# Patient Record
Sex: MALE | Race: OTHER | HISPANIC OR LATINO | URBAN - METROPOLITAN AREA
[De-identification: names, ages, dates, MRNs, and addresses within clinical notes are randomized per-mention and may not be internally consistent; named-entity substitution may affect disease eponyms.]

---

## 2018-07-23 ENCOUNTER — OUTPATIENT (OUTPATIENT)
Dept: OUTPATIENT SERVICES | Facility: HOSPITAL | Age: 53
LOS: 1 days | Discharge: HOME | End: 2018-07-23

## 2018-07-23 DIAGNOSIS — E11.65 TYPE 2 DIABETES MELLITUS WITH HYPERGLYCEMIA: ICD-10-CM

## 2018-07-23 DIAGNOSIS — I10 ESSENTIAL (PRIMARY) HYPERTENSION: ICD-10-CM

## 2018-07-23 DIAGNOSIS — E55.9 VITAMIN D DEFICIENCY, UNSPECIFIED: ICD-10-CM

## 2018-07-23 DIAGNOSIS — E78.00 PURE HYPERCHOLESTEROLEMIA, UNSPECIFIED: ICD-10-CM

## 2018-10-29 ENCOUNTER — OUTPATIENT (OUTPATIENT)
Dept: OUTPATIENT SERVICES | Facility: HOSPITAL | Age: 53
LOS: 1 days | Discharge: HOME | End: 2018-10-29

## 2018-10-29 DIAGNOSIS — E11.9 TYPE 2 DIABETES MELLITUS WITHOUT COMPLICATIONS: ICD-10-CM

## 2018-10-29 DIAGNOSIS — E55.9 VITAMIN D DEFICIENCY, UNSPECIFIED: ICD-10-CM

## 2018-10-29 DIAGNOSIS — I10 ESSENTIAL (PRIMARY) HYPERTENSION: ICD-10-CM

## 2018-10-29 DIAGNOSIS — E78.00 PURE HYPERCHOLESTEROLEMIA, UNSPECIFIED: ICD-10-CM

## 2021-04-17 ENCOUNTER — OUTPATIENT (OUTPATIENT)
Dept: OUTPATIENT SERVICES | Facility: HOSPITAL | Age: 56
LOS: 1 days | Discharge: HOME | End: 2021-04-17

## 2021-04-17 DIAGNOSIS — Z11.59 ENCOUNTER FOR SCREENING FOR OTHER VIRAL DISEASES: ICD-10-CM

## 2021-07-01 ENCOUNTER — INPATIENT (INPATIENT)
Facility: HOSPITAL | Age: 56
LOS: 23 days | Discharge: HOME | End: 2021-07-25
Attending: INTERNAL MEDICINE | Admitting: INTERNAL MEDICINE
Payer: COMMERCIAL

## 2021-07-01 VITALS
TEMPERATURE: 99 F | HEIGHT: 63 IN | WEIGHT: 163.14 LBS | SYSTOLIC BLOOD PRESSURE: 192 MMHG | OXYGEN SATURATION: 99 % | HEART RATE: 77 BPM | RESPIRATION RATE: 18 BRPM | DIASTOLIC BLOOD PRESSURE: 130 MMHG

## 2021-07-01 PROCEDURE — 93010 ELECTROCARDIOGRAM REPORT: CPT

## 2021-07-01 PROCEDURE — 99285 EMERGENCY DEPT VISIT HI MDM: CPT | Mod: 25

## 2021-07-01 PROCEDURE — 93308 TTE F-UP OR LMTD: CPT | Mod: 26

## 2021-07-01 NOTE — ED ADULT TRIAGE NOTE - CHIEF COMPLAINT QUOTE
I'm diabetic, my feet are swollen and they hurt, my arm is bothering me most of the day. My thighs also hurt - patient   Patient reports wanting medication for pain and be able to get appointment with specialist faster

## 2021-07-02 ENCOUNTER — TRANSCRIPTION ENCOUNTER (OUTPATIENT)
Age: 56
End: 2021-07-02

## 2021-07-02 DIAGNOSIS — R09.89 OTHER SPECIFIED SYMPTOMS AND SIGNS INVOLVING THE CIRCULATORY AND RESPIRATORY SYSTEMS: ICD-10-CM

## 2021-07-02 DIAGNOSIS — I50.9 HEART FAILURE, UNSPECIFIED: ICD-10-CM

## 2021-07-02 LAB
A1C WITH ESTIMATED AVERAGE GLUCOSE RESULT: 5.8 % — HIGH (ref 4–5.6)
ALBUMIN SERPL ELPH-MCNC: 2.5 G/DL — LOW (ref 3.5–5.2)
ALBUMIN SERPL ELPH-MCNC: 2.6 G/DL — LOW (ref 3.5–5.2)
ALP SERPL-CCNC: 166 U/L — HIGH (ref 30–115)
ALP SERPL-CCNC: 172 U/L — HIGH (ref 30–115)
ALT FLD-CCNC: 42 U/L — HIGH (ref 0–41)
ALT FLD-CCNC: 45 U/L — HIGH (ref 0–41)
ANION GAP SERPL CALC-SCNC: 5 MMOL/L — LOW (ref 7–14)
ANION GAP SERPL CALC-SCNC: 6 MMOL/L — LOW (ref 7–14)
AST SERPL-CCNC: 40 U/L — SIGNIFICANT CHANGE UP (ref 0–41)
AST SERPL-CCNC: 60 U/L — HIGH (ref 0–41)
B-OH-BUTYR SERPL-SCNC: <0.2 MMOL/L — SIGNIFICANT CHANGE UP
BASE EXCESS BLDV CALC-SCNC: 2.1 MMOL/L — HIGH (ref -2–2)
BASOPHILS # BLD AUTO: 0.03 K/UL — SIGNIFICANT CHANGE UP (ref 0–0.2)
BASOPHILS # BLD AUTO: 0.04 K/UL — SIGNIFICANT CHANGE UP (ref 0–0.2)
BASOPHILS NFR BLD AUTO: 0.3 % — SIGNIFICANT CHANGE UP (ref 0–1)
BASOPHILS NFR BLD AUTO: 0.4 % — SIGNIFICANT CHANGE UP (ref 0–1)
BILIRUB DIRECT SERPL-MCNC: <0.2 MG/DL — SIGNIFICANT CHANGE UP (ref 0–0.2)
BILIRUB INDIRECT FLD-MCNC: >0.3 MG/DL — SIGNIFICANT CHANGE UP (ref 0.2–1.2)
BILIRUB SERPL-MCNC: 0.4 MG/DL — SIGNIFICANT CHANGE UP (ref 0.2–1.2)
BILIRUB SERPL-MCNC: 0.5 MG/DL — SIGNIFICANT CHANGE UP (ref 0.2–1.2)
BLD GP AB SCN SERPL QL: SIGNIFICANT CHANGE UP
BUN SERPL-MCNC: 24 MG/DL — HIGH (ref 10–20)
BUN SERPL-MCNC: 27 MG/DL — HIGH (ref 10–20)
CA-I SERPL-SCNC: 1.15 MMOL/L — SIGNIFICANT CHANGE UP (ref 1.12–1.3)
CALCIUM SERPL-MCNC: 7.9 MG/DL — LOW (ref 8.5–10.1)
CALCIUM SERPL-MCNC: 8 MG/DL — LOW (ref 8.5–10.1)
CHLORIDE SERPL-SCNC: 103 MMOL/L — SIGNIFICANT CHANGE UP (ref 98–110)
CHLORIDE SERPL-SCNC: 105 MMOL/L — SIGNIFICANT CHANGE UP (ref 98–110)
CK MB CFR SERPL CALC: 10.2 NG/ML — HIGH (ref 0.6–6.3)
CK SERPL-CCNC: 863 U/L — HIGH (ref 0–225)
CO2 SERPL-SCNC: 26 MMOL/L — SIGNIFICANT CHANGE UP (ref 17–32)
CO2 SERPL-SCNC: 28 MMOL/L — SIGNIFICANT CHANGE UP (ref 17–32)
CREAT SERPL-MCNC: 2 MG/DL — HIGH (ref 0.7–1.5)
CREAT SERPL-MCNC: 2.1 MG/DL — HIGH (ref 0.7–1.5)
EOSINOPHIL # BLD AUTO: 0.31 K/UL — SIGNIFICANT CHANGE UP (ref 0–0.7)
EOSINOPHIL # BLD AUTO: 0.57 K/UL — SIGNIFICANT CHANGE UP (ref 0–0.7)
EOSINOPHIL NFR BLD AUTO: 3.3 % — SIGNIFICANT CHANGE UP (ref 0–8)
EOSINOPHIL NFR BLD AUTO: 5.5 % — SIGNIFICANT CHANGE UP (ref 0–8)
ESTIMATED AVERAGE GLUCOSE: 120 MG/DL — HIGH (ref 68–114)
GAS PNL BLDV: 138 MMOL/L — SIGNIFICANT CHANGE UP (ref 136–145)
GAS PNL BLDV: SIGNIFICANT CHANGE UP
GLUCOSE BLDC GLUCOMTR-MCNC: 130 MG/DL — HIGH (ref 70–99)
GLUCOSE BLDC GLUCOMTR-MCNC: 133 MG/DL — HIGH (ref 70–99)
GLUCOSE SERPL-MCNC: 133 MG/DL — HIGH (ref 70–99)
GLUCOSE SERPL-MCNC: 90 MG/DL — SIGNIFICANT CHANGE UP (ref 70–99)
HCO3 BLDV-SCNC: 29 MMOL/L — SIGNIFICANT CHANGE UP (ref 22–29)
HCT VFR BLD CALC: 36.9 % — LOW (ref 42–52)
HCT VFR BLD CALC: 39.8 % — LOW (ref 42–52)
HCT VFR BLDA CALC: 34 % — SIGNIFICANT CHANGE UP (ref 34–44)
HGB BLD CALC-MCNC: 11.1 G/DL — LOW (ref 14–18)
HGB BLD-MCNC: 11.1 G/DL — LOW (ref 14–18)
HGB BLD-MCNC: 11.7 G/DL — LOW (ref 14–18)
IMM GRANULOCYTES NFR BLD AUTO: 0.3 % — SIGNIFICANT CHANGE UP (ref 0.1–0.3)
IMM GRANULOCYTES NFR BLD AUTO: 0.4 % — HIGH (ref 0.1–0.3)
IRON SATN MFR SERPL: 22 % — SIGNIFICANT CHANGE UP (ref 15–50)
IRON SATN MFR SERPL: 43 UG/DL — SIGNIFICANT CHANGE UP (ref 35–150)
LACTATE BLDV-MCNC: 0.9 MMOL/L — SIGNIFICANT CHANGE UP (ref 0.5–1.6)
LACTATE SERPL-SCNC: 0.6 MMOL/L — LOW (ref 0.7–2)
LYMPHOCYTES # BLD AUTO: 1.04 K/UL — LOW (ref 1.2–3.4)
LYMPHOCYTES # BLD AUTO: 1.14 K/UL — LOW (ref 1.2–3.4)
LYMPHOCYTES # BLD AUTO: 11.1 % — LOW (ref 20.5–51.1)
LYMPHOCYTES # BLD AUTO: 11.1 % — LOW (ref 20.5–51.1)
MAGNESIUM SERPL-MCNC: 2 MG/DL — SIGNIFICANT CHANGE UP (ref 1.8–2.4)
MAGNESIUM SERPL-MCNC: 2.1 MG/DL — SIGNIFICANT CHANGE UP (ref 1.8–2.4)
MCHC RBC-ENTMCNC: 23.3 PG — LOW (ref 27–31)
MCHC RBC-ENTMCNC: 24.1 PG — LOW (ref 27–31)
MCHC RBC-ENTMCNC: 29.4 G/DL — LOW (ref 32–37)
MCHC RBC-ENTMCNC: 30.1 G/DL — LOW (ref 32–37)
MCV RBC AUTO: 79.3 FL — LOW (ref 80–94)
MCV RBC AUTO: 80 FL — SIGNIFICANT CHANGE UP (ref 80–94)
MONOCYTES # BLD AUTO: 0.7 K/UL — HIGH (ref 0.1–0.6)
MONOCYTES # BLD AUTO: 0.89 K/UL — HIGH (ref 0.1–0.6)
MONOCYTES NFR BLD AUTO: 7.5 % — SIGNIFICANT CHANGE UP (ref 1.7–9.3)
MONOCYTES NFR BLD AUTO: 8.7 % — SIGNIFICANT CHANGE UP (ref 1.7–9.3)
NEUTROPHILS # BLD AUTO: 7.23 K/UL — HIGH (ref 1.4–6.5)
NEUTROPHILS # BLD AUTO: 7.61 K/UL — HIGH (ref 1.4–6.5)
NEUTROPHILS NFR BLD AUTO: 74 % — SIGNIFICANT CHANGE UP (ref 42.2–75.2)
NEUTROPHILS NFR BLD AUTO: 77.4 % — HIGH (ref 42.2–75.2)
NRBC # BLD: 0 /100 WBCS — SIGNIFICANT CHANGE UP (ref 0–0)
NRBC # BLD: 0 /100 WBCS — SIGNIFICANT CHANGE UP (ref 0–0)
NT-PROBNP SERPL-SCNC: HIGH PG/ML (ref 0–300)
OSMOLALITY SERPL: 288 MOS/KG — SIGNIFICANT CHANGE UP (ref 275–300)
PCO2 BLDV: 55 MMHG — HIGH (ref 41–51)
PH BLDV: 7.33 — SIGNIFICANT CHANGE UP (ref 7.26–7.43)
PLATELET # BLD AUTO: 230 K/UL — SIGNIFICANT CHANGE UP (ref 130–400)
PLATELET # BLD AUTO: 239 K/UL — SIGNIFICANT CHANGE UP (ref 130–400)
PO2 BLDV: 17 MMHG — LOW (ref 20–40)
POTASSIUM SERPL-MCNC: 4.6 MMOL/L — SIGNIFICANT CHANGE UP (ref 3.5–5)
POTASSIUM SERPL-MCNC: 4.8 MMOL/L — SIGNIFICANT CHANGE UP (ref 3.5–5)
POTASSIUM SERPL-SCNC: 4.6 MMOL/L — SIGNIFICANT CHANGE UP (ref 3.5–5)
POTASSIUM SERPL-SCNC: 4.8 MMOL/L — SIGNIFICANT CHANGE UP (ref 3.5–5)
PROT SERPL-MCNC: 5.1 G/DL — LOW (ref 6–8)
PROT SERPL-MCNC: 5.1 G/DL — LOW (ref 6–8)
RAPID RVP RESULT: SIGNIFICANT CHANGE UP
RBC # BLD: 4.61 M/UL — LOW (ref 4.7–6.1)
RBC # BLD: 5.02 M/UL — SIGNIFICANT CHANGE UP (ref 4.7–6.1)
RBC # FLD: 14.1 % — SIGNIFICANT CHANGE UP (ref 11.5–14.5)
RBC # FLD: 14.3 % — SIGNIFICANT CHANGE UP (ref 11.5–14.5)
SAO2 % BLDV: 19 % — SIGNIFICANT CHANGE UP
SARS-COV-2 RNA SPEC QL NAA+PROBE: SIGNIFICANT CHANGE UP
SODIUM SERPL-SCNC: 136 MMOL/L — SIGNIFICANT CHANGE UP (ref 135–146)
SODIUM SERPL-SCNC: 137 MMOL/L — SIGNIFICANT CHANGE UP (ref 135–146)
TIBC SERPL-MCNC: 198 UG/DL — LOW (ref 220–430)
TROPONIN T SERPL-MCNC: 0.1 NG/ML — CRITICAL HIGH
TROPONIN T SERPL-MCNC: 0.11 NG/ML — CRITICAL HIGH
TROPONIN T SERPL-MCNC: 0.11 NG/ML — CRITICAL HIGH
UIBC SERPL-MCNC: 155 UG/DL — SIGNIFICANT CHANGE UP (ref 110–370)
WBC # BLD: 10.28 K/UL — SIGNIFICANT CHANGE UP (ref 4.8–10.8)
WBC # BLD: 9.35 K/UL — SIGNIFICANT CHANGE UP (ref 4.8–10.8)
WBC # FLD AUTO: 10.28 K/UL — SIGNIFICANT CHANGE UP (ref 4.8–10.8)
WBC # FLD AUTO: 9.35 K/UL — SIGNIFICANT CHANGE UP (ref 4.8–10.8)

## 2021-07-02 PROCEDURE — 93306 TTE W/DOPPLER COMPLETE: CPT | Mod: 26

## 2021-07-02 PROCEDURE — 76770 US EXAM ABDO BACK WALL COMP: CPT | Mod: 26

## 2021-07-02 PROCEDURE — 99223 1ST HOSP IP/OBS HIGH 75: CPT

## 2021-07-02 PROCEDURE — 71045 X-RAY EXAM CHEST 1 VIEW: CPT | Mod: 26

## 2021-07-02 PROCEDURE — 93970 EXTREMITY STUDY: CPT | Mod: 26

## 2021-07-02 PROCEDURE — 99222 1ST HOSP IP/OBS MODERATE 55: CPT

## 2021-07-02 RX ORDER — SODIUM CHLORIDE 9 MG/ML
1000 INJECTION, SOLUTION INTRAVENOUS
Refills: 0 | Status: DISCONTINUED | OUTPATIENT
Start: 2021-07-02 | End: 2021-07-19

## 2021-07-02 RX ORDER — DEXTROSE 50 % IN WATER 50 %
25 SYRINGE (ML) INTRAVENOUS ONCE
Refills: 0 | Status: DISCONTINUED | OUTPATIENT
Start: 2021-07-02 | End: 2021-07-19

## 2021-07-02 RX ORDER — FUROSEMIDE 40 MG
80 TABLET ORAL EVERY 12 HOURS
Refills: 0 | Status: DISCONTINUED | OUTPATIENT
Start: 2021-07-02 | End: 2021-07-05

## 2021-07-02 RX ORDER — BRIMONIDINE TARTRATE 2 MG/MG
0 SOLUTION/ DROPS OPHTHALMIC
Qty: 0 | Refills: 0 | DISCHARGE

## 2021-07-02 RX ORDER — NIFEDIPINE 30 MG
30 TABLET, EXTENDED RELEASE 24 HR ORAL ONCE
Refills: 0 | Status: COMPLETED | OUTPATIENT
Start: 2021-07-02 | End: 2021-07-02

## 2021-07-02 RX ORDER — GLUCAGON INJECTION, SOLUTION 0.5 MG/.1ML
1 INJECTION, SOLUTION SUBCUTANEOUS ONCE
Refills: 0 | Status: DISCONTINUED | OUTPATIENT
Start: 2021-07-02 | End: 2021-07-19

## 2021-07-02 RX ORDER — TIMOLOL 0.5 %
1 DROPS OPHTHALMIC (EYE)
Refills: 0 | Status: DISCONTINUED | OUTPATIENT
Start: 2021-07-02 | End: 2021-07-25

## 2021-07-02 RX ORDER — BRIMONIDINE TARTRATE 2 MG/MG
1 SOLUTION/ DROPS OPHTHALMIC
Refills: 0 | Status: DISCONTINUED | OUTPATIENT
Start: 2021-07-02 | End: 2021-07-25

## 2021-07-02 RX ORDER — LATANOPROST 0.05 MG/ML
0 SOLUTION/ DROPS OPHTHALMIC; TOPICAL
Qty: 0 | Refills: 0 | DISCHARGE

## 2021-07-02 RX ORDER — DORZOLAMIDE HYDROCHLORIDE TIMOLOL MALEATE 20; 5 MG/ML; MG/ML
1 SOLUTION/ DROPS OPHTHALMIC
Refills: 0 | Status: DISCONTINUED | OUTPATIENT
Start: 2021-07-02 | End: 2021-07-02

## 2021-07-02 RX ORDER — FUROSEMIDE 40 MG
0 TABLET ORAL
Qty: 0 | Refills: 0 | DISCHARGE

## 2021-07-02 RX ORDER — ENOXAPARIN SODIUM 100 MG/ML
40 INJECTION SUBCUTANEOUS DAILY
Refills: 0 | Status: DISCONTINUED | OUTPATIENT
Start: 2021-07-02 | End: 2021-07-12

## 2021-07-02 RX ORDER — DEXTROSE 50 % IN WATER 50 %
12.5 SYRINGE (ML) INTRAVENOUS ONCE
Refills: 0 | Status: DISCONTINUED | OUTPATIENT
Start: 2021-07-02 | End: 2021-07-19

## 2021-07-02 RX ORDER — ATORVASTATIN CALCIUM 80 MG/1
40 TABLET, FILM COATED ORAL AT BEDTIME
Refills: 0 | Status: DISCONTINUED | OUTPATIENT
Start: 2021-07-02 | End: 2021-07-25

## 2021-07-02 RX ORDER — ATORVASTATIN CALCIUM 80 MG/1
0 TABLET, FILM COATED ORAL
Qty: 0 | Refills: 0 | DISCHARGE

## 2021-07-02 RX ORDER — KETOROLAC TROMETHAMINE 0.5 %
0 DROPS OPHTHALMIC (EYE)
Qty: 0 | Refills: 0 | DISCHARGE

## 2021-07-02 RX ORDER — FUROSEMIDE 40 MG
40 TABLET ORAL ONCE
Refills: 0 | Status: COMPLETED | OUTPATIENT
Start: 2021-07-02 | End: 2021-07-02

## 2021-07-02 RX ORDER — DORZOLAMIDE HYDROCHLORIDE 20 MG/ML
1 SOLUTION/ DROPS OPHTHALMIC THREE TIMES A DAY
Refills: 0 | Status: DISCONTINUED | OUTPATIENT
Start: 2021-07-02 | End: 2021-07-25

## 2021-07-02 RX ORDER — DEXTROSE 50 % IN WATER 50 %
15 SYRINGE (ML) INTRAVENOUS ONCE
Refills: 0 | Status: DISCONTINUED | OUTPATIENT
Start: 2021-07-02 | End: 2021-07-19

## 2021-07-02 RX ORDER — NIFEDIPINE 30 MG
30 TABLET, EXTENDED RELEASE 24 HR ORAL DAILY
Refills: 0 | Status: DISCONTINUED | OUTPATIENT
Start: 2021-07-02 | End: 2021-07-03

## 2021-07-02 RX ORDER — CHLORHEXIDINE GLUCONATE 213 G/1000ML
1 SOLUTION TOPICAL
Refills: 0 | Status: DISCONTINUED | OUTPATIENT
Start: 2021-07-02 | End: 2021-07-25

## 2021-07-02 RX ORDER — DORZOLAMIDE HYDROCHLORIDE TIMOLOL MALEATE 20; 5 MG/ML; MG/ML
0 SOLUTION/ DROPS OPHTHALMIC
Qty: 0 | Refills: 0 | DISCHARGE

## 2021-07-02 RX ORDER — LATANOPROST 0.05 MG/ML
1 SOLUTION/ DROPS OPHTHALMIC; TOPICAL AT BEDTIME
Refills: 0 | Status: DISCONTINUED | OUTPATIENT
Start: 2021-07-02 | End: 2021-07-25

## 2021-07-02 RX ORDER — PIOGLITAZONE HCL AND METFORMIN HCL 850; 15 MG/1; MG/1
0 TABLET ORAL
Qty: 0 | Refills: 0 | DISCHARGE

## 2021-07-02 RX ORDER — LISINOPRIL 2.5 MG/1
0 TABLET ORAL
Qty: 0 | Refills: 0 | DISCHARGE

## 2021-07-02 RX ADMIN — DORZOLAMIDE HYDROCHLORIDE 1 DROP(S): 20 SOLUTION/ DROPS OPHTHALMIC at 06:38

## 2021-07-02 RX ADMIN — Medication 40 MILLIGRAM(S): at 03:25

## 2021-07-02 RX ADMIN — DORZOLAMIDE HYDROCHLORIDE 1 DROP(S): 20 SOLUTION/ DROPS OPHTHALMIC at 21:34

## 2021-07-02 RX ADMIN — Medication 1 DROP(S): at 17:21

## 2021-07-02 RX ADMIN — Medication 80 MILLIGRAM(S): at 17:21

## 2021-07-02 RX ADMIN — ATORVASTATIN CALCIUM 40 MILLIGRAM(S): 80 TABLET, FILM COATED ORAL at 21:34

## 2021-07-02 RX ADMIN — Medication 1 DROP(S): at 06:38

## 2021-07-02 RX ADMIN — BRIMONIDINE TARTRATE 1 DROP(S): 2 SOLUTION/ DROPS OPHTHALMIC at 06:38

## 2021-07-02 RX ADMIN — Medication 30 MILLIGRAM(S): at 16:23

## 2021-07-02 RX ADMIN — ENOXAPARIN SODIUM 40 MILLIGRAM(S): 100 INJECTION SUBCUTANEOUS at 11:42

## 2021-07-02 RX ADMIN — LATANOPROST 1 DROP(S): 0.05 SOLUTION/ DROPS OPHTHALMIC; TOPICAL at 21:34

## 2021-07-02 RX ADMIN — DORZOLAMIDE HYDROCHLORIDE 1 DROP(S): 20 SOLUTION/ DROPS OPHTHALMIC at 14:08

## 2021-07-02 RX ADMIN — BRIMONIDINE TARTRATE 1 DROP(S): 2 SOLUTION/ DROPS OPHTHALMIC at 17:21

## 2021-07-02 NOTE — H&P ADULT - NSICDXPASTMEDICALHX_GEN_ALL_CORE_FT
PAST MEDICAL HISTORY:  DM (diabetes mellitus)     Glaucoma     HLD (hyperlipidemia)     HTN (hypertension)

## 2021-07-02 NOTE — H&P ADULT - NSHPPHYSICALEXAM_GEN_ALL_CORE
GENERAL: mildly distressed  HEAD:  Atraumatic, Normocephalic  EYES: EOMI, PERRLA, conjunctiva and sclera clear  NECK: Supple, No JVD  CHEST/LUNG: Decreased breath sounds b/l  HEART: Regular rate and rhythm; No murmurs, rubs, or gallops  ABDOMEN: Soft, Nontender, Nondistended; Bowel sounds present  EXTREMITIES:  Pitting Edema up till mid calf  PSYCH: AAOx3  NEUROLOGY: non-focal  SKIN: No rashes or lesions

## 2021-07-02 NOTE — CHART NOTE - NSCHARTNOTEFT_GEN_A_CORE
pt is feeling better compared to when he got to ER    CARDIAC MARKERS ( 02 Jul 2021 06:03 )  x     / 0.11 ng/mL / x     / x     / x      CARDIAC MARKERS ( 02 Jul 2021 01:09 )  x     / 0.10 ng/mL / x     / x     / x        #Progress Note Handoff  Pending (specify):  ECHo, cardio   Family discussion: dw pt, fully aaox3  Disposition: Home_

## 2021-07-02 NOTE — H&P ADULT - HISTORY OF PRESENT ILLNESS
PC: LE swelling + Pain 1month    PMHx: HTN, HLD, T2DM on insulin, Glaucoma    HPI: 56M w/ PMHx as above presents to the Ed c/o worsening LE swelling and pain for the past 1 month. Endorses SOB on exertion, along w/ increased abdominal girth. Denies orthopnea, PND, wheeze, CP, diaphoresis. No cardiac Hx.    ED course: /130 in triage, otherwise VS. Labs showed Hb 11.1, Cr 2.1, mildly increased ALP and AST/ALT, Trop 0.10, BNP 29595. CXR revealing cardiomegaly and CP angle blunting b/l.

## 2021-07-02 NOTE — ED PROVIDER NOTE - PHYSICAL EXAMINATION
Vital Signs: Reviewed  GEN: alert, NAD, speaks full sentences  HEAD:  normocephalic, atraumatic  EYES:  PERRLA; conjunctivae without injection, drainage or discharge  ENMT:  nasal mucosa moist; mouth moist without ulcerations or lesions; throat moist without erythema, exudate, ulcerations or lesions  NECK:  supple  CARDIAC:  regular rate, normal S1 and S2, no murmurs  RESP:  tachypneic, crackles bases  ABDOMEN:  soft, nontender, distended  MUSCULOSKELETAL/NEURO:  normal movement, normal tone  SKIN:  normal skin color for age and race, well-perfused; warm and dry

## 2021-07-02 NOTE — ED ADULT NURSE NOTE - OBJECTIVE STATEMENT
pt c/o generalized weakness, poor po intake x3 days. pt also states bilateral lower leg swelling and increased sob on dyspnea x1 week

## 2021-07-02 NOTE — ED PROVIDER NOTE - PROGRESS NOTE DETAILS
AG: pt confirms CP free, still denies SOB but confirms that it is difficult to take a deep breath. ECG non-ischemic.

## 2021-07-02 NOTE — CONSULT NOTE ADULT - PROBLEM SELECTOR RECOMMENDATION 9
- Patient likely developed progressive HF over years 2/2 poor bp control. Unclear if patient is truly compliant on prescribed meds including lasix at home. Pioglitazone likely exacerbated sx of HF.  - C/w aggressive diuresis 80mg lasix BID.   - Please strictly document I&Os. Daily weight monitoring. Po fluid restriction.  - Monitor on tele and pending TTE results.  - Closely monitor electrolytes and renal functions. Will replete K and Mg to 4 and 2 respectively as needed.  - Toponemia on admission c/w CHF exacerbation in the context of LENNY/CKD. No signs of ACS at present.  - BP control as needed along with diuresis. Closely monitor BP.    Please refer to attending documentation for final plan of care. - Patient likely developed progressive HF over years 2/2 poor bp control. Unclear if patient is truly compliant on prescribed meds including lasix at home. Pioglitazone likely exacerbated sx of HF.  - C/w diuresis 80mg lasix BID.   - Please strictly document I&Os. Daily weight monitoring. Po fluid restriction.  - Monitor on tele and pending TTE results.  - Closely monitor electrolytes and renal functions. Will replete K and Mg to 4 and 2 respectively as needed.  - Toponemia on admission c/w CHF exacerbation in the context of LENNY/CKD. No signs of ACS at present. However, will consider need for ischemic w/u based on TTE findings and LV functions.  - BP control as needed along with diuresis. Closely monitor BP. Okay to hold off on initiating nifedipine as patient's BP likely chronic elevated and aggressive diuresis will likely resolve hypertension.  - Please stop pioglitazone at discharge as this could precipitate HF exacerbation.    Please refer to attending documentation for final plan of care.

## 2021-07-02 NOTE — H&P ADULT - ASSESSMENT
56M w/ PMHx of HTN, HLD, T2DM on insulin, Glaucoma presents to the Ed c/o worsening LE swelling and pain for the past 1 month.    #Acute CHF exacerbation  #LE Pitting Edema, b/l  -Denies cardiac Hx   -Usual weight per patient: 67.3kg, Weight on admission: 74kg  -CXR: cardiomegaly and CP angle blunting b/l.  -Serum Pro-BNP: 73108  -Currently not requiring supplemental O2  Plan  - VA Duplex  - I<O, Daily Weight  - IV Lasix  - No ACEI/BB until patient euvolemic  - 2D echo  - supplemental O2 as needed    #LENNY on CKD  #Cardiorenal Syndrome  -Cr increased from 1.7 (3/29/21) to 2.1  Plan  - Urine lytes for FeNa and FeUrea, and protein-creatine ratio.  - UA to evaluate for proteinuria/sterile pyuria/casts  - f/u US KUB to r/o obstruction  - Monitor I&Os to assess for oliguria/anuria.    #HTN  #HLD  -Holding ACEI  -cw Statin    #T2DM  -On Lantus and Metformin-Pioglitazone at home  Plan  - f/u A1c  - Insulin basal/bolus if FS>180    #Glaucoma  -Eye drops    DVT ppx Lovenox  GI ppx N/A  Diet DASH/CC. fluid restricted  Code Full  Dispo Acute

## 2021-07-02 NOTE — CONSULT NOTE ADULT - SUBJECTIVE AND OBJECTIVE BOX
Date of Admission: 07-02-21    CHIEF COMPLAINT: weakness    HISTORY OF PRESENT ILLNESS:   57yo M hx of HTN, HLD, DMII on insulin and pioglitazone presented to ED with cc of generalized weakness. Cardiology service consulted for generalized swelling concerning for CHF exacerbation. Patient reported generalized weakness onset one month ago. His wife recently travelled abroad and he has been eating take-out food lately (likely high in salt content). Patient reported noticing increasing LE swelling. Also sob with walking 2 flight of stairs. He realized increasing abd girth as well as weight gain several pounds. Patient otherwise denied orthopnea, chest pain or palpitations. Patient reported compliant on all prescribed medication at home.    Interval Hx On admission, patient noted hypoglycemic to 60s and now recovered feeling better in the ED. BP remains elevated and denied active complaints at the time of this eval.    PAST MEDICAL & SURGICAL HISTORY  HTN (hypertension)  HLD (hyperlipidemia)  DM (diabetes mellitus)  Glaucoma  No significant past surgical history    FAMILY HISTORY:  FAMILY HISTORY:  No pertinent family history in first degree relatives    SOCIAL HISTORY:  [x]smoker  []Alcohol occasionally/socially  []Drug    ROS:  Negative except as mentioned in HPI    ALLERGIES:  No Known Allergies    MEDICATIONS:  MEDICATIONS  (STANDING):  atorvastatin 40 milliGRAM(s) Oral at bedtime  brimonidine 0.2% Ophthalmic Solution 1 Drop(s) Both EYES two times a day  chlorhexidine 4% Liquid 1 Application(s) Topical <User Schedule>  dextrose 40% Gel 15 Gram(s) Oral once  dextrose 5%. 1000 milliLiter(s) (50 mL/Hr) IV Continuous <Continuous>  dextrose 5%. 1000 milliLiter(s) (100 mL/Hr) IV Continuous <Continuous>  dextrose 50% Injectable 25 Gram(s) IV Push once  dextrose 50% Injectable 12.5 Gram(s) IV Push once  dextrose 50% Injectable 25 Gram(s) IV Push once  dorzolamide 2% Ophthalmic Solution 1 Drop(s) Both EYES three times a day  enoxaparin Injectable 40 milliGRAM(s) SubCutaneous daily  furosemide   Injectable 80 milliGRAM(s) IV Push every 12 hours  glucagon  Injectable 1 milliGRAM(s) IntraMuscular once  latanoprost 0.005% Ophthalmic Solution 1 Drop(s) Both EYES at bedtime  NIFEdipine XL 30 milliGRAM(s) Oral once  NIFEdipine XL 30 milliGRAM(s) Oral daily  timolol 0.5% Solution 1 Drop(s) Both EYES two times a day  MEDICATIONS  (PRN):    HOME MEDICATIONS:  Home Medications:  atorvastatin 40 mg oral tablet: 1 tab(s) orally once a day (02 Jul 2021 04:46)  BASAGLAR 100 UNIT/ML KWIKPEN:  (02 Jul 2021 04:46)  brimonidine 0.2% ophthalmic solution: 1 drop(s) to each affected eye 2 times a day (02 Jul 2021 04:46)  dorzolamide-timolol 2%-0.5% preservative-free ophthalmic solution: 1 drop(s) to each affected eye 2 times a day (02 Jul 2021 04:46)  furosemide 20 mg oral tablet: 1 tab(s) orally once a day (02 Jul 2021 04:46)  latanoprost 0.005% ophthalmic solution: 1 drop(s) to each affected eye once a day (in the evening) (02 Jul 2021 04:46)  lisinopril 20 mg oral tablet: 1 tab(s) orally once a day (02 Jul 2021 04:46)  pioglitazone-metformin 15 mg-850 mg oral tablet: 1 tab(s) orally 2 times a day (02 Jul 2021 04:46)  Rhopressa 0.02% ophthalmic solution: 1 drop(s) to each affected eye once a day (in the evening) (02 Jul 2021 04:46)    VITALS:   T(F): 98.7 (07-02 @ 13:06), Max: 99.1 (07-01 @ 23:44)  HR: 84 (07-02 @ 13:08) (69 - 86)  BP: 168/104 (07-02 @ 13:25) (151/89 - 195/120)  BP(mean): --  RR: 18 (07-02 @ 13:08) (18 - 18)  SpO2: 98% (07-02 @ 06:00) (98% - 99%)    PHYSICAL EXAM:  GEN: Not in acute distress  HEENT: NCAT, PERRL, EOMI  LUNGS: Clear to auscultation bilaterally   CARDIOVASCULAR: RRR, S1/S2 present,   ABD: Soft, non-tender, distended.  EXT: 2+ LE swelling up to bilateral knee level  SKIN: Intact  NEURO: AAOx3    LABS:                        11.7   9.35  )-----------( 239      ( 02 Jul 2021 11:37 )             39.8     07-02    136  |  103  |  27<H>  ----------------------------<  133<H>  4.6   |  28  |  2.0<H>    Ca    7.9<L>      02 Jul 2021 11:37  Mg     2.0     07-02    TPro  5.1<L>  /  Alb  2.6<L>  /  TBili  0.5  /  DBili  <0.2  /  AST  40  /  ALT  42<H>  /  AlkPhos  166<H>  07-02      Lactate, Blood: 0.6 mmol/L *L* (07-02-21 @ 11:37)  Creatine Kinase, Serum: 863 U/L *H* (07-02-21 @ 11:37)  Troponin T, Serum: 0.11 ng/mL *HH* (07-02-21 @ 11:37)  Troponin T, Serum: 0.11 ng/mL *HH* (07-02-21 @ 06:03)  Troponin T, Serum: 0.10 ng/mL *HH* (07-02-21 @ 01:09)    Troponin 0.11, CKMB 10.2, / 07-02-21 @ 11:37  Troponin 0.11, CKMB --, CK --/ 07-02-21 @ 06:03  Troponin 0.10, CKMB --, CK --/ 07-02-21 @ 01:09    Serum Pro-Brain Natriuretic Peptide: 02878 pg/mL (07-02-21 @ 01:09)      Hemoglobin A1C   Thyroid    RADIOLOGY:  -CXR: bilateral pleural effusion, cardiomegaly    -TTE: N/A    ECG:  Sinus with frequent PVCs, Extreme RAD, RBBB    TELEMETRY EVENTS:  PVC   Date of Admission: 07-02-21    CHIEF COMPLAINT: weakness    HISTORY OF PRESENT ILLNESS:   55yo M hx of HTN, HLD, DMII on insulin and pioglitazone presented to ED with cc of generalized weakness. Cardiology service consulted for generalized swelling concerning for CHF exacerbation. Patient reported generalized weakness onset one month ago. His wife recently travelled abroad and he has been eating take-out food lately (likely high in salt content). Patient reported noticing increasing LE swelling. Also sob with walking 2 flight of stairs. He realized increasing abd girth as well as weight gain several pounds. Patient otherwise denied orthopnea, chest pain or palpitations. Patient reported compliant on all prescribed medication at home.    Interval Hx On admission, patient noted hypoglycemic to 60s and now recovered feeling better in the ED. BP remains elevated and denied active complaints at the time of this eval.    PAST MEDICAL & SURGICAL HISTORY  HTN (hypertension)  HLD (hyperlipidemia)  DM (diabetes mellitus)  Glaucoma  No significant past surgical history    FAMILY HISTORY:  FAMILY HISTORY:  No pertinent family history in first degree relatives    SOCIAL HISTORY:  [x]smoker  []Alcohol occasionally/socially  []Drug    ROS:  Negative except as mentioned in HPI    ALLERGIES:  No Known Allergies    MEDICATIONS:  MEDICATIONS  (STANDING):  atorvastatin 40 milliGRAM(s) Oral at bedtime  brimonidine 0.2% Ophthalmic Solution 1 Drop(s) Both EYES two times a day  chlorhexidine 4% Liquid 1 Application(s) Topical <User Schedule>  dextrose 40% Gel 15 Gram(s) Oral once  dextrose 5%. 1000 milliLiter(s) (50 mL/Hr) IV Continuous <Continuous>  dextrose 5%. 1000 milliLiter(s) (100 mL/Hr) IV Continuous <Continuous>  dextrose 50% Injectable 25 Gram(s) IV Push once  dextrose 50% Injectable 12.5 Gram(s) IV Push once  dextrose 50% Injectable 25 Gram(s) IV Push once  dorzolamide 2% Ophthalmic Solution 1 Drop(s) Both EYES three times a day  enoxaparin Injectable 40 milliGRAM(s) SubCutaneous daily  furosemide   Injectable 80 milliGRAM(s) IV Push every 12 hours  glucagon  Injectable 1 milliGRAM(s) IntraMuscular once  latanoprost 0.005% Ophthalmic Solution 1 Drop(s) Both EYES at bedtime  NIFEdipine XL 30 milliGRAM(s) Oral once  NIFEdipine XL 30 milliGRAM(s) Oral daily  timolol 0.5% Solution 1 Drop(s) Both EYES two times a day  MEDICATIONS  (PRN):    HOME MEDICATIONS:  Home Medications:  atorvastatin 40 mg oral tablet: 1 tab(s) orally once a day (02 Jul 2021 04:46)  BASAGLAR 100 UNIT/ML KWIKPEN:  (02 Jul 2021 04:46)  brimonidine 0.2% ophthalmic solution: 1 drop(s) to each affected eye 2 times a day (02 Jul 2021 04:46)  dorzolamide-timolol 2%-0.5% preservative-free ophthalmic solution: 1 drop(s) to each affected eye 2 times a day (02 Jul 2021 04:46)  furosemide 20 mg oral tablet: 1 tab(s) orally once a day (02 Jul 2021 04:46)  latanoprost 0.005% ophthalmic solution: 1 drop(s) to each affected eye once a day (in the evening) (02 Jul 2021 04:46)  lisinopril 20 mg oral tablet: 1 tab(s) orally once a day (02 Jul 2021 04:46)  pioglitazone-metformin 15 mg-850 mg oral tablet: 1 tab(s) orally 2 times a day (02 Jul 2021 04:46)  Rhopressa 0.02% ophthalmic solution: 1 drop(s) to each affected eye once a day (in the evening) (02 Jul 2021 04:46)    VITALS:   T(F): 98.7 (07-02 @ 13:06), Max: 99.1 (07-01 @ 23:44)  HR: 84 (07-02 @ 13:08) (69 - 86)  BP: 168/104 (07-02 @ 13:25) (151/89 - 195/120)  BP(mean): --  RR: 18 (07-02 @ 13:08) (18 - 18)  SpO2: 98% (07-02 @ 06:00) (98% - 99%)    PHYSICAL EXAM:  GEN: Not in acute distress  HEENT: NCAT, PERRL, EOMI  LUNGS: Clear to auscultation bilaterally. Diminished lung sounds bilaterally lower lung fields.  CARDIOVASCULAR: RRR, S1/S2 present,   ABD: Soft, non-tender, distended.  EXT: 2+ LE swelling up to bilateral knee level  SKIN: Intact  NEURO: AAOx3    LABS:                        11.7   9.35  )-----------( 239      ( 02 Jul 2021 11:37 )             39.8     07-02    136  |  103  |  27<H>  ----------------------------<  133<H>  4.6   |  28  |  2.0<H>    Ca    7.9<L>      02 Jul 2021 11:37  Mg     2.0     07-02    TPro  5.1<L>  /  Alb  2.6<L>  /  TBili  0.5  /  DBili  <0.2  /  AST  40  /  ALT  42<H>  /  AlkPhos  166<H>  07-02      Lactate, Blood: 0.6 mmol/L *L* (07-02-21 @ 11:37)  Creatine Kinase, Serum: 863 U/L *H* (07-02-21 @ 11:37)  Troponin T, Serum: 0.11 ng/mL *HH* (07-02-21 @ 11:37)  Troponin T, Serum: 0.11 ng/mL *HH* (07-02-21 @ 06:03)  Troponin T, Serum: 0.10 ng/mL *HH* (07-02-21 @ 01:09)    Troponin 0.11, CKMB 10.2, / 07-02-21 @ 11:37  Troponin 0.11, CKMB --, CK --/ 07-02-21 @ 06:03  Troponin 0.10, CKMB --, CK --/ 07-02-21 @ 01:09    Serum Pro-Brain Natriuretic Peptide: 20071 pg/mL (07-02-21 @ 01:09)      Hemoglobin A1C   Thyroid    RADIOLOGY:  -CXR: bilateral pleural effusion, cardiomegaly    -TTE: N/A    ECG:  Sinus with frequent PVCs, Extreme RAD, RBBB    TELEMETRY EVENTS:  PVC

## 2021-07-02 NOTE — DISCHARGE NOTE NURSING/CASE MANAGEMENT/SOCIAL WORK - PATIENT PORTAL LINK FT
You can access the FollowMyHealth Patient Portal offered by [facility] by registering at the following website: [email or web address]. By joining [employer]’s FollowMyHealth portal, you will also be able to view your health information using other applications (apps) compatible with our system.

## 2021-07-02 NOTE — ED ADULT NURSE REASSESSMENT NOTE - NS ED NURSE REASSESS COMMENT FT1
rn contacted md [phone] regarding pt admitting . md confirmed that pt is ok to go to medicine floor and not tele and will trend troponins in the am to see if they are up or down trending

## 2021-07-02 NOTE — H&P ADULT - ATTENDING COMMENTS
HPI:  PC: LE swelling + Pain 1month    PMHx: HTN, HLD, T2DM on insulin, Glaucoma    HPI: 56M w/ PMHx as above presents to the Ed c/o worsening LE swelling and pain for the past 1 month. Endorses SOB on exertion, along w/ increased abdominal girth. Denies orthopnea, PND, wheeze, CP, diaphoresis. No cardiac Hx.    ED course: /130 in triage, otherwise VS. Labs showed Hb 11.1, Cr 2.1, mildly increased ALP and AST/ALT, Trop 0.10, BNP 01519. CXR revealing cardiomegaly and CP angle blunting b/l. (02 Jul 2021 05:08)    REVIEW OF SYSTEMS: see cc/HPI   CONSTITUTIONAL: No weakness, fevers or chills  EYES/ENT: No visual changes;  No vertigo or throat pain   NECK: No pain or stiffness  RESPIRATORY: No cough, wheezing, hemoptysis; (+) shortness of breath/[place]  CARDIOVASCULAR: No chest pain or palpitations, (+) pedal edema   GASTROINTESTINAL: No abdominal or epigastric pain. No nausea, vomiting, or hematemesis; No diarrhea or constipation. No melena or hematochezia.  GENITOURINARY: No dysuria, frequency or hematuria  NEUROLOGICAL: No numbness or weakness  SKIN: No itching, rashes, (+) dry    Physical Exam:   General: WN/WD NAD  Neurology: A&Ox3, nonfocal, follows commands  Eyes: PERRLA/ EOMI  ENT/Neck: Neck supple, trachea midline, No JVD  Respiratory: CTA B/L, No wheezing, rales, rhonchi  CV: Normal rate regular rhythm, S3 present, no murmurs/ rubs  Abdominal: Soft, NT, ND +BS,   Extremities: (+) pitting edema, + peripheral pulses  Skin: No Rashes, Hematoma, Ecchymosis, (+) dry skin   Incisions: n/a  Tubes: n/a    A/p  New onset HF?EF   -admit to tele   -serial CE and EKGs  -2D echo ( heart appears dilated on CXR)  -IV lasix /Is and Os/ Fluid restriction   -dietary eval    LENNY on CKD III ( in setting of DM and Uncontrolled HTN)  -Renal U/S   -renal lytes/ cr  -serial Scr /BUN while diuresing   -Is and Os  -hold ACEI for now     HTN - uncontrolled in ED  -IV lasix   -may need to add Hydralazine     Type II DM/ Dyslipidemia   -Hold oral hypoglycemic agents   -Lispro Sliding scale w/ F/S monitoring   -statin     DVT prophylaxis

## 2021-07-02 NOTE — H&P ADULT - NSHPLABSRESULTS_GEN_ALL_CORE
Labs:                         11.1   10.28 )-----------( 230      ( 02 Jul 2021 01:09 )             36.9     Neutophil% 74.0, Lymphocyte% 11.1, Monocyte% 8.7, Bands% 0.3 07-02-21 @ 01:09    02 Jul 2021 01:09    137    |  105    |  24     ----------------------------<  90     4.8     |  26     |  2.1      Ca    8.0        02 Jul 2021 01:09  Mg     2.1       02 Jul 2021 01:09    TPro  5.1    /  Alb  2.5    /  TBili  0.4    /  DBili  x      /  AST  60     /  ALT  45     /  AlkPhos  172    02 Jul 2021 01:09            Serum Pro-Brain Natriuretic Peptide: 47752 pg/mL (07-02-21 @ 01:09)    Troponin 0.10, CKMB --, CK -- 07-02-21 @ 01:09                            Troponin T, Serum: 0.10 ng/mL (07-02)      Radiology:

## 2021-07-02 NOTE — ED PROVIDER NOTE - OBJECTIVE STATEMENT
56yM pmhx HTN, HLD, DM c/o leg pain and swelling for a while, worsening over the past day; constant; states he recently saw PMD Dr Kitchen who rx'ed a water pill which pt has been taking; poor PO intake as wife has been in the [place] recently. Denies chest pain, SOB, fever/chills, abd pain, n/v/d.

## 2021-07-02 NOTE — CONSULT NOTE ADULT - ASSESSMENT
57yo M hx of HTN, HLD, DMII on insulin and pioglitazone presented to ED with cc of generalized weakness. Cardiology service consulted for generalized swelling concerning for CHF exacerbation.

## 2021-07-02 NOTE — ED PROVIDER NOTE - ATTENDING CONTRIBUTION TO CARE
I personally evaluated the patient. I reviewed the Resident’s or Physician Assistant’s note (as assigned above), and agree with the findings and plan except as documented in my note.    57 y/o M pmhx HTN, HLD, DM c/o b/l leg swelling and SOB. No CP. No n/v. No abd pain.     CONSTITUTIONAL: Well-developed; well-nourished; in no acute distress. Sitting up and providing appropriate history and physical examination  SKIN: skin exam is warm and dry, no acute rash.  HEAD: Normocephalic; atraumatic.  EYES: PERRL, 3 mm bilateral, no nystagmus, EOM intact; conjunctiva and sclera clear.  ENT: No nasal discharge; airway clear.  NECK: Supple; non tender.+ full passive ROM in all directions. No JVD  CARD: S1, S2 normal; no murmurs, gallops, or rubs. Regular rate and rhythm. + Symmetric Strong Pulses  RESP: b/l rales   ABD: soft; non-distended; non-tender. No Rebound, No Gaurding, No signs of peritnitis, No CVA tenderness  EXT: 2+ peripheral edema     Plan - ecg, labs, cxr, reassess

## 2021-07-03 LAB
A1C WITH ESTIMATED AVERAGE GLUCOSE RESULT: 5.9 % — HIGH (ref 4–5.6)
ALBUMIN SERPL ELPH-MCNC: 2.6 G/DL — LOW (ref 3.5–5.2)
ALP SERPL-CCNC: 149 U/L — HIGH (ref 30–115)
ALT FLD-CCNC: 33 U/L — SIGNIFICANT CHANGE UP (ref 0–41)
ANION GAP SERPL CALC-SCNC: 8 MMOL/L — SIGNIFICANT CHANGE UP (ref 7–14)
APPEARANCE UR: CLEAR — SIGNIFICANT CHANGE UP
AST SERPL-CCNC: 30 U/L — SIGNIFICANT CHANGE UP (ref 0–41)
BACTERIA # UR AUTO: NEGATIVE — SIGNIFICANT CHANGE UP
BILIRUB SERPL-MCNC: 0.5 MG/DL — SIGNIFICANT CHANGE UP (ref 0.2–1.2)
BILIRUB UR-MCNC: NEGATIVE — SIGNIFICANT CHANGE UP
BUN SERPL-MCNC: 31 MG/DL — HIGH (ref 10–20)
CALCIUM SERPL-MCNC: 8.3 MG/DL — LOW (ref 8.5–10.1)
CHLORIDE SERPL-SCNC: 104 MMOL/L — SIGNIFICANT CHANGE UP (ref 98–110)
CO2 SERPL-SCNC: 25 MMOL/L — SIGNIFICANT CHANGE UP (ref 17–32)
COLOR SPEC: SIGNIFICANT CHANGE UP
COVID-19 SPIKE DOMAIN AB INTERP: POSITIVE
COVID-19 SPIKE DOMAIN ANTIBODY RESULT: >250 U/ML — HIGH
CREAT ?TM UR-MCNC: 58 MG/DL — SIGNIFICANT CHANGE UP
CREAT SERPL-MCNC: 2.2 MG/DL — HIGH (ref 0.7–1.5)
DIFF PNL FLD: ABNORMAL
EPI CELLS # UR: 1 /HPF — SIGNIFICANT CHANGE UP (ref 0–5)
ESTIMATED AVERAGE GLUCOSE: 123 MG/DL — HIGH (ref 68–114)
FERRITIN SERPL-MCNC: 255 NG/ML — SIGNIFICANT CHANGE UP (ref 30–400)
GLUCOSE BLDC GLUCOMTR-MCNC: 109 MG/DL — HIGH (ref 70–99)
GLUCOSE BLDC GLUCOMTR-MCNC: 141 MG/DL — HIGH (ref 70–99)
GLUCOSE BLDC GLUCOMTR-MCNC: 201 MG/DL — HIGH (ref 70–99)
GLUCOSE BLDC GLUCOMTR-MCNC: 249 MG/DL — HIGH (ref 70–99)
GLUCOSE SERPL-MCNC: 123 MG/DL — HIGH (ref 70–99)
GLUCOSE UR QL: ABNORMAL
HAV IGM SER-ACNC: SIGNIFICANT CHANGE UP
HBV CORE IGM SER-ACNC: SIGNIFICANT CHANGE UP
HBV SURFACE AG SER-ACNC: SIGNIFICANT CHANGE UP
HCT VFR BLD CALC: 37.9 % — LOW (ref 42–52)
HCV AB S/CO SERPL IA: 0.05 COI — SIGNIFICANT CHANGE UP
HCV AB S/CO SERPL IA: 0.1 S/CO — SIGNIFICANT CHANGE UP (ref 0–0.99)
HCV AB SERPL-IMP: SIGNIFICANT CHANGE UP
HCV AB SERPL-IMP: SIGNIFICANT CHANGE UP
HGB BLD-MCNC: 11.5 G/DL — LOW (ref 14–18)
HYALINE CASTS # UR AUTO: 1 /LPF — SIGNIFICANT CHANGE UP (ref 0–7)
KETONES UR-MCNC: NEGATIVE — SIGNIFICANT CHANGE UP
LEUKOCYTE ESTERASE UR-ACNC: NEGATIVE — SIGNIFICANT CHANGE UP
MAGNESIUM SERPL-MCNC: 1.9 MG/DL — SIGNIFICANT CHANGE UP (ref 1.8–2.4)
MCHC RBC-ENTMCNC: 24.1 PG — LOW (ref 27–31)
MCHC RBC-ENTMCNC: 30.3 G/DL — LOW (ref 32–37)
MCV RBC AUTO: 79.3 FL — LOW (ref 80–94)
NITRITE UR-MCNC: NEGATIVE — SIGNIFICANT CHANGE UP
NRBC # BLD: 0 /100 WBCS — SIGNIFICANT CHANGE UP (ref 0–0)
PH UR: 7 — SIGNIFICANT CHANGE UP (ref 5–8)
PLATELET # BLD AUTO: 201 K/UL — SIGNIFICANT CHANGE UP (ref 130–400)
POTASSIUM SERPL-MCNC: 4.5 MMOL/L — SIGNIFICANT CHANGE UP (ref 3.5–5)
POTASSIUM SERPL-SCNC: 4.5 MMOL/L — SIGNIFICANT CHANGE UP (ref 3.5–5)
POTASSIUM UR-SCNC: 28 MMOL/L — SIGNIFICANT CHANGE UP
PROT ?TM UR-MCNC: 470 MG/DLG/24H — SIGNIFICANT CHANGE UP
PROT SERPL-MCNC: 5.2 G/DL — LOW (ref 6–8)
PROT UR-MCNC: ABNORMAL
PROT/CREAT UR-RTO: 8.1 RATIO — HIGH (ref 0–0.2)
RBC # BLD: 4.78 M/UL — SIGNIFICANT CHANGE UP (ref 4.7–6.1)
RBC # FLD: 13.9 % — SIGNIFICANT CHANGE UP (ref 11.5–14.5)
RBC CASTS # UR COMP ASSIST: 8 /HPF — HIGH (ref 0–4)
SARS-COV-2 IGG+IGM SERPL QL IA: >250 U/ML — HIGH
SARS-COV-2 IGG+IGM SERPL QL IA: POSITIVE
SODIUM SERPL-SCNC: 137 MMOL/L — SIGNIFICANT CHANGE UP (ref 135–146)
SODIUM UR-SCNC: 103 MMOL/L — SIGNIFICANT CHANGE UP
SP GR SPEC: 1.01 — SIGNIFICANT CHANGE UP (ref 1.01–1.03)
TROPONIN T SERPL-MCNC: 0.14 NG/ML — CRITICAL HIGH
UROBILINOGEN FLD QL: SIGNIFICANT CHANGE UP
UUN UR-MCNC: 389 MG/DL — SIGNIFICANT CHANGE UP
WBC # BLD: 9.31 K/UL — SIGNIFICANT CHANGE UP (ref 4.8–10.8)
WBC # FLD AUTO: 9.31 K/UL — SIGNIFICANT CHANGE UP (ref 4.8–10.8)
WBC UR QL: 2 /HPF — SIGNIFICANT CHANGE UP (ref 0–5)

## 2021-07-03 PROCEDURE — 93010 ELECTROCARDIOGRAM REPORT: CPT

## 2021-07-03 PROCEDURE — 99233 SBSQ HOSP IP/OBS HIGH 50: CPT

## 2021-07-03 PROCEDURE — 93925 LOWER EXTREMITY STUDY: CPT | Mod: 26

## 2021-07-03 RX ORDER — GABAPENTIN 400 MG/1
200 CAPSULE ORAL
Refills: 0 | Status: DISCONTINUED | OUTPATIENT
Start: 2021-07-03 | End: 2021-07-16

## 2021-07-03 RX ORDER — NIFEDIPINE 30 MG
60 TABLET, EXTENDED RELEASE 24 HR ORAL DAILY
Refills: 0 | Status: DISCONTINUED | OUTPATIENT
Start: 2021-07-04 | End: 2021-07-06

## 2021-07-03 RX ORDER — NIFEDIPINE 30 MG
30 TABLET, EXTENDED RELEASE 24 HR ORAL ONCE
Refills: 0 | Status: COMPLETED | OUTPATIENT
Start: 2021-07-03 | End: 2021-07-03

## 2021-07-03 RX ADMIN — Medication 80 MILLIGRAM(S): at 17:10

## 2021-07-03 RX ADMIN — Medication 80 MILLIGRAM(S): at 05:13

## 2021-07-03 RX ADMIN — Medication 30 MILLIGRAM(S): at 08:54

## 2021-07-03 RX ADMIN — DORZOLAMIDE HYDROCHLORIDE 1 DROP(S): 20 SOLUTION/ DROPS OPHTHALMIC at 12:54

## 2021-07-03 RX ADMIN — Medication 30 MILLIGRAM(S): at 05:12

## 2021-07-03 RX ADMIN — BRIMONIDINE TARTRATE 1 DROP(S): 2 SOLUTION/ DROPS OPHTHALMIC at 05:13

## 2021-07-03 RX ADMIN — GABAPENTIN 200 MILLIGRAM(S): 400 CAPSULE ORAL at 17:10

## 2021-07-03 RX ADMIN — ENOXAPARIN SODIUM 40 MILLIGRAM(S): 100 INJECTION SUBCUTANEOUS at 11:32

## 2021-07-03 RX ADMIN — Medication 1 DROP(S): at 17:10

## 2021-07-03 RX ADMIN — Medication 1 DROP(S): at 05:13

## 2021-07-03 RX ADMIN — ATORVASTATIN CALCIUM 40 MILLIGRAM(S): 80 TABLET, FILM COATED ORAL at 21:46

## 2021-07-03 RX ADMIN — CHLORHEXIDINE GLUCONATE 1 APPLICATION(S): 213 SOLUTION TOPICAL at 05:14

## 2021-07-03 RX ADMIN — DORZOLAMIDE HYDROCHLORIDE 1 DROP(S): 20 SOLUTION/ DROPS OPHTHALMIC at 05:13

## 2021-07-03 RX ADMIN — BRIMONIDINE TARTRATE 1 DROP(S): 2 SOLUTION/ DROPS OPHTHALMIC at 17:09

## 2021-07-03 NOTE — PROGRESS NOTE ADULT - ASSESSMENT
56M w/ PMHx of HTN, HLD, T2DM on insulin, Glaucoma presents to the Ed c/o worsening LE swelling and pain for the past 1 month.    #New onset Acute CHF exacerbation suspected secondary to uncontrolled HTN  #Uncontrolled HTN  -Denies cardiac Hx, Usual weight per patient: 67.3kg, Weight on admission: 74kg  -CXR: cardiomegaly and CP angle blunting bilateral, BNP: 95520 on admission  -Currently not requiring supplemental O2  - VA Duplex negative   - Trop 0.10 > 0.11 > 0.11, CKMB 10.2  - continue with Lasix 80mg q12  - Echo pending   - I<O, Daily Weight  - No ACEI/BB until patient euvolemic  - Increase Procardia to 60mg daily  - monitor BP, if needed, increase procardia to 90mg and/or add Hydralazine 10mg TID  - Cardiology following: Ischemic workup once compensated  - f/u HF consult       #LENNY on CKD  #Cardiorenal Syndrome  - Cr increased from 1.7 (3/29/21) to 2.1  - Currently Cr 2.0  - Urine lytes for FeNa and FeUrea, and protein-creatine ratio.  - UA to evaluate for proteinuria/sterile pyuria/casts  - US KUB: no hydronephrosis bilateral, right renal cysts  - Monitor I&Os to assess for oliguria/anuria.    #HLD  -cw Statin    #T2DM  -On Lantus and Metformin-Pioglitazone at home  - f/u A1c 5.8  - plan to hold Pioglitazone on d/c as can lead to CHF exacerbation  - Insulin basal/bolus if FS>180    #Glaucoma  -Eye drops    DVT ppx Lovenox  GI ppx N/A  Diet DASH/CC. fluid restricted  Code Full  Dispo Acute     56M w/ PMHx of HTN, HLD, T2DM on insulin, Glaucoma presents to the Ed c/o worsening LE swelling and pain for the past 1 month.    #New onset Acute CHF exacerbation  HFrEF  secondary to uncontrolled HTN  #moderate pericardial effusion   #Uncontrolled HTN  -Denies cardiac Hx, Usual weight per patient: 67.3kg, Weight on admission: 74kg  -CXR: cardiomegaly and CP angle blunting bilateral, BNP: 00920 on admission  -Currently not requiring supplemental O2  - VA Duplex negative   - Trop 0.10 > 0.11 > 0.11, CKMB 10.2  - continue with Lasix 80mg q12  - Echo showed EF34% moderate pericardial effusion   - I<O, Daily Weight  - No ACEI/BB until patient euvolemic  - Increase Procardia to 60mg daily  - monitor BP, if needed, increase procardia to 90mg and/or add Hydralazine 10mg TID  - Cardiology following: Ischemic workup once compensated  - f/u HF consult       #LENNY on CKD  #Cardiorenal Syndrome  - Cr increased from 1.7 (3/29/21) to 2.1  - Currently Cr 2.0  - Urine lytes for FeNa and FeUrea, and protein-creatine ratio.  - UA to evaluate for proteinuria/sterile pyuria/casts  - US KUB: no hydronephrosis bilateral, right renal cysts  - Monitor I&Os to assess for oliguria/anuria.    #HLD  -cw Statin    #T2DM with peripheral neuropathy   -On Lantus and Metformin-Pioglitazone at home  - f/u A1c 5.8  - plan to hold Pioglitazone on d/c as can lead to CHF exacerbation  - Insulin basal/bolus if FS>180  - started on gabapentin 200 mg BID renally adjusted  - duplex neg   - check arterial duplex      #Glaucoma  -Eye drops    DVT ppx Lovenox  GI ppx N/A  Diet DASH/CC. fluid restricted  Code Full  Dispo Acute

## 2021-07-03 NOTE — PROGRESS NOTE ADULT - SUBJECTIVE AND OBJECTIVE BOX
HPI  Patient is a 56y old Male who presents with a chief complaint of generalized weakness (02 Jul 2021 15:03)    Currently admitted to medicine with the primary diagnosis of New onset of congestive heart failure       Today is hospital day 1d.     INTERVAL HPI / OVERNIGHT EVENTS:  Patient was examined and seen at bedside. This morning he is resting comfortably in bed and reports no new issues or overnight events. Currently he is not complaining of SOB, has some numbness in his feet.    ROS: Otherwise unremarkable     PAST MEDICAL & SURGICAL HISTORY  HTN (hypertension)    HLD (hyperlipidemia)    DM (diabetes mellitus)    Glaucoma    No significant past surgical history      ALLERGIES  No Known Allergies    MEDICATIONS  STANDING MEDICATIONS  atorvastatin 40 milliGRAM(s) Oral at bedtime  brimonidine 0.2% Ophthalmic Solution 1 Drop(s) Both EYES two times a day  chlorhexidine 4% Liquid 1 Application(s) Topical <User Schedule>  dextrose 40% Gel 15 Gram(s) Oral once  dextrose 5%. 1000 milliLiter(s) IV Continuous <Continuous>  dextrose 5%. 1000 milliLiter(s) IV Continuous <Continuous>  dextrose 50% Injectable 25 Gram(s) IV Push once  dextrose 50% Injectable 12.5 Gram(s) IV Push once  dextrose 50% Injectable 25 Gram(s) IV Push once  dorzolamide 2% Ophthalmic Solution 1 Drop(s) Both EYES three times a day  enoxaparin Injectable 40 milliGRAM(s) SubCutaneous daily  furosemide   Injectable 80 milliGRAM(s) IV Push every 12 hours  gabapentin 200 milliGRAM(s) Oral two times a day  glucagon  Injectable 1 milliGRAM(s) IntraMuscular once  latanoprost 0.005% Ophthalmic Solution 1 Drop(s) Both EYES at bedtime  timolol 0.5% Solution 1 Drop(s) Both EYES two times a day    PRN MEDICATIONS    VITALS:  T(F): 96.1  HR: 79  BP: 183/97  RR: 19  SpO2: --    PHYSICAL EXAM  GEN: NAD, Resting comfortably in bed  PULM: Clear to auscultation bilaterally, No wheezes  CVS: Regular rate and rhythm, S1-S2, no murmurs  ABD: Soft, non-tender, mildly distended  EXT: No edema in LE, cold to touch  NEURO: A&Ox3, no focal deficits    LABS                        11.5   9.31  )-----------( 201      ( 03 Jul 2021 06:53 )             37.9     07-03    137  |  104  |  31<H>  ----------------------------<  123<H>  4.5   |  25  |  2.2<H>    Ca    8.3<L>      03 Jul 2021 06:53  Mg     1.9     07-03    TPro  5.2<L>  /  Alb  2.6<L>  /  TBili  0.5  /  DBili  x   /  AST  30  /  ALT  33  /  AlkPhos  149<H>  07-03          Lactate, Blood: 0.6 mmol/L *L* (07-02-21 @ 11:37)  Creatine Kinase, Serum: 863 U/L *H* (07-02-21 @ 11:37)  Troponin T, Serum: 0.11 ng/mL *HH* (07-02-21 @ 11:37)      CARDIAC MARKERS ( 02 Jul 2021 11:37 )  x     / 0.11 ng/mL / 863 U/L / x     / 10.2 ng/mL  CARDIAC MARKERS ( 02 Jul 2021 06:03 )  x     / 0.11 ng/mL / x     / x     / x      CARDIAC MARKERS ( 02 Jul 2021 01:09 )  x     / 0.10 ng/mL / x     / x     / x          RADIOLOGY    EXAM:  XR CHEST PORTABLE URGENT 1V            PROCEDURE DATE:  07/02/2021            INTERPRETATION:  Clinical History / Reason for exam: Chest pain    Comparison : Chest radiograph March 10, 2010.    Technique/Positioning: Single AP view of the chest.    Findings:    Support devices: None.    Cardiac/mediastinum/hilum: Marked cardiomegaly.    Lung parenchyma/Pleura: Mild pulmonary vascular congestion. No pneumothorax.    Skeleton/soft tissues: Unremarkable.    Impression:    Marked cardiomegaly with pulmonary vascular congestion.     HPI  Patient is a 56y old Male who presents with a chief complaint of generalized weakness (02 Jul 2021 15:03)    Currently admitted to medicine with the primary diagnosis of New onset of congestive heart failure    Attending note: pt seen and examined at bedside. No cp or sob. Complained of numbness in his toes.        Today is hospital day 1d.     INTERVAL HPI / OVERNIGHT EVENTS:  Patient was examined and seen at bedside. This morning he is resting comfortably in bed and reports no new issues or overnight events. Currently he is not complaining of SOB, has some numbness in his feet.    Attending Note: Pt seen and examined at bedside. No cp or sob.     ROS: Otherwise unremarkable     PAST MEDICAL & SURGICAL HISTORY  HTN (hypertension)    HLD (hyperlipidemia)    DM (diabetes mellitus)    Glaucoma    No significant past surgical history      ALLERGIES  No Known Allergies    MEDICATIONS  STANDING MEDICATIONS  atorvastatin 40 milliGRAM(s) Oral at bedtime  brimonidine 0.2% Ophthalmic Solution 1 Drop(s) Both EYES two times a day  chlorhexidine 4% Liquid 1 Application(s) Topical <User Schedule>  dextrose 40% Gel 15 Gram(s) Oral once  dextrose 5%. 1000 milliLiter(s) IV Continuous <Continuous>  dextrose 5%. 1000 milliLiter(s) IV Continuous <Continuous>  dextrose 50% Injectable 25 Gram(s) IV Push once  dextrose 50% Injectable 12.5 Gram(s) IV Push once  dextrose 50% Injectable 25 Gram(s) IV Push once  dorzolamide 2% Ophthalmic Solution 1 Drop(s) Both EYES three times a day  enoxaparin Injectable 40 milliGRAM(s) SubCutaneous daily  furosemide   Injectable 80 milliGRAM(s) IV Push every 12 hours  gabapentin 200 milliGRAM(s) Oral two times a day  glucagon  Injectable 1 milliGRAM(s) IntraMuscular once  latanoprost 0.005% Ophthalmic Solution 1 Drop(s) Both EYES at bedtime  timolol 0.5% Solution 1 Drop(s) Both EYES two times a day    PRN MEDICATIONS    VITALS:  T(F): 96.1  HR: 79  BP: 183/97  RR: 19  SpO2: --    PHYSICAL EXAM  GEN: NAD, Resting comfortably in bed  PULM: Clear to auscultation bilaterally, No wheezes  CVS: Regular rate and rhythm, S1-S2, no murmurs  ABD: Soft, non-tender, mildly distended  EXT: No edema in LE, cold to touch  NEURO: A&Ox3, no focal deficits    LABS                        11.5   9.31  )-----------( 201      ( 03 Jul 2021 06:53 )             37.9     07-03    137  |  104  |  31<H>  ----------------------------<  123<H>  4.5   |  25  |  2.2<H>    Ca    8.3<L>      03 Jul 2021 06:53  Mg     1.9     07-03    TPro  5.2<L>  /  Alb  2.6<L>  /  TBili  0.5  /  DBili  x   /  AST  30  /  ALT  33  /  AlkPhos  149<H>  07-03          Lactate, Blood: 0.6 mmol/L *L* (07-02-21 @ 11:37)  Creatine Kinase, Serum: 863 U/L *H* (07-02-21 @ 11:37)  Troponin T, Serum: 0.11 ng/mL *HH* (07-02-21 @ 11:37)      CARDIAC MARKERS ( 02 Jul 2021 11:37 )  x     / 0.11 ng/mL / 863 U/L / x     / 10.2 ng/mL  CARDIAC MARKERS ( 02 Jul 2021 06:03 )  x     / 0.11 ng/mL / x     / x     / x      CARDIAC MARKERS ( 02 Jul 2021 01:09 )  x     / 0.10 ng/mL / x     / x     / x          RADIOLOGY    EXAM:  XR CHEST PORTABLE URGENT 1V            PROCEDURE DATE:  07/02/2021            INTERPRETATION:  Clinical History / Reason for exam: Chest pain    Comparison : Chest radiograph March 10, 2010.    Technique/Positioning: Single AP view of the chest.    Findings:    Support devices: None.    Cardiac/mediastinum/hilum: Marked cardiomegaly.    Lung parenchyma/Pleura: Mild pulmonary vascular congestion. No pneumothorax.    Skeleton/soft tissues: Unremarkable.    Impression:    Marked cardiomegaly with pulmonary vascular congestion.    < from: TTE Echo Complete w/o Contrast w/ Doppler (07.02.21 @ 14:54) >    Summary:   1. Moderately decreased global left ventricular systolic function.   2. Multiple left ventricular regional wall motion abnormalities exist. See wall motion findings.   3. LV Ejection Fraction by Garcia's Method with a biplane EF of 34 %.   4. Moderately increased LV wall thickness.   5. Normal left ventricular internal cavity size.   6. Mild to moderately enlarged left atrium.   7. Normal right atrial size.   8. Moderate pericardial effusion.   9. Mild to moderate mitral valve regurgitation.  10. Structurally normal mitral valve, with normal leaflet excursion.  11. Moderate tricuspid regurgitation.  12. Mild aortic regurgitation.  13. Normal trileaflet aortic valve with normal opening.  14. Mild pulmonic valve regurgitation.  15. Estimated pulmonary artery systolic pressure is 52.0 mmHg assuming a right atrial pressure of 10 mmHg, which is consistent with moderate pulmonary hypertension.    < end of copied text >

## 2021-07-03 NOTE — PROGRESS NOTE ADULT - ATTENDING COMMENTS
#Progress Note Handoff  Pending (specify):  HF management, cardiology follow up, HF consult, will need ischemic work up, cardiac telemonitoring, arterial duplex.   Family discussion: updated family at bedside with pt  Disposition: cardiac telemonitoring

## 2021-07-04 LAB
ALBUMIN SERPL ELPH-MCNC: 2.2 G/DL — LOW (ref 3.5–5.2)
ALP SERPL-CCNC: 136 U/L — HIGH (ref 30–115)
ALT FLD-CCNC: 25 U/L — SIGNIFICANT CHANGE UP (ref 0–41)
ANION GAP SERPL CALC-SCNC: 8 MMOL/L — SIGNIFICANT CHANGE UP (ref 7–14)
AST SERPL-CCNC: 23 U/L — SIGNIFICANT CHANGE UP (ref 0–41)
BASOPHILS # BLD AUTO: 0.03 K/UL — SIGNIFICANT CHANGE UP (ref 0–0.2)
BASOPHILS NFR BLD AUTO: 0.4 % — SIGNIFICANT CHANGE UP (ref 0–1)
BILIRUB SERPL-MCNC: 0.4 MG/DL — SIGNIFICANT CHANGE UP (ref 0.2–1.2)
BUN SERPL-MCNC: 35 MG/DL — HIGH (ref 10–20)
CALCIUM SERPL-MCNC: 7.7 MG/DL — LOW (ref 8.5–10.1)
CHLORIDE SERPL-SCNC: 104 MMOL/L — SIGNIFICANT CHANGE UP (ref 98–110)
CO2 SERPL-SCNC: 23 MMOL/L — SIGNIFICANT CHANGE UP (ref 17–32)
CREAT SERPL-MCNC: 2.1 MG/DL — HIGH (ref 0.7–1.5)
EOSINOPHIL # BLD AUTO: 0.42 K/UL — SIGNIFICANT CHANGE UP (ref 0–0.7)
EOSINOPHIL NFR BLD AUTO: 5.5 % — SIGNIFICANT CHANGE UP (ref 0–8)
GLUCOSE BLDC GLUCOMTR-MCNC: 106 MG/DL — HIGH (ref 70–99)
GLUCOSE BLDC GLUCOMTR-MCNC: 149 MG/DL — HIGH (ref 70–99)
GLUCOSE BLDC GLUCOMTR-MCNC: 169 MG/DL — HIGH (ref 70–99)
GLUCOSE BLDC GLUCOMTR-MCNC: 222 MG/DL — HIGH (ref 70–99)
GLUCOSE SERPL-MCNC: 191 MG/DL — HIGH (ref 70–99)
HCT VFR BLD CALC: 34 % — LOW (ref 42–52)
HGB BLD-MCNC: 10.4 G/DL — LOW (ref 14–18)
IMM GRANULOCYTES NFR BLD AUTO: 0.3 % — SIGNIFICANT CHANGE UP (ref 0.1–0.3)
LYMPHOCYTES # BLD AUTO: 1.04 K/UL — LOW (ref 1.2–3.4)
LYMPHOCYTES # BLD AUTO: 13.5 % — LOW (ref 20.5–51.1)
MAGNESIUM SERPL-MCNC: 2 MG/DL — SIGNIFICANT CHANGE UP (ref 1.8–2.4)
MCHC RBC-ENTMCNC: 23.8 PG — LOW (ref 27–31)
MCHC RBC-ENTMCNC: 30.6 G/DL — LOW (ref 32–37)
MCV RBC AUTO: 77.8 FL — LOW (ref 80–94)
MONOCYTES # BLD AUTO: 0.55 K/UL — SIGNIFICANT CHANGE UP (ref 0.1–0.6)
MONOCYTES NFR BLD AUTO: 7.1 % — SIGNIFICANT CHANGE UP (ref 1.7–9.3)
NEUTROPHILS # BLD AUTO: 5.64 K/UL — SIGNIFICANT CHANGE UP (ref 1.4–6.5)
NEUTROPHILS NFR BLD AUTO: 73.2 % — SIGNIFICANT CHANGE UP (ref 42.2–75.2)
NRBC # BLD: 0 /100 WBCS — SIGNIFICANT CHANGE UP (ref 0–0)
PLATELET # BLD AUTO: 179 K/UL — SIGNIFICANT CHANGE UP (ref 130–400)
POTASSIUM SERPL-MCNC: 4.8 MMOL/L — SIGNIFICANT CHANGE UP (ref 3.5–5)
POTASSIUM SERPL-SCNC: 4.8 MMOL/L — SIGNIFICANT CHANGE UP (ref 3.5–5)
PROT SERPL-MCNC: 4.9 G/DL — LOW (ref 6–8)
RBC # BLD: 4.37 M/UL — LOW (ref 4.7–6.1)
RBC # FLD: 13.8 % — SIGNIFICANT CHANGE UP (ref 11.5–14.5)
SODIUM SERPL-SCNC: 135 MMOL/L — SIGNIFICANT CHANGE UP (ref 135–146)
TROPONIN T SERPL-MCNC: 0.09 NG/ML — CRITICAL HIGH
WBC # BLD: 7.7 K/UL — SIGNIFICANT CHANGE UP (ref 4.8–10.8)
WBC # FLD AUTO: 7.7 K/UL — SIGNIFICANT CHANGE UP (ref 4.8–10.8)

## 2021-07-04 PROCEDURE — 99233 SBSQ HOSP IP/OBS HIGH 50: CPT

## 2021-07-04 PROCEDURE — 71046 X-RAY EXAM CHEST 2 VIEWS: CPT | Mod: 26

## 2021-07-04 RX ORDER — INSULIN GLARGINE 100 [IU]/ML
10 INJECTION, SOLUTION SUBCUTANEOUS EVERY MORNING
Refills: 0 | Status: DISCONTINUED | OUTPATIENT
Start: 2021-07-04 | End: 2021-07-08

## 2021-07-04 RX ORDER — INSULIN LISPRO 100/ML
VIAL (ML) SUBCUTANEOUS
Refills: 0 | Status: DISCONTINUED | OUTPATIENT
Start: 2021-07-04 | End: 2021-07-14

## 2021-07-04 RX ORDER — INSULIN LISPRO 100/ML
3 VIAL (ML) SUBCUTANEOUS
Refills: 0 | Status: DISCONTINUED | OUTPATIENT
Start: 2021-07-04 | End: 2021-07-17

## 2021-07-04 RX ADMIN — Medication 1 DROP(S): at 17:34

## 2021-07-04 RX ADMIN — Medication 1 DROP(S): at 05:06

## 2021-07-04 RX ADMIN — GABAPENTIN 200 MILLIGRAM(S): 400 CAPSULE ORAL at 05:06

## 2021-07-04 RX ADMIN — GABAPENTIN 200 MILLIGRAM(S): 400 CAPSULE ORAL at 17:34

## 2021-07-04 RX ADMIN — ENOXAPARIN SODIUM 40 MILLIGRAM(S): 100 INJECTION SUBCUTANEOUS at 11:02

## 2021-07-04 RX ADMIN — INSULIN GLARGINE 10 UNIT(S): 100 INJECTION, SOLUTION SUBCUTANEOUS at 08:40

## 2021-07-04 RX ADMIN — Medication 80 MILLIGRAM(S): at 17:34

## 2021-07-04 RX ADMIN — LATANOPROST 1 DROP(S): 0.05 SOLUTION/ DROPS OPHTHALMIC; TOPICAL at 21:14

## 2021-07-04 RX ADMIN — DORZOLAMIDE HYDROCHLORIDE 1 DROP(S): 20 SOLUTION/ DROPS OPHTHALMIC at 05:06

## 2021-07-04 RX ADMIN — BRIMONIDINE TARTRATE 1 DROP(S): 2 SOLUTION/ DROPS OPHTHALMIC at 05:07

## 2021-07-04 RX ADMIN — ATORVASTATIN CALCIUM 40 MILLIGRAM(S): 80 TABLET, FILM COATED ORAL at 21:14

## 2021-07-04 RX ADMIN — Medication 80 MILLIGRAM(S): at 05:07

## 2021-07-04 RX ADMIN — DORZOLAMIDE HYDROCHLORIDE 1 DROP(S): 20 SOLUTION/ DROPS OPHTHALMIC at 13:16

## 2021-07-04 RX ADMIN — BRIMONIDINE TARTRATE 1 DROP(S): 2 SOLUTION/ DROPS OPHTHALMIC at 17:35

## 2021-07-04 RX ADMIN — DORZOLAMIDE HYDROCHLORIDE 1 DROP(S): 20 SOLUTION/ DROPS OPHTHALMIC at 21:14

## 2021-07-04 RX ADMIN — Medication 60 MILLIGRAM(S): at 05:06

## 2021-07-04 NOTE — PROGRESS NOTE ADULT - SUBJECTIVE AND OBJECTIVE BOX
Pt seen and examined at bedside. No CP or SOB.  Pt improving.       PAST MEDICAL & SURGICAL HISTORY:  HTN (hypertension)    HLD (hyperlipidemia)    DM (diabetes mellitus)    Glaucoma    No significant past surgical history        VITAL SIGNS (Last 24 hrs):  T(C): 36.2 (21 @ 05:02), Max: 36.2 (21 @ 05:02)  HR: 73 (21 @ 05:02) (69 - 79)  BP: 135/87 (21 @ 05:02) (117/73 - 135/87)  RR: 18 (21 @ 05:02) (18 - 18)  SpO2: 99% (21 @ 21:04) (99% - 99%)  Wt(kg): --  Daily     Daily Weight in k.4 (2021 05:02)    I&O's Summary    2021 07:01  -  2021 07:00  --------------------------------------------------------  IN: 1790 mL / OUT: 2975 mL / NET: -1185 mL    2021 07:01  -  2021 11:23  --------------------------------------------------------  IN: 0 mL / OUT: 1450 mL / NET: -1450 mL        PHYSICAL EXAM:  GENERAL: NAD, well-developed  HEAD:  Atraumatic, Normocephalic  EYES: EOMI, PERRLA, conjunctiva and sclera clear  NECK: Supple, No JVD  CHEST/LUNG: Clear to auscultation bilaterally; No wheeze  HEART: Regular rate and rhythm; No murmurs, rubs, or gallops  ABDOMEN: Soft, Nontender, Nondistended; Bowel sounds present  EXTREMITIES:  2+ Peripheral Pulses, No clubbing, cyanosis, Trace edema  PSYCH: AAOx3  NEUROLOGY: non-focal  SKIN: No rashes or lesions    Labs Reviewed  Spoke to patient in regards to abnormal labs.    CBC Full  -  ( 2021 04:52 )  WBC Count : 7.70 K/uL  Hemoglobin : 10.4 g/dL  Hematocrit : 34.0 %  Platelet Count - Automated : 179 K/uL  Mean Cell Volume : 77.8 fL  Mean Cell Hemoglobin : 23.8 pg  Mean Cell Hemoglobin Concentration : 30.6 g/dL  Auto Neutrophil # : 5.64 K/uL  Auto Lymphocyte # : 1.04 K/uL  Auto Monocyte # : 0.55 K/uL  Auto Eosinophil # : 0.42 K/uL  Auto Basophil # : 0.03 K/uL  Auto Neutrophil % : 73.2 %  Auto Lymphocyte % : 13.5 %  Auto Monocyte % : 7.1 %  Auto Eosinophil % : 5.5 %  Auto Basophil % : 0.4 %    BMP:     @ 04:52    Blood Urea Nitrogen - 35  Calcium - 7.7  Carbond Dioxide - 23  Chloride - 104  Creatinine - 2.1  Glucose - 191  Potassium - 4.8  Sodium - 135      Hemoglobin A1c -     Urine Culture:        COVID Labs      D-Dimer:    Ferritin, Serum: 255 ng/mL (21 @ 11:37)      Imaging reviewed:   < from: TTE Echo Complete w/o Contrast w/ Doppler (21 @ 14:54) >  Summary:   1. Moderately decreased global left ventricular systolic function.   2. Multiple left ventricular regional wall motion abnormalities exist. See wall motion findings.   3. LV Ejection Fraction by Garcia's Method with a biplane EF of 34 %.   4. Moderately increased LV wall thickness.   5. Normal left ventricular internal cavity size.   6. Mild to moderately enlarged left atrium.   7. Normal right atrial size.   8. Moderate pericardial effusion.   9. Mild to moderate mitral valve regurgitation.  10. Structurally normal mitral valve, with normal leaflet excursion.  11. Moderate tricuspid regurgitation.  12. Mild aortic regurgitation.  13. Normal trileaflet aortic valve with normal opening.  14. Mild pulmonic valve regurgitation.  15. Estimated pulmonary artery systolic pressure is 52.0 mmHg assuming a right atrial pressure of 10 mmHg, which is consistent with moderate pulmonary hypertension.    < end of copied text >      MEDICATIONS  (STANDING):  atorvastatin 40 milliGRAM(s) Oral at bedtime  brimonidine 0.2% Ophthalmic Solution 1 Drop(s) Both EYES two times a day  chlorhexidine 4% Liquid 1 Application(s) Topical <User Schedule>  dextrose 40% Gel 15 Gram(s) Oral once  dextrose 5%. 1000 milliLiter(s) (50 mL/Hr) IV Continuous <Continuous>  dextrose 5%. 1000 milliLiter(s) (100 mL/Hr) IV Continuous <Continuous>  dextrose 50% Injectable 25 Gram(s) IV Push once  dextrose 50% Injectable 12.5 Gram(s) IV Push once  dextrose 50% Injectable 25 Gram(s) IV Push once  dorzolamide 2% Ophthalmic Solution 1 Drop(s) Both EYES three times a day  enoxaparin Injectable 40 milliGRAM(s) SubCutaneous daily  furosemide   Injectable 80 milliGRAM(s) IV Push every 12 hours  gabapentin 200 milliGRAM(s) Oral two times a day  glucagon  Injectable 1 milliGRAM(s) IntraMuscular once  insulin glargine Injectable (LANTUS) 10 Unit(s) SubCutaneous every morning  insulin lispro (ADMELOG) corrective regimen sliding scale   SubCutaneous three times a day before meals  insulin lispro Injectable (ADMELOG) 3 Unit(s) SubCutaneous three times a day before meals  latanoprost 0.005% Ophthalmic Solution 1 Drop(s) Both EYES at bedtime  NIFEdipine XL 60 milliGRAM(s) Oral daily  timolol 0.5% Solution 1 Drop(s) Both EYES two times a day    MEDICATIONS  (PRN):

## 2021-07-04 NOTE — PROGRESS NOTE ADULT - ASSESSMENT
56M w/ PMHx of HTN, HLD, T2DM on insulin, Glaucoma presents to the Ed c/o worsening LE swelling and pain for the past 1 month.    #New onset Acute CHF exacerbation  HFrEF  secondary to uncontrolled HTN  #moderate pericardial effusion   #moderate Pulm HTN  #Uncontrolled HTN  -Denies cardiac Hx, Usual weight per patient: 67.3kg, Weight on admission: 74kg  -CXR: cardiomegaly and CP angle blunting bilateral, BNP: 76531 on admission  -Currently not requiring supplemental O2  - VA Duplex negative   - Trop 0.10 > 0.11 > 0.11, CKMB 10.2, trending down  - continue with Lasix 80mg q12  - Echo showed EF34% moderate pericardial effusion   - I<O, Daily Weight  - No ACEI/BB until patient euvolemic  - Increase Procardia to 60mg daily  - monitor BP, if needed, increase procardia to 90mg and/or add Hydralazine 10mg TID  - Cardiology following: Ischemic workup once compensated  - f/u HF consult   -CXR appears to have effusion, follow up read      #LENNY on CKD  #Cardiorenal Syndrome  - Cr increased from 1.7 (3/29/21) to 2.1 (2.1 mabye new baseline?)   - Currently Cr 2.0  - Urine lytes for FeNa and FeUrea, and protein-creatine ratio.  - UA to evaluate for proteinuria/sterile pyuria/casts  - US KUB: no hydronephrosis bilateral, right renal cysts  - Monitor I&Os to assess for oliguria/anuria.    #HLD  -cw Statin    #T2DM with peripheral neuropathy   -On Lantus and Metformin-Pioglitazone at home  - f/u A1c 5.8  - plan to hold Pioglitazone on d/c as can lead to CHF exacerbation  - Insulin basal/bolus if FS>180  - started on gabapentin 200 mg BID renally adjusted  - duplex neg   - check arterial duplex      #Glaucoma  -Eye drops    #Progress Note Handoff  Pending (specify):  Cardio follow up, continue HF management   Family discussion: updated pt at bedside.   Disposition: f9

## 2021-07-04 NOTE — PHYSICAL THERAPY INITIAL EVALUATION ADULT - PERTINENT HX OF CURRENT PROBLEM, REHAB EVAL
56M w/ PMHx of HTN, HLD, T2DM on insulin, Glaucoma presents to the Ed c/o worsening LE swelling and pain for the past 1 month.
bilat LE's edema and pain x 1 month

## 2021-07-04 NOTE — PHYSICAL THERAPY INITIAL EVALUATION ADULT - GENERAL OBSERVATIONS, REHAB EVAL
905-
[id number] pm. pt seen in F9. 55 y/o M rec'd/left in bed, nad, + bed alarm. A+Ox4, speaks fluent English in addition to Citizen of [place]. reports resolution of bilat LE's edema present on admission. negative DVT on bilat LE's [place] doppler. vitals: 150/90 91 97% O2 sats on RA.

## 2021-07-05 LAB
ALBUMIN SERPL ELPH-MCNC: 2.6 G/DL — LOW (ref 3.5–5.2)
ALP SERPL-CCNC: 136 U/L — HIGH (ref 30–115)
ALT FLD-CCNC: 23 U/L — SIGNIFICANT CHANGE UP (ref 0–41)
ANION GAP SERPL CALC-SCNC: 8 MMOL/L — SIGNIFICANT CHANGE UP (ref 7–14)
AST SERPL-CCNC: 21 U/L — SIGNIFICANT CHANGE UP (ref 0–41)
BASOPHILS # BLD AUTO: 0.03 K/UL — SIGNIFICANT CHANGE UP (ref 0–0.2)
BASOPHILS NFR BLD AUTO: 0.4 % — SIGNIFICANT CHANGE UP (ref 0–1)
BILIRUB SERPL-MCNC: 0.3 MG/DL — SIGNIFICANT CHANGE UP (ref 0.2–1.2)
BUN SERPL-MCNC: 38 MG/DL — HIGH (ref 10–20)
CALCIUM SERPL-MCNC: 8.1 MG/DL — LOW (ref 8.5–10.1)
CHLORIDE SERPL-SCNC: 103 MMOL/L — SIGNIFICANT CHANGE UP (ref 98–110)
CO2 SERPL-SCNC: 30 MMOL/L — SIGNIFICANT CHANGE UP (ref 17–32)
CREAT SERPL-MCNC: 2.4 MG/DL — HIGH (ref 0.7–1.5)
EOSINOPHIL # BLD AUTO: 0.51 K/UL — SIGNIFICANT CHANGE UP (ref 0–0.7)
EOSINOPHIL NFR BLD AUTO: 6 % — SIGNIFICANT CHANGE UP (ref 0–8)
GLUCOSE BLDC GLUCOMTR-MCNC: 107 MG/DL — HIGH (ref 70–99)
GLUCOSE BLDC GLUCOMTR-MCNC: 212 MG/DL — HIGH (ref 70–99)
GLUCOSE BLDC GLUCOMTR-MCNC: 70 MG/DL — SIGNIFICANT CHANGE UP (ref 70–99)
GLUCOSE BLDC GLUCOMTR-MCNC: 89 MG/DL — SIGNIFICANT CHANGE UP (ref 70–99)
GLUCOSE SERPL-MCNC: 98 MG/DL — SIGNIFICANT CHANGE UP (ref 70–99)
HCT VFR BLD CALC: 36.3 % — LOW (ref 42–52)
HGB BLD-MCNC: 10.9 G/DL — LOW (ref 14–18)
IMM GRANULOCYTES NFR BLD AUTO: 0.2 % — SIGNIFICANT CHANGE UP (ref 0.1–0.3)
LYMPHOCYTES # BLD AUTO: 1.25 K/UL — SIGNIFICANT CHANGE UP (ref 1.2–3.4)
LYMPHOCYTES # BLD AUTO: 14.6 % — LOW (ref 20.5–51.1)
MAGNESIUM SERPL-MCNC: 2 MG/DL — SIGNIFICANT CHANGE UP (ref 1.8–2.4)
MCHC RBC-ENTMCNC: 23.3 PG — LOW (ref 27–31)
MCHC RBC-ENTMCNC: 30 G/DL — LOW (ref 32–37)
MCV RBC AUTO: 77.7 FL — LOW (ref 80–94)
MONOCYTES # BLD AUTO: 0.67 K/UL — HIGH (ref 0.1–0.6)
MONOCYTES NFR BLD AUTO: 7.8 % — SIGNIFICANT CHANGE UP (ref 1.7–9.3)
NEUTROPHILS # BLD AUTO: 6.08 K/UL — SIGNIFICANT CHANGE UP (ref 1.4–6.5)
NEUTROPHILS NFR BLD AUTO: 71 % — SIGNIFICANT CHANGE UP (ref 42.2–75.2)
NRBC # BLD: 0 /100 WBCS — SIGNIFICANT CHANGE UP (ref 0–0)
PHOSPHATE SERPL-MCNC: 4.3 MG/DL — SIGNIFICANT CHANGE UP (ref 2.1–4.9)
PLATELET # BLD AUTO: 223 K/UL — SIGNIFICANT CHANGE UP (ref 130–400)
POTASSIUM SERPL-MCNC: 4.2 MMOL/L — SIGNIFICANT CHANGE UP (ref 3.5–5)
POTASSIUM SERPL-SCNC: 4.2 MMOL/L — SIGNIFICANT CHANGE UP (ref 3.5–5)
PROT SERPL-MCNC: 5.2 G/DL — LOW (ref 6–8)
RBC # BLD: 4.67 M/UL — LOW (ref 4.7–6.1)
RBC # FLD: 13.8 % — SIGNIFICANT CHANGE UP (ref 11.5–14.5)
SODIUM SERPL-SCNC: 141 MMOL/L — SIGNIFICANT CHANGE UP (ref 135–146)
WBC # BLD: 8.56 K/UL — SIGNIFICANT CHANGE UP (ref 4.8–10.8)
WBC # FLD AUTO: 8.56 K/UL — SIGNIFICANT CHANGE UP (ref 4.8–10.8)

## 2021-07-05 PROCEDURE — 99233 SBSQ HOSP IP/OBS HIGH 50: CPT

## 2021-07-05 RX ADMIN — INSULIN GLARGINE 10 UNIT(S): 100 INJECTION, SOLUTION SUBCUTANEOUS at 11:41

## 2021-07-05 RX ADMIN — BRIMONIDINE TARTRATE 1 DROP(S): 2 SOLUTION/ DROPS OPHTHALMIC at 18:59

## 2021-07-05 RX ADMIN — GABAPENTIN 200 MILLIGRAM(S): 400 CAPSULE ORAL at 18:13

## 2021-07-05 RX ADMIN — DORZOLAMIDE HYDROCHLORIDE 1 DROP(S): 20 SOLUTION/ DROPS OPHTHALMIC at 21:36

## 2021-07-05 RX ADMIN — Medication 60 MILLIGRAM(S): at 05:22

## 2021-07-05 RX ADMIN — DORZOLAMIDE HYDROCHLORIDE 1 DROP(S): 20 SOLUTION/ DROPS OPHTHALMIC at 05:23

## 2021-07-05 RX ADMIN — ATORVASTATIN CALCIUM 40 MILLIGRAM(S): 80 TABLET, FILM COATED ORAL at 21:36

## 2021-07-05 RX ADMIN — ENOXAPARIN SODIUM 40 MILLIGRAM(S): 100 INJECTION SUBCUTANEOUS at 11:41

## 2021-07-05 RX ADMIN — CHLORHEXIDINE GLUCONATE 1 APPLICATION(S): 213 SOLUTION TOPICAL at 05:19

## 2021-07-05 RX ADMIN — DORZOLAMIDE HYDROCHLORIDE 1 DROP(S): 20 SOLUTION/ DROPS OPHTHALMIC at 13:25

## 2021-07-05 RX ADMIN — Medication 1 DROP(S): at 18:13

## 2021-07-05 RX ADMIN — Medication 3 UNIT(S): at 12:38

## 2021-07-05 RX ADMIN — Medication 3 UNIT(S): at 08:11

## 2021-07-05 RX ADMIN — Medication 1 DROP(S): at 05:24

## 2021-07-05 RX ADMIN — GABAPENTIN 200 MILLIGRAM(S): 400 CAPSULE ORAL at 05:22

## 2021-07-05 RX ADMIN — BRIMONIDINE TARTRATE 1 DROP(S): 2 SOLUTION/ DROPS OPHTHALMIC at 05:24

## 2021-07-05 RX ADMIN — Medication 80 MILLIGRAM(S): at 05:22

## 2021-07-05 RX ADMIN — LATANOPROST 1 DROP(S): 0.05 SOLUTION/ DROPS OPHTHALMIC; TOPICAL at 21:36

## 2021-07-05 NOTE — PROGRESS NOTE ADULT - ATTENDING COMMENTS
#Progress Note Handoff  Pending (specify): HF evaluation, Cardio   Family discussion: house staff updated pt family  DispositionF9

## 2021-07-05 NOTE — PROGRESS NOTE ADULT - SUBJECTIVE AND OBJECTIVE BOX
GILLIAN MENDOZA 56y Male  MRN#: [id number]   CODE STATUS:________    SUBJECTIVE  Patient is a 56y old Male who presents with a chief complaint of generalized weakness (2021 15:03)  Currently admitted to medicine with the primary diagnosis of New onset of congestive heart failure     Today is hospital day 3d, and this morning he is resting comfortably and reports no overnight events.       OBJECTIVE  PAST MEDICAL & SURGICAL HISTORY  HTN (hypertension)    HLD (hyperlipidemia)    DM (diabetes mellitus)    Glaucoma    No significant past surgical history      ALLERGIES:  No Known Allergies    MEDICATIONS:  STANDING MEDICATIONS  atorvastatin 40 milliGRAM(s) Oral at bedtime  brimonidine 0.2% Ophthalmic Solution 1 Drop(s) Both EYES two times a day  chlorhexidine 4% Liquid 1 Application(s) Topical <User Schedule>  dextrose 40% Gel 15 Gram(s) Oral once  dextrose 5%. 1000 milliLiter(s) IV Continuous <Continuous>  dextrose 5%. 1000 milliLiter(s) IV Continuous <Continuous>  dextrose 50% Injectable 25 Gram(s) IV Push once  dextrose 50% Injectable 12.5 Gram(s) IV Push once  dextrose 50% Injectable 25 Gram(s) IV Push once  dorzolamide 2% Ophthalmic Solution 1 Drop(s) Both EYES three times a day  enoxaparin Injectable 40 milliGRAM(s) SubCutaneous daily  gabapentin 200 milliGRAM(s) Oral two times a day  glucagon  Injectable 1 milliGRAM(s) IntraMuscular once  insulin glargine Injectable (LANTUS) 10 Unit(s) SubCutaneous every morning  insulin lispro (ADMELOG) corrective regimen sliding scale   SubCutaneous three times a day before meals  insulin lispro Injectable (ADMELOG) 3 Unit(s) SubCutaneous three times a day before meals  latanoprost 0.005% Ophthalmic Solution 1 Drop(s) Both EYES at bedtime  NIFEdipine XL 60 milliGRAM(s) Oral daily  timolol 0.5% Solution 1 Drop(s) Both EYES two times a day    PRN MEDICATIONS      VITAL SIGNS: Last 24 Hours  T(C): 35.6 (2021 05:15), Max: 36.3 (2021 20:36)  T(F): 96 (2021 05:15), Max: 97.3 (2021 20:36)  HR: 72 (2021 05:15) (71 - 82)  BP: 138/85 (2021 05:15) (120/71 - 140/90)  BP(mean): --  RR: 18 (2021 05:15) (18 - 18)  SpO2: --    LABS:                        10.4   7.70  )-----------( 179      ( 2021 04:52 )             34.0     07-04    135  |  104  |  35<H>  ----------------------------<  191<H>  4.8   |  23  |  2.1<H>    Ca    7.7<L>      2021 04:52  Mg     2.0     07-04    TPro  4.9<L>  /  Alb  2.2<L>  /  TBili  0.4  /  DBili  x   /  AST  23  /  ALT  25  /  AlkPhos  136<H>  07-04      Urinalysis Basic - ( 2021 16:34 )    Color: Light Yellow / Appearance: Clear / S.013 / pH: x  Gluc: x / Ketone: Negative  / Bili: Negative / Urobili: <2 mg/dL   Blood: x / Protein: 300 mg/dL / Nitrite: Negative   Leuk Esterase: Negative / RBC: 8 /HPF / WBC 2 /HPF   Sq Epi: x / Non Sq Epi: 1 /HPF / Bacteria: Negative            CARDIAC MARKERS ( 2021 04:52 )  x     / 0.09 ng/mL / x     / x     / x      CARDIAC MARKERS ( 2021 16:38 )  x     / 0.14 ng/mL / x     / x     / x          RADIOLOGY:    PHYSICAL EXAM:  GEN: NAD, Resting comfortably in bed  PULM: Clear to auscultation bilaterally, No wheezes  CVS: Regular rate and rhythm, S1-S2, no murmurs  ABD: Soft, non-tender, nondistended  EXT: No edema in LE  NEURO: A&Ox3, no focal deficits      ASSESSMENT & PLAN  56M w/ PMHx of HTN, HLD, T2DM on insulin, Glaucoma presents to the Ed c/o worsening LE swelling and pain for the past 1 month.    #New onset Acute CHF exacerbation  HFrEF  secondary to uncontrolled HTN  #moderate pericardial effusion   #Uncontrolled HTN  -Denies cardiac Hx, Usual weight per patient: 67.3kg, Weight on admission: 74kg  -CXR: cardiomegaly and CP angle blunting bilateral, BNP: 95468 on admission  -Currently not requiring supplemental O2  - VA Duplex negative   - Trop 0.10 > 0.11 > 0.11, CKMB 10.2  - hold Lasix 80mg tonight, will reassess tomorrow  - Echo showed EF34% moderate pericardial effusion   - I<O, Daily Weight  - No ACEI/BB until patient euvolemic  - c/w Procardia to 60mg daily  - monitor BP, if needed, increase procardia to 90mg and/or add Hydralazine 10mg TID  - Cardiology following: Ischemic workup once compensated, will need to be seen by HF team prior to ischemic w/u  - f/u HF consult       #LENNY on CKD  #Cardiorenal Syndrome  - Cr increased from 1.7 (3/29/21) to 2.1  - Currently Cr 2.1  - Urine lytes for FeNa and FeUrea, and protein-creatine ratio.  - UA to evaluate for proteinuria/sterile pyuria/casts  - US KUB: no hydronephrosis bilateral, right renal cysts  - Monitor I&Os to assess for oliguria/anuria.    #HLD  -cw Statin    #T2DM with peripheral neuropathy   -On Lantus and Metformin-Pioglitazone at home  - f/u A1c 5.8  - plan to hold Pioglitazone on d/c as can lead to CHF exacerbation  - Insulin basal/bolus if FS>180  - started on gabapentin 200 mg BID renally adjusted  - duplex neg   - arterial duplex negative for stenosis      #Glaucoma  -Eye drops    DVT ppx Lovenox  GI ppx N/A  Diet DASH/CC. fluid restricted  Code Full  Dispo pending HF eval       GILLIAN MENDOZA 56y Male  MRN#: [id number]   CODE STATUS:________    SUBJECTIVE  Patient is a 56y old Male who presents with a chief complaint of generalized weakness (2021 15:03)  Currently admitted to medicine with the primary diagnosis of New onset of congestive heart failure     Today is hospital day 3d, and this morning he is resting comfortably and reports no overnight events.     Attending Note: Pt seen and examined at bedside. No cp or sob. No overnight events.       OBJECTIVE  PAST MEDICAL & SURGICAL HISTORY  HTN (hypertension)    HLD (hyperlipidemia)    DM (diabetes mellitus)    Glaucoma    No significant past surgical history      ALLERGIES:  No Known Allergies    MEDICATIONS:  STANDING MEDICATIONS  atorvastatin 40 milliGRAM(s) Oral at bedtime  brimonidine 0.2% Ophthalmic Solution 1 Drop(s) Both EYES two times a day  chlorhexidine 4% Liquid 1 Application(s) Topical <User Schedule>  dextrose 40% Gel 15 Gram(s) Oral once  dextrose 5%. 1000 milliLiter(s) IV Continuous <Continuous>  dextrose 5%. 1000 milliLiter(s) IV Continuous <Continuous>  dextrose 50% Injectable 25 Gram(s) IV Push once  dextrose 50% Injectable 12.5 Gram(s) IV Push once  dextrose 50% Injectable 25 Gram(s) IV Push once  dorzolamide 2% Ophthalmic Solution 1 Drop(s) Both EYES three times a day  enoxaparin Injectable 40 milliGRAM(s) SubCutaneous daily  gabapentin 200 milliGRAM(s) Oral two times a day  glucagon  Injectable 1 milliGRAM(s) IntraMuscular once  insulin glargine Injectable (LANTUS) 10 Unit(s) SubCutaneous every morning  insulin lispro (ADMELOG) corrective regimen sliding scale   SubCutaneous three times a day before meals  insulin lispro Injectable (ADMELOG) 3 Unit(s) SubCutaneous three times a day before meals  latanoprost 0.005% Ophthalmic Solution 1 Drop(s) Both EYES at bedtime  NIFEdipine XL 60 milliGRAM(s) Oral daily  timolol 0.5% Solution 1 Drop(s) Both EYES two times a day    PRN MEDICATIONS      VITAL SIGNS: Last 24 Hours  T(C): 35.6 (2021 05:15), Max: 36.3 (2021 20:36)  T(F): 96 (2021 05:15), Max: 97.3 (2021 20:36)  HR: 72 (2021 05:15) (71 - 82)  BP: 138/85 (2021 05:15) (120/71 - 140/90)  BP(mean): --  RR: 18 (2021 05:15) (18 - 18)  SpO2: --    LABS:                        10.4   7.70  )-----------( 179      ( 2021 04:52 )             34.0     07-04    135  |  104  |  35<H>  ----------------------------<  191<H>  4.8   |  23  |  2.1<H>    Ca    7.7<L>      2021 04:52  Mg     2.0     07-    TPro  4.9<L>  /  Alb  2.2<L>  /  TBili  0.4  /  DBili  x   /  AST  23  /  ALT  25  /  AlkPhos  136<H>  07-04      Urinalysis Basic - ( 2021 16:34 )    Color: Light Yellow / Appearance: Clear / S.013 / pH: x  Gluc: x / Ketone: Negative  / Bili: Negative / Urobili: <2 mg/dL   Blood: x / Protein: 300 mg/dL / Nitrite: Negative   Leuk Esterase: Negative / RBC: 8 /HPF / WBC 2 /HPF   Sq Epi: x / Non Sq Epi: 1 /HPF / Bacteria: Negative            CARDIAC MARKERS ( 2021 04:52 )  x     / 0.09 ng/mL / x     / x     / x      CARDIAC MARKERS ( 2021 16:38 )  x     / 0.14 ng/mL / x     / x     / x          RADIOLOGY:    PHYSICAL EXAM:  GEN: NAD, Resting comfortably in bed  PULM: Clear to auscultation bilaterally, No wheezes  CVS: Regular rate and rhythm, S1-S2, no murmurs  ABD: Soft, non-tender, nondistended  EXT: No edema in LE  NEURO: A&Ox3, no focal deficits      ASSESSMENT & PLAN  56M w/ PMHx of HTN, HLD, T2DM on insulin, Glaucoma presents to the Ed c/o worsening LE swelling and pain for the past 1 month.    #New onset Acute CHF exacerbation  HFrEF  secondary to uncontrolled HTN  #moderate pericardial effusion   #Uncontrolled HTN  -Denies cardiac Hx, Usual weight per patient: 67.3kg, Weight on admission: 74kg  -CXR: cardiomegaly and CP angle blunting bilateral, BNP: 70773 on admission  -Currently not requiring supplemental O2  - VA Duplex negative   - Trop 0.10 > 0.11 > 0.11, CKMB 10.2  - hold Lasix 80mg tonight, will reassess tomorrow  - Echo showed EF34% moderate pericardial effusion   - I<O, Daily Weight  - No ACEI/BB until patient euvolemic  - c/w Procardia to 60mg daily  - monitor BP, if needed, increase procardia to 90mg and/or add Hydralazine 10mg TID  - Cardiology following: Ischemic workup once compensated, will need to be seen by HF team prior to ischemic w/u  - f/u HF consult     Attending Note: Lasix held today as Cr bumped. HF evaluation in am      #LENNY on CKD  #Cardiorenal Syndrome  - Cr increased from 1.7 (3/29/21) to 2.1  - Currently Cr 2.6  - Urine lytes for FeNa and FeUrea, and protein-creatine ratio.  - UA to evaluate for proteinuria/sterile pyuria/casts  - US KUB: no hydronephrosis bilateral, right renal cysts  - Monitor I&Os to assess for oliguria/anuria.    #HLD  -cw Statin    #T2DM with peripheral neuropathy   -On Lantus and Metformin-Pioglitazone at home  - f/u A1c 5.8  - plan to hold Pioglitazone on d/c as can lead to CHF exacerbation  - Insulin basal/bolus if FS>180  - started on gabapentin 200 mg BID renally adjusted  - duplex neg   - arterial duplex negative for stenosis      #Glaucoma  -Eye drops    DVT ppx Lovenox  GI ppx N/A  Diet DASH/CC. fluid restricted  Code Full  Dispo pending HF eval

## 2021-07-06 LAB
ALBUMIN SERPL ELPH-MCNC: 2.4 G/DL — LOW (ref 3.5–5.2)
ALP SERPL-CCNC: 125 U/L — HIGH (ref 30–115)
ALT FLD-CCNC: 24 U/L — SIGNIFICANT CHANGE UP (ref 0–41)
ANION GAP SERPL CALC-SCNC: 7 MMOL/L — SIGNIFICANT CHANGE UP (ref 7–14)
AST SERPL-CCNC: 24 U/L — SIGNIFICANT CHANGE UP (ref 0–41)
BILIRUB SERPL-MCNC: 0.2 MG/DL — SIGNIFICANT CHANGE UP (ref 0.2–1.2)
BUN SERPL-MCNC: 46 MG/DL — HIGH (ref 10–20)
CALCIUM SERPL-MCNC: 7.8 MG/DL — LOW (ref 8.5–10.1)
CHLORIDE SERPL-SCNC: 104 MMOL/L — SIGNIFICANT CHANGE UP (ref 98–110)
CO2 SERPL-SCNC: 28 MMOL/L — SIGNIFICANT CHANGE UP (ref 17–32)
CREAT SERPL-MCNC: 2.7 MG/DL — HIGH (ref 0.7–1.5)
GLUCOSE BLDC GLUCOMTR-MCNC: 114 MG/DL — HIGH (ref 70–99)
GLUCOSE BLDC GLUCOMTR-MCNC: 114 MG/DL — HIGH (ref 70–99)
GLUCOSE BLDC GLUCOMTR-MCNC: 120 MG/DL — HIGH (ref 70–99)
GLUCOSE BLDC GLUCOMTR-MCNC: 151 MG/DL — HIGH (ref 70–99)
GLUCOSE SERPL-MCNC: 112 MG/DL — HIGH (ref 70–99)
HCT VFR BLD CALC: 33.8 % — LOW (ref 42–52)
HGB BLD-MCNC: 10.2 G/DL — LOW (ref 14–18)
MAGNESIUM SERPL-MCNC: 2 MG/DL — SIGNIFICANT CHANGE UP (ref 1.8–2.4)
MCHC RBC-ENTMCNC: 24 PG — LOW (ref 27–31)
MCHC RBC-ENTMCNC: 30.2 G/DL — LOW (ref 32–37)
MCV RBC AUTO: 79.5 FL — LOW (ref 80–94)
NRBC # BLD: 0 /100 WBCS — SIGNIFICANT CHANGE UP (ref 0–0)
PHOSPHATE SERPL-MCNC: 4.3 MG/DL — SIGNIFICANT CHANGE UP (ref 2.1–4.9)
PLATELET # BLD AUTO: 194 K/UL — SIGNIFICANT CHANGE UP (ref 130–400)
POTASSIUM SERPL-MCNC: 4.7 MMOL/L — SIGNIFICANT CHANGE UP (ref 3.5–5)
POTASSIUM SERPL-SCNC: 4.7 MMOL/L — SIGNIFICANT CHANGE UP (ref 3.5–5)
PROT SERPL-MCNC: 5 G/DL — LOW (ref 6–8)
RBC # BLD: 4.25 M/UL — LOW (ref 4.7–6.1)
RBC # FLD: 13.8 % — SIGNIFICANT CHANGE UP (ref 11.5–14.5)
SODIUM SERPL-SCNC: 139 MMOL/L — SIGNIFICANT CHANGE UP (ref 135–146)
WBC # BLD: 7.82 K/UL — SIGNIFICANT CHANGE UP (ref 4.8–10.8)
WBC # FLD AUTO: 7.82 K/UL — SIGNIFICANT CHANGE UP (ref 4.8–10.8)

## 2021-07-06 PROCEDURE — 99223 1ST HOSP IP/OBS HIGH 75: CPT

## 2021-07-06 PROCEDURE — 71045 X-RAY EXAM CHEST 1 VIEW: CPT | Mod: 26

## 2021-07-06 PROCEDURE — 99233 SBSQ HOSP IP/OBS HIGH 50: CPT

## 2021-07-06 RX ORDER — ISOSORBIDE DINITRATE 5 MG/1
20 TABLET ORAL THREE TIMES A DAY
Refills: 0 | Status: DISCONTINUED | OUTPATIENT
Start: 2021-07-06 | End: 2021-07-16

## 2021-07-06 RX ORDER — HYDRALAZINE HCL 50 MG
25 TABLET ORAL THREE TIMES A DAY
Refills: 0 | Status: DISCONTINUED | OUTPATIENT
Start: 2021-07-06 | End: 2021-07-11

## 2021-07-06 RX ADMIN — GABAPENTIN 200 MILLIGRAM(S): 400 CAPSULE ORAL at 17:08

## 2021-07-06 RX ADMIN — Medication 60 MILLIGRAM(S): at 05:59

## 2021-07-06 RX ADMIN — Medication 3 UNIT(S): at 08:00

## 2021-07-06 RX ADMIN — DORZOLAMIDE HYDROCHLORIDE 1 DROP(S): 20 SOLUTION/ DROPS OPHTHALMIC at 21:32

## 2021-07-06 RX ADMIN — ENOXAPARIN SODIUM 40 MILLIGRAM(S): 100 INJECTION SUBCUTANEOUS at 12:02

## 2021-07-06 RX ADMIN — BRIMONIDINE TARTRATE 1 DROP(S): 2 SOLUTION/ DROPS OPHTHALMIC at 05:58

## 2021-07-06 RX ADMIN — DORZOLAMIDE HYDROCHLORIDE 1 DROP(S): 20 SOLUTION/ DROPS OPHTHALMIC at 12:03

## 2021-07-06 RX ADMIN — BRIMONIDINE TARTRATE 1 DROP(S): 2 SOLUTION/ DROPS OPHTHALMIC at 17:08

## 2021-07-06 RX ADMIN — Medication 1 DROP(S): at 05:58

## 2021-07-06 RX ADMIN — DORZOLAMIDE HYDROCHLORIDE 1 DROP(S): 20 SOLUTION/ DROPS OPHTHALMIC at 05:58

## 2021-07-06 RX ADMIN — INSULIN GLARGINE 10 UNIT(S): 100 INJECTION, SOLUTION SUBCUTANEOUS at 08:00

## 2021-07-06 RX ADMIN — Medication 1 DROP(S): at 17:08

## 2021-07-06 RX ADMIN — GABAPENTIN 200 MILLIGRAM(S): 400 CAPSULE ORAL at 05:59

## 2021-07-06 RX ADMIN — Medication 1: at 12:01

## 2021-07-06 RX ADMIN — Medication 25 MILLIGRAM(S): at 21:31

## 2021-07-06 RX ADMIN — Medication 3 UNIT(S): at 12:02

## 2021-07-06 RX ADMIN — ATORVASTATIN CALCIUM 40 MILLIGRAM(S): 80 TABLET, FILM COATED ORAL at 21:31

## 2021-07-06 RX ADMIN — LATANOPROST 1 DROP(S): 0.05 SOLUTION/ DROPS OPHTHALMIC; TOPICAL at 21:31

## 2021-07-06 RX ADMIN — Medication 3 UNIT(S): at 17:07

## 2021-07-06 NOTE — PROGRESS NOTE ADULT - ATTENDING COMMENTS
#Progress Note Handoff  Pending (specify):  [facility] HF team. pt cleared for cath on there end, will reach out to cardiology today for possible cath, follow up HF note for diaeresis   Family discussion: house staff updated pt family  Disposition: cardiac telemonitoring

## 2021-07-06 NOTE — CONSULT NOTE ADULT - SUBJECTIVE AND OBJECTIVE BOX
Date of Admission: 7/2/21    HISTORY OF PRESENT ILLNESS:    HPI: 56M w/ PMHx HTN, HLD, T2DM on insulin, Glaucoma. He presents to the Ed c/o worsening LE swelling and pain for the past 1 month. Endorses SOB on exertion, along w/ increased abdominal girth. Denies orthopnea, PND, wheeze, CP, diaphoresis. No cardiac Hx.        PAST MEDICAL & SURGICAL HISTORY:    HTN (hypertension)  HLD (hyperlipidemia)  DM (diabetes mellitus)  Glaucoma  No significant past surgical history        FAMILY HISTORY:  [ ] no pertinent family history of premature cardiovascular disease in first degree relatives.  Mother:   Father:   Siblings:     SOCIAL HISTORY:    [ ] Non-smoker  [ ] Smoker  [ ] Alcohol    Allergies    No Known Allergies    Intolerances    	    REVIEW OF SYSTEMS:  CONSTITUTIONAL: No fever, weight loss, or fatigue  CARDIOLOGY: PAtient denies chest pain, shortness of breath or syncopal episodes.   RESPIRATORY: denies shortness of breath, wheezeing.   NEUROLOGICAL: NO weakness, no focal deficits to report.  ENDOCRINOLOGICAL: no recent change in diabetic medications.   GI: no BRBPR, no N,V,diarrhea.    PSYCHIATRY: normal mood and affect  HEENT: no nasal discharge, no ecchymosis  SKIN: no ecchymosis, no breakdown  MUSCULOSKELETAL: Full range of motion x4.     PHYSICAL EXAM:  T(C): 36.2 (07-06-21 @ 05:52), Max: 37 (07-05-21 @ 21:00)  HR: 71 (07-06-21 @ 05:52) (70 - 74)  BP: 152/97 (07-06-21 @ 05:52) (113/65 - 152/97)  RR: 18 (07-06-21 @ 05:52) (18 - 20)  SpO2: --  Wt(kg): --  I&O's Summary    05 Jul 2021 07:01  -  06 Jul 2021 07:00  --------------------------------------------------------  IN: 810 mL / OUT: 1850 mL / NET: -1040 mL        General Appearance: well appearing, normal for age and gender. 	  Neck: normal JVP, no bruit.   Eyes: No xanthomalasia, Extra Ocular muscles intact.   Cardiovascular: regular rate and rhythm S1 S2, No JVD, No murmurs, No edema  Respiratory: Lungs clear to auscultation	  Psychiatry: Alert and oriented x 3, Mood & affect appropriate  Gastrointestinal:  Soft, Non-tender  Skin/Integumen: No rashes, No ecchymoses, No cyanosis	  Neurologic: Non-focal  Musculoskeletal/ extremities: Normal range of motion, No clubbing, cyanosis or edema  Vascular: Peripheral pulses palpable 2+ bilaterally    LABS:	 	                          10.9   8.56  )-----------( 223      ( 05 Jul 2021 12:04 )             36.3     07-05    141  |  103  |  38<H>  ----------------------------<  98  4.2   |  30  |  2.4<H>    Ca    8.1<L>      05 Jul 2021 12:04  Phos  4.3     07-05  Mg     2.0     07-05    TPro  5.2<L>  /  Alb  2.6<L>  /  TBili  0.3  /  DBili  x   /  AST  21  /  ALT  23  /  AlkPhos  136<H>  07-05            CARDIAC MARKERS:  	    PREVIOUS DIAGNOSTIC TESTING:          TTE 7/2/21    Summary:   1. Moderately decreased global left ventricular systolic function.   2. Multiple left ventricular regional wall motion abnormalities exist. See wall motion findings.   3. LV Ejection Fraction by Garcia's Method with a biplane EF of 34 %.   4. Moderately increased LV wall thickness.   5. Normal left ventricular internal cavity size.   6. Mild to moderately enlarged left atrium.   7. Normal right atrial size.   8. Moderate pericardial effusion.   9. Mild to moderate mitral valve regurgitation.  10. Structurally normal mitral valve, with normal leaflet excursion.  11. Moderate tricuspid regurgitation.  12. Mild aortic regurgitation.  13. Normal trileaflet aortic valve with normal opening.  14. Mild pulmonic valve regurgitation.  15. Estimated pulmonary artery systolic pressure is 52.0 mmHg assuming a right atrial pressure of 10 mmHg, which is consistent with moderate pulmonary hypertension.  	    Home Medications:  atorvastatin 40 mg oral tablet: 1 tab(s) orally once a day (02 Jul 2021 04:46)  BASAGLAR 100 UNIT/ML KWIKPEN:  (02 Jul 2021 04:46)  brimonidine 0.2% ophthalmic solution: 1 drop(s) to each affected eye 2 times a day (02 Jul 2021 04:46)  dorzolamide-timolol 2%-0.5% preservative-free ophthalmic solution: 1 drop(s) to each affected eye 2 times a day (02 Jul 2021 04:46)  furosemide 20 mg oral tablet: 1 tab(s) orally once a day (02 Jul 2021 04:46)  latanoprost 0.005% ophthalmic solution: 1 drop(s) to each affected eye once a day (in the evening) (02 Jul 2021 04:46)  lisinopril 20 mg oral tablet: 1 tab(s) orally once a day (02 Jul 2021 04:46)  pioglitazone-metformin 15 mg-850 mg oral tablet: 1 tab(s) orally 2 times a day (02 Jul 2021 04:46)  Rhopressa 0.02% ophthalmic solution: 1 drop(s) to each affected eye once a day (in the evening) (02 Jul 2021 04:46)    MEDICATIONS  (STANDING):  atorvastatin 40 milliGRAM(s) Oral at bedtime  brimonidine 0.2% Ophthalmic Solution 1 Drop(s) Both EYES two times a day  chlorhexidine 4% Liquid 1 Application(s) Topical <User Schedule>  dextrose 40% Gel 15 Gram(s) Oral once  dextrose 5%. 1000 milliLiter(s) (50 mL/Hr) IV Continuous <Continuous>  dextrose 5%. 1000 milliLiter(s) (100 mL/Hr) IV Continuous <Continuous>  dextrose 50% Injectable 25 Gram(s) IV Push once  dextrose 50% Injectable 12.5 Gram(s) IV Push once  dextrose 50% Injectable 25 Gram(s) IV Push once  dorzolamide 2% Ophthalmic Solution 1 Drop(s) Both EYES three times a day  enoxaparin Injectable 40 milliGRAM(s) SubCutaneous daily  gabapentin 200 milliGRAM(s) Oral two times a day  glucagon  Injectable 1 milliGRAM(s) IntraMuscular once  insulin glargine Injectable (LANTUS) 10 Unit(s) SubCutaneous every morning  insulin lispro (ADMELOG) corrective regimen sliding scale   SubCutaneous three times a day before meals  insulin lispro Injectable (ADMELOG) 3 Unit(s) SubCutaneous three times a day before meals  latanoprost 0.005% Ophthalmic Solution 1 Drop(s) Both EYES at bedtime  NIFEdipine XL 60 milliGRAM(s) Oral daily  timolol 0.5% Solution 1 Drop(s) Both EYES two times a day    MEDICATIONS  (PRN):         Date of Admission: 21    HISTORY OF PRESENT ILLNESS:    HPI: 56M w/ PMHx HTN, HLD, T2DM on insulin, Glaucoma. He presents to the Ed c/o worsening LE swelling and pain for the past 1 month. Endorses SOB on exertion, along w/ increased abdominal girth. Denies orthopnea, PND, wheeze, CP, diaphoresis. No cardiac Hx.    Patient reports about two months ago he was able to walk and climb stairs without any limitations. For the past couple weeks he developed a BLE edema. Patient denies c/p, palpitations, headaches, bleeding, LH, dizziness, orthopnea, PND, LOC, cough, feeling bloated, N/V/D.     PAST MEDICAL & SURGICAL HISTORY:     HTN (hypertension)  HLD (hyperlipidemia)  DM (diabetes mellitus)  Glaucoma  No significant past surgical history        FAMILY HISTORY:    Mother:  77 HTN  Father:  at 55 of CKD       SOCIAL HISTORY:      Smokes about 3 cigarettes a week, social alcohol drinking, denies drug use    Allergies    No Known Allergies    Intolerances    	    REVIEW OF SYSTEMS:  CONSTITUTIONAL: No fever, weight loss, or fatigue  CARDIOLOGY: denies chest pain, shortness of breath or syncopal episodes.   RESPIRATORY: denies shortness of breath   NEUROLOGICAL: NO weakness, no focal deficits to report.  ENDOCRINOLOGICAL: no recent change in diabetic medications.   GI: no BRBPR, no N,V, diarrhea.    PSYCHIATRY: normal mood and affect  HEENT: no nasal discharge, no ecchymosis  SKIN: no ecchymosis, no breakdown  MUSCULOSKELETAL: Full range of motion x4.     PHYSICAL EXAM:    General Appearance: well appearing, normal for age and gender. 	  Neck: normal JVP, no bruit.   Eyes: Extra Ocular muscles intact.   Cardiovascular: regular rate and rhythm S1 S2, No JVD, No murmurs, No edema  Respiratory: Lungs clear to auscultation	  Psychiatry: Alert and oriented x 3, Mood & affect appropriate  Gastrointestinal:  Soft, Non-tender  Skin/Integumen: No rashes, No ecchymoses, No cyanosis	  Neurologic: Non-focal  Musculoskeletal/ extremities: Normal range of motion, No clubbing, cyanosis or edema  Vascular: Peripheral pulses palpable 2+ bilaterally      PREVIOUS DIAGNOSTIC TESTING:      TTE 21    Summary:   1. Moderately decreased global left ventricular systolic function.   2. Multiple left ventricular regional wall motion abnormalities exist. See wall motion findings.   3. LV Ejection Fraction by Garcia's Method with a biplane EF of 34 %.   4. Moderately increased LV wall thickness.   5. Normal left ventricular internal cavity size.   6. Mild to moderately enlarged left atrium.   7. Normal right atrial size.   8. Moderate pericardial effusion.   9. Mild to moderate mitral valve regurgitation.  10. Structurally normal mitral valve, with normal leaflet excursion.  11. Moderate tricuspid regurgitation.  12. Mild aortic regurgitation.  13. Normal trileaflet aortic valve with normal opening.  14. Mild pulmonic valve regurgitation.  15. Estimated pulmonary artery systolic pressure is 52.0 mmHg assuming a right atrial pressure of 10 mmHg, which is consistent with moderate pulmonary hypertension.  	    Home Medications:  atorvastatin 40 mg oral tablet: 1 tab(s) orally once a day (2021 04:46)  BASAGLAR 100 UNIT/ML KWIKPEN:  (:46)  brimonidine 0.2% ophthalmic solution: 1 drop(s) to each affected eye 2 times a day (2021 04:46)  dorzolamide-timolol 2%-0.5% preservative-free ophthalmic solution: 1 drop(s) to each affected eye 2 times a day (2021 04:46)  furosemide 20 mg oral tablet: 1 tab(s) orally once a day (2021 04:46)  latanoprost 0.005% ophthalmic solution: 1 drop(s) to each affected eye once a day (in the evening) (2021 04:46)  lisinopril 20 mg oral tablet: 1 tab(s) orally once a day (2021 04:46)  pioglitazone-metformin 15 mg-850 mg oral tablet: 1 tab(s) orally 2 times a day (2021 04:46)  Rhopressa 0.02% ophthalmic solution: 1 drop(s) to each affected eye once a day (in the evening) (2021 04:46)    MEDICATIONS  (STANDING):  atorvastatin 40 milliGRAM(s) Oral at bedtime  brimonidine 0.2% Ophthalmic Solution 1 Drop(s) Both EYES two times a day  chlorhexidine 4% Liquid 1 Application(s) Topical <User Schedule>  dextrose 40% Gel 15 Gram(s) Oral once  dextrose 5%. 1000 milliLiter(s) (50 mL/Hr) IV Continuous <Continuous>  dextrose 5%. 1000 milliLiter(s) (100 mL/Hr) IV Continuous <Continuous>  dextrose 50% Injectable 25 Gram(s) IV Push once  dextrose 50% Injectable 12.5 Gram(s) IV Push once  dextrose 50% Injectable 25 Gram(s) IV Push once  dorzolamide 2% Ophthalmic Solution 1 Drop(s) Both EYES three times a day  enoxaparin Injectable 40 milliGRAM(s) SubCutaneous daily  gabapentin 200 milliGRAM(s) Oral two times a day  glucagon  Injectable 1 milliGRAM(s) IntraMuscular once  insulin glargine Injectable (LANTUS) 10 Unit(s) SubCutaneous every morning  insulin lispro (ADMELOG) corrective regimen sliding scale   SubCutaneous three times a day before meals  insulin lispro Injectable (ADMELOG) 3 Unit(s) SubCutaneous three times a day before meals  latanoprost 0.005% Ophthalmic Solution 1 Drop(s) Both EYES at bedtime  NIFEdipine XL 60 milliGRAM(s) Oral daily  timolol 0.5% Solution 1 Drop(s) Both EYES two times a day    MEDICATIONS  (PRN):

## 2021-07-06 NOTE — PROGRESS NOTE ADULT - ASSESSMENT
ASSESSMENT & PLAN  56M w/ PMHx HTN, HLD, DM2 (on insulin), Glaucoma presents to the Ed c/o worsening LE swelling and pain for the past 1 month. Endorses SOB on exertion, along w/ increased abdominal girth. Denies orthopnea, PND, wheeze, CP, diaphoresis. No cardiac Hx. ED course: /130 in triage, otherwise VS. Labs showed Hb 11.1, Cr 2.1, mildly increased ALP and AST/ALT, Trop 0.10, BNP 51104. CXR revealing cardiomegaly and CP angle blunting b/l.    #New Onset Acute CHF exacerbation  #HFrEF 2/2 uncontrolled HTN  #Moderate pericardial effusion  #Uncontrolled HTN  -no prior cardiac hx  -CXR - cardiomegaly and blunting of CP angles  -BNP 40916 on admission  -trops 0.10 -> 0.11 -> 0.11 -> 0.14 -> 0.09  -Echo - EF 34%, moderate pericardial effusion  -I<O, daily weights  -hold ACEI/BB until pt is euvolemic  -c/w nifedipine XL 60mg PO daily  -cont to monitor BP, can increase nifedipine to 90mg and/or add hydralazine 10mg TID  -CHF team to see patient today - if cleared will f/u with cardiology for possible cath    #LENNY on CKD  -Cr 2.0->2.2->2.1->2.4->2.7  -hold lasix today  -FeNa = 2.8%, FeUrea 40.2% => likely intrinsic  -UA 7/3 significant for moderate blood, 300 protein, protein/Cr ratio 8.1  -US KUB - no hydro b/l, right renal cysts  -cont to monitor I&O for oliguria/anuria    #HLD  -cont atrovastatin    #DM2 w/ peripheral neuropathy  -FS: 114, 212, 70, 89  -cont Lantus, ISS  -on lanus, metformin-pioglitazone at home  -hold pioglitazone now and on d/c - can contribute to CHF exacerbation  -on gabapentin 200mg BID renally dosed  -duplex negative  -arterial duplex negative    #HTN  -BP: 152/97 (113/65 - 152/97)  -cont nifedipine    #Glaucoma  -cont eye drops    PT/REHAB: seen by PT ambulated 100ft with supervision  ACTIVITY: Increase as tolerated   DVT PPX: enoxaparin Injectable  GI PPX:  Not indicated  DIET: Diet, DASH/TLC  CODE: Full code   DIsposition: from home; will return home  Pending: f/u CHF team - if cleared can reach out to cardiology to continue ischemic w/u. Cont to hold lasix due to rising Cr.  ASSESSMENT & PLAN  56M w/ PMHx HTN, HLD, DM2 (on insulin), Glaucoma presents to the Ed c/o worsening LE swelling and pain for the past 1 month. Endorses SOB on exertion, along w/ increased abdominal girth. Denies orthopnea, PND, wheeze, CP, diaphoresis. No cardiac Hx. ED course: /130 in triage, otherwise VS. Labs showed Hb 11.1, Cr 2.1, mildly increased ALP and AST/ALT, Trop 0.10, BNP 51871. CXR revealing cardiomegaly and CP angle blunting b/l.    #New Onset Acute HFrEF exacerbation  #HFrEF 2/2 uncontrolled HTN  #Moderate pericardial effusion  #Uncontrolled HTN  -no prior cardiac hx  -CXR - cardiomegaly and blunting of CP angles  -BNP 79929 on admission  -trops 0.10 -> 0.11 -> 0.11 -> 0.14 -> 0.09  -Echo - EF 34%, moderate pericardial effusion  -I<O, daily weights  -hold ACEI/BB until pt is euvolemic  -c/w nifedipine XL 60mg PO daily  -cont to monitor BP, can increase nifedipine to 90mg and/or add hydralazine 10mg TID  -CHF team to see patient today - if cleared will f/u with cardiology for possible cath    #LENNY on CKD  -Cr 2.0->2.2->2.1->2.4->2.7  -hold lasix today  -FeNa = 2.8%, FeUrea 40.2% => likely intrinsic  -UA 7/3 significant for moderate blood, 300 protein, protein/Cr ratio 8.1  -US KUB - no hydro b/l, right renal cysts  -cont to monitor I&O for oliguria/anuria    #HLD  -cont atrovastatin    #DM2 w/ peripheral neuropathy  -FS: 114, 212, 70, 89  -cont Lantus, ISS  -on lanus, metformin-pioglitazone at home  -hold pioglitazone now and on d/c - can contribute to CHF exacerbation  -on gabapentin 200mg BID renally dosed  -duplex negative  -arterial duplex negative    #HTN  -BP: 152/97 (113/65 - 152/97)  -cont nifedipine    #Glaucoma  -cont eye drops    PT/REHAB: seen by PT ambulated 100ft with supervision  ACTIVITY: Increase as tolerated   DVT PPX: enoxaparin Injectable  GI PPX:  Not indicated  DIET: Diet, DASH/TLC  CODE: Full code   DIsposition: from home; will return home  Pending: f/u CHF team - if cleared can reach out to cardiology to continue ischemic w/u. Cont to hold lasix due to rising Cr.

## 2021-07-06 NOTE — PROGRESS NOTE ADULT - SUBJECTIVE AND OBJECTIVE BOX
GILLIAN MENDOZA MRN#: [id number]  CODE STATUS: FULL CODE     SUBJECTIVE   Chief complaint: generalized weakness    Currently admitted to medicine with the primary diagnosis of NEW ONSET OF CONGESTIVE HEART FAILURE ...      Today is hospital day 4d,   INTERVAL HPI/OVERNIGHT EVENTS: No acute events overnight    This morning, he is laying in bed comfortably. Denies chest pain, shortness of breath, abdominal pain, nausea, vomiting or changes in bowel habits. He has no complaints today.     Urinating and stooling appropriately.    Present Today:           Cain Catheter (x)No/ ()Yes?   Indication:             Central Line (x)No/ ()Yes?  Indication:          IV Fluids (x)No/ ()Yes?   Indication:     OBJECTIVE  PAST MEDICAL & SURGICAL HISTORY  HTN (hypertension)    HLD (hyperlipidemia)    DM (diabetes mellitus)    Glaucoma    No significant past surgical history      ALLERGIES:  No Known Allergies    HOME MEDICATIONS:  Home Medications:  atorvastatin 40 mg oral tablet: 1 tab(s) orally once a day (02 Jul 2021 04:46)  BASAGLAR 100 UNIT/ML KWIKPEN:  (02 Jul 2021 04:46)  brimonidine 0.2% ophthalmic solution: 1 drop(s) to each affected eye 2 times a day (02 Jul 2021 04:46)  dorzolamide-timolol 2%-0.5% preservative-free ophthalmic solution: 1 drop(s) to each affected eye 2 times a day (02 Jul 2021 04:46)  furosemide 20 mg oral tablet: 1 tab(s) orally once a day (02 Jul 2021 04:46)  latanoprost 0.005% ophthalmic solution: 1 drop(s) to each affected eye once a day (in the evening) (02 Jul 2021 04:46)  lisinopril 20 mg oral tablet: 1 tab(s) orally once a day (02 Jul 2021 04:46)  pioglitazone-metformin 15 mg-850 mg oral tablet: 1 tab(s) orally 2 times a day (02 Jul 2021 04:46)  Rhopressa 0.02% ophthalmic solution: 1 drop(s) to each affected eye once a day (in the evening) (02 Jul 2021 04:46)    MEDICATIONS:  STANDING MEDICATIONS  MEDICATIONS  (STANDING):  atorvastatin 40 milliGRAM(s) Oral at bedtime  brimonidine 0.2% Ophthalmic Solution 1 Drop(s) Both EYES two times a day  chlorhexidine 4% Liquid 1 Application(s) Topical <User Schedule>  dextrose 40% Gel 15 Gram(s) Oral once  dextrose 5%. 1000 milliLiter(s) (50 mL/Hr) IV Continuous <Continuous>  dextrose 5%. 1000 milliLiter(s) (100 mL/Hr) IV Continuous <Continuous>  dextrose 50% Injectable 25 Gram(s) IV Push once  dextrose 50% Injectable 12.5 Gram(s) IV Push once  dextrose 50% Injectable 25 Gram(s) IV Push once  dorzolamide 2% Ophthalmic Solution 1 Drop(s) Both EYES three times a day  enoxaparin Injectable 40 milliGRAM(s) SubCutaneous daily  gabapentin 200 milliGRAM(s) Oral two times a day  glucagon  Injectable 1 milliGRAM(s) IntraMuscular once  insulin glargine Injectable (LANTUS) 10 Unit(s) SubCutaneous every morning  insulin lispro (ADMELOG) corrective regimen sliding scale   SubCutaneous three times a day before meals  insulin lispro Injectable (ADMELOG) 3 Unit(s) SubCutaneous three times a day before meals  latanoprost 0.005% Ophthalmic Solution 1 Drop(s) Both EYES at bedtime  NIFEdipine XL 60 milliGRAM(s) Oral daily  timolol 0.5% Solution 1 Drop(s) Both EYES two times a day    PRN MEDICATIONS  MEDICATIONS  (PRN):      VITAL SIGNS  T(F): 97.2 (07-06 @ 05:52), Max: 98.6 (07-05 @ 21:00)  HR: 71 (07-06 @ 05:52) (70 - 74)  BP: 152/97 (07-06 @ 05:52) (113/65 - 152/97)  RR: 18 (07-06 @ 05:52) (18 - 20)  SpO2: --    LABS:                        10.2   7.82  )-----------( 194      ( 06 Jul 2021 07:25 )             33.8     07-06    139  |  104  |  46<H>  ----------------------------<  112<H>  4.7   |  28  |  2.7<H>    Ca    7.8<L>      06 Jul 2021 07:25  Phos  4.3     07-06  Mg     2.0     07-06    TPro  5.0<L>  /  Alb  2.4<L>  /  TBili  0.2  /  DBili  x   /  AST  24  /  ALT  24  /  AlkPhos  125<H>  07-06      RADIOLOGY:  CXR 7/6 - Cardiomegaly. Low lung volumes leads to magnification of the pulmonary interstitium.  LE Art duplex 7/3 - Moderate atherosclerotic disease in bilateral lower extremities without evidence of hemodynamically significant stenoses or occlusions.  LE venous duplex 7/2 - No evidence of deep venous thrombosis in either lower extremity.    ECHO:  7/2 - 1. Moderately decreased global left ventricular systolic function.   2. Multiple left ventricular regional wall motion abnormalities exist. See wall motion findings.   3. LV Ejection Fraction by Garcia's Method with a biplane EF of 34 %.   4. Moderately increased LV wall thickness.   5. Normal left ventricular internal cavity size.   6. Mild to moderately enlarged left atrium.   7. Normal right atrial size.   8. Moderate pericardial effusion.   9. Mild to moderate mitral valve regurgitation.  10. Structurally normal mitral valve, with normal leaflet excursion.  11. Moderate tricuspid regurgitation.  12. Mild aortic regurgitation.  13. Normal trileaflet aortic valve with normal opening.  14. Mild pulmonic valve regurgitation.  15. Estimated pulmonary artery systolic pressure is 52.0 mmHg assuming a right atrial pressure of 10 mmHg, which is consistent with moderate pulmonary hypertension.      PHYSICAL EXAM:  General: Comfortably laying on the hospital bed. NAD. AAOx3.  HEENT: Atraumatic. Normocephalic. EOMI. PERRL. Conjunctiva and sclera clear.  Cardiac: Normal S1, S2. RRR. No murmurs, rubs, or gallops.  Pulm: CTA b/l no wheezes, rhonchi, or rales.  GI: Soft, nontender, nondistended. BSx4q  Ext: 2+ peripheral pulses. Moving all 4 extremities. No edema, cyanosis.  Neuro: Grossly intact  Skin: No lesions or rashes    ASSESSMENT & PLAN  56M w/ PMHx HTN, HLD, DM2 (on insulin), Glaucoma presents to the Ed c/o worsening LE swelling and pain for the past 1 month. Endorses SOB on exertion, along w/ increased abdominal girth. Denies orthopnea, PND, wheeze, CP, diaphoresis. No cardiac Hx. ED course: /130 in triage, otherwise VS. Labs showed Hb 11.1, Cr 2.1, mildly increased ALP and AST/ALT, Trop 0.10, BNP 62044. CXR revealing cardiomegaly and CP angle blunting b/l.    #New Onset Acute CHF exacerbation  #HFrEF 2/2 uncontrolled HTN  #Moderate pericardial effusion  #Uncontrolled HTN    #LENNY on CKD  -Cr 2.0->2.2->2.1->2.4->2.7  -    #HLD  -cont atrovastatin      #DM2  -FS: 114, 212, 70, 89  -cont     #HTN  -BP: 152/97 (113/65 - 152/97)    PT/REHAB   ACTIVITY: Increase as tolerated   DVT PPX: enoxaparin Injectable    GI PPX:  Not indicated  DIET: Diet, DASH/TLC        CODE: Full code   DIsposition: from home;   Pending:   GILLIAN MENDOZA MRN#: [id number]  CODE STATUS: FULL CODE     SUBJECTIVE   Chief complaint: generalized weakness    Currently admitted to medicine with the primary diagnosis of NEW ONSET OF CONGESTIVE HEART FAILURE ...      Today is hospital day 4d,   INTERVAL HPI/OVERNIGHT EVENTS: No acute events overnight    This morning, he is laying in bed comfortably. Denies chest pain, shortness of breath, abdominal pain, nausea, vomiting or changes in bowel habits. He has no complaints today.     Urinating and stooling appropriately. Last BM saturday.     Present Today:           Cain Catheter (x)No/ ()Yes?   Indication:             Central Line (x)No/ ()Yes?  Indication:          IV Fluids (x)No/ ()Yes?   Indication:     OBJECTIVE  PAST MEDICAL & SURGICAL HISTORY  HTN (hypertension)    HLD (hyperlipidemia)    DM (diabetes mellitus)    Glaucoma    No significant past surgical history      ALLERGIES:  No Known Allergies    HOME MEDICATIONS:  Home Medications:  atorvastatin 40 mg oral tablet: 1 tab(s) orally once a day (02 Jul 2021 04:46)  BASAGLAR 100 UNIT/ML KWIKPEN:  (02 Jul 2021 04:46)  brimonidine 0.2% ophthalmic solution: 1 drop(s) to each affected eye 2 times a day (02 Jul 2021 04:46)  dorzolamide-timolol 2%-0.5% preservative-free ophthalmic solution: 1 drop(s) to each affected eye 2 times a day (02 Jul 2021 04:46)  furosemide 20 mg oral tablet: 1 tab(s) orally once a day (02 Jul 2021 04:46)  latanoprost 0.005% ophthalmic solution: 1 drop(s) to each affected eye once a day (in the evening) (02 Jul 2021 04:46)  lisinopril 20 mg oral tablet: 1 tab(s) orally once a day (02 Jul 2021 04:46)  pioglitazone-metformin 15 mg-850 mg oral tablet: 1 tab(s) orally 2 times a day (02 Jul 2021 04:46)  Rhopressa 0.02% ophthalmic solution: 1 drop(s) to each affected eye once a day (in the evening) (02 Jul 2021 04:46)    MEDICATIONS:  STANDING MEDICATIONS  MEDICATIONS  (STANDING):  atorvastatin 40 milliGRAM(s) Oral at bedtime  brimonidine 0.2% Ophthalmic Solution 1 Drop(s) Both EYES two times a day  chlorhexidine 4% Liquid 1 Application(s) Topical <User Schedule>  dextrose 40% Gel 15 Gram(s) Oral once  dextrose 5%. 1000 milliLiter(s) (50 mL/Hr) IV Continuous <Continuous>  dextrose 5%. 1000 milliLiter(s) (100 mL/Hr) IV Continuous <Continuous>  dextrose 50% Injectable 25 Gram(s) IV Push once  dextrose 50% Injectable 12.5 Gram(s) IV Push once  dextrose 50% Injectable 25 Gram(s) IV Push once  dorzolamide 2% Ophthalmic Solution 1 Drop(s) Both EYES three times a day  enoxaparin Injectable 40 milliGRAM(s) SubCutaneous daily  gabapentin 200 milliGRAM(s) Oral two times a day  glucagon  Injectable 1 milliGRAM(s) IntraMuscular once  insulin glargine Injectable (LANTUS) 10 Unit(s) SubCutaneous every morning  insulin lispro (ADMELOG) corrective regimen sliding scale   SubCutaneous three times a day before meals  insulin lispro Injectable (ADMELOG) 3 Unit(s) SubCutaneous three times a day before meals  latanoprost 0.005% Ophthalmic Solution 1 Drop(s) Both EYES at bedtime  NIFEdipine XL 60 milliGRAM(s) Oral daily  timolol 0.5% Solution 1 Drop(s) Both EYES two times a day    PRN MEDICATIONS  MEDICATIONS  (PRN):      VITAL SIGNS  T(F): 97.2 (07-06 @ 05:52), Max: 98.6 (07-05 @ 21:00)  HR: 71 (07-06 @ 05:52) (70 - 74)  BP: 152/97 (07-06 @ 05:52) (113/65 - 152/97)  RR: 18 (07-06 @ 05:52) (18 - 20)      LABS:                        10.2   7.82  )-----------( 194      ( 06 Jul 2021 07:25 )             33.8     07-06    139  |  104  |  46<H>  ----------------------------<  112<H>  4.7   |  28  |  2.7<H>    Ca    7.8<L>      06 Jul 2021 07:25  Phos  4.3     07-06  Mg     2.0     07-06    TPro  5.0<L>  /  Alb  2.4<L>  /  TBili  0.2  /  DBili  x   /  AST  24  /  ALT  24  /  AlkPhos  125<H>  07-06      RADIOLOGY:  CXR 7/6 - Cardiomegaly. Low lung volumes leads to magnification of the pulmonary interstitium.  LE Art duplex 7/3 - Moderate atherosclerotic disease in bilateral lower extremities without evidence of hemodynamically significant stenoses or occlusions.  LE venous duplex 7/2 - No evidence of deep venous thrombosis in either lower extremity.    ECHO:  7/2 - 1. Moderately decreased global left ventricular systolic function.   2. Multiple left ventricular regional wall motion abnormalities exist. See wall motion findings.   3. LV Ejection Fraction by Garcia's Method with a biplane EF of 34 %.   4. Moderately increased LV wall thickness.   5. Normal left ventricular internal cavity size.   6. Mild to moderately enlarged left atrium.   7. Normal right atrial size.   8. Moderate pericardial effusion.   9. Mild to moderate mitral valve regurgitation.  10. Structurally normal mitral valve, with normal leaflet excursion.  11. Moderate tricuspid regurgitation.  12. Mild aortic regurgitation.  13. Normal trileaflet aortic valve with normal opening.  14. Mild pulmonic valve regurgitation.  15. Estimated pulmonary artery systolic pressure is 52.0 mmHg assuming a right atrial pressure of 10 mmHg, which is consistent with moderate pulmonary hypertension.      PHYSICAL EXAM:  General: Comfortably laying on the hospital bed. NAD. AAOx3.  HEENT: Atraumatic. Normocephalic. EOMI. PERRL. Conjunctiva and sclera clear.  Cardiac: Normal S1, S2. RRR. No murmurs, rubs, or gallops.  Pulm: CTA b/l no wheezes, rhonchi, or rales.  GI: Soft, nontender, nondistended. BSx4q  Ext: 2+ peripheral pulses. Moving all 4 extremities. No edema, cyanosis.  Neuro: Grossly intact  Skin: No lesions or rashes       GILLIAN MENDOZA MRN#: [id number]  CODE STATUS: FULL CODE     SUBJECTIVE   Chief complaint: generalized weakness    Currently admitted to medicine with the primary diagnosis of NEW ONSET OF CONGESTIVE HEART FAILURE ...      Today is hospital day 4d,   INTERVAL HPI/OVERNIGHT EVENTS: No acute events overnight    Attending Note: Pt seen and examined at bedside. No cp or sob. No overnight events.     This morning, he is laying in bed comfortably. Denies chest pain, shortness of breath, abdominal pain, nausea, vomiting or changes in bowel habits. He has no complaints today.     Urinating and stooling appropriately. Last BM saturday.     Present Today:           Cain Catheter (x)No/ ()Yes?   Indication:             Central Line (x)No/ ()Yes?  Indication:          IV Fluids (x)No/ ()Yes?   Indication:     OBJECTIVE  PAST MEDICAL & SURGICAL HISTORY  HTN (hypertension)    HLD (hyperlipidemia)    DM (diabetes mellitus)    Glaucoma    No significant past surgical history      ALLERGIES:  No Known Allergies    HOME MEDICATIONS:  Home Medications:  atorvastatin 40 mg oral tablet: 1 tab(s) orally once a day (02 Jul 2021 04:46)  BASAGLAR 100 UNIT/ML KWIKPEN:  (02 Jul 2021 04:46)  brimonidine 0.2% ophthalmic solution: 1 drop(s) to each affected eye 2 times a day (02 Jul 2021 04:46)  dorzolamide-timolol 2%-0.5% preservative-free ophthalmic solution: 1 drop(s) to each affected eye 2 times a day (02 Jul 2021 04:46)  furosemide 20 mg oral tablet: 1 tab(s) orally once a day (02 Jul 2021 04:46)  latanoprost 0.005% ophthalmic solution: 1 drop(s) to each affected eye once a day (in the evening) (02 Jul 2021 04:46)  lisinopril 20 mg oral tablet: 1 tab(s) orally once a day (02 Jul 2021 04:46)  pioglitazone-metformin 15 mg-850 mg oral tablet: 1 tab(s) orally 2 times a day (02 Jul 2021 04:46)  Rhopressa 0.02% ophthalmic solution: 1 drop(s) to each affected eye once a day (in the evening) (02 Jul 2021 04:46)    MEDICATIONS:  STANDING MEDICATIONS  MEDICATIONS  (STANDING):  atorvastatin 40 milliGRAM(s) Oral at bedtime  brimonidine 0.2% Ophthalmic Solution 1 Drop(s) Both EYES two times a day  chlorhexidine 4% Liquid 1 Application(s) Topical <User Schedule>  dextrose 40% Gel 15 Gram(s) Oral once  dextrose 5%. 1000 milliLiter(s) (50 mL/Hr) IV Continuous <Continuous>  dextrose 5%. 1000 milliLiter(s) (100 mL/Hr) IV Continuous <Continuous>  dextrose 50% Injectable 25 Gram(s) IV Push once  dextrose 50% Injectable 12.5 Gram(s) IV Push once  dextrose 50% Injectable 25 Gram(s) IV Push once  dorzolamide 2% Ophthalmic Solution 1 Drop(s) Both EYES three times a day  enoxaparin Injectable 40 milliGRAM(s) SubCutaneous daily  gabapentin 200 milliGRAM(s) Oral two times a day  glucagon  Injectable 1 milliGRAM(s) IntraMuscular once  insulin glargine Injectable (LANTUS) 10 Unit(s) SubCutaneous every morning  insulin lispro (ADMELOG) corrective regimen sliding scale   SubCutaneous three times a day before meals  insulin lispro Injectable (ADMELOG) 3 Unit(s) SubCutaneous three times a day before meals  latanoprost 0.005% Ophthalmic Solution 1 Drop(s) Both EYES at bedtime  NIFEdipine XL 60 milliGRAM(s) Oral daily  timolol 0.5% Solution 1 Drop(s) Both EYES two times a day    PRN MEDICATIONS  MEDICATIONS  (PRN):      VITAL SIGNS  T(F): 97.2 (07-06 @ 05:52), Max: 98.6 (07-05 @ 21:00)  HR: 71 (07-06 @ 05:52) (70 - 74)  BP: 152/97 (07-06 @ 05:52) (113/65 - 152/97)  RR: 18 (07-06 @ 05:52) (18 - 20)      LABS:                        10.2   7.82  )-----------( 194      ( 06 Jul 2021 07:25 )             33.8     07-06    139  |  104  |  46<H>  ----------------------------<  112<H>  4.7   |  28  |  2.7<H>    Ca    7.8<L>      06 Jul 2021 07:25  Phos  4.3     07-06  Mg     2.0     07-06    TPro  5.0<L>  /  Alb  2.4<L>  /  TBili  0.2  /  DBili  x   /  AST  24  /  ALT  24  /  AlkPhos  125<H>  07-06      RADIOLOGY:  CXR 7/6 - Cardiomegaly. Low lung volumes leads to magnification of the pulmonary interstitium.  LE Art duplex 7/3 - Moderate atherosclerotic disease in bilateral lower extremities without evidence of hemodynamically significant stenoses or occlusions.  LE venous duplex 7/2 - No evidence of deep venous thrombosis in either lower extremity.    ECHO:  7/2 - 1. Moderately decreased global left ventricular systolic function.   2. Multiple left ventricular regional wall motion abnormalities exist. See wall motion findings.   3. LV Ejection Fraction by Garcia's Method with a biplane EF of 34 %.   4. Moderately increased LV wall thickness.   5. Normal left ventricular internal cavity size.   6. Mild to moderately enlarged left atrium.   7. Normal right atrial size.   8. Moderate pericardial effusion.   9. Mild to moderate mitral valve regurgitation.  10. Structurally normal mitral valve, with normal leaflet excursion.  11. Moderate tricuspid regurgitation.  12. Mild aortic regurgitation.  13. Normal trileaflet aortic valve with normal opening.  14. Mild pulmonic valve regurgitation.  15. Estimated pulmonary artery systolic pressure is 52.0 mmHg assuming a right atrial pressure of 10 mmHg, which is consistent with moderate pulmonary hypertension.      PHYSICAL EXAM:  General: Comfortably laying on the hospital bed. NAD. AAOx3.  HEENT: Atraumatic. Normocephalic. EOMI. PERRL. Conjunctiva and sclera clear.  Cardiac: Normal S1, S2. RRR. No murmurs, rubs, or gallops.  Pulm: CTA b/l no wheezes, rhonchi, or rales.  GI: Soft, nontender, nondistended. BSx4q  Ext: 2+ peripheral pulses. Moving all 4 extremities. No edema, cyanosis.  Neuro: Grossly intact  Skin: No lesions or rashes

## 2021-07-07 LAB
ANION GAP SERPL CALC-SCNC: 5 MMOL/L — LOW (ref 7–14)
BASOPHILS # BLD AUTO: 0.03 K/UL — SIGNIFICANT CHANGE UP (ref 0–0.2)
BASOPHILS NFR BLD AUTO: 0.4 % — SIGNIFICANT CHANGE UP (ref 0–1)
BUN SERPL-MCNC: 52 MG/DL — HIGH (ref 10–20)
CALCIUM SERPL-MCNC: 7.9 MG/DL — LOW (ref 8.5–10.1)
CHLORIDE SERPL-SCNC: 107 MMOL/L — SIGNIFICANT CHANGE UP (ref 98–110)
CO2 SERPL-SCNC: 28 MMOL/L — SIGNIFICANT CHANGE UP (ref 17–32)
CREAT SERPL-MCNC: 2.7 MG/DL — HIGH (ref 0.7–1.5)
EOSINOPHIL # BLD AUTO: 0.52 K/UL — SIGNIFICANT CHANGE UP (ref 0–0.7)
EOSINOPHIL NFR BLD AUTO: 6.5 % — SIGNIFICANT CHANGE UP (ref 0–8)
GLUCOSE BLDC GLUCOMTR-MCNC: 101 MG/DL — HIGH (ref 70–99)
GLUCOSE BLDC GLUCOMTR-MCNC: 124 MG/DL — HIGH (ref 70–99)
GLUCOSE BLDC GLUCOMTR-MCNC: 129 MG/DL — HIGH (ref 70–99)
GLUCOSE BLDC GLUCOMTR-MCNC: 140 MG/DL — HIGH (ref 70–99)
GLUCOSE SERPL-MCNC: 99 MG/DL — SIGNIFICANT CHANGE UP (ref 70–99)
HCT VFR BLD CALC: 33.6 % — LOW (ref 42–52)
HGB BLD-MCNC: 10.3 G/DL — LOW (ref 14–18)
IMM GRANULOCYTES NFR BLD AUTO: 0.1 % — SIGNIFICANT CHANGE UP (ref 0.1–0.3)
LYMPHOCYTES # BLD AUTO: 1.53 K/UL — SIGNIFICANT CHANGE UP (ref 1.2–3.4)
LYMPHOCYTES # BLD AUTO: 19 % — LOW (ref 20.5–51.1)
MAGNESIUM SERPL-MCNC: 2 MG/DL — SIGNIFICANT CHANGE UP (ref 1.8–2.4)
MCHC RBC-ENTMCNC: 24.1 PG — LOW (ref 27–31)
MCHC RBC-ENTMCNC: 30.7 G/DL — LOW (ref 32–37)
MCV RBC AUTO: 78.5 FL — LOW (ref 80–94)
MONOCYTES # BLD AUTO: 0.64 K/UL — HIGH (ref 0.1–0.6)
MONOCYTES NFR BLD AUTO: 7.9 % — SIGNIFICANT CHANGE UP (ref 1.7–9.3)
NEUTROPHILS # BLD AUTO: 5.33 K/UL — SIGNIFICANT CHANGE UP (ref 1.4–6.5)
NEUTROPHILS NFR BLD AUTO: 66.1 % — SIGNIFICANT CHANGE UP (ref 42.2–75.2)
NRBC # BLD: 0 /100 WBCS — SIGNIFICANT CHANGE UP (ref 0–0)
PLATELET # BLD AUTO: 214 K/UL — SIGNIFICANT CHANGE UP (ref 130–400)
POTASSIUM SERPL-MCNC: 4.6 MMOL/L — SIGNIFICANT CHANGE UP (ref 3.5–5)
POTASSIUM SERPL-SCNC: 4.6 MMOL/L — SIGNIFICANT CHANGE UP (ref 3.5–5)
RBC # BLD: 4.28 M/UL — LOW (ref 4.7–6.1)
RBC # FLD: 13.8 % — SIGNIFICANT CHANGE UP (ref 11.5–14.5)
SODIUM SERPL-SCNC: 140 MMOL/L — SIGNIFICANT CHANGE UP (ref 135–146)
WBC # BLD: 8.06 K/UL — SIGNIFICANT CHANGE UP (ref 4.8–10.8)
WBC # FLD AUTO: 8.06 K/UL — SIGNIFICANT CHANGE UP (ref 4.8–10.8)

## 2021-07-07 PROCEDURE — 99233 SBSQ HOSP IP/OBS HIGH 50: CPT

## 2021-07-07 RX ORDER — METOPROLOL TARTRATE 50 MG
25 TABLET ORAL DAILY
Refills: 0 | Status: DISCONTINUED | OUTPATIENT
Start: 2021-07-07 | End: 2021-07-13

## 2021-07-07 RX ORDER — METOPROLOL TARTRATE 50 MG
12.5 TABLET ORAL
Refills: 0 | Status: DISCONTINUED | OUTPATIENT
Start: 2021-07-07 | End: 2021-07-07

## 2021-07-07 RX ADMIN — CHLORHEXIDINE GLUCONATE 1 APPLICATION(S): 213 SOLUTION TOPICAL at 05:35

## 2021-07-07 RX ADMIN — BRIMONIDINE TARTRATE 1 DROP(S): 2 SOLUTION/ DROPS OPHTHALMIC at 17:30

## 2021-07-07 RX ADMIN — DORZOLAMIDE HYDROCHLORIDE 1 DROP(S): 20 SOLUTION/ DROPS OPHTHALMIC at 05:35

## 2021-07-07 RX ADMIN — ISOSORBIDE DINITRATE 20 MILLIGRAM(S): 5 TABLET ORAL at 11:54

## 2021-07-07 RX ADMIN — ATORVASTATIN CALCIUM 40 MILLIGRAM(S): 80 TABLET, FILM COATED ORAL at 21:36

## 2021-07-07 RX ADMIN — Medication 25 MILLIGRAM(S): at 21:36

## 2021-07-07 RX ADMIN — Medication 1 DROP(S): at 17:32

## 2021-07-07 RX ADMIN — Medication 12.5 MILLIGRAM(S): at 08:04

## 2021-07-07 RX ADMIN — ENOXAPARIN SODIUM 40 MILLIGRAM(S): 100 INJECTION SUBCUTANEOUS at 11:53

## 2021-07-07 RX ADMIN — INSULIN GLARGINE 10 UNIT(S): 100 INJECTION, SOLUTION SUBCUTANEOUS at 08:05

## 2021-07-07 RX ADMIN — Medication 3 UNIT(S): at 08:04

## 2021-07-07 RX ADMIN — Medication 3 UNIT(S): at 11:53

## 2021-07-07 RX ADMIN — ISOSORBIDE DINITRATE 20 MILLIGRAM(S): 5 TABLET ORAL at 05:36

## 2021-07-07 RX ADMIN — LATANOPROST 1 DROP(S): 0.05 SOLUTION/ DROPS OPHTHALMIC; TOPICAL at 21:36

## 2021-07-07 RX ADMIN — Medication 1 DROP(S): at 05:35

## 2021-07-07 RX ADMIN — DORZOLAMIDE HYDROCHLORIDE 1 DROP(S): 20 SOLUTION/ DROPS OPHTHALMIC at 21:36

## 2021-07-07 RX ADMIN — ISOSORBIDE DINITRATE 20 MILLIGRAM(S): 5 TABLET ORAL at 17:30

## 2021-07-07 RX ADMIN — GABAPENTIN 200 MILLIGRAM(S): 400 CAPSULE ORAL at 17:30

## 2021-07-07 RX ADMIN — DORZOLAMIDE HYDROCHLORIDE 1 DROP(S): 20 SOLUTION/ DROPS OPHTHALMIC at 13:27

## 2021-07-07 RX ADMIN — GABAPENTIN 200 MILLIGRAM(S): 400 CAPSULE ORAL at 05:37

## 2021-07-07 RX ADMIN — Medication 25 MILLIGRAM(S): at 05:36

## 2021-07-07 RX ADMIN — Medication 25 MILLIGRAM(S): at 13:27

## 2021-07-07 RX ADMIN — BRIMONIDINE TARTRATE 1 DROP(S): 2 SOLUTION/ DROPS OPHTHALMIC at 05:36

## 2021-07-07 RX ADMIN — Medication 3 UNIT(S): at 17:11

## 2021-07-07 NOTE — PROGRESS NOTE ADULT - ASSESSMENT
ASSESSMENT & PLAN  56M w/ PMHx HTN, HLD, DM2 (on insulin), Glaucoma presents to the Ed c/o worsening LE swelling and pain for the past 1 month. Endorses SOB on exertion, along w/ increased abdominal girth. Denies orthopnea, PND, wheeze, CP, diaphoresis. No cardiac Hx. ED course: /130 in triage, otherwise VS. Labs showed Hb 11.1, Cr 2.1, mildly increased ALP and AST/ALT, Trop 0.10, BNP 27387. CXR revealing cardiomegaly and CP angle blunting b/l.    #New Onset Acute HFrEF exacerbation  #HFrEF 2/2 uncontrolled HTN  #Moderate pericardial effusion  #Uncontrolled HTN  -no prior cardiac hx  -CXR - cardiomegaly and blunting of CP angles  -BNP 84582 on admission  -trops 0.10 -> 0.11 -> 0.11 -> 0.14 -> 0.09  -Echo - EF 34%, moderate pericardial effusion  -I<O, daily weights  -BP: 152/97 (113/65 - 152/97)  -HF team saw pt 7/6 - cleared to f/u with cardiology for ischemic w/u / cath  -per HF team, d/c nifedipine, start metoprolol succinate 25mg qd, start hydralazine 25mg TID, and start isosorbide 20mg TID.  -awaiting f/u by Dr. Finn    #LENNY on CKD  -Cr 2.0->2.2->2.1->2.4->2.7->2.7  -cont to hold Lasix  -FeNa = 2.8%, FeUrea 40.2% => likely intrinsic  -UA 7/3 significant for moderate blood, 300 protein, protein/Cr ratio 8.1  -US KUB - no hydro b/l, right renal cysts  -cont to monitor I&O for oliguria/anuria    #HLD  -cont atrovastatin    #DM2 w/ peripheral neuropathy  -FS: 120, 114, 101, 124  -cont Lantus, ISS  -on lanus, metformin-pioglitazone at home  -hold pioglitazone now and on d/c - can contribute to CHF exacerbation  -on gabapentin 200mg BID renally dosed  -duplex negative  -arterial duplex negative    #Glaucoma  -cont eye drops    PT/REHAB: seen by PT ambulated 100ft with supervision  ACTIVITY: Increase as tolerated   DVT PPX: enoxaparin Injectable  GI PPX:  Not indicated  DIET: Diet, DASH/TLC  CODE: Full code   DIsposition: from home; will return home  Pending: f/u cardiology for ischemic w/u. Cont to hold lasix due to rising Cr.

## 2021-07-07 NOTE — PROGRESS NOTE ADULT - SUBJECTIVE AND OBJECTIVE BOX
GILLIAN MENDOZA MRN#: [id number]  CODE STATUS: FULL CODE     SUBJECTIVE   Chief complaint: new onset CHF    Currently admitted to medicine with the primary diagnosis of NEW ONSET OF CONGESTIVE HEART FAILURE ...      Today is hospital day 5d,   INTERVAL HPI/OVERNIGHT EVENTS: No acute events overnight    This morning, he is laying in bed comfortably. Denies chest pain, shortness of breath, abdominal pain, nausea, vomiting or changes in bowel habits. He has no complaints today.     Urinating and stooling appropriately.    Present Today:           Cain Catheter (x)No/ ()Yes?   Indication:             Central Line (x)No/ ()Yes?  Indication:          IV Fluids (x)No/ ()Yes?   Indication:     OBJECTIVE  PAST MEDICAL & SURGICAL HISTORY  HTN (hypertension)    HLD (hyperlipidemia)    DM (diabetes mellitus)    Glaucoma    No significant past surgical history      ALLERGIES:  No Known Allergies    HOME MEDICATIONS:  Home Medications:  atorvastatin 40 mg oral tablet: 1 tab(s) orally once a day (02 Jul 2021 04:46)  BASAGLAR 100 UNIT/ML KWIKPEN:  (02 Jul 2021 04:46)  brimonidine 0.2% ophthalmic solution: 1 drop(s) to each affected eye 2 times a day (02 Jul 2021 04:46)  dorzolamide-timolol 2%-0.5% preservative-free ophthalmic solution: 1 drop(s) to each affected eye 2 times a day (02 Jul 2021 04:46)  furosemide 20 mg oral tablet: 1 tab(s) orally once a day (02 Jul 2021 04:46)  latanoprost 0.005% ophthalmic solution: 1 drop(s) to each affected eye once a day (in the evening) (02 Jul 2021 04:46)  lisinopril 20 mg oral tablet: 1 tab(s) orally once a day (02 Jul 2021 04:46)  pioglitazone-metformin 15 mg-850 mg oral tablet: 1 tab(s) orally 2 times a day (02 Jul 2021 04:46)  Rhopressa 0.02% ophthalmic solution: 1 drop(s) to each affected eye once a day (in the evening) (02 Jul 2021 04:46)    MEDICATIONS:  STANDING MEDICATIONS  MEDICATIONS  (STANDING):  atorvastatin 40 milliGRAM(s) Oral at bedtime  brimonidine 0.2% Ophthalmic Solution 1 Drop(s) Both EYES two times a day  chlorhexidine 4% Liquid 1 Application(s) Topical <User Schedule>  dextrose 40% Gel 15 Gram(s) Oral once  dextrose 5%. 1000 milliLiter(s) (50 mL/Hr) IV Continuous <Continuous>  dextrose 5%. 1000 milliLiter(s) (100 mL/Hr) IV Continuous <Continuous>  dextrose 50% Injectable 25 Gram(s) IV Push once  dextrose 50% Injectable 12.5 Gram(s) IV Push once  dextrose 50% Injectable 25 Gram(s) IV Push once  dorzolamide 2% Ophthalmic Solution 1 Drop(s) Both EYES three times a day  enoxaparin Injectable 40 milliGRAM(s) SubCutaneous daily  gabapentin 200 milliGRAM(s) Oral two times a day  glucagon  Injectable 1 milliGRAM(s) IntraMuscular once  hydrALAZINE 25 milliGRAM(s) Oral three times a day  insulin glargine Injectable (LANTUS) 10 Unit(s) SubCutaneous every morning  insulin lispro (ADMELOG) corrective regimen sliding scale   SubCutaneous three times a day before meals  insulin lispro Injectable (ADMELOG) 3 Unit(s) SubCutaneous three times a day before meals  isosorbide   dinitrate Tablet (ISORDIL) 20 milliGRAM(s) Oral three times a day  latanoprost 0.005% Ophthalmic Solution 1 Drop(s) Both EYES at bedtime  metoprolol succinate ER 25 milliGRAM(s) Oral daily  timolol 0.5% Solution 1 Drop(s) Both EYES two times a day    PRN MEDICATIONS  MEDICATIONS  (PRN):      VITAL SIGNS  T(F): 98 (07-07 @ 13:27), Max: 98 (07-07 @ 05:09)  HR: 75 (07-07 @ 13:27) (74 - 78)  BP: 131/74 (07-07 @ 13:27) (115/67 - 156/90)  RR: 16 (07-07 @ 13:27) (16 - 18)  SpO2: --    LABS:                        10.3   8.06  )-----------( 214      ( 07 Jul 2021 04:30 )             33.6     07-07    140  |  107  |  52<H>  ----------------------------<  99  4.6   |  28  |  2.7<H>    Ca    7.9<L>      07 Jul 2021 04:30  Phos  4.3     07-06  Mg     2.0     07-07    TPro  5.0<L>  /  Alb  2.4<L>  /  TBili  0.2  /  DBili  x   /  AST  24  /  ALT  24  /  AlkPhos  125<H>  07-06      RADIOLOGY:  CXR 7/6 - Cardiomegaly. Low lung volumes leads to magnification of the pulmonary interstitium.  LE Art duplex 7/3 - Moderate atherosclerotic disease in bilateral lower extremities without evidence of hemodynamically significant stenoses or occlusions.  LE venous duplex 7/2 - No evidence of deep venous thrombosis in either lower extremity.    ECHO:  7/2 - 1. Moderately decreased global left ventricular systolic function.   2. Multiple left ventricular regional wall motion abnormalities exist. See wall motion findings.   3. LV Ejection Fraction by Garcia's Method with a biplane EF of 34 %.   4. Moderately increased LV wall thickness.   5. Normal left ventricular internal cavity size.   6. Mild to moderately enlarged left atrium.   7. Normal right atrial size.   8. Moderate pericardial effusion.   9. Mild to moderate mitral valve regurgitation.  10. Structurally normal mitral valve, with normal leaflet excursion.  11. Moderate tricuspid regurgitation.  12. Mild aortic regurgitation.  13. Normal trileaflet aortic valve with normal opening.  14. Mild pulmonic valve regurgitation.  15. Estimated pulmonary artery systolic pressure is 52.0 mmHg assuming a right atrial pressure of 10 mmHg, which is consistent with moderate pulmonary hypertension.      PHYSICAL EXAM:  General: Comfortably laying on the hospital bed. NAD. AAOx3.  HEENT: Atraumatic. Normocephalic. EOMI. PERRL. Conjunctiva and sclera clear.  Cardiac: Normal S1, S2. RRR. No murmurs, rubs, or gallops.  Pulm: CTA b/l no wheezes, rhonchi, or rales.  GI: Soft, nontender, nondistended. [id number]  Ext: 2+ peripheral pulses. Moving all 4 extremities. No edema, cyanosis.  Neuro: Grossly intact  Skin: No lesions or rashes

## 2021-07-07 NOTE — PROGRESS NOTE ADULT - ASSESSMENT
55 y/o man with PMH of HTN, CKD from 2020, DM type 2 on insulin with peripheral neuropathy and Glaucoma presented to the ED c/o worsening LE swelling and pain for the past 1 month.    1. Newly diagnosed acute HFrEF  - moderate pericardial effusion and pulm HTN  - was on IV lasix for diuresis and now held for rising Cr  - volume status improved but still not euvolemic  - heart failure consult appreciated  - continue metoprolol, hydralazine, isosorbide  - cardio f/u for ischemic workup  - daily wts, I's and O's, low sodium diet    2. HTN - now better controlled on current meds    3. LENNY over CKD 3  - CKD progressed over the past year  - nephrotic range proteinuria now  - Cr rising with diuresis so lasix was held  - no hydronephrosis on US  - A1C 5.9 so DM seems well controlled as outpt  - workup for proteinuria  - renal consult    4. DM type 2 with peripheral neuropathy   - On Lantus and Metformin-Pioglitazone at home  -  A1c 5.8  - continue insulin inpt  - discontinue pioglitazone on discharge - metformin will also need to be stopped if renal function does not improve by discharge  -on gabapentin 200 mg BID renally adjusted  - duplex neg   - arterial duplex with moderate atherosclerotic disease    5. Glaucoma  - on Eye drops    6. DVT prophylaxis - heparin SQ        PROGRESS NOTE HANDOFF    Pending: cardio f/u re: ischemic workup, BMP in AM, renal consult    pt aware of plan of care    Disposition: home 57 y/o man with PMH of HTN, CKD from 2020, DM type 2 on insulin with peripheral neuropathy and Glaucoma presented to the ED c/o worsening LE swelling and pain for the past 1 month.    1. Newly diagnosed acute HFrEF  - moderate pericardial effusion and pulm HTN  - was on IV lasix for diuresis and now held for rising Cr  - volume status improved but still not euvolemic  - heart failure consult appreciated  - continue metoprolol, hydralazine, isosorbide  - cardio f/u for ischemic workup  - daily wts, I's and O's, low sodium diet    2. HTN - now better controlled on current meds    3. LENNY over CKD 3  - CKD progressed over the past year  - nephrotic range proteinuria now   - Cr rising with diuresis so lasix was held  - no hydronephrosis on US  - A1C 5.9 so DM seems well controlled as outpt now but pt with neuropathy  - workup for proteinuria - due to DM vs other causes  - renal consult    4. DM type 2 with peripheral neuropathy   - On Lantus and Metformin-Pioglitazone at home  -  A1c 5.8  - continue insulin inpt  - discontinue pioglitazone on discharge - metformin will also need to be stopped if renal function does not improve by discharge  -on gabapentin 200 mg BID renally adjusted  - duplex neg   - arterial duplex with moderate atherosclerotic disease    5. Glaucoma  - on Eye drops    6. DVT prophylaxis - heparin SQ        PROGRESS NOTE HANDOFF    Pending: cardio f/u re: ischemic workup, BMP in AM, renal consult    pt aware of plan of care    Disposition: home

## 2021-07-07 NOTE — PROGRESS NOTE ADULT - SUBJECTIVE AND OBJECTIVE BOX
GILLIAN MENDOZA  56y Male    INTERVAL HPI/OVERNIGHT EVENTS:    Pt lying flat in bed in no distress  Less SOB and LE edema  Denies chest pain, N/V, abdominal pain or other complaints.    T(F): 98 (21 @ 13:27), Max: 98 (21 @ 05:09)  HR: 75 (21 @ 13:27) (74 - 78)  BP: 131/74 (21 @ 13:27) (115/67 - 156/90)  RR: 16 (21 @ 13:27) (16 - 18)  SpO2: --    I&O's Summary    2021 07:01  -  2021 07:00  --------------------------------------------------------  IN: 1020 mL / OUT: 1500 mL / NET: -480 mL    2021 07:01  -  2021 17:29  --------------------------------------------------------  IN: 460 mL / OUT: 500 mL / NET: -40 mL        Daily     Daily Weight in k.5 (2021 05:09)    CAPILLARY BLOOD GLUCOSE      POCT Blood Glucose.: 129 mg/dL (2021 16:22)  POCT Blood Glucose.: 124 mg/dL (2021 11:34)  POCT Blood Glucose.: 101 mg/dL (2021 07:39)  POCT Blood Glucose.: 114 mg/dL (2021 21:29)        PHYSICAL EXAM:  GENERAL: NAD  HEAD:  Normocephalic  EYES:  conjunctiva and sclera clear  ENMT: Moist mucous membranes  NECK: Supple  NERVOUS SYSTEM:  Alert & Oriented X3, Good concentration  CHEST/LUNG: decreased BS at left base otherwise CTA  HEART: Regular rate and rhythm  ABDOMEN: Soft, Nontender, Nondistended; Bowel sounds present  EXTREMITIES:  decreased LE edema      Consultant(s) Notes Reviewed:  [x ] YES  [ ] NO  Care Discussed with Consultants/Other Providers [ x] YES  [ ] NO    MEDICATIONS  (STANDING):  atorvastatin 40 milliGRAM(s) Oral at bedtime  brimonidine 0.2% Ophthalmic Solution 1 Drop(s) Both EYES two times a day  chlorhexidine 4% Liquid 1 Application(s) Topical <User Schedule>  dextrose 40% Gel 15 Gram(s) Oral once  dextrose 5%. 1000 milliLiter(s) (50 mL/Hr) IV Continuous <Continuous>  dextrose 5%. 1000 milliLiter(s) (100 mL/Hr) IV Continuous <Continuous>  dextrose 50% Injectable 25 Gram(s) IV Push once  dextrose 50% Injectable 12.5 Gram(s) IV Push once  dextrose 50% Injectable 25 Gram(s) IV Push once  dorzolamide 2% Ophthalmic Solution 1 Drop(s) Both EYES three times a day  enoxaparin Injectable 40 milliGRAM(s) SubCutaneous daily  gabapentin 200 milliGRAM(s) Oral two times a day  glucagon  Injectable 1 milliGRAM(s) IntraMuscular once  hydrALAZINE 25 milliGRAM(s) Oral three times a day  insulin glargine Injectable (LANTUS) 10 Unit(s) SubCutaneous every morning  insulin lispro (ADMELOG) corrective regimen sliding scale   SubCutaneous three times a day before meals  insulin lispro Injectable (ADMELOG) 3 Unit(s) SubCutaneous three times a day before meals  isosorbide   dinitrate Tablet (ISORDIL) 20 milliGRAM(s) Oral three times a day  latanoprost 0.005% Ophthalmic Solution 1 Drop(s) Both EYES at bedtime  metoprolol succinate ER 25 milliGRAM(s) Oral daily  timolol 0.5% Solution 1 Drop(s) Both EYES two times a day    MEDICATIONS  (PRN):    EKG reviewed  Telemetry reviewed    LABS:                        10.3   8.06  )-----------( 214      ( 2021 04:30 )             33.6     07-07    140  |  107  |  52<H>  ----------------------------<  99  4.6   |  28  |  2.7<H>    Ca    7.9<L>      2021 04:30  Phos  4.3     07-06  Mg     2.0     07-07    TPro  5.0<L>  /  Alb  2.4<L>  /  TBili  0.2  /  DBili  x   /  AST  24  /  ALT  24  /  AlkPhos  125<H>      Protein/Creatinine Ratio Calculation: 8.1 Ratio           RADIOLOGY & ADDITIONAL TESTS:    Imaging or report Personally Reviewed:  [ ] YES  [ ] NO    < from: TTE Echo Complete w/o Contrast w/ Doppler (21 @ 14:54) >  Summary:   1. Moderately decreased global left ventricular systolic function.   2. Multiple left ventricular regional wall motion abnormalities exist. See wall motion findings.   3. LV Ejection Fraction by Garcia's Method with a biplane EF of 34 %.   4. Moderately increased LV wall thickness.   5. Normal left ventricular internal cavity size.   6. Mild to moderately enlarged left atrium.   7. Normal right atrial size.   8. Moderate pericardial effusion.   9. Mild to moderate mitral valve regurgitation.  10. Structurally normal mitral valve, with normal leaflet excursion.  11. Moderate tricuspid regurgitation.  12. Mild aortic regurgitation.  13. Normal trileaflet aortic valve with normal opening.  14. Mild pulmonic valve regurgitation.  15. Estimated pulmonary artery systolic pressure is 52.0 mmHg assuming a right atrial pressure of 10 mmHg, which is consistent with moderate pulmonary hypertension.      < end of copied text >      < from: US Kidney and Bladder (21 @ 06:07) >  IMPRESSION:  1.  No hydronephrosis bilaterally.  2.  Right renal cyst.  3.  Postvoid residual bladder volume is 164 mL.  4.  Incidentally noted bilateral pleural effusions.      < end of copied text >      Case discussed with residents and RN on rounds today    Care discussed with pt

## 2021-07-08 LAB
ANION GAP SERPL CALC-SCNC: 6 MMOL/L — LOW (ref 7–14)
BASOPHILS # BLD AUTO: 0.03 K/UL — SIGNIFICANT CHANGE UP (ref 0–0.2)
BASOPHILS NFR BLD AUTO: 0.3 % — SIGNIFICANT CHANGE UP (ref 0–1)
BUN SERPL-MCNC: 48 MG/DL — HIGH (ref 10–20)
CALCIUM SERPL-MCNC: 7.7 MG/DL — LOW (ref 8.5–10.1)
CHLORIDE SERPL-SCNC: 105 MMOL/L — SIGNIFICANT CHANGE UP (ref 98–110)
CO2 SERPL-SCNC: 26 MMOL/L — SIGNIFICANT CHANGE UP (ref 17–32)
CREAT SERPL-MCNC: 2.5 MG/DL — HIGH (ref 0.7–1.5)
EOSINOPHIL # BLD AUTO: 0.78 K/UL — HIGH (ref 0–0.7)
EOSINOPHIL NFR BLD AUTO: 7.5 % — SIGNIFICANT CHANGE UP (ref 0–8)
GLUCOSE BLDC GLUCOMTR-MCNC: 151 MG/DL — HIGH (ref 70–99)
GLUCOSE BLDC GLUCOMTR-MCNC: 158 MG/DL — HIGH (ref 70–99)
GLUCOSE BLDC GLUCOMTR-MCNC: 162 MG/DL — HIGH (ref 70–99)
GLUCOSE BLDC GLUCOMTR-MCNC: 88 MG/DL — SIGNIFICANT CHANGE UP (ref 70–99)
GLUCOSE SERPL-MCNC: 94 MG/DL — SIGNIFICANT CHANGE UP (ref 70–99)
HCT VFR BLD CALC: 30.2 % — LOW (ref 42–52)
HGB BLD-MCNC: 8.9 G/DL — LOW (ref 14–18)
IMM GRANULOCYTES NFR BLD AUTO: 0.2 % — SIGNIFICANT CHANGE UP (ref 0.1–0.3)
LYMPHOCYTES # BLD AUTO: 1.79 K/UL — SIGNIFICANT CHANGE UP (ref 1.2–3.4)
LYMPHOCYTES # BLD AUTO: 17.2 % — LOW (ref 20.5–51.1)
MAGNESIUM SERPL-MCNC: 1.9 MG/DL — SIGNIFICANT CHANGE UP (ref 1.8–2.4)
MCHC RBC-ENTMCNC: 23.4 PG — LOW (ref 27–31)
MCHC RBC-ENTMCNC: 29.5 G/DL — LOW (ref 32–37)
MCV RBC AUTO: 79.5 FL — LOW (ref 80–94)
MONOCYTES # BLD AUTO: 0.75 K/UL — HIGH (ref 0.1–0.6)
MONOCYTES NFR BLD AUTO: 7.2 % — SIGNIFICANT CHANGE UP (ref 1.7–9.3)
NEUTROPHILS # BLD AUTO: 7.04 K/UL — HIGH (ref 1.4–6.5)
NEUTROPHILS NFR BLD AUTO: 67.6 % — SIGNIFICANT CHANGE UP (ref 42.2–75.2)
NRBC # BLD: 0 /100 WBCS — SIGNIFICANT CHANGE UP (ref 0–0)
PLATELET # BLD AUTO: 215 K/UL — SIGNIFICANT CHANGE UP (ref 130–400)
POTASSIUM SERPL-MCNC: 4.3 MMOL/L — SIGNIFICANT CHANGE UP (ref 3.5–5)
POTASSIUM SERPL-SCNC: 4.3 MMOL/L — SIGNIFICANT CHANGE UP (ref 3.5–5)
RBC # BLD: 3.8 M/UL — LOW (ref 4.7–6.1)
RBC # FLD: 13.8 % — SIGNIFICANT CHANGE UP (ref 11.5–14.5)
SODIUM SERPL-SCNC: 137 MMOL/L — SIGNIFICANT CHANGE UP (ref 135–146)
WBC # BLD: 10.41 K/UL — SIGNIFICANT CHANGE UP (ref 4.8–10.8)
WBC # FLD AUTO: 10.41 K/UL — SIGNIFICANT CHANGE UP (ref 4.8–10.8)

## 2021-07-08 PROCEDURE — 99233 SBSQ HOSP IP/OBS HIGH 50: CPT

## 2021-07-08 RX ORDER — INSULIN GLARGINE 100 [IU]/ML
8 INJECTION, SOLUTION SUBCUTANEOUS EVERY MORNING
Refills: 0 | Status: DISCONTINUED | OUTPATIENT
Start: 2021-07-08 | End: 2021-07-14

## 2021-07-08 RX ADMIN — ISOSORBIDE DINITRATE 20 MILLIGRAM(S): 5 TABLET ORAL at 05:35

## 2021-07-08 RX ADMIN — Medication 3 UNIT(S): at 16:41

## 2021-07-08 RX ADMIN — LATANOPROST 1 DROP(S): 0.05 SOLUTION/ DROPS OPHTHALMIC; TOPICAL at 21:24

## 2021-07-08 RX ADMIN — DORZOLAMIDE HYDROCHLORIDE 1 DROP(S): 20 SOLUTION/ DROPS OPHTHALMIC at 21:24

## 2021-07-08 RX ADMIN — Medication 25 MILLIGRAM(S): at 05:35

## 2021-07-08 RX ADMIN — Medication 25 MILLIGRAM(S): at 05:37

## 2021-07-08 RX ADMIN — GABAPENTIN 200 MILLIGRAM(S): 400 CAPSULE ORAL at 05:35

## 2021-07-08 RX ADMIN — DORZOLAMIDE HYDROCHLORIDE 1 DROP(S): 20 SOLUTION/ DROPS OPHTHALMIC at 05:35

## 2021-07-08 RX ADMIN — Medication 25 MILLIGRAM(S): at 21:24

## 2021-07-08 RX ADMIN — Medication 25 MILLIGRAM(S): at 13:07

## 2021-07-08 RX ADMIN — ISOSORBIDE DINITRATE 20 MILLIGRAM(S): 5 TABLET ORAL at 18:06

## 2021-07-08 RX ADMIN — Medication 3 UNIT(S): at 12:23

## 2021-07-08 RX ADMIN — Medication 1: at 12:24

## 2021-07-08 RX ADMIN — BRIMONIDINE TARTRATE 1 DROP(S): 2 SOLUTION/ DROPS OPHTHALMIC at 05:35

## 2021-07-08 RX ADMIN — GABAPENTIN 200 MILLIGRAM(S): 400 CAPSULE ORAL at 18:06

## 2021-07-08 RX ADMIN — INSULIN GLARGINE 8 UNIT(S): 100 INJECTION, SOLUTION SUBCUTANEOUS at 08:41

## 2021-07-08 RX ADMIN — CHLORHEXIDINE GLUCONATE 1 APPLICATION(S): 213 SOLUTION TOPICAL at 05:36

## 2021-07-08 RX ADMIN — ATORVASTATIN CALCIUM 40 MILLIGRAM(S): 80 TABLET, FILM COATED ORAL at 21:24

## 2021-07-08 RX ADMIN — Medication 1 DROP(S): at 18:06

## 2021-07-08 RX ADMIN — Medication 1: at 16:41

## 2021-07-08 RX ADMIN — BRIMONIDINE TARTRATE 1 DROP(S): 2 SOLUTION/ DROPS OPHTHALMIC at 18:06

## 2021-07-08 RX ADMIN — ENOXAPARIN SODIUM 40 MILLIGRAM(S): 100 INJECTION SUBCUTANEOUS at 12:27

## 2021-07-08 RX ADMIN — ISOSORBIDE DINITRATE 20 MILLIGRAM(S): 5 TABLET ORAL at 12:28

## 2021-07-08 RX ADMIN — Medication 1 DROP(S): at 05:35

## 2021-07-08 RX ADMIN — DORZOLAMIDE HYDROCHLORIDE 1 DROP(S): 20 SOLUTION/ DROPS OPHTHALMIC at 13:07

## 2021-07-08 NOTE — PROGRESS NOTE ADULT - ASSESSMENT
57 y/o man with PMH of HTN, CKD from 2020, DM type 2 on insulin with peripheral neuropathy and Glaucoma presented to the ED c/o worsening LE swelling and pain for the past 1 month.    1. Newly diagnosed acute HFrEF  - moderate pericardial effusion and pulm HTN  - was on IV lasix for diuresis and then held for rising Cr  - volume status improved but still not euvolemic  - heart failure f/u  - continue metoprolol, hydralazine, isosorbide  - awaiting cardio f/u for ischemic workup  - daily wts, I's and O's, low sodium diet    2. HTN - now better controlled on current meds    3. LENNY over CKD 3  - CKD progressed over the past year  - nephrotic range proteinuria now   - Cr rising with diuresis so lasix was held - now trending down  - no hydronephrosis on US  - A1C 5.9 so DM seems well controlled as outpt now but pt with neuropathy  - workup for proteinuria - due to DM vs other causes  - renal consult pending    4. DM type 2 with peripheral neuropathy   - On Lantus and Metformin-Pioglitazone at home  -  A1c 5.8  - continue insulin inpt - lantus dose decreased  - discontinue pioglitazone on discharge - metformin will also need to be stopped if renal function does not improve by discharge  -on gabapentin 200 mg BID renally adjusted  - duplex negative  - arterial duplex with moderate atherosclerotic disease    5. Glaucoma  - on Eye drops    6. DVT prophylaxis - heparin SQ        PROGRESS NOTE HANDOFF    Pending: cardio f/u re: ischemic workup, BMP in AM, renal consult    pt aware of plan of care    Disposition: home

## 2021-07-08 NOTE — CONSULT NOTE ADULT - ASSESSMENT
56M w/ PMHx HTN, HLD, T2DM on insulin x>10 years, retinopathy, Glaucoma presents to the Ed c/o worsening LE swelling and pain for the past 1 month. Endorses SOB on exertion, along w/ increased abdominal girth.  Found to have:  #New Onset Acute HFrEF exacerbation  #HFrEF   #Moderate pericardial effusion  #Uncontrolled HTN  #LENNY vs CKD    #LENNY on CKD with PRoteinuria >300  -Cr 2.1 <- 2.5 < -2.7  (Baseline 1.7 in 3/2021)  -cont to hold Lasix  - Proteinuria likely due to Diabetic nephropathy - needs serology for lupus and dysproteinemia  -FeNa = 2.8%, FeUrea 40.2% c/w intrinsic  -UA 7/3 significant for moderate blood, 300 protein, protein/Cr ratio 8.1  -US KUB - no hydro b/l, right renal cysts  -cont to monitor I&O for oliguria/anuria  -obtain SPEP, UPEP, SIF and UIF,  light chains in serum  , iron studies, MARLIN, C3, C4  - needs cardiac cath to r/o ischemic heart disease - creat is close to baseline  pt is at moderate-high risk for ROSENDO - HOLD diuretics, no IVF, benefits outweigh the risk of IV contrast, cont to monitor BMP daily    HFrEF-no prior cardiac hx  -BNP 93375 on admission; trops 0.10 -> 0.11 -> 0.11 -> 0.14 -> 0.09  -Echo - EF 34%, moderate pericardial effusion    HTN - start Nifedipine ER 30 mg qd -BP: 182/97 (140/78 - 182/97)  cont metoprolol succinate 25mg qd, cont hydralazine 25mg TID,  #DM2 w/ peripheral neuropathy and likely retionopathy  -on lanus, metformin-pioglitazone at home  -hold pioglitazone now and on d/c - can contribute to CHF exacerbation  -on gabapentin 200mg BID  -arterial duplex negative

## 2021-07-08 NOTE — CONSULT NOTE ADULT - SUBJECTIVE AND OBJECTIVE BOX
NEPHROLOGY CONSULTATION NOTE    Patient is a 56y Male whom presented to the hospital with SOB and LE edema.  PMHx: HTN, HLD, T2DM on insulin for 13 years, ahd laser tx for retinopathy, Glaucoma    HPI: 56M w/ PMHx as above presents to the Ed c/o worsening LE swelling and pain for the past 1 month. Endorses SOB on exertion, along w/ increased abdominal girth.   ED course: /130 in triage, otherwise VS. Labs showed Hb 11.1, Cr 2.1, mildly increased ALP and AST/ALT, Trop 0.10, BNP 90820. CXR revealing cardiomegaly and CP angle blunting b/l.  Pt is not aware of renal failure, but has not blood work done for a few years.  Follows with dr Mireya Mobley on [address]    PAST MEDICAL & SURGICAL HISTORY:  HTN (hypertension)    HLD (hyperlipidemia)    DM (diabetes mellitus)    Glaucoma    No significant past surgical history      Allergies:  No Known Allergies    Home Medications Reviewed  Hospital Medications:   MEDICATIONS  (STANDING):  atorvastatin 40 milliGRAM(s) Oral at bedtime  brimonidine 0.2% Ophthalmic Solution 1 Drop(s) Both EYES two times a day  chlorhexidine 4% Liquid 1 Application(s) Topical <User Schedule>  dextrose 40% Gel 15 Gram(s) Oral once  dextrose 5%. 1000 milliLiter(s) (100 mL/Hr) IV Continuous <Continuous>  dextrose 5%. 1000 milliLiter(s) (50 mL/Hr) IV Continuous <Continuous>  dextrose 50% Injectable 25 Gram(s) IV Push once  dextrose 50% Injectable 12.5 Gram(s) IV Push once  dextrose 50% Injectable 25 Gram(s) IV Push once  dorzolamide 2% Ophthalmic Solution 1 Drop(s) Both EYES three times a day  enoxaparin Injectable 40 milliGRAM(s) SubCutaneous daily  gabapentin 200 milliGRAM(s) Oral two times a day  glucagon  Injectable 1 milliGRAM(s) IntraMuscular once  hydrALAZINE 25 milliGRAM(s) Oral three times a day  insulin glargine Injectable (LANTUS) 8 Unit(s) SubCutaneous every morning  insulin lispro (ADMELOG) corrective regimen sliding scale   SubCutaneous three times a day before meals  insulin lispro Injectable (ADMELOG) 3 Unit(s) SubCutaneous three times a day before meals  isosorbide   dinitrate Tablet (ISORDIL) 20 milliGRAM(s) Oral three times a day  latanoprost 0.005% Ophthalmic Solution 1 Drop(s) Both EYES at bedtime  metoprolol succinate ER 25 milliGRAM(s) Oral daily  timolol 0.5% Solution 1 Drop(s) Both EYES two times a day      SOCIAL HISTORY:  Denies ETOH,Smoking,   FAMILY HISTORY:  No pertinent family history in first degree relatives          REVIEW OF SYSTEMS:  CONSTITUTIONAL: No weakness, fevers or chills  EYES/ENT: No visual changes;  No vertigo or throat pain   NECK: No pain or stiffness  RESPIRATORY: No cough, wheezing, hemoptysis; Mild shortness of breath  CARDIOVASCULAR: No chest pain or palpitations.  GASTROINTESTINAL: No abdominal or epigastric pain. No nausea, vomiting, or hematemesis; No diarrhea or constipation. No melena or hematochezia.  GENITOURINARY: No dysuria, frequency, foamy urine,  NEUROLOGICAL: No numbness or weakness  SKIN: No itching, burning, rashes, or lesions   VASCULAR: No bilateral lower extremity edema.   All other review of systems is negative unless indicated above.    VITALS:  T(F): 98.1 (21 @ 13:07), Max: 98.2 (21 @ 05:06)  HR: 80 (21 @ 13:07)  BP: 182/97 (21 @ 13:07)  RR: 18 (21 @ 13:07)  SpO2: --     @ 07:  -   @ 07:00  --------------------------------------------------------  IN: 820 mL / OUT: 1700 mL / NET: -880 mL     @ 07:  -   @ 16:50  --------------------------------------------------------  IN: 0 mL / OUT: 580 mL / NET: -580 mL            I&O's Detail    2021 07:01  -  2021 07:00  --------------------------------------------------------  IN:    Oral Fluid: 820 mL  Total IN: 820 mL    OUT:    Voided (mL): 1700 mL  Total OUT: 1700 mL    Total NET: -880 mL      2021 07:01  -  2021 16:50  --------------------------------------------------------  IN:  Total IN: 0 mL    OUT:    Voided (mL): 580 mL  Total OUT: 580 mL    Total NET: -580 mL            PHYSICAL EXAM:  Constitutional: NAD  HEENT: anicteric sclera, oropharynx clear, MMM  Neck: No JVD  Respiratory: CTAB, no wheezes, rales or rhonchi  Cardiovascular: S1, S2, RRR  Gastrointestinal: BS+, soft, NT/ND  Extremities:  1+ peripheral edema  Neurological: A/O x 3, no focal deficits  Psychiatric: Normal mood, normal affect  : No CVA tenderness. No harris.   Skin: No rashes  Vascular Access:    LABS:      137  |  105  |  48<H>  ----------------------------<  94  4.3   |  26  |  2.5<H>    Ca    7.7<L>      2021 06:42  Mg     1.9           Creatinine Trend: 2.5 <--, 2.7 <--, 2.7 <--, 2.4 <--, 2.1 <--, 2.2 <--, 2.0 <--, 2.1 <--                        8.9    10.41 )-----------( 215      ( 2021 06:42 )             30.2     Urine Studies:  Urinalysis Basic - ( 2021 16:34 )    Color: Light Yellow / Appearance: Clear / S.013 / pH:   Gluc:  / Ketone: Negative  / Bili: Negative / Urobili: <2 mg/dL   Blood:  / Protein: 300 mg/dL / Nitrite: Negative   Leuk Esterase: Negative / RBC: 8 /HPF / WBC 2 /HPF   Sq Epi:  / Non Sq Epi: 1 /HPF / Bacteria: Negative      Protein/Creatinine Ratio Calculation: 8.1 Ratio ( @ 16:34)  Creatinine, Random Urine: 58 mg/dL ( @ 16:34)  Sodium, Random Urine: 103.0 mmoL/L ( @ 16:34)  Potassium, Random Urine: 28 mmol/L ( @ 16:34)            RADIOLOGY & ADDITIONAL STUDIES:    < from: Xray Chest 1 View- PORTABLE-Routine (Xray Chest 1 View- PORTABLE-Routine in AM.) (21 @ 10:26) >  Cardiomegaly. Low lung volumes leads to magnification of the pulmonary interstitium.    < end of copied text >  < from: US Kidney and Bladder (21 @ 06:07) >    Right kidney: 10.8 cm. A simple interpolar cyst measures up to 1.4 cm. No solid renal mass, hydronephrosis or calculi.    Left kidney:  11.2 cm. No renal mass, hydronephrosis or calculi.    Urinary bladder: No debris or calculus. Bilateral ureteral jets are visualized. Prevoid volume of approximately 360 mL.  Postvoid volume is approximately 164 mL.    Prostate: Measures 4.2 x 3.0 x 3.7 cm, proximally 25 mL in volume.    Other: Bilateral pleural effusions noted.    IMPRESSION:  1.  No hydronephrosis bilaterally.  2.  Right renal cyst.  3.  Postvoid residual bladder volume is 164 mL.  4.  Incidentally noted bilateral pleural effusions.      < end of copied text >

## 2021-07-08 NOTE — PROGRESS NOTE ADULT - ASSESSMENT
ASSESSMENT & PLAN  HPI: 56M w/ PMHx HTN, HLD, T2DM on insulin, Glaucoma presents to the Ed c/o worsening LE swelling and pain for the past 1 month. Endorses SOB on exertion, along w/ increased abdominal girth. Denies orthopnea, PND, wheeze, CP, diaphoresis. No cardiac Hx. Seen by HF team and cardiology.    #New Onset Acute HFrEF exacerbation  #HFrEF 2/2 uncontrolled HTN  #Moderate pericardial effusion  #Uncontrolled HTN  -no prior cardiac hx  -CXR - cardiomegaly and blunting of CP angles  -BNP 28059 on admission  -trops 0.10 -> 0.11 -> 0.11 -> 0.14 -> 0.09  -Echo - EF 34%, moderate pericardial effusion  -I<O, daily weights  -BP: 182/97 (140/78 - 182/97)  -HF team saw pt 7/6 - cleared to f/u with cardiology for ischemic w/u / cath  -per HF team, cont metoprolol succinate 25mg qd, cont hydralazine 25mg TID, and cont isosorbide 20mg TID.  -awaiting f/u by Dr. Finn for ischemic w/u poss cath    #LENNY on CKD  -Cr 2.0->2.2->2.1->2.4->2.7->2.7 (Baseline 1.7 3/2021)  -cont to hold Lasix  -FeNa = 2.8%, FeUrea 40.2% => likely intrinsic  -UA 7/3 significant for moderate blood, 300 protein, protein/Cr ratio 8.1  -US KUB - no hydro b/l, right renal cysts  -cont to monitor I&O for oliguria/anuria  -obtain SPEP, light chains, iron studies, MARLIN, C3, C4    #HLD  -cont atorvastatin    #DM2 w/ peripheral neuropathy  -FS: 151, 88, 140, 129  -cont Lantus, ISS  -on lanus, metformin-pioglitazone at home  -hold pioglitazone now and on d/c - can contribute to CHF exacerbation  -on gabapentin 200mg BID  -duplex negative  -arterial duplex negative    #Glaucoma  -cont eye drops    PT/REHAB: ambulated 50ft x2 w/ supervision  ACTIVITY: Increase as tolerated   DVT PPX: enoxaparin Injectable  GI PPX:  Not indicated  DIET: Diet, DASH/TLC  CODE: Full code   DIsposition: from home; acute  Pending: f/u by Dr. Orozco, f/u SPEP, light chains, iron, MARLIN, C3, C4.

## 2021-07-08 NOTE — PROGRESS NOTE ADULT - SUBJECTIVE AND OBJECTIVE BOX
GILLIAN MENDOZA  56y Male    INTERVAL HPI/OVERNIGHT EVENTS:    Pt in bed in no distress.  Denies SOB, chest pain, N/V, abdominal pain  denies seeing a nephrologist in the past    T(F): 98.2 (21 @ 05:06), Max: 98.2 (21 @ 05:06)  HR: 72 (21 @ 05:06) (72 - 75)  BP: 149/85 (21 @ 05:06) (131/74 - 149/85)  RR: 18 (21 @ 05:06) (16 - 18)  SpO2: --    I&O's Summary    2021 07:01  -  2021 07:00  --------------------------------------------------------  IN: 820 mL / OUT: 1700 mL / NET: -880 mL      Daily Weight in k.4 (2021 05:06)    CAPILLARY BLOOD GLUCOSE      POCT Blood Glucose.: 151 mg/dL (2021 11:24)  POCT Blood Glucose.: 88 mg/dL (2021 07:47)  POCT Blood Glucose.: 140 mg/dL (2021 22:02)  POCT Blood Glucose.: 129 mg/dL (2021 16:22)        PHYSICAL EXAM:  GENERAL: NAD  HEAD:  Normocephalic  EYES:  conjunctiva and sclera clear  ENMT: Moist mucous membranes  NERVOUS SYSTEM:  Alert, awake, Good concentration  CHEST/LUNG: decreased BS of left base otherwise CTA  HEART: Regular rate and rhythm  ABDOMEN: Soft, Nontender, Nondistended; Bowel sounds present  EXTREMITIES: + LE edema but improved from admission    Consultant(s) Notes Reviewed:  [x ] YES  [ ] NO  Care Discussed with Consultants/Other Providers [ x] YES  [ ] NO    MEDICATIONS  (STANDING):  atorvastatin 40 milliGRAM(s) Oral at bedtime  brimonidine 0.2% Ophthalmic Solution 1 Drop(s) Both EYES two times a day  chlorhexidine 4% Liquid 1 Application(s) Topical <User Schedule>  dextrose 40% Gel 15 Gram(s) Oral once  dextrose 5%. 1000 milliLiter(s) (100 mL/Hr) IV Continuous <Continuous>  dextrose 5%. 1000 milliLiter(s) (50 mL/Hr) IV Continuous <Continuous>  dextrose 50% Injectable 25 Gram(s) IV Push once  dextrose 50% Injectable 12.5 Gram(s) IV Push once  dextrose 50% Injectable 25 Gram(s) IV Push once  dorzolamide 2% Ophthalmic Solution 1 Drop(s) Both EYES three times a day  enoxaparin Injectable 40 milliGRAM(s) SubCutaneous daily  gabapentin 200 milliGRAM(s) Oral two times a day  glucagon  Injectable 1 milliGRAM(s) IntraMuscular once  hydrALAZINE 25 milliGRAM(s) Oral three times a day  insulin glargine Injectable (LANTUS) 8 Unit(s) SubCutaneous every morning  insulin lispro (ADMELOG) corrective regimen sliding scale   SubCutaneous three times a day before meals  insulin lispro Injectable (ADMELOG) 3 Unit(s) SubCutaneous three times a day before meals  isosorbide   dinitrate Tablet (ISORDIL) 20 milliGRAM(s) Oral three times a day  latanoprost 0.005% Ophthalmic Solution 1 Drop(s) Both EYES at bedtime  metoprolol succinate ER 25 milliGRAM(s) Oral daily  timolol 0.5% Solution 1 Drop(s) Both EYES two times a day    MEDICATIONS  (PRN):      Telemetry reviewed by me    LABS:                        8.9    10.41 )-----------( 215      ( 2021 06:42 )             30.2     07-08    137  |  105  |  48<H>  ----------------------------<  94  4.3   |  26  |  2.5<H>    Ca    7.7<L>      2021 06:42  Mg     1.9     07-08                  Case discussed with residents and RN on rounds today    Care discussed with pt

## 2021-07-08 NOTE — PROGRESS NOTE ADULT - SUBJECTIVE AND OBJECTIVE BOX
GILLIAN MENDOZA MRN#: [id number]  CODE STATUS: FULL CODE     SUBJECTIVE   Chief complaint: LE swelling, pain    Currently admitted to medicine with the primary diagnosis of NEW ONSET OF CONGESTIVE HEART FAILURE ...      Today is hospital day 6d,   INTERVAL HPI/OVERNIGHT EVENTS: No acute events overnight    This morning, he is laying in bed comfortably. Denies chest pain, shortness of breath, abdominal pain, nausea, vomiting or changes in bowel habits. He has no complaints today.     Urinating and stooling appropriately.    Present Today:           Cain Catheter (x)No/ ()Yes?   Indication:             Central Line (x)No/ ()Yes?  Indication:          IV Fluids (x)No/ ()Yes?   Indication:     OBJECTIVE  PAST MEDICAL & SURGICAL HISTORY  HTN (hypertension)    HLD (hyperlipidemia)    DM (diabetes mellitus)    Glaucoma    No significant past surgical history      ALLERGIES:  No Known Allergies    HOME MEDICATIONS:  Home Medications:  atorvastatin 40 mg oral tablet: 1 tab(s) orally once a day (02 Jul 2021 04:46)  BASAGLAR 100 UNIT/ML KWIKPEN:  (02 Jul 2021 04:46)  brimonidine 0.2% ophthalmic solution: 1 drop(s) to each affected eye 2 times a day (02 Jul 2021 04:46)  dorzolamide-timolol 2%-0.5% preservative-free ophthalmic solution: 1 drop(s) to each affected eye 2 times a day (02 Jul 2021 04:46)  furosemide 20 mg oral tablet: 1 tab(s) orally once a day (02 Jul 2021 04:46)  latanoprost 0.005% ophthalmic solution: 1 drop(s) to each affected eye once a day (in the evening) (02 Jul 2021 04:46)  lisinopril 20 mg oral tablet: 1 tab(s) orally once a day (02 Jul 2021 04:46)  pioglitazone-metformin 15 mg-850 mg oral tablet: 1 tab(s) orally 2 times a day (02 Jul 2021 04:46)  Rhopressa 0.02% ophthalmic solution: 1 drop(s) to each affected eye once a day (in the evening) (02 Jul 2021 04:46)    MEDICATIONS:  STANDING MEDICATIONS  MEDICATIONS  (STANDING):  atorvastatin 40 milliGRAM(s) Oral at bedtime  brimonidine 0.2% Ophthalmic Solution 1 Drop(s) Both EYES two times a day  chlorhexidine 4% Liquid 1 Application(s) Topical <User Schedule>  dextrose 40% Gel 15 Gram(s) Oral once  dextrose 5%. 1000 milliLiter(s) (100 mL/Hr) IV Continuous <Continuous>  dextrose 5%. 1000 milliLiter(s) (50 mL/Hr) IV Continuous <Continuous>  dextrose 50% Injectable 25 Gram(s) IV Push once  dextrose 50% Injectable 12.5 Gram(s) IV Push once  dextrose 50% Injectable 25 Gram(s) IV Push once  dorzolamide 2% Ophthalmic Solution 1 Drop(s) Both EYES three times a day  enoxaparin Injectable 40 milliGRAM(s) SubCutaneous daily  gabapentin 200 milliGRAM(s) Oral two times a day  glucagon  Injectable 1 milliGRAM(s) IntraMuscular once  hydrALAZINE 25 milliGRAM(s) Oral three times a day  insulin glargine Injectable (LANTUS) 8 Unit(s) SubCutaneous every morning  insulin lispro (ADMELOG) corrective regimen sliding scale   SubCutaneous three times a day before meals  insulin lispro Injectable (ADMELOG) 3 Unit(s) SubCutaneous three times a day before meals  isosorbide   dinitrate Tablet (ISORDIL) 20 milliGRAM(s) Oral three times a day  latanoprost 0.005% Ophthalmic Solution 1 Drop(s) Both EYES at bedtime  metoprolol succinate ER 25 milliGRAM(s) Oral daily  timolol 0.5% Solution 1 Drop(s) Both EYES two times a day    PRN MEDICATIONS  MEDICATIONS  (PRN):      VITAL SIGNS  T(F): 98.1 (07-08 @ 13:07), Max: 98.2 (07-08 @ 05:06)  HR: 80 (07-08 @ 13:07) (72 - 80)  BP: 182/97 (07-08 @ 13:07) (140/78 - 182/97)  RR: 18 (07-08 @ 13:07) (18 - 18)  SpO2: --    LABS:                        8.9    10.41 )-----------( 215      ( 08 Jul 2021 06:42 )             30.2     07-08    137  |  105  |  48<H>  ----------------------------<  94  4.3   |  26  |  2.5<H>    Ca    7.7<L>      08 Jul 2021 06:42  Mg     1.9     07-08        RADIOLOGY:  CXR 7/6 - Cardiomegaly. Low lung volumes leads to magnification of the pulmonary interstitium.  LE Art duplex 7/3 - Moderate atherosclerotic disease in bilateral lower extremities without evidence of hemodynamically significant stenoses or occlusions.  LE venous duplex 7/2 - No evidence of deep venous thrombosis in either lower extremity.    ECHO:  7/2 - 1. Moderately decreased global left ventricular systolic function.   2. Multiple left ventricular regional wall motion abnormalities exist. See wall motion findings.   3. LV Ejection Fraction by Garcia's Method with a biplane EF of 34 %.   4. Moderately increased LV wall thickness.   5. Normal left ventricular internal cavity size.   6. Mild to moderately enlarged left atrium.   7. Normal right atrial size.   8. Moderate pericardial effusion.   9. Mild to moderate mitral valve regurgitation.  10. Structurally normal mitral valve, with normal leaflet excursion.  11. Moderate tricuspid regurgitation.  12. Mild aortic regurgitation.  13. Normal trileaflet aortic valve with normal opening.  14. Mild pulmonic valve regurgitation.  15. Estimated pulmonary artery systolic pressure is 52.0 mmHg assuming a right atrial pressure of 10 mmHg, which is consistent with moderate pulmonary hypertension.      PHYSICAL EXAM:  General: Comfortably laying on the hospital bed. NAD. AAOx3.  HEENT: Atraumatic. Normocephalic. EOMI. PERRL. Conjunctiva and sclera clear.  Cardiac: Normal S1, S2. RRR. No murmurs, rubs, or gallops.  Pulm: CTA b/l no wheezes, rhonchi, or rales.  GI: Soft, nontender, nondistended. [id number]  Ext: 2+ peripheral pulses. Moving all 4 extremities. No edema, cyanosis.  Neuro: Grossly intact  Skin: No lesions or rashes

## 2021-07-09 LAB
ALBUMIN SERPL ELPH-MCNC: 2.6 G/DL — LOW (ref 3.5–5.2)
ALP SERPL-CCNC: 117 U/L — HIGH (ref 30–115)
ALT FLD-CCNC: 32 U/L — SIGNIFICANT CHANGE UP (ref 0–41)
ANION GAP SERPL CALC-SCNC: 8 MMOL/L — SIGNIFICANT CHANGE UP (ref 7–14)
AST SERPL-CCNC: 28 U/L — SIGNIFICANT CHANGE UP (ref 0–41)
BASOPHILS # BLD AUTO: 0.01 K/UL — SIGNIFICANT CHANGE UP (ref 0–0.2)
BASOPHILS NFR BLD AUTO: 0.1 % — SIGNIFICANT CHANGE UP (ref 0–1)
BILIRUB SERPL-MCNC: 0.4 MG/DL — SIGNIFICANT CHANGE UP (ref 0.2–1.2)
BUN SERPL-MCNC: 45 MG/DL — HIGH (ref 10–20)
C3 SERPL-MCNC: 108 MG/DL — SIGNIFICANT CHANGE UP (ref 81–157)
C4 SERPL-MCNC: 23 MG/DL — SIGNIFICANT CHANGE UP (ref 13–39)
CALCIUM SERPL-MCNC: 7.7 MG/DL — LOW (ref 8.5–10.1)
CHLORIDE SERPL-SCNC: 108 MMOL/L — SIGNIFICANT CHANGE UP (ref 98–110)
CO2 SERPL-SCNC: 24 MMOL/L — SIGNIFICANT CHANGE UP (ref 17–32)
CREAT SERPL-MCNC: 2.1 MG/DL — HIGH (ref 0.7–1.5)
EOSINOPHIL # BLD AUTO: 0.79 K/UL — HIGH (ref 0–0.7)
EOSINOPHIL NFR BLD AUTO: 8 % — SIGNIFICANT CHANGE UP (ref 0–8)
GLUCOSE BLDC GLUCOMTR-MCNC: 122 MG/DL — HIGH (ref 70–99)
GLUCOSE BLDC GLUCOMTR-MCNC: 178 MG/DL — HIGH (ref 70–99)
GLUCOSE BLDC GLUCOMTR-MCNC: 179 MG/DL — HIGH (ref 70–99)
GLUCOSE BLDC GLUCOMTR-MCNC: 93 MG/DL — SIGNIFICANT CHANGE UP (ref 70–99)
GLUCOSE SERPL-MCNC: 140 MG/DL — HIGH (ref 70–99)
HCT VFR BLD CALC: 31.9 % — LOW (ref 42–52)
HGB BLD-MCNC: 9.4 G/DL — LOW (ref 14–18)
IMM GRANULOCYTES NFR BLD AUTO: 0.4 % — HIGH (ref 0.1–0.3)
KAPPA LC SER QL IFE: 9.68 MG/DL — HIGH (ref 0.33–1.94)
KAPPA/LAMBDA FREE LIGHT CHAIN RATIO, SERUM: 1.97 RATIO — HIGH (ref 0.26–1.65)
LAMBDA LC SER QL IFE: 4.91 MG/DL — HIGH (ref 0.57–2.63)
LYMPHOCYTES # BLD AUTO: 1.36 K/UL — SIGNIFICANT CHANGE UP (ref 1.2–3.4)
LYMPHOCYTES # BLD AUTO: 13.7 % — LOW (ref 20.5–51.1)
MAGNESIUM SERPL-MCNC: 2 MG/DL — SIGNIFICANT CHANGE UP (ref 1.8–2.4)
MCHC RBC-ENTMCNC: 23.7 PG — LOW (ref 27–31)
MCHC RBC-ENTMCNC: 29.5 G/DL — LOW (ref 32–37)
MCV RBC AUTO: 80.6 FL — SIGNIFICANT CHANGE UP (ref 80–94)
MONOCYTES # BLD AUTO: 0.91 K/UL — HIGH (ref 0.1–0.6)
MONOCYTES NFR BLD AUTO: 9.2 % — SIGNIFICANT CHANGE UP (ref 1.7–9.3)
NEUTROPHILS # BLD AUTO: 6.82 K/UL — HIGH (ref 1.4–6.5)
NEUTROPHILS NFR BLD AUTO: 68.6 % — SIGNIFICANT CHANGE UP (ref 42.2–75.2)
NRBC # BLD: 0 /100 WBCS — SIGNIFICANT CHANGE UP (ref 0–0)
PLATELET # BLD AUTO: 206 K/UL — SIGNIFICANT CHANGE UP (ref 130–400)
POTASSIUM SERPL-MCNC: 4.8 MMOL/L — SIGNIFICANT CHANGE UP (ref 3.5–5)
POTASSIUM SERPL-SCNC: 4.8 MMOL/L — SIGNIFICANT CHANGE UP (ref 3.5–5)
PROT SERPL-MCNC: 4.9 G/DL — LOW (ref 6–8.3)
PROT SERPL-MCNC: 4.9 G/DL — LOW (ref 6–8.3)
PROT SERPL-MCNC: 5 G/DL — LOW (ref 6–8)
RBC # BLD: 3.96 M/UL — LOW (ref 4.7–6.1)
RBC # FLD: 13.8 % — SIGNIFICANT CHANGE UP (ref 11.5–14.5)
SODIUM SERPL-SCNC: 140 MMOL/L — SIGNIFICANT CHANGE UP (ref 135–146)
WBC # BLD: 9.93 K/UL — SIGNIFICANT CHANGE UP (ref 4.8–10.8)
WBC # FLD AUTO: 9.93 K/UL — SIGNIFICANT CHANGE UP (ref 4.8–10.8)

## 2021-07-09 PROCEDURE — 99233 SBSQ HOSP IP/OBS HIGH 50: CPT

## 2021-07-09 RX ADMIN — ISOSORBIDE DINITRATE 20 MILLIGRAM(S): 5 TABLET ORAL at 17:01

## 2021-07-09 RX ADMIN — Medication 25 MILLIGRAM(S): at 05:01

## 2021-07-09 RX ADMIN — Medication 1 DROP(S): at 17:01

## 2021-07-09 RX ADMIN — BRIMONIDINE TARTRATE 1 DROP(S): 2 SOLUTION/ DROPS OPHTHALMIC at 05:01

## 2021-07-09 RX ADMIN — Medication 3 UNIT(S): at 11:46

## 2021-07-09 RX ADMIN — Medication 25 MILLIGRAM(S): at 22:01

## 2021-07-09 RX ADMIN — Medication 25 MILLIGRAM(S): at 14:10

## 2021-07-09 RX ADMIN — Medication 1: at 11:46

## 2021-07-09 RX ADMIN — GABAPENTIN 200 MILLIGRAM(S): 400 CAPSULE ORAL at 05:01

## 2021-07-09 RX ADMIN — ENOXAPARIN SODIUM 40 MILLIGRAM(S): 100 INJECTION SUBCUTANEOUS at 11:46

## 2021-07-09 RX ADMIN — Medication 1 DROP(S): at 05:01

## 2021-07-09 RX ADMIN — DORZOLAMIDE HYDROCHLORIDE 1 DROP(S): 20 SOLUTION/ DROPS OPHTHALMIC at 22:02

## 2021-07-09 RX ADMIN — LATANOPROST 1 DROP(S): 0.05 SOLUTION/ DROPS OPHTHALMIC; TOPICAL at 22:02

## 2021-07-09 RX ADMIN — ATORVASTATIN CALCIUM 40 MILLIGRAM(S): 80 TABLET, FILM COATED ORAL at 22:01

## 2021-07-09 RX ADMIN — ISOSORBIDE DINITRATE 20 MILLIGRAM(S): 5 TABLET ORAL at 05:00

## 2021-07-09 RX ADMIN — BRIMONIDINE TARTRATE 1 DROP(S): 2 SOLUTION/ DROPS OPHTHALMIC at 17:01

## 2021-07-09 RX ADMIN — INSULIN GLARGINE 8 UNIT(S): 100 INJECTION, SOLUTION SUBCUTANEOUS at 07:57

## 2021-07-09 RX ADMIN — GABAPENTIN 200 MILLIGRAM(S): 400 CAPSULE ORAL at 17:01

## 2021-07-09 RX ADMIN — Medication 3 UNIT(S): at 07:57

## 2021-07-09 RX ADMIN — ISOSORBIDE DINITRATE 20 MILLIGRAM(S): 5 TABLET ORAL at 11:45

## 2021-07-09 RX ADMIN — DORZOLAMIDE HYDROCHLORIDE 1 DROP(S): 20 SOLUTION/ DROPS OPHTHALMIC at 14:10

## 2021-07-09 RX ADMIN — DORZOLAMIDE HYDROCHLORIDE 1 DROP(S): 20 SOLUTION/ DROPS OPHTHALMIC at 05:01

## 2021-07-09 NOTE — PROGRESS NOTE ADULT - SUBJECTIVE AND OBJECTIVE BOX
GILLIAN MENDOZA  56y Male    INTERVAL HPI/OVERNIGHT EVENTS:    Pt feels well.   No SOB, chest pain, abdominal pain  stable LE edema    T(F): 98.4 (21 @ 05:17), Max: 99.3 (21 @ 22:12)  HR: 73 (21 @ 05:17) (73 - 80)  BP: 146/83 (21 @ 08:31) (146/83 - 182/97)  RR: 18 (21 @ 05:17) (18 - 18)  SpO2: 98% (21 @ 23:57) (98% - 98%) on RA    I&O's Summary    2021 07:01  -  2021 07:00  --------------------------------------------------------  IN: 237 mL / OUT: 1980 mL / NET: -1743 mL    2021 07:01  -  2021 12:16  --------------------------------------------------------  IN: 0 mL / OUT: 400 mL / NET: -400 mL        Daily     Daily Weight in k.7 (2021 05:17)    CAPILLARY BLOOD GLUCOSE      POCT Blood Glucose.: 179 mg/dL (2021 11:09)  POCT Blood Glucose.: 122 mg/dL (2021 07:43)  POCT Blood Glucose.: 162 mg/dL (2021 21:47)  POCT Blood Glucose.: 158 mg/dL (2021 16:27)        PHYSICAL EXAM:  GENERAL: NAD  HEAD:  Normocephalic  EYES:  conjunctiva and sclera clear  ENMT: Moist mucous membranes  NERVOUS SYSTEM:  Alert, awake, Good concentration  CHEST/LUNG: decreased BS of left base but improved air entry  otherwise CTA  HEART: Regular rate and rhythm  ABDOMEN: Soft, Nontender, Nondistended; Bowel sounds present  EXTREMITIES: 1+ LE edema    Consultant(s) Notes Reviewed:  [x ] YES  [ ] NO  Care Discussed with Consultants/Other Providers [ x] YES  [ ] NO    MEDICATIONS  (STANDING):  atorvastatin 40 milliGRAM(s) Oral at bedtime  brimonidine 0.2% Ophthalmic Solution 1 Drop(s) Both EYES two times a day  chlorhexidine 4% Liquid 1 Application(s) Topical <User Schedule>  dextrose 40% Gel 15 Gram(s) Oral once  dextrose 5%. 1000 milliLiter(s) (50 mL/Hr) IV Continuous <Continuous>  dextrose 5%. 1000 milliLiter(s) (100 mL/Hr) IV Continuous <Continuous>  dextrose 50% Injectable 25 Gram(s) IV Push once  dextrose 50% Injectable 12.5 Gram(s) IV Push once  dextrose 50% Injectable 25 Gram(s) IV Push once  dorzolamide 2% Ophthalmic Solution 1 Drop(s) Both EYES three times a day  enoxaparin Injectable 40 milliGRAM(s) SubCutaneous daily  gabapentin 200 milliGRAM(s) Oral two times a day  glucagon  Injectable 1 milliGRAM(s) IntraMuscular once  hydrALAZINE 25 milliGRAM(s) Oral three times a day  insulin glargine Injectable (LANTUS) 8 Unit(s) SubCutaneous every morning  insulin lispro (ADMELOG) corrective regimen sliding scale   SubCutaneous three times a day before meals  insulin lispro Injectable (ADMELOG) 3 Unit(s) SubCutaneous three times a day before meals  isosorbide   dinitrate Tablet (ISORDIL) 20 milliGRAM(s) Oral three times a day  latanoprost 0.005% Ophthalmic Solution 1 Drop(s) Both EYES at bedtime  metoprolol succinate ER 25 milliGRAM(s) Oral daily  timolol 0.5% Solution 1 Drop(s) Both EYES two times a day    MEDICATIONS  (PRN):      Telemetry reviewed by me    LABS:                        9.4    9.93  )-----------( 206      ( 2021 05:31 )             31.9     07-09    140  |  108  |  45<H>  ----------------------------<  140<H>  4.8   |  24  |  2.1<H>    Ca    7.7<L>      2021 05:31  Mg     2.0     07-09    TPro  5.0<L>  /  Alb  2.6<L>  /  TBili  0.4  /  DBili  x   /  AST  28  /  ALT  32  /  AlkPhos  117<H>                Case discussed with residents and RN on rounds today    Care discussed with pt

## 2021-07-09 NOTE — PROGRESS NOTE ADULT - SUBJECTIVE AND OBJECTIVE BOX
Nephrology progress note    Patient is seen and examined, events over the last 24 h noted .  Denies SOB  Allergies:  No Known Allergies    Hospital Medications:   MEDICATIONS  (STANDING):  atorvastatin 40 milliGRAM(s) Oral at bedtime  brimonidine 0.2% Ophthalmic Solution 1 Drop(s) Both EYES two times a day  chlorhexidine 4% Liquid 1 Application(s) Topical <User Schedule>  dextrose 40% Gel 15 Gram(s) Oral once  dextrose 5%. 1000 milliLiter(s) (50 mL/Hr) IV Continuous <Continuous>  dextrose 5%. 1000 milliLiter(s) (100 mL/Hr) IV Continuous <Continuous>  dextrose 50% Injectable 25 Gram(s) IV Push once  dextrose 50% Injectable 12.5 Gram(s) IV Push once  dextrose 50% Injectable 25 Gram(s) IV Push once  dorzolamide 2% Ophthalmic Solution 1 Drop(s) Both EYES three times a day  enoxaparin Injectable 40 milliGRAM(s) SubCutaneous daily  gabapentin 200 milliGRAM(s) Oral two times a day  glucagon  Injectable 1 milliGRAM(s) IntraMuscular once  hydrALAZINE 25 milliGRAM(s) Oral three times a day  insulin glargine Injectable (LANTUS) 8 Unit(s) SubCutaneous every morning  insulin lispro (ADMELOG) corrective regimen sliding scale   SubCutaneous three times a day before meals  insulin lispro Injectable (ADMELOG) 3 Unit(s) SubCutaneous three times a day before meals  isosorbide   dinitrate Tablet (ISORDIL) 20 milliGRAM(s) Oral three times a day  latanoprost 0.005% Ophthalmic Solution 1 Drop(s) Both EYES at bedtime  metoprolol succinate ER 25 milliGRAM(s) Oral daily  timolol 0.5% Solution 1 Drop(s) Both EYES two times a day        VITALS:  T(F): 98.4 (21 @ 05:17), Max: 99.3 (21 @ 22:12)  HR: 73 (21 @ 05:17)  BP: 146/83 (21 @ 08:31)  RR: 18 (21 @ 05:17)  SpO2: 98% (21 @ 23:57)  Wt(kg): --     @ 07:01  -   @ 07:00  --------------------------------------------------------  IN: 820 mL / OUT: 1700 mL / NET: -880 mL     @ 07:01   @ 07:00  --------------------------------------------------------  IN: 237 mL / OUT: 1980 mL / NET: -1743 mL     @ 07:  -   @ 12:43  --------------------------------------------------------  IN: 0 mL / OUT: 400 mL / NET: -400 mL          PHYSICAL EXAM:  Constitutional: NAD  HEENT: anicteric sclera, MMM  Neck: No JVD  Respiratory: CTAB, no wheezes, rales or rhonchi  Cardiovascular: S1, S2, RRR  Gastrointestinal: BS+, soft, NT/ND  Extremities: No peripheral edema  Neurological: A/O x 3  : No CVA tenderness. No harris.   Skin: No rashes  Vascular Access:    LABS:      140  |  108  |  45<H>  ----------------------------<  140<H>  4.8   |  24  |  2.1<H>    Ca    7.7<L>      2021 05:31  Mg     2.0         TPro  5.0<L>  /  Alb  2.6<L>  /  TBili  0.4  /  DBili      /  AST  28  /  ALT  32  /  AlkPhos  117<H>                            9.4    9.93  )-----------( 206      ( 2021 05:31 )             31.9       Urine Studies:  Urinalysis Basic - ( 2021 16:34 )    Color: Light Yellow / Appearance: Clear / S.013 / pH:   Gluc:  / Ketone: Negative  / Bili: Negative / Urobili: <2 mg/dL   Blood:  / Protein: 300 mg/dL / Nitrite: Negative   Leuk Esterase: Negative / RBC: 8 /HPF / WBC 2 /HPF   Sq Epi:  / Non Sq Epi: 1 /HPF / Bacteria: Negative      Protein/Creatinine Ratio Calculation: 8.1 Ratio ( @ 16:34)  Creatinine, Random Urine: 58 mg/dL ( @ 16:34)  Sodium, Random Urine: 103.0 mmoL/L ( @ 16:34)  Potassium, Random Urine: 28 mmol/L ( @ 16:34)    RADIOLOGY & ADDITIONAL STUDIES:  < from:  Kidney and Bladder (21 @ 06:07) >  Right kidney: 10.8 cm. A simple interpolar cyst measures up to 1.4 cm. No solid renal mass, hydronephrosis or calculi.    Left kidney:  11.2 cm. No renal mass, hydronephrosis or calculi.    Urinary bladder: No debris or calculus. Bilateral ureteral jets are visualized. Prevoid volume of approximately 360 mL.  Postvoid volume is approximately 164 mL.    Prostate: Measures 4.2 x 3.0 x 3.7 cm, proximally 25 mL in volume.    Other: Bilateral pleural effusions noted.    IMPRESSION:  1.  No hydronephrosis bilaterally.  2.  Right renal cyst.  3.  Postvoid residual bladder volume is 164 mL.    < end of copied text >

## 2021-07-09 NOTE — PROGRESS NOTE ADULT - SUBJECTIVE AND OBJECTIVE BOX
ELLIOT MENDOZA MRN#: [id number]  CODE STATUS: FULL CODE     SUBJECTIVE   Chief complaint: acute CHF    Currently admitted to medicine with the primary diagnosis of NEW ONSET OF CONGESTIVE HEART FAILURE ...      Today is hospital day 7d,   INTERVAL HPI/OVERNIGHT EVENTS: No acute events overnight    This morning, he is laying in bed comfortably. Denies chest pain, shortness of breath, abdominal pain, nausea, vomiting or changes in bowel habits. He has no complaints today.     Urinating and stooling appropriately.    Present Today:           Cain Catheter (x)No/ ()Yes?   Indication:             Central Line (x)No/ ()Yes?  Indication:          IV Fluids (x)No/ ()Yes?   Indication:     OBJECTIVE  PAST MEDICAL & SURGICAL HISTORY  HTN (hypertension)    HLD (hyperlipidemia)    DM (diabetes mellitus)    Glaucoma    No significant past surgical history      ALLERGIES:  No Known Allergies    HOME MEDICATIONS:  Home Medications:  atorvastatin 40 mg oral tablet: 1 tab(s) orally once a day (02 Jul 2021 04:46)  BASAGLAR 100 UNIT/ML KWIKPEN:  (02 Jul 2021 04:46)  brimonidine 0.2% ophthalmic solution: 1 drop(s) to each affected eye 2 times a day (02 Jul 2021 04:46)  dorzolamide-timolol 2%-0.5% preservative-free ophthalmic solution: 1 drop(s) to each affected eye 2 times a day (02 Jul 2021 04:46)  furosemide 20 mg oral tablet: 1 tab(s) orally once a day (02 Jul 2021 04:46)  latanoprost 0.005% ophthalmic solution: 1 drop(s) to each affected eye once a day (in the evening) (02 Jul 2021 04:46)  lisinopril 20 mg oral tablet: 1 tab(s) orally once a day (02 Jul 2021 04:46)  pioglitazone-metformin 15 mg-850 mg oral tablet: 1 tab(s) orally 2 times a day (02 Jul 2021 04:46)  Rhopressa 0.02% ophthalmic solution: 1 drop(s) to each affected eye once a day (in the evening) (02 Jul 2021 04:46)    MEDICATIONS:  STANDING MEDICATIONS  MEDICATIONS  (STANDING):  atorvastatin 40 milliGRAM(s) Oral at bedtime  brimonidine 0.2% Ophthalmic Solution 1 Drop(s) Both EYES two times a day  chlorhexidine 4% Liquid 1 Application(s) Topical <User Schedule>  dextrose 40% Gel 15 Gram(s) Oral once  dextrose 5%. 1000 milliLiter(s) (50 mL/Hr) IV Continuous <Continuous>  dextrose 5%. 1000 milliLiter(s) (100 mL/Hr) IV Continuous <Continuous>  dextrose 50% Injectable 25 Gram(s) IV Push once  dextrose 50% Injectable 12.5 Gram(s) IV Push once  dextrose 50% Injectable 25 Gram(s) IV Push once  dorzolamide 2% Ophthalmic Solution 1 Drop(s) Both EYES three times a day  enoxaparin Injectable 40 milliGRAM(s) SubCutaneous daily  gabapentin 200 milliGRAM(s) Oral two times a day  glucagon  Injectable 1 milliGRAM(s) IntraMuscular once  hydrALAZINE 25 milliGRAM(s) Oral three times a day  insulin glargine Injectable (LANTUS) 8 Unit(s) SubCutaneous every morning  insulin lispro (ADMELOG) corrective regimen sliding scale   SubCutaneous three times a day before meals  insulin lispro Injectable (ADMELOG) 3 Unit(s) SubCutaneous three times a day before meals  isosorbide   dinitrate Tablet (ISORDIL) 20 milliGRAM(s) Oral three times a day  latanoprost 0.005% Ophthalmic Solution 1 Drop(s) Both EYES at bedtime  metoprolol succinate ER 25 milliGRAM(s) Oral daily  timolol 0.5% Solution 1 Drop(s) Both EYES two times a day    PRN MEDICATIONS  MEDICATIONS  (PRN):      VITAL SIGNS  T(F): 98.1 (07-09 @ 13:02), Max: 99.3 (07-08 @ 22:12)  HR: 76 (07-09 @ 13:02) (73 - 79)  BP: 153/83 (07-09 @ 13:02) (146/83 - 174/92)  RR: 18 (07-09 @ 13:02) (18 - 18)  SpO2: 98% (07-08 @ 23:57) (98% - 98%)    LABS:                        9.4    9.93  )-----------( 206      ( 09 Jul 2021 05:31 )             31.9     07-09    140  |  108  |  45<H>  ----------------------------<  140<H>  4.8   |  24  |  2.1<H>    Ca    7.7<L>      09 Jul 2021 05:31  Mg     2.0     07-09    TPro  5.0<L>  /  Alb  2.6<L>  /  TBili  0.4  /  DBili  x   /  AST  28  /  ALT  32  /  AlkPhos  117<H>  07-09      RADIOLOGY:  CXR 7/6 - Cardiomegaly. Low lung volumes leads to magnification of the pulmonary interstitium.  LE Art duplex 7/3 - Moderate atherosclerotic disease in bilateral lower extremities without evidence of hemodynamically significant stenoses or occlusions.  LE venous duplex 7/2 - No evidence of deep venous thrombosis in either lower extremity.    ECHO:  7/2 - 1. Moderately decreased global left ventricular systolic function.   2. Multiple left ventricular regional wall motion abnormalities exist. See wall motion findings.   3. LV Ejection Fraction by Garcia's Method with a biplane EF of 34 %.   4. Moderately increased LV wall thickness.   5. Normal left ventricular internal cavity size.   6. Mild to moderately enlarged left atrium.   7. Normal right atrial size.   8. Moderate pericardial effusion.   9. Mild to moderate mitral valve regurgitation.  10. Structurally normal mitral valve, with normal leaflet excursion.  11. Moderate tricuspid regurgitation.  12. Mild aortic regurgitation.  13. Normal trileaflet aortic valve with normal opening.  14. Mild pulmonic valve regurgitation.  15. Estimated pulmonary artery systolic pressure is 52.0 mmHg assuming a right atrial pressure of 10 mmHg, which is consistent with moderate pulmonary hypertension.      PHYSICAL EXAM:  General: Comfortably laying on the hospital bed. NAD. AAOx3.  HEENT: Atraumatic. Normocephalic. EOMI. PERRL. Conjunctiva and sclera clear.  Cardiac: Normal S1, S2. RRR. No murmurs, rubs, or gallops.  Pulm: CTA b/l no wheezes, rhonchi, or rales.  GI: Soft, nontender, nondistended. BSx4q  Ext: 2+ peripheral pulses. Moving all 4 extremities. No edema, cyanosis.  Neuro: Grossly intact  Skin: No lesions or rashes    ASSESSMENT & PLAN  HPI: 56M w/ PMHx HTN, HLD, T2DM on insulin, Glaucoma presents to the Ed c/o worsening LE swelling and pain for the past 1 month. Endorses SOB on exertion, along w/ increased abdominal girth. Denies orthopnea, PND, wheeze, CP, diaphoresis. No cardiac Hx. Seen by HF team and cardiology.    #New Onset Acute HFrEF exacerbation  #HFrEF 2/2 uncontrolled HTN  #Moderate pericardial effusion  #Uncontrolled HTN  -no prior cardiac hx  -CXR - cardiomegaly and blunting of CP angles  -BNP 06793 on admission  -trops 0.10 -> 0.11 -> 0.11 -> 0.14 -> 0.09  -Echo - EF 34%, moderate pericardial effusion  -I<O, daily weights  -BP: 182/97 (140/78 - 182/97)  -HF team saw pt 7/6 - cleared to f/u with cardiology for ischemic w/u / cath  -per HF team, cont metoprolol succinate 25mg qd, cont hydralazine 25mg TID, and cont isosorbide 20mg TID.  -awaiting f/u by Dr. Finn for ischemic w/u poss cath    #LENNY on CKD  -Cr 2.0->2.2->2.1->2.4->2.7->2.7 (Baseline 1.7 3/2021)  -cont to hold Lasix  -FeNa = 2.8%, FeUrea 40.2% => likely intrinsic  -UA 7/3 significant for moderate blood, 300 protein, protein/Cr ratio 8.1  -US KUB - no hydro b/l, right renal cysts  -cont to monitor I&O for oliguria/anuria  -obtain SPEP, light chains, iron studies, MARLIN, C3, C4    #HLD  -cont atorvastatin    #DM2 w/ peripheral neuropathy  -FS: 151, 88, 140, 129  -cont Lantus, ISS  -on lanus, metformin-pioglitazone at home  -hold pioglitazone now and on d/c - can contribute to CHF exacerbation  -on gabapentin 200mg BID  -duplex negative  -arterial duplex negative    #Glaucoma  -cont eye drops  #DM2  -FS: 179, 122, 162, 158  -cont     #HTN  -BP: 153/83 (146/83 - 174/92)    PT/REHAB   ACTIVITY: Activity - Increase As Tolerated  Activity - Bedrest    DVT PPX: enoxaparin Injectable    GI PPX:  Not indicated  DIET: Diet, DASH/TLC        CODE: Full code   Disposition: from home;   Pending:   ELLIOT MENDOZA MRN#: [id number]  CODE STATUS: FULL CODE     SUBJECTIVE   Chief complaint: acute CHF    Currently admitted to medicine with the primary diagnosis of NEW ONSET OF CONGESTIVE HEART FAILURE ...      Today is hospital day 7d,   INTERVAL HPI/OVERNIGHT EVENTS: No acute events overnight    This morning, he is laying in bed comfortably. Denies chest pain, shortness of breath, abdominal pain, nausea, vomiting or changes in bowel habits. He has no complaints today.     Urinating and stooling appropriately.    Present Today:           Cain Catheter (x)No/ ()Yes?   Indication:             Central Line (x)No/ ()Yes?  Indication:          IV Fluids (x)No/ ()Yes?   Indication:     OBJECTIVE  PAST MEDICAL & SURGICAL HISTORY  HTN (hypertension)    HLD (hyperlipidemia)    DM (diabetes mellitus)    Glaucoma    No significant past surgical history      ALLERGIES:  No Known Allergies    HOME MEDICATIONS:  Home Medications:  atorvastatin 40 mg oral tablet: 1 tab(s) orally once a day (02 Jul 2021 04:46)  BASAGLAR 100 UNIT/ML KWIKPEN:  (02 Jul 2021 04:46)  brimonidine 0.2% ophthalmic solution: 1 drop(s) to each affected eye 2 times a day (02 Jul 2021 04:46)  dorzolamide-timolol 2%-0.5% preservative-free ophthalmic solution: 1 drop(s) to each affected eye 2 times a day (02 Jul 2021 04:46)  furosemide 20 mg oral tablet: 1 tab(s) orally once a day (02 Jul 2021 04:46)  latanoprost 0.005% ophthalmic solution: 1 drop(s) to each affected eye once a day (in the evening) (02 Jul 2021 04:46)  lisinopril 20 mg oral tablet: 1 tab(s) orally once a day (02 Jul 2021 04:46)  pioglitazone-metformin 15 mg-850 mg oral tablet: 1 tab(s) orally 2 times a day (02 Jul 2021 04:46)  Rhopressa 0.02% ophthalmic solution: 1 drop(s) to each affected eye once a day (in the evening) (02 Jul 2021 04:46)    MEDICATIONS:  STANDING MEDICATIONS  MEDICATIONS  (STANDING):  atorvastatin 40 milliGRAM(s) Oral at bedtime  brimonidine 0.2% Ophthalmic Solution 1 Drop(s) Both EYES two times a day  chlorhexidine 4% Liquid 1 Application(s) Topical <User Schedule>  dextrose 40% Gel 15 Gram(s) Oral once  dextrose 5%. 1000 milliLiter(s) (50 mL/Hr) IV Continuous <Continuous>  dextrose 5%. 1000 milliLiter(s) (100 mL/Hr) IV Continuous <Continuous>  dextrose 50% Injectable 25 Gram(s) IV Push once  dextrose 50% Injectable 12.5 Gram(s) IV Push once  dextrose 50% Injectable 25 Gram(s) IV Push once  dorzolamide 2% Ophthalmic Solution 1 Drop(s) Both EYES three times a day  enoxaparin Injectable 40 milliGRAM(s) SubCutaneous daily  gabapentin 200 milliGRAM(s) Oral two times a day  glucagon  Injectable 1 milliGRAM(s) IntraMuscular once  hydrALAZINE 25 milliGRAM(s) Oral three times a day  insulin glargine Injectable (LANTUS) 8 Unit(s) SubCutaneous every morning  insulin lispro (ADMELOG) corrective regimen sliding scale   SubCutaneous three times a day before meals  insulin lispro Injectable (ADMELOG) 3 Unit(s) SubCutaneous three times a day before meals  isosorbide   dinitrate Tablet (ISORDIL) 20 milliGRAM(s) Oral three times a day  latanoprost 0.005% Ophthalmic Solution 1 Drop(s) Both EYES at bedtime  metoprolol succinate ER 25 milliGRAM(s) Oral daily  timolol 0.5% Solution 1 Drop(s) Both EYES two times a day    PRN MEDICATIONS  MEDICATIONS  (PRN):      VITAL SIGNS  T(F): 98.1 (07-09 @ 13:02), Max: 99.3 (07-08 @ 22:12)  HR: 76 (07-09 @ 13:02) (73 - 79)  BP: 153/83 (07-09 @ 13:02) (146/83 - 174/92)  RR: 18 (07-09 @ 13:02) (18 - 18)  SpO2: 98% (07-08 @ 23:57) (98% - 98%)    LABS:                        9.4    9.93  )-----------( 206      ( 09 Jul 2021 05:31 )             31.9     07-09    140  |  108  |  45<H>  ----------------------------<  140<H>  4.8   |  24  |  2.1<H>    Ca    7.7<L>      09 Jul 2021 05:31  Mg     2.0     07-09    TPro  5.0<L>  /  Alb  2.6<L>  /  TBili  0.4  /  DBili  x   /  AST  28  /  ALT  32  /  AlkPhos  117<H>  07-09      RADIOLOGY:  CXR 7/6 - Cardiomegaly. Low lung volumes leads to magnification of the pulmonary interstitium.  LE Art duplex 7/3 - Moderate atherosclerotic disease in bilateral lower extremities without evidence of hemodynamically significant stenoses or occlusions.  LE venous duplex 7/2 - No evidence of deep venous thrombosis in either lower extremity.    ECHO:  7/2 - 1. Moderately decreased global left ventricular systolic function.   2. Multiple left ventricular regional wall motion abnormalities exist. See wall motion findings.   3. LV Ejection Fraction by Garcia's Method with a biplane EF of 34 %.   4. Moderately increased LV wall thickness.   5. Normal left ventricular internal cavity size.   6. Mild to moderately enlarged left atrium.   7. Normal right atrial size.   8. Moderate pericardial effusion.   9. Mild to moderate mitral valve regurgitation.  10. Structurally normal mitral valve, with normal leaflet excursion.  11. Moderate tricuspid regurgitation.  12. Mild aortic regurgitation.  13. Normal trileaflet aortic valve with normal opening.  14. Mild pulmonic valve regurgitation.  15. Estimated pulmonary artery systolic pressure is 52.0 mmHg assuming a right atrial pressure of 10 mmHg, which is consistent with moderate pulmonary hypertension.      PHYSICAL EXAM:  General: Comfortably laying on the hospital bed. NAD. AAOx3.  HEENT: Atraumatic. Normocephalic. EOMI. PERRL. Conjunctiva and sclera clear.  Cardiac: Normal S1, S2. RRR. No murmurs, rubs, or gallops.  Pulm: CTA b/l no wheezes, rhonchi, or rales.  GI: Soft, nontender, nondistended. BSx4q  Ext: 2+ peripheral pulses. Moving all 4 extremities. No edema, cyanosis.  Neuro: Grossly intact  Skin: No lesions or rashes    ASSESSMENT & PLAN  HPI: 56M w/ PMHx HTN, HLD, T2DM on insulin, Glaucoma presents to the Ed c/o worsening LE swelling and pain for the past 1 month. Endorses SOB on exertion, along w/ increased abdominal girth. Denies orthopnea, PND, wheeze, CP, diaphoresis. No cardiac Hx. Seen by HF team and cardiology.    #New Onset Acute HFrEF exacerbation  #HFrEF 2/2 uncontrolled HTN  #Moderate pericardial effusion  #Uncontrolled HTN  -no prior cardiac hx  -CXR - cardiomegaly and blunting of CP angles  -BNP 72862 on admission  -trops 0.10 -> 0.11 -> 0.11 -> 0.14 -> 0.09  -Echo - EF 34%, moderate pericardial effusion  -I<O, daily weights  -BP: 153/83 (146/83 - 174/92)  -HF team saw pt 7/6 - cleared to f/u with cardiology for ischemic w/u / cath  -per HF team, cont metoprolol succinate 25mg qd, cont hydralazine 25mg TID, and cont isosorbide 20mg TID.  -awaiting f/u by Dr. Finn for ischemic w/u poss cath    #LENNY on CKD  -Cr 2.4->2.7->2.7->2.5->2.1 (Baseline 1.7 3/2021)  -cont to hold Lasix per nephro  -FeNa = 2.8%, FeUrea 40.2% => likely intrinsic  -UA 7/3 significant for moderate blood, 300 protein, protein/Cr ratio 8.1  -US KUB - no hydro b/l, right renal cysts  -cont to monitor I&O for oliguria/anuria  -f/u SPEP, UPEP, SIF, UIF, light chains, iron studies, MARLIN, C3, C4    #HLD  -cont atorvastatin    #DM2 w/ peripheral neuropathy  -FS: 179, 122, 162, 158  -cont Lantus, ISS  -on lanus, metformin-pioglitazone at home  -hold pioglitazone now and on d/c - can contribute to CHF exacerbation  -on gabapentin 200mg BID  -duplex negative  -arterial duplex negative    #Glaucoma  -cont eye drops    #HTN      PT/REHAB   ACTIVITY: Activity - Increase As Tolerated  Activity - Bedrest    DVT PPX: enoxaparin Injectable    GI PPX:  Not indicated  DIET: Diet, DASH/TLC        CODE: Full code   Disposition: from home;   Pending:   ELLIOT MENDOZA MRN#: [id number]  CODE STATUS: FULL CODE     SUBJECTIVE   Chief complaint: acute CHF    Currently admitted to medicine with the primary diagnosis of NEW ONSET OF CONGESTIVE HEART FAILURE ...      Today is hospital day 7d,   INTERVAL HPI/OVERNIGHT EVENTS: No acute events overnight    This morning, he is laying in bed comfortably. Denies chest pain, shortness of breath, abdominal pain, nausea, vomiting or changes in bowel habits. He has no complaints today.     Urinating and stooling appropriately.    Present Today:           Cain Catheter (x)No/ ()Yes?   Indication:             Central Line (x)No/ ()Yes?  Indication:          IV Fluids (x)No/ ()Yes?   Indication:     OBJECTIVE  PAST MEDICAL & SURGICAL HISTORY  HTN (hypertension)    HLD (hyperlipidemia)    DM (diabetes mellitus)    Glaucoma    No significant past surgical history      ALLERGIES:  No Known Allergies    HOME MEDICATIONS:  Home Medications:  atorvastatin 40 mg oral tablet: 1 tab(s) orally once a day (02 Jul 2021 04:46)  BASAGLAR 100 UNIT/ML KWIKPEN:  (02 Jul 2021 04:46)  brimonidine 0.2% ophthalmic solution: 1 drop(s) to each affected eye 2 times a day (02 Jul 2021 04:46)  dorzolamide-timolol 2%-0.5% preservative-free ophthalmic solution: 1 drop(s) to each affected eye 2 times a day (02 Jul 2021 04:46)  furosemide 20 mg oral tablet: 1 tab(s) orally once a day (02 Jul 2021 04:46)  latanoprost 0.005% ophthalmic solution: 1 drop(s) to each affected eye once a day (in the evening) (02 Jul 2021 04:46)  lisinopril 20 mg oral tablet: 1 tab(s) orally once a day (02 Jul 2021 04:46)  pioglitazone-metformin 15 mg-850 mg oral tablet: 1 tab(s) orally 2 times a day (02 Jul 2021 04:46)  Rhopressa 0.02% ophthalmic solution: 1 drop(s) to each affected eye once a day (in the evening) (02 Jul 2021 04:46)    MEDICATIONS:  STANDING MEDICATIONS  MEDICATIONS  (STANDING):  atorvastatin 40 milliGRAM(s) Oral at bedtime  brimonidine 0.2% Ophthalmic Solution 1 Drop(s) Both EYES two times a day  chlorhexidine 4% Liquid 1 Application(s) Topical <User Schedule>  dextrose 40% Gel 15 Gram(s) Oral once  dextrose 5%. 1000 milliLiter(s) (50 mL/Hr) IV Continuous <Continuous>  dextrose 5%. 1000 milliLiter(s) (100 mL/Hr) IV Continuous <Continuous>  dextrose 50% Injectable 25 Gram(s) IV Push once  dextrose 50% Injectable 12.5 Gram(s) IV Push once  dextrose 50% Injectable 25 Gram(s) IV Push once  dorzolamide 2% Ophthalmic Solution 1 Drop(s) Both EYES three times a day  enoxaparin Injectable 40 milliGRAM(s) SubCutaneous daily  gabapentin 200 milliGRAM(s) Oral two times a day  glucagon  Injectable 1 milliGRAM(s) IntraMuscular once  hydrALAZINE 25 milliGRAM(s) Oral three times a day  insulin glargine Injectable (LANTUS) 8 Unit(s) SubCutaneous every morning  insulin lispro (ADMELOG) corrective regimen sliding scale   SubCutaneous three times a day before meals  insulin lispro Injectable (ADMELOG) 3 Unit(s) SubCutaneous three times a day before meals  isosorbide   dinitrate Tablet (ISORDIL) 20 milliGRAM(s) Oral three times a day  latanoprost 0.005% Ophthalmic Solution 1 Drop(s) Both EYES at bedtime  metoprolol succinate ER 25 milliGRAM(s) Oral daily  timolol 0.5% Solution 1 Drop(s) Both EYES two times a day    PRN MEDICATIONS  MEDICATIONS  (PRN):      VITAL SIGNS  T(F): 98.1 (07-09 @ 13:02), Max: 99.3 (07-08 @ 22:12)  HR: 76 (07-09 @ 13:02) (73 - 79)  BP: 153/83 (07-09 @ 13:02) (146/83 - 174/92)  RR: 18 (07-09 @ 13:02) (18 - 18)  SpO2: 98% (07-08 @ 23:57) (98% - 98%)    LABS:                        9.4    9.93  )-----------( 206      ( 09 Jul 2021 05:31 )             31.9     07-09    140  |  108  |  45<H>  ----------------------------<  140<H>  4.8   |  24  |  2.1<H>    Ca    7.7<L>      09 Jul 2021 05:31  Mg     2.0     07-09    TPro  5.0<L>  /  Alb  2.6<L>  /  TBili  0.4  /  DBili  x   /  AST  28  /  ALT  32  /  AlkPhos  117<H>  07-09      RADIOLOGY:  CXR 7/6 - Cardiomegaly. Low lung volumes leads to magnification of the pulmonary interstitium.  LE Art duplex 7/3 - Moderate atherosclerotic disease in bilateral lower extremities without evidence of hemodynamically significant stenoses or occlusions.  LE venous duplex 7/2 - No evidence of deep venous thrombosis in either lower extremity.    ECHO:  7/2 - 1. Moderately decreased global left ventricular systolic function.   2. Multiple left ventricular regional wall motion abnormalities exist. See wall motion findings.   3. LV Ejection Fraction by Garcia's Method with a biplane EF of 34 %.   4. Moderately increased LV wall thickness.   5. Normal left ventricular internal cavity size.   6. Mild to moderately enlarged left atrium.   7. Normal right atrial size.   8. Moderate pericardial effusion.   9. Mild to moderate mitral valve regurgitation.  10. Structurally normal mitral valve, with normal leaflet excursion.  11. Moderate tricuspid regurgitation.  12. Mild aortic regurgitation.  13. Normal trileaflet aortic valve with normal opening.  14. Mild pulmonic valve regurgitation.  15. Estimated pulmonary artery systolic pressure is 52.0 mmHg assuming a right atrial pressure of 10 mmHg, which is consistent with moderate pulmonary hypertension.      PHYSICAL EXAM:  General: Comfortably laying on the hospital bed. NAD. AAOx3.  HEENT: Atraumatic. Normocephalic. EOMI. PERRL. Conjunctiva and sclera clear.  Cardiac: Normal S1, S2. RRR. No murmurs, rubs, or gallops.  Pulm: CTA b/l no wheezes, rhonchi, or rales.  GI: Soft, nontender, nondistended. BSx4q  Ext: 2+ peripheral pulses. Moving all 4 extremities. No edema, cyanosis.  Neuro: Grossly intact  Skin: No lesions or rashes

## 2021-07-09 NOTE — PROGRESS NOTE ADULT - ASSESSMENT
55 y/o man with PMH of HTN, CKD from 2020, DM type 2 on insulin with peripheral neuropathy and Glaucoma presented to the ED c/o worsening LE swelling and pain for the past 1 month.    1. Newly diagnosed acute HFrEF  - moderate pericardial effusion and pulm HTN  - was on IV lasix for diuresis and then held for rising Cr  - continue to hold diuretic for now per renal  - remains in negative balance  - volume status improved but still not euvolemic  - heart failure f/u  - continue metoprolol, hydralazine, isosorbide  - awaiting cardio f/u for ischemic workup - Cr close to baseline now  - per renal, pt at moderate-high risk for ROSENDO - HOLD diuretics, no IVF, benefits outweigh the risk of IV contrast, cont to monitor BMP daily  - daily wts, I's and O's, low sodium diet    2. HTN - uncontrolled - increase hydralazine to 50mg tid if remains elevated  adding CCB may worsen edema    3. LENNY over CKD 3 - nonoliguric  - CKD progressed over the past year  - nephrotic range proteinuria now   - Cr rising with diuresis so lasix was held - now trending down  - no hydronephrosis on US  - A1C 5.9 so DM seems well controlled as outpt now but pt with neuropathy  - likely has retinopathy also  - renal consult appreciated - proteinuria likely from DM but will work up other causes  - f/u labs per renal note  - daily BMP and monitor urine output    4. DM type 2 with peripheral neuropathy   - On Lantus and Metformin-Pioglitazone at home  -  A1c 5.8  - continue insulin inpt - lantus dose decreased and FS stable  - discontinue pioglitazone on discharge - metformin will also need to be stopped if renal function does not improve by discharge  -on gabapentin 200 mg BID renally adjusted  - duplex negative  - arterial duplex with moderate atherosclerotic disease    5. Glaucoma  - on Eye drops    6. DVT prophylaxis - lovenox        PROGRESS NOTE HANDOFF    Pending: cardio f/u re: ischemic workup, BMP in AM, proteinuria workup    pt aware of plan of care    Disposition: home

## 2021-07-09 NOTE — PROGRESS NOTE ADULT - ASSESSMENT
ASSESSMENT & PLAN  HPI: 56M w/ PMHx HTN, HLD, T2DM on insulin, Glaucoma presents to the Ed c/o worsening LE swelling and pain for the past 1 month. Endorses SOB on exertion, along w/ increased abdominal girth. Denies orthopnea, PND, wheeze, CP, diaphoresis. No cardiac Hx. Seen by HF team and cardiology.    #New Onset Acute HFrEF exacerbation  #HFrEF 2/2 uncontrolled HTN  #Moderate pericardial effusion  #Uncontrolled HTN  -no prior cardiac hx  -CXR - cardiomegaly and blunting of CP angles  -BNP 98691 on admission  -trops 0.10 -> 0.11 -> 0.11 -> 0.14 -> 0.09  -Echo - EF 34%, moderate pericardial effusion  -I<O, daily weights  -BP: 153/83 (146/83 - 174/92)  -HF team saw pt 7/6 - cleared to f/u with cardiology for ischemic w/u / cath  -per HF team, cont metoprolol succinate 25mg qd, cont hydralazine 25mg TID, and cont isosorbide 20mg TID.  -awaiting f/u by Dr. Finn for ischemic w/u poss cath    #LENNY on CKD  -Cr 2.4->2.7->2.7->2.5->2.1 (Baseline 1.7 3/2021)  -cont to hold Lasix per nephro  -FeNa = 2.8%, FeUrea 40.2% => likely intrinsic  -UA 7/3 significant for moderate blood, 300 protein, protein/Cr ratio 8.1  -US KUB - no hydro b/l, right renal cysts  -cont to monitor I&O for oliguria/anuria  -f/u SPEP, UPEP, SIF, UIF, light chains, iron studies, MARLIN, C3, C4    #HLD  -cont atorvastatin    #DM2 w/ peripheral neuropathy  -FS: 179, 122, 162, 158  -cont Lantus, ISS  -on lantus, metformin-pioglitazone at home  -hold pioglitazone now and on d/c - can contribute to CHF exacerbation  -on gabapentin 200mg BID  -duplex negative  -arterial duplex negative    #Glaucoma  -cont eye drops    PT/REHAB: 50ft x2 w/o assistance with PT  ACTIVITY: Activity - Increase As Tolerated  DVT PPX: enoxaparin  GI PPX:  Not indicated  DIET: Diet, DASH/TLC  CODE: Full code  Disposition: from home;   Pending: Dr. Orozco for ischemic w/u

## 2021-07-09 NOTE — PROGRESS NOTE ADULT - ASSESSMENT
56M w/ PMHx HTN, HLD, T2DM on insulin x>10 years, retinopathy, Glaucoma presents to the Ed c/o worsening LE swelling and pain for the past 1 month. Endorses SOB on exertion, along w/ increased abdominal girth.  Found to have:  #New Onset Acute HFrEF exacerbation  #HFrEF   #Moderate pericardial effusion  #Uncontrolled HTN  #LENNY vs CKD    #LENNY on CKD with PRoteinuria >300  -Cr 2.1 <- 2.5 < -2.7  (Baseline 1.7 in 3/2021)  -cont to hold Lasix  - Proteinuria likely due to Diabetic nephropathy - needs serology for lupus and dysproteinemia  -FeNa = 2.8%, FeUrea 40.2% c/w intrinsic  -UA 7/3 significant for moderate blood, 300 protein, protein/Cr ratio 8.1  -US kidneys bladder - no hydro b/l, right renal cysts  -cont to monitor I&O for oliguria/anuria  -obtain SPEP, UPEP, SIF and UIF,  light chains in serum  , iron studies, MARLIN, C3, C4  - needs cardiac cath to r/o ischemic heart disease - creat is close to baseline  pt is at moderate-high risk for ROSENDO - HOLD diuretics, no IVF, benefits outweigh the risk of IV contrast, cont to monitor BMP daily    HFrEF-no prior cardiac hx, high BNP  -Echo - EF 34%, moderate pericardial effusion - to be monitored    HTN - BP better controlled on Hydralazine and beta blocker  #DM2 w/ peripheral neuropathy and likely retinopathy  -on lantus now

## 2021-07-10 LAB
ALBUMIN SERPL ELPH-MCNC: 2.9 G/DL — LOW (ref 3.5–5.2)
ALP SERPL-CCNC: 118 U/L — HIGH (ref 30–115)
ALT FLD-CCNC: 37 U/L — SIGNIFICANT CHANGE UP (ref 0–41)
ANION GAP SERPL CALC-SCNC: 7 MMOL/L — SIGNIFICANT CHANGE UP (ref 7–14)
AST SERPL-CCNC: 32 U/L — SIGNIFICANT CHANGE UP (ref 0–41)
BASOPHILS # BLD AUTO: 0.02 K/UL — SIGNIFICANT CHANGE UP (ref 0–0.2)
BASOPHILS NFR BLD AUTO: 0.2 % — SIGNIFICANT CHANGE UP (ref 0–1)
BILIRUB SERPL-MCNC: 0.5 MG/DL — SIGNIFICANT CHANGE UP (ref 0.2–1.2)
BUN SERPL-MCNC: 42 MG/DL — HIGH (ref 10–20)
CALCIUM SERPL-MCNC: 8.1 MG/DL — LOW (ref 8.5–10.1)
CHLORIDE SERPL-SCNC: 111 MMOL/L — HIGH (ref 98–110)
CO2 SERPL-SCNC: 25 MMOL/L — SIGNIFICANT CHANGE UP (ref 17–32)
CREAT SERPL-MCNC: 2.1 MG/DL — HIGH (ref 0.7–1.5)
EOSINOPHIL # BLD AUTO: 0.61 K/UL — SIGNIFICANT CHANGE UP (ref 0–0.7)
EOSINOPHIL NFR BLD AUTO: 7.4 % — SIGNIFICANT CHANGE UP (ref 0–8)
FOLATE SERPL-MCNC: 3 NG/ML — LOW
GLUCOSE BLDC GLUCOMTR-MCNC: 133 MG/DL — HIGH (ref 70–99)
GLUCOSE BLDC GLUCOMTR-MCNC: 235 MG/DL — HIGH (ref 70–99)
GLUCOSE BLDC GLUCOMTR-MCNC: 250 MG/DL — HIGH (ref 70–99)
GLUCOSE BLDC GLUCOMTR-MCNC: 98 MG/DL — SIGNIFICANT CHANGE UP (ref 70–99)
GLUCOSE SERPL-MCNC: 122 MG/DL — HIGH (ref 70–99)
HCT VFR BLD CALC: 34 % — LOW (ref 42–52)
HGB BLD-MCNC: 9.8 G/DL — LOW (ref 14–18)
IMM GRANULOCYTES NFR BLD AUTO: 0.4 % — HIGH (ref 0.1–0.3)
LYMPHOCYTES # BLD AUTO: 1.33 K/UL — SIGNIFICANT CHANGE UP (ref 1.2–3.4)
LYMPHOCYTES # BLD AUTO: 16.1 % — LOW (ref 20.5–51.1)
MAGNESIUM SERPL-MCNC: 2 MG/DL — SIGNIFICANT CHANGE UP (ref 1.8–2.4)
MCHC RBC-ENTMCNC: 23.2 PG — LOW (ref 27–31)
MCHC RBC-ENTMCNC: 28.8 G/DL — LOW (ref 32–37)
MCV RBC AUTO: 80.4 FL — SIGNIFICANT CHANGE UP (ref 80–94)
MONOCYTES # BLD AUTO: 0.64 K/UL — HIGH (ref 0.1–0.6)
MONOCYTES NFR BLD AUTO: 7.7 % — SIGNIFICANT CHANGE UP (ref 1.7–9.3)
NEUTROPHILS # BLD AUTO: 5.63 K/UL — SIGNIFICANT CHANGE UP (ref 1.4–6.5)
NEUTROPHILS NFR BLD AUTO: 68.2 % — SIGNIFICANT CHANGE UP (ref 42.2–75.2)
NRBC # BLD: 0 /100 WBCS — SIGNIFICANT CHANGE UP (ref 0–0)
PLATELET # BLD AUTO: 201 K/UL — SIGNIFICANT CHANGE UP (ref 130–400)
POTASSIUM SERPL-MCNC: 4.9 MMOL/L — SIGNIFICANT CHANGE UP (ref 3.5–5)
POTASSIUM SERPL-SCNC: 4.9 MMOL/L — SIGNIFICANT CHANGE UP (ref 3.5–5)
PROT SERPL-MCNC: 5.2 G/DL — LOW (ref 6–8)
RBC # BLD: 4.23 M/UL — LOW (ref 4.7–6.1)
RBC # FLD: 14.2 % — SIGNIFICANT CHANGE UP (ref 11.5–14.5)
SODIUM SERPL-SCNC: 143 MMOL/L — SIGNIFICANT CHANGE UP (ref 135–146)
VIT B12 SERPL-MCNC: 544 PG/ML — SIGNIFICANT CHANGE UP (ref 232–1245)
WBC # BLD: 8.26 K/UL — SIGNIFICANT CHANGE UP (ref 4.8–10.8)
WBC # FLD AUTO: 8.26 K/UL — SIGNIFICANT CHANGE UP (ref 4.8–10.8)

## 2021-07-10 PROCEDURE — 99233 SBSQ HOSP IP/OBS HIGH 50: CPT

## 2021-07-10 RX ADMIN — Medication 25 MILLIGRAM(S): at 05:47

## 2021-07-10 RX ADMIN — GABAPENTIN 200 MILLIGRAM(S): 400 CAPSULE ORAL at 17:16

## 2021-07-10 RX ADMIN — BRIMONIDINE TARTRATE 1 DROP(S): 2 SOLUTION/ DROPS OPHTHALMIC at 17:16

## 2021-07-10 RX ADMIN — Medication 3 UNIT(S): at 12:10

## 2021-07-10 RX ADMIN — DORZOLAMIDE HYDROCHLORIDE 1 DROP(S): 20 SOLUTION/ DROPS OPHTHALMIC at 13:29

## 2021-07-10 RX ADMIN — Medication 1 DROP(S): at 17:17

## 2021-07-10 RX ADMIN — ISOSORBIDE DINITRATE 20 MILLIGRAM(S): 5 TABLET ORAL at 11:21

## 2021-07-10 RX ADMIN — INSULIN GLARGINE 8 UNIT(S): 100 INJECTION, SOLUTION SUBCUTANEOUS at 07:58

## 2021-07-10 RX ADMIN — CHLORHEXIDINE GLUCONATE 1 APPLICATION(S): 213 SOLUTION TOPICAL at 05:48

## 2021-07-10 RX ADMIN — BRIMONIDINE TARTRATE 1 DROP(S): 2 SOLUTION/ DROPS OPHTHALMIC at 05:47

## 2021-07-10 RX ADMIN — LATANOPROST 1 DROP(S): 0.05 SOLUTION/ DROPS OPHTHALMIC; TOPICAL at 22:15

## 2021-07-10 RX ADMIN — ATORVASTATIN CALCIUM 40 MILLIGRAM(S): 80 TABLET, FILM COATED ORAL at 22:15

## 2021-07-10 RX ADMIN — ENOXAPARIN SODIUM 40 MILLIGRAM(S): 100 INJECTION SUBCUTANEOUS at 11:21

## 2021-07-10 RX ADMIN — Medication 1 DROP(S): at 05:48

## 2021-07-10 RX ADMIN — DORZOLAMIDE HYDROCHLORIDE 1 DROP(S): 20 SOLUTION/ DROPS OPHTHALMIC at 05:48

## 2021-07-10 RX ADMIN — ISOSORBIDE DINITRATE 20 MILLIGRAM(S): 5 TABLET ORAL at 17:15

## 2021-07-10 RX ADMIN — Medication 3 UNIT(S): at 07:58

## 2021-07-10 RX ADMIN — GABAPENTIN 200 MILLIGRAM(S): 400 CAPSULE ORAL at 05:47

## 2021-07-10 RX ADMIN — Medication 25 MILLIGRAM(S): at 13:28

## 2021-07-10 RX ADMIN — Medication 25 MILLIGRAM(S): at 22:15

## 2021-07-10 RX ADMIN — Medication 2: at 12:09

## 2021-07-10 RX ADMIN — ISOSORBIDE DINITRATE 20 MILLIGRAM(S): 5 TABLET ORAL at 05:47

## 2021-07-10 RX ADMIN — DORZOLAMIDE HYDROCHLORIDE 1 DROP(S): 20 SOLUTION/ DROPS OPHTHALMIC at 22:15

## 2021-07-10 NOTE — PROGRESS NOTE ADULT - SUBJECTIVE AND OBJECTIVE BOX
seen and examined   no distress   24 h events noted         PAST HISTORY  --------------------------------------------------------------------------------  No significant changes to PMH, PSH, FHx, SHx, unless otherwise noted    ALLERGIES & MEDICATIONS  --------------------------------------------------------------------------------  Allergies    No Known Allergies    Intolerances      Standing Inpatient Medications  atorvastatin 40 milliGRAM(s) Oral at bedtime  brimonidine 0.2% Ophthalmic Solution 1 Drop(s) Both EYES two times a day  chlorhexidine 4% Liquid 1 Application(s) Topical <User Schedule>  dextrose 40% Gel 15 Gram(s) Oral once  dextrose 5%. 1000 milliLiter(s) IV Continuous <Continuous>  dextrose 5%. 1000 milliLiter(s) IV Continuous <Continuous>  dextrose 50% Injectable 25 Gram(s) IV Push once  dextrose 50% Injectable 12.5 Gram(s) IV Push once  dextrose 50% Injectable 25 Gram(s) IV Push once  dorzolamide 2% Ophthalmic Solution 1 Drop(s) Both EYES three times a day  enoxaparin Injectable 40 milliGRAM(s) SubCutaneous daily  gabapentin 200 milliGRAM(s) Oral two times a day  glucagon  Injectable 1 milliGRAM(s) IntraMuscular once  hydrALAZINE 25 milliGRAM(s) Oral three times a day  insulin glargine Injectable (LANTUS) 8 Unit(s) SubCutaneous every morning  insulin lispro (ADMELOG) corrective regimen sliding scale   SubCutaneous three times a day before meals  insulin lispro Injectable (ADMELOG) 3 Unit(s) SubCutaneous three times a day before meals  isosorbide   dinitrate Tablet (ISORDIL) 20 milliGRAM(s) Oral three times a day  latanoprost 0.005% Ophthalmic Solution 1 Drop(s) Both EYES at bedtime  metoprolol succinate ER 25 milliGRAM(s) Oral daily  timolol 0.5% Solution 1 Drop(s) Both EYES two times a day          VITALS/PHYSICAL EXAM  --------------------------------------------------------------------------------  T(C): 36.3 (07-10-21 @ 05:00), Max: 36.7 (07-09-21 @ 13:02)  HR: 73 (07-10-21 @ 05:00) (73 - 76)  BP: 159/90 (07-10-21 @ 05:00) (134/74 - 159/90)  RR: 16 (07-10-21 @ 05:00) (16 - 18)  SpO2: 96% (07-10-21 @ 05:00) (96% - 96%)  Wt(kg): --        07-09-21 @ 07:01  -  07-10-21 @ 07:00  --------------------------------------------------------  IN: 0 mL / OUT: 1400 mL / NET: -1400 mL      Physical Exam:  	Gen: NAD  	Pulm:  decrease BS B/L  	CV: S1S2; no rub  	Abd: +distended      LABS/STUDIES  --------------------------------------------------------------------------------              9.8    8.26  >-----------<  201      [07-10-21 @ 05:47]              34.0     143  |  111  |  42  ----------------------------<  122      [07-10-21 @ 05:47]  4.9   |  25  |  2.1        Ca     8.1     [07-10-21 @ 05:47]      Mg     2.0     [07-10-21 @ 05:47]    TPro  5.2  /  Alb  2.9  /  TBili  0.5  /  DBili  x   /  AST  32  /  ALT  37  /  AlkPhos  118  [07-10-21 @ 05:47]          Creatinine Trend:  SCr 2.1 [07-10 @ 05:47]  SCr 2.1 [07-09 @ 05:31]  SCr 2.5 [07-08 @ 06:42]  SCr 2.7 [07-07 @ 04:30]  SCr 2.7 [07-06 @ 07:25]    Urinalysis - [07-03-21 @ 16:34]      Color Light Yellow / Appearance Clear / SG 1.013 / pH 7.0      Gluc 500 mg/dL / Ketone Negative  / Bili Negative / Urobili <2 mg/dL       Blood Moderate / Protein 300 mg/dL / Leuk Est Negative / Nitrite Negative      RBC 8 / WBC 2 / Hyaline 1 / Gran  / Sq Epi  / Non Sq Epi 1 / Bacteria Negative    Urine Creatinine 58      [07-03-21 @ 16:34]  Urine Protein 470      [07-03-21 @ 16:34]  Urine Sodium 103.0      [07-03-21 @ 16:34]  Urine Urea Nitrogen 389      [07-03-21 @ 16:34]  Urine Potassium 28      [07-03-21 @ 16:34]    Iron 43, TIBC 198, %sat 22      [07-02-21 @ 11:37]  Ferritin 255      [07-02-21 @ 11:37]    HBsAg Nonreact      [07-02-21 @ 11:37]  HCV 0.10, Nonreact      [07-02-21 @ 11:37]    C3 Complement 108      [07-09-21 @ 05:31]  C4 Complement 23      [07-09-21 @ 05:31]  Free Light Chains: kappa 9.68, lambda 4.91, ratio = 1.97      [07-08 @ 15:33]

## 2021-07-10 NOTE — PROGRESS NOTE ADULT - SUBJECTIVE AND OBJECTIVE BOX
GILLIAN MENDOZA  56y Male    INTERVAL HPI/OVERNIGHT EVENTS:    No complaints. Lying flat in bed  denies SOB, chest or abdominal pain, N/V    T(F): 97.4 (07-10-21 @ 05:00), Max: 98.1 (21 @ 13:02)  HR: 73 (07-10-21 @ 05:00) (73 - 76)  BP: 159/90 (07-10-21 @ 05:00) (134/74 - 159/90)  RR: 16 (07-10-21 @ 05:00) (16 - 18)  SpO2: 96% (07-10-21 @ 05:00) (96% - 96%) on RA    I&O's Summary    2021 07:01  -  10 Jul 2021 07:00  --------------------------------------------------------  IN: 0 mL / OUT: 1400 mL / NET: -1400 mL      Daily Weight in k.2 (10 Jul 2021 05:00)    CAPILLARY BLOOD GLUCOSE      POCT Blood Glucose.: 235 mg/dL (10 Jul 2021 11:21)  POCT Blood Glucose.: 133 mg/dL (10 Jul 2021 07:54)  POCT Blood Glucose.: 178 mg/dL (2021 21:13)  POCT Blood Glucose.: 93 mg/dL (2021 16:20)        PHYSICAL EXAM:  GENERAL: NAD  HEAD:  Normocephalic  EYES:  conjunctiva and sclera clear  ENMT: Moist mucous membranes  NERVOUS SYSTEM:  Alert, awake, Good concentration  CHEST/LUNG: decreased BS at left base otherwise CTA  HEART: Regular rate and rhythm  ABDOMEN: Soft, Nontender, Nondistended; Bowel sounds present  EXTREMITIES: No edema    Consultant(s) Notes Reviewed:  [x ] YES  [ ] NO  Care Discussed with Consultants/Other Providers [ x] YES  [ ] NO    MEDICATIONS  (STANDING):  atorvastatin 40 milliGRAM(s) Oral at bedtime  brimonidine 0.2% Ophthalmic Solution 1 Drop(s) Both EYES two times a day  chlorhexidine 4% Liquid 1 Application(s) Topical <User Schedule>  dextrose 40% Gel 15 Gram(s) Oral once  dextrose 5%. 1000 milliLiter(s) (50 mL/Hr) IV Continuous <Continuous>  dextrose 5%. 1000 milliLiter(s) (100 mL/Hr) IV Continuous <Continuous>  dextrose 50% Injectable 25 Gram(s) IV Push once  dextrose 50% Injectable 12.5 Gram(s) IV Push once  dextrose 50% Injectable 25 Gram(s) IV Push once  dorzolamide 2% Ophthalmic Solution 1 Drop(s) Both EYES three times a day  enoxaparin Injectable 40 milliGRAM(s) SubCutaneous daily  gabapentin 200 milliGRAM(s) Oral two times a day  glucagon  Injectable 1 milliGRAM(s) IntraMuscular once  hydrALAZINE 25 milliGRAM(s) Oral three times a day  insulin glargine Injectable (LANTUS) 8 Unit(s) SubCutaneous every morning  insulin lispro (ADMELOG) corrective regimen sliding scale   SubCutaneous three times a day before meals  insulin lispro Injectable (ADMELOG) 3 Unit(s) SubCutaneous three times a day before meals  isosorbide   dinitrate Tablet (ISORDIL) 20 milliGRAM(s) Oral three times a day  latanoprost 0.005% Ophthalmic Solution 1 Drop(s) Both EYES at bedtime  metoprolol succinate ER 25 milliGRAM(s) Oral daily  timolol 0.5% Solution 1 Drop(s) Both EYES two times a day    MEDICATIONS  (PRN):      Telemetry reviewed by me    LABS:                        9.8    8.26  )-----------( 201      ( 10 Jul 2021 05:47 )             34.0     07-10    143  |  111<H>  |  42<H>  ----------------------------<  122<H>  4.9   |  25  |  2.1<H>    Ca    8.1<L>      10 Jul 2021 05:47  Mg     2.0     07-10    TPro  5.2<L>  /  Alb  2.9<L>  /  TBili  0.5  /  DBili  x   /  AST  32  /  ALT  37  /  AlkPhos  118<H>  07-10                Case discussed with residents and RN on rounds today    Care discussed with pt

## 2021-07-10 NOTE — PROGRESS NOTE ADULT - ASSESSMENT
57 y/o man with PMH of HTN, CKD from 2020, DM type 2 on insulin with peripheral neuropathy and Glaucoma presented to the ED c/o worsening LE swelling and pain for the past 1 month.    1. Newly diagnosed acute HFrEF  - moderate pericardial effusion and pulm HTN  - was on IV lasix for diuresis and then held for rising Cr  - continue to hold diuretic for now per renal  - remains in negative balance  - volume status improved but still not euvolemic  - discussed with heart failure team on 7/9  - continue metoprolol, hydralazine, isosorbide  - case discussed with cardiology attending today - cath tentatively scheduled for Monday as add on - pt may have breakfast on Monday and then NPO for cath  - per renal, pt at moderate-high risk for ROSENDO - HOLD diuretics, no IVF, benefits outweigh the risk of IV contrast, cont to monitor BMP daily  - daily wts, I's and O's, low sodium diet    2. HTN - uncontrolled - increase hydralazine to 50mg tid if remains elevated  adding CCB may worsen edema    3. LENNY over CKD 3 - nonoliguric  - CKD progressed over the past year  - nephrotic range proteinuria now   - Cr rising with diuresis so lasix was held - now trending down  - no hydronephrosis on US  - A1C 5.9 so DM seems well controlled as outpt now but pt with neuropathy  - likely has retinopathy also  - renal consult appreciated - proteinuria likely from DM but will work up other causes  - f/u labs per renal note  - daily BMP and monitor urine output    4. DM type 2 with peripheral neuropathy   - On Lantus and Metformin-Pioglitazone at home  -  A1c 5.8  - continue insulin inpt - lantus dose decreased and FS stable  - discontinue pioglitazone on discharge - metformin will also need to be stopped if renal function does not improve by discharge  -on gabapentin 200 mg BID renally adjusted  - duplex negative  - arterial duplex with moderate atherosclerotic disease    5. Glaucoma  - on Eye drops    6. DVT prophylaxis - lovenox  heparin SQ if worsening renal function        PROGRESS NOTE HANDOFF    Pending:  BMP in AM, proteinuria workup  cardiac cath on Monday    pt aware of plan of care    Disposition: home

## 2021-07-10 NOTE — PROGRESS NOTE ADULT - ASSESSMENT
56M w/ PMHx HTN, HLD, T2DM on insulin x>10 years, retinopathy, Glaucoma presents to the Ed c/o worsening LE swelling and pain for the past 1 month. Endorses SOB on exertion, along w/ increased abdominal girth.    #LENNY on CKD with PRoteinuria >300  -Cr 2.1 <- 2.5 < -2.7  (Baseline 1.7 in 3/2021)  -lasix on hold  - non oliguric   - check ph level   - change enoxaparin to heparin in view of LENNY   - Proteinuria likely due to Diabetic nephropathy - needs serology for lupus and dysproteinemia  -FeNa = 2.8%, FeUrea 40.2% c/w intrinsic  -US KUB - no hydro b/l, right renal cysts  -k/l ratio noted   - needs cardiac cath to r/o ischemic heart disease - creat is close to baseline  pt is at moderate-high risk for ROSENDO - HOLD diuretics, no IVF, benefits outweigh the risk of IV contrast, cont to monitor BMP daily  HFrEF-no prior cardiac hx  -Echo - EF 34%, moderate pericardial effusion  HTN - can increase hydralazine to 50 q 8 , if remains elevated

## 2021-07-11 LAB
ALBUMIN SERPL ELPH-MCNC: 2.8 G/DL — LOW (ref 3.5–5.2)
ALP SERPL-CCNC: 128 U/L — HIGH (ref 30–115)
ALT FLD-CCNC: 36 U/L — SIGNIFICANT CHANGE UP (ref 0–41)
ANION GAP SERPL CALC-SCNC: 8 MMOL/L — SIGNIFICANT CHANGE UP (ref 7–14)
AST SERPL-CCNC: 33 U/L — SIGNIFICANT CHANGE UP (ref 0–41)
BASOPHILS # BLD AUTO: 0.02 K/UL — SIGNIFICANT CHANGE UP (ref 0–0.2)
BASOPHILS NFR BLD AUTO: 0.2 % — SIGNIFICANT CHANGE UP (ref 0–1)
BILIRUB SERPL-MCNC: 0.3 MG/DL — SIGNIFICANT CHANGE UP (ref 0.2–1.2)
BUN SERPL-MCNC: 46 MG/DL — HIGH (ref 10–20)
CALCIUM SERPL-MCNC: 7.9 MG/DL — LOW (ref 8.5–10.1)
CHLORIDE SERPL-SCNC: 110 MMOL/L — SIGNIFICANT CHANGE UP (ref 98–110)
CO2 SERPL-SCNC: 21 MMOL/L — SIGNIFICANT CHANGE UP (ref 17–32)
CREAT SERPL-MCNC: 2.1 MG/DL — HIGH (ref 0.7–1.5)
EOSINOPHIL # BLD AUTO: 0.65 K/UL — SIGNIFICANT CHANGE UP (ref 0–0.7)
EOSINOPHIL NFR BLD AUTO: 7.3 % — SIGNIFICANT CHANGE UP (ref 0–8)
GLUCOSE BLDC GLUCOMTR-MCNC: 143 MG/DL — HIGH (ref 70–99)
GLUCOSE BLDC GLUCOMTR-MCNC: 157 MG/DL — HIGH (ref 70–99)
GLUCOSE BLDC GLUCOMTR-MCNC: 171 MG/DL — HIGH (ref 70–99)
GLUCOSE BLDC GLUCOMTR-MCNC: 94 MG/DL — SIGNIFICANT CHANGE UP (ref 70–99)
GLUCOSE SERPL-MCNC: 151 MG/DL — HIGH (ref 70–99)
HCT VFR BLD CALC: 31.4 % — LOW (ref 42–52)
HGB BLD-MCNC: 9.3 G/DL — LOW (ref 14–18)
IMM GRANULOCYTES NFR BLD AUTO: 0.3 % — SIGNIFICANT CHANGE UP (ref 0.1–0.3)
LYMPHOCYTES # BLD AUTO: 1.21 K/UL — SIGNIFICANT CHANGE UP (ref 1.2–3.4)
LYMPHOCYTES # BLD AUTO: 13.7 % — LOW (ref 20.5–51.1)
MAGNESIUM SERPL-MCNC: 1.9 MG/DL — SIGNIFICANT CHANGE UP (ref 1.8–2.4)
MCHC RBC-ENTMCNC: 24 PG — LOW (ref 27–31)
MCHC RBC-ENTMCNC: 29.6 G/DL — LOW (ref 32–37)
MCV RBC AUTO: 81.1 FL — SIGNIFICANT CHANGE UP (ref 80–94)
MONOCYTES # BLD AUTO: 0.72 K/UL — HIGH (ref 0.1–0.6)
MONOCYTES NFR BLD AUTO: 8.1 % — SIGNIFICANT CHANGE UP (ref 1.7–9.3)
NEUTROPHILS # BLD AUTO: 6.23 K/UL — SIGNIFICANT CHANGE UP (ref 1.4–6.5)
NEUTROPHILS NFR BLD AUTO: 70.4 % — SIGNIFICANT CHANGE UP (ref 42.2–75.2)
NRBC # BLD: 0 /100 WBCS — SIGNIFICANT CHANGE UP (ref 0–0)
PLATELET # BLD AUTO: 180 K/UL — SIGNIFICANT CHANGE UP (ref 130–400)
POTASSIUM SERPL-MCNC: 4.9 MMOL/L — SIGNIFICANT CHANGE UP (ref 3.5–5)
POTASSIUM SERPL-SCNC: 4.9 MMOL/L — SIGNIFICANT CHANGE UP (ref 3.5–5)
PROT SERPL-MCNC: 5 G/DL — LOW (ref 6–8)
RBC # BLD: 3.87 M/UL — LOW (ref 4.7–6.1)
RBC # FLD: 14.2 % — SIGNIFICANT CHANGE UP (ref 11.5–14.5)
SODIUM SERPL-SCNC: 139 MMOL/L — SIGNIFICANT CHANGE UP (ref 135–146)
WBC # BLD: 8.86 K/UL — SIGNIFICANT CHANGE UP (ref 4.8–10.8)
WBC # FLD AUTO: 8.86 K/UL — SIGNIFICANT CHANGE UP (ref 4.8–10.8)

## 2021-07-11 PROCEDURE — 99233 SBSQ HOSP IP/OBS HIGH 50: CPT

## 2021-07-11 RX ORDER — LABETALOL HCL 100 MG
5 TABLET ORAL ONCE
Refills: 0 | Status: COMPLETED | OUTPATIENT
Start: 2021-07-11 | End: 2021-07-11

## 2021-07-11 RX ORDER — HYDRALAZINE HCL 50 MG
50 TABLET ORAL THREE TIMES A DAY
Refills: 0 | Status: DISCONTINUED | OUTPATIENT
Start: 2021-07-11 | End: 2021-07-15

## 2021-07-11 RX ADMIN — Medication 25 MILLIGRAM(S): at 05:34

## 2021-07-11 RX ADMIN — LATANOPROST 1 DROP(S): 0.05 SOLUTION/ DROPS OPHTHALMIC; TOPICAL at 22:12

## 2021-07-11 RX ADMIN — DORZOLAMIDE HYDROCHLORIDE 1 DROP(S): 20 SOLUTION/ DROPS OPHTHALMIC at 22:12

## 2021-07-11 RX ADMIN — Medication 50 MILLIGRAM(S): at 22:12

## 2021-07-11 RX ADMIN — ISOSORBIDE DINITRATE 20 MILLIGRAM(S): 5 TABLET ORAL at 05:32

## 2021-07-11 RX ADMIN — INSULIN GLARGINE 8 UNIT(S): 100 INJECTION, SOLUTION SUBCUTANEOUS at 08:00

## 2021-07-11 RX ADMIN — BRIMONIDINE TARTRATE 1 DROP(S): 2 SOLUTION/ DROPS OPHTHALMIC at 17:14

## 2021-07-11 RX ADMIN — Medication 1: at 12:26

## 2021-07-11 RX ADMIN — DORZOLAMIDE HYDROCHLORIDE 1 DROP(S): 20 SOLUTION/ DROPS OPHTHALMIC at 05:31

## 2021-07-11 RX ADMIN — Medication 3 UNIT(S): at 12:26

## 2021-07-11 RX ADMIN — ISOSORBIDE DINITRATE 20 MILLIGRAM(S): 5 TABLET ORAL at 17:15

## 2021-07-11 RX ADMIN — ENOXAPARIN SODIUM 40 MILLIGRAM(S): 100 INJECTION SUBCUTANEOUS at 11:42

## 2021-07-11 RX ADMIN — ATORVASTATIN CALCIUM 40 MILLIGRAM(S): 80 TABLET, FILM COATED ORAL at 22:11

## 2021-07-11 RX ADMIN — DORZOLAMIDE HYDROCHLORIDE 1 DROP(S): 20 SOLUTION/ DROPS OPHTHALMIC at 13:07

## 2021-07-11 RX ADMIN — Medication 1 DROP(S): at 17:15

## 2021-07-11 RX ADMIN — ISOSORBIDE DINITRATE 20 MILLIGRAM(S): 5 TABLET ORAL at 11:41

## 2021-07-11 RX ADMIN — Medication 3 UNIT(S): at 07:51

## 2021-07-11 RX ADMIN — BRIMONIDINE TARTRATE 1 DROP(S): 2 SOLUTION/ DROPS OPHTHALMIC at 05:31

## 2021-07-11 RX ADMIN — Medication 25 MILLIGRAM(S): at 05:31

## 2021-07-11 RX ADMIN — Medication 1 DROP(S): at 05:31

## 2021-07-11 RX ADMIN — GABAPENTIN 200 MILLIGRAM(S): 400 CAPSULE ORAL at 17:14

## 2021-07-11 RX ADMIN — Medication 50 MILLIGRAM(S): at 13:07

## 2021-07-11 RX ADMIN — Medication 5 MILLIGRAM(S): at 17:57

## 2021-07-11 RX ADMIN — GABAPENTIN 200 MILLIGRAM(S): 400 CAPSULE ORAL at 05:31

## 2021-07-11 NOTE — PROGRESS NOTE ADULT - SUBJECTIVE AND OBJECTIVE BOX
[facility] FOLLOW UP NOTE  --------------------------------------------------------------------------------  Chief Complaint:    24 hour events/subjective:        PAST HISTORY  --------------------------------------------------------------------------------  No significant changes to PMH, PSH, FHx, SHx, unless otherwise noted    ALLERGIES & MEDICATIONS  --------------------------------------------------------------------------------  Allergies    No Known Allergies    Intolerances      Standing Inpatient Medications  atorvastatin 40 milliGRAM(s) Oral at bedtime  brimonidine 0.2% Ophthalmic Solution 1 Drop(s) Both EYES two times a day  chlorhexidine 4% Liquid 1 Application(s) Topical <User Schedule>  dextrose 40% Gel 15 Gram(s) Oral once  dextrose 5%. 1000 milliLiter(s) IV Continuous <Continuous>  dextrose 5%. 1000 milliLiter(s) IV Continuous <Continuous>  dextrose 50% Injectable 25 Gram(s) IV Push once  dextrose 50% Injectable 12.5 Gram(s) IV Push once  dextrose 50% Injectable 25 Gram(s) IV Push once  dorzolamide 2% Ophthalmic Solution 1 Drop(s) Both EYES three times a day  enoxaparin Injectable 40 milliGRAM(s) SubCutaneous daily  gabapentin 200 milliGRAM(s) Oral two times a day  glucagon  Injectable 1 milliGRAM(s) IntraMuscular once  hydrALAZINE 25 milliGRAM(s) Oral three times a day  insulin glargine Injectable (LANTUS) 8 Unit(s) SubCutaneous every morning  insulin lispro (ADMELOG) corrective regimen sliding scale   SubCutaneous three times a day before meals  insulin lispro Injectable (ADMELOG) 3 Unit(s) SubCutaneous three times a day before meals  isosorbide   dinitrate Tablet (ISORDIL) 20 milliGRAM(s) Oral three times a day  latanoprost 0.005% Ophthalmic Solution 1 Drop(s) Both EYES at bedtime  metoprolol succinate ER 25 milliGRAM(s) Oral daily  timolol 0.5% Solution 1 Drop(s) Both EYES two times a day    PRN Inpatient Medications      REVIEW OF SYSTEMS  --------------------------------------------------------------------------------  Gen: No weight changes, fatigue, fevers/chills, weakness  Skin: No rashes  Head/Eyes/Ears/Mouth: No headache; Normal hearing; Normal vision w/o blurriness; No sinus pain/discomfort, sore throat  Respiratory: No dyspnea, cough, wheezing, hemoptysis  CV: No chest pain, PND, orthopnea  GI: No abdominal pain, diarrhea, constipation, nausea, vomiting, melena, hematochezia  : No increased frequency, dysuria, hematuria, nocturia  MSK: No joint pain/swelling; no back pain; no edema  Neuro: No dizziness/lightheadedness, weakness, seizures, numbness, tingling  Heme: No easy bruising or bleeding  Endo: No heat/cold intolerance  Psych: No significant nervousness, anxiety, stress, depression    All other systems were reviewed and are negative, except as noted.    VITALS/PHYSICAL EXAM  --------------------------------------------------------------------------------  T(C): 36.4 (07-11-21 @ 05:00), Max: 36.8 (07-10-21 @ 13:35)  HR: 69 (07-11-21 @ 05:00) (69 - 75)  BP: 151/89 (07-11-21 @ 05:00) (142/83 - 167/88)  RR: 16 (07-11-21 @ 05:00) (16 - 18)  SpO2: 97% (07-10-21 @ 19:43) (97% - 97%)  Wt(kg): --        07-10-21 @ 07:01  -  07-11-21 @ 07:00  --------------------------------------------------------  IN: 298 mL / OUT: 600 mL / NET: -302 mL      Physical Exam:  	Gen: NAD, well-appearing  	HEENT: PERRL, supple neck, clear oropharynx  	Pulm: CTA B/L  	CV: RRR, S1S2; no rub  	Back: No spinal or CVA tenderness; no sacral edema  	Abd: +BS, soft, nontender/nondistended  	: No suprapubic tenderness  	UE: Warm, FROM, no clubbing, intact strength; no edema; no asterixis  	LE: Warm, FROM, no clubbing, intact strength; no edema  	Neuro: No focal deficits, intact gait  	Psych: Normal affect and mood  	Skin: Warm, without rashes  	Vascular access:    LABS/STUDIES  --------------------------------------------------------------------------------              9.3    8.86  >-----------<  180      [07-11-21 @ 05:29]              31.4     139  |  110  |  46  ----------------------------<  151      [07-11-21 @ 05:29]  4.9   |  21  |  2.1        Ca     7.9     [07-11-21 @ 05:29]      Mg     1.9     [07-11-21 @ 05:29]    TPro  5.0  /  Alb  2.8  /  TBili  0.3  /  DBili  x   /  AST  33  /  ALT  36  /  AlkPhos  128  [07-11-21 @ 05:29]          Creatinine Trend:  SCr 2.1 [07-11 @ 05:29]  SCr 2.1 [07-10 @ 05:47]  SCr 2.1 [07-09 @ 05:31]  SCr 2.5 [07-08 @ 06:42]  SCr 2.7 [07-07 @ 04:30]    Urinalysis - [07-03-21 @ 16:34]      Color Light Yellow / Appearance Clear / SG 1.013 / pH 7.0      Gluc 500 mg/dL / Ketone Negative  / Bili Negative / Urobili <2 mg/dL       Blood Moderate / Protein 300 mg/dL / Leuk Est Negative / Nitrite Negative      RBC 8 / WBC 2 / Hyaline 1 / Gran  / Sq Epi  / Non Sq Epi 1 / Bacteria Negative      Iron 43, TIBC 198, %sat 22      [07-02-21 @ 11:37]  Ferritin 255      [07-02-21 @ 11:37]    HBsAg Nonreact      [07-02-21 @ 11:37]  HCV 0.10, Nonreact      [07-02-21 @ 11:37]    C3 Complement 108      [07-09-21 @ 05:31]  C4 Complement 23      [07-09-21 @ 05:31]  Free Light Chains: kappa 9.68, lambda 4.91, ratio = 1.97      [07-08 @ 15:33]

## 2021-07-11 NOTE — PROGRESS NOTE ADULT - ASSESSMENT
57 y/o man with PMH of HTN, CKD from 2020, DM type 2 on insulin with peripheral neuropathy and Glaucoma presented to the ED c/o worsening LE swelling and pain for the past 1 month.    1. Newly diagnosed acute HFrEF  - moderate pericardial effusion and pulm HTN  - was on IV lasix for diuresis and then held for rising Cr  - continue to hold diuretic for now per renal  - remains in negative balance  - volume status improved but still not euvolemic  - discussed with heart failure team on 7/9  - continue metoprolol, hydralazine, isosorbide  - case discussed with cardiology attending - cath tentatively scheduled for Monday as add on - pt may have breakfast on Monday and then NPO for cath  - per renal, pt at moderate-high risk for ROSENDO - HOLD diuretics, no IVF, benefits outweigh the risk of IV contrast, cont to monitor BMP daily  - daily wts, I's and O's, low sodium diet    2. HTN - uncontrolled - increased hydralazine to 50mg tid and monitor    3. LENNY over CKD 3 - nonoliguric  - CKD progressed over the past year  - nephrotic range proteinuria now   - Cr rising with diuresis so lasix was held - now trending down and stable at 2.1  - no hydronephrosis on US  - A1C 5.9 so DM seems well controlled as outpt now but pt with neuropathy  - likely has retinopathy also  - renal consult appreciated - proteinuria likely from DM but will work up other causes  - f/u labs per renal note  - daily BMP and monitor urine output    4. DM type 2 with peripheral neuropathy   - On Lantus and Metformin-Pioglitazone at home  -  A1c 5.8  - continue insulin inpt - lantus dose decreased and FS stable  - discontinue pioglitazone on discharge - metformin will also need to be stopped if renal function does not improve by discharge  -on gabapentin 200 mg BID renally adjusted  - duplex negative  - arterial duplex with moderate atherosclerotic disease    5. Glaucoma  - on Eye drops    6. DVT prophylaxis - lovenox  heparin SQ if worsening renal function        PROGRESS NOTE HANDOFF    Pending: BMP in AM, proteinuria workup  cardiac cath on Monday    pt aware of plan of care    Disposition: home

## 2021-07-11 NOTE — PROGRESS NOTE ADULT - SUBJECTIVE AND OBJECTIVE BOX
GILLIAN MENDOZA  56y Male    INTERVAL HPI/OVERNIGHT EVENTS:    No complaints of pain or SOB  ambulating    T(F): 98.3 (21 @ 12:46), Max: 98.3 (07-10-21 @ 13:35)  HR: 80 (21 @ 12:46) (69 - 80)  BP: 188/94 (21 @ 12:46) (142/83 - 188/94)  RR: 18 (21 @ 12:46) (16 - 18)  SpO2: 97% (07-10-21 @ 19:43) (97% - 97%) on RA    I&O's Summary    10 Jul 2021 07:01  -  2021 07:00  --------------------------------------------------------  IN: 298 mL / OUT: 600 mL / NET: -302 mL    2021 07:01  -  2021 12:58  --------------------------------------------------------  IN: 420 mL / OUT: 500 mL / NET: -80 mL      Daily Weight in k (2021 05:00)    CAPILLARY BLOOD GLUCOSE      POCT Blood Glucose.: 171 mg/dL (2021 11:52)  POCT Blood Glucose.: 143 mg/dL (2021 07:33)  POCT Blood Glucose.: 250 mg/dL (10 Jul 2021 21:46)  POCT Blood Glucose.: 98 mg/dL (10 Jul 2021 16:24)        PHYSICAL EXAM:  GENERAL: NAD  HEAD:  Normocephalic  EYES:  conjunctiva and sclera clear  ENMT: Moist mucous membranes  NERVOUS SYSTEM:  Alert, awake, Good concentration  CHEST/LUNG: mildly decreased BS of left base, otherwise CTA  HEART: Regular rate and rhythm  ABDOMEN: Soft, Nontender, Nondistended; Bowel sounds present  EXTREMITIES: No edema    Consultant(s) Notes Reviewed:  [x ] YES  [ ] NO  Care Discussed with Consultants/Other Providers [ x] YES  [ ] NO    MEDICATIONS  (STANDING):  atorvastatin 40 milliGRAM(s) Oral at bedtime  brimonidine 0.2% Ophthalmic Solution 1 Drop(s) Both EYES two times a day  chlorhexidine 4% Liquid 1 Application(s) Topical <User Schedule>  dextrose 40% Gel 15 Gram(s) Oral once  dextrose 5%. 1000 milliLiter(s) (50 mL/Hr) IV Continuous <Continuous>  dextrose 5%. 1000 milliLiter(s) (100 mL/Hr) IV Continuous <Continuous>  dextrose 50% Injectable 25 Gram(s) IV Push once  dextrose 50% Injectable 12.5 Gram(s) IV Push once  dextrose 50% Injectable 25 Gram(s) IV Push once  dorzolamide 2% Ophthalmic Solution 1 Drop(s) Both EYES three times a day  enoxaparin Injectable 40 milliGRAM(s) SubCutaneous daily  gabapentin 200 milliGRAM(s) Oral two times a day  glucagon  Injectable 1 milliGRAM(s) IntraMuscular once  hydrALAZINE 25 milliGRAM(s) Oral three times a day  insulin glargine Injectable (LANTUS) 8 Unit(s) SubCutaneous every morning  insulin lispro (ADMELOG) corrective regimen sliding scale   SubCutaneous three times a day before meals  insulin lispro Injectable (ADMELOG) 3 Unit(s) SubCutaneous three times a day before meals  isosorbide   dinitrate Tablet (ISORDIL) 20 milliGRAM(s) Oral three times a day  latanoprost 0.005% Ophthalmic Solution 1 Drop(s) Both EYES at bedtime  metoprolol succinate ER 25 milliGRAM(s) Oral daily  timolol 0.5% Solution 1 Drop(s) Both EYES two times a day    MEDICATIONS  (PRN):      Telemetry reviewed by me    LABS:                        9.3    8.86  )-----------( 180      ( 2021 05:29 )             31.4     07-11    139  |  110  |  46<H>  ----------------------------<  151<H>  4.9   |  21  |  2.1<H>    Ca    7.9<L>      2021 05:29  Mg     1.9     07-11    TPro  5.0<L>  /  Alb  2.8<L>  /  TBili  0.3  /  DBili  x   /  AST  33  /  ALT  36  /  AlkPhos  128<H>                  Case discussed with RN today    Care discussed with pt

## 2021-07-11 NOTE — PROGRESS NOTE ADULT - ASSESSMENT
LENNY on CKD creatinine stable at 2.1 , bicarb 21 , hemoglobin 9.3 , significant CAD , ECHO noted , /89 , proteinuria probably secondary to Dm , plan for C CATH , risk of dye discussed  with patient vs benefits

## 2021-07-11 NOTE — PROGRESS NOTE ADULT - ASSESSMENT
ASSESSMENT & PLAN  HPI: 56M w/ PMHx HTN, HLD, T2DM on insulin, Glaucoma presents to the Ed c/o worsening LE swelling and pain for the past 1 month. Endorses SOB on exertion, along w/ increased abdominal girth. Denies orthopnea, PND, wheeze, CP, diaphoresis. No cardiac Hx. Seen by HF team and cardiology.    #New Onset Acute HFrEF exacerbation  #HFrEF 2/2 uncontrolled HTN  #Moderate pericardial effusion  #Uncontrolled HTN  -no prior cardiac hx  -CXR - cardiomegaly and blunting of CP angles  -BNP 07927 on admission  -trops 0.10 -> 0.11 -> 0.11 -> 0.14 -> 0.09  -Echo - EF 34%, moderate pericardial effusion  -I<O, daily weights  -BP: 188/94 (142/83 - 188/94)  -HF team saw pt 7/6 - cleared to f/u with cardiology for ischemic w/u / cath  -per HF team, cont metoprolol succinate 25mg qd, cont hydralazine 25mg TID, and cont isosorbide 20mg TID.  -scheduled for cath monday 7/12  -NPO after breakfast Monday AM    #LENNY on CKD  -Cr 2.4->2.7->2.7->2.5->2.1 (Baseline 1.7 3/2021)  -cont to hold Lasix per nephro  -FeNa = 2.8%, FeUrea 40.2% => likely intrinsic  -UA 7/3 significant for moderate blood, 300 protein, protein/Cr ratio 8.1  -US KUB - no hydro b/l, right renal cysts  -cont to monitor I&O for oliguria/anuria  -SPEP - decreased protein, C3 wnl, C4 wnl  -incr kappa, lambda light chains. Ratio 1.97  -f/u UPEP, SIF, UIF, iron studies, MARLIN    #HLD  -cont atorvastatin    #DM2 w/ peripheral neuropathy  -FS: 171, 143, 250, 98  -cont Lantus, ISS  -on lantus, metformin-pioglitazone at home  -hold pioglitazone now and on d/c - can contribute to CHF exacerbation  -on gabapentin 200mg BID  -duplex negative  -arterial duplex negative    #Glaucoma  -cont eye drops    PT/REHAB: 50ft x2 w/o assistance with PT  ACTIVITY: Activity - Increase As Tolerated  DVT PPX: enoxaparin  GI PPX:  Not indicated  DIET: Diet, DASH/TLC  CODE: Full code  Disposition: from home;   Pending: cardiac cath monday

## 2021-07-11 NOTE — PROGRESS NOTE ADULT - SUBJECTIVE AND OBJECTIVE BOX
GILLIAN MENDOZA MRN#: [id number]  CODE STATUS: FULL CODE     SUBJECTIVE   Chief complaint: new onset CHF    Currently admitted to medicine with the primary diagnosis of NEW ONSET OF CONGESTIVE HEART FAILURE ...      Today is hospital day 9d,   INTERVAL HPI/OVERNIGHT EVENTS: No acute events overnight    This morning, he is laying in bed comfortably. Denies chest pain, shortness of breath, abdominal pain, nausea, vomiting or changes in bowel habits. He has no complaints today.     Urinating and stooling appropriately.    Present Today:           Cain Catheter (x)No/ ()Yes?   Indication:             Central Line (x)No/ ()Yes?  Indication:          IV Fluids (x)No/ ()Yes?   Indication:     OBJECTIVE  PAST MEDICAL & SURGICAL HISTORY  HTN (hypertension)    HLD (hyperlipidemia)    DM (diabetes mellitus)    Glaucoma    No significant past surgical history      ALLERGIES:  No Known Allergies    HOME MEDICATIONS:  Home Medications:  atorvastatin 40 mg oral tablet: 1 tab(s) orally once a day (02 Jul 2021 04:46)  BASAGLAR 100 UNIT/ML KWIKPEN:  (02 Jul 2021 04:46)  brimonidine 0.2% ophthalmic solution: 1 drop(s) to each affected eye 2 times a day (02 Jul 2021 04:46)  dorzolamide-timolol 2%-0.5% preservative-free ophthalmic solution: 1 drop(s) to each affected eye 2 times a day (02 Jul 2021 04:46)  furosemide 20 mg oral tablet: 1 tab(s) orally once a day (02 Jul 2021 04:46)  latanoprost 0.005% ophthalmic solution: 1 drop(s) to each affected eye once a day (in the evening) (02 Jul 2021 04:46)  lisinopril 20 mg oral tablet: 1 tab(s) orally once a day (02 Jul 2021 04:46)  pioglitazone-metformin 15 mg-850 mg oral tablet: 1 tab(s) orally 2 times a day (02 Jul 2021 04:46)  Rhopressa 0.02% ophthalmic solution: 1 drop(s) to each affected eye once a day (in the evening) (02 Jul 2021 04:46)    MEDICATIONS:  STANDING MEDICATIONS  MEDICATIONS  (STANDING):  atorvastatin 40 milliGRAM(s) Oral at bedtime  brimonidine 0.2% Ophthalmic Solution 1 Drop(s) Both EYES two times a day  chlorhexidine 4% Liquid 1 Application(s) Topical <User Schedule>  dextrose 40% Gel 15 Gram(s) Oral once  dextrose 5%. 1000 milliLiter(s) (50 mL/Hr) IV Continuous <Continuous>  dextrose 5%. 1000 milliLiter(s) (100 mL/Hr) IV Continuous <Continuous>  dextrose 50% Injectable 25 Gram(s) IV Push once  dextrose 50% Injectable 12.5 Gram(s) IV Push once  dextrose 50% Injectable 25 Gram(s) IV Push once  dorzolamide 2% Ophthalmic Solution 1 Drop(s) Both EYES three times a day  enoxaparin Injectable 40 milliGRAM(s) SubCutaneous daily  gabapentin 200 milliGRAM(s) Oral two times a day  glucagon  Injectable 1 milliGRAM(s) IntraMuscular once  hydrALAZINE 50 milliGRAM(s) Oral three times a day  insulin glargine Injectable (LANTUS) 8 Unit(s) SubCutaneous every morning  insulin lispro (ADMELOG) corrective regimen sliding scale   SubCutaneous three times a day before meals  insulin lispro Injectable (ADMELOG) 3 Unit(s) SubCutaneous three times a day before meals  isosorbide   dinitrate Tablet (ISORDIL) 20 milliGRAM(s) Oral three times a day  latanoprost 0.005% Ophthalmic Solution 1 Drop(s) Both EYES at bedtime  metoprolol succinate ER 25 milliGRAM(s) Oral daily  timolol 0.5% Solution 1 Drop(s) Both EYES two times a day    PRN MEDICATIONS  MEDICATIONS  (PRN):      VITAL SIGNS  T(F): 98.3 (07-11 @ 12:46), Max: 98.3 (07-11 @ 12:46)  HR: 80 (07-11 @ 12:46) (69 - 80)  BP: 188/94 (07-11 @ 12:46) (142/83 - 188/94)  RR: 18 (07-11 @ 12:46) (16 - 18)  SpO2: 97% (07-10 @ 19:43) (97% - 97%)    LABS:                        9.3    8.86  )-----------( 180      ( 11 Jul 2021 05:29 )             31.4     07-11    139  |  110  |  46<H>  ----------------------------<  151<H>  4.9   |  21  |  2.1<H>    Ca    7.9<L>      11 Jul 2021 05:29  Mg     1.9     07-11    TPro  5.0<L>  /  Alb  2.8<L>  /  TBili  0.3  /  DBili  x   /  AST  33  /  ALT  36  /  AlkPhos  128<H>  07-11                  RADIOLOGY:      ECHO:      PHYSICAL EXAM:  General: Comfortably laying on the hospital bed. NAD. AAOx3.  HEENT: Atraumatic. Normocephalic. EOMI. PERRL. Conjunctiva and sclera clear.  Cardiac: Normal S1, S2. RRR. No murmurs, rubs, or gallops.  Pulm: CTA b/l no wheezes, rhonchi, or rales.  GI: Soft, nontender, nondistended. BSx4q  Ext: 2+ peripheral pulses. Moving all 4 extremities. No edema, cyanosis.  Neuro: Grossly intact  Skin: No lesions or rashes    ASSESSMENT & PLAN  HPI: 56M w/ PMHx HTN, HLD, T2DM on insulin, Glaucoma presents to the Ed c/o worsening LE swelling and pain for the past 1 month. Endorses SOB on exertion, along w/ increased abdominal girth. Denies orthopnea, PND, wheeze, CP, diaphoresis. No cardiac Hx. Seen by HF team and cardiology.    #New Onset Acute HFrEF exacerbation  #HFrEF 2/2 uncontrolled HTN  #Moderate pericardial effusion  #Uncontrolled HTN  -no prior cardiac hx  -CXR - cardiomegaly and blunting of CP angles  -BNP 98760 on admission  -trops 0.10 -> 0.11 -> 0.11 -> 0.14 -> 0.09  -Echo - EF 34%, moderate pericardial effusion  -I<O, daily weights  -BP: 188/94 (142/83 - 188/94)  -HF team saw pt 7/6 - cleared to f/u with cardiology for ischemic w/u / cath  -per HF team, cont metoprolol succinate 25mg qd, cont hydralazine 25mg TID, and cont isosorbide 20mg TID.  -scheduled for cath monday 7/12  -NPO after breakfast Monday AM    #LENNY on CKD  -Cr 2.4->2.7->2.7->2.5->2.1 (Baseline 1.7 3/2021)  -cont to hold Lasix per nephro  -FeNa = 2.8%, FeUrea 40.2% => likely intrinsic  -UA 7/3 significant for moderate blood, 300 protein, protein/Cr ratio 8.1  -US KUB - no hydro b/l, right renal cysts  -cont to monitor I&O for oliguria/anuria  -f/u SPEP, UPEP, SIF, UIF, light chains, iron studies, MARLIN, C3, C4    #HLD  -cont atorvastatin    #DM2 w/ peripheral neuropathy  -FS: 171, 143, 250, 98  -cont Lantus, ISS  -on lantus, metformin-pioglitazone at home  -hold pioglitazone now and on d/c - can contribute to CHF exacerbation  -on gabapentin 200mg BID  -duplex negative  -arterial duplex negative    #Glaucoma  -cont eye drops    PT/REHAB: 50ft x2 w/o assistance with PT  ACTIVITY: Activity - Increase As Tolerated  DVT PPX: enoxaparin  GI PPX:  Not indicated  DIET: Diet, DASH/TLC  CODE: Full code  Disposition: from home;   Pending: Dr. Orozco for ischemic w/u    GILLIAN MENDOZA MRN#: [id number]  CODE STATUS: FULL CODE     SUBJECTIVE   Chief complaint: new onset CHF    Currently admitted to medicine with the primary diagnosis of NEW ONSET OF CONGESTIVE HEART FAILURE ...      Today is hospital day 9d,   INTERVAL HPI/OVERNIGHT EVENTS: No acute events overnight    This morning, he is laying in bed comfortably. Denies chest pain, shortness of breath, abdominal pain, nausea, vomiting or changes in bowel habits. He has no complaints today.     Urinating and stooling appropriately.    Present Today:           Cain Catheter (x)No/ ()Yes?   Indication:             Central Line (x)No/ ()Yes?  Indication:          IV Fluids (x)No/ ()Yes?   Indication:     OBJECTIVE  PAST MEDICAL & SURGICAL HISTORY  HTN (hypertension)    HLD (hyperlipidemia)    DM (diabetes mellitus)    Glaucoma    No significant past surgical history      ALLERGIES:  No Known Allergies    HOME MEDICATIONS:  Home Medications:  atorvastatin 40 mg oral tablet: 1 tab(s) orally once a day (02 Jul 2021 04:46)  BASAGLAR 100 UNIT/ML KWIKPEN:  (02 Jul 2021 04:46)  brimonidine 0.2% ophthalmic solution: 1 drop(s) to each affected eye 2 times a day (02 Jul 2021 04:46)  dorzolamide-timolol 2%-0.5% preservative-free ophthalmic solution: 1 drop(s) to each affected eye 2 times a day (02 Jul 2021 04:46)  furosemide 20 mg oral tablet: 1 tab(s) orally once a day (02 Jul 2021 04:46)  latanoprost 0.005% ophthalmic solution: 1 drop(s) to each affected eye once a day (in the evening) (02 Jul 2021 04:46)  lisinopril 20 mg oral tablet: 1 tab(s) orally once a day (02 Jul 2021 04:46)  pioglitazone-metformin 15 mg-850 mg oral tablet: 1 tab(s) orally 2 times a day (02 Jul 2021 04:46)  Rhopressa 0.02% ophthalmic solution: 1 drop(s) to each affected eye once a day (in the evening) (02 Jul 2021 04:46)    MEDICATIONS:  STANDING MEDICATIONS  MEDICATIONS  (STANDING):  atorvastatin 40 milliGRAM(s) Oral at bedtime  brimonidine 0.2% Ophthalmic Solution 1 Drop(s) Both EYES two times a day  chlorhexidine 4% Liquid 1 Application(s) Topical <User Schedule>  dextrose 40% Gel 15 Gram(s) Oral once  dextrose 5%. 1000 milliLiter(s) (50 mL/Hr) IV Continuous <Continuous>  dextrose 5%. 1000 milliLiter(s) (100 mL/Hr) IV Continuous <Continuous>  dextrose 50% Injectable 25 Gram(s) IV Push once  dextrose 50% Injectable 12.5 Gram(s) IV Push once  dextrose 50% Injectable 25 Gram(s) IV Push once  dorzolamide 2% Ophthalmic Solution 1 Drop(s) Both EYES three times a day  enoxaparin Injectable 40 milliGRAM(s) SubCutaneous daily  gabapentin 200 milliGRAM(s) Oral two times a day  glucagon  Injectable 1 milliGRAM(s) IntraMuscular once  hydrALAZINE 50 milliGRAM(s) Oral three times a day  insulin glargine Injectable (LANTUS) 8 Unit(s) SubCutaneous every morning  insulin lispro (ADMELOG) corrective regimen sliding scale   SubCutaneous three times a day before meals  insulin lispro Injectable (ADMELOG) 3 Unit(s) SubCutaneous three times a day before meals  isosorbide   dinitrate Tablet (ISORDIL) 20 milliGRAM(s) Oral three times a day  latanoprost 0.005% Ophthalmic Solution 1 Drop(s) Both EYES at bedtime  metoprolol succinate ER 25 milliGRAM(s) Oral daily  timolol 0.5% Solution 1 Drop(s) Both EYES two times a day    PRN MEDICATIONS  MEDICATIONS  (PRN):      VITAL SIGNS  T(F): 98.3 (07-11 @ 12:46), Max: 98.3 (07-11 @ 12:46)  HR: 80 (07-11 @ 12:46) (69 - 80)  BP: 188/94 (07-11 @ 12:46) (142/83 - 188/94)  RR: 18 (07-11 @ 12:46) (16 - 18)  SpO2: 97% (07-10 @ 19:43) (97% - 97%)    LABS:                        9.3    8.86  )-----------( 180      ( 11 Jul 2021 05:29 )             31.4     07-11    139  |  110  |  46<H>  ----------------------------<  151<H>  4.9   |  21  |  2.1<H>    Ca    7.9<L>      11 Jul 2021 05:29  Mg     1.9     07-11    TPro  5.0<L>  /  Alb  2.8<L>  /  TBili  0.3  /  DBili  x   /  AST  33  /  ALT  36  /  AlkPhos  128<H>  07-11                  RADIOLOGY:      ECHO:      PHYSICAL EXAM:  General: Comfortably laying on the hospital bed. NAD. AAOx3.  HEENT: Atraumatic. Normocephalic. EOMI. PERRL. Conjunctiva and sclera clear.  Cardiac: Normal S1, S2. RRR. No murmurs, rubs, or gallops.  Pulm: CTA b/l no wheezes, rhonchi, or rales.  GI: Soft, nontender, nondistended. BSx4q  Ext: 2+ peripheral pulses. Moving all 4 extremities. No edema, cyanosis.  Neuro: Grossly intact  Skin: No lesions or rashes

## 2021-07-12 LAB
ALBUMIN SERPL ELPH-MCNC: 2.5 G/DL — LOW (ref 3.5–5.2)
ALP SERPL-CCNC: 119 U/L — HIGH (ref 30–115)
ALT FLD-CCNC: 34 U/L — SIGNIFICANT CHANGE UP (ref 0–41)
ANA TITR SER: NEGATIVE — SIGNIFICANT CHANGE UP
ANION GAP SERPL CALC-SCNC: 10 MMOL/L — SIGNIFICANT CHANGE UP (ref 7–14)
AST SERPL-CCNC: 25 U/L — SIGNIFICANT CHANGE UP (ref 0–41)
BASOPHILS # BLD AUTO: 0.03 K/UL — SIGNIFICANT CHANGE UP (ref 0–0.2)
BASOPHILS NFR BLD AUTO: 0.3 % — SIGNIFICANT CHANGE UP (ref 0–1)
BILIRUB SERPL-MCNC: 0.4 MG/DL — SIGNIFICANT CHANGE UP (ref 0.2–1.2)
BUN SERPL-MCNC: 45 MG/DL — HIGH (ref 10–20)
CALCIUM SERPL-MCNC: 8.1 MG/DL — LOW (ref 8.5–10.1)
CHLORIDE SERPL-SCNC: 110 MMOL/L — SIGNIFICANT CHANGE UP (ref 98–110)
CO2 SERPL-SCNC: 20 MMOL/L — SIGNIFICANT CHANGE UP (ref 17–32)
CREAT SERPL-MCNC: 2.1 MG/DL — HIGH (ref 0.7–1.5)
EOSINOPHIL # BLD AUTO: 0.65 K/UL — SIGNIFICANT CHANGE UP (ref 0–0.7)
EOSINOPHIL NFR BLD AUTO: 7.1 % — SIGNIFICANT CHANGE UP (ref 0–8)
GLUCOSE BLDC GLUCOMTR-MCNC: 100 MG/DL — HIGH (ref 70–99)
GLUCOSE BLDC GLUCOMTR-MCNC: 102 MG/DL — HIGH (ref 70–99)
GLUCOSE BLDC GLUCOMTR-MCNC: 131 MG/DL — HIGH (ref 70–99)
GLUCOSE BLDC GLUCOMTR-MCNC: 170 MG/DL — HIGH (ref 70–99)
GLUCOSE SERPL-MCNC: 100 MG/DL — HIGH (ref 70–99)
HCT VFR BLD CALC: 30.8 % — LOW (ref 42–52)
HGB BLD-MCNC: 9.2 G/DL — LOW (ref 14–18)
IMM GRANULOCYTES NFR BLD AUTO: 0.3 % — SIGNIFICANT CHANGE UP (ref 0.1–0.3)
LYMPHOCYTES # BLD AUTO: 1.42 K/UL — SIGNIFICANT CHANGE UP (ref 1.2–3.4)
LYMPHOCYTES # BLD AUTO: 15.4 % — LOW (ref 20.5–51.1)
MAGNESIUM SERPL-MCNC: 1.9 MG/DL — SIGNIFICANT CHANGE UP (ref 1.8–2.4)
MCHC RBC-ENTMCNC: 24.1 PG — LOW (ref 27–31)
MCHC RBC-ENTMCNC: 29.9 G/DL — LOW (ref 32–37)
MCV RBC AUTO: 80.8 FL — SIGNIFICANT CHANGE UP (ref 80–94)
MONOCYTES # BLD AUTO: 0.75 K/UL — HIGH (ref 0.1–0.6)
MONOCYTES NFR BLD AUTO: 8.2 % — SIGNIFICANT CHANGE UP (ref 1.7–9.3)
NEUTROPHILS # BLD AUTO: 6.32 K/UL — SIGNIFICANT CHANGE UP (ref 1.4–6.5)
NEUTROPHILS NFR BLD AUTO: 68.7 % — SIGNIFICANT CHANGE UP (ref 42.2–75.2)
NRBC # BLD: 0 /100 WBCS — SIGNIFICANT CHANGE UP (ref 0–0)
PLATELET # BLD AUTO: 188 K/UL — SIGNIFICANT CHANGE UP (ref 130–400)
POTASSIUM SERPL-MCNC: 4.7 MMOL/L — SIGNIFICANT CHANGE UP (ref 3.5–5)
POTASSIUM SERPL-SCNC: 4.7 MMOL/L — SIGNIFICANT CHANGE UP (ref 3.5–5)
PROT SERPL-MCNC: 5 G/DL — LOW (ref 6–8)
RBC # BLD: 3.81 M/UL — LOW (ref 4.7–6.1)
RBC # FLD: 14.2 % — SIGNIFICANT CHANGE UP (ref 11.5–14.5)
SARS-COV-2 RNA SPEC QL NAA+PROBE: SIGNIFICANT CHANGE UP
SODIUM SERPL-SCNC: 140 MMOL/L — SIGNIFICANT CHANGE UP (ref 135–146)
WBC # BLD: 9.2 K/UL — SIGNIFICANT CHANGE UP (ref 4.8–10.8)
WBC # FLD AUTO: 9.2 K/UL — SIGNIFICANT CHANGE UP (ref 4.8–10.8)

## 2021-07-12 PROCEDURE — 93970 EXTREMITY STUDY: CPT | Mod: 26

## 2021-07-12 PROCEDURE — 99233 SBSQ HOSP IP/OBS HIGH 50: CPT

## 2021-07-12 PROCEDURE — 99254 IP/OBS CNSLTJ NEW/EST MOD 60: CPT

## 2021-07-12 PROCEDURE — 93458 L HRT ARTERY/VENTRICLE ANGIO: CPT | Mod: 26

## 2021-07-12 PROCEDURE — 93880 EXTRACRANIAL BILAT STUDY: CPT | Mod: 26

## 2021-07-12 RX ORDER — ASPIRIN/CALCIUM CARB/MAGNESIUM 324 MG
81 TABLET ORAL DAILY
Refills: 0 | Status: DISCONTINUED | OUTPATIENT
Start: 2021-07-12 | End: 2021-07-25

## 2021-07-12 RX ORDER — MAGNESIUM SULFATE 500 MG/ML
2 VIAL (ML) INJECTION ONCE
Refills: 0 | Status: COMPLETED | OUTPATIENT
Start: 2021-07-12 | End: 2021-07-12

## 2021-07-12 RX ORDER — SODIUM CHLORIDE 9 MG/ML
1000 INJECTION INTRAMUSCULAR; INTRAVENOUS; SUBCUTANEOUS
Refills: 0 | Status: DISCONTINUED | OUTPATIENT
Start: 2021-07-12 | End: 2021-07-16

## 2021-07-12 RX ORDER — HEPARIN SODIUM 5000 [USP'U]/ML
5000 INJECTION INTRAVENOUS; SUBCUTANEOUS EVERY 8 HOURS
Refills: 0 | Status: DISCONTINUED | OUTPATIENT
Start: 2021-07-12 | End: 2021-07-25

## 2021-07-12 RX ADMIN — Medication 1 DROP(S): at 05:57

## 2021-07-12 RX ADMIN — CHLORHEXIDINE GLUCONATE 1 APPLICATION(S): 213 SOLUTION TOPICAL at 05:57

## 2021-07-12 RX ADMIN — GABAPENTIN 200 MILLIGRAM(S): 400 CAPSULE ORAL at 05:56

## 2021-07-12 RX ADMIN — LATANOPROST 1 DROP(S): 0.05 SOLUTION/ DROPS OPHTHALMIC; TOPICAL at 21:55

## 2021-07-12 RX ADMIN — BRIMONIDINE TARTRATE 1 DROP(S): 2 SOLUTION/ DROPS OPHTHALMIC at 05:57

## 2021-07-12 RX ADMIN — DORZOLAMIDE HYDROCHLORIDE 1 DROP(S): 20 SOLUTION/ DROPS OPHTHALMIC at 21:55

## 2021-07-12 RX ADMIN — DORZOLAMIDE HYDROCHLORIDE 1 DROP(S): 20 SOLUTION/ DROPS OPHTHALMIC at 13:54

## 2021-07-12 RX ADMIN — Medication 50 MILLIGRAM(S): at 21:56

## 2021-07-12 RX ADMIN — Medication 50 MILLIGRAM(S): at 05:56

## 2021-07-12 RX ADMIN — ISOSORBIDE DINITRATE 20 MILLIGRAM(S): 5 TABLET ORAL at 11:53

## 2021-07-12 RX ADMIN — GABAPENTIN 200 MILLIGRAM(S): 400 CAPSULE ORAL at 18:27

## 2021-07-12 RX ADMIN — Medication 100 GRAM(S): at 11:29

## 2021-07-12 RX ADMIN — Medication 3 UNIT(S): at 08:19

## 2021-07-12 RX ADMIN — ISOSORBIDE DINITRATE 20 MILLIGRAM(S): 5 TABLET ORAL at 18:27

## 2021-07-12 RX ADMIN — Medication 25 MILLIGRAM(S): at 05:57

## 2021-07-12 RX ADMIN — HEPARIN SODIUM 5000 UNIT(S): 5000 INJECTION INTRAVENOUS; SUBCUTANEOUS at 21:56

## 2021-07-12 RX ADMIN — BRIMONIDINE TARTRATE 1 DROP(S): 2 SOLUTION/ DROPS OPHTHALMIC at 18:30

## 2021-07-12 RX ADMIN — ATORVASTATIN CALCIUM 40 MILLIGRAM(S): 80 TABLET, FILM COATED ORAL at 21:54

## 2021-07-12 RX ADMIN — Medication 50 MILLIGRAM(S): at 13:54

## 2021-07-12 RX ADMIN — Medication 1 DROP(S): at 18:28

## 2021-07-12 RX ADMIN — DORZOLAMIDE HYDROCHLORIDE 1 DROP(S): 20 SOLUTION/ DROPS OPHTHALMIC at 05:59

## 2021-07-12 RX ADMIN — ISOSORBIDE DINITRATE 20 MILLIGRAM(S): 5 TABLET ORAL at 05:59

## 2021-07-12 NOTE — PROGRESS NOTE ADULT - ASSESSMENT
HPI: 56M w/ PMHx HTN, HLD, T2DM on insulin, Glaucoma presents to the Ed c/o worsening LE swelling and pain for the past 1 month. Endorses SOB on exertion, along w/ increased abdominal girth. Denies orthopnea, PND, wheeze, CP, diaphoresis. No cardiac Hx. Seen by HF team and cardiology.    #New Onset Acute HFrEF exacerbation  #HFrEF 2/2 uncontrolled HTN  #Moderate pericardial effusion  #Uncontrolled HTN  -no prior cardiac hx  -CXR - cardiomegaly and blunting of CP angles  -BNP 10388 on admission  -trops 0.10 -> 0.11 -> 0.11 -> 0.14 -> 0.09  -Echo - EF 34%, moderate pericardial effusion  -I<O, daily weights  -BP: 188/94 (142/83 - 188/94)  -HF team saw pt 7/6 - cleared to f/u with cardiology for ischemic w/u / cath  -per HF team, cont metoprolol succinate 25mg qd, cont hydralazine 25mg TID, and cont isosorbide 20mg TID.  -scheduled for cath monday 7/12  -NPO after breakfast Monday AM    #LENNY on CKD  -Cr 2.4->2.7->2.7->2.5->2.1 (Baseline 1.7 3/2021)  -cont to hold Lasix per nephro  -FeNa = 2.8%, FeUrea 40.2% => likely intrinsic  -UA 7/3 significant for moderate blood, 300 protein, protein/Cr ratio 8.1  -US KUB - no hydro b/l, right renal cysts  -cont to monitor I&O for oliguria/anuria  -SPEP - decreased protein, C3 wnl, C4 wnl  -incr kappa, lambda light chains. Ratio 1.97  -f/u UPEP, SIF, UIF, iron studies, MARLIN    #HLD  -cont atorvastatin    #DM2 w/ peripheral neuropathy  -FS: 171, 143, 250, 98  -cont Lantus, ISS  -on lantus, metformin-pioglitazone at home  -hold pioglitazone now and on d/c - can contribute to CHF exacerbation  -on gabapentin 200mg BID  -duplex negative  -arterial duplex negative    #Glaucoma  -cont eye drops    PT/REHAB: 50ft x2 w/o assistance with PT  ACTIVITY: Activity - Increase As Tolerated  DVT PPX: enoxaparin  GI PPX:  Not indicated  DIET: Diet, DASH/TLC  CODE: Full code  Disposition: from home;   Pending: cardiac cath monday    HPI: 56M w/ PMHx HTN, HLD, T2DM on insulin, Glaucoma presents to the Ed c/o worsening LE swelling and pain for the past 1 month. Endorses SOB on exertion, along w/ increased abdominal girth. Denies orthopnea, PND, wheeze, CP, diaphoresis. No cardiac Hx. Seen by HF team and cardiology.    #New Onset Acute HFrEF exacerbation  #HFrEF 2/2 uncontrolled HTN  #Moderate pericardial effusion  #Uncontrolled HTN  -no prior cardiac hx  -CXR - cardiomegaly and blunting of CP angles  -BNP 71864 on admission  -trops 0.10 -> 0.11 -> 0.11 -> 0.14 -> 0.09  -Echo - EF 34%, moderate pericardial effusion  -I<O, daily weights  -BP: 188/94 (142/83 - 188/94)  - pending cardiac cath today (7/12)  -per HF team, cont metoprolol succinate 25mg qd, cont hydralazine 25mg TID, and cont isosorbide 20mg TID.  -scheduled for cath monday 7/12      #LENNY on CKD  -Cr 2.4->2.7->2.7->2.5->2.1 (Baseline 1.7 3/2021)  -cont to hold Lasix per nephro  -FeNa = 2.8%, FeUrea 40.2% => likely intrinsic  -UA 7/3 significant for moderate blood, 300 protein, protein/Cr ratio 8.1  -US KUB - no hydro b/l, right renal cysts  -cont to monitor I&O for oliguria/anuria  -SPEP - decreased protein, C3 wnl, C4 wnl  -incr kappa, lambda light chains. Ratio 1.97  -f/u UPEP, SIF, UIF, iron studies, MARLIN    #HLD  -cont atorvastatin    #DM2 w/ peripheral neuropathy  -FS: 171, 143, 250, 98  -cont Lantus, ISS  -on lantus, metformin-pioglitazone at home  -hold pioglitazone now and on d/c - can contribute to CHF exacerbation  -on gabapentin 200mg BID  -duplex negative  -arterial duplex negative    #Glaucoma  -cont eye drops    PT/REHAB: 50ft x2 w/o assistance with PT  ACTIVITY: Activity - Increase As Tolerated  DVT PPX: enoxaparin  GI PPX:  Not indicated  DIET: Diet, DASH/TLC  CODE: Full code  Disposition: from home;   Pending: cardiac cath monday    HPI: 56M w/ PMHx HTN, HLD, T2DM on insulin, Glaucoma presents to the Ed c/o worsening LE swelling and pain for the past 1 month. Endorses SOB on exertion, along w/ increased abdominal girth. Denies orthopnea, PND, wheeze, CP, diaphoresis. No cardiac Hx. Seen by HF team and cardiology.    #New Onset Acute HFrEF exacerbation  #HFrEF 2/2 uncontrolled HTN  #Moderate pericardial effusion  #Uncontrolled HTN  -no prior cardiac hx  -CXR - cardiomegaly and blunting of CP angles  -BNP 98836 on admission  -trops 0.10 -> 0.11 -> 0.11 -> 0.14 -> 0.09  -Echo - EF 34%, moderate pericardial effusion  -I<O, daily weights  -BP: 188/94 (142/83 - 188/94)  - pending cardiac cath today (7/12)  -per HF team, cont metoprolol succinate 25mg qd, cont hydralazine 25mg TID, and cont isosorbide 20mg TID.        #LENNY on CKD  -Cr 2.4->2.7->2.7->2.5->2.1 (Baseline 1.7 3/2021)  -cont to hold Lasix per nephro  -FeNa = 2.8%, FeUrea 40.2% => likely intrinsic  -UA 7/3 significant for moderate blood, 300 protein, protein/Cr ratio 8.1  -US KUB - no hydro b/l, right renal cysts  -cont to monitor I&O for oliguria/anuria  -SPEP - decreased protein, C3 wnl, C4 wnl  -incr kappa, lambda light chains. Ratio 1.97  -f/u UPEP, SIF, UIF, iron studies, MARLIN      #HLD  -cont atorvastatin    #DM2 w/ peripheral neuropathy  -FS: 171, 143, 250, 98  -cont Lantus, ISS  -on lantus, metformin-pioglitazone at home  -hold pioglitazone now and on d/c - can contribute to CHF exacerbation  -on gabapentin 200mg BID  -duplex negative  -arterial duplex negative    #Glaucoma  -cont eye drops    PT/REHAB: 50ft x2 w/o assistance with PT  ACTIVITY: Activity - Increase As Tolerated  DVT PPX: heparin   GI PPX:  Not indicated  DIET: Diet, DASH/TLC  CODE: Full code  Disposition: from home;   Pending: cardiac cath today

## 2021-07-12 NOTE — PROGRESS NOTE ADULT - ASSESSMENT
56M w/ PMHx HTN, HLD, T2DM on insulin x>10 years, retinopathy, Glaucoma presents to the Ed c/o worsening LE swelling and pain for the past 1 month. Endorses SOB on exertion, along w/ increased abdominal girth.    #LENNY on CKD with PRoteinuria >300  -Cr 2.1 <- 2.5 < -2.7  (Baseline 1.7 in 3/2021)  -lasix on hold  - creatinine stable   - non oliguric   - check ph level   - change enoxaparin to heparin in view of LENNY   - Proteinuria likely due to Diabetic nephropathy - needs serology for lupus and dysproteinemia  -FeNa = 2.8%, FeUrea 40.2% c/w intrinsic  -US KUB - no hydro b/l, right renal cysts  -k/l ratio noted   - cardiac cath today   pt is at moderate-high risk for ROSENDO - HOLD diuretics, no IVF, benefits outweigh the risk of IV contrast, cont to monitor BMP daily  HFrEF-no prior cardiac hx  -Echo - EF 34%, moderate pericardial effusion  HTN - can increase hydralazine to 50 q 8 , if remains elevated   will follow

## 2021-07-12 NOTE — CONSULT NOTE ADULT - SUBJECTIVE AND OBJECTIVE BOX
Surgeon: Dr. Welch    Consult requesting by: Dr. Singh  Renal: Dr. Presley  Cardiology: Dr. Cantu    HISTORY OF PRESENT ILLNESS:  HPI:  PC: LE swelling + Pain  2-3 months    PMHx: HTN, HLD, T2DM on insulin, Glaucoma    HPI: 56M w/ PMHx as above presents to the Ed c/o worsening LE swelling and pain for the past 2-3 months. Endorses SOB on exertion, along w/ increased abdominal girth. Denies orthopnea, PND, wheeze, CP, diaphoresis. No cardiac Hx.    ED course: /130 in triage, otherwise VS. Labs showed Hb 11.1, Cr 2.1, mildly increased ALP and AST/ALT, Trop 0.10, BNP 92889. CXR revealing cardiomegaly and CP angle blunting b/l. (02 Jul 2021 05:08)      NYHA functional class    [ ] Class I (no limitation) [ ] Class II (slight limitation) [ ] Class III (marked limitation) [x ] Class IV (symptoms at rest)    PAST MEDICAL & SURGICAL HISTORY:  HTN (hypertension)    HLD (hyperlipidemia)    DM (diabetes mellitus)    Glaucoma    No significant past surgical history    MEDICATIONS  (STANDING):  aspirin  chewable 81 milliGRAM(s) Oral daily  atorvastatin 40 milliGRAM(s) Oral at bedtime  brimonidine 0.2% Ophthalmic Solution 1 Drop(s) Both EYES two times a day  chlorhexidine 4% Liquid 1 Application(s) Topical <User Schedule>  dextrose 40% Gel 15 Gram(s) Oral once  dextrose 5%. 1000 milliLiter(s) (50 mL/Hr) IV Continuous <Continuous>  dextrose 5%. 1000 milliLiter(s) (100 mL/Hr) IV Continuous <Continuous>  dextrose 50% Injectable 25 Gram(s) IV Push once  dextrose 50% Injectable 12.5 Gram(s) IV Push once  dextrose 50% Injectable 25 Gram(s) IV Push once  dorzolamide 2% Ophthalmic Solution 1 Drop(s) Both EYES three times a day  gabapentin 200 milliGRAM(s) Oral two times a day  glucagon  Injectable 1 milliGRAM(s) IntraMuscular once  heparin   Injectable 5000 Unit(s) SubCutaneous every 8 hours  hydrALAZINE 50 milliGRAM(s) Oral three times a day  insulin glargine Injectable (LANTUS) 8 Unit(s) SubCutaneous every morning  insulin lispro (ADMELOG) corrective regimen sliding scale   SubCutaneous three times a day before meals  insulin lispro Injectable (ADMELOG) 3 Unit(s) SubCutaneous three times a day before meals  isosorbide   dinitrate Tablet (ISORDIL) 20 milliGRAM(s) Oral three times a day  latanoprost 0.005% Ophthalmic Solution 1 Drop(s) Both EYES at bedtime  metoprolol succinate ER 25 milliGRAM(s) Oral daily  sodium chloride 0.9%. 1000 milliLiter(s) (75 mL/Hr) IV Continuous <Continuous>  timolol 0.5% Solution 1 Drop(s) Both EYES two times a day    MEDICATIONS  (PRN)  No Known Allergies/NKDA    SOCIAL HISTORY:  Smoker: [x ] Yes  [ ] No        PACK YEARS: Occasional use                   WHEN QUIT?  ETOH use: [ ] Yes  [ x] No              FREQUENCY / QUANTITY:  Ilicit Drug use:  [ ] Yes  [x ] No  Occupation: Housekeeping   Lives with: wife at home  Assisted device use:  5 meter walk test: 1____sec, 2____sec, 3___sec Unable to complete at this time, recent cath    FAMILY HISTORY:  No pertinent family history in first degree relatives    Review of Systems  CONSTITUTIONAL: No fever, weight loss, or fatigue  EYES: No eye pain, visual disturbances, or discharge  ENMT:  No difficulty hearing, tinnitus, vertigo; No sinus or throat pain  NECK: No pain or stiffness  RESPIRATORY: No cough, wheezing, chills or hemoptysis; No shortness of breath  CARDIOVASCULAR: No chest pain, palpitations, dizziness, or leg swelling  GASTROINTESTINAL: No abdominal or epigastric pain. No diarrhea or constipation. No nausea, vomiting, or hematemesis. No melena or hematochezia.  GENITOURINARY: No dysuria, frequency, hematuria, or incontinence  NEUROLOGICAL: No headaches, memory loss, loss of strength, numbness, or tremors  MUSCULOSKELETAL: No joint pain or swelling; No muscle, back, or extremity pain  SKIN: No itching, burning, rashes, or lesions   LYMPH NODES: No enlarged glands  ENDOCRINE: No heat or cold intolerance; No hair loss  PSYCHIATRIC: No depression, anxiety, mood swings, or difficulty sleeping  HEME/LYMPH: No easy bruising, or bleeding gums  ALLERY AND IMMUNOLOGIC: No hives or eczema    PHYSICAL EXAM  Vital Signs Last 24 Hrs  T(C): 36.2 (12 Jul 2021 13:48), Max: 36.6 (11 Jul 2021 20:56)  T(F): 97.2 (12 Jul 2021 13:48), Max: 97.8 (11 Jul 2021 20:56)  HR: 72 (12 Jul 2021 13:48) (68 - 74)  BP: 137/81 (12 Jul 2021 13:48) (135/77 - 168/84)  BP(mean): --  RR: 18 (12 Jul 2021 13:48) (18 - 18)  SpO2: --  Right arm bp:                       Left arm bp:    GENERAL: NAD, well-groomed, well-developed  HEAD:  Atraumatic, Normocephalic  EYES: EOMI, PERRLA, conjunctiva and sclera clear  ENMT: Moist mucous membranes  NECK: Supple, No JVD, Normal thyroid  NERVOUS SYSTEM:  Alert & Oriented X3, Good concentration  CHEST/LUNG: Clear to auscultation bilaterally; No rales, rhonchi, wheezing, or rubs  HEART: Regular rate and rhythm; No murmurs, rubs, or gallops  ABDOMEN: Soft, Nontender, Nondistended; Bowel sounds present  EXTREMITIES:  2+ Peripheral Pulses, b/l LE edema improving      LABS:                        9.2    9.20  )-----------( 188      ( 12 Jul 2021 05:32 )             30.8     07-12    140  |  110  |  45<H>  ----------------------------<  100<H>  4.7   |  20  |  2.1<H>    Ca    8.1<L>      12 Jul 2021 05:32  Mg     1.9     07-12    TPro  5.0<L>  /  Alb  2.5<L>  /  TBili  0.4  /  DBili  x   /  AST  25  /  ALT  34  /  AlkPhos  119<H>  07-12    Covid Results: COVID-19 PCR: NotDetec (07-12-21 @ 15:05)      Cardiac Cath:   LEFT HEART CATHETERIZATION  Left main: Mild disease  LAD: 95% mid LAD stenosis  Diag1: small vessel. Severe disease  Diag2: Medium size. 80% lesion  Left Circumflex: 90% lesion proximal segment. 80% lesion distal segment  OM1: severe disease proximal segment  OM2: severe disease (bifurcation with the LCx)  Right Coronary Artery: 100%  proximal segment  RPDA: receiving collaterals from LAD    INTERVENTION  SPECIMEN REMOVED: Not applicable  IMPLANTS: none    POST-OP DIAGNOSIS:  Significant 3V disease with SYNTAX score of 40    TTE:  Summary:   1. Moderately decreased global left ventricular systolic function.   2. Multiple left ventricular regional wall motion abnormalities exist. See wall motion findings.   3. LV Ejection Fraction by Garcia's Method with a biplane EF of 34 %.   4. Moderately increased LV wall thickness.   5. Normal left ventricular internal cavity size.   6. Mild to moderately enlarged left atrium.   7. Normal right atrial size.   8. Moderate pericardial effusion.   9. Mild to moderate mitral valve regurgitation.  10. Structurally normal mitral valve, with normal leaflet excursion.  11. Moderate tricuspid regurgitation.  12. Mild aortic regurgitation.  13. Normal trileaflet aortic valve with normal opening.  14. Mild pulmonic valve regurgitation.  15. Estimated pulmonary artery systolic pressure is 52.0 mmHg assuming a right atrial pressure of 10 mmHg, which is consistent with moderate pulmonary hypertension.      Recommendation: (Procedures/Evaluations)  CT HEAD Non-Contrast:[  ]  CT Chest without contrast [ X ]  CT Abd/Pelvis non con [x]  Echocadiography :[ X ]  Carotid Duplex :[ X ]  PFT : Simple PFT [ X ]    WANDA/PVR: [x ]  Vein mapping [x]  Renal Consult [ x]  Stool Guaiac [x]  Pulmonary Consult: [ ]   Vascular Consult [ ]    Dental Consult [ ]   Hem-Onc Consult [ ]   GI Consult [ ]   Other Consultations :    STS Risk Score:   Risk of Mortality: 2.769%  Renal Failure: 13.338%  Permanent Stroke: 2.672%  Prolonged Ventilation: 19.316%  DSW Infection: 0.421%  Reoperation: 2.888%  Morbidity or Mortality: 34.009%  Short Length of Stay: 20.213%  Long Length of Stay: 16.473%      Impression:    CAD [x ]  Valvular  disease [ ]   Aortic Disease [ ]   LENNY: Yes[x ] No [ ]   CKD Stage I [ ] , Stage II [ ] , Stage III [ x], Stage IV [ ]   Anemia: Yes [x ], No [ ]  Diabetes :Yes [x ], No [ ]  Acute MI: Yes [ ], No [ x]   Heart Failure: Yes [ ] , No [ ] HFpEF [ ], HFrEF [ x]        Assessment/ Plan:  56 year old male here with 3vCAD and acute CHF w/reduced Ef. Case discussed with DR. Welch, will complete above mentioned preop workup for possible CABG later this week.                 Surgeon: Dr. Welch    Consult requesting by: Dr. Singh  Renal: Dr. Presley  Cardiology: Dr. Cantu    HISTORY OF PRESENT ILLNESS:  56M with a PMH HTN, DM, Glaucoma, DLD, CKD, Diabetic Neuropathy. The patient presents to the Ed c/o worsening LE swelling and pain for the past 2-3 months. Endorses SOB on exertion, along w/ increased abdominal girth. Denies orthopnea, PND, wheeze, CP, diaphoresis. No cardiac Hx.  Echocardiography revealed significantly reduced EF.  Subsequent cardiac catheterization revealed severe 3vCAD.        NYHA functional class    [ ] Class I (no limitation) [ ] Class II (slight limitation) [ ] Class III (marked limitation) [x ] Class IV (symptoms at rest)    PAST MEDICAL & SURGICAL HISTORY:  HTN (hypertension)    HLD (hyperlipidemia)    DM (diabetes mellitus)    Glaucoma    No significant past surgical history    MEDICATIONS  (STANDING):  aspirin  chewable 81 milliGRAM(s) Oral daily  atorvastatin 40 milliGRAM(s) Oral at bedtime  brimonidine 0.2% Ophthalmic Solution 1 Drop(s) Both EYES two times a day  chlorhexidine 4% Liquid 1 Application(s) Topical <User Schedule>  dextrose 40% Gel 15 Gram(s) Oral once  dextrose 5%. 1000 milliLiter(s) (50 mL/Hr) IV Continuous <Continuous>  dextrose 5%. 1000 milliLiter(s) (100 mL/Hr) IV Continuous <Continuous>  dextrose 50% Injectable 25 Gram(s) IV Push once  dextrose 50% Injectable 12.5 Gram(s) IV Push once  dextrose 50% Injectable 25 Gram(s) IV Push once  dorzolamide 2% Ophthalmic Solution 1 Drop(s) Both EYES three times a day  gabapentin 200 milliGRAM(s) Oral two times a day  glucagon  Injectable 1 milliGRAM(s) IntraMuscular once  heparin   Injectable 5000 Unit(s) SubCutaneous every 8 hours  hydrALAZINE 50 milliGRAM(s) Oral three times a day  insulin glargine Injectable (LANTUS) 8 Unit(s) SubCutaneous every morning  insulin lispro (ADMELOG) corrective regimen sliding scale   SubCutaneous three times a day before meals  insulin lispro Injectable (ADMELOG) 3 Unit(s) SubCutaneous three times a day before meals  isosorbide   dinitrate Tablet (ISORDIL) 20 milliGRAM(s) Oral three times a day  latanoprost 0.005% Ophthalmic Solution 1 Drop(s) Both EYES at bedtime  metoprolol succinate ER 25 milliGRAM(s) Oral daily  sodium chloride 0.9%. 1000 milliLiter(s) (75 mL/Hr) IV Continuous <Continuous>  timolol 0.5% Solution 1 Drop(s) Both EYES two times a day    MEDICATIONS  (PRN)  No Known Allergies/NKDA    SOCIAL HISTORY:  Smoker: [x ] Yes  [ ] No        PACK YEARS: Occasional use                   WHEN QUIT?  ETOH use: [ ] Yes  [ x] No              FREQUENCY / QUANTITY:  Ilicit Drug use:  [ ] Yes  [x ] No  Occupation: Housekeeping   Lives with: wife at home  Assisted device use:  5 meter walk test: 1____sec, 2____sec, 3___sec Unable to complete at this time, recent cath    FAMILY HISTORY:  No pertinent family history in first degree relatives    Review of Systems  CONSTITUTIONAL: No fever, weight loss, or fatigue  EYES: No eye pain, visual disturbances, or discharge  ENMT:  No difficulty hearing, tinnitus, vertigo; No sinus or throat pain  NECK: No pain or stiffness  RESPIRATORY: No cough, wheezing, chills or hemoptysis; No shortness of breath  CARDIOVASCULAR: No chest pain, palpitations, dizziness, or leg swelling  GASTROINTESTINAL: No abdominal or epigastric pain. No diarrhea or constipation. No nausea, vomiting, or hematemesis. No melena or hematochezia.  GENITOURINARY: No dysuria, frequency, hematuria, or incontinence  NEUROLOGICAL: No headaches, memory loss, loss of strength, numbness, or tremors  MUSCULOSKELETAL: No joint pain or swelling; No muscle, back, or extremity pain  SKIN: No itching, burning, rashes, or lesions   LYMPH NODES: No enlarged glands  ENDOCRINE: No heat or cold intolerance; No hair loss  PSYCHIATRIC: No depression, anxiety, mood swings, or difficulty sleeping  HEME/LYMPH: No easy bruising, or bleeding gums  ALLERY AND IMMUNOLOGIC: No hives or eczema    PHYSICAL EXAM  Vital Signs Last 24 Hrs  T(C): 36.2 (12 Jul 2021 13:48), Max: 36.6 (11 Jul 2021 20:56)  T(F): 97.2 (12 Jul 2021 13:48), Max: 97.8 (11 Jul 2021 20:56)  HR: 72 (12 Jul 2021 13:48) (68 - 74)  BP: 137/81 (12 Jul 2021 13:48) (135/77 - 168/84)  RR: 18 (12 Jul 2021 13:48) (18 - 18)    GENERAL: NAD, well-groomed, well-developed  HEAD:  Atraumatic, Normocephalic  EYES: EOMI, PERRLA, conjunctiva and sclera clear  ENMT: Moist mucous membranes  NECK: Supple, No JVD, Normal thyroid  NERVOUS SYSTEM:  Alert & Oriented X3, Good concentration  CHEST/LUNG: Clear to auscultation bilaterally; No rales, rhonchi, wheezing, or rubs  HEART: Regular rate and rhythm; No murmurs, rubs, or gallops  ABDOMEN: Soft, Nontender, Nondistended; Bowel sounds present  EXTREMITIES:  2+ Peripheral Pulses, b/l LE edema improving      LABS:                        9.2    9.20  )-----------( 188      ( 12 Jul 2021 05:32 )             30.8     07-12    140  |  110  |  45<H>  ----------------------------<  100<H>  4.7   |  20  |  2.1<H>    Ca    8.1<L>      12 Jul 2021 05:32  Mg     1.9     07-12    TPro  5.0<L>  /  Alb  2.5<L>  /  TBili  0.4  /  DBili  x   /  AST  25  /  ALT  34  /  AlkPhos  119<H>  07-12    Covid Results: COVID-19 PCR: NotDetec (07-12-21 @ 15:05)      Cardiac Cath:   LEFT HEART CATHETERIZATION  Left main: Mild disease  LAD: 95% mid LAD stenosis  Diag1: small vessel. Severe disease  Diag2: Medium size. 80% lesion  Left Circumflex: 90% lesion proximal segment. 80% lesion distal segment  OM1: severe disease proximal segment  OM2: severe disease (bifurcation with the LCx)  Right Coronary Artery: 100%  proximal segment  RPDA: receiving collaterals from LAD    INTERVENTION  SPECIMEN REMOVED: Not applicable  IMPLANTS: none    POST-OP DIAGNOSIS:  Significant 3V disease with SYNTAX score of 40    TTE:  Summary:   1. Moderately decreased global left ventricular systolic function.   2. Multiple left ventricular regional wall motion abnormalities exist. See wall motion findings.   3. LV Ejection Fraction by Garcia's Method with a biplane EF of 34 %.   4. Moderately increased LV wall thickness.   5. Normal left ventricular internal cavity size.   6. Mild to moderately enlarged left atrium.   7. Normal right atrial size.   8. Moderate pericardial effusion.   9. Mild to moderate mitral valve regurgitation.  10. Structurally normal mitral valve, with normal leaflet excursion.  11. Moderate tricuspid regurgitation.  12. Mild aortic regurgitation.  13. Normal trileaflet aortic valve with normal opening.  14. Mild pulmonic valve regurgitation.  15. Estimated pulmonary artery systolic pressure is 52.0 mmHg assuming a right atrial pressure of 10 mmHg, which is consistent with moderate pulmonary hypertension.      Recommendation: (Procedures/Evaluations)  CT HEAD Non-Contrast:[  ]  CT Chest without contrast [ X ]  CT Abd/Pelvis non con [x]  Echocadiography :[ X ]  Carotid Duplex :[ X ]  PFT : Simple PFT [ X ]    WANDA/PVR: [x ]  Vein mapping [x]  Renal Consult [ x]  Stool Guaiac [x]  Pulmonary Consult: [ ]   Vascular Consult [ ]    Dental Consult [ ]   Hem-Onc Consult [ ]   GI Consult [ ]   Other Consultations :    STS Risk Score: CABG  Risk of Mortality: 2.769%  Renal Failure: 13.338%  Permanent Stroke: 2.672%  Prolonged Ventilation: 19.316%  DSW Infection: 0.421%  Reoperation: 2.888%  Morbidity or Mortality: 34.009%  Short Length of Stay: 20.213%  Long Length of Stay: 16.473%      Impression:    CAD [x ]  Valvular  disease [ ]   Aortic Disease [ ]   LENNY: Yes[x ] No [ ]   CKD Stage I [ ] , Stage II [ ] , Stage III [ x], Stage IV [ ]   Anemia: Yes [x ], No [ ]  Diabetes :Yes [x ], No [ ]  Acute MI: Yes [ ], No [ x]   Heart Failure: Yes [ ] , No [ ] HFpEF [ ], HFrEF [ x]        Assessment/ Plan:  56 year old male here with 3vCAD and acute CHF w/reduced Ef. Case discussed with DR. Welch, will complete above mentioned preop workup for possible CABG later this week.

## 2021-07-12 NOTE — PROGRESS NOTE ADULT - SUBJECTIVE AND OBJECTIVE BOX
GILLIAN MENDOZA  56y  Male    HPI: 56M w/ PMHx HTN, HLD, T2DM on insulin, Glaucoma presents to the Ed c/o worsening LE swelling and pain for the past 1 month      INTERVAL HPI/OVERNIGHT EVENTS:  No acute events overnight. Patient reports that his leg swellling is significanlty reduced. Josepheis CP, nauseas    REVIEW OF SYSTEMS:  CONSTITUTIONAL: No fever, weight loss, or fatigue  EYES: No eye pain, visual disturbances, or discharge  ENMT:  No difficulty hearing, tinnitus, vertigo; No sinus or throat pain  NECK: No pain or stiffness  RESPIRATORY: No cough, wheezing, chills or hemoptysis; No shortness of breath  CARDIOVASCULAR: No chest pain, palpitations, dizziness, or leg swelling  GASTROINTESTINAL: No abdominal or epigastric pain. No diarrhea or constipation. No nausea, vomiting, or hematemesis. No melena or hematochezia.  GENITOURINARY: No dysuria, frequency, hematuria, or incontinence  NEUROLOGICAL: No headaches, memory loss, loss of strength, numbness, or tremors  MUSCULOSKELETAL: No joint pain or swelling; No muscle, back, or extremity pain  SKIN: No itching, burning, rashes, or lesions   LYMPH NODES: No enlarged glands  ENDOCRINE: No heat or cold intolerance; No hair loss  PSYCHIATRIC: No depression, anxiety, mood swings, or difficulty sleeping  HEME/LYMPH: No easy bruising, or bleeding gums  ALLERY AND IMMUNOLOGIC: No hives or eczema    Vital Signs Last 24 Hrs  T(C): 35.7 (12 Jul 2021 04:55), Max: 36.8 (11 Jul 2021 12:46)  T(F): 96.2 (12 Jul 2021 04:55), Max: 98.3 (11 Jul 2021 12:46)  HR: 68 (12 Jul 2021 04:55) (68 - 80)  BP: 141/80 (12 Jul 2021 04:55) (135/77 - 195/105)  BP(mean): --  RR: 18 (12 Jul 2021 04:55) (18 - 18)  SpO2: --    PHYSICAL EXAM:  GENERAL: NAD, well-groomed, well-developed  HEAD:  Atraumatic, Normocephalic  EYES: EOMI, PERRLA, conjunctiva and sclera clear  ENMT: Moist mucous membranes, Good dentition, No lesions  NECK: Supple, No JVD, Normal thyroid  NERVOUS SYSTEM:  Alert & Oriented X3, Good concentration; Motor Strength 5/5 B/L upper and lower extremities; DTRs 2+ intact and symmetric  CHEST/LUNG: Clear to auscultation bilaterally; No rales, rhonchi, wheezing, or rubs  HEART: Regular rate and rhythm; No murmurs, rubs, or gallops  ABDOMEN: Soft, Nontender, Nondistended; Bowel sounds present  EXTREMITIES:  2+ Peripheral Pulses, No clubbing, cyanosis, or edema  LYMPH: No lymphadenopathy noted  SKIN: No rashes or lesions    Consultant(s) Notes Reviewed:  [x ] YES  [ ] NO  Care Discussed with Consultants/Other Providers [ x] YES  [ ] NO    LABS:                        9.2    9.20  )-----------( 188      ( 12 Jul 2021 05:32 )             30.8     07-12    140  |  110  |  45<H>  ----------------------------<  100<H>  4.7   |  20  |  2.1<H>    Ca    8.1<L>      12 Jul 2021 05:32  Mg     1.9     07-12    TPro  5.0<L>  /  Alb  2.5<L>  /  TBili  0.4  /  DBili  x   /  AST  25  /  ALT  34  /  AlkPhos  119<H>  07-12          CAPILLARY BLOOD GLUCOSE      POCT Blood Glucose.: 131 mg/dL (12 Jul 2021 11:37)  POCT Blood Glucose.: 102 mg/dL (12 Jul 2021 07:48)  POCT Blood Glucose.: 157 mg/dL (11 Jul 2021 21:17)  POCT Blood Glucose.: 94 mg/dL (11 Jul 2021 16:50)  POCT Blood Glucose.: 171 mg/dL (11 Jul 2021 11:52)      RADIOLOGY & ADDITIONAL TESTS:    Imaging Personally Reviewed:  [ ] YES  [ ] NO GILLAIN MENDOZA  56y  Male    HPI: 56M w/ PMHx HTN, HLD, T2DM on insulin, Glaucoma presents to the Ed c/o worsening LE swelling and pain for the past 1 month      INTERVAL HPI/OVERNIGHT EVENTS:  No acute events overnight. Patient reports that his leg swelling is significantly reduced. Denies CP, SOB, palpitations.     REVIEW OF SYSTEMS:  CONSTITUTIONAL: No fever, weight loss, or fatigue  EYES: No eye pain, visual disturbances, or discharge  ENMT:  No difficulty hearing, tinnitus, vertigo; No sinus or throat pain  NECK: No pain or stiffness  RESPIRATORY: No cough, wheezing, chills or hemoptysis; No shortness of breath  CARDIOVASCULAR: No chest pain, palpitations, dizziness, or leg swelling  GASTROINTESTINAL: No abdominal or epigastric pain. No diarrhea or constipation. No nausea, vomiting, or hematemesis. No melena or hematochezia.  GENITOURINARY: No dysuria, frequency, hematuria, or incontinence  NEUROLOGICAL: No headaches, memory loss, loss of strength, numbness, or tremors  MUSCULOSKELETAL: No joint pain or swelling; No muscle, back, or extremity pain  SKIN: No itching, burning, rashes, or lesions   LYMPH NODES: No enlarged glands  ENDOCRINE: No heat or cold intolerance; No hair loss  PSYCHIATRIC: No depression, anxiety, mood swings, or difficulty sleeping  HEME/LYMPH: No easy bruising, or bleeding gums  ALLERY AND IMMUNOLOGIC: No hives or eczema    Vital Signs Last 24 Hrs  T(C): 35.7 (12 Jul 2021 04:55), Max: 36.8 (11 Jul 2021 12:46)  T(F): 96.2 (12 Jul 2021 04:55), Max: 98.3 (11 Jul 2021 12:46)  HR: 68 (12 Jul 2021 04:55) (68 - 80)  BP: 141/80 (12 Jul 2021 04:55) (135/77 - 195/105)  BP(mean): --  RR: 18 (12 Jul 2021 04:55) (18 - 18)  SpO2: --    PHYSICAL EXAM:  GENERAL: NAD, well-groomed, well-developed  HEAD:  Atraumatic, Normocephalic  EYES: EOMI, PERRLA, conjunctiva and sclera clear  ENMT: Moist mucous membranes, Good dentition, No lesions  NECK: Supple, No JVD, Normal thyroid  NERVOUS SYSTEM:  Alert & Oriented X3, Good concentration; Motor Strength 5/5 B/L upper and lower extremities; DTRs 2+ intact and symmetric  CHEST/LUNG: Clear to auscultation bilaterally; No rales, rhonchi, wheezing, or rubs  HEART: Regular rate and rhythm; No murmurs, rubs, or gallops  ABDOMEN: Soft, Nontender, Nondistended; Bowel sounds present  EXTREMITIES:  2+ Peripheral Pulses, No clubbing, cyanosis, or edema  LYMPH: No lymphadenopathy noted  SKIN: No rashes or lesions    Consultant(s) Notes Reviewed:  [x ] YES  [ ] NO  Care Discussed with Consultants/Other Providers [ x] YES  [ ] NO    LABS:                        9.2    9.20  )-----------( 188      ( 12 Jul 2021 05:32 )             30.8     07-12    140  |  110  |  45<H>  ----------------------------<  100<H>  4.7   |  20  |  2.1<H>    Ca    8.1<L>      12 Jul 2021 05:32  Mg     1.9     07-12    TPro  5.0<L>  /  Alb  2.5<L>  /  TBili  0.4  /  DBili  x   /  AST  25  /  ALT  34  /  AlkPhos  119<H>  07-12          CAPILLARY BLOOD GLUCOSE      POCT Blood Glucose.: 131 mg/dL (12 Jul 2021 11:37)  POCT Blood Glucose.: 102 mg/dL (12 Jul 2021 07:48)  POCT Blood Glucose.: 157 mg/dL (11 Jul 2021 21:17)  POCT Blood Glucose.: 94 mg/dL (11 Jul 2021 16:50)  POCT Blood Glucose.: 171 mg/dL (11 Jul 2021 11:52)      RADIOLOGY & ADDITIONAL TESTS:    Imaging Personally Reviewed:  [ ] YES  [ ] NO GILLIAN MENDOZA  56y  Male    HPI: 56M w/ PMHx HTN, HLD, T2DM on insulin, Glaucoma presents to the Ed c/o worsening LE swelling and pain for the past 1 month      INTERVAL HPI/OVERNIGHT EVENTS:  No acute events overnight. Patient reports that his leg swelling is significantly reduced. Denies CP, SOB, palpitations.     REVIEW OF SYSTEMS:  CONSTITUTIONAL: No fever, weight loss, or fatigue  EYES: No eye pain, visual disturbances, or discharge  ENMT:  No difficulty hearing, tinnitus, vertigo; No sinus or throat pain  NECK: No pain or stiffness  RESPIRATORY: No cough, wheezing, chills or hemoptysis; No shortness of breath  CARDIOVASCULAR: No chest pain, palpitations, dizziness, or leg swelling  GASTROINTESTINAL: No abdominal or epigastric pain. No diarrhea or constipation. No nausea, vomiting, or hematemesis. No melena or hematochezia.  GENITOURINARY: No dysuria, frequency, hematuria, or incontinence  NEUROLOGICAL: No headaches, memory loss, loss of strength, numbness, or tremors  MUSCULOSKELETAL: No joint pain or swelling; No muscle, back, or extremity pain  SKIN: No itching, burning, rashes, or lesions   LYMPH NODES: No enlarged glands  ENDOCRINE: No heat or cold intolerance; No hair loss  PSYCHIATRIC: No depression, anxiety, mood swings, or difficulty sleeping  HEME/LYMPH: No easy bruising, or bleeding gums  ALLERY AND IMMUNOLOGIC: No hives or eczema    Vital Signs Last 24 Hrs  T(C): 35.7 (12 Jul 2021 04:55), Max: 36.8 (11 Jul 2021 12:46)  T(F): 96.2 (12 Jul 2021 04:55), Max: 98.3 (11 Jul 2021 12:46)  HR: 68 (12 Jul 2021 04:55) (68 - 80)  BP: 141/80 (12 Jul 2021 04:55) (135/77 - 195/105)  BP(mean): --  RR: 18 (12 Jul 2021 04:55) (18 - 18)  SpO2: --    PHYSICAL EXAM:  GENERAL: NAD, well-groomed, well-developed  HEAD:  Atraumatic, Normocephalic  EYES: EOMI, PERRLA, conjunctiva and sclera clear  ENMT: Moist mucous membranes  NECK: Supple, No JVD, Normal thyroid  NERVOUS SYSTEM:  Alert & Oriented X3, Good concentration  CHEST/LUNG: Clear to auscultation bilaterally; No rales, rhonchi, wheezing, or rubs  HEART: Regular rate and rhythm; No murmurs, rubs, or gallops  ABDOMEN: Soft, Nontender, Nondistended; Bowel sounds present  EXTREMITIES:  2+ Peripheral Pulses, b/l LE edema improving     Consultant(s) Notes Reviewed:  [x ] YES  [ ] NO  Care Discussed with Consultants/Other Providers [ x] YES  [ ] NO    LABS:                        9.2    9.20  )-----------( 188      ( 12 Jul 2021 05:32 )             30.8     07-12    140  |  110  |  45<H>  ----------------------------<  100<H>  4.7   |  20  |  2.1<H>    Ca    8.1<L>      12 Jul 2021 05:32  Mg     1.9     07-12    TPro  5.0<L>  /  Alb  2.5<L>  /  TBili  0.4  /  DBili  x   /  AST  25  /  ALT  34  /  AlkPhos  119<H>  07-12          CAPILLARY BLOOD GLUCOSE      POCT Blood Glucose.: 131 mg/dL (12 Jul 2021 11:37)  POCT Blood Glucose.: 102 mg/dL (12 Jul 2021 07:48)  POCT Blood Glucose.: 157 mg/dL (11 Jul 2021 21:17)  POCT Blood Glucose.: 94 mg/dL (11 Jul 2021 16:50)  POCT Blood Glucose.: 171 mg/dL (11 Jul 2021 11:52)      RADIOLOGY & ADDITIONAL TESTS:    Imaging Personally Reviewed:  [ ] YES  [ ] NO

## 2021-07-12 NOTE — PROGRESS NOTE ADULT - SUBJECTIVE AND OBJECTIVE BOX
GILLIAN MENDOZA  56y Male    INTERVAL HPI/OVERNIGHT EVENTS:    Pt feels well - no complaints of pain or SOB  awaiting cardiac cath   discussed case with cardiology attending today - keep pt NPO for procedure today    T(F): 96.2 (21 @ 04:55), Max: 98.3 (21 @ 12:46)  HR: 74 (21 @ 11:50) (68 - 80)  BP: 168/84 (21 @ 11:50) (135/77 - 195/105)  RR: 18 (21 @ 04:55) (18 - 18)  SpO2: --    I&O's Summary    2021 07:01  -  2021 07:00  --------------------------------------------------------  IN: 857 mL / OUT: 950 mL / NET: -93 mL        Daily Weight in k.9 (2021 04:55)    CAPILLARY BLOOD GLUCOSE      POCT Blood Glucose.: 131 mg/dL (2021 11:37)  POCT Blood Glucose.: 102 mg/dL (2021 07:48)  POCT Blood Glucose.: 157 mg/dL (2021 21:17)  POCT Blood Glucose.: 94 mg/dL (2021 16:50)        PHYSICAL EXAM:  GENERAL: NAD  HEAD:  Normocephalic  EYES:  conjunctiva and sclera clear  ENMT: Moist mucous membranes  NERVOUS SYSTEM:  Alert, awake, Good concentration  CHEST/LUNG: CTA b/l - improved air entry of left base  HEART: Regular rate and rhythm  ABDOMEN: Soft, Nontender, Nondistended  EXTREMITIES: + pedal edema      Consultant(s) Notes Reviewed:  [x ] YES  [ ] NO  Care Discussed with Consultants/Other Providers [ x] YES  [ ] NO    MEDICATIONS  (STANDING):  atorvastatin 40 milliGRAM(s) Oral at bedtime  brimonidine 0.2% Ophthalmic Solution 1 Drop(s) Both EYES two times a day  chlorhexidine 4% Liquid 1 Application(s) Topical <User Schedule>  dextrose 40% Gel 15 Gram(s) Oral once  dextrose 5%. 1000 milliLiter(s) (50 mL/Hr) IV Continuous <Continuous>  dextrose 5%. 1000 milliLiter(s) (100 mL/Hr) IV Continuous <Continuous>  dextrose 50% Injectable 25 Gram(s) IV Push once  dextrose 50% Injectable 12.5 Gram(s) IV Push once  dextrose 50% Injectable 25 Gram(s) IV Push once  dorzolamide 2% Ophthalmic Solution 1 Drop(s) Both EYES three times a day  enoxaparin Injectable 40 milliGRAM(s) SubCutaneous daily  gabapentin 200 milliGRAM(s) Oral two times a day  glucagon  Injectable 1 milliGRAM(s) IntraMuscular once  hydrALAZINE 50 milliGRAM(s) Oral three times a day  insulin glargine Injectable (LANTUS) 8 Unit(s) SubCutaneous every morning  insulin lispro (ADMELOG) corrective regimen sliding scale   SubCutaneous three times a day before meals  insulin lispro Injectable (ADMELOG) 3 Unit(s) SubCutaneous three times a day before meals  isosorbide   dinitrate Tablet (ISORDIL) 20 milliGRAM(s) Oral three times a day  latanoprost 0.005% Ophthalmic Solution 1 Drop(s) Both EYES at bedtime  metoprolol succinate ER 25 milliGRAM(s) Oral daily  timolol 0.5% Solution 1 Drop(s) Both EYES two times a day    MEDICATIONS  (PRN):      Telemetry reviewed by me    LABS:                        9.2    9.20  )-----------( 188      ( 2021 05:32 )             30.8         140  |  110  |  45<H>  ----------------------------<  100<H>  4.7   |  20  |  2.1<H>    Ca    8.1<L>      2021 05:32  Mg     1.9         TPro  5.0<L>  /  Alb  2.5<L>  /  TBili  0.4  /  DBili  x   /  AST  25  /  ALT  34  /  AlkPhos  119<H>                  Case discussed with residents and RN on rounds today    Care discussed with pt

## 2021-07-12 NOTE — PROGRESS NOTE ADULT - ASSESSMENT
Acute on chronic systolic HF / LNENY/ Fluid overload     Patient is slightly fluid overloaded on exam    Ischemic workup - coronary angiogram   Get BMP daily   Maintain potassium >4.0, Mg >2.1  Strict intake and output  Daily weight   Plan discussed with primary team  Will continue to follow  Acute on chronic systolic HF / LENNY/ Fluid overload     Patient is euvolemic on exam  Continue Hydralazine 50 mg TID  Continue Isosorbide 20 mg TID  Switch Metoprolol to Carvedilol 12.5 mg BID  LHC showed 3V CAD, CT consulted for possible CABG  Get BMP daily   Maintain potassium >4.0, Mg >2.1  Strict intake and output  Daily weight   Will continue to follow  Acute on chronic systolic HF / LENNY/ Fluid overload     Patient is euvolemic on exam  Continue Hydralazine 50 mg TID  Continue Isosorbide 20 mg TID  Switch Metoprolol to Carvedilol 12.5 mg BID  LHC showed 3V CAD, CT consulted for possible CABG  Get BMP daily   Maintain potassium >4.0, Mg >2.1  Strict intake and output  Daily weight   Will continue to follow

## 2021-07-12 NOTE — PROGRESS NOTE ADULT - SUBJECTIVE AND OBJECTIVE BOX
Date of Admission: 21    HISTORY OF PRESENT ILLNESS:    HPI: 56M w/ PMHx HTN, HLD, T2DM on insulin, Glaucoma. He presents to the Ed c/o worsening LE swelling and pain for the past 1 month. Endorses SOB on exertion, along w/ increased abdominal girth. Denies orthopnea, PND, wheeze, CP, diaphoresis. No cardiac Hx.    Patient reports about two months ago he was able to walk and climb stairs without any limitations. For the past couple weeks he developed a BLE edema. Patient denies c/p, palpitations, headaches, bleeding, LH, dizziness, orthopnea, PND, LOC, cough, feeling bloated, N/V/D.     PAST MEDICAL & SURGICAL HISTORY:     HTN (hypertension)  HLD (hyperlipidemia)  DM (diabetes mellitus)  Glaucoma  No significant past surgical history        FAMILY HISTORY:    Mother:  77 HTN  Father:  at 55 of CKD       SOCIAL HISTORY:      Smokes about 3 cigarettes a week, social alcohol drinking, denies drug use    Allergies    No Known Allergies    Intolerances    	    REVIEW OF SYSTEMS:  CONSTITUTIONAL: No fever, weight loss, or fatigue  CARDIOLOGY: denies chest pain, shortness of breath or syncopal episodes.   RESPIRATORY: denies shortness of breath   NEUROLOGICAL: NO weakness, no focal deficits to report.  ENDOCRINOLOGICAL: no recent change in diabetic medications.   GI: no BRBPR, no N,V, diarrhea.    PSYCHIATRY: normal mood and affect  HEENT: no nasal discharge, no ecchymosis  SKIN: no ecchymosis, no breakdown  MUSCULOSKELETAL: Full range of motion x4.     PHYSICAL EXAM:    General Appearance: well appearing, normal for age and gender. 	  Neck: normal JVP, no bruit.   Eyes: Extra Ocular muscles intact.   Cardiovascular: regular rate and rhythm S1 S2, No JVD, No murmurs, No edema  Respiratory: Lungs clear to auscultation	  Psychiatry: Alert and oriented x 3, Mood & affect appropriate  Gastrointestinal:  Soft, Non-tender  Skin/Integumen: No rashes, No ecchymoses, No cyanosis	  Neurologic: Non-focal  Musculoskeletal/ extremities: Normal range of motion, No clubbing, cyanosis or edema  Vascular: Peripheral pulses palpable 2+ bilaterally      PREVIOUS DIAGNOSTIC TESTING:      TTE 21    Summary:   1. Moderately decreased global left ventricular systolic function.   2. Multiple left ventricular regional wall motion abnormalities exist. See wall motion findings.   3. LV Ejection Fraction by Garcia's Method with a biplane EF of 34 %.   4. Moderately increased LV wall thickness.   5. Normal left ventricular internal cavity size.   6. Mild to moderately enlarged left atrium.   7. Normal right atrial size.   8. Moderate pericardial effusion.   9. Mild to moderate mitral valve regurgitation.  10. Structurally normal mitral valve, with normal leaflet excursion.  11. Moderate tricuspid regurgitation.  12. Mild aortic regurgitation.  13. Normal trileaflet aortic valve with normal opening.  14. Mild pulmonic valve regurgitation.  15. Estimated pulmonary artery systolic pressure is 52.0 mmHg assuming a right atrial pressure of 10 mmHg, which is consistent with moderate pulmonary hypertension.  	    Home Medications:  atorvastatin 40 mg oral tablet: 1 tab(s) orally once a day (2021 04:46)  BASAGLAR 100 UNIT/ML KWIKPEN:  (:46)  brimonidine 0.2% ophthalmic solution: 1 drop(s) to each affected eye 2 times a day (2021 04:46)  dorzolamide-timolol 2%-0.5% preservative-free ophthalmic solution: 1 drop(s) to each affected eye 2 times a day (2021 04:46)  furosemide 20 mg oral tablet: 1 tab(s) orally once a day (2021 04:46)  latanoprost 0.005% ophthalmic solution: 1 drop(s) to each affected eye once a day (in the evening) (2021 04:46)  lisinopril 20 mg oral tablet: 1 tab(s) orally once a day (2021 04:46)  pioglitazone-metformin 15 mg-850 mg oral tablet: 1 tab(s) orally 2 times a day (2021 04:46)  Rhopressa 0.02% ophthalmic solution: 1 drop(s) to each affected eye once a day (in the evening) (2021 04:46)    MEDICATIONS  (STANDING):  atorvastatin 40 milliGRAM(s) Oral at bedtime  brimonidine 0.2% Ophthalmic Solution 1 Drop(s) Both EYES two times a day  chlorhexidine 4% Liquid 1 Application(s) Topical <User Schedule>  dextrose 40% Gel 15 Gram(s) Oral once  dextrose 5%. 1000 milliLiter(s) (50 mL/Hr) IV Continuous <Continuous>  dextrose 5%. 1000 milliLiter(s) (100 mL/Hr) IV Continuous <Continuous>  dextrose 50% Injectable 25 Gram(s) IV Push once  dextrose 50% Injectable 12.5 Gram(s) IV Push once  dextrose 50% Injectable 25 Gram(s) IV Push once  dorzolamide 2% Ophthalmic Solution 1 Drop(s) Both EYES three times a day  enoxaparin Injectable 40 milliGRAM(s) SubCutaneous daily  gabapentin 200 milliGRAM(s) Oral two times a day  glucagon  Injectable 1 milliGRAM(s) IntraMuscular once  insulin glargine Injectable (LANTUS) 10 Unit(s) SubCutaneous every morning  insulin lispro (ADMELOG) corrective regimen sliding scale   SubCutaneous three times a day before meals  insulin lispro Injectable (ADMELOG) 3 Unit(s) SubCutaneous three times a day before meals  latanoprost 0.005% Ophthalmic Solution 1 Drop(s) Both EYES at bedtime  NIFEdipine XL 60 milliGRAM(s) Oral daily  timolol 0.5% Solution 1 Drop(s) Both EYES two times a day    MEDICATIONS  (PRN):         Date of Admission: 21    Interval History:    - Patient sitting in a chair, in NAD  - Pending [facility] today    HISTORY OF PRESENT ILLNESS:    HPI: 56M w/ PMHx HTN, HLD, T2DM on insulin, Glaucoma. He presents to the Ed c/o worsening LE swelling and pain for the past 1 month. Endorses SOB on exertion, along w/ increased abdominal girth. Denies orthopnea, PND, wheeze, CP, diaphoresis. No cardiac Hx.    Patient reports about two months ago he was able to walk and climb stairs without any limitations. For the past couple weeks he developed a BLE edema. Patient denies c/p, palpitations, headaches, bleeding, LH, dizziness, orthopnea, PND, LOC, cough, feeling bloated, N/V/D.     PAST MEDICAL & SURGICAL HISTORY:     HTN (hypertension)  HLD (hyperlipidemia)  DM (diabetes mellitus)  Glaucoma  No significant past surgical history        FAMILY HISTORY:    Mother:  77 HTN  Father:  at 55 of CKD       SOCIAL HISTORY:      Smokes about 3 cigarettes a week, social alcohol drinking, denies drug use    Allergies    No Known Allergies    Intolerances      PHYSICAL EXAM:    General Appearance: well appearing, normal for age and gender. 	  Neck: normal JVP, no bruit.   Cardiovascular: regular rate and rhythm S1 S2, No JVD, No murmurs, No edema  Respiratory: Lungs clear to auscultation	  Psychiatry: Alert and oriented x 3, Mood & affect appropriate  Gastrointestinal:  Soft, Non-tender  Musculoskeletal/ extremities: Normal range of motion, No clubbing, cyanosis or edema  Vascular: Peripheral pulses palpable 2+ bilaterally      PREVIOUS DIAGNOSTIC TESTING:      TTE 21    Summary:   1. Moderately decreased global left ventricular systolic function.   2. Multiple left ventricular regional wall motion abnormalities exist. See wall motion findings.   3. LV Ejection Fraction by Garcia's Method with a biplane EF of 34 %.   4. Moderately increased LV wall thickness.   5. Normal left ventricular internal cavity size.   6. Mild to moderately enlarged left atrium.   7. Normal right atrial size.   8. Moderate pericardial effusion.   9. Mild to moderate mitral valve regurgitation.  10. Structurally normal mitral valve, with normal leaflet excursion.  11. Moderate tricuspid regurgitation.  12. Mild aortic regurgitation.  13. Normal trileaflet aortic valve with normal opening.  14. Mild pulmonic valve regurgitation.  15. Estimated pulmonary artery systolic pressure is 52.0 mmHg assuming a right atrial pressure of 10 mmHg, which is consistent with moderate pulmonary hypertension.  	    Home Medications:  atorvastatin 40 mg oral tablet: 1 tab(s) orally once a day (2021 04:46)  BASAGLAR 100 UNIT/ML KWIKPEN:  (2021 04:46)  brimonidine 0.2% ophthalmic solution: 1 drop(s) to each affected eye 2 times a day (2021 04:46)  dorzolamide-timolol 2%-0.5% preservative-free ophthalmic solution: 1 drop(s) to each affected eye 2 times a day (2021 04:46)  furosemide 20 mg oral tablet: 1 tab(s) orally once a day (2021 04:46)  latanoprost 0.005% ophthalmic solution: 1 drop(s) to each affected eye once a day (in the evening) (2021 04:46)  lisinopril 20 mg oral tablet: 1 tab(s) orally once a day (2021 04:46)  pioglitazone-metformin 15 mg-850 mg oral tablet: 1 tab(s) orally 2 times a day (2021 04:46)  Rhopressa 0.02% ophthalmic solution: 1 drop(s) to each affected eye once a day (in the evening) (2021 04:46)    MEDICATIONS  (STANDING):  atorvastatin 40 milliGRAM(s) Oral at bedtime  brimonidine 0.2% Ophthalmic Solution 1 Drop(s) Both EYES two times a day  chlorhexidine 4% Liquid 1 Application(s) Topical <User Schedule>  dextrose 40% Gel 15 Gram(s) Oral once  dextrose 5%. 1000 milliLiter(s) (50 mL/Hr) IV Continuous <Continuous>  dextrose 5%. 1000 milliLiter(s) (100 mL/Hr) IV Continuous <Continuous>  dextrose 50% Injectable 25 Gram(s) IV Push once  dextrose 50% Injectable 12.5 Gram(s) IV Push once  dextrose 50% Injectable 25 Gram(s) IV Push once  dorzolamide 2% Ophthalmic Solution 1 Drop(s) Both EYES three times a day  enoxaparin Injectable 40 milliGRAM(s) SubCutaneous daily  gabapentin 200 milliGRAM(s) Oral two times a day  glucagon  Injectable 1 milliGRAM(s) IntraMuscular once  insulin glargine Injectable (LANTUS) 10 Unit(s) SubCutaneous every morning  insulin lispro (ADMELOG) corrective regimen sliding scale   SubCutaneous three times a day before meals  insulin lispro Injectable (ADMELOG) 3 Unit(s) SubCutaneous three times a day before meals  latanoprost 0.005% Ophthalmic Solution 1 Drop(s) Both EYES at bedtime  NIFEdipine XL 60 milliGRAM(s) Oral daily  timolol 0.5% Solution 1 Drop(s) Both EYES two times a day    MEDICATIONS  (PRN):

## 2021-07-12 NOTE — PROGRESS NOTE ADULT - SUBJECTIVE AND OBJECTIVE BOX
seen and examined  no new complaints         PAST HISTORY  --------------------------------------------------------------------------------  No significant changes to PMH, PSH, FHx, SHx, unless otherwise noted    ALLERGIES & MEDICATIONS  --------------------------------------------------------------------------------  Allergies    No Known Allergies    Intolerances      Standing Inpatient Medications  atorvastatin 40 milliGRAM(s) Oral at bedtime  brimonidine 0.2% Ophthalmic Solution 1 Drop(s) Both EYES two times a day  chlorhexidine 4% Liquid 1 Application(s) Topical <User Schedule>  dextrose 40% Gel 15 Gram(s) Oral once  dextrose 5%. 1000 milliLiter(s) IV Continuous <Continuous>  dextrose 5%. 1000 milliLiter(s) IV Continuous <Continuous>  dextrose 50% Injectable 25 Gram(s) IV Push once  dextrose 50% Injectable 12.5 Gram(s) IV Push once  dextrose 50% Injectable 25 Gram(s) IV Push once  dorzolamide 2% Ophthalmic Solution 1 Drop(s) Both EYES three times a day  enoxaparin Injectable 40 milliGRAM(s) SubCutaneous daily  gabapentin 200 milliGRAM(s) Oral two times a day  glucagon  Injectable 1 milliGRAM(s) IntraMuscular once  hydrALAZINE 50 milliGRAM(s) Oral three times a day  insulin glargine Injectable (LANTUS) 8 Unit(s) SubCutaneous every morning  insulin lispro (ADMELOG) corrective regimen sliding scale   SubCutaneous three times a day before meals  insulin lispro Injectable (ADMELOG) 3 Unit(s) SubCutaneous three times a day before meals  isosorbide   dinitrate Tablet (ISORDIL) 20 milliGRAM(s) Oral three times a day  latanoprost 0.005% Ophthalmic Solution 1 Drop(s) Both EYES at bedtime  metoprolol succinate ER 25 milliGRAM(s) Oral daily  timolol 0.5% Solution 1 Drop(s) Both EYES two times a day    PRN Inpatient Medications        VITALS/PHYSICAL EXAM  --------------------------------------------------------------------------------  T(C): 35.7 (07-12-21 @ 04:55), Max: 36.8 (07-11-21 @ 12:46)  HR: 68 (07-12-21 @ 04:55) (68 - 80)  BP: 141/80 (07-12-21 @ 04:55) (135/77 - 195/105)  RR: 18 (07-12-21 @ 04:55) (18 - 18)  SpO2: --  Wt(kg): --        07-11-21 @ 07:01  -  07-12-21 @ 07:00  --------------------------------------------------------  IN: 857 mL / OUT: 950 mL / NET: -93 mL      Physical Exam:  	Gen: NAD  	Pulm: CTA B/L  	CV: S1S2; no rub  	Abd:  soft, nontender/nondistended  	LE:  no edema      LABS/STUDIES  --------------------------------------------------------------------------------              9.2    9.20  >-----------<  188      [07-12-21 @ 05:32]              30.8     140  |  110  |  45  ----------------------------<  100      [07-12-21 @ 05:32]  4.7   |  20  |  2.1        Ca     8.1     [07-12-21 @ 05:32]      Mg     1.9     [07-12-21 @ 05:32]    TPro  5.0  /  Alb  2.5  /  TBili  0.4  /  DBili  x   /  AST  25  /  ALT  34  /  AlkPhos  119  [07-12-21 @ 05:32]      Creatinine Trend:  SCr 2.1 [07-12 @ 05:32]  SCr 2.1 [07-11 @ 05:29]  SCr 2.1 [07-10 @ 05:47]  SCr 2.1 [07-09 @ 05:31]  SCr 2.5 [07-08 @ 06:42]    Urinalysis - [07-03-21 @ 16:34]      Color Light Yellow / Appearance Clear / SG 1.013 / pH 7.0      Gluc 500 mg/dL / Ketone Negative  / Bili Negative / Urobili <2 mg/dL       Blood Moderate / Protein 300 mg/dL / Leuk Est Negative / Nitrite Negative      RBC 8 / WBC 2 / Hyaline 1 / Gran  / Sq Epi  / Non Sq Epi 1 / Bacteria Negative      Iron 43, TIBC 198, %sat 22      [07-02-21 @ 11:37]  Ferritin 255      [07-02-21 @ 11:37]    HBsAg Nonreact      [07-02-21 @ 11:37]  HCV 0.10, Nonreact      [07-02-21 @ 11:37]    C3 Complement 108      [07-09-21 @ 05:31]  C4 Complement 23      [07-09-21 @ 05:31]  Free Light Chains: kappa 9.68, lambda 4.91, ratio = 1.97      [07-08 @ 15:33]

## 2021-07-12 NOTE — PROGRESS NOTE ADULT - ASSESSMENT
55 y/o man with PMH of HTN, CKD from 2020, DM type 2 on insulin with peripheral neuropathy and Glaucoma presented to the ED c/o worsening LE swelling and pain for the past 1 month.    1. Newly diagnosed acute HFrEF  - moderate pericardial effusion and pulm HTN  - was on IV lasix for diuresis and then held for rising Cr  - continue to hold diuretic for now per renal  - volume status improved   - heart failure following  - continue metoprolol, hydralazine, isosorbide  - case discussed with cardiology attending - cath scheduled for today as add on - pt may have breakfast on Monday and then NPO for cath  - per renal, pt at moderate-high risk for ROSENDO - HOLD diuretics, no IVF, benefits outweigh the risk of IV contrast, cont to monitor BMP daily  - daily wts, I's and O's, low sodium diet    2. HTN - uncontrolled - increased hydralazine to 50mg tid on 7/11 and monitor    3. LENNY over CKD 3 - nonoliguric  - CKD progressed over the past year  - nephrotic range proteinuria now   - Cr rising with diuresis so lasix was held - now trending down and stable at 2.1  - no hydronephrosis on US  - A1C 5.9 so DM seems well controlled as outpt now but pt with neuropathy  - likely has retinopathy also  - renal f/u appreciated - proteinuria likely from DM but will work up other causes  - f/u labs per renal note  - daily BMP and monitor urine output    4. DM type 2 with peripheral neuropathy   - On Lantus and Metformin-Pioglitazone at home  -  A1c 5.8  - continue insulin inpt - lantus dose decreased and FS stable  - discontinue pioglitazone on discharge - metformin will also need to be stopped if renal function does not improve by discharge  -on gabapentin 200 mg BID renally adjusted  - duplex negative  - arterial duplex with moderate atherosclerotic disease    5. Glaucoma  - on Eye drops    6. DVT prophylaxis -   heparin SQ         PROGRESS NOTE HANDOFF    Pending: cardiac cath today, BMP in AM    pt aware of plan of care    Disposition: home

## 2021-07-13 LAB
ALBUMIN SERPL ELPH-MCNC: 3 G/DL — LOW (ref 3.5–5.2)
ALP SERPL-CCNC: 155 U/L — HIGH (ref 30–115)
ALT FLD-CCNC: 34 U/L — SIGNIFICANT CHANGE UP (ref 0–41)
ANION GAP SERPL CALC-SCNC: 5 MMOL/L — LOW (ref 7–14)
AST SERPL-CCNC: 24 U/L — SIGNIFICANT CHANGE UP (ref 0–41)
BASOPHILS # BLD AUTO: 0.03 K/UL — SIGNIFICANT CHANGE UP (ref 0–0.2)
BASOPHILS NFR BLD AUTO: 0.4 % — SIGNIFICANT CHANGE UP (ref 0–1)
BILIRUB SERPL-MCNC: 0.4 MG/DL — SIGNIFICANT CHANGE UP (ref 0.2–1.2)
BUN SERPL-MCNC: 43 MG/DL — HIGH (ref 10–20)
CALCIUM SERPL-MCNC: 8.5 MG/DL — SIGNIFICANT CHANGE UP (ref 8.5–10.1)
CHLORIDE SERPL-SCNC: 109 MMOL/L — SIGNIFICANT CHANGE UP (ref 98–110)
CO2 SERPL-SCNC: 26 MMOL/L — SIGNIFICANT CHANGE UP (ref 17–32)
CREAT SERPL-MCNC: 2.1 MG/DL — HIGH (ref 0.7–1.5)
EOSINOPHIL # BLD AUTO: 0.53 K/UL — SIGNIFICANT CHANGE UP (ref 0–0.7)
EOSINOPHIL NFR BLD AUTO: 6.4 % — SIGNIFICANT CHANGE UP (ref 0–8)
GLUCOSE BLDC GLUCOMTR-MCNC: 162 MG/DL — HIGH (ref 70–99)
GLUCOSE BLDC GLUCOMTR-MCNC: 169 MG/DL — HIGH (ref 70–99)
GLUCOSE BLDC GLUCOMTR-MCNC: 191 MG/DL — HIGH (ref 70–99)
GLUCOSE BLDC GLUCOMTR-MCNC: 224 MG/DL — HIGH (ref 70–99)
GLUCOSE SERPL-MCNC: 116 MG/DL — HIGH (ref 70–99)
HCT VFR BLD CALC: 33.3 % — LOW (ref 42–52)
HGB BLD-MCNC: 9.8 G/DL — LOW (ref 14–18)
IMM GRANULOCYTES NFR BLD AUTO: 0.2 % — SIGNIFICANT CHANGE UP (ref 0.1–0.3)
LYMPHOCYTES # BLD AUTO: 0.97 K/UL — LOW (ref 1.2–3.4)
LYMPHOCYTES # BLD AUTO: 11.7 % — LOW (ref 20.5–51.1)
MAGNESIUM SERPL-MCNC: 2.4 MG/DL — SIGNIFICANT CHANGE UP (ref 1.8–2.4)
MCHC RBC-ENTMCNC: 24 PG — LOW (ref 27–31)
MCHC RBC-ENTMCNC: 29.4 G/DL — LOW (ref 32–37)
MCV RBC AUTO: 81.4 FL — SIGNIFICANT CHANGE UP (ref 80–94)
MONOCYTES # BLD AUTO: 0.56 K/UL — SIGNIFICANT CHANGE UP (ref 0.1–0.6)
MONOCYTES NFR BLD AUTO: 6.8 % — SIGNIFICANT CHANGE UP (ref 1.7–9.3)
NEUTROPHILS # BLD AUTO: 6.16 K/UL — SIGNIFICANT CHANGE UP (ref 1.4–6.5)
NEUTROPHILS NFR BLD AUTO: 74.5 % — SIGNIFICANT CHANGE UP (ref 42.2–75.2)
NRBC # BLD: 0 /100 WBCS — SIGNIFICANT CHANGE UP (ref 0–0)
PLATELET # BLD AUTO: 214 K/UL — SIGNIFICANT CHANGE UP (ref 130–400)
POTASSIUM SERPL-MCNC: 4.9 MMOL/L — SIGNIFICANT CHANGE UP (ref 3.5–5)
POTASSIUM SERPL-SCNC: 4.9 MMOL/L — SIGNIFICANT CHANGE UP (ref 3.5–5)
PROT SERPL-MCNC: 5.8 G/DL — LOW (ref 6–8)
RBC # BLD: 4.09 M/UL — LOW (ref 4.7–6.1)
RBC # FLD: 14.4 % — SIGNIFICANT CHANGE UP (ref 11.5–14.5)
SODIUM SERPL-SCNC: 140 MMOL/L — SIGNIFICANT CHANGE UP (ref 135–146)
WBC # BLD: 8.27 K/UL — SIGNIFICANT CHANGE UP (ref 4.8–10.8)
WBC # FLD AUTO: 8.27 K/UL — SIGNIFICANT CHANGE UP (ref 4.8–10.8)

## 2021-07-13 PROCEDURE — 71250 CT THORAX DX C-: CPT | Mod: 26

## 2021-07-13 PROCEDURE — 99233 SBSQ HOSP IP/OBS HIGH 50: CPT

## 2021-07-13 PROCEDURE — 93923 UPR/LXTR ART STDY 3+ LVLS: CPT | Mod: 26

## 2021-07-13 PROCEDURE — 74176 CT ABD & PELVIS W/O CONTRAST: CPT | Mod: 26

## 2021-07-13 PROCEDURE — 99232 SBSQ HOSP IP/OBS MODERATE 35: CPT

## 2021-07-13 RX ORDER — AMIODARONE HYDROCHLORIDE 400 MG/1
200 TABLET ORAL DAILY
Refills: 0 | Status: DISCONTINUED | OUTPATIENT
Start: 2021-07-17 | End: 2021-07-17

## 2021-07-13 RX ORDER — AMIODARONE HYDROCHLORIDE 400 MG/1
400 TABLET ORAL EVERY 8 HOURS
Refills: 0 | Status: COMPLETED | OUTPATIENT
Start: 2021-07-13 | End: 2021-07-16

## 2021-07-13 RX ORDER — AMIODARONE HYDROCHLORIDE 400 MG/1
TABLET ORAL
Refills: 0 | Status: DISCONTINUED | OUTPATIENT
Start: 2021-07-13 | End: 2021-07-17

## 2021-07-13 RX ORDER — CARVEDILOL PHOSPHATE 80 MG/1
12.5 CAPSULE, EXTENDED RELEASE ORAL EVERY 12 HOURS
Refills: 0 | Status: DISCONTINUED | OUTPATIENT
Start: 2021-07-13 | End: 2021-07-18

## 2021-07-13 RX ADMIN — ATORVASTATIN CALCIUM 40 MILLIGRAM(S): 80 TABLET, FILM COATED ORAL at 21:32

## 2021-07-13 RX ADMIN — Medication 1: at 17:19

## 2021-07-13 RX ADMIN — ISOSORBIDE DINITRATE 20 MILLIGRAM(S): 5 TABLET ORAL at 11:46

## 2021-07-13 RX ADMIN — CARVEDILOL PHOSPHATE 12.5 MILLIGRAM(S): 80 CAPSULE, EXTENDED RELEASE ORAL at 17:24

## 2021-07-13 RX ADMIN — HEPARIN SODIUM 5000 UNIT(S): 5000 INJECTION INTRAVENOUS; SUBCUTANEOUS at 21:32

## 2021-07-13 RX ADMIN — ISOSORBIDE DINITRATE 20 MILLIGRAM(S): 5 TABLET ORAL at 05:59

## 2021-07-13 RX ADMIN — HEPARIN SODIUM 5000 UNIT(S): 5000 INJECTION INTRAVENOUS; SUBCUTANEOUS at 06:00

## 2021-07-13 RX ADMIN — Medication 3 UNIT(S): at 11:47

## 2021-07-13 RX ADMIN — DORZOLAMIDE HYDROCHLORIDE 1 DROP(S): 20 SOLUTION/ DROPS OPHTHALMIC at 21:32

## 2021-07-13 RX ADMIN — GABAPENTIN 200 MILLIGRAM(S): 400 CAPSULE ORAL at 05:58

## 2021-07-13 RX ADMIN — AMIODARONE HYDROCHLORIDE 400 MILLIGRAM(S): 400 TABLET ORAL at 21:31

## 2021-07-13 RX ADMIN — Medication 81 MILLIGRAM(S): at 11:52

## 2021-07-13 RX ADMIN — AMIODARONE HYDROCHLORIDE 400 MILLIGRAM(S): 400 TABLET ORAL at 14:39

## 2021-07-13 RX ADMIN — DORZOLAMIDE HYDROCHLORIDE 1 DROP(S): 20 SOLUTION/ DROPS OPHTHALMIC at 14:41

## 2021-07-13 RX ADMIN — Medication 50 MILLIGRAM(S): at 21:32

## 2021-07-13 RX ADMIN — Medication 1: at 11:47

## 2021-07-13 RX ADMIN — BRIMONIDINE TARTRATE 1 DROP(S): 2 SOLUTION/ DROPS OPHTHALMIC at 06:00

## 2021-07-13 RX ADMIN — Medication 1: at 08:15

## 2021-07-13 RX ADMIN — Medication 3 UNIT(S): at 08:14

## 2021-07-13 RX ADMIN — DORZOLAMIDE HYDROCHLORIDE 1 DROP(S): 20 SOLUTION/ DROPS OPHTHALMIC at 05:57

## 2021-07-13 RX ADMIN — Medication 50 MILLIGRAM(S): at 05:59

## 2021-07-13 RX ADMIN — HEPARIN SODIUM 5000 UNIT(S): 5000 INJECTION INTRAVENOUS; SUBCUTANEOUS at 14:40

## 2021-07-13 RX ADMIN — CHLORHEXIDINE GLUCONATE 1 APPLICATION(S): 213 SOLUTION TOPICAL at 05:57

## 2021-07-13 RX ADMIN — Medication 3 UNIT(S): at 17:18

## 2021-07-13 RX ADMIN — LATANOPROST 1 DROP(S): 0.05 SOLUTION/ DROPS OPHTHALMIC; TOPICAL at 21:32

## 2021-07-13 RX ADMIN — BRIMONIDINE TARTRATE 1 DROP(S): 2 SOLUTION/ DROPS OPHTHALMIC at 17:25

## 2021-07-13 RX ADMIN — GABAPENTIN 200 MILLIGRAM(S): 400 CAPSULE ORAL at 17:24

## 2021-07-13 RX ADMIN — Medication 1 DROP(S): at 06:00

## 2021-07-13 RX ADMIN — Medication 1 DROP(S): at 17:25

## 2021-07-13 RX ADMIN — Medication 50 MILLIGRAM(S): at 14:40

## 2021-07-13 RX ADMIN — Medication 25 MILLIGRAM(S): at 05:59

## 2021-07-13 RX ADMIN — INSULIN GLARGINE 8 UNIT(S): 100 INJECTION, SOLUTION SUBCUTANEOUS at 11:48

## 2021-07-13 RX ADMIN — ISOSORBIDE DINITRATE 20 MILLIGRAM(S): 5 TABLET ORAL at 17:23

## 2021-07-13 NOTE — PROGRESS NOTE ADULT - SUBJECTIVE AND OBJECTIVE BOX
GILLIAN MENDOZA  56y Male    INTERVAL HPI/OVERNIGHT EVENTS:    Pt denies chest pain or SOB  seen by heart failure and CT surgery teams this morning    T(F): 97 (21 @ 04:30), Max: 97.2 (21 @ 13:48)  HR: 69 (21 @ 04:30) (69 - 72)  BP: 145/88 (21 @ 04:30) (133/77 - 155/93)  RR: 18 (21 @ 04:30) (18 - 18)  SpO2: --    I&O's Summary    2021 07:01  -  2021 07:00  --------------------------------------------------------  IN: 200 mL / OUT: 675 mL / NET: -475 mL      Daily Weight in k.3 (2021 04:30)    CAPILLARY BLOOD GLUCOSE      POCT Blood Glucose.: 162 mg/dL (2021 11:26)  POCT Blood Glucose.: 169 mg/dL (2021 07:28)  POCT Blood Glucose.: 170 mg/dL (2021 21:38)  POCT Blood Glucose.: 100 mg/dL (2021 18:17)        PHYSICAL EXAM:  GENERAL: NAD  HEAD:  Normocephalic  EYES:  conjunctiva and sclera clear  ENMT: Moist mucous membranes  NERVOUS SYSTEM:  Alert, awake, Good concentration  CHEST/LUNG: CTA b/l  HEART: Regular rate and rhythm  ABDOMEN: Soft, Nontender, Nondistended  EXTREMITIES: + pedal edema     Consultant(s) Notes Reviewed:  [x ] YES  [ ] NO  Care Discussed with Consultants/Other Providers [ x] YES  [ ] NO    MEDICATIONS  (STANDING):  aMIOdarone    Tablet   Oral   aMIOdarone    Tablet 400 milliGRAM(s) Oral every 8 hours  aspirin  chewable 81 milliGRAM(s) Oral daily  atorvastatin 40 milliGRAM(s) Oral at bedtime  brimonidine 0.2% Ophthalmic Solution 1 Drop(s) Both EYES two times a day  carvedilol 12.5 milliGRAM(s) Oral every 12 hours  chlorhexidine 4% Liquid 1 Application(s) Topical <User Schedule>  dextrose 40% Gel 15 Gram(s) Oral once  dextrose 5%. 1000 milliLiter(s) (50 mL/Hr) IV Continuous <Continuous>  dextrose 5%. 1000 milliLiter(s) (100 mL/Hr) IV Continuous <Continuous>  dextrose 50% Injectable 25 Gram(s) IV Push once  dextrose 50% Injectable 12.5 Gram(s) IV Push once  dextrose 50% Injectable 25 Gram(s) IV Push once  dorzolamide 2% Ophthalmic Solution 1 Drop(s) Both EYES three times a day  gabapentin 200 milliGRAM(s) Oral two times a day  glucagon  Injectable 1 milliGRAM(s) IntraMuscular once  heparin   Injectable 5000 Unit(s) SubCutaneous every 8 hours  hydrALAZINE 50 milliGRAM(s) Oral three times a day  insulin glargine Injectable (LANTUS) 8 Unit(s) SubCutaneous every morning  insulin lispro (ADMELOG) corrective regimen sliding scale   SubCutaneous three times a day before meals  insulin lispro Injectable (ADMELOG) 3 Unit(s) SubCutaneous three times a day before meals  isosorbide   dinitrate Tablet (ISORDIL) 20 milliGRAM(s) Oral three times a day  latanoprost 0.005% Ophthalmic Solution 1 Drop(s) Both EYES at bedtime  sodium chloride 0.9%. 1000 milliLiter(s) (75 mL/Hr) IV Continuous <Continuous>  timolol 0.5% Solution 1 Drop(s) Both EYES two times a day    MEDICATIONS  (PRN):      Telemetry reviewed by me    LABS:                        9.2    9.20  )-----------( 188      ( 2021 05:32 )             30.8         140  |  110  |  45<H>  ----------------------------<  100<H>  4.7   |  20  |  2.1<H>    Ca    8.1<L>      2021 05:32  Mg     1.9         TPro  5.0<L>  /  Alb  2.5<L>  /  TBili  0.4  /  DBili  x   /  AST  25  /  ALT  34  /  AlkPhos  119<H>                  Case discussed with residents and RN on rounds today    Care discussed with pt

## 2021-07-13 NOTE — PROGRESS NOTE ADULT - SUBJECTIVE AND OBJECTIVE BOX
seen and examined  no distress   lying comfortable         PAST HISTORY  --------------------------------------------------------------------------------  No significant changes to PMH, PSH, FHx, SHx, unless otherwise noted    ALLERGIES & MEDICATIONS  --------------------------------------------------------------------------------  Allergies    No Known Allergies    Intolerances      Standing Inpatient Medications  aspirin  chewable 81 milliGRAM(s) Oral daily  atorvastatin 40 milliGRAM(s) Oral at bedtime  brimonidine 0.2% Ophthalmic Solution 1 Drop(s) Both EYES two times a day  carvedilol 12.5 milliGRAM(s) Oral every 12 hours  chlorhexidine 4% Liquid 1 Application(s) Topical <User Schedule>  dextrose 40% Gel 15 Gram(s) Oral once  dextrose 5%. 1000 milliLiter(s) IV Continuous <Continuous>  dextrose 5%. 1000 milliLiter(s) IV Continuous <Continuous>  dextrose 50% Injectable 25 Gram(s) IV Push once  dextrose 50% Injectable 12.5 Gram(s) IV Push once  dextrose 50% Injectable 25 Gram(s) IV Push once  dorzolamide 2% Ophthalmic Solution 1 Drop(s) Both EYES three times a day  gabapentin 200 milliGRAM(s) Oral two times a day  glucagon  Injectable 1 milliGRAM(s) IntraMuscular once  heparin   Injectable 5000 Unit(s) SubCutaneous every 8 hours  hydrALAZINE 50 milliGRAM(s) Oral three times a day  insulin glargine Injectable (LANTUS) 8 Unit(s) SubCutaneous every morning  insulin lispro (ADMELOG) corrective regimen sliding scale   SubCutaneous three times a day before meals  insulin lispro Injectable (ADMELOG) 3 Unit(s) SubCutaneous three times a day before meals  isosorbide   dinitrate Tablet (ISORDIL) 20 milliGRAM(s) Oral three times a day  latanoprost 0.005% Ophthalmic Solution 1 Drop(s) Both EYES at bedtime  sodium chloride 0.9%. 1000 milliLiter(s) IV Continuous <Continuous>  timolol 0.5% Solution 1 Drop(s) Both EYES two times a day        VITALS/PHYSICAL EXAM  --------------------------------------------------------------------------------  T(C): 36.1 (07-13-21 @ 04:30), Max: 36.2 (07-12-21 @ 13:48)  HR: 69 (07-13-21 @ 04:30) (69 - 74)  BP: 145/88 (07-13-21 @ 04:30) (133/77 - 168/84)  RR: 18 (07-13-21 @ 04:30) (18 - 18)  SpO2: --  Wt(kg): --        07-12-21 @ 07:01  -  07-13-21 @ 07:00  --------------------------------------------------------  IN: 200 mL / OUT: 675 mL / NET: -475 mL      Physical Exam:  	Gen: NAD,  	Pulm: CTA B/L  	CV:  S1S2; no rub  	Abd: soft, nontender/nondistended  	    LABS/STUDIES  --------------------------------------------------------------------------------              9.2    9.20  >-----------<  188      [07-12-21 @ 05:32]              30.8     140  |  110  |  45  ----------------------------<  100      [07-12-21 @ 05:32]  4.7   |  20  |  2.1        Ca     8.1     [07-12-21 @ 05:32]      Mg     1.9     [07-12-21 @ 05:32]    TPro  5.0  /  Alb  2.5  /  TBili  0.4  /  DBili  x   /  AST  25  /  ALT  34  /  AlkPhos  119  [07-12-21 @ 05:32]          Creatinine Trend:  SCr 2.1 [07-12 @ 05:32]  SCr 2.1 [07-11 @ 05:29]  SCr 2.1 [07-10 @ 05:47]  SCr 2.1 [07-09 @ 05:31]  SCr 2.5 [07-08 @ 06:42]    Urinalysis - [07-03-21 @ 16:34]      Color Light Yellow / Appearance Clear / SG 1.013 / pH 7.0      Gluc 500 mg/dL / Ketone Negative  / Bili Negative / Urobili <2 mg/dL       Blood Moderate / Protein 300 mg/dL / Leuk Est Negative / Nitrite Negative      RBC 8 / WBC 2 / Hyaline 1 / Gran  / Sq Epi  / Non Sq Epi 1 / Bacteria Negative      Iron 43, TIBC 198, %sat 22      [07-02-21 @ 11:37]  Ferritin 255      [07-02-21 @ 11:37]    HBsAg Nonreact      [07-02-21 @ 11:37]  HCV 0.10, Nonreact      [07-02-21 @ 11:37]    MARLIN: titer Negative, pattern --      [07-09-21 @ 05:31]  C3 Complement 108      [07-09-21 @ 05:31]  C4 Complement 23      [07-09-21 @ 05:31]  Free Light Chains: kappa 9.68, lambda 4.91, ratio = 1.97      [07-08 @ 15:33]

## 2021-07-13 NOTE — PROGRESS NOTE ADULT - ASSESSMENT
HPI: 56M w/ PMHx HTN, HLD, T2DM on insulin, Glaucoma presents to the Ed c/o worsening LE swelling and pain for the past 1 month. Endorses SOB on exertion, along w/ increased abdominal girth. Denies orthopnea, PND, wheeze, CP, diaphoresis. No cardiac Hx. Seen by HF team and cardiology.    #New Onset Acute HFrEF exacerbation  #HFrEF 2/2 uncontrolled HTN  #Moderate pericardial effusion  #Uncontrolled HTN  -no prior cardiac hx  -CXR - cardiomegaly and blunting of CP angles  -BNP 58635 on admission  -trops 0.10 -> 0.11 -> 0.11 -> 0.14 -> 0.09  -Echo - EF 34%, moderate pericardial effusion  -I<O, daily weights  - s/p  cardiac cath today - Significant 3V disease. CTS on board   -per HF team, d/c metoprolol succinate 25mg qd and switch to Carvedilol 12.5 BID ,  cont hydralazine 25mg TID, and cont isosorbide 20mg TID.         #LENNY on CKD  -Cr 2.4->2.7->2.7->2.5->2.1 (Baseline 1.7 3/2021)  -cont to hold Lasix per nephro  -FeNa = 2.8%, FeUrea 40.2% => likely intrinsic  -UA 7/3 significant for moderate blood, 300 protein, protein/Cr ratio 8.1  -US KUB - no hydro b/l, right renal cysts  -cont to monitor I&O for oliguria/anuria  -SPEP - decreased protein, C3 wnl, C4 wnl  -incr kappa, lambda light chains. Ratio 1.97  -f/u UPEP, SIF, UIF, iron studies, MARLIN      #HLD  -cont atorvastatin    #DM2 w/ peripheral neuropathy  -FS: 171, 143, 250, 98  -cont Lantus, ISS  -on lantus, metformin-pioglitazone at home  -hold pioglitazone now and on d/c - can contribute to CHF exacerbation  -on gabapentin 200mg BID  -duplex negative  -arterial duplex negative    #Glaucoma  -cont eye drops    PT/REHAB: 50ft x2 w/o assistance with PT  ACTIVITY: Activity - Increase As Tolerated  DVT PPX: heparin   GI PPX:  Not indicated  DIET: Diet, DASH/TLC  CODE: Full code  Disposition: from home;

## 2021-07-13 NOTE — PROGRESS NOTE ADULT - SUBJECTIVE AND OBJECTIVE BOX
Date of Admission: 21    Interval History:    - Patient resting in the chair, no acute events over night    HISTORY OF PRESENT ILLNESS:    HPI: 56M w/ PMHx HTN, HLD, T2DM on insulin, Glaucoma. He presents to the Ed c/o worsening LE swelling and pain for the past 1 month. Endorses SOB on exertion, along w/ increased abdominal girth. Denies orthopnea, PND, wheeze, CP, diaphoresis. No cardiac Hx.    Patient reports about two months ago he was able to walk and climb stairs without any limitations. For the past couple weeks he developed a BLE edema. Patient denies c/p, palpitations, headaches, bleeding, LH, dizziness, orthopnea, PND, LOC, cough, feeling bloated, N/V/D.     PAST MEDICAL & SURGICAL HISTORY:     HTN (hypertension)  HLD (hyperlipidemia)  DM (diabetes mellitus)  Glaucoma  No significant past surgical history        FAMILY HISTORY:    Mother:  77 HTN  Father:  at 55 of CKD       SOCIAL HISTORY:      Smokes about 3 cigarettes a week, social alcohol drinking, denies drug use    Allergies    No Known Allergies    Intolerances      PHYSICAL EXAM:    General Appearance: well appearing, normal for age and gender. 	  Neck: normal JVP, no bruit.   Cardiovascular: regular rate and rhythm S1 S2, No JVD, No murmurs, No edema  Respiratory: Lungs clear to auscultation	  Psychiatry: Alert and oriented x 3, Mood & affect appropriate  Gastrointestinal:  Soft, Non-tender  Musculoskeletal/ extremities: Normal range of motion, No clubbing, cyanosis or edema  Vascular: Peripheral pulses palpable 2+ bilaterally      PREVIOUS DIAGNOSTIC TESTING:      TTE 21    Summary:   1. Moderately decreased global left ventricular systolic function.   2. Multiple left ventricular regional wall motion abnormalities exist. See wall motion findings.   3. LV Ejection Fraction by Garcia's Method with a biplane EF of 34 %.   4. Moderately increased LV wall thickness.   5. Normal left ventricular internal cavity size.   6. Mild to moderately enlarged left atrium.   7. Normal right atrial size.   8. Moderate pericardial effusion.   9. Mild to moderate mitral valve regurgitation.  10. Structurally normal mitral valve, with normal leaflet excursion.  11. Moderate tricuspid regurgitation.  12. Mild aortic regurgitation.  13. Normal trileaflet aortic valve with normal opening.  14. Mild pulmonic valve regurgitation.  15. Estimated pulmonary artery systolic pressure is 52.0 mmHg assuming a right atrial pressure of 10 mmHg, which is consistent with moderate pulmonary hypertension.  	    Home Medications:  atorvastatin 40 mg oral tablet: 1 tab(s) orally once a day (2021 04:46)  BASAGLAR 100 UNIT/ML KWIKPEN:  (2021 04:46)  brimonidine 0.2% ophthalmic solution: 1 drop(s) to each affected eye 2 times a day (2021 04:46)  dorzolamide-timolol 2%-0.5% preservative-free ophthalmic solution: 1 drop(s) to each affected eye 2 times a day (:46)  furosemide 20 mg oral tablet: 1 tab(s) orally once a day (:46)  latanoprost 0.005% ophthalmic solution: 1 drop(s) to each affected eye once a day (in the evening) (:46)  lisinopril 20 mg oral tablet: 1 tab(s) orally once a day (:46)  pioglitazone-metformin 15 mg-850 mg oral tablet: 1 tab(s) orally 2 times a day (:46)  Rhopressa 0.02% ophthalmic solution: 1 drop(s) to each affected eye once a day (in the evening) (2021 04:46)    MEDICATIONS  (STANDING):  atorvastatin 40 milliGRAM(s) Oral at bedtime  brimonidine 0.2% Ophthalmic Solution 1 Drop(s) Both EYES two times a day  chlorhexidine 4% Liquid 1 Application(s) Topical <User Schedule>  dextrose 40% Gel 15 Gram(s) Oral once  dextrose 5%. 1000 milliLiter(s) (50 mL/Hr) IV Continuous <Continuous>  dextrose 5%. 1000 milliLiter(s) (100 mL/Hr) IV Continuous <Continuous>  dextrose 50% Injectable 25 Gram(s) IV Push once  dextrose 50% Injectable 12.5 Gram(s) IV Push once  dextrose 50% Injectable 25 Gram(s) IV Push once  dorzolamide 2% Ophthalmic Solution 1 Drop(s) Both EYES three times a day  enoxaparin Injectable 40 milliGRAM(s) SubCutaneous daily  gabapentin 200 milliGRAM(s) Oral two times a day  glucagon  Injectable 1 milliGRAM(s) IntraMuscular once  insulin glargine Injectable (LANTUS) 10 Unit(s) SubCutaneous every morning  insulin lispro (ADMELOG) corrective regimen sliding scale   SubCutaneous three times a day before meals  insulin lispro Injectable (ADMELOG) 3 Unit(s) SubCutaneous three times a day before meals  latanoprost 0.005% Ophthalmic Solution 1 Drop(s) Both EYES at bedtime  NIFEdipine XL 60 milliGRAM(s) Oral daily  timolol 0.5% Solution 1 Drop(s) Both EYES two times a day    MEDICATIONS  (PRN):

## 2021-07-13 NOTE — PROGRESS NOTE ADULT - ASSESSMENT
56M w/ PMHx HTN, HLD, T2DM on insulin x>10 years, retinopathy, Glaucoma presents to the Ed c/o worsening LE swelling and pain for the past 1 month. Endorses SOB on exertion, along w/ increased abdominal girth.    #LENNY on CKD with PRoteinuria >300  -Cr 2.1 <- 2.5 < -2.7  (Baseline 1.7 in 3/2021)  -lasix on hold  - creatinine stable / check repeat today post cardiac cath   - non oliguric   - check ph level   - s/p cardiac acth , needs CABG   - Proteinuria likely due to Diabetic nephropathy - needs serology for lupus and dysproteinemia  -FeNa = 2.8%, FeUrea 40.2% c/w intrinsic  -US KUB - no hydro b/l, right renal cysts  -k/l ratio noted   -Echo - EF 34%, moderate pericardial effusion  HTN - bp improving , hydralazine increased   CT notes appreciated   will follow

## 2021-07-13 NOTE — PROGRESS NOTE ADULT - ASSESSMENT
57 y/o man with PMH of HTN, CKD from 2020, DM type 2 on insulin with peripheral neuropathy and Glaucoma presented to the ED c/o worsening LE swelling and pain for the past 1 month.    1. Newly diagnosed acute HFrEF  - moderate pericardial effusion and pulm HTN  - was on IV lasix for diuresis and then held for rising Cr  - continue to hold diuretic for now per renal  - volume status improved   - heart failure f/u appreciated - now on coreg  - continue hydralazine and isosorbide  - 3 vessel CAD on cardiac cath 7/12 - for CABG later this week  - preop per CT surgery  - cardiac MRI  - monitor BMP daily  - daily wts, I's and O's, low sodium diet    2. HTN - now better controlled - on hydralazine 50mg tid and coreg    3. LENNY over CKD 3 - nonoliguric  - CKD progressed over the past year  - nephrotic range proteinuria now   - Cr rising with diuresis so lasix was held - now trending down and stable at 2.1  - no hydronephrosis on US  - A1C 5.9 so DM seems well controlled as outpt now but pt with neuropathy  - likely has retinopathy also  - renal f/u appreciated - proteinuria likely from DM but will work up other causes  - f/u labs per renal note  - daily BMP and monitor urine output    4. DM type 2 with peripheral neuropathy   - On Lantus and Metformin-Pioglitazone at home  -  A1c 5.8  - continue insulin inpt - lantus dose decreased and FS stable  - discontinue pioglitazone on discharge - metformin will also need to be stopped if renal function does not improve by discharge  -on gabapentin 200 mg BID renally adjusted  - duplex negative  - arterial duplex with moderate atherosclerotic disease    5. Glaucoma  - on Eye drops    6. DVT prophylaxis -   heparin SQ         PROGRESS NOTE HANDOFF    Pending: Cardiac MRI, CABG this week    pt aware of plan of care    Disposition: home

## 2021-07-13 NOTE — PROGRESS NOTE ADULT - ASSESSMENT
Acute on chronic systolic HF / LENNY/ Fluid overload     Patient is euvolemic on exam  Continue Hydralazine 50 mg TID  Continue Isosorbide 20 mg TID  Continue Carvedilol 12.5 mg BID  LHC showed 3V CAD, CT consulted for possible CABG  Get BMP daily   Maintain potassium >4.0, Mg >2.1  Strict intake and output  Daily weight   Will continue to follow Acute on chronic systolic HF / LENNY/ Fluid overload     Patient is euvolemic on exam  Continue Hydralazine 50 mg TID  Continue Isosorbide 20 mg TID  Continue Carvedilol 12.5 mg BID  LHC showed 3V CAD - CT evaluation for CABG ongoing   Get CMR to assess LV function and viability   Get BMP daily   Maintain potassium >4.0, Mg >2.1  Strict intake and output  Daily weight   Discussed with CT surgery and medicine team

## 2021-07-13 NOTE — PROGRESS NOTE ADULT - SUBJECTIVE AND OBJECTIVE BOX
CT Surgery Consult - Follow Up Note  56yMale w/ PMH HTN, DM, Glaucoma, DLD, CKD, Diabetic Neuropathy, admitted w/ LE edema, CP, SANABRI[place] and ascites.   TTE revealed significantly reduced EF.  Subsequent cardiac catheterization revealed severe 3vCAD.  CV Surgery called for eval for CABG.        SUBJECTIVE ASSESSMENT:   Vital Signs Last 24 Hrs  T(F): 97 (13 Jul 2021 04:30), Max: 97.2 (12 Jul 2021 13:48)  HR: 69 (13 Jul 2021 04:30) (69 - 74)  BP: 145/88 (13 Jul 2021 04:30) (133/77 - 168/84)    CAPILLARY BLOOD GLUCOSE  POCT Blood Glucose.: 169 mg/dL (13 Jul 2021 07:28)  POCT Blood Glucose.: 170 mg/dL (12 Jul 2021 21:38)  POCT Blood Glucose.: 100 mg/dL (12 Jul 2021 18:17)  POCT Blood Glucose.: 131 mg/dL (12 Jul 2021 11:37)    Physical Exam:  GENERAL: NAD, well-groomed, well-developed  HEAD:  Atraumatic, Normocephalic  EYES: EOMI, PERRLA, conjunctiva and sclera clear  ENMT: Moist mucous membranes  NECK: Supple, No JVD, Normal thyroid  NERVOUS SYSTEM:  Alert & Oriented X3, Good concentration  CHEST/LUNG: Clear to auscultation bilaterally; No rales, rhonchi, wheezing, or rubs  HEART: Regular rate and rhythm; No murmurs, rubs, or gallops  ABDOMEN: Soft, Nontender, Nondistended; Bowel sounds present  EXTREMITIES:  2+ Peripheral Pulses, b/l LE edema improving      LABS:              9.2<L>  9.20  )-----------( 188      ( 12 Jul 2021 05:32 )             30.8<L>                        9.3<L>  8.86  )-----------( 180      ( 11 Jul 2021 05:29 )             31.4<L>    07-12    140  |  110  |  45<H>  ----------------------------<  100<H>  4.7   |  20  |  2.1<H>  07-11    139  |  110  |  46<H>  ----------------------------<  151<H>  4.9   |  21  |  2.1<H>    Ca    8.1<L>      12 Jul 2021 05:32  Mg     1.9     07-12    TPro  5.0<L> [6.0 - 8.0]  /  Alb  2.5<L> [3.5 - 5.2]  /  TBili  0.4 [0.2 - 1.2]  /  DBili  x   /  AST  25 [0 - 41]  /  ALT  34 [0 - 41]  /  AlkPhos  119<H> [30 - 115]  07-12      MEDICATIONS  (STANDING):  aspirin  chewable 81 milliGRAM(s) Oral daily  atorvastatin 40 milliGRAM(s) Oral at bedtime  brimonidine 0.2% Ophthalmic Solution 1 Drop(s) Both EYES two times a day  carvedilol 12.5 milliGRAM(s) Oral every 12 hours  chlorhexidine 4% Liquid 1 Application(s) Topical <User Schedule>  dextrose 40% Gel 15 Gram(s) Oral once  dextrose 5%. 1000 milliLiter(s) (50 mL/Hr) IV Continuous <Continuous>  dextrose 5%. 1000 milliLiter(s) (100 mL/Hr) IV Continuous <Continuous>  dextrose 50% Injectable 25 Gram(s) IV Push once  dextrose 50% Injectable 12.5 Gram(s) IV Push once  dextrose 50% Injectable 25 Gram(s) IV Push once  dorzolamide 2% Ophthalmic Solution 1 Drop(s) Both EYES three times a day  gabapentin 200 milliGRAM(s) Oral two times a day  glucagon  Injectable 1 milliGRAM(s) IntraMuscular once  heparin   Injectable 5000 Unit(s) SubCutaneous every 8 hours  hydrALAZINE 50 milliGRAM(s) Oral three times a day  insulin glargine Injectable (LANTUS) 8 Unit(s) SubCutaneous every morning  insulin lispro (ADMELOG) corrective regimen sliding scale   SubCutaneous three times a day before meals  insulin lispro Injectable (ADMELOG) 3 Unit(s) SubCutaneous three times a day before meals  isosorbide   dinitrate Tablet (ISORDIL) 20 milliGRAM(s) Oral three times a day  latanoprost 0.005% Ophthalmic Solution 1 Drop(s) Both EYES at bedtime  sodium chloride 0.9%. 1000 milliLiter(s) (75 mL/Hr) IV Continuous <Continuous>  timolol 0.5% Solution 1 Drop(s) Both EYES two times a day    MEDICATIONS  (PRN):    Allergies  No Known Allergies      Recommendation: (Procedures/Evaluations)  CT HEAD Non-Contrast:[  ]  CT Chest without contrast [ X ]  CT Abd/Pelvis non con [x]  Echocadiography :[ X ]  Carotid Duplex :[ X ]  PFT : Simple PFT [ X ]    WANDA/PVR: [x ]  Vein mapping [x]  Renal Consult [ x]  Stool Guaiac [x]  Pulmonary Consult: [ ]   Vascular Consult [ ]    Dental Consult [ ]   Hem-Onc Consult [ ]   GI Consult [ ]     Assessment/ Plan:  56 year old male w/ PMH HTN, DM, Glaucoma, DLD, CKD, Diabetic Neuropathy, admitted w/ LE edema, CP, SA[place] and ascites, reduced EF on TTE, severe 3vCAD; CV Surgery called for eval for CABG.      - will follow closely for completion of above mentioned preop workup for possible CABG later this week.     CT Surgery Consult - Follow Up Note  56yMale w/ PMH HTN, DM, Glaucoma, DLD, CKD, Diabetic Neuropathy, admitted w/ LE edema, CP, SA[place]BRI[place] and ascites.   TTE revealed significantly reduced EF.  Subsequent cardiac catheterization revealed severe 3vCAD.  CV Surgery called for eval for CABG.        SUBJECTIVE ASSESSMENT:   Vital Signs Last 24 Hrs  T(F): 97 (13 Jul 2021 04:30), Max: 97.2 (12 Jul 2021 13:48)  HR: 69 (13 Jul 2021 04:30) (69 - 74)  BP: 145/88 (13 Jul 2021 04:30) (133/77 - 168/84)    CAPILLARY BLOOD GLUCOSE  POCT Blood Glucose.: 169 mg/dL (13 Jul 2021 07:28)  POCT Blood Glucose.: 170 mg/dL (12 Jul 2021 21:38)  POCT Blood Glucose.: 100 mg/dL (12 Jul 2021 18:17)  POCT Blood Glucose.: 131 mg/dL (12 Jul 2021 11:37)    Physical Exam:  GENERAL: NAD, well-groomed, well-developed  HEAD:  Atraumatic, Normocephalic  EYES: EOMI, PERRLA, conjunctiva and sclera clear  ENMT: Moist mucous membranes  NECK: Supple, No JVD, Normal thyroid  NERVOUS SYSTEM:  Alert & Oriented X3, Good concentration  CHEST/LUNG: Clear to auscultation bilaterally; No rales, rhonchi, wheezing, or rubs  HEART: Regular rate and rhythm; No murmurs, rubs, or gallops  ABDOMEN: Soft, Nontender, Nondistended; Bowel sounds present  EXTREMITIES:  2+ Peripheral Pulses, b/l LE edema improving      LABS:              9.2<L>  9.20  )-----------( 188      ( 12 Jul 2021 05:32 )             30.8<L>                        9.3<L>  8.86  )-----------( 180      ( 11 Jul 2021 05:29 )             31.4<L>    07-12    140  |  110  |  45<H>  ----------------------------<  100<H>  4.7   |  20  |  2.1<H>  07-11    139  |  110  |  46<H>  ----------------------------<  151<H>  4.9   |  21  |  2.1<H>    Ca    8.1<L>      12 Jul 2021 05:32  Mg     1.9     07-12    TPro  5.0<L> [6.0 - 8.0]  /  Alb  2.5<L> [3.5 - 5.2]  /  TBili  0.4 [0.2 - 1.2]  /  DBili  x   /  AST  25 [0 - 41]  /  ALT  34 [0 - 41]  /  AlkPhos  119<H> [30 - 115]  07-12      MEDICATIONS  (STANDING):  aspirin  chewable 81 milliGRAM(s) Oral daily  atorvastatin 40 milliGRAM(s) Oral at bedtime  brimonidine 0.2% Ophthalmic Solution 1 Drop(s) Both EYES two times a day  carvedilol 12.5 milliGRAM(s) Oral every 12 hours  chlorhexidine 4% Liquid 1 Application(s) Topical <User Schedule>  dextrose 40% Gel 15 Gram(s) Oral once  dextrose 5%. 1000 milliLiter(s) (50 mL/Hr) IV Continuous <Continuous>  dextrose 5%. 1000 milliLiter(s) (100 mL/Hr) IV Continuous <Continuous>  dextrose 50% Injectable 25 Gram(s) IV Push once  dextrose 50% Injectable 12.5 Gram(s) IV Push once  dextrose 50% Injectable 25 Gram(s) IV Push once  dorzolamide 2% Ophthalmic Solution 1 Drop(s) Both EYES three times a day  gabapentin 200 milliGRAM(s) Oral two times a day  glucagon  Injectable 1 milliGRAM(s) IntraMuscular once  heparin   Injectable 5000 Unit(s) SubCutaneous every 8 hours  hydrALAZINE 50 milliGRAM(s) Oral three times a day  insulin glargine Injectable (LANTUS) 8 Unit(s) SubCutaneous every morning  insulin lispro (ADMELOG) corrective regimen sliding scale   SubCutaneous three times a day before meals  insulin lispro Injectable (ADMELOG) 3 Unit(s) SubCutaneous three times a day before meals  isosorbide   dinitrate Tablet (ISORDIL) 20 milliGRAM(s) Oral three times a day  latanoprost 0.005% Ophthalmic Solution 1 Drop(s) Both EYES at bedtime  sodium chloride 0.9%. 1000 milliLiter(s) (75 mL/Hr) IV Continuous <Continuous>  timolol 0.5% Solution 1 Drop(s) Both EYES two times a day    MEDICATIONS  (PRN):    Allergies  No Known Allergies      Recommendation: (Procedures/Evaluations)  CT HEAD Non-Contrast:[  ]  CT Chest without contrast [ X ]  CT Abd/Pelvis non con [x]  Echocadiography :[ X ]  Carotid Duplex :[ X ]  PFT : Simple PFT [ X ]    WANDA/PVR: [x ]  Vein mapping [x]  Renal Consult [ x]  Stool Guaiac [x]  Pulmonary Consult: [ ]   Vascular Consult [ ]    Dental Consult [ ]   Hem-Onc Consult [ ]   GI Consult [ ]     Assessment/ Plan:  56 year old male w/ PMH HTN, DM, Glaucoma, DLD, CKD, Diabetic Neuropathy, admitted w/ LE edema, CP, SA[place] and ascites, reduced EF on TTE, severe 3vCAD; CV Surgery called for eval for CABG.  Case discussed with  and     - will follow closely for completion of above mentioned preop workup for possible CABG later this week.     CT Surgery Consult - Follow Up Note  56yMale w/ PMH HTN, DM, Glaucoma, DLD, CKD, Diabetic Neuropathy, admitted w/ LE edema, CP, SANABRI[place] and ascites.   TTE revealed significantly reduced EF.  Subsequent cardiac catheterization revealed severe 3vCAD.  CV Surgery called for eval for CABG.        SUBJECTIVE ASSESSMENT:   Vital Signs Last 24 Hrs  T(F): 97 (13 Jul 2021 04:30), Max: 97.2 (12 Jul 2021 13:48)  HR: 69 (13 Jul 2021 04:30) (69 - 74)  BP: 145/88 (13 Jul 2021 04:30) (133/77 - 168/84)    CAPILLARY BLOOD GLUCOSE  POCT Blood Glucose.: 169 mg/dL (13 Jul 2021 07:28)  POCT Blood Glucose.: 170 mg/dL (12 Jul 2021 21:38)  POCT Blood Glucose.: 100 mg/dL (12 Jul 2021 18:17)  POCT Blood Glucose.: 131 mg/dL (12 Jul 2021 11:37)    Physical Exam:  GENERAL: NAD, well-groomed, well-developed  HEAD:  Atraumatic, Normocephalic  EYES: EOMI, PERRLA, conjunctiva and sclera clear  ENMT: Moist mucous membranes  NECK: Supple, No JVD, Normal thyroid  NERVOUS SYSTEM:  Alert & Oriented X3, Good concentration  CHEST/LUNG: Clear to auscultation bilaterally; No rales, rhonchi, wheezing, or rubs  HEART: Regular rate and rhythm; No murmurs, rubs, or gallops  ABDOMEN: Soft, Nontender, Nondistended; Bowel sounds present  EXTREMITIES:  2+ Peripheral Pulses, b/l LE edema improving      LABS:              9.2<L>  9.20  )-----------( 188      ( 12 Jul 2021 05:32 )             30.8<L>                        9.3<L>  8.86  )-----------( 180      ( 11 Jul 2021 05:29 )             31.4<L>    07-12    140  |  110  |  45<H>  ----------------------------<  100<H>  4.7   |  20  |  2.1<H>  07-11    139  |  110  |  46<H>  ----------------------------<  151<H>  4.9   |  21  |  2.1<H>    Ca    8.1<L>      12 Jul 2021 05:32  Mg     1.9     07-12    TPro  5.0<L> [6.0 - 8.0]  /  Alb  2.5<L> [3.5 - 5.2]  /  TBili  0.4 [0.2 - 1.2]  /  DBili  x   /  AST  25 [0 - 41]  /  ALT  34 [0 - 41]  /  AlkPhos  119<H> [30 - 115]  07-12      MEDICATIONS  (STANDING):  aspirin  chewable 81 milliGRAM(s) Oral daily  atorvastatin 40 milliGRAM(s) Oral at bedtime  brimonidine 0.2% Ophthalmic Solution 1 Drop(s) Both EYES two times a day  carvedilol 12.5 milliGRAM(s) Oral every 12 hours  chlorhexidine 4% Liquid 1 Application(s) Topical <User Schedule>  dextrose 40% Gel 15 Gram(s) Oral once  dextrose 5%. 1000 milliLiter(s) (50 mL/Hr) IV Continuous <Continuous>  dextrose 5%. 1000 milliLiter(s) (100 mL/Hr) IV Continuous <Continuous>  dextrose 50% Injectable 25 Gram(s) IV Push once  dextrose 50% Injectable 12.5 Gram(s) IV Push once  dextrose 50% Injectable 25 Gram(s) IV Push once  dorzolamide 2% Ophthalmic Solution 1 Drop(s) Both EYES three times a day  gabapentin 200 milliGRAM(s) Oral two times a day  glucagon  Injectable 1 milliGRAM(s) IntraMuscular once  heparin   Injectable 5000 Unit(s) SubCutaneous every 8 hours  hydrALAZINE 50 milliGRAM(s) Oral three times a day  insulin glargine Injectable (LANTUS) 8 Unit(s) SubCutaneous every morning  insulin lispro (ADMELOG) corrective regimen sliding scale   SubCutaneous three times a day before meals  insulin lispro Injectable (ADMELOG) 3 Unit(s) SubCutaneous three times a day before meals  isosorbide   dinitrate Tablet (ISORDIL) 20 milliGRAM(s) Oral three times a day  latanoprost 0.005% Ophthalmic Solution 1 Drop(s) Both EYES at bedtime  sodium chloride 0.9%. 1000 milliLiter(s) (75 mL/Hr) IV Continuous <Continuous>  timolol 0.5% Solution 1 Drop(s) Both EYES two times a day    MEDICATIONS  (PRN):    Allergies  No Known Allergies      Recommendation: (Procedures/Evaluations)  CT HEAD Non-Contrast:[  ]  CT Chest without contrast [ X ]  CT Abd/Pelvis non con [x]  Echocadiography :[ X ]  Carotid Duplex :[ X ]  PFT : Simple PFT [ X ]    WANDA/PVR: [x ]  Vein mapping [x]  Renal Consult [ x]  Stool Guaiac [x]  Pulmonary Consult: [ ]   Vascular Consult [ ]    Dental Consult [ ]   Hem-Onc Consult [ ]   GI Consult [ ]     EXAM:  CT ABDOMEN AND PELVIS        EXAM:  CT CHEST        PROCEDURE DATE:  07/13/2021    INTERPRETATION:  CLINICAL STATEMENT: Coronary artery disease. Pericardial effusion    TECHNIQUE: Contiguous axial CT images were obtained from thethoracic inlet to the pubic symphysis without intravenous contrast.  Oral contrast was not administered.  Reformatted images in the coronal and sagittal planes were acquired.    COMPARISON CT: None.    OTHER STUDIES USED FOR CORRELATION: None.  FINDINGS:  CHEST:  LUNGS/PLEURA/AIRWAYS: Trachea and endobronchial tree are patent. Small bilateral effusions with superimposed lower lobe atelectasis. No parenchymal consolidation or pneumothorax. No suspicious nodule or mass, however limited by breathing artifact..  MEDIASTINUM/THORACIC NODES: No enlarged mediastinal, hilar or axillary lymph nodes..  HEART/GREAT VESSELS: Moderate pericardial effusion. Triple-vessel coronary artery atherosclerotic disease. Thoracic aorta and main pulmonaryartery are normal in caliber..  ABDOMEN/PELVIS:  HEPATOBILIARY: No suspicious parenchymal lesion or biliary ductal dilatation.  SPLEEN: Unremarkable.  PANCREAS: Unremarkable.  ADRENAL GLANDS: Unremarkable.  KIDNEYS: Nonspecific bilateral perinephric inflammation. No hydronephrosis or radiopaque ureteral tract calculi bilaterally. Right midpole hypodense cyst.  ABDOMINOPELVIC NODES: Unremarkable.  PELVIC ORGANS: Contrast within urinary bladder. Prostatomegaly.  PERITONEUM/MESENTERY/BOWEL: No bowel obstruction, pneumoperitoneum or ascites..  BONES/SOFT TISSUES: Degenerative changes of the spine. No acute osseous abnormality..  IMPRESSION:  1. Moderate pericardial effusion. Triple-vessel coronary artery atherosclerotic disease.  2. Small bilateral effusions with superimposed lower lobe atelectasis.  3. Nonspecific bilateral perinephric inflammation. No acute intra-abdominal pathology.      EXAM:  PHYSIOL EXTREM LOW 3+ LEV BI        PROCEDURE DATE:  07/13/2021    INTERPRETATION:  Clinical History / Reason for exam: The patient is a 50 years old male with claudication.  The study was performed to assess the patient for peripheral occlusive disease.  Bilateral lower extremity pulse volume recordings and pressure analyses were obtained.  Evaluation of the bilateral lower extremities revealed normal PVR waveform amplitudes at the high thigh, low thigh, calf, ankle, metatarsal and digit levels. No significant pressure gradients were noted.  Bilateral calf arteries been noncompressible.  Ankle brachial index was  1.2 on the right & 1.5  on the left.  Impression:  Normal arterial hemodynamics in the lower extremities bilaterally.      EXAM:  DUPLEX SCAN EXT VEINS LOWER BI        PROCEDURE DATE:  07/12/2021    INTERPRETATION:  CLINICAL INFORMATION: The patient is a 56-year-old male preop CABG.  The study was performed for venous mapping in preparation for lower coronary artery bypass grafting.  Measurements are as follows:  Right lower extremity:  Great saphenous vein:        Upper Thigh Proximal: 5.5mm        Upper Thigh Mid: 4.4mm        Upper Thigh Distal: 3.4 mm        GSV At The Knee: 2.1 mm        GSV At The Ankle: 2.4 mm  Left lower extremity:  Great saphenous vein:        Upper Thigh Proximal: 5.9 mm        Upper Thigh Mid: 3.9 mm        Upper Thigh Distal: 3.4 mm        GSV At The Knee: 3.2 mm        GSV Mid Calf: 2.5 mm        GSV At The Ankle: 2.4 mm  Impression:  Bilateral lower extremity vein mapping with the above measurements      EXAM:  CAROTID DUPLEX COMPLETE BILAT        PROCEDURE DATE:  07/12/2021    INTERPRETATION:  CLINICAL INFORMATION: 56-year-old male preop coronary artery bypass grafting.  COMPARISON: None available.  TECHNIQUE: Grayscale, color and spectral Doppler examination of both carotid arteries was performed.  FINDINGS:  No elevated velocities or abnormal waveforms are encountered.  Peak systolic velocities are as follows:  RIGHT:  PROX CCA = 80 cm/s  DIST CCA = 75 cm/s  PROX ICA = 84 cm/s  DIST ICA = 52 cm/s  ECA = 55 cm/s  LEFT:  PROX CCA = 81 cm/s  DIST CCA = 73 cm/s  PROX ICA = 47 cm/s  DIST ICA = 83 cm/s  ECA = 74 cm/s  Antegrade flow is noted within both vertebral arteries.  IMPRESSION: Mild right 20-39% internal carotid artery stenosis. No hemodynamically significant stenosis appreciated on the left.  Measurement of carotid stenosis is based on velocity parameters that correlate the residual internal carotid diameter with that of the more distal vessel in accordance with a method such as the [employer] Symptomatic Carotid Endarterectomy Trial (NASCET).        Assessment/ Plan:  56 year old male w/ PMH HTN, DM, Glaucoma, DLD, CKD, Diabetic Neuropathy, admitted w/ LE edema, CP, [place] and ascites, reduced EF on TTE, severe 3vCAD; CV Surgery called for eval for CABG.  Case discussed with  and     - will follow closely for completion of above mentioned preop workup for possible CABG later this week.

## 2021-07-13 NOTE — PROGRESS NOTE ADULT - SUBJECTIVE AND OBJECTIVE BOX
GILLIAN MENDOZA  56y  Male      Patient is a 56y old  Male who presents with a chief complaint of CHF sx (12 Jul 2021 17:59)      INTERVAL HPI/OVERNIGHT EVENTS:    REVIEW OF SYSTEMS:  CONSTITUTIONAL: No fever, weight loss, or fatigue  EYES: No eye pain, visual disturbances, or discharge  ENMT:  No difficulty hearing, tinnitus, vertigo; No sinus or throat pain  NECK: No pain or stiffness  RESPIRATORY: No cough, wheezing, chills or hemoptysis; No shortness of breath  CARDIOVASCULAR: No chest pain, palpitations, dizziness, or leg swelling  GASTROINTESTINAL: No abdominal or epigastric pain. No diarrhea or constipation. No nausea, vomiting, or hematemesis. No melena or hematochezia.  GENITOURINARY: No dysuria, frequency, hematuria, or incontinence  NEUROLOGICAL: No headaches, memory loss, loss of strength, numbness, or tremors  MUSCULOSKELETAL: No joint pain or swelling; No muscle, back, or extremity pain  SKIN: No itching, burning, rashes, or lesions   LYMPH NODES: No enlarged glands  ENDOCRINE: No heat or cold intolerance; No hair loss  PSYCHIATRIC: No depression, anxiety, mood swings, or difficulty sleeping  HEME/LYMPH: No easy bruising, or bleeding gums  ALLERY AND IMMUNOLOGIC: No hives or eczema    Vital Signs Last 24 Hrs  T(C): 36.1 (13 Jul 2021 04:30), Max: 36.2 (12 Jul 2021 13:48)  T(F): 97 (13 Jul 2021 04:30), Max: 97.2 (12 Jul 2021 13:48)  HR: 69 (13 Jul 2021 04:30) (69 - 74)  BP: 145/88 (13 Jul 2021 04:30) (133/77 - 168/84)  BP(mean): --  RR: 18 (13 Jul 2021 04:30) (18 - 18)  SpO2: --    PHYSICAL EXAM:  GENERAL: NAD, well-groomed, well-developed  HEAD:  Atraumatic, Normocephalic  EYES: EOMI, PERRLA, conjunctiva and sclera clear  ENMT: Moist mucous membranes, Good dentition, No lesions  NECK: Supple, No JVD, Normal thyroid  NERVOUS SYSTEM:  Alert & Oriented X3, Good concentration; Motor Strength 5/5 B/L upper and lower extremities; DTRs 2+ intact and symmetric  CHEST/LUNG: Clear to auscultation bilaterally; No rales, rhonchi, wheezing, or rubs  HEART: Regular rate and rhythm; No murmurs, rubs, or gallops  ABDOMEN: Soft, Nontender, Nondistended; Bowel sounds present  EXTREMITIES:  2+ Peripheral Pulses, No clubbing, cyanosis, or edema  LYMPH: No lymphadenopathy noted  SKIN: No rashes or lesions    Consultant(s) Notes Reviewed:  [x ] YES  [ ] NO  Care Discussed with Consultants/Other Providers [ x] YES  [ ] NO    LABS:                        9.2    9.20  )-----------( 188      ( 12 Jul 2021 05:32 )             30.8     07-12    140  |  110  |  45<H>  ----------------------------<  100<H>  4.7   |  20  |  2.1<H>    Ca    8.1<L>      12 Jul 2021 05:32  Mg     1.9     07-12    TPro  5.0<L>  /  Alb  2.5<L>  /  TBili  0.4  /  DBili  x   /  AST  25  /  ALT  34  /  AlkPhos  119<H>  07-12          CAPILLARY BLOOD GLUCOSE      POCT Blood Glucose.: 169 mg/dL (13 Jul 2021 07:28)  POCT Blood Glucose.: 170 mg/dL (12 Jul 2021 21:38)  POCT Blood Glucose.: 100 mg/dL (12 Jul 2021 18:17)  POCT Blood Glucose.: 131 mg/dL (12 Jul 2021 11:37)      RADIOLOGY & ADDITIONAL TESTS:    Imaging Personally Reviewed:  [ ] YES  [ ] NO GILLIAN MENDOZA  56y  Male      HPI: 56M w/ PMHx HTN, HLD, T2DM on insulin, Glaucoma presents to the Ed c/o worsening LE swelling and pain for the past 1 month      INTERVAL HPI/OVERNIGHT EVENTS:    No significant events overnight. Denies CP, shortness of breath , and palpitations     REVIEW OF SYSTEMS:  CONSTITUTIONAL: No fever, weight loss, or fatigue  EYES: No eye pain, visual disturbances, or discharge  ENMT:  No difficulty hearing, tinnitus, vertigo; No sinus or throat pain  NECK: No pain or stiffness  RESPIRATORY: No cough, wheezing, chills or hemoptysis; No shortness of breath  CARDIOVASCULAR: No chest pain, palpitations, dizziness, or leg swelling  GASTROINTESTINAL: No abdominal or epigastric pain  GENITOURINARY: No dysuria, frequency, hematuria, or incontinence  NEUROLOGICAL: No headaches, memory loss, loss of strength, numbness, or tremors  MUSCULOSKELETAL: Swollen feet   SKIN: Dry skin in LE      Vital Signs Last 24 Hrs  T(C): 36.1 (13 Jul 2021 04:30), Max: 36.2 (12 Jul 2021 13:48)  T(F): 97 (13 Jul 2021 04:30), Max: 97.2 (12 Jul 2021 13:48)  HR: 69 (13 Jul 2021 04:30) (69 - 74)  BP: 145/88 (13 Jul 2021 04:30) (133/77 - 168/84)  BP(mean): --  RR: 18 (13 Jul 2021 04:30) (18 - 18)  SpO2: --    PHYSICAL EXAM:  GENERAL: NAD, well-groomed, well-developed  HEAD:  Atraumatic, Normocephalic  EYES: EOMI, PERRLA, conjunctiva and sclera clear  ENMT: Moist mucous membranes  NECK: Supple  NERVOUS SYSTEM:  Alert & Oriented X3  CHEST/LUNG: Clear to auscultation bilaterally; No rales, rhonchi, wheezing, or rubs  HEART: Regular rate and rhythm; No murmurs, rubs, or gallops  ABDOMEN: Soft, Nontender, Nondistended; Bowel sounds present  EXTREMITIES:  2+ Peripheral Pulses. 2+ Pitting edema up to ankle       Consultant(s) Notes Reviewed:  [x ] YES  [ ] NO  Care Discussed with Consultants/Other Providers [ x] YES  [ ] NO    LABS:                        9.2    9.20  )-----------( 188      ( 12 Jul 2021 05:32 )             30.8     07-12    140  |  110  |  45<H>  ----------------------------<  100<H>  4.7   |  20  |  2.1<H>    Ca    8.1<L>      12 Jul 2021 05:32  Mg     1.9     07-12    TPro  5.0<L>  /  Alb  2.5<L>  /  TBili  0.4  /  DBili  x   /  AST  25  /  ALT  34  /  AlkPhos  119<H>  07-12          CAPILLARY BLOOD GLUCOSE      POCT Blood Glucose.: 169 mg/dL (13 Jul 2021 07:28)  POCT Blood Glucose.: 170 mg/dL (12 Jul 2021 21:38)  POCT Blood Glucose.: 100 mg/dL (12 Jul 2021 18:17)  POCT Blood Glucose.: 131 mg/dL (12 Jul 2021 11:37)      RADIOLOGY & ADDITIONAL TESTS:    Imaging Personally Reviewed:  [ ] YES  [ ] NO

## 2021-07-14 LAB
ALBUMIN SERPL ELPH-MCNC: 2.8 G/DL — LOW (ref 3.5–5.2)
ALP SERPL-CCNC: 209 U/L — HIGH (ref 30–115)
ALT FLD-CCNC: 83 U/L — HIGH (ref 0–41)
ANION GAP SERPL CALC-SCNC: 8 MMOL/L — SIGNIFICANT CHANGE UP (ref 7–14)
AST SERPL-CCNC: 97 U/L — HIGH (ref 0–41)
BASOPHILS # BLD AUTO: 0.02 K/UL — SIGNIFICANT CHANGE UP (ref 0–0.2)
BASOPHILS NFR BLD AUTO: 0.2 % — SIGNIFICANT CHANGE UP (ref 0–1)
BILIRUB SERPL-MCNC: 0.4 MG/DL — SIGNIFICANT CHANGE UP (ref 0.2–1.2)
BUN SERPL-MCNC: 49 MG/DL — HIGH (ref 10–20)
CALCIUM SERPL-MCNC: 8.2 MG/DL — LOW (ref 8.5–10.1)
CHLORIDE SERPL-SCNC: 111 MMOL/L — HIGH (ref 98–110)
CO2 SERPL-SCNC: 21 MMOL/L — SIGNIFICANT CHANGE UP (ref 17–32)
CREAT SERPL-MCNC: 2.5 MG/DL — HIGH (ref 0.7–1.5)
EOSINOPHIL # BLD AUTO: 0.5 K/UL — SIGNIFICANT CHANGE UP (ref 0–0.7)
EOSINOPHIL NFR BLD AUTO: 6.1 % — SIGNIFICANT CHANGE UP (ref 0–8)
GLUCOSE BLDC GLUCOMTR-MCNC: 202 MG/DL — HIGH (ref 70–99)
GLUCOSE BLDC GLUCOMTR-MCNC: 209 MG/DL — HIGH (ref 70–99)
GLUCOSE BLDC GLUCOMTR-MCNC: 218 MG/DL — HIGH (ref 70–99)
GLUCOSE BLDC GLUCOMTR-MCNC: 96 MG/DL — SIGNIFICANT CHANGE UP (ref 70–99)
GLUCOSE SERPL-MCNC: 204 MG/DL — HIGH (ref 70–99)
HCT VFR BLD CALC: 30.7 % — LOW (ref 42–52)
HGB BLD-MCNC: 9.1 G/DL — LOW (ref 14–18)
IMM GRANULOCYTES NFR BLD AUTO: 0.5 % — HIGH (ref 0.1–0.3)
LYMPHOCYTES # BLD AUTO: 1.05 K/UL — LOW (ref 1.2–3.4)
LYMPHOCYTES # BLD AUTO: 12.9 % — LOW (ref 20.5–51.1)
MAGNESIUM SERPL-MCNC: 2.2 MG/DL — SIGNIFICANT CHANGE UP (ref 1.8–2.4)
MCHC RBC-ENTMCNC: 23.9 PG — LOW (ref 27–31)
MCHC RBC-ENTMCNC: 29.6 G/DL — LOW (ref 32–37)
MCV RBC AUTO: 80.6 FL — SIGNIFICANT CHANGE UP (ref 80–94)
MONOCYTES # BLD AUTO: 0.83 K/UL — HIGH (ref 0.1–0.6)
MONOCYTES NFR BLD AUTO: 10.2 % — HIGH (ref 1.7–9.3)
NEUTROPHILS # BLD AUTO: 5.72 K/UL — SIGNIFICANT CHANGE UP (ref 1.4–6.5)
NEUTROPHILS NFR BLD AUTO: 70.1 % — SIGNIFICANT CHANGE UP (ref 42.2–75.2)
NRBC # BLD: 0 /100 WBCS — SIGNIFICANT CHANGE UP (ref 0–0)
PLATELET # BLD AUTO: 162 K/UL — SIGNIFICANT CHANGE UP (ref 130–400)
POTASSIUM SERPL-MCNC: 5 MMOL/L — SIGNIFICANT CHANGE UP (ref 3.5–5)
POTASSIUM SERPL-SCNC: 5 MMOL/L — SIGNIFICANT CHANGE UP (ref 3.5–5)
PROT SERPL-MCNC: 5.4 G/DL — LOW (ref 6–8)
RBC # BLD: 3.81 M/UL — LOW (ref 4.7–6.1)
RBC # FLD: 14.3 % — SIGNIFICANT CHANGE UP (ref 11.5–14.5)
SODIUM SERPL-SCNC: 140 MMOL/L — SIGNIFICANT CHANGE UP (ref 135–146)
T3 SERPL-MCNC: 128 NG/DL — SIGNIFICANT CHANGE UP (ref 80–200)
T4 AB SER-ACNC: 8.7 UG/DL — SIGNIFICANT CHANGE UP (ref 4.6–12)
TSH SERPL-MCNC: 5.62 UIU/ML — HIGH (ref 0.27–4.2)
WBC # BLD: 8.16 K/UL — SIGNIFICANT CHANGE UP (ref 4.8–10.8)
WBC # FLD AUTO: 8.16 K/UL — SIGNIFICANT CHANGE UP (ref 4.8–10.8)

## 2021-07-14 PROCEDURE — 76705 ECHO EXAM OF ABDOMEN: CPT | Mod: 26

## 2021-07-14 PROCEDURE — 99232 SBSQ HOSP IP/OBS MODERATE 35: CPT

## 2021-07-14 PROCEDURE — 71045 X-RAY EXAM CHEST 1 VIEW: CPT | Mod: 26

## 2021-07-14 PROCEDURE — 99233 SBSQ HOSP IP/OBS HIGH 50: CPT

## 2021-07-14 PROCEDURE — 75557 CARDIAC MRI FOR MORPH: CPT | Mod: 26

## 2021-07-14 RX ORDER — INSULIN GLARGINE 100 [IU]/ML
9 INJECTION, SOLUTION SUBCUTANEOUS EVERY MORNING
Refills: 0 | Status: DISCONTINUED | OUTPATIENT
Start: 2021-07-14 | End: 2021-07-17

## 2021-07-14 RX ORDER — INSULIN LISPRO 100/ML
VIAL (ML) SUBCUTANEOUS
Refills: 0 | Status: DISCONTINUED | OUTPATIENT
Start: 2021-07-14 | End: 2021-07-25

## 2021-07-14 RX ADMIN — HEPARIN SODIUM 5000 UNIT(S): 5000 INJECTION INTRAVENOUS; SUBCUTANEOUS at 05:46

## 2021-07-14 RX ADMIN — Medication 1 DROP(S): at 17:10

## 2021-07-14 RX ADMIN — DORZOLAMIDE HYDROCHLORIDE 1 DROP(S): 20 SOLUTION/ DROPS OPHTHALMIC at 05:46

## 2021-07-14 RX ADMIN — DORZOLAMIDE HYDROCHLORIDE 1 DROP(S): 20 SOLUTION/ DROPS OPHTHALMIC at 14:40

## 2021-07-14 RX ADMIN — Medication 2: at 08:05

## 2021-07-14 RX ADMIN — AMIODARONE HYDROCHLORIDE 400 MILLIGRAM(S): 400 TABLET ORAL at 14:36

## 2021-07-14 RX ADMIN — Medication 3 UNIT(S): at 08:05

## 2021-07-14 RX ADMIN — ISOSORBIDE DINITRATE 20 MILLIGRAM(S): 5 TABLET ORAL at 05:46

## 2021-07-14 RX ADMIN — GABAPENTIN 200 MILLIGRAM(S): 400 CAPSULE ORAL at 17:09

## 2021-07-14 RX ADMIN — Medication 1 DROP(S): at 05:47

## 2021-07-14 RX ADMIN — ISOSORBIDE DINITRATE 20 MILLIGRAM(S): 5 TABLET ORAL at 18:23

## 2021-07-14 RX ADMIN — GABAPENTIN 200 MILLIGRAM(S): 400 CAPSULE ORAL at 05:46

## 2021-07-14 RX ADMIN — LATANOPROST 1 DROP(S): 0.05 SOLUTION/ DROPS OPHTHALMIC; TOPICAL at 21:41

## 2021-07-14 RX ADMIN — DORZOLAMIDE HYDROCHLORIDE 1 DROP(S): 20 SOLUTION/ DROPS OPHTHALMIC at 21:42

## 2021-07-14 RX ADMIN — CARVEDILOL PHOSPHATE 12.5 MILLIGRAM(S): 80 CAPSULE, EXTENDED RELEASE ORAL at 05:46

## 2021-07-14 RX ADMIN — HEPARIN SODIUM 5000 UNIT(S): 5000 INJECTION INTRAVENOUS; SUBCUTANEOUS at 21:41

## 2021-07-14 RX ADMIN — AMIODARONE HYDROCHLORIDE 400 MILLIGRAM(S): 400 TABLET ORAL at 21:40

## 2021-07-14 RX ADMIN — Medication 50 MILLIGRAM(S): at 14:37

## 2021-07-14 RX ADMIN — CHLORHEXIDINE GLUCONATE 1 APPLICATION(S): 213 SOLUTION TOPICAL at 05:46

## 2021-07-14 RX ADMIN — Medication 3 UNIT(S): at 14:37

## 2021-07-14 RX ADMIN — Medication 4: at 17:09

## 2021-07-14 RX ADMIN — HEPARIN SODIUM 5000 UNIT(S): 5000 INJECTION INTRAVENOUS; SUBCUTANEOUS at 14:36

## 2021-07-14 RX ADMIN — Medication 3 UNIT(S): at 17:09

## 2021-07-14 RX ADMIN — ISOSORBIDE DINITRATE 20 MILLIGRAM(S): 5 TABLET ORAL at 14:36

## 2021-07-14 RX ADMIN — AMIODARONE HYDROCHLORIDE 400 MILLIGRAM(S): 400 TABLET ORAL at 05:46

## 2021-07-14 RX ADMIN — INSULIN GLARGINE 8 UNIT(S): 100 INJECTION, SOLUTION SUBCUTANEOUS at 08:06

## 2021-07-14 RX ADMIN — Medication 50 MILLIGRAM(S): at 05:46

## 2021-07-14 RX ADMIN — Medication 81 MILLIGRAM(S): at 14:37

## 2021-07-14 RX ADMIN — BRIMONIDINE TARTRATE 1 DROP(S): 2 SOLUTION/ DROPS OPHTHALMIC at 17:10

## 2021-07-14 RX ADMIN — Medication 2: at 14:38

## 2021-07-14 RX ADMIN — ATORVASTATIN CALCIUM 40 MILLIGRAM(S): 80 TABLET, FILM COATED ORAL at 21:40

## 2021-07-14 RX ADMIN — BRIMONIDINE TARTRATE 1 DROP(S): 2 SOLUTION/ DROPS OPHTHALMIC at 05:46

## 2021-07-14 NOTE — PROGRESS NOTE ADULT - ASSESSMENT
57 y/o man with PMH of HTN, CKD from 2020, DM type 2 on insulin with peripheral neuropathy and Glaucoma presented to the ED c/o worsening LE swelling and pain for the past 1 month.    1. Newly diagnosed acute HFrEF  - moderate pericardial effusion and pulm HTN  - was on IV lasix for diuresis and then held for rising Cr  - continue to hold diuretic for now per renal  - volume status improved   - heart failure f/u appreciated - now on coreg  - continue hydralazine and isosorbide  - 3 vessel CAD on cardiac cath 7/12 - for CABG - to be scheduled by CT surgery  - preop per CT surgery  - cardiac MRI  - monitor BMP daily  - daily wts, I's and O's, low sodium diet    2. HTN - now better controlled - on hydralazine 50mg tid and coreg    3. LENNY over CKD 3 - nonoliguric  - CKD progressed over the past year  - nephrotic range proteinuria now   - Cr rising with diuresis so lasix was held - trended down but now up to 2.5 (s/p cath 7/12)  - no hydronephrosis on US  - A1C 5.9 so DM seems well controlled as outpt now but pt with neuropathy  - likely has retinopathy also  - renal f/u   - proteinuria likely from DM but will work up other causes  - daily BMP and monitor urine output    4. DM type 2 with peripheral neuropathy   - On Lantus and Metformin-Pioglitazone at home  -  A1c 5.8  - continue insulin inpt - dose increased and monitor FS  - discontinue pioglitazone on discharge - metformin will also need to be stopped if renal function does not improve by discharge  -on gabapentin 200 mg BID renally adjusted  - duplex negative  - arterial duplex with moderate atherosclerotic disease but normal WANDA    5. Glaucoma  - on Eye drops    6. DVT prophylaxis -   heparin SQ         PROGRESS NOTE HANDOFF    Pending: Cardiac MRI, CABG     pt aware of plan of care    Disposition: home

## 2021-07-14 NOTE — PROGRESS NOTE ADULT - ASSESSMENT
Acute on chronic systolic HF / LENNY/ Fluid overload     Patient is volume overloaded  Start Bumex 2 mg iv BID + 3% hypertonic saline 150 ml BID   Continue Hydralazine 50 mg TID  Continue Isosorbide 20 mg TID  Continue Carvedilol 12.5 mg BID  LHC showed 3V CAD - CT evaluation for CABG ongoing   Could not finish CMR due to SOB  Repeat study when volume status optimized    Get BMP daily   Maintain potassium >4.0, Mg >2.1  Strict intake and output  Daily weight   Discussed with CT surgery and medicine team

## 2021-07-14 NOTE — PROGRESS NOTE ADULT - SUBJECTIVE AND OBJECTIVE BOX
PLANNED OPERATIVE PROCEDURE(s):  CABG                               56yMale  SURGEON(s): Dr. Yee/Shira/Rebekah  SUBJECTIVE ASSESSMENT:  Patient has no complaints at this time. Reports he is feeling well today. Denies any issues with his diet. Reports LE swelling is improving. No SOB at this time.   Vital Signs Last 24 Hrs  T(F): 97 (14 Jul 2021 14:23), Max: 97.5 (13 Jul 2021 20:15)  HR: 57 (14 Jul 2021 14:23) (57 - 65)  BP: 116/68 (14 Jul 2021 14:23) (96/58 - 142/75)  RR: 18 (14 Jul 2021 05:06) (18 - 18)  SpO2: 98% (14 Jul 2021 07:37) (98% - 98%)      I&O's Detail    13 Jul 2021 07:01  -  14 Jul 2021 07:00  --------------------------------------------------------  IN:  Total IN: 0 mL    OUT:    Voided (mL): 1100 mL  Total OUT: 1100 mL        Net:   I&O's Detail    12 Jul 2021 07:01  -  13 Jul 2021 07:00  --------------------------------------------------------  Total NET: -475 mL      13 Jul 2021 07:01  -  14 Jul 2021 07:00  --------------------------------------------------------  Total NET: -1100 mL        CAPILLARY BLOOD GLUCOSE      POCT Blood Glucose.: 209 mg/dL (14 Jul 2021 16:36)  POCT Blood Glucose.: 218 mg/dL (14 Jul 2021 11:29)  POCT Blood Glucose.: 202 mg/dL (14 Jul 2021 08:00)  POCT Blood Glucose.: 224 mg/dL (13 Jul 2021 21:08)    Physical Exam:  General: Patient pleasant and in no acute distress. A&Ox3  Cardiac: S1/S2, RRR, no murmur, no rubs  Lungs: unlabored respirations, CTA b/l, no wheeze, no rales, no crackles  Abdomen: Soft/NT/ND; positive bowel sounds x 4  Extremities: Mild edema b/l lower extremities present; good capillary refill; no cyanosis; palpable 1+ pedal pulses b/l    Central Venous Catheter: Yes[]  No[X] , If Yes indication:           Day #  Cain Catheter: Yes  [] , No  [X] , If yes indication:                      Day #  NGT: Yes [] No [X] ,    If Yes Placement:                                     Day #  EPICARDIAL WIRES:  [] YES [X] NO                                              Day #  BOWEL MOVEMENT:  [X] YES [] NO, If No, Timing since last BM:      Day #  CHEST TUBE(Left/Right):  [] YES [X] NO, If yes -  AIR LEAKS:  [] YES [] NO        LABS:                        9.1<L>  8.16  )-----------( 162      ( 14 Jul 2021 04:30 )             30.7<L>                        9.8<L>  8.27  )-----------( 214      ( 13 Jul 2021 04:30 )             33.3<L>    07-14    140  |  111<H>  |  49<H>  ----------------------------<  204<H>  5.0   |  21  |  2.5<H>  07-13    140  |  109  |  43<H>  ----------------------------<  116<H>  4.9   |  26  |  2.1<H>    Ca    8.2<L>      14 Jul 2021 04:30  Mg     2.2     07-14    TPro  5.4<L> [6.0 - 8.0]  /  Alb  2.8<L> [3.5 - 5.2]  /  TBili  0.4 [0.2 - 1.2]  /  DBili  x   /  AST  97<H> [0 - 41]  /  ALT  83<H> [0 - 41]  /  AlkPhos  209<H> [30 - 115]  07-14        RADIOLOGY & ADDITIONAL TESTS:    MR Cardiac:  EXAM:  MR CARD MORPH FUNCTION            PROCEDURE DATE:  07/14/2021          ******PRELIMINARY REPORT******    ******PRELIMINARY REPORT******          INTERPRETATION:  CLINICAL INDICATION: Preoperative examination.    TECHNIQUE:  Multi-sequential, multi-planar cardiac MR was performed. No intravenous contrast was administered.    COMPARISON: None.    FINDINGS:  LEFT:  The left ventricle appears mildly enlarged. There is left ventricular mild to moderate global hypokinesis. There is wall thinning and akinesis of the inferior wall.    RIGHT:  The right cardiac chambers are normal in size.    There is right ventricular mild global hypokinesis. The right ventricular myocardium has normal thickness and signal.    VOLUMETRIC DATA:  Artifact limits accurate quantitative volumetric evaluation.    VALVES  Suboptimal evaluation of the valves.    VESSELS  The thoracic aorta has normal caliber.  There is a left aortic arch.  The central pulmonary arteries are normal in caliber.  There is typical pulmonary venous return.  There is typical systemic venous return.    ADDITIONAL FINDINGS  Small bilateral effusions with adjacent atelectasis. Moderate pericardial effusion.    IMPRESSION:  Incomplete examination. No intravenous contrast administered. Examination prematurely terminated as patient could not continue with the exam due to dyspnea.    Qualitatively biventricular global hypokinesis as described above. Wall thinning and akinesis of the inferior wall.    Small bilateral effusions with adjacent atelectasis. Moderate pericardial effusion.      RUQ U/S:  INTERPRETATION:  CLINICAL INFORMATION: Transaminitis    COMPARISON: CT abdomen 7/13/2021, US abdomen 7/2/2021    TECHNIQUE: Sonography of the right upper quadrant.    FINDINGS:    Liver: Within normal limits.  Bile ducts: Normal caliber. Common bile duct measures 3 mm.  Gallbladder: The gallbladder is not distended and diffusely thick walled with pericholecystic fluid  Pancreas: Visualized portions are within normal limits.  Right kidney: 11.3 cm. No hydronephrosis. Mid pole simple cyst measuring 1.3 x 1.1 x 1.2 cm.  Ascites: None. Right pleural effusion.  IVC: Visualized portions are within normal limits.    IMPRESSION:    Right-sided pleural effusion. Gallbladder wall edema, nondistended, unchanged from CT scan.      MEDICATIONS  (STANDING):  aMIOdarone    Tablet   Oral   aMIOdarone    Tablet 400 milliGRAM(s) Oral every 8 hours  aspirin  chewable 81 milliGRAM(s) Oral daily  atorvastatin 40 milliGRAM(s) Oral at bedtime  brimonidine 0.2% Ophthalmic Solution 1 Drop(s) Both EYES two times a day  carvedilol 12.5 milliGRAM(s) Oral every 12 hours  chlorhexidine 4% Liquid 1 Application(s) Topical <User Schedule>  dextrose 40% Gel 15 Gram(s) Oral once  dextrose 5%. 1000 milliLiter(s) (50 mL/Hr) IV Continuous <Continuous>  dextrose 5%. 1000 milliLiter(s) (100 mL/Hr) IV Continuous <Continuous>  dextrose 50% Injectable 25 Gram(s) IV Push once  dextrose 50% Injectable 12.5 Gram(s) IV Push once  dextrose 50% Injectable 25 Gram(s) IV Push once  dorzolamide 2% Ophthalmic Solution 1 Drop(s) Both EYES three times a day  gabapentin 200 milliGRAM(s) Oral two times a day  glucagon  Injectable 1 milliGRAM(s) IntraMuscular once  heparin   Injectable 5000 Unit(s) SubCutaneous every 8 hours  hydrALAZINE 50 milliGRAM(s) Oral three times a day  insulin glargine Injectable (LANTUS) 9 Unit(s) SubCutaneous every morning  insulin lispro (ADMELOG) corrective regimen sliding scale   SubCutaneous Before meals and at bedtime  insulin lispro Injectable (ADMELOG) 3 Unit(s) SubCutaneous three times a day before meals  isosorbide   dinitrate Tablet (ISORDIL) 20 milliGRAM(s) Oral three times a day  latanoprost 0.005% Ophthalmic Solution 1 Drop(s) Both EYES at bedtime  sodium chloride 0.9%. 1000 milliLiter(s) (75 mL/Hr) IV Continuous <Continuous>  timolol 0.5% Solution 1 Drop(s) Both EYES two times a day    MEDICATIONS  (PRN):  LORazepam   Injectable 1 milliGRAM(s) IV Push once PRN Anxiety    HEPARIN:  [X] YES [] NO  Dose: 5000 UNITS/HR UNITS Q8H  LOVENOX:[] YES [X] NO  Dose: XX mg Q24H  COUMADIN: []  YES [X] NO  Dose: XX mg  Q24H  SCD's: No  GI Prophylaxis: Protonix [], Pepcid [], None [X]      Allergies:  No Known Allergies      Ambulation/Activity Status: Encourage regular ambulation as tolerated.    Assessment/Plan:  56y Male with a history of HTN, DM, glaucoma, DLD, CKD and diabetic nephropathy who presented for worsening LE swelling associated with dyspnea on exertion and increased abdominal girth found to have reduced EF with severe 3v CAD. Plan for CABG procedure. Patient not agreeable to surgery at this time as he wants to wait for his mother to be here which will not be until the end of the week. Will continue to follow patient and manage heart failure symptoms.  - Case and plan discussed with CTU Intensivist and CT Surgeon - Dr. Yee/Shira/Rebekah  - Continue C3 supportive care    - Continue DVT prophylaxis  - Continue to advance physical activity as tolerated and continue PT/OT as directed  -PFTs not yet completed. Will be completed tomorrow morning . Will follow up on results.  -Discussed with patient potential for HEIDI to better assess valve function.   -LFTs elevated. Bile ducts and liver contour wnl. Thickened gallbladder wall and pericholecystic fluid. Will continue to trend.   -Discussed with patient possibility of hemodialysis after CABG procedure due to pre-op decreased renal function.  1. CAD: Continue ASA, statin, BB  2. HTN: Controlled with amlodipine and hydralazine.  3. DM/Glucose Control: DM controlled with injectable insulin.

## 2021-07-14 NOTE — PROGRESS NOTE ADULT - SUBJECTIVE AND OBJECTIVE BOX
Date of Admission: 21    Interval History:    - Patient unable to complete CMR today due to SOB, worsening renal function.       HISTORY OF PRESENT ILLNESS:    HPI: 56M w/ PMHx HTN, HLD, T2DM on insulin, Glaucoma. He presents to the Ed c/o worsening LE swelling and pain for the past 1 month. Endorses SOB on exertion, along w/ increased abdominal girth. Denies orthopnea, PND, wheeze, CP, diaphoresis. No cardiac Hx.    Patient reports about two months ago he was able to walk and climb stairs without any limitations. For the past couple weeks he developed a BLE edema. Patient denies c/p, palpitations, headaches, bleeding, LH, dizziness, orthopnea, PND, LOC, cough, feeling bloated, N/V/D.     PAST MEDICAL & SURGICAL HISTORY:     HTN (hypertension)  HLD (hyperlipidemia)  DM (diabetes mellitus)  Glaucoma  No significant past surgical history        FAMILY HISTORY:    Mother:  77 HTN  Father:  at 55 of CKD       SOCIAL HISTORY:      Smokes about 3 cigarettes a week, social alcohol drinking, denies drug use    Allergies    No Known Allergies    Intolerances      PHYSICAL EXAM:    General Appearance: well appearing, normal for age and gender. 	  Neck: normal JVP, no bruit.   Cardiovascular: regular rate and rhythm S1 S2, No JVD, No murmurs, No edema  Respiratory: Lungs clear to auscultation	  Psychiatry: Alert and oriented x 3, Mood & affect appropriate  Gastrointestinal:  Soft, Non-tender  Musculoskeletal/ extremities: Normal range of motion, No clubbing, cyanosis or edema  Vascular: Peripheral pulses palpable 2+ bilaterally      PREVIOUS DIAGNOSTIC TESTING:      TTE 21    Summary:   1. Moderately decreased global left ventricular systolic function.   2. Multiple left ventricular regional wall motion abnormalities exist. See wall motion findings.   3. LV Ejection Fraction by Garcia's Method with a biplane EF of 34 %.   4. Moderately increased LV wall thickness.   5. Normal left ventricular internal cavity size.   6. Mild to moderately enlarged left atrium.   7. Normal right atrial size.   8. Moderate pericardial effusion.   9. Mild to moderate mitral valve regurgitation.  10. Structurally normal mitral valve, with normal leaflet excursion.  11. Moderate tricuspid regurgitation.  12. Mild aortic regurgitation.  13. Normal trileaflet aortic valve with normal opening.  14. Mild pulmonic valve regurgitation.  15. Estimated pulmonary artery systolic pressure is 52.0 mmHg assuming a right atrial pressure of 10 mmHg, which is consistent with moderate pulmonary hypertension.  	    Home Medications:  atorvastatin 40 mg oral tablet: 1 tab(s) orally once a day (2021 04:46)  BASAGLAR 100 UNIT/ML KWIKPEN:  (2021 04:46)  brimonidine 0.2% ophthalmic solution: 1 drop(s) to each affected eye 2 times a day (2021 04:46)  dorzolamide-timolol 2%-0.5% preservative-free ophthalmic solution: 1 drop(s) to each affected eye 2 times a day (2021 04:46)  furosemide 20 mg oral tablet: 1 tab(s) orally once a day (2021 04:46)  latanoprost 0.005% ophthalmic solution: 1 drop(s) to each affected eye once a day (in the evening) (2021 04:46)  lisinopril 20 mg oral tablet: 1 tab(s) orally once a day (2021 04:46)  pioglitazone-metformin 15 mg-850 mg oral tablet: 1 tab(s) orally 2 times a day (2021 04:46)  Rhopressa 0.02% ophthalmic solution: 1 drop(s) to each affected eye once a day (in the evening) (2021 04:46)    MEDICATIONS  (STANDING):  atorvastatin 40 milliGRAM(s) Oral at bedtime  brimonidine 0.2% Ophthalmic Solution 1 Drop(s) Both EYES two times a day  chlorhexidine 4% Liquid 1 Application(s) Topical <User Schedule>  dextrose 40% Gel 15 Gram(s) Oral once  dextrose 5%. 1000 milliLiter(s) (50 mL/Hr) IV Continuous <Continuous>  dextrose 5%. 1000 milliLiter(s) (100 mL/Hr) IV Continuous <Continuous>  dextrose 50% Injectable 25 Gram(s) IV Push once  dextrose 50% Injectable 12.5 Gram(s) IV Push once  dextrose 50% Injectable 25 Gram(s) IV Push once  dorzolamide 2% Ophthalmic Solution 1 Drop(s) Both EYES three times a day  enoxaparin Injectable 40 milliGRAM(s) SubCutaneous daily  gabapentin 200 milliGRAM(s) Oral two times a day  glucagon  Injectable 1 milliGRAM(s) IntraMuscular once  insulin glargine Injectable (LANTUS) 10 Unit(s) SubCutaneous every morning  insulin lispro (ADMELOG) corrective regimen sliding scale   SubCutaneous three times a day before meals  insulin lispro Injectable (ADMELOG) 3 Unit(s) SubCutaneous three times a day before meals  latanoprost 0.005% Ophthalmic Solution 1 Drop(s) Both EYES at bedtime  NIFEdipine XL 60 milliGRAM(s) Oral daily  timolol 0.5% Solution 1 Drop(s) Both EYES two times a day    MEDICATIONS  (PRN):

## 2021-07-14 NOTE — PROGRESS NOTE ADULT - ASSESSMENT
HPI: 56M w/ PMHx HTN, HLD, T2DM on insulin, Glaucoma presents to the Ed c/o worsening LE swelling and pain for the past 1 month. Endorses SOB on exertion, along w/ increased abdominal girth. Denies orthopnea, PND, wheeze, CP, diaphoresis. No cardiac Hx. Seen by HF team and cardiology.    #New Onset Acute HFrEF exacerbation  #HFrEF 2/2 uncontrolled HTN  #Moderate pericardial effusion  #Uncontrolled HTN  -no prior cardiac hx  -CXR - cardiomegaly and blunting of CP angles  -BNP 37578 on admission  -trops 0.10 -> 0.11 -> 0.11 -> 0.14 -> 0.09  -Echo - EF 34%, moderate pericardial effusion  -I<O, daily weights  - s/p  cardiac cath today - Significant 3V disease. CTS on board . CABG possibly on Thursday   -per HF team, Carvedilol 12.5 BID ,  cont hydralazine 25mg TID, and cont isosorbide 20mg TID.       #Transaminitis   -AST 24>97  -ALT 34>83  - Liver WNL on U/S   -Will monitor     #LENNY on CKD  -Cr 2.4->2.7->2.7->2.5->2.1 (Baseline 1.7 3/2021)  -cont to hold Lasix per nephro  -FeNa = 2.8%, FeUrea 40.2% => likely intrinsic  -UA 7/3 significant for moderate blood, 300 protein, protein/Cr ratio 8.1  -US KUB - no hydro b/l, right renal cysts  -cont to monitor I&O for oliguria/anuria        #HLD  -cont atorvastatin    #DM2 w/ peripheral neuropathy  -FS: 171, 143, 250, 98  -cont Lantus, ISS  -on lantus, metformin-pioglitazone at home  -hold pioglitazone now and on d/c - can contribute to CHF exacerbation  -on gabapentin 200mg BID  -duplex negative  -arterial duplex negative    #Glaucoma  -cont eye drops    PT/REHAB: 50ft x2 w/o assistance with PT  ACTIVITY: Activity - Increase As Tolerated  DVT PPX: heparin   GI PPX:  Not indicated  DIET: Diet, DASH/TLC  CODE: Full code  Disposition: from home;

## 2021-07-14 NOTE — PROGRESS NOTE ADULT - ATTENDING COMMENTS
56-year-old gentleman we had seen a couple of days ago following his cardiac catheterization that showed severe diffuse triple-vessel coronary artery disease.  He presented with decompensated acute on chronic systolic congestive heart failure  Acute on chronic renal failure  Anasarca  Diabetic peripheral neuropathy  Diabetic retinopathy  Diabetic and hypertensive kidney disease    Patient appears much older than his stated age    His cardiac catheterization was once again reviewed and it shows severe diffuse triple-vessel coronary artery disease  He had an MRI but it was an incomplete study of his heart and the limited images did show a very low ejection fraction with considerable thinning of the left ventricular wall.    He is being considered for a high risk open heart surgery with coronary artery bypass grafting and the timing of this is to be optimized.    Today he was noticed to have worsening renal function and also his liver functions were abnormal and therefore after discussion with the heart failure cardiologist, Dr. Cantu a team decision was made to delay surgery and complete his full preoperative work-up.    In addition the patient insists that he would not consent to surgery until his mother came up to visit him and she is coming from [place].  She is scheduled to be here on 7/17/2021.    I had my colleagues Dr. Yee and Dr. Silva, also evaluate the patient and as a team we will make a plan along with the heart team approach to come up with the best option for this patient.  I will be away the next few days but he will be taken care of by the heart team.    The main concern of course is a high chance of dialysis dependent renal failure in the perioperative.  And this was discussed in detail with the patient.    The patient had multiple opportunities to ask questions and these were all answered.

## 2021-07-14 NOTE — PROGRESS NOTE ADULT - SUBJECTIVE AND OBJECTIVE BOX
GILLIAN MENDOZA  56y Male    INTERVAL HPI/OVERNIGHT EVENTS:    No chest pain or SOB    T(F): 96.7 (21 @ 05:06), Max: 97.8 (21 @ 14:20)  HR: 64 (21 @ 05:06) (64 - 76)  BP: 142/75 (21 @ 05:06) (96/58 - 156/88)  RR: 18 (21 @ 05:06) (18 - 18)  SpO2: 98% (21 @ 07:37) (98% - 98%) on RA    I&O's Summary    2021 07:01  -  2021 07:00  --------------------------------------------------------  IN: 0 mL / OUT: 1100 mL / NET: -1100 mL      Daily Weight in k.7 (2021 05:06)    CAPILLARY BLOOD GLUCOSE      POCT Blood Glucose.: 218 mg/dL (2021 11:29)  POCT Blood Glucose.: 202 mg/dL (2021 08:00)  POCT Blood Glucose.: 224 mg/dL (2021 21:08)  POCT Blood Glucose.: 191 mg/dL (2021 16:34)        PHYSICAL EXAM:  GENERAL: NAD  HEAD:  Normocephalic  EYES:  conjunctiva and sclera clear  ENMT: Moist mucous membranes  NECK: Supple, No JVD  NERVOUS SYSTEM:  Alert, awake, Good concentration  CHEST/LUNG: mildly decreased BS at left base, otherwise CTA b/l  HEART: Regular rate and rhythm  ABDOMEN: Soft, Nontender, Nondistended  EXTREMITIES: + pedal edema      Consultant(s) Notes Reviewed:  [x ] YES  [ ] NO  Care Discussed with Consultants/Other Providers [ x] YES  [ ] NO    MEDICATIONS  (STANDING):  aMIOdarone    Tablet   Oral   aMIOdarone    Tablet 400 milliGRAM(s) Oral every 8 hours  aspirin  chewable 81 milliGRAM(s) Oral daily  atorvastatin 40 milliGRAM(s) Oral at bedtime  brimonidine 0.2% Ophthalmic Solution 1 Drop(s) Both EYES two times a day  carvedilol 12.5 milliGRAM(s) Oral every 12 hours  chlorhexidine 4% Liquid 1 Application(s) Topical <User Schedule>  dextrose 40% Gel 15 Gram(s) Oral once  dextrose 5%. 1000 milliLiter(s) (50 mL/Hr) IV Continuous <Continuous>  dextrose 5%. 1000 milliLiter(s) (100 mL/Hr) IV Continuous <Continuous>  dextrose 50% Injectable 25 Gram(s) IV Push once  dextrose 50% Injectable 12.5 Gram(s) IV Push once  dextrose 50% Injectable 25 Gram(s) IV Push once  dorzolamide 2% Ophthalmic Solution 1 Drop(s) Both EYES three times a day  gabapentin 200 milliGRAM(s) Oral two times a day  glucagon  Injectable 1 milliGRAM(s) IntraMuscular once  heparin   Injectable 5000 Unit(s) SubCutaneous every 8 hours  hydrALAZINE 50 milliGRAM(s) Oral three times a day  insulin glargine Injectable (LANTUS) 9 Unit(s) SubCutaneous every morning  insulin lispro (ADMELOG) corrective regimen sliding scale   SubCutaneous Before meals and at bedtime  insulin lispro Injectable (ADMELOG) 3 Unit(s) SubCutaneous three times a day before meals  isosorbide   dinitrate Tablet (ISORDIL) 20 milliGRAM(s) Oral three times a day  latanoprost 0.005% Ophthalmic Solution 1 Drop(s) Both EYES at bedtime  sodium chloride 0.9%. 1000 milliLiter(s) (75 mL/Hr) IV Continuous <Continuous>  timolol 0.5% Solution 1 Drop(s) Both EYES two times a day    MEDICATIONS  (PRN):  LORazepam   Injectable 1 milliGRAM(s) IV Push once PRN Anxiety      Telemetry reviewed by me    LABS:                        9.1    8.16  )-----------( 162      ( 2021 04:30 )             30.7     07-14    140  |  111<H>  |  49<H>  ----------------------------<  204<H>  5.0   |  21  |  2.5<H>    Ca    8.2<L>      2021 04:30  Mg     2.2     07-14    TPro  5.4<L>  /  Alb  2.8<L>  /  TBili  0.4  /  DBili  x   /  AST  97<H>  /  ALT  83<H>  /  AlkPhos  209<H>                  Case discussed with residents and RN on rounds today    Care discussed with pt

## 2021-07-14 NOTE — PROGRESS NOTE ADULT - ASSESSMENT
56M w/ PMHx HTN, HLD, T2DM on insulin x>10 years, retinopathy, Glaucoma presents to the Ed c/o worsening LE swelling and pain for the past 1 month. Endorses SOB on exertion, along w/ increased abdominal girth.    #LENNY / CKD / proteinuria   - creat up-trending, non oliguric, hypervolemic  - appreciate heart failure f/u, start bumex iv 2mg q12   - last phos level noted, does not need binder   - s/p cardiac cath, appreciate CT surgery eval for CAVG  - heavy proteinuria, nephrotic range likely d/t DM - check serum flc ratio noted, MARLIN negative   - renal ultrasound noted w/o hydronephrosis, right renal cysts  - TTE noted with Echo - EF 34%, moderate pericardial effusion  HTN - decrease hydralazine d/t hypotension    Will follow

## 2021-07-14 NOTE — PROGRESS NOTE ADULT - SUBJECTIVE AND OBJECTIVE BOX
Nephrology Progress Note    GILLIAN MENDOZA  [id number]  56y  Male    S:  Patient is seen and examined, events over the last 24h noted.    O:  Allergies:  No Known Allergies    Hospital Medications:   MEDICATIONS  (STANDING):  aMIOdarone    Tablet 400 milliGRAM(s) Oral every 8 hours  aspirin  chewable 81 milliGRAM(s) Oral daily  atorvastatin 40 milliGRAM(s) Oral at bedtime  brimonidine 0.2% Ophthalmic Solution 1 Drop(s) Both EYES two times a day  carvedilol 12.5 milliGRAM(s) Oral every 12 hours  dorzolamide 2% Ophthalmic Solution 1 Drop(s) Both EYES three times a day  gabapentin 200 milliGRAM(s) Oral two times a day  glucagon  Injectable 1 milliGRAM(s) IntraMuscular once  heparin   Injectable 5000 Unit(s) SubCutaneous every 8 hours  hydrALAZINE 50 milliGRAM(s) Oral three times a day  insulin glargine Injectable (LANTUS) 9 Unit(s) SubCutaneous every morning  insulin lispro (ADMELOG) corrective regimen sliding scale   SubCutaneous Before meals and at bedtime  insulin lispro Injectable (ADMELOG) 3 Unit(s) SubCutaneous three times a day before meals  isosorbide   dinitrate Tablet (ISORDIL) 20 milliGRAM(s) Oral three times a day  latanoprost 0.005% Ophthalmic Solution 1 Drop(s) Both EYES at bedtime  sodium chloride 0.9%. 1000 milliLiter(s) (75 mL/Hr) IV Continuous <Continuous>  timolol 0.5% Solution 1 Drop(s) Both EYES two times a day    MEDICATIONS  (PRN):  LORazepam   Injectable 1 milliGRAM(s) IV Push once PRN Anxiety    Home Medications:  atorvastatin 40 mg oral tablet: 1 tab(s) orally once a day (2021 04:46)  BASAGLAR 100 UNIT/ML KWIKPEN:  (2021 04:46)  brimonidine 0.2% ophthalmic solution: 1 drop(s) to each affected eye 2 times a day (2021 04:46)  dorzolamide-timolol 2%-0.5% preservative-free ophthalmic solution: 1 drop(s) to each affected eye 2 times a day (2021 04:46)  furosemide 20 mg oral tablet: 1 tab(s) orally once a day (2021 04:46)  latanoprost 0.005% ophthalmic solution: 1 drop(s) to each affected eye once a day (in the evening) (2021 04:46)  lisinopril 20 mg oral tablet: 1 tab(s) orally once a day (2021 04:46)  pioglitazone-metformin 15 mg-850 mg oral tablet: 1 tab(s) orally 2 times a day (2021 04:46)  Rhopressa 0.02% ophthalmic solution: 1 drop(s) to each affected eye once a day (in the evening) (2021 04:46)      VITALS:  Daily Weight in k.7 (2021 05:06)  T(F): 96.5 (21 @ 20:34), Max: 97 (21 @ 14:23)  HR: 54 (21 @ 20:34)  BP: 86/60 (21 @ 20:34)  RR: 18 (21 @ 20:34)  SpO2: 98% (21 @ 07:37)  Wt(kg): --  I&O's Detail    2021 07:  -  2021 07:00  --------------------------------------------------------  IN:  Total IN: 0 mL    OUT:    Voided (mL): 1100 mL  Total OUT: 1100 mL    Total NET: -1100 mL      2021 07:  -  2021 23:17  --------------------------------------------------------  IN:    Oral Fluid: 570 mL  Total IN: 570 mL    OUT:    Voided (mL): 150 mL  Total OUT: 150 mL    Total NET: 420 mL        I&O's Summary    2021 07:  -  2021 07:00  --------------------------------------------------------  IN: 0 mL / OUT: 1100 mL / NET: -1100 mL    2021 07:01  -  2021 23:17  --------------------------------------------------------  IN: 570 mL / OUT: 150 mL / NET: 420 mL          PHYSICAL EXAM:  Gen: NAD  Resp: CTAB   Card: S1/S2  Abd: soft  Extremities: +LE edema      LABS:        140  |  111<H>  |  49<H>  ----------------------------<  204<H>  5.0   |  21  |  2.5<H>    Ca    8.2<L>      2021 04:30  Mg     2.2         TPro  5.4<L>  /  Alb  2.8<L>  /  TBili  0.4  /  DBili      /  AST  97<H>  /  ALT  83<H>  /  AlkPhos  209<H>      eGFR if Non African American: 27 mL/min/1.73M2 (21 @ 04:30)  eGFR if African American: 32 mL/min/1.73M2 (21 @ 04:30)    Phosphorus Level, Serum: 4.3 mg/dL (21 @ 07:25)  Phosphorus Level, Serum: 4.3 mg/dL (21 @ 12:04)    Osmolality, Serum: 288 mos/kg (21 @ 01:09)                          9.1    8.16  )-----------( 162      ( 2021 04:30 )             30.7     Mean Cell Volume: 80.6 fL (21 @ 04:30)    Ferritin, Serum: 255 ng/mL (21 @ 11:37)  % Saturation, Iron: 22 % (21 @ 11:37)    Protein/Creatinine Ratio Calculation: 8.1 Ratio ( @ 16:34)  Creatinine, Random Urine: 58 mg/dL ( @ 16:34)    Creatinine trend:  Creatinine, Serum: 2.5 mg/dL (21 @ 04:30)  Creatinine, Serum: 2.1 mg/dL (21 @ 04:30)  Creatinine, Serum: 2.1 mg/dL (21 @ 05:32)  Creatinine, Serum: 2.1 mg/dL (21 @ 05:29)  Creatinine, Serum: 2.1 mg/dL (07-10-21 @ 05:47)  Creatinine, Serum: 2.1 mg/dL (21 @ 05:31)  Creatinine, Serum: 2.5 mg/dL (21 @ 06:42)  Creatinine, Serum: 2.7 mg/dL (21 @ 04:30)      EDDIE Lambda: 4.91 mg/dL (21 @ 15:33)  EDDIE Kappa: 9.68 mg/dL (21 @ 15:33)  [place]/Lambda Free Light Chain Ratio, Serum: 1.97 Ratio (21 @ 15:33)

## 2021-07-14 NOTE — PROGRESS NOTE ADULT - SUBJECTIVE AND OBJECTIVE BOX
GILLIAN MENDOZA  56y  Male      Patient is a 56y old  Male who presents with a chief complaint of New Onset Acute HFrEF exacerbation (13 Jul 2021 11:12)      INTERVAL HPI/OVERNIGHT EVENTS:    REVIEW OF SYSTEMS:  CONSTITUTIONAL: No fever, weight loss, or fatigue  EYES: No eye pain, visual disturbances, or discharge  ENMT:  No difficulty hearing, tinnitus, vertigo; No sinus or throat pain  NECK: No pain or stiffness  RESPIRATORY: No cough, wheezing, chills or hemoptysis; No shortness of breath  CARDIOVASCULAR: No chest pain, palpitations, dizziness, or leg swelling  GASTROINTESTINAL: No abdominal or epigastric pain. No diarrhea or constipation. No nausea, vomiting, or hematemesis. No melena or hematochezia.  GENITOURINARY: No dysuria, frequency, hematuria, or incontinence  NEUROLOGICAL: No headaches, memory loss, loss of strength, numbness, or tremors  MUSCULOSKELETAL: No joint pain or swelling; No muscle, back, or extremity pain  SKIN: No itching, burning, rashes, or lesions   LYMPH NODES: No enlarged glands  ENDOCRINE: No heat or cold intolerance; No hair loss  PSYCHIATRIC: No depression, anxiety, mood swings, or difficulty sleeping  HEME/LYMPH: No easy bruising, or bleeding gums  ALLERY AND IMMUNOLOGIC: No hives or eczema    Vital Signs Last 24 Hrs  T(C): 36.1 (14 Jul 2021 14:23), Max: 36.4 (13 Jul 2021 20:15)  T(F): 97 (14 Jul 2021 14:23), Max: 97.5 (13 Jul 2021 20:15)  HR: 57 (14 Jul 2021 14:23) (57 - 65)  BP: 116/68 (14 Jul 2021 14:23) (96/58 - 142/75)  BP(mean): --  RR: 18 (14 Jul 2021 05:06) (18 - 18)  SpO2: 98% (14 Jul 2021 07:37) (98% - 98%)    PHYSICAL EXAM:  GENERAL: NAD, well-groomed, well-developed  HEAD:  Atraumatic, Normocephalic  EYES: EOMI, PERRLA, conjunctiva and sclera clear  ENMT: Moist mucous membranes, Good dentition, No lesions  NECK: Supple, No JVD, Normal thyroid  NERVOUS SYSTEM:  Alert & Oriented X3, Good concentration; Motor Strength 5/5 B/L upper and lower extremities; DTRs 2+ intact and symmetric  CHEST/LUNG: Clear to auscultation bilaterally; No rales, rhonchi, wheezing, or rubs  HEART: Regular rate and rhythm; No murmurs, rubs, or gallops  ABDOMEN: Soft, Nontender, Nondistended; Bowel sounds present  EXTREMITIES:  2+ Peripheral Pulses, No clubbing, cyanosis, or edema  LYMPH: No lymphadenopathy noted  SKIN: No rashes or lesions    Consultant(s) Notes Reviewed:  [x ] YES  [ ] NO  Care Discussed with Consultants/Other Providers [ x] YES  [ ] NO    LABS:                        9.1    8.16  )-----------( 162      ( 14 Jul 2021 04:30 )             30.7     07-14    140  |  111<H>  |  49<H>  ----------------------------<  204<H>  5.0   |  21  |  2.5<H>    Ca    8.2<L>      14 Jul 2021 04:30  Mg     2.2     07-14    TPro  5.4<L>  /  Alb  2.8<L>  /  TBili  0.4  /  DBili  x   /  AST  97<H>  /  ALT  83<H>  /  AlkPhos  209<H>  07-14          CAPILLARY BLOOD GLUCOSE      POCT Blood Glucose.: 218 mg/dL (14 Jul 2021 11:29)  POCT Blood Glucose.: 202 mg/dL (14 Jul 2021 08:00)  POCT Blood Glucose.: 224 mg/dL (13 Jul 2021 21:08)  POCT Blood Glucose.: 191 mg/dL (13 Jul 2021 16:34)      RADIOLOGY & ADDITIONAL TESTS:  PROCEDURE DATE:  07/14/2021            INTERPRETATION:  CLINICAL INFORMATION: Transaminitis    COMPARISON: CT abdomen 7/13/2021, US abdomen 7/2/2021    TECHNIQUE: Sonography of the right upper quadrant.    FINDINGS:    Liver: Within normal limits.  Bile ducts: Normal caliber. Common bile duct measures 3 mm.  Gallbladder: The gallbladder is not distended and diffusely thick walled with pericholecystic fluid  Pancreas: Visualized portions are within normal limits.  Right kidney: 11.3 cm. No hydronephrosis. Mid pole simple cyst measuring 1.3 x 1.1 x 1.2 cm.  Ascites: None. Right pleural effusion.  IVC: Visualized portions are within normal limits.    IMPRESSION:    Right-sided pleural effusion. Gallbladder wall edema, nondistended, unchanged from CT scan.    --- End of Report ---

## 2021-07-15 LAB
ALBUMIN SERPL ELPH-MCNC: 2.9 G/DL — LOW (ref 3.5–5.2)
ALP SERPL-CCNC: 260 U/L — HIGH (ref 30–115)
ALT FLD-CCNC: 121 U/L — HIGH (ref 0–41)
ANION GAP SERPL CALC-SCNC: 10 MMOL/L — SIGNIFICANT CHANGE UP (ref 7–14)
ANION GAP SERPL CALC-SCNC: 10 MMOL/L — SIGNIFICANT CHANGE UP (ref 7–14)
ANION GAP SERPL CALC-SCNC: 8 MMOL/L — SIGNIFICANT CHANGE UP (ref 7–14)
APTT BLD: 29.3 SEC — SIGNIFICANT CHANGE UP (ref 27–39.2)
AST SERPL-CCNC: 111 U/L — HIGH (ref 0–41)
BILIRUB SERPL-MCNC: 0.5 MG/DL — SIGNIFICANT CHANGE UP (ref 0.2–1.2)
BUN SERPL-MCNC: 63 MG/DL — CRITICAL HIGH (ref 10–20)
BUN SERPL-MCNC: 64 MG/DL — CRITICAL HIGH (ref 10–20)
BUN SERPL-MCNC: 69 MG/DL — CRITICAL HIGH (ref 10–20)
CALCIUM SERPL-MCNC: 8.1 MG/DL — LOW (ref 8.5–10.1)
CALCIUM SERPL-MCNC: 8.2 MG/DL — LOW (ref 8.5–10.1)
CALCIUM SERPL-MCNC: 8.4 MG/DL — LOW (ref 8.5–10.1)
CHLORIDE SERPL-SCNC: 105 MMOL/L — SIGNIFICANT CHANGE UP (ref 98–110)
CHLORIDE SERPL-SCNC: 106 MMOL/L — SIGNIFICANT CHANGE UP (ref 98–110)
CHLORIDE SERPL-SCNC: 107 MMOL/L — SIGNIFICANT CHANGE UP (ref 98–110)
CO2 SERPL-SCNC: 20 MMOL/L — SIGNIFICANT CHANGE UP (ref 17–32)
CO2 SERPL-SCNC: 21 MMOL/L — SIGNIFICANT CHANGE UP (ref 17–32)
CO2 SERPL-SCNC: 22 MMOL/L — SIGNIFICANT CHANGE UP (ref 17–32)
CREAT SERPL-MCNC: 3.2 MG/DL — HIGH (ref 0.7–1.5)
CREAT SERPL-MCNC: 3.3 MG/DL — HIGH (ref 0.7–1.5)
CREAT SERPL-MCNC: 3.5 MG/DL — HIGH (ref 0.7–1.5)
GLUCOSE BLDC GLUCOMTR-MCNC: 107 MG/DL — HIGH (ref 70–99)
GLUCOSE BLDC GLUCOMTR-MCNC: 108 MG/DL — HIGH (ref 70–99)
GLUCOSE BLDC GLUCOMTR-MCNC: 111 MG/DL — HIGH (ref 70–99)
GLUCOSE BLDC GLUCOMTR-MCNC: 115 MG/DL — HIGH (ref 70–99)
GLUCOSE BLDC GLUCOMTR-MCNC: 135 MG/DL — HIGH (ref 70–99)
GLUCOSE SERPL-MCNC: 110 MG/DL — HIGH (ref 70–99)
GLUCOSE SERPL-MCNC: 111 MG/DL — HIGH (ref 70–99)
GLUCOSE SERPL-MCNC: 91 MG/DL — SIGNIFICANT CHANGE UP (ref 70–99)
HCT VFR BLD CALC: 31.3 % — LOW (ref 42–52)
HGB BLD-MCNC: 9 G/DL — LOW (ref 14–18)
INR BLD: 1.03 RATIO — SIGNIFICANT CHANGE UP (ref 0.65–1.3)
MAGNESIUM SERPL-MCNC: 2.3 MG/DL — SIGNIFICANT CHANGE UP (ref 1.8–2.4)
MCHC RBC-ENTMCNC: 23.3 PG — LOW (ref 27–31)
MCHC RBC-ENTMCNC: 28.8 G/DL — LOW (ref 32–37)
MCV RBC AUTO: 80.9 FL — SIGNIFICANT CHANGE UP (ref 80–94)
NRBC # BLD: 0 /100 WBCS — SIGNIFICANT CHANGE UP (ref 0–0)
PLATELET # BLD AUTO: 203 K/UL — SIGNIFICANT CHANGE UP (ref 130–400)
POTASSIUM SERPL-MCNC: 4.8 MMOL/L — SIGNIFICANT CHANGE UP (ref 3.5–5)
POTASSIUM SERPL-MCNC: 5.4 MMOL/L — HIGH (ref 3.5–5)
POTASSIUM SERPL-MCNC: 6 MMOL/L — CRITICAL HIGH (ref 3.5–5)
POTASSIUM SERPL-SCNC: 4.8 MMOL/L — SIGNIFICANT CHANGE UP (ref 3.5–5)
POTASSIUM SERPL-SCNC: 5.4 MMOL/L — HIGH (ref 3.5–5)
POTASSIUM SERPL-SCNC: 6 MMOL/L — CRITICAL HIGH (ref 3.5–5)
PROT SERPL-MCNC: 5.6 G/DL — LOW (ref 6–8)
PROTHROM AB SERPL-ACNC: 11.8 SEC — SIGNIFICANT CHANGE UP (ref 9.95–12.87)
RBC # BLD: 3.87 M/UL — LOW (ref 4.7–6.1)
RBC # FLD: 14.4 % — SIGNIFICANT CHANGE UP (ref 11.5–14.5)
SODIUM SERPL-SCNC: 135 MMOL/L — SIGNIFICANT CHANGE UP (ref 135–146)
SODIUM SERPL-SCNC: 137 MMOL/L — SIGNIFICANT CHANGE UP (ref 135–146)
SODIUM SERPL-SCNC: 137 MMOL/L — SIGNIFICANT CHANGE UP (ref 135–146)
WBC # BLD: 8.17 K/UL — SIGNIFICANT CHANGE UP (ref 4.8–10.8)
WBC # FLD AUTO: 8.17 K/UL — SIGNIFICANT CHANGE UP (ref 4.8–10.8)

## 2021-07-15 PROCEDURE — 99233 SBSQ HOSP IP/OBS HIGH 50: CPT

## 2021-07-15 PROCEDURE — 94060 EVALUATION OF WHEEZING: CPT | Mod: 26

## 2021-07-15 RX ORDER — BUMETANIDE 0.25 MG/ML
2 INJECTION INTRAMUSCULAR; INTRAVENOUS
Refills: 0 | Status: DISCONTINUED | OUTPATIENT
Start: 2021-07-15 | End: 2021-07-15

## 2021-07-15 RX ORDER — SODIUM CHLORIDE 5 G/100ML
150 INJECTION, SOLUTION INTRAVENOUS
Refills: 0 | Status: DISCONTINUED | OUTPATIENT
Start: 2021-07-15 | End: 2021-07-16

## 2021-07-15 RX ORDER — DEXTROSE 50 % IN WATER 50 %
50 SYRINGE (ML) INTRAVENOUS ONCE
Refills: 0 | Status: COMPLETED | OUTPATIENT
Start: 2021-07-15 | End: 2021-07-15

## 2021-07-15 RX ORDER — BUMETANIDE 0.25 MG/ML
2 INJECTION INTRAMUSCULAR; INTRAVENOUS
Refills: 0 | Status: DISCONTINUED | OUTPATIENT
Start: 2021-07-15 | End: 2021-07-16

## 2021-07-15 RX ORDER — SODIUM ZIRCONIUM CYCLOSILICATE 10 G/10G
10 POWDER, FOR SUSPENSION ORAL
Refills: 0 | Status: COMPLETED | OUTPATIENT
Start: 2021-07-15 | End: 2021-07-16

## 2021-07-15 RX ORDER — SODIUM CHLORIDE 5 G/100ML
150 INJECTION, SOLUTION INTRAVENOUS
Refills: 0 | Status: DISCONTINUED | OUTPATIENT
Start: 2021-07-16 | End: 2021-07-16

## 2021-07-15 RX ORDER — SODIUM CHLORIDE 5 G/100ML
150 INJECTION, SOLUTION INTRAVENOUS
Refills: 0 | Status: DISCONTINUED | OUTPATIENT
Start: 2021-07-15 | End: 2021-07-15

## 2021-07-15 RX ORDER — INSULIN HUMAN 100 [IU]/ML
10 INJECTION, SOLUTION SUBCUTANEOUS ONCE
Refills: 0 | Status: COMPLETED | OUTPATIENT
Start: 2021-07-15 | End: 2021-07-15

## 2021-07-15 RX ORDER — SODIUM ZIRCONIUM CYCLOSILICATE 10 G/10G
10 POWDER, FOR SUSPENSION ORAL
Refills: 0 | Status: DISCONTINUED | OUTPATIENT
Start: 2021-07-15 | End: 2021-07-15

## 2021-07-15 RX ADMIN — DORZOLAMIDE HYDROCHLORIDE 1 DROP(S): 20 SOLUTION/ DROPS OPHTHALMIC at 06:35

## 2021-07-15 RX ADMIN — ISOSORBIDE DINITRATE 20 MILLIGRAM(S): 5 TABLET ORAL at 17:16

## 2021-07-15 RX ADMIN — Medication 81 MILLIGRAM(S): at 12:07

## 2021-07-15 RX ADMIN — HEPARIN SODIUM 5000 UNIT(S): 5000 INJECTION INTRAVENOUS; SUBCUTANEOUS at 21:56

## 2021-07-15 RX ADMIN — CHLORHEXIDINE GLUCONATE 1 APPLICATION(S): 213 SOLUTION TOPICAL at 06:34

## 2021-07-15 RX ADMIN — HEPARIN SODIUM 5000 UNIT(S): 5000 INJECTION INTRAVENOUS; SUBCUTANEOUS at 06:35

## 2021-07-15 RX ADMIN — GABAPENTIN 200 MILLIGRAM(S): 400 CAPSULE ORAL at 17:17

## 2021-07-15 RX ADMIN — Medication 3 UNIT(S): at 17:16

## 2021-07-15 RX ADMIN — ISOSORBIDE DINITRATE 20 MILLIGRAM(S): 5 TABLET ORAL at 06:34

## 2021-07-15 RX ADMIN — CARVEDILOL PHOSPHATE 12.5 MILLIGRAM(S): 80 CAPSULE, EXTENDED RELEASE ORAL at 17:17

## 2021-07-15 RX ADMIN — BRIMONIDINE TARTRATE 1 DROP(S): 2 SOLUTION/ DROPS OPHTHALMIC at 17:17

## 2021-07-15 RX ADMIN — INSULIN GLARGINE 9 UNIT(S): 100 INJECTION, SOLUTION SUBCUTANEOUS at 07:42

## 2021-07-15 RX ADMIN — ISOSORBIDE DINITRATE 20 MILLIGRAM(S): 5 TABLET ORAL at 12:07

## 2021-07-15 RX ADMIN — SODIUM CHLORIDE 50 MILLILITER(S): 5 INJECTION, SOLUTION INTRAVENOUS at 09:02

## 2021-07-15 RX ADMIN — Medication 1 DROP(S): at 17:18

## 2021-07-15 RX ADMIN — LATANOPROST 1 DROP(S): 0.05 SOLUTION/ DROPS OPHTHALMIC; TOPICAL at 21:56

## 2021-07-15 RX ADMIN — AMIODARONE HYDROCHLORIDE 400 MILLIGRAM(S): 400 TABLET ORAL at 06:34

## 2021-07-15 RX ADMIN — Medication 50 MILLILITER(S): at 09:34

## 2021-07-15 RX ADMIN — GABAPENTIN 200 MILLIGRAM(S): 400 CAPSULE ORAL at 06:34

## 2021-07-15 RX ADMIN — Medication 50 MILLIGRAM(S): at 06:34

## 2021-07-15 RX ADMIN — DORZOLAMIDE HYDROCHLORIDE 1 DROP(S): 20 SOLUTION/ DROPS OPHTHALMIC at 14:32

## 2021-07-15 RX ADMIN — HEPARIN SODIUM 5000 UNIT(S): 5000 INJECTION INTRAVENOUS; SUBCUTANEOUS at 14:32

## 2021-07-15 RX ADMIN — BUMETANIDE 2 MILLIGRAM(S): 0.25 INJECTION INTRAMUSCULAR; INTRAVENOUS at 17:17

## 2021-07-15 RX ADMIN — AMIODARONE HYDROCHLORIDE 400 MILLIGRAM(S): 400 TABLET ORAL at 14:32

## 2021-07-15 RX ADMIN — Medication 1 DROP(S): at 06:35

## 2021-07-15 RX ADMIN — BRIMONIDINE TARTRATE 1 DROP(S): 2 SOLUTION/ DROPS OPHTHALMIC at 06:35

## 2021-07-15 RX ADMIN — Medication 3 UNIT(S): at 12:07

## 2021-07-15 RX ADMIN — SODIUM CHLORIDE 50 MILLILITER(S): 5 INJECTION, SOLUTION INTRAVENOUS at 18:01

## 2021-07-15 RX ADMIN — DORZOLAMIDE HYDROCHLORIDE 1 DROP(S): 20 SOLUTION/ DROPS OPHTHALMIC at 21:56

## 2021-07-15 RX ADMIN — INSULIN HUMAN 10 UNIT(S): 100 INJECTION, SOLUTION SUBCUTANEOUS at 09:34

## 2021-07-15 RX ADMIN — CARVEDILOL PHOSPHATE 12.5 MILLIGRAM(S): 80 CAPSULE, EXTENDED RELEASE ORAL at 06:34

## 2021-07-15 RX ADMIN — AMIODARONE HYDROCHLORIDE 400 MILLIGRAM(S): 400 TABLET ORAL at 21:55

## 2021-07-15 RX ADMIN — Medication 3 UNIT(S): at 07:41

## 2021-07-15 RX ADMIN — ATORVASTATIN CALCIUM 40 MILLIGRAM(S): 80 TABLET, FILM COATED ORAL at 21:55

## 2021-07-15 RX ADMIN — BUMETANIDE 2 MILLIGRAM(S): 0.25 INJECTION INTRAMUSCULAR; INTRAVENOUS at 09:02

## 2021-07-15 NOTE — PROGRESS NOTE ADULT - SUBJECTIVE AND OBJECTIVE BOX
GILLIAN MENDOZA  56y  Male      Patient is a 56y old  Male who presents with a chief complaint of Lower Extremity swelling (15 Jul 2021 12:56)      INTERVAL HPI/OVERNIGHT EVENTS:    REVIEW OF SYSTEMS:  CONSTITUTIONAL: No fever, weight loss, or fatigue  EYES: No eye pain, visual disturbances, or discharge  ENMT:  No difficulty hearing, tinnitus, vertigo; No sinus or throat pain  NECK: No pain or stiffness  RESPIRATORY: No cough, wheezing, chills or hemoptysis; No shortness of breath  CARDIOVASCULAR: No chest pain, palpitations, dizziness, or leg swelling  GASTROINTESTINAL: No abdominal or epigastric pain. No diarrhea or constipation. No nausea, vomiting, or hematemesis. No melena or hematochezia.  GENITOURINARY: No dysuria, frequency, hematuria, or incontinence  NEUROLOGICAL: No headaches, memory loss, loss of strength, numbness, or tremors  MUSCULOSKELETAL: No joint pain or swelling; No muscle, back, or extremity pain  SKIN: No itching, burning, rashes, or lesions   LYMPH NODES: No enlarged glands  ENDOCRINE: No heat or cold intolerance; No hair loss  PSYCHIATRIC: No depression, anxiety, mood swings, or difficulty sleeping  HEME/LYMPH: No easy bruising, or bleeding gums  ALLERY AND IMMUNOLOGIC: No hives or eczema    Vital Signs Last 24 Hrs  T(C): 36.4 (15 Jul 2021 06:27), Max: 36.4 (15 Jul 2021 06:27)  T(F): 97.5 (15 Jul 2021 06:27), Max: 97.5 (15 Jul 2021 06:27)  HR: 52 (15 Jul 2021 09:09) (52 - 57)  BP: 90/58 (15 Jul 2021 09:09) (86/60 - 116/68)  BP(mean): --  RR: 18 (15 Jul 2021 06:27) (18 - 18)  SpO2: --    PHYSICAL EXAM:  GENERAL: NAD, well-groomed, well-developed  HEAD:  Atraumatic, Normocephalic  EYES: EOMI, PERRLA, conjunctiva and sclera clear  ENMT: Moist mucous membranes, Good dentition, No lesions  NECK: Supple, No JVD, Normal thyroid  NERVOUS SYSTEM:  Alert & Oriented X3, Good concentration; Motor Strength 5/5 B/L upper and lower extremities; DTRs 2+ intact and symmetric  CHEST/LUNG: Clear to auscultation bilaterally; No rales, rhonchi, wheezing, or rubs  HEART: Regular rate and rhythm; No murmurs, rubs, or gallops  ABDOMEN: Soft, Nontender, Nondistended; Bowel sounds present  EXTREMITIES:  2+ Peripheral Pulses, No clubbing, cyanosis, or edema  LYMPH: No lymphadenopathy noted  SKIN: No rashes or lesions    Consultant(s) Notes Reviewed:  [x ] YES  [ ] NO  Care Discussed with Consultants/Other Providers [ x] YES  [ ] NO    LABS:                        9.0    8.17  )-----------( 203      ( 15 Jul 2021 07:05 )             31.3     07-15    137  |  107  |  64<HH>  ----------------------------<  111<H>  4.8   |  22  |  3.2<H>    Ca    8.2<L>      15 Jul 2021 11:06  Mg     2.3     07-15    TPro  5.6<L>  /  Alb  2.9<L>  /  TBili  0.5  /  DBili  x   /  AST  111<H>  /  ALT  121<H>  /  AlkPhos  260<H>  07-15    PT/INR - ( 15 Jul 2021 07:05 )   PT: 11.80 sec;   INR: 1.03 ratio         PTT - ( 15 Jul 2021 07:05 )  PTT:29.3 sec      CAPILLARY BLOOD GLUCOSE      POCT Blood Glucose.: 107 mg/dL (15 Jul 2021 11:53)  POCT Blood Glucose.: 115 mg/dL (15 Jul 2021 09:29)  POCT Blood Glucose.: 108 mg/dL (15 Jul 2021 07:37)  POCT Blood Glucose.: 96 mg/dL (14 Jul 2021 21:22)  POCT Blood Glucose.: 209 mg/dL (14 Jul 2021 16:36)      RADIOLOGY & ADDITIONAL TESTS:    Imaging Personally Reviewed:  [ ] YES  [ ] NO GILLIAN MENDOZA  56y  Male      HPI: 56M w/ PMHx HTN, HLD, T2DM on insulin, Glaucoma presents to the Ed c/o worsening LE swelling and pain for the past 1 month      INTERVAL HPI/OVERNIGHT EVENTS:  Patient was unable to complete cardiac MR because of SOB. Denies SOB this morning. Patient also reports decrease urine output.       Vital Signs Last 24 Hrs  T(C): 36.4 (15 Jul 2021 06:27), Max: 36.4 (15 Jul 2021 06:27)  T(F): 97.5 (15 Jul 2021 06:27), Max: 97.5 (15 Jul 2021 06:27)  HR: 52 (15 Jul 2021 09:09) (52 - 57)  BP: 90/58 (15 Jul 2021 09:09) (86/60 - 116/68)  RR: 18 (15 Jul 2021 06:27) (18 - 18)      Review of Systems  CONSTITUTIONAL: No fever, weight loss, or fatigue  EYES: No eye pain, visual disturbances, or discharge  ENMT:  No difficulty hearing, tinnitus, vertigo; No sinus or throat pain  NECK: No pain or stiffness  RESPIRATORY: No cough, wheezing, chills or hemoptysis; No shortness of breath  CARDIOVASCULAR: No chest pain, palpitations, dizziness, or leg swelling  GASTROINTESTINAL: No abdominal or epigastric pain  GENITOURINARY: No dysuria, frequency, hematuria, or incontinence  NEUROLOGICAL: No headaches, memory loss, loss of strength, numbness, or tremors  MUSCULOSKELETAL: Swollen feet   SKIN: Dry skin in LE     PHYSICAL EXAM:  GENERAL: NAD, well-groomed, well-developed  HEAD:  Atraumatic, Normocephalic  EYES: EOMI, PERRLA, conjunctiva and sclera clear  ENMT: Moist mucous membranes  NECK: Supple  NERVOUS SYSTEM:  Alert & Oriented X3  CHEST/LUNG: Clear to auscultation bilaterally; No rales, rhonchi, wheezing, or rubs  HEART: Regular rate and rhythm; No murmurs, rubs, or gallops  ABDOMEN: Soft, Nontender, Nondistended; Bowel sounds present  EXTREMITIES:  2+ Peripheral Pulses. 2+ Pitting edema up to ankle       Consultant(s) Notes Reviewed:  [x ] YES  [ ] NO  Care Discussed with Consultants/Other Providers [ x] YES  [ ] NO    LABS:                        9.0    8.17  )-----------( 203      ( 15 Jul 2021 07:05 )             31.3     07-15    137  |  107  |  64<HH>  ----------------------------<  111<H>  4.8   |  22  |  3.2<H>    Ca    8.2<L>      15 Jul 2021 11:06  Mg     2.3     07-15    TPro  5.6<L>  /  Alb  2.9<L>  /  TBili  0.5  /  DBili  x   /  AST  111<H>  /  ALT  121<H>  /  AlkPhos  260<H>  07-15    PT/INR - ( 15 Jul 2021 07:05 )   PT: 11.80 sec;   INR: 1.03 ratio         PTT - ( 15 Jul 2021 07:05 )  PTT:29.3 sec      CAPILLARY BLOOD GLUCOSE      POCT Blood Glucose.: 107 mg/dL (15 Jul 2021 11:53)  POCT Blood Glucose.: 115 mg/dL (15 Jul 2021 09:29)  POCT Blood Glucose.: 108 mg/dL (15 Jul 2021 07:37)  POCT Blood Glucose.: 96 mg/dL (14 Jul 2021 21:22)  POCT Blood Glucose.: 209 mg/dL (14 Jul 2021 16:36)          Imaging Personally Reviewed:  [ ] YES  [ ] NO

## 2021-07-15 NOTE — PROGRESS NOTE ADULT - ASSESSMENT
Acute on chronic systolic HF / LENNY/ Fluid overload     Patient is volume overloaded  Continue Bumex 2 mg iv BID + 3% hypertonic saline 150 ml BID   Continue Hydralazine 50 mg TID  Continue Isosorbide 20 mg TID  Continue Carvedilol 12.5 mg BID  LHC showed 3V CAD - CT surgery following for possible CABG   Repeat CMR tomorrow    Get BMP daily   Maintain potassium >4.0, Mg >2.1  Strict intake and output  Daily weight   Discussed with CT surgery and medicine team

## 2021-07-15 NOTE — PROGRESS NOTE ADULT - ASSESSMENT
56M w/ PMHx HTN, HLD, T2DM on insulin x>10 years, retinopathy, Glaucoma presents to the Ed c/o worsening LE swelling and pain for the past 1 month. Endorses SOB on exertion, along w/ increased abdominal girth.    # LENNY / CKD 3b / CRS / ATN/hypotension   # Hyperkalemia  # Nephrotic range proteinuria  # HTN  # Normocytic anemia, iron deficient    - creat stable, non oliguric, hypervolemic  - repeat K level ok, maintain on low K diet   - appreciate heart failure f/u, cont iv bumex 2mg q12   - document strict i/o and daily weights   - last phos level noted, does not need binder, repeat phos level  - heavy proteinuria, nephrotic range likely d/t DM - serum flc ratio w/i nml range for CKD, MARLIN negative   - renal ultrasound noted w/o hydronephrosis, right renal cysts  - s/p cardiac cath, appreciate CT surgery eval for CABG  - TTE noted with Echo - EF 34%, moderate pericardial effusion, pending repeat CMR  - replete iron iv 100mg x 5 doses  - hold hydralazine iso hypotension    Will follow  56M w/ PMHx HTN, HLD, T2DM on insulin x>10 years, retinopathy, Glaucoma presents to the Ed c/o worsening LE swelling and pain for the past 1 month. Endorses SOB on exertion, along w/ increased abdominal girth.    # LENNY / CKD 3b / CRS / ATN/hypotension / timeframe also c/w contrast induced nephropathy   # Hyperkalemia  # Nephrotic range proteinuria  # HTN  # Normocytic anemia, iron deficient    # ADHF, low EF  - creat stable, non oliguric, hypervolemic  - repeat K level ok, maintain on low K diet   - appreciate heart failure f/u, cont iv bumex 2mg q12   - document strict i/o and daily weights   - last phos level noted, does not need binder, repeat phos level  - heavy proteinuria, nephrotic range likely d/t DM - serum flc ratio w/i nml range for CKD, MARLIN negative   - renal ultrasound noted w/o hydronephrosis, right renal cysts  - s/p cardiac cath, appreciate CT surgery f/u regarding CABG  - TTE noted with Echo - EF 34%, moderate pericardial effusion, pending repeat CMR  - replete iron iv 100mg x 5 doses  - hold hydralazine iso hypotension    Will follow

## 2021-07-15 NOTE — PROGRESS NOTE ADULT - ASSESSMENT
HPI: 56M w/ PMHx HTN, HLD, T2DM on insulin, Glaucoma presents to the Ed c/o worsening LE swelling and pain for the past 1 month. Endorses SOB on exertion, along w/ increased abdominal girth. Denies orthopnea, PND, wheeze, CP, diaphoresis. No cardiac Hx. Seen by HF team and cardiology.    #New Onset Acute HFrEF exacerbation  #HFrEF 2/2 uncontrolled HTN  #Moderate pericardial effusion  #Uncontrolled HTN  -no prior cardiac hx  -CXR - cardiomegaly and blunting of CP angles  -BNP 01720 on admission  -trops 0.10 -> 0.11 -> 0.11 -> 0.14 -> 0.09  -Echo - EF 34%, moderate pericardial effusion  -I<O, daily weights  - s/p  cardiac cath today - Significant 3V disease. CTS on board   -per HF team, d/c metoprolol succinate 25mg qd and switch to Carvedilol 12.5 BID ,  cont hydralazine 25mg TID, and cont isosorbide 20mg TID.         #LENNY on CKD, Hyperkalemia  #?ROSENDO s/p cath, Cardiorenal Syndrome  -Cr 2.4->2.7->2.7->2.5->2.1 (Baseline 1.7 3/2021)  -oliguria   -Bumex 2mg BID +3% NS  -Lokelma, low potassium diet   -FeNa = 2.8%, FeUrea 40.2% => likely intrinsic  -UA 7/3 significant for moderate blood, 300 protein, protein/Cr ratio 8.1  -US KUB - no hydro b/l, right renal cysts  -cont to monitor I&O for oliguria/anuria  -SPEP - decreased protein, C3 wnl, C4 wnl  -incr kappa, lambda light chains. Ratio 1.97  -f/u UPEP, SIF, UIF, iron studies, MARLIN      #HLD  -cont atorvastatin    #DM2 w/ peripheral neuropathy  -FS: 171, 143, 250, 98  -cont Lantus, ISS  -on lantus, metformin-pioglitazone at home  -hold pioglitazone now and on d/c - can contribute to CHF exacerbation  -on gabapentin 200mg BID  -duplex negative  -arterial duplex negative    #Glaucoma  -cont eye drops    PT/REHAB: 50ft x2 w/o assistance with PT  ACTIVITY: Activity - Increase As Tolerated  DVT PPX: heparin   GI PPX:  Not indicated  DIET: Diet, DASH/TLC  CODE: Full code  Disposition: from home;    HPI: 56M w/ PMHx HTN, HLD, T2DM on insulin, Glaucoma presents to the Ed c/o worsening LE swelling and pain for the past 1 month. Endorses SOB on exertion, along w/ increased abdominal girth. Denies orthopnea, PND, wheeze, CP, diaphoresis. No cardiac Hx. Seen by HF team and cardiology.    #New Onset Acute HFrEF exacerbation  #HFrEF 2/2 uncontrolled HTN  #Moderate pericardial effusion  #Uncontrolled HTN  -no prior cardiac hx  -CXR - cardiomegaly and blunting of CP angles  -BNP 85276 on admission  -trops 0.10 -> 0.11 -> 0.11 -> 0.14 -> 0.09  -Echo - EF 34%, moderate pericardial effusion  -Bumex w/ hypertonic saline   -I<O, daily weights  - s/p  cardiac cath today - Significant 3V disease. CTS on board   -per HF team, d/c metoprolol succinate 25mg qd and switch to Carvedilol 12.5 BID ,and cont isosorbide 20mg TID.   -low BP d/c cont hydralazine 25mg TID 7/15         #LENNY on CKD, Hyperkalemia  #?ROSENDO s/p cath, Cardiorenal Syndrome  -Cr 2.4->2.7->2.7->2.5->2.1 (Baseline 1.7 3/2021)  -oliguria   -Bumex 2mg BID +3% NS  -Lokelma 10 mg BID , low potassium diet , nephro rec   -FeNa = 2.8%, FeUrea 40.2% => likely intrinsic  -UA 7/3 significant for moderate blood, 300 protein, protein/Cr ratio 8.1  -US KUB - no hydro b/l, right renal cysts  -cont to monitor I&O for oliguria/anuria  -SPEP - decreased protein, C3 wnl, C4 wnl  -incr kappa, lambda light chains. Ratio 1.97  -f/u UPEP, SIF, UIF, iron studies, MARLIN      #HLD  -cont atorvastatin    #DM2 w/ peripheral neuropathy  -FS: 171, 143, 250, 98  -cont Lantus, ISS  -on lantus, metformin-pioglitazone at home  -hold pioglitazone now and on d/c - can contribute to CHF exacerbation  -on gabapentin 200mg BID  -duplex negative  -arterial duplex negative    #Glaucoma  -cont eye drops    PT/REHAB: 50ft x2 w/o assistance with PT  ACTIVITY: Activity - Increase As Tolerated  DVT PPX: heparin   GI PPX:  Not indicated  DIET: Diet, DASH/TLC  CODE: Full code  Disposition: from home;

## 2021-07-15 NOTE — PROGRESS NOTE ADULT - SUBJECTIVE AND OBJECTIVE BOX
Nephrology Progress Note    GILLIAN MENDOZA  MRN-[id number]  56y  Male    S:  Patient is seen and examined, events over the last 24h noted.    O:  Allergies:  No Known Allergies    Hospital Medications:   MEDICATIONS  (STANDING):  aMIOdarone    Tablet 400 milliGRAM(s) Oral every 8 hours  aspirin  chewable 81 milliGRAM(s) Oral daily  atorvastatin 40 milliGRAM(s) Oral at bedtime  brimonidine 0.2% Ophthalmic Solution 1 Drop(s) Both EYES two times a day  buMETAnide Injectable 2 milliGRAM(s) IV Push <User Schedule>  carvedilol 12.5 milliGRAM(s) Oral every 12 hours  dorzolamide 2% Ophthalmic Solution 1 Drop(s) Both EYES three times a day  gabapentin 200 milliGRAM(s) Oral two times a day  glucagon  Injectable 1 milliGRAM(s) IntraMuscular once  heparin   Injectable 5000 Unit(s) SubCutaneous every 8 hours  insulin glargine Injectable (LANTUS) 9 Unit(s) SubCutaneous every morning  insulin lispro (ADMELOG) corrective regimen sliding scale   SubCutaneous Before meals and at bedtime  insulin lispro Injectable (ADMELOG) 3 Unit(s) SubCutaneous three times a day before meals  isosorbide   dinitrate Tablet (ISORDIL) 20 milliGRAM(s) Oral three times a day  latanoprost 0.005% Ophthalmic Solution 1 Drop(s) Both EYES at bedtime  sodium chloride 0.9%. 1000 milliLiter(s) (75 mL/Hr) IV Continuous <Continuous>  sodium chloride 3%. 150 milliLiter(s) (50 mL/Hr) IV Continuous <Continuous>  sodium chloride 3%. 150 milliLiter(s) (50 mL/Hr) IV Continuous <Continuous>  timolol 0.5% Solution 1 Drop(s) Both EYES two times a day    MEDICATIONS  (PRN):  LORazepam   Injectable 1 milliGRAM(s) IV Push once PRN Anxiety    Home Medications:  atorvastatin 40 mg oral tablet: 1 tab(s) orally once a day (2021 04:46)  BASAGLAR 100 UNIT/ML KWIKPEN:  (2021 04:46)  brimonidine 0.2% ophthalmic solution: 1 drop(s) to each affected eye 2 times a day (2021 04:46)  dorzolamide-timolol 2%-0.5% preservative-free ophthalmic solution: 1 drop(s) to each affected eye 2 times a day (2021 04:46)  furosemide 20 mg oral tablet: 1 tab(s) orally once a day (2021 04:46)  latanoprost 0.005% ophthalmic solution: 1 drop(s) to each affected eye once a day (in the evening) (2021 04:46)  lisinopril 20 mg oral tablet: 1 tab(s) orally once a day (2021 04:46)  pioglitazone-metformin 15 mg-850 mg oral tablet: 1 tab(s) orally 2 times a day (2021 04:46)  Rhopressa 0.02% ophthalmic solution: 1 drop(s) to each affected eye once a day (in the evening) (2021 04:46)      VITALS:  Daily     Daily Weight in k (15 Jul 2021 06:27)  T(F): 97.5 (07-15-21 @ 06:27), Max: 97.5 (07-15-21 @ 06:27)  HR: 52 (07-15-21 @ 09:09)  BP: 90/58 (07-15-21 @ 09:09)  RR: 18 (07-15-21 @ 06:27)  SpO2: --  Wt(kg): --  I&O's Detail    2021 07:  -  15 Jul 2021 07:00  --------------------------------------------------------  IN:    Oral Fluid: 570 mL  Total IN: 570 mL    OUT:    Voided (mL): 150 mL  Total OUT: 150 mL    Total NET: 420 mL      15 Jul 2021 07:  -  15 Jul 2021 13:38  --------------------------------------------------------  IN:    Oral Fluid: 590 mL  Total IN: 590 mL    OUT:    Voided (mL): 475 mL  Total OUT: 475 mL    Total NET: 115 mL        I&O's Summary    2021 07:  -  15 Jul 2021 07:00  --------------------------------------------------------  IN: 570 mL / OUT: 150 mL / NET: 420 mL    15 Jul 2021 07:  -  15 Jul 2021 13:38  --------------------------------------------------------  IN: 590 mL / OUT: 475 mL / NET: 115 mL          PHYSICAL EXAM:  Gen: NAD  Resp:   Card: S1/S2  Abd: soft  Extremities:   Vascular access:       LABS:    07-15    135  |  105  |  63<HH>  ----------------------------<  91  6.0<HH>   |  20  |  3.3<H>    Ca    8.4<L>      15 Jul 2021 07:05  Mg     2.3     07-15    TPro  5.6<L>  /  Alb  2.9<L>  /  TBili  0.5  /  DBili      /  AST  111<H>  /  ALT  121<H>  /  AlkPhos  260<H>  07-15    eGFR if Non African American: 20 mL/min/1.73M2 (07-15-21 @ 07:05)  eGFR if African American: 23 mL/min/1.73M2 (07-15-21 @ 07:05)    Phosphorus Level, Serum: 4.3 mg/dL (21 @ 07:25)  Phosphorus Level, Serum: 4.3 mg/dL (21 @ 12:04)    Osmolality, Serum: 288 mos/kg (21 @ 01:09)                          9.0    8.17  )-----------( 203      ( 15 Jul 2021 07:05 )             31.3     Mean Cell Volume: 80.9 fL (07-15-21 @ 07:05)    Ferritin, Serum: 255 ng/mL (21 @ 11:37)  % Saturation, Iron: 22 % (21 @ 11:37)      Urine Studies:      Urea Nitrogen,  Random Urine: 389 mg/dL (21 @ 16:34)    Protein/Creatinine Ratio Calculation: 8.1 Ratio ( @ 16:34)  Creatinine, Random Urine: 58 mg/dL ( @ 16:34)    Creatinine trend:  Creatinine, Serum: 3.3 mg/dL (07-15-21 @ 07:05)  Creatinine, Serum: 2.5 mg/dL (21 @ 04:30)  Creatinine, Serum: 2.1 mg/dL (21 @ 04:30)  Creatinine, Serum: 2.1 mg/dL (21 @ 05:32)  Creatinine, Serum: 2.1 mg/dL (21 @ 05:29)  Creatinine, Serum: 2.1 mg/dL (07-10-21 @ 05:47)  Creatinine, Serum: 2.1 mg/dL (21 @ 05:31)  Creatinine, Serum: 2.5 mg/dL (21 @ 06:42)  Creatinine, Serum: 2.7 mg/dL (21 @ 04:30)      EDDIE Lambda: 4.91 mg/dL (21 @ 15:33)  EDDIE Kappa: 9.68 mg/dL (21 @ 15:33)  Spe[place]/Lambda Free Light Chain Ratio, Serum: 1.97 Ratio (21 @ 15:33)      US Kidney and Bladder:   EXAM:  US KIDNEYS AND BLADDER            PROCEDURE DATE:  2021            INTERPRETATION:  CLINICAL INFORMATION: Acute kidney injury    COMPARISON: None available.    TECHNIQUE: Sonography of the kidneys and bladder.    FINDINGS:    Right kidney: 10.8 cm. A simple interpolar cyst measures up to 1.4 cm. No solid renal mass, hydronephrosis or calculi.    Left kidney:  11.2 cm. No renal mass, hydronephrosis or calculi.    Urinary bladder: No debris or calculus. Bilateral ureteral jets are visualized. Prevoid volume of approximately 360 mL.  Postvoid volume is approximately 164 mL.    Prostate: Measures 4.2 x 3.0 x 3.7 cm, proximally 25 mL in volume.    Other: Bilateral pleural effusions noted.    IMPRESSION:  1.  No hydronephrosis bilaterally.  2.  Right renal cyst.  3.  Postvoid residual bladder volume is 164 mL.  4.  Incidentally noted bilateral pleural effusions.     Nephrology Progress Note    GILLIAN MENDOZA  [id number]  56y  Male    S:  Patient is seen and examined, events over the last 24h noted.    O:  Allergies:  No Known Allergies    Hospital Medications:   MEDICATIONS  (STANDING):  aMIOdarone    Tablet 400 milliGRAM(s) Oral every 8 hours  aspirin  chewable 81 milliGRAM(s) Oral daily  atorvastatin 40 milliGRAM(s) Oral at bedtime  brimonidine 0.2% Ophthalmic Solution 1 Drop(s) Both EYES two times a day  buMETAnide Injectable 2 milliGRAM(s) IV Push <User Schedule>  carvedilol 12.5 milliGRAM(s) Oral every 12 hours  dorzolamide 2% Ophthalmic Solution 1 Drop(s) Both EYES three times a day  gabapentin 200 milliGRAM(s) Oral two times a day  glucagon  Injectable 1 milliGRAM(s) IntraMuscular once  heparin   Injectable 5000 Unit(s) SubCutaneous every 8 hours  insulin glargine Injectable (LANTUS) 9 Unit(s) SubCutaneous every morning  insulin lispro (ADMELOG) corrective regimen sliding scale   SubCutaneous Before meals and at bedtime  insulin lispro Injectable (ADMELOG) 3 Unit(s) SubCutaneous three times a day before meals  isosorbide   dinitrate Tablet (ISORDIL) 20 milliGRAM(s) Oral three times a day  latanoprost 0.005% Ophthalmic Solution 1 Drop(s) Both EYES at bedtime  timolol 0.5% Solution 1 Drop(s) Both EYES two times a day    MEDICATIONS  (PRN):  LORazepam   Injectable 1 milliGRAM(s) IV Push once PRN Anxiety    Home Medications:  atorvastatin 40 mg oral tablet: 1 tab(s) orally once a day (2021 04:46)  BASAGLAR 100 UNIT/ML KWIKPEN:  (2021 04:46)  brimonidine 0.2% ophthalmic solution: 1 drop(s) to each affected eye 2 times a day (2021 04:46)  dorzolamide-timolol 2%-0.5% preservative-free ophthalmic solution: 1 drop(s) to each affected eye 2 times a day (2021 04:46)  furosemide 20 mg oral tablet: 1 tab(s) orally once a day (2021 04:46)  latanoprost 0.005% ophthalmic solution: 1 drop(s) to each affected eye once a day (in the evening) (2021 04:46)  lisinopril 20 mg oral tablet: 1 tab(s) orally once a day (2021 04:46)  pioglitazone-metformin 15 mg-850 mg oral tablet: 1 tab(s) orally 2 times a day (2021 04:46)  Rhopressa 0.02% ophthalmic solution: 1 drop(s) to each affected eye once a day (in the evening) (2021 04:46)      VITALS:  Daily     Daily Weight in k (15 Jul 2021 06:27)  T(F): 97.5 (07-15-21 @ 06:27), Max: 97.5 (07-15-21 @ 06:27)  HR: 52 (07-15-21 @ 09:09)  BP: 90/58 (07-15-21 @ 09:09)  RR: 18 (07-15-21 @ 06:27)  SpO2: --  Wt(kg): --  I&O's Detail    2021 07:  -  15 Jul 2021 07:00  --------------------------------------------------------  IN:    Oral Fluid: 570 mL  Total IN: 570 mL    OUT:    Voided (mL): 150 mL  Total OUT: 150 mL    Total NET: 420 mL      15 Jul 2021 07:  -  15 Jul 2021 13:38  --------------------------------------------------------  IN:    Oral Fluid: 590 mL  Total IN: 590 mL    OUT:    Voided (mL): 475 mL  Total OUT: 475 mL    Total NET: 115 mL        I&O's Summary    2021 07:01  -  15 Jul 2021 07:00  --------------------------------------------------------  IN: 570 mL / OUT: 150 mL / NET: 420 mL    15 Jul 2021 07:01  -  15 Jul 2021 13:38  --------------------------------------------------------  IN: 590 mL / OUT: 475 mL / NET: 115 mL          PHYSICAL EXAM:  Gen: NAD  Resp: decreased bs at the bases  Card: S1/S2  Abd: soft  Extremities: +edema      LABS:    07-15    135  |  105  |  63<HH>  ----------------------------<  91  6.0<HH>   |  20  |  3.3<H>    Ca    8.4<L>      15 Jul 2021 07:05  Mg     2.3     07-15    TPro  5.6<L>  /  Alb  2.9<L>  /  TBili  0.5  /  DBili      /  AST  111<H>  /  ALT  121<H>  /  AlkPhos  260<H>  07-15    07-15    137  |  107  |  64<HH>  ----------------------------<  111<H>  4.8   |  22  |  3.2<H>    Ca    8.2<L>      15 Jul 2021 11:06  Mg     2.3     07-15    TPro  5.6<L>  /  Alb  2.9<L>  /  TBili  0.5  /  DBili  x   /  AST  111<H>  /  ALT  121<H>  /  AlkPhos  260<H>  07-15      Phosphorus Level, Serum: 4.3 mg/dL (21 @ 07:25)  Phosphorus Level, Serum: 4.3 mg/dL (21 @ 12:04)    Osmolality, Serum: 288 mos/kg (21 @ 01:09)                          9.0    8.17  )-----------( 203      ( 15 Jul 2021 07:05 )             31.3     Mean Cell Volume: 80.9 fL (07-15-21 @ 07:05)    Ferritin, Serum: 255 ng/mL (21 @ 11:37)  % Saturation, Iron: 22 % (21 @ 11:37)    Protein/Creatinine Ratio Calculation: 8.1 Ratio ( @ 16:34)    Creatinine trend:  Creatinine, Serum: 3.3 mg/dL (07-15-21 @ 07:05)  Creatinine, Serum: 2.5 mg/dL (21 @ 04:30)  Creatinine, Serum: 2.1 mg/dL (21 @ 04:30)  Creatinine, Serum: 2.1 mg/dL (21 @ 05:32)  Creatinine, Serum: 2.1 mg/dL (21 @ 05:29)      EDDIE Lambda: 4.91 mg/dL (21 @ 15:33)  EDDIE Kappa: 9.68 mg/dL (21 @ 15:33)  [place]/Lambda Free Light Chain Ratio, Serum: 1.97 Ratio (21 @ 15:33)      US Kidney and Bladder:   EXAM:  US KIDNEYS AND BLADDER            PROCEDURE DATE:  2021            INTERPRETATION:  CLINICAL INFORMATION: Acute kidney injury    COMPARISON: None available.    TECHNIQUE: Sonography of the kidneys and bladder.    FINDINGS:    Right kidney: 10.8 cm. A simple interpolar cyst measures up to 1.4 cm. No solid renal mass, hydronephrosis or calculi.    Left kidney:  11.2 cm. No renal mass, hydronephrosis or calculi.    Urinary bladder: No debris or calculus. Bilateral ureteral jets are visualized. Prevoid volume of approximately 360 mL.  Postvoid volume is approximately 164 mL.    Prostate: Measures 4.2 x 3.0 x 3.7 cm, proximally 25 mL in volume.    Other: Bilateral pleural effusions noted.    IMPRESSION:  1.  No hydronephrosis bilaterally.  2.  Right renal cyst.  3.  Postvoid residual bladder volume is 164 mL.  4.  Incidentally noted bilateral pleural effusions.

## 2021-07-15 NOTE — PROGRESS NOTE ADULT - ASSESSMENT
55 y/o man with PMH of HTN, CKD from 2020, DM type 2 on insulin with peripheral neuropathy and Glaucoma presented to the ED c/o worsening LE swelling and pain for the past 1 month.    1. Newly diagnosed acute HFrEF  - moderate pericardial effusion and pulm HTN  - was on IV lasix for diuresis and then held for rising Cr  - heart failure f/u appreciated  - now on bumex 2mg bid and hypertonic saline for volume overload, hepatic congestion and CRS  - on coreg and isosorbide  - hydralazine held due to hypotension  - 3 vessel CAD on cardiac cath 7/12 - for CABG - to be scheduled by CT surgery next week  - preop per CT surgery  - cardiac MRI on 7/16  - monitor BMP, Mg bid while on IV bumex  - daily wts, I's and O's, low sodium diet    2. HTN - avoid hypotension - hydralazine held and re-evaluate at starting lower dose    3. LENNY over CKD 3 - nonoliguric  - CKD progressed over the past year  - nephrotic range proteinuria now   - - no hydronephrosis on US  - A1C 5.9 so DM seems well controlled as outpt now but pt with neuropathy  - likely has retinopathy also  - renal f/u   - proteinuria likely from DM but will work up other causes  - daily BMP and monitor urine output    4. DM type 2 with peripheral neuropathy   - On Lantus and Metformin-Pioglitazone at home  -  A1c 5.8  - continue insulin inpt - dose increased and monitor FS  - discontinue pioglitazone on discharge - metformin will also need to be stopped if renal function does not improve by discharge  -on gabapentin 200 mg BID renally adjusted  - duplex negative  - arterial duplex with moderate atherosclerotic disease but normal WANDA    5. Glaucoma  - on Eye drops    6. DVT prophylaxis -   heparin SQ         PROGRESS NOTE HANDOFF    Pending: Cardiac MRI, CABG     pt aware of plan of care    Disposition: home 55 y/o man with PMH of HTN, CKD from 2020, DM type 2 on insulin with peripheral neuropathy and Glaucoma presented to the ED c/o worsening LE swelling and pain for the past 1 month.    1. Newly diagnosed acute HFrEF  - moderate pericardial effusion and pulm HTN  - was on IV lasix for diuresis and then held for rising Cr  - heart failure f/u appreciated  - now on bumex 2mg bid and hypertonic saline for volume overload, hepatic congestion and CRS  - on coreg and isosorbide  - hydralazine held due to hypotension  - 3 vessel CAD on cardiac cath 7/12 - for CABG - to be scheduled by CT surgery next week  - preop per CT surgery  - cardiac MRI on 7/16  - monitor BMP, Mg bid while on IV bumex  - daily wts, I's and O's, low sodium diet    2. HTN - avoid hypotension - hydralazine held and re-evaluate at starting lower dose    3. LENNY over CKD 3  - CKD progressed over the past year  - nephrotic range proteinuria now - likely due to DM  - no hydronephrosis on US  - Cr initially improved with holding diuretic but now increased - likely due to CRS  - pt also had cardiac cath on Monday  - diuresis with bumex  - BMP bid  - urine output improving with diuresis  - renal following  - avoid nephrotoxic agents    4. DM type 2 with peripheral neuropathy   - On Lantus and Metformin-Pioglitazone at home  -  A1c 5.8  - continue insulin inpt - dose increased and monitor FS  - discontinue pioglitazone on discharge - metformin will also need to be stopped if renal function does not improve by discharge  -on gabapentin 200 mg BID renally adjusted  - duplex negative  - arterial duplex with moderate atherosclerotic disease but normal WANDA    5. Glaucoma  - on Eye drops    6. DVT prophylaxis -   heparin SQ         PROGRESS NOTE HANDOFF    Pending: Cardiac MRI 7/16, improvement in LENNY, CABG next week    pt aware of plan of care    Disposition: home

## 2021-07-15 NOTE — PROGRESS NOTE ADULT - SUBJECTIVE AND OBJECTIVE BOX
GILLIAN MENDOZA  56y Male    INTERVAL HPI/OVERNIGHT EVENTS:    Lying in bed - denies chest pain and SOB  decreased urine output over the past 24 hours  now urinating once on hypertonic saline and diuretic    T(F): 97.5 (07-15-21 @ 06:27), Max: 97.5 (07-15-21 @ 06:27)  HR: 52 (07-15-21 @ 09:09) (52 - 57)  BP: 90/58 (07-15-21 @ 09:09) (86/60 - 116/68)  RR: 18 (07-15-21 @ 06:27) (18 - 18)  SpO2: --    I&O's Summary    2021 07:01  -  15 Jul 2021 07:00  --------------------------------------------------------  IN: 570 mL / OUT: 150 mL / NET: 420 mL    15 Jul 2021 07:01  -  15 Jul 2021 14:18  --------------------------------------------------------  IN: 590 mL / OUT: 475 mL / NET: 115 mL        Daily     Daily Weight in k (15 Jul 2021 06:27)    CAPILLARY BLOOD GLUCOSE      POCT Blood Glucose.: 107 mg/dL (15 Jul 2021 11:53)  POCT Blood Glucose.: 115 mg/dL (15 Jul 2021 09:29)  POCT Blood Glucose.: 108 mg/dL (15 Jul 2021 07:37)  POCT Blood Glucose.: 96 mg/dL (2021 21:22)  POCT Blood Glucose.: 209 mg/dL (2021 16:36)        PHYSICAL EXAM:  GENERAL: NAD  HEAD:  Normocephalic  EYES:  conjunctiva and sclera clear  ENMT: Moist mucous membranes  face appears slightly more swollen today  NERVOUS SYSTEM:  Alert, awake, Good concentration  CHEST/LUNG: decreased BS at left base >right base  HEART: Regular rate and rhythm  ABDOMEN: Soft, Nontender, Nondistended; Bowel sounds present  EXTREMITIES: + pedal edema      Consultant(s) Notes Reviewed:  [x ] YES  [ ] NO  Care Discussed with Consultants/Other Providers [ x] YES  [ ] NO    MEDICATIONS  (STANDING):  aMIOdarone    Tablet 400 milliGRAM(s) Oral every 8 hours  aMIOdarone    Tablet   Oral   aspirin  chewable 81 milliGRAM(s) Oral daily  atorvastatin 40 milliGRAM(s) Oral at bedtime  brimonidine 0.2% Ophthalmic Solution 1 Drop(s) Both EYES two times a day  buMETAnide Injectable 2 milliGRAM(s) IV Push <User Schedule>  carvedilol 12.5 milliGRAM(s) Oral every 12 hours  chlorhexidine 4% Liquid 1 Application(s) Topical <User Schedule>  dextrose 40% Gel 15 Gram(s) Oral once  dextrose 5%. 1000 milliLiter(s) (50 mL/Hr) IV Continuous <Continuous>  dextrose 5%. 1000 milliLiter(s) (100 mL/Hr) IV Continuous <Continuous>  dextrose 50% Injectable 25 Gram(s) IV Push once  dextrose 50% Injectable 12.5 Gram(s) IV Push once  dextrose 50% Injectable 25 Gram(s) IV Push once  dorzolamide 2% Ophthalmic Solution 1 Drop(s) Both EYES three times a day  gabapentin 200 milliGRAM(s) Oral two times a day  glucagon  Injectable 1 milliGRAM(s) IntraMuscular once  heparin   Injectable 5000 Unit(s) SubCutaneous every 8 hours  insulin glargine Injectable (LANTUS) 9 Unit(s) SubCutaneous every morning  insulin lispro (ADMELOG) corrective regimen sliding scale   SubCutaneous Before meals and at bedtime  insulin lispro Injectable (ADMELOG) 3 Unit(s) SubCutaneous three times a day before meals  isosorbide   dinitrate Tablet (ISORDIL) 20 milliGRAM(s) Oral three times a day  latanoprost 0.005% Ophthalmic Solution 1 Drop(s) Both EYES at bedtime  sodium chloride 0.9%. 1000 milliLiter(s) (75 mL/Hr) IV Continuous <Continuous>  sodium chloride 3%. 150 milliLiter(s) (50 mL/Hr) IV Continuous <Continuous>  sodium chloride 3%. 150 milliLiter(s) (50 mL/Hr) IV Continuous <Continuous>  timolol 0.5% Solution 1 Drop(s) Both EYES two times a day    MEDICATIONS  (PRN):  LORazepam   Injectable 1 milliGRAM(s) IV Push once PRN Anxiety      Telemetry reviewed by me    LABS:                        9.0    8.17  )-----------( 203      ( 15 Jul 2021 07:05 )             31.3     07-15    137  |  107  |  64<HH>  ----------------------------<  111<H>  4.8   |  22  |  3.2<H>    Ca    8.2<L>      15 Jul 2021 11:06  Mg     2.3     07-15    TPro  5.6<L>  /  Alb  2.9<L>  /  TBili  0.5  /  DBili  x   /  AST  111<H>  /  ALT  121<H>  /  AlkPhos  260<H>  07-15    PT/INR - ( 15 Jul 2021 07:05 )   PT: 11.80 sec;   INR: 1.03 ratio         PTT - ( 15 Jul 2021 07:05 )  PTT:29.3 sec          RADIOLOGY & ADDITIONAL TESTS:    Imaging or report Personally Reviewed:  [ ] YES  [ ] NO    < from: US Abdomen Upper Quadrant Right (21 @ 13:56) >  FINDINGS:    Liver: Within normal limits.  Bile ducts: Normal caliber. Common bile duct measures 3 mm.  Gallbladder: The gallbladder is not distended and diffusely thick walled with pericholecystic fluid  Pancreas: Visualized portions are within normal limits.  Right kidney: 11.3 cm. No hydronephrosis. Mid pole simple cyst measuring 1.3 x 1.1 x 1.2 cm.  Ascites: None. Right pleural effusion.  IVC: Visualized portions are within normal limits.    IMPRESSION:    Right-sided pleural effusion. Gallbladder wall edema, nondistended, unchanged from CT scan.    < end of copied text >        Case discussed with residents and RN on rounds today    Care discussed with pt

## 2021-07-15 NOTE — PROGRESS NOTE ADULT - SUBJECTIVE AND OBJECTIVE BOX
OPERATIVE PROCEDURE(s): CABG   56yMale  SURGEON(s): Dr. Yee/Shira/Rebekah  SUBJECTIVE ASSESSMENT:  Pt seen and examined. No acute complaints. Pt states that his b/l LE edema has been getting better with medication. Otherwise no complaints.  Vital Signs Last 24 Hrs  T(F): 97.5 (15 Jul 2021 06:27), Max: 97.5 (15 Jul 2021 06:27)  HR: 52 (15 Jul 2021 09:09) (52 - 57)  BP: 90/58 (15 Jul 2021 09:09) (86/60 - 116/68)  RR: 18 (15 Jul 2021 06:27) (18 - 18)    I&O's Detail    14 Jul 2021 07:01  -  15 Jul 2021 07:00  --------------------------------------------------------  IN:    Oral Fluid: 570 mL  Total IN: 570 mL    OUT:    Voided (mL): 150 mL  Total OUT: 150 mL    Net:   I&O's Detail    13 Jul 2021 07:01  -  14 Jul 2021 07:00  --------------------------------------------------------  Total NET: -1100 mL      14 Jul 2021 07:01  -  15 Jul 2021 07:00  --------------------------------------------------------  Total NET: 420 mL    CAPILLARY BLOOD GLUCOSE    POCT Blood Glucose.: 107 mg/dL (15 Jul 2021 11:53)  POCT Blood Glucose.: 115 mg/dL (15 Jul 2021 09:29)  POCT Blood Glucose.: 108 mg/dL (15 Jul 2021 07:37)  POCT Blood Glucose.: 96 mg/dL (14 Jul 2021 21:22)  POCT Blood Glucose.: 209 mg/dL (14 Jul 2021 16:36)    Physical Exam:  General: NAD; A&Ox3. Patient in no acute distress.  Cardiac: S1/S2, RRR, no murmur, no rubs  Lungs: unlabored respirations, CTA b/l, no wheeze, no rales, no crackles  Abdomen: Soft/NT/ND; positive bowel sounds x 4  Extremities: Mild edema b/l lower extremities; cap refill <2; no cyanosis; palpable 2+ pedal pulses b/l    BOWEL MOVEMENT:  [x] YES [] NO, If No, Timing since last BM:      Day #    LABS:                        9.0<L>  8.17  )-----------( 203      ( 15 Jul 2021 07:05 )             31.3<L>                        9.1<L>  8.16  )-----------( 162      ( 14 Jul 2021 04:30 )             30.7<L>    07-15    135  |  105  |  63<HH>  ----------------------------<  91  6.0<HH>   |  20  |  3.3<H>  07-14    140  |  111<H>  |  49<H>  ----------------------------<  204<H>  5.0   |  21  |  2.5<H>    Ca    8.4<L>      15 Jul 2021 07:05  Mg     2.3     07-15    TPro  5.6<L> [6.0 - 8.0]  /  Alb  2.9<L> [3.5 - 5.2]  /  TBili  0.5 [0.2 - 1.2]  /  DBili  x   /  AST  111<H> [0 - 41]  /  ALT  121<H> [0 - 41]  /  AlkPhos  260<H> [30 - 115]  07-15    PT/INR - ( 15 Jul 2021 07:05 )   PT: ;   INR: 1.03 ratio         PTT - ( 15 Jul 2021 07:05 )  PTT:29.3 sec    RADIOLOGY & ADDITIONAL TESTS:   CXR:   EXAM:  XR CHEST PORTABLE ROUTINE 1V        PROCEDURE DATE:  07/14/2021    INTERPRETATION:  Clinical History / Reason for exam: Shortness of breath.    Comparison : Chest radiograph performed on 7/6/2021..    Technique/Positioning: Semierect AP chest radiograph. Low lung volumes versus poor inspiratory effort.    Findings:    Support devices: None.    Cardiac/mediastinum/hilum: Enlarged vascular pedicle and cardiac silhouette..    Lung parenchyma/Pleura: Left basilar opacity/pleural effusion. No focal consolidation or pneumothorax..    Skeleton/soft tissues: Unremarkable.    Impression: Stable cardiomegaly.    Left basilar opacity/pleural effusion.    --- End of Report ---  ROSENDO STRINGER MD; Resident Radiologist  This document has been electronically signed.  BLUE RUIZ MD; Attending Radiologist  This document has been electronically signed. Jul 15 2021 12:21PM (07-14-21 @ 17:30)          MEDICATIONS  (STANDING):  aMIOdarone    Tablet 400 milliGRAM(s) Oral every 8 hours  aMIOdarone    Tablet   Oral   aspirin  chewable 81 milliGRAM(s) Oral daily  atorvastatin 40 milliGRAM(s) Oral at bedtime  brimonidine 0.2% Ophthalmic Solution 1 Drop(s) Both EYES two times a day  buMETAnide Injectable 2 milliGRAM(s) IV Push <User Schedule>  carvedilol 12.5 milliGRAM(s) Oral every 12 hours  chlorhexidine 4% Liquid 1 Application(s) Topical <User Schedule>  dextrose 40% Gel 15 Gram(s) Oral once  dextrose 5%. 1000 milliLiter(s) (50 mL/Hr) IV Continuous <Continuous>  dextrose 5%. 1000 milliLiter(s) (100 mL/Hr) IV Continuous <Continuous>  dextrose 50% Injectable 25 Gram(s) IV Push once  dextrose 50% Injectable 12.5 Gram(s) IV Push once  dextrose 50% Injectable 25 Gram(s) IV Push once  dorzolamide 2% Ophthalmic Solution 1 Drop(s) Both EYES three times a day  gabapentin 200 milliGRAM(s) Oral two times a day  glucagon  Injectable 1 milliGRAM(s) IntraMuscular once  heparin   Injectable 5000 Unit(s) SubCutaneous every 8 hours  insulin glargine Injectable (LANTUS) 9 Unit(s) SubCutaneous every morning  insulin lispro (ADMELOG) corrective regimen sliding scale   SubCutaneous Before meals and at bedtime  insulin lispro Injectable (ADMELOG) 3 Unit(s) SubCutaneous three times a day before meals  isosorbide   dinitrate Tablet (ISORDIL) 20 milliGRAM(s) Oral three times a day  latanoprost 0.005% Ophthalmic Solution 1 Drop(s) Both EYES at bedtime  sodium chloride 0.9%. 1000 milliLiter(s) (75 mL/Hr) IV Continuous <Continuous>  sodium chloride 3%. 150 milliLiter(s) (50 mL/Hr) IV Continuous <Continuous>  sodium chloride 3%. 150 milliLiter(s) (50 mL/Hr) IV Continuous <Continuous>  timolol 0.5% Solution 1 Drop(s) Both EYES two times a day    MEDICATIONS  (PRN):  LORazepam   Injectable 1 milliGRAM(s) IV Push once PRN Anxiety    HEPARIN:  [x] YES [] NO   Injectable 5000 Unit(s) SubCutaneous every 8 hours  LOVENOX:[] YES [x] NO  Dose: XX mg Q24H  COUMADIN: []  YES [x] NO  Dose: XX mg  Q24H  SCD's: No b/l  GI Prophylaxis: Protonix [], Pepcid [], None [x], (Contra-indication:.....)    No Known Allergies    Ambulation/Activity Status: Ambulating well on his own and several times a day.    Assessment/Plan:  56y Male status-post CABG  - Case and plan discussed with CTU Intensivist and CT Surgeon - Dr. Yee/Shira/Rebekah  - Continue CTU supportive care    - Continue DVT/GI prophylaxis  - Incentive Spirometry 10 times an hour  - Continue to advance physical activity as tolerated and continue PT/OT as directed  1. CAD: Continue ASA, statin, BB  2. HTN: Continue Amlodipine and Hydralazine  4. HLD: atorvastatin 40 milliGRAM(s) Oral at bedtime  5. DM/Glucose Control: Continue insulin regimen   Plan for CABG procedure next week

## 2021-07-15 NOTE — PROGRESS NOTE ADULT - SUBJECTIVE AND OBJECTIVE BOX
Date of Admission: 21    Interval History:    - Patient is hemodynamically stable. Remains overloaded.        HISTORY OF PRESENT ILLNESS:    HPI: 56M w/ PMHx HTN, HLD, T2DM on insulin, Glaucoma. He presents to the Ed c/o worsening LE swelling and pain for the past 1 month. Endorses SOB on exertion, along w/ increased abdominal girth. Denies orthopnea, PND, wheeze, CP, diaphoresis. No cardiac Hx.    Patient reports about two months ago he was able to walk and climb stairs without any limitations. For the past couple weeks he developed a BLE edema. Patient denies c/p, palpitations, headaches, bleeding, LH, dizziness, orthopnea, PND, LOC, cough, feeling bloated, N/V/D.     PAST MEDICAL & SURGICAL HISTORY:     HTN (hypertension)  HLD (hyperlipidemia)  DM (diabetes mellitus)  Glaucoma  No significant past surgical history        FAMILY HISTORY:    Mother:  77 HTN  Father:  at 55 of CKD       SOCIAL HISTORY:      Smokes about 3 cigarettes a week, social alcohol drinking, denies drug use    Allergies    No Known Allergies    Intolerances      PHYSICAL EXAM:    General Appearance: well appearing, normal for age and gender. 	  Neck: normal JVP, no bruit.   Cardiovascular: regular rate and rhythm S1 S2, No JVD, No murmurs, No edema  Respiratory: Lungs clear to auscultation	  Psychiatry: Alert and oriented x 3, Mood & affect appropriate  Gastrointestinal:  Soft, Non-tender  Musculoskeletal/ extremities: Normal range of motion, No clubbing, cyanosis or edema  Vascular: Peripheral pulses palpable 2+ bilaterally      PREVIOUS DIAGNOSTIC TESTING:      TTE 21    Summary:   1. Moderately decreased global left ventricular systolic function.   2. Multiple left ventricular regional wall motion abnormalities exist. See wall motion findings.   3. LV Ejection Fraction by Garcia's Method with a biplane EF of 34 %.   4. Moderately increased LV wall thickness.   5. Normal left ventricular internal cavity size.   6. Mild to moderately enlarged left atrium.   7. Normal right atrial size.   8. Moderate pericardial effusion.   9. Mild to moderate mitral valve regurgitation.  10. Structurally normal mitral valve, with normal leaflet excursion.  11. Moderate tricuspid regurgitation.  12. Mild aortic regurgitation.  13. Normal trileaflet aortic valve with normal opening.  14. Mild pulmonic valve regurgitation.  15. Estimated pulmonary artery systolic pressure is 52.0 mmHg assuming a right atrial pressure of 10 mmHg, which is consistent with moderate pulmonary hypertension.  	    Home Medications:  atorvastatin 40 mg oral tablet: 1 tab(s) orally once a day (2021 04:46)  BASAGLAR 100 UNIT/ML KWIKPEN:  (2021 04:46)  brimonidine 0.2% ophthalmic solution: 1 drop(s) to each affected eye 2 times a day (2021 04:46)  dorzolamide-timolol 2%-0.5% preservative-free ophthalmic solution: 1 drop(s) to each affected eye 2 times a day (2021 04:46)  furosemide 20 mg oral tablet: 1 tab(s) orally once a day (:46)  latanoprost 0.005% ophthalmic solution: 1 drop(s) to each affected eye once a day (in the evening) (:46)  lisinopril 20 mg oral tablet: 1 tab(s) orally once a day (:46)  pioglitazone-metformin 15 mg-850 mg oral tablet: 1 tab(s) orally 2 times a day (:46)  Rhopressa 0.02% ophthalmic solution: 1 drop(s) to each affected eye once a day (in the evening) (2021 04:46)    MEDICATIONS  (STANDING):  atorvastatin 40 milliGRAM(s) Oral at bedtime  brimonidine 0.2% Ophthalmic Solution 1 Drop(s) Both EYES two times a day  chlorhexidine 4% Liquid 1 Application(s) Topical <User Schedule>  dextrose 40% Gel 15 Gram(s) Oral once  dextrose 5%. 1000 milliLiter(s) (50 mL/Hr) IV Continuous <Continuous>  dextrose 5%. 1000 milliLiter(s) (100 mL/Hr) IV Continuous <Continuous>  dextrose 50% Injectable 25 Gram(s) IV Push once  dextrose 50% Injectable 12.5 Gram(s) IV Push once  dextrose 50% Injectable 25 Gram(s) IV Push once  dorzolamide 2% Ophthalmic Solution 1 Drop(s) Both EYES three times a day  enoxaparin Injectable 40 milliGRAM(s) SubCutaneous daily  gabapentin 200 milliGRAM(s) Oral two times a day  glucagon  Injectable 1 milliGRAM(s) IntraMuscular once  insulin glargine Injectable (LANTUS) 10 Unit(s) SubCutaneous every morning  insulin lispro (ADMELOG) corrective regimen sliding scale   SubCutaneous three times a day before meals  insulin lispro Injectable (ADMELOG) 3 Unit(s) SubCutaneous three times a day before meals  latanoprost 0.005% Ophthalmic Solution 1 Drop(s) Both EYES at bedtime  NIFEdipine XL 60 milliGRAM(s) Oral daily  timolol 0.5% Solution 1 Drop(s) Both EYES two times a day    MEDICATIONS  (PRN):

## 2021-07-16 LAB
% ALBUMIN: 45.2 % — SIGNIFICANT CHANGE UP
% ALPHA 1: 6.1 % — SIGNIFICANT CHANGE UP
% ALPHA 2: 14.4 % — SIGNIFICANT CHANGE UP
% BETA: 17.4 % — SIGNIFICANT CHANGE UP
% GAMMA: 16.9 % — SIGNIFICANT CHANGE UP
ALBUMIN SERPL ELPH-MCNC: 2.2 G/DL — LOW (ref 3.6–5.5)
ALBUMIN SERPL ELPH-MCNC: 2.5 G/DL — LOW (ref 3.5–5.2)
ALBUMIN/GLOB SERPL ELPH: 0.8 RATIO — SIGNIFICANT CHANGE UP
ALP SERPL-CCNC: 217 U/L — HIGH (ref 30–115)
ALPHA1 GLOB SERPL ELPH-MCNC: 0.3 G/DL — SIGNIFICANT CHANGE UP (ref 0.1–0.4)
ALPHA2 GLOB SERPL ELPH-MCNC: 0.7 G/DL — SIGNIFICANT CHANGE UP (ref 0.5–1)
ALT FLD-CCNC: 102 U/L — HIGH (ref 0–41)
ANION GAP SERPL CALC-SCNC: 13 MMOL/L — SIGNIFICANT CHANGE UP (ref 7–14)
ANION GAP SERPL CALC-SCNC: 9 MMOL/L — SIGNIFICANT CHANGE UP (ref 7–14)
AST SERPL-CCNC: 71 U/L — HIGH (ref 0–41)
B-GLOBULIN SERPL ELPH-MCNC: 0.9 G/DL — SIGNIFICANT CHANGE UP (ref 0.5–1)
BASOPHILS # BLD AUTO: 0.04 K/UL — SIGNIFICANT CHANGE UP (ref 0–0.2)
BASOPHILS NFR BLD AUTO: 0.5 % — SIGNIFICANT CHANGE UP (ref 0–1)
BILIRUB SERPL-MCNC: 0.4 MG/DL — SIGNIFICANT CHANGE UP (ref 0.2–1.2)
BUN SERPL-MCNC: 71 MG/DL — CRITICAL HIGH (ref 10–20)
BUN SERPL-MCNC: 75 MG/DL — CRITICAL HIGH (ref 10–20)
CALCIUM SERPL-MCNC: 8.1 MG/DL — LOW (ref 8.5–10.1)
CALCIUM SERPL-MCNC: 8.4 MG/DL — LOW (ref 8.5–10.1)
CHLORIDE SERPL-SCNC: 106 MMOL/L — SIGNIFICANT CHANGE UP (ref 98–110)
CHLORIDE SERPL-SCNC: 110 MMOL/L — SIGNIFICANT CHANGE UP (ref 98–110)
CO2 SERPL-SCNC: 17 MMOL/L — SIGNIFICANT CHANGE UP (ref 17–32)
CO2 SERPL-SCNC: 20 MMOL/L — SIGNIFICANT CHANGE UP (ref 17–32)
CREAT SERPL-MCNC: 3.6 MG/DL — HIGH (ref 0.7–1.5)
CREAT SERPL-MCNC: 3.9 MG/DL — HIGH (ref 0.7–1.5)
EOSINOPHIL # BLD AUTO: 0.53 K/UL — SIGNIFICANT CHANGE UP (ref 0–0.7)
EOSINOPHIL NFR BLD AUTO: 6.1 % — SIGNIFICANT CHANGE UP (ref 0–8)
GAMMA GLOBULIN: 0.8 G/DL — SIGNIFICANT CHANGE UP (ref 0.6–1.6)
GLUCOSE BLDC GLUCOMTR-MCNC: 102 MG/DL — HIGH (ref 70–99)
GLUCOSE BLDC GLUCOMTR-MCNC: 159 MG/DL — HIGH (ref 70–99)
GLUCOSE BLDC GLUCOMTR-MCNC: 170 MG/DL — HIGH (ref 70–99)
GLUCOSE BLDC GLUCOMTR-MCNC: 185 MG/DL — HIGH (ref 70–99)
GLUCOSE BLDC GLUCOMTR-MCNC: 89 MG/DL — SIGNIFICANT CHANGE UP (ref 70–99)
GLUCOSE SERPL-MCNC: 71 MG/DL — SIGNIFICANT CHANGE UP (ref 70–99)
GLUCOSE SERPL-MCNC: 99 MG/DL — SIGNIFICANT CHANGE UP (ref 70–99)
HCT VFR BLD CALC: 27.4 % — LOW (ref 42–52)
HGB BLD-MCNC: 8.2 G/DL — LOW (ref 14–18)
IMM GRANULOCYTES NFR BLD AUTO: 0.5 % — HIGH (ref 0.1–0.3)
INTERPRETATION SERPL IFE-IMP: SIGNIFICANT CHANGE UP
LYMPHOCYTES # BLD AUTO: 1.28 K/UL — SIGNIFICANT CHANGE UP (ref 1.2–3.4)
LYMPHOCYTES # BLD AUTO: 14.7 % — LOW (ref 20.5–51.1)
MAGNESIUM SERPL-MCNC: 2.3 MG/DL — SIGNIFICANT CHANGE UP (ref 1.8–2.4)
MCHC RBC-ENTMCNC: 24.2 PG — LOW (ref 27–31)
MCHC RBC-ENTMCNC: 29.9 G/DL — LOW (ref 32–37)
MCV RBC AUTO: 80.8 FL — SIGNIFICANT CHANGE UP (ref 80–94)
MONOCYTES # BLD AUTO: 0.7 K/UL — HIGH (ref 0.1–0.6)
MONOCYTES NFR BLD AUTO: 8.1 % — SIGNIFICANT CHANGE UP (ref 1.7–9.3)
NEUTROPHILS # BLD AUTO: 6.09 K/UL — SIGNIFICANT CHANGE UP (ref 1.4–6.5)
NEUTROPHILS NFR BLD AUTO: 70.1 % — SIGNIFICANT CHANGE UP (ref 42.2–75.2)
NRBC # BLD: 0 /100 WBCS — SIGNIFICANT CHANGE UP (ref 0–0)
PLATELET # BLD AUTO: 182 K/UL — SIGNIFICANT CHANGE UP (ref 130–400)
POTASSIUM SERPL-MCNC: 5 MMOL/L — SIGNIFICANT CHANGE UP (ref 3.5–5)
POTASSIUM SERPL-MCNC: 5.2 MMOL/L — HIGH (ref 3.5–5)
POTASSIUM SERPL-SCNC: 5 MMOL/L — SIGNIFICANT CHANGE UP (ref 3.5–5)
POTASSIUM SERPL-SCNC: 5.2 MMOL/L — HIGH (ref 3.5–5)
PROT PATTERN SERPL ELPH-IMP: SIGNIFICANT CHANGE UP
PROT SERPL-MCNC: 4.9 G/DL — LOW (ref 6–8)
RBC # BLD: 3.39 M/UL — LOW (ref 4.7–6.1)
RBC # FLD: 14.3 % — SIGNIFICANT CHANGE UP (ref 11.5–14.5)
SODIUM SERPL-SCNC: 136 MMOL/L — SIGNIFICANT CHANGE UP (ref 135–146)
SODIUM SERPL-SCNC: 139 MMOL/L — SIGNIFICANT CHANGE UP (ref 135–146)
WBC # BLD: 8.68 K/UL — SIGNIFICANT CHANGE UP (ref 4.8–10.8)
WBC # FLD AUTO: 8.68 K/UL — SIGNIFICANT CHANGE UP (ref 4.8–10.8)

## 2021-07-16 PROCEDURE — 99232 SBSQ HOSP IP/OBS MODERATE 35: CPT

## 2021-07-16 PROCEDURE — 99253 IP/OBS CNSLTJ NEW/EST LOW 45: CPT | Mod: GC

## 2021-07-16 PROCEDURE — 99233 SBSQ HOSP IP/OBS HIGH 50: CPT

## 2021-07-16 RX ORDER — GABAPENTIN 400 MG/1
100 CAPSULE ORAL
Refills: 0 | Status: DISCONTINUED | OUTPATIENT
Start: 2021-07-16 | End: 2021-07-25

## 2021-07-16 RX ORDER — IRON SUCROSE 20 MG/ML
100 INJECTION, SOLUTION INTRAVENOUS EVERY 24 HOURS
Refills: 0 | Status: COMPLETED | OUTPATIENT
Start: 2021-07-16 | End: 2021-07-20

## 2021-07-16 RX ADMIN — Medication 1 DROP(S): at 06:01

## 2021-07-16 RX ADMIN — SODIUM CHLORIDE 50 MILLILITER(S): 5 INJECTION, SOLUTION INTRAVENOUS at 06:02

## 2021-07-16 RX ADMIN — Medication 2: at 12:30

## 2021-07-16 RX ADMIN — AMIODARONE HYDROCHLORIDE 400 MILLIGRAM(S): 400 TABLET ORAL at 06:00

## 2021-07-16 RX ADMIN — Medication 3 UNIT(S): at 17:19

## 2021-07-16 RX ADMIN — GABAPENTIN 100 MILLIGRAM(S): 400 CAPSULE ORAL at 17:21

## 2021-07-16 RX ADMIN — LATANOPROST 1 DROP(S): 0.05 SOLUTION/ DROPS OPHTHALMIC; TOPICAL at 22:49

## 2021-07-16 RX ADMIN — HEPARIN SODIUM 5000 UNIT(S): 5000 INJECTION INTRAVENOUS; SUBCUTANEOUS at 15:11

## 2021-07-16 RX ADMIN — AMIODARONE HYDROCHLORIDE 400 MILLIGRAM(S): 400 TABLET ORAL at 15:11

## 2021-07-16 RX ADMIN — HEPARIN SODIUM 5000 UNIT(S): 5000 INJECTION INTRAVENOUS; SUBCUTANEOUS at 06:00

## 2021-07-16 RX ADMIN — CHLORHEXIDINE GLUCONATE 1 APPLICATION(S): 213 SOLUTION TOPICAL at 06:00

## 2021-07-16 RX ADMIN — DORZOLAMIDE HYDROCHLORIDE 1 DROP(S): 20 SOLUTION/ DROPS OPHTHALMIC at 15:12

## 2021-07-16 RX ADMIN — BUMETANIDE 2 MILLIGRAM(S): 0.25 INJECTION INTRAMUSCULAR; INTRAVENOUS at 06:00

## 2021-07-16 RX ADMIN — Medication 3 UNIT(S): at 12:31

## 2021-07-16 RX ADMIN — HEPARIN SODIUM 5000 UNIT(S): 5000 INJECTION INTRAVENOUS; SUBCUTANEOUS at 22:48

## 2021-07-16 RX ADMIN — SODIUM ZIRCONIUM CYCLOSILICATE 10 GRAM(S): 10 POWDER, FOR SUSPENSION ORAL at 17:27

## 2021-07-16 RX ADMIN — Medication 81 MILLIGRAM(S): at 11:29

## 2021-07-16 RX ADMIN — SODIUM ZIRCONIUM CYCLOSILICATE 10 GRAM(S): 10 POWDER, FOR SUSPENSION ORAL at 06:01

## 2021-07-16 RX ADMIN — DORZOLAMIDE HYDROCHLORIDE 1 DROP(S): 20 SOLUTION/ DROPS OPHTHALMIC at 06:00

## 2021-07-16 RX ADMIN — BRIMONIDINE TARTRATE 1 DROP(S): 2 SOLUTION/ DROPS OPHTHALMIC at 17:20

## 2021-07-16 RX ADMIN — DORZOLAMIDE HYDROCHLORIDE 1 DROP(S): 20 SOLUTION/ DROPS OPHTHALMIC at 22:49

## 2021-07-16 RX ADMIN — ATORVASTATIN CALCIUM 40 MILLIGRAM(S): 80 TABLET, FILM COATED ORAL at 22:48

## 2021-07-16 RX ADMIN — BRIMONIDINE TARTRATE 1 DROP(S): 2 SOLUTION/ DROPS OPHTHALMIC at 06:00

## 2021-07-16 RX ADMIN — Medication 3 UNIT(S): at 07:57

## 2021-07-16 RX ADMIN — CARVEDILOL PHOSPHATE 12.5 MILLIGRAM(S): 80 CAPSULE, EXTENDED RELEASE ORAL at 06:00

## 2021-07-16 RX ADMIN — GABAPENTIN 200 MILLIGRAM(S): 400 CAPSULE ORAL at 06:00

## 2021-07-16 RX ADMIN — INSULIN GLARGINE 9 UNIT(S): 100 INJECTION, SOLUTION SUBCUTANEOUS at 07:56

## 2021-07-16 RX ADMIN — ISOSORBIDE DINITRATE 20 MILLIGRAM(S): 5 TABLET ORAL at 06:00

## 2021-07-16 RX ADMIN — Medication 1 DROP(S): at 17:31

## 2021-07-16 RX ADMIN — IRON SUCROSE 210 MILLIGRAM(S): 20 INJECTION, SOLUTION INTRAVENOUS at 10:57

## 2021-07-16 RX ADMIN — Medication 2: at 23:07

## 2021-07-16 NOTE — PROGRESS NOTE ADULT - ASSESSMENT
Acute on chronic systolic HF / LENNY/ Fluid overload     Patient is volume overloaded  Continue Bumex 2 mg iv BID + 3% hypertonic saline 150 ml BID   Continue Hydralazine 50 mg TID  Continue Isosorbide 20 mg TID  Continue Carvedilol 12.5 mg BID  LHC showed 3V CAD - CT surgery following for possible CABG   Repeat CMR     Get BMP daily   Maintain potassium >4.0, Mg >2.1  Strict intake and output  Daily weight   Discussed with CT surgery and medicine team

## 2021-07-16 NOTE — CONSULT NOTE ADULT - SUBJECTIVE AND OBJECTIVE BOX
HPI:  PC: LE swelling + Pain 1month    PMHx: HTN, HLD, T2DM on insulin, Glaucoma    HPI: 56M w/ PMHx as above presents to the Ed c/o worsening LE swelling and pain for the past 1 month. Endorses SOB on exertion, along w/ increased abdominal girth. Denies orthopnea, PND, wheeze, CP, diaphoresis. No cardiac Hx.    ED course: /130 in triage, otherwise VS. Labs showed Hb 11.1, Cr 2.1, mildly increased ALP and AST/ALT, Trop 0.10, BNP 67365. CXR revealing cardiomegaly and CP angle blunting b/l. (02 Jul 2021 05:08)    Patient admitted for CHF exacerbation, LENNY on CKD and control of HTN; he underwent cardiac cath that showed 3-V disease. Patient scheduled for CABG. During stay, patient developed hypotension and developed LENNY      PAST MEDICAL & SURGICAL HISTORY  HTN (hypertension)    HLD (hyperlipidemia)    DM (diabetes mellitus)    Glaucoma    No significant past surgical history      FAMILY HISTORY:  FAMILY HISTORY:  No pertinent family history in first degree relatives      SOCIAL HISTORY:  []smoker: occasional  []Alcohol: occasional  []Drug    ALLERGIES:  No Known Allergies      MEDICATIONS:  MEDICATIONS  (STANDING):  aMIOdarone    Tablet 400 milliGRAM(s) Oral every 8 hours  aMIOdarone    Tablet   Oral   aspirin  chewable 81 milliGRAM(s) Oral daily  atorvastatin 40 milliGRAM(s) Oral at bedtime  brimonidine 0.2% Ophthalmic Solution 1 Drop(s) Both EYES two times a day  carvedilol 12.5 milliGRAM(s) Oral every 12 hours  chlorhexidine 4% Liquid 1 Application(s) Topical <User Schedule>  dextrose 40% Gel 15 Gram(s) Oral once  dextrose 5%. 1000 milliLiter(s) (50 mL/Hr) IV Continuous <Continuous>  dextrose 5%. 1000 milliLiter(s) (100 mL/Hr) IV Continuous <Continuous>  dextrose 50% Injectable 25 Gram(s) IV Push once  dextrose 50% Injectable 12.5 Gram(s) IV Push once  dextrose 50% Injectable 25 Gram(s) IV Push once  dorzolamide 2% Ophthalmic Solution 1 Drop(s) Both EYES three times a day  gabapentin 100 milliGRAM(s) Oral two times a day  glucagon  Injectable 1 milliGRAM(s) IntraMuscular once  heparin   Injectable 5000 Unit(s) SubCutaneous every 8 hours  insulin glargine Injectable (LANTUS) 9 Unit(s) SubCutaneous every morning  insulin lispro (ADMELOG) corrective regimen sliding scale   SubCutaneous Before meals and at bedtime  insulin lispro Injectable (ADMELOG) 3 Unit(s) SubCutaneous three times a day before meals  iron sucrose IVPB 100 milliGRAM(s) IV Intermittent every 24 hours  latanoprost 0.005% Ophthalmic Solution 1 Drop(s) Both EYES at bedtime  sodium zirconium cyclosilicate 10 Gram(s) Oral two times a day  timolol 0.5% Solution 1 Drop(s) Both EYES two times a day    MEDICATIONS  (PRN):  LORazepam   Injectable 1 milliGRAM(s) IV Push once PRN Anxiety      HOME MEDICATIONS:  Home Medications:  atorvastatin 40 mg oral tablet: 1 tab(s) orally once a day (02 Jul 2021 04:46)  BASAGLAR 100 UNIT/ML KWIKPEN:  (02 Jul 2021 04:46)  brimonidine 0.2% ophthalmic solution: 1 drop(s) to each affected eye 2 times a day (02 Jul 2021 04:46)  dorzolamide-timolol 2%-0.5% preservative-free ophthalmic solution: 1 drop(s) to each affected eye 2 times a day (02 Jul 2021 04:46)  furosemide 20 mg oral tablet: 1 tab(s) orally once a day (02 Jul 2021 04:46)  latanoprost 0.005% ophthalmic solution: 1 drop(s) to each affected eye once a day (in the evening) (02 Jul 2021 04:46)  lisinopril 20 mg oral tablet: 1 tab(s) orally once a day (02 Jul 2021 04:46)  pioglitazone-metformin 15 mg-850 mg oral tablet: 1 tab(s) orally 2 times a day (02 Jul 2021 04:46)  Rhopressa 0.02% ophthalmic solution: 1 drop(s) to each affected eye once a day (in the evening) (02 Jul 2021 04:46)      VITALS:   T(F): 96.8 (07-16 @ 13:43), Max: 97.8 (07-13 @ 14:20)  HR: 49 (07-16 @ 13:43) (47 - 76)  BP: 111/69 (07-16 @ 13:43) (86/60 - 156/88)  BP(mean): --  RR: 20 (07-16 @ 13:43) (18 - 20)  SpO2: 98% (07-16 @ 07:26) (98% - 98%)    I&O's Summary    15 Jul 2021 07:01  -  16 Jul 2021 07:00  --------------------------------------------------------  IN: 1690 mL / OUT: 2050 mL / NET: -360 mL    16 Jul 2021 07:01  -  16 Jul 2021 16:18  --------------------------------------------------------  IN: 340 mL / OUT: 750 mL / NET: -410 mL        REVIEW OF SYSTEMS:  CONSTITUTIONAL: No weakness, fevers or chills  EYES: No visual changes  ENT: No vertigo or throat pain   NECK: No pain or stiffness  RESPIRATORY: No cough, wheezing, hemoptysis;   CARDIOVASCULAR: No chest pain or palpitations; shortness of breath, LE (improved in-hospital)  GASTROINTESTINAL: No abdominal or epigastric pain. No nausea, vomiting, or hematemesis; No diarrhea or constipation. No melena or hematochezia.  GENITOURINARY: No dysuria, frequency or hematuria  NEUROLOGICAL: No numbness or weakness  SKIN: No itching, no rashes  MSK: No pain    PHYSICAL EXAM:  NEURO: patient is awake , alert and oriented  GEN: Not in acute distress; comfortable in bed  NECK: no thyroid enlargement, no JVD  LUNGS: Clear to auscultation bilaterally   CARDIOVASCULAR: S1/S2 present, RRR , systolic murmur  ABD: Soft, non-tender, non-distended  EXT: 1+ bilateral LE edema  SKIN: Intact    LABS:                        8.2    8.68  )-----------( 182      ( 16 Jul 2021 06:31 )             27.4     07-16    139  |  110  |  71<HH>  ----------------------------<  99  5.2<H>   |  20  |  3.6<H>    Ca    8.4<L>      16 Jul 2021 06:31  Mg     2.3     07-16    TPro  4.9<L>  /  Alb  2.5<L>  /  TBili  0.4  /  DBili  x   /  AST  71<H>  /  ALT  102<H>  /  AlkPhos  217<H>  07-16    PT/INR - ( 15 Jul 2021 07:05 )   PT: 11.80 sec;   INR: 1.03 ratio         PTT - ( 15 Jul 2021 07:05 )  PTT:29.3 sec          Troponin trend:            RADIOLOGY:  -CXR:  < from: Xray Chest 1 View- PORTABLE-Routine (Xray Chest 1 View- PORTABLE-Routine .) (07.14.21 @ 17:30) >  Skeleton/soft tissues: Unremarkable.    Impression: Stable cardiomegaly.    Left basilar opacity/pleural effusion.    < end of copied text >    -TTE:  < from: TTE Echo Complete w/o Contrast w/ Doppler (07.02.21 @ 14:54) >  Summary:   1. Moderately decreased global left ventricular systolic function.   2. Multiple left ventricular regional wall motion abnormalities exist. See wall motion findings.   3. LV Ejection Fraction by Garcia's Method with a biplane EF of 34 %.   4. Moderately increased LV wall thickness.   5. Normal left ventricular internal cavity size.   6. Mild to moderately enlarged left atrium.   7. Normal right atrial size.   8. Moderate pericardial effusion.   9. Mild to moderate mitral valve regurgitation.  10. Structurally normal mitral valve, with normal leaflet excursion.  11. Moderate tricuspid regurgitation.  12. Mild aortic regurgitation.  13. Normal trileaflet aortic valve with normal opening.  14. Mild pulmonic valve regurgitation.  15. Estimated pulmonary artery systolic pressure is 52.0 mmHg assuming a right atrial pressure of 10 mmHg, which is consistent with moderate pulmonary hypertension.    < end of copied text >    -CCTA:  -STRESS TEST:  -CATHETERIZATION:  < from: Cardiac Cath Lab - Adult (07.12.21 @ 16:37) >  CORONARY CIRCULATION: The coronary circulation is right dominant. There was    3-vessel coronary artery disease (LAD, RCA, and circumflex). SYNTAX score    of 40. Left main: The vessel was normal sized. Angiography showed mild    atherosclerosis with no flow limiting lesions. LAD: The vessel was normal    sized. Proximal LAD: There was a tubular 90 % stenosis at the site of the    first septal branch. There was DEACON grade 3 flow through the vessel (brisk    flow). Mid LAD: The vessel was small to medium sized. Angiography showed    mild atherosclerosis with no flow limiting lesions. There was a tubular 90    % stenosis at a site with no prior intervention. There was DEACON grade 3    flow through the vessel (brisk flow). Distal LAD: Angiography showed mild    atherosclerosis with no flow limiting lesions. 1st diagonal: The vessel    was small sized. Angiography showed severe atherosclerosis. 2nd diagonal:    The vessel was medium sized. There was a tubular 70 % stenosis in the    proximal third of the vessel segment. Circumflex: The vessel was normal    sized. Proximal circumflex: The vessel was medium sized. There was a    tubular 90 % stenosis at a site with no prior intervention. There was DEACON    grade 3 flow through the vessel (brisk flow). Distal circumflex: There was    a diffuse 85 % stenosis at a site with no prior intervention, at the    origin of OM2. 1st obtuse marginal: The vessel was medium sized. There was    a diffuse 80 % stenosis at a site with no prior intervention, at the    ostium of the vessel segment. 2nd obtuse marginal: The vessel was normal    sized. There was a tubular 80 % stenosis at a site with no prior    intervention, at the ostium of the vessel segment. RCA: The vessel was    normal sized. Angiography showed minor luminal irregularities with no flow    limiting lesions. There grade 2 collaterals supplying rPDA and rPLA    terrtory from LAD and LCx respectively. Proximal RCA: Angiography showed    severe atherosclerosis. Right PDA: The vessel was recieving collaterals    from left system. The vessel was medium sized. Angiography showed moderate    atherosclerosis with no clinical lesions appreciated. Right posterolateral    segment: The vessel was receiving collaterals form left system. The vessel    was normal sized. Angiography showed moderate atherosclerosis with no    clinical lesions appreciated.    < end of copied text >      ECG: NSR, RBBB, LPFB HPI:  PC: LE swelling + Pain 1month    PMHx: HTN, HLD, T2DM on insulin, Glaucoma    HPI: 56M w/ PMHx as above presents to the Ed c/o worsening LE swelling and pain for the past 1 month. Endorses SOB on exertion, along w/ increased abdominal girth. Denies orthopnea, PND, wheeze, CP, diaphoresis. No cardiac Hx.    ED course: /130 in triage, otherwise VS. Labs showed Hb 11.1, Cr 2.1, mildly increased ALP and AST/ALT, Trop 0.10, BNP 37421. CXR revealing cardiomegaly and CP angle blunting b/l. (02 Jul 2021 05:08)    Patient admitted for CHF exacerbation, LENNY on CKD and control of HTN; he underwent cardiac cath that showed 3-V disease. Patient scheduled for CABG. During stay, patient developed hypotension and developed LENNY      PAST MEDICAL & SURGICAL HISTORY  HTN (hypertension)    HLD (hyperlipidemia)    DM (diabetes mellitus)    Glaucoma    No significant past surgical history      FAMILY HISTORY:  FAMILY HISTORY:  No pertinent family history in first degree relatives      SOCIAL HISTORY:  []smoker: occasional  []Alcohol: occasional  []Drug    ALLERGIES:  No Known Allergies      MEDICATIONS:  MEDICATIONS  (STANDING):  aMIOdarone    Tablet 400 milliGRAM(s) Oral every 8 hours  aMIOdarone    Tablet   Oral   aspirin  chewable 81 milliGRAM(s) Oral daily  atorvastatin 40 milliGRAM(s) Oral at bedtime  brimonidine 0.2% Ophthalmic Solution 1 Drop(s) Both EYES two times a day  carvedilol 12.5 milliGRAM(s) Oral every 12 hours  chlorhexidine 4% Liquid 1 Application(s) Topical <User Schedule>  dextrose 40% Gel 15 Gram(s) Oral once  dextrose 5%. 1000 milliLiter(s) (50 mL/Hr) IV Continuous <Continuous>  dextrose 5%. 1000 milliLiter(s) (100 mL/Hr) IV Continuous <Continuous>  dextrose 50% Injectable 25 Gram(s) IV Push once  dextrose 50% Injectable 12.5 Gram(s) IV Push once  dextrose 50% Injectable 25 Gram(s) IV Push once  dorzolamide 2% Ophthalmic Solution 1 Drop(s) Both EYES three times a day  gabapentin 100 milliGRAM(s) Oral two times a day  glucagon  Injectable 1 milliGRAM(s) IntraMuscular once  heparin   Injectable 5000 Unit(s) SubCutaneous every 8 hours  insulin glargine Injectable (LANTUS) 9 Unit(s) SubCutaneous every morning  insulin lispro (ADMELOG) corrective regimen sliding scale   SubCutaneous Before meals and at bedtime  insulin lispro Injectable (ADMELOG) 3 Unit(s) SubCutaneous three times a day before meals  iron sucrose IVPB 100 milliGRAM(s) IV Intermittent every 24 hours  latanoprost 0.005% Ophthalmic Solution 1 Drop(s) Both EYES at bedtime  sodium zirconium cyclosilicate 10 Gram(s) Oral two times a day  timolol 0.5% Solution 1 Drop(s) Both EYES two times a day    MEDICATIONS  (PRN):  LORazepam   Injectable 1 milliGRAM(s) IV Push once PRN Anxiety      HOME MEDICATIONS:  Home Medications:  atorvastatin 40 mg oral tablet: 1 tab(s) orally once a day (02 Jul 2021 04:46)  BASAGLAR 100 UNIT/ML KWIKPEN:  (02 Jul 2021 04:46)  brimonidine 0.2% ophthalmic solution: 1 drop(s) to each affected eye 2 times a day (02 Jul 2021 04:46)  dorzolamide-timolol 2%-0.5% preservative-free ophthalmic solution: 1 drop(s) to each affected eye 2 times a day (02 Jul 2021 04:46)  furosemide 20 mg oral tablet: 1 tab(s) orally once a day (02 Jul 2021 04:46)  latanoprost 0.005% ophthalmic solution: 1 drop(s) to each affected eye once a day (in the evening) (02 Jul 2021 04:46)  lisinopril 20 mg oral tablet: 1 tab(s) orally once a day (02 Jul 2021 04:46)  pioglitazone-metformin 15 mg-850 mg oral tablet: 1 tab(s) orally 2 times a day (02 Jul 2021 04:46)  Rhopressa 0.02% ophthalmic solution: 1 drop(s) to each affected eye once a day (in the evening) (02 Jul 2021 04:46)      VITALS:   T(F): 96.8 (07-16 @ 13:43), Max: 97.8 (07-13 @ 14:20)  HR: 49 (07-16 @ 13:43) (47 - 76)  BP: 111/69 (07-16 @ 13:43) (86/60 - 156/88)  BP(mean): --  RR: 20 (07-16 @ 13:43) (18 - 20)  SpO2: 98% (07-16 @ 07:26) (98% - 98%)    I&O's Summary    15 Jul 2021 07:01  -  16 Jul 2021 07:00  --------------------------------------------------------  IN: 1690 mL / OUT: 2050 mL / NET: -360 mL    16 Jul 2021 07:01  -  16 Jul 2021 16:18  --------------------------------------------------------  IN: 340 mL / OUT: 750 mL / NET: -410 mL        REVIEW OF SYSTEMS:  CONSTITUTIONAL: No weakness, fevers or chills  EYES: No visual changes  ENT: No vertigo or throat pain   NECK: No pain or stiffness  RESPIRATORY: No cough, wheezing, hemoptysis;   CARDIOVASCULAR: No chest pain or palpitations; shortness of breath, LE (improved in-hospital)  GASTROINTESTINAL: No abdominal or epigastric pain. No nausea, vomiting, or hematemesis; No diarrhea or constipation. No melena or hematochezia.  GENITOURINARY: No dysuria, frequency or hematuria  NEUROLOGICAL: No numbness or weakness  SKIN: No itching, no rashes  MSK: No pain    PHYSICAL EXAM:  NEURO: patient is awake , alert and oriented  GEN: Not in acute distress; comfortable in bed  NECK: no thyroid enlargement, no JVD  LUNGS: Bibasilar crackles  CARDIOVASCULAR: S1/S2 present, RRR , systolic murmur  ABD: Soft, non-tender, non-distended  EXT: 1+ bilateral LE edema  SKIN: Intact    LABS:                        8.2    8.68  )-----------( 182      ( 16 Jul 2021 06:31 )             27.4     07-16    139  |  110  |  71<HH>  ----------------------------<  99  5.2<H>   |  20  |  3.6<H>    Ca    8.4<L>      16 Jul 2021 06:31  Mg     2.3     07-16    TPro  4.9<L>  /  Alb  2.5<L>  /  TBili  0.4  /  DBili  x   /  AST  71<H>  /  ALT  102<H>  /  AlkPhos  217<H>  07-16    PT/INR - ( 15 Jul 2021 07:05 )   PT: 11.80 sec;   INR: 1.03 ratio         PTT - ( 15 Jul 2021 07:05 )  PTT:29.3 sec          Troponin trend:            RADIOLOGY:  -CXR:  < from: Xray Chest 1 View- PORTABLE-Routine (Xray Chest 1 View- PORTABLE-Routine .) (07.14.21 @ 17:30) >  Skeleton/soft tissues: Unremarkable.    Impression: Stable cardiomegaly.    Left basilar opacity/pleural effusion.    < end of copied text >    -TTE:  < from: TTE Echo Complete w/o Contrast w/ Doppler (07.02.21 @ 14:54) >  Summary:   1. Moderately decreased global left ventricular systolic function.   2. Multiple left ventricular regional wall motion abnormalities exist. See wall motion findings.   3. LV Ejection Fraction by Garcia's Method with a biplane EF of 34 %.   4. Moderately increased LV wall thickness.   5. Normal left ventricular internal cavity size.   6. Mild to moderately enlarged left atrium.   7. Normal right atrial size.   8. Moderate pericardial effusion.   9. Mild to moderate mitral valve regurgitation.  10. Structurally normal mitral valve, with normal leaflet excursion.  11. Moderate tricuspid regurgitation.  12. Mild aortic regurgitation.  13. Normal trileaflet aortic valve with normal opening.  14. Mild pulmonic valve regurgitation.  15. Estimated pulmonary artery systolic pressure is 52.0 mmHg assuming a right atrial pressure of 10 mmHg, which is consistent with moderate pulmonary hypertension.    < end of copied text >    -CCTA:  -STRESS TEST:  -CATHETERIZATION:  < from: Cardiac Cath Lab - Adult (07.12.21 @ 16:37) >  CORONARY CIRCULATION: The coronary circulation is right dominant. There was    3-vessel coronary artery disease (LAD, RCA, and circumflex). SYNTAX score    of 40. Left main: The vessel was normal sized. Angiography showed mild    atherosclerosis with no flow limiting lesions. LAD: The vessel was normal    sized. Proximal LAD: There was a tubular 90 % stenosis at the site of the    first septal branch. There was DEACON grade 3 flow through the vessel (brisk    flow). Mid LAD: The vessel was small to medium sized. Angiography showed    mild atherosclerosis with no flow limiting lesions. There was a tubular 90    % stenosis at a site with no prior intervention. There was DEACON grade 3    flow through the vessel (brisk flow). Distal LAD: Angiography showed mild    atherosclerosis with no flow limiting lesions. 1st diagonal: The vessel    was small sized. Angiography showed severe atherosclerosis. 2nd diagonal:    The vessel was medium sized. There was a tubular 70 % stenosis in the    proximal third of the vessel segment. Circumflex: The vessel was normal    sized. Proximal circumflex: The vessel was medium sized. There was a    tubular 90 % stenosis at a site with no prior intervention. There was DEACON    grade 3 flow through the vessel (brisk flow). Distal circumflex: There was    a diffuse 85 % stenosis at a site with no prior intervention, at the    origin of OM2. 1st obtuse marginal: The vessel was medium sized. There was    a diffuse 80 % stenosis at a site with no prior intervention, at the    ostium of the vessel segment. 2nd obtuse marginal: The vessel was normal    sized. There was a tubular 80 % stenosis at a site with no prior    intervention, at the ostium of the vessel segment. RCA: The vessel was    normal sized. Angiography showed minor luminal irregularities with no flow    limiting lesions. There grade 2 collaterals supplying rPDA and rPLA    terrtory from LAD and LCx respectively. Proximal RCA: Angiography showed    severe atherosclerosis. Right PDA: The vessel was recieving collaterals    from left system. The vessel was medium sized. Angiography showed moderate    atherosclerosis with no clinical lesions appreciated. Right posterolateral    segment: The vessel was receiving collaterals form left system. The vessel    was normal sized. Angiography showed moderate atherosclerosis with no    clinical lesions appreciated.    < end of copied text >      ECG: NSR, RBBB, LPFB

## 2021-07-16 NOTE — CHART NOTE - NSCHARTNOTEFT_GEN_A_CORE
HPI: 56M w/ PMHx HTN, HLD, CKD, T2DM on insulin, Glaucoma presented to the Ed c/o worsening LE swelling.  Endorsed SOB on exertion, along w/ increased abdominal girth. Denied orthopnea, PND, wheezing, CP, and diaphoresis. No cardiac Hx.  Patient was diagnosed with new onset Acute HFrEF exacerbation.  CXR showed cardiomegaly and blunting of CP angles. BNP 43716 on admission. Cath showed significant 3 V disease, LAD, RC, LCx. CTS surgery was consulted. During preop cardiac MRI patient became SOB. There was bilateral effusions with adjacent atelectasis and moderate pericardial effusion on imaging . Bumex 2 mg BID + hypertonic saline was started.     While hospitalized patient's cr worsened given time frame of cath, multiple low bp reading (discontinued hydralazine)and diuresis. CCU for monitoring  kidney function  while diuresing and pending CABG. Patient may need dialysis before CABG.           #New Onset Acute HFrEF exacerbation  #HFrEF 2/2 uncontrolled HTN  #Moderate pericardial effusion  #Uncontrolled HTN  -no prior cardiac hx  -CXR - cardiomegaly and blunting of CP angles  -BNP 65008 on admission  -trops 0.10 -> 0.11 -> 0.11 -> 0.14 -> 0.09  -Echo - EF 34%, moderate pericardial effusion  -Bumex w/ hypertonic saline   -I<O, daily weights  - s/p  cardiac cath today - Significant 3V disease. CTS on board   -per HF team, d/c metoprolol succinate 25mg qd and switch to Carvedilol 12.5 BID ,and cont isosorbide 20mg TID.   -low BP d/c cont hydralazine 25mg TID 7/15         #LENNY on CKD, Hyperkalemia  #?ROSENDO s/p cath, Cardiorenal Syndrome  -Cr 2.4->2.7->2.7->2.5->2.1 (Baseline 1.7 3/2021)  -oliguria   -Bumex 2mg BID +3% NS  -Lokelma 10 mg BID , low potassium diet , nephro rec   -FeNa = 2.8%, FeUrea 40.2% => likely intrinsic  -UA 7/3 significant for moderate blood, 300 protein, protein/Cr ratio 8.1  -US KUB - no hydro b/l, right renal cysts  -cont to monitor I&O for oliguria/anuria  -SPEP - decreased protein, C3 wnl, C4 wnl  -incr kappa, lambda light chains. Ratio 1.97  -f/u UPEP, SIF, UIF, iron studies, MARLIN      #HLD  -cont atorvastatin    #DM2 w/ peripheral neuropathy  -FS: 171, 143, 250, 98  -cont Lantus, ISS  -on lantus, metformin-pioglitazone at home  -hold pioglitazone now and on d/c - can contribute to CHF exacerbation  -on gabapentin 200mg BID  -duplex negative  -arterial duplex negative    #Glaucoma  -cont eye drops    PT/REHAB: 50ft x2 w/o assistance with PT  ACTIVITY: Activity - Increase As Tolerated  DVT PPX: heparin   GI PPX:  Not indicated  DIET: Diet, DASH/TLC  CODE: Full code  Disposition: CABG HPI: 56M w/ PMHx HTN, HLD, CKD, T2DM on insulin, Glaucoma presented to the Ed c/o worsening LE swelling.  Endorsed SOB on exertion, along w/ increased abdominal girth. Denied orthopnea, PND, wheezing, CP, and diaphoresis. No cardiac Hx.  Patient was diagnosed with new onset Acute HFrEF exacerbation.  CXR showed cardiomegaly and blunting of CP angles. BNP 76208 on admission. Cath showed significant 3 V disease, LAD, RCA, LCx. CTS was consulted. During preop cardiac MRI patient became SOB. There was bilateral effusions with adjacent atelectasis and moderate pericardial effusion on imaging . Bumex 2 mg BID + hypertonic saline was started.     While hospitalized patient's cr worsened given time frame of cath, multiple low bp reading (discontinued hydralazine)and diuresis. CCU for monitoring  kidney function  while diuresing and pending CABG. Patient may need dialysis before CABG.           #New Onset Acute HFrEF exacerbation  #HFrEF 2/2 uncontrolled HTN  #Moderate pericardial effusion  #Uncontrolled HTN  -no prior cardiac hx  -CXR - cardiomegaly and blunting of CP angles  -BNP 52131 on admission  -trops 0.10 -> 0.11 -> 0.11 -> 0.14 -> 0.09  -Echo - EF 34%, moderate pericardial effusion  -Bumex w/ hypertonic saline   -I<O, daily weights  - s/p  cardiac cath today - Significant 3V disease. CTS on board   -per HF team, d/c metoprolol succinate 25mg qd and switch to Carvedilol 12.5 BID ,and cont isosorbide 20mg TID.   -low BP d/c cont hydralazine 25mg TID 7/15         #LENNY on CKD, Hyperkalemia  #?ROSENDO s/p cath, Cardiorenal Syndrome  -Cr 2.4->2.7->2.7->2.5->2.1 (Baseline 1.7 3/2021)  -oliguria   -Bumex 2mg BID +3% NS  -Lokelma 10 mg BID , low potassium diet , nephro rec   -FeNa = 2.8%, FeUrea 40.2% => likely intrinsic  -UA 7/3 significant for moderate blood, 300 protein, protein/Cr ratio 8.1  -US KUB - no hydro b/l, right renal cysts  -cont to monitor I&O for oliguria/anuria  -SPEP - decreased protein, C3 wnl, C4 wnl  -incr kappa, lambda light chains. Ratio 1.97        #HLD  -cont atorvastatin    #DM2 w/ peripheral neuropathy  -FS: 171, 143, 250, 98  -cont Lantus, ISS  -on lantus, metformin-pioglitazone at home  -hold pioglitazone now and on d/c - can contribute to CHF exacerbation  -on gabapentin 200mg BID  -duplex negative  -arterial duplex negative    #Glaucoma  -cont eye drops    PT/REHAB: 50ft x2 w/o assistance with PT  ACTIVITY: Activity - Increase As Tolerated  DVT PPX: heparin   GI PPX:  Not indicated  DIET: Diet, DASH/TLC  CODE: Full code  Disposition: CABG

## 2021-07-16 NOTE — PROGRESS NOTE ADULT - SUBJECTIVE AND OBJECTIVE BOX
Nephrology progress note    Patient is seen and examined, events over the last 24 h noted .    Allergies:  No Known Allergies    Hospital Medications:   MEDICATIONS  (STANDING):  aMIOdarone    Tablet 400 milliGRAM(s) Oral every 8 hours  aMIOdarone    Tablet   Oral   aspirin  chewable 81 milliGRAM(s) Oral daily  atorvastatin 40 milliGRAM(s) Oral at bedtime  brimonidine 0.2% Ophthalmic Solution 1 Drop(s) Both EYES two times a day  carvedilol 12.5 milliGRAM(s) Oral every 12 hours  chlorhexidine 4% Liquid 1 Application(s) Topical <User Schedule>  dextrose 40% Gel 15 Gram(s) Oral once  dextrose 5%. 1000 milliLiter(s) (50 mL/Hr) IV Continuous <Continuous>  dextrose 5%. 1000 milliLiter(s) (100 mL/Hr) IV Continuous <Continuous>  dextrose 50% Injectable 25 Gram(s) IV Push once  dextrose 50% Injectable 12.5 Gram(s) IV Push once  dextrose 50% Injectable 25 Gram(s) IV Push once  dorzolamide 2% Ophthalmic Solution 1 Drop(s) Both EYES three times a day  gabapentin 100 milliGRAM(s) Oral two times a day  glucagon  Injectable 1 milliGRAM(s) IntraMuscular once  heparin   Injectable 5000 Unit(s) SubCutaneous every 8 hours  insulin glargine Injectable (LANTUS) 9 Unit(s) SubCutaneous every morning  insulin lispro (ADMELOG) corrective regimen sliding scale   SubCutaneous Before meals and at bedtime  insulin lispro Injectable (ADMELOG) 3 Unit(s) SubCutaneous three times a day before meals  iron sucrose IVPB 100 milliGRAM(s) IV Intermittent every 24 hours  latanoprost 0.005% Ophthalmic Solution 1 Drop(s) Both EYES at bedtime  sodium zirconium cyclosilicate 10 Gram(s) Oral two times a day  timolol 0.5% Solution 1 Drop(s) Both EYES two times a day        VITALS:  T(F): 97.5 (07-16-21 @ 05:10), Max: 97.8 (07-15-21 @ 13:43)  HR: 47 (07-16-21 @ 07:26)  BP: 117/73 (07-16-21 @ 05:10)  RR: 18 (07-16-21 @ 05:10)  SpO2: 98% (07-16-21 @ 07:26)  Wt(kg): --    07-14 @ 07:01  -  07-15 @ 07:00  --------------------------------------------------------  IN: 570 mL / OUT: 150 mL / NET: 420 mL    07-15 @ 07:01 - 07-16 @ 07:00  --------------------------------------------------------  IN: 1690 mL / OUT: 2050 mL / NET: -360 mL    07-16 @ 07:01 - 07-16 @ 13:29  --------------------------------------------------------  IN: 340 mL / OUT: 750 mL / NET: -410 mL          PHYSICAL EXAM:  Constitutional: NAD  HEENT: anicteric sclera  Respiratory: CTA  Cardiovascular: S1, S2, RRR  Gastrointestinal: BS+, soft, NT/ND  Extremities: No peripheral edema  Neurological: A/O x 3  : No CVA tenderness. No harris.   Skin: No rashes  Vascular Access:    LABS:  07-16    139  |  110  |  71<HH>  ----------------------------<  99  5.2<H>   |  20  |  3.6<H>  Creatinine Trend: 3.6<--, 3.5<--, 3.2<--, 3.3<--, 2.5<--, 2.1<--  Ca    8.4<L>      16 Jul 2021 06:31  Mg     2.3     07-16    TPro  4.9<L>  /  Alb  2.5<L>  /  TBili  0.4  /  DBili      /  AST  71<H>  /  ALT  102<H>  /  AlkPhos  217<H>  07-16                          8.2    8.68  )-----------( 182      ( 16 Jul 2021 06:31 )             27.4       Urine Studies:      RADIOLOGY & ADDITIONAL STUDIES:

## 2021-07-16 NOTE — PROGRESS NOTE ADULT - SUBJECTIVE AND OBJECTIVE BOX
GILLIAN MENDOZA  56y  Male      Patient is a 56y old  Male who presents with a chief complaint of Lower Extremity swelling (15 Jul 2021 12:56)      INTERVAL HPI/OVERNIGHT EVENTS:    REVIEW OF SYSTEMS:  CONSTITUTIONAL: No fever, weight loss, or fatigue  EYES: No eye pain, visual disturbances, or discharge  ENMT:  No difficulty hearing, tinnitus, vertigo; No sinus or throat pain  NECK: No pain or stiffness  RESPIRATORY: No cough, wheezing, chills or hemoptysis; No shortness of breath  CARDIOVASCULAR: No chest pain, palpitations, dizziness, or leg swelling  GASTROINTESTINAL: No abdominal or epigastric pain. No diarrhea or constipation. No nausea, vomiting, or hematemesis. No melena or hematochezia.  GENITOURINARY: No dysuria, frequency, hematuria, or incontinence  NEUROLOGICAL: No headaches, memory loss, loss of strength, numbness, or tremors  MUSCULOSKELETAL: No joint pain or swelling; No muscle, back, or extremity pain  SKIN: No itching, burning, rashes, or lesions   LYMPH NODES: No enlarged glands  ENDOCRINE: No heat or cold intolerance; No hair loss  PSYCHIATRIC: No depression, anxiety, mood swings, or difficulty sleeping  HEME/LYMPH: No easy bruising, or bleeding gums  ALLERY AND IMMUNOLOGIC: No hives or eczema    Vital Signs Last 24 Hrs  T(C): 36.4 (16 Jul 2021 05:10), Max: 36.6 (15 Jul 2021 13:43)  T(F): 97.5 (16 Jul 2021 05:10), Max: 97.8 (15 Jul 2021 13:43)  HR: 51 (16 Jul 2021 05:10) (51 - 55)  BP: 117/73 (16 Jul 2021 05:10) (88/55 - 121/79)  BP(mean): --  RR: 18 (16 Jul 2021 05:10) (18 - 18)  SpO2: --    PHYSICAL EXAM:  GENERAL: NAD, well-groomed, well-developed  HEAD:  Atraumatic, Normocephalic  EYES: EOMI, PERRLA, conjunctiva and sclera clear  ENMT: Moist mucous membranes, Good dentition, No lesions  NECK: Supple, No JVD, Normal thyroid  NERVOUS SYSTEM:  Alert & Oriented X3, Good concentration; Motor Strength 5/5 B/L upper and lower extremities; DTRs 2+ intact and symmetric  CHEST/LUNG: Clear to auscultation bilaterally; No rales, rhonchi, wheezing, or rubs  HEART: Regular rate and rhythm; No murmurs, rubs, or gallops  ABDOMEN: Soft, Nontender, Nondistended; Bowel sounds present  EXTREMITIES:  2+ Peripheral Pulses, No clubbing, cyanosis, or edema  LYMPH: No lymphadenopathy noted  SKIN: No rashes or lesions    Consultant(s) Notes Reviewed:  [x ] YES  [ ] NO  Care Discussed with Consultants/Other Providers [ x] YES  [ ] NO    LABS:                        9.0    8.17  )-----------( 203      ( 15 Jul 2021 07:05 )             31.3     07-15    137  |  106  |  69<HH>  ----------------------------<  110<H>  5.4<H>   |  21  |  3.5<H>    Ca    8.1<L>      15 Jul 2021 17:51  Mg     2.3     07-15    TPro  5.6<L>  /  Alb  2.9<L>  /  TBili  0.5  /  DBili  x   /  AST  111<H>  /  ALT  121<H>  /  AlkPhos  260<H>  07-15    PT/INR - ( 15 Jul 2021 07:05 )   PT: 11.80 sec;   INR: 1.03 ratio         PTT - ( 15 Jul 2021 07:05 )  PTT:29.3 sec      CAPILLARY BLOOD GLUCOSE      POCT Blood Glucose.: 111 mg/dL (15 Jul 2021 21:22)  POCT Blood Glucose.: 135 mg/dL (15 Jul 2021 16:29)  POCT Blood Glucose.: 107 mg/dL (15 Jul 2021 11:53)  POCT Blood Glucose.: 115 mg/dL (15 Jul 2021 09:29)  POCT Blood Glucose.: 108 mg/dL (15 Jul 2021 07:37)      RADIOLOGY & ADDITIONAL TESTS:    Imaging Personally Reviewed:  [ ] YES  [ ] NO

## 2021-07-16 NOTE — CONSULT NOTE ADULT - ASSESSMENT
IMPRESSION:  CAD - 3V disease, awaiting   Ischemic cardiomyopathy   LENNY - worsening (ROSENDO vs hypoperfusion from hypotension?)  Elevated LFTs  DM  HTN - currently with low BP  HLD    PLAN:    CNS:    HEENT: Oral care    PULMONARY:    - HOB @ 45 degrees    CARDIOVASCULAR:  - Upgrade to CCU  - Hold diuretics for now  - Holding Hydralazine and Isordil  - Continue aspirin 81mg daily, Atorvastatin 40mg daily  - Continue carvedilol 12.5mg q12h  - Couldn't complete MRI due to orthopnea; will repeat once euvolemic  - F/U with CT surgery; plan for CABG next week    GI: GI prophylaxis.  Feeding     RENAL:  - Follow up renal function and lytes.  Correct as needed  - Avoid nephrotoxic agents  - Nephrology following    INFECTIOUS DISEASE: Monitor VS    HEMATOLOGICAL:    - On iron supplementation  - Monitor cbc; watch for signs and symptoms of bleeding  - DVT prophylaxis.    ENDOCRINE:  Follow up FS.  Insulin protocol IMPRESSION:  CAD - 3V disease, awaiting   Ischemic cardiomyopathy   LENNY - worsening (ROSENDO vs hypoperfusion from hypotension?)  Elevated LFTs  DM  HTN - currently with low BP  HLD    PLAN:    CNS:    HEENT: Oral care    PULMONARY:    - HOB @ 45 degrees    CARDIOVASCULAR:  - Upgrade to CCU  - Hold diuretics for now  - Holding Hydralazine and Isordil  - Continue aspirin 81mg daily, Atorvastatin 40mg daily  - Continue carvedilol 12.5mg q12h  - Monitor renal function, electrolytes, daily weight and volume status, strict I&Os; keep I<Os and adjust diuretics accordingly  - Couldn't complete MRI due to orthopnea; will repeat once euvolemic  - F/U with CT surgery; plan for CABG next week    GI: GI prophylaxis.  Feeding     RENAL:  - Follow up renal function and lytes.  Correct as needed  - Avoid nephrotoxic agents  - Nephrology following    INFECTIOUS DISEASE: Monitor VS    HEMATOLOGICAL:    - On iron supplementation  - Monitor cbc; watch for signs and symptoms of bleeding  - DVT prophylaxis.    ENDOCRINE:  Follow up FS.  Insulin protocol

## 2021-07-16 NOTE — PROGRESS NOTE ADULT - ASSESSMENT
56M w/ PMHx HTN, HLD, T2DM on insulin x>10 years, retinopathy, Glaucoma presents to the Ed c/o worsening LE swelling and pain for the past 1 month. Endorses SOB on exertion, along w/ increased abdominal girth.    # LENNY / CKD 3b / CRS / ATN/hypotension / time frame also c/w contrast induced nephropathy - worsening creat due to low BP - hold Bumex  # Hyperkalemia cont Lokelma, low K diet  # Nephrotic range proteinuria - needs SPEP UPEP SIF UIF Kappa/lambda 1.9, MARLIN c3c4 wnl; likely due to DM  - last phos level noted, does not need binder, repeat phos level  - renal ultrasound noted w/o hydronephrosis, right renal cysts  # HTN  # Normocytic anemia, iron deficient  0n Venofer, start Procrit 10,000 sq qweek  # ADHF, low EF - s/p cardiac cath, appreciate CT surgery f/u regarding CABG  - TTE noted with Echo - EF 34%, moderate pericardial effusion, pending repeat CMR  Will follow

## 2021-07-16 NOTE — PROGRESS NOTE ADULT - SUBJECTIVE AND OBJECTIVE BOX
ANTICOAGULATION FOLLOW-UP CLINIC VISIT    Patient Name:  Tracy Palomo  Date:  3/22/2019  Contact Type:  Face to Face    SUBJECTIVE:     Patient Findings     Comments:   The patient was assessed for diet, medication, and activity level changes, missed or extra doses, bruising or bleeding, with no problem findings.  Johanna Mckeon RN             OBJECTIVE    INR Protime   Date Value Ref Range Status   2019 2.5 (A) 0.9 - 1.1 Final       ASSESSMENT / PLAN  INR assessment THER    Recheck INR In: 2 WEEKS    INR Location Clinic      Anticoagulation Summary  As of 3/22/2019    INR goal:   2.0-3.0   TTR:   60.8 % (2.9 y)   INR used for dosin.5 (3/22/2019)   Warfarin maintenance plan:   5 mg (5 mg x 1) every Mon, Wed, Fri; 2.5 mg (5 mg x 0.5) all other days   Full warfarin instructions:   5 mg every Mon, Wed, Fri; 2.5 mg all other days   Weekly warfarin total:   25 mg   No change documented:   Johanna Mckeon RN   Plan last modified:   Johanna Mckeon RN (2018)   Next INR check:   2019   Target end date:       Indications    History of Pulmonary emboli with probable pulmonary infarction [I26.99]  Long term current use of anticoagulant therapy [Z79.01]             Anticoagulation Episode Summary     INR check location:       Preferred lab:       Send INR reminders to:   MI FRANCESCO    Comments:   5 mg tablets, book, PM dose       Anticoagulation Care Providers     Provider Role Specialty Phone number    BlanquitaestradaGerard MD [facility] [phone]            See the Encounter Report to view Anticoagulation Flowsheet and Dosing Calendar (Go to Encounters tab in chart review, and find the Anticoagulation Therapy Visit)    Dosage adjustment made based on physician directed care plan.      Johanna Mckeon RN                  OPERATIVE PROCEDURE(s):                POD #                       56yMale  SURGEON(s): GILDA Velasco  SUBJECTIVE ASSESSMENT:     Vital Signs Last 24 Hrs  T(F): 97.5 (16 Jul 2021 05:10), Max: 97.8 (15 Jul 2021 13:43)  HR: 47 (16 Jul 2021 07:26) (47 - 55)  BP: 117/73 (16 Jul 2021 05:10) (88/55 - 121/79)  BP(mean): --  ABP: --  ABP(mean): --  RR: 18 (16 Jul 2021 05:10) (18 - 18)  SpO2: 98% (16 Jul 2021 07:26) (98% - 98%)  CVP(mm Hg): --  CVP(cm H2O): --  CO: --  CI: --  PA: --  SVR: --    I&O's Detail    15 Jul 2021 07:01  -  16 Jul 2021 07:00  --------------------------------------------------------  IN:    Oral Fluid: 1390 mL    sodium chloride 3%: 300 mL  Total IN: 1690 mL    OUT:    Voided (mL): 2050 mL  Total OUT: 2050 mL        Net:   I&O's Detail    14 Jul 2021 07:01  -  15 Jul 2021 07:00  --------------------------------------------------------  Total NET: 420 mL      15 Jul 2021 07:01  -  16 Jul 2021 07:00  --------------------------------------------------------  Total NET: -360 mL        CAPILLARY BLOOD GLUCOSE      POCT Blood Glucose.: 102 mg/dL (16 Jul 2021 07:24)  POCT Blood Glucose.: 111 mg/dL (15 Jul 2021 21:22)  POCT Blood Glucose.: 135 mg/dL (15 Jul 2021 16:29)  POCT Blood Glucose.: 107 mg/dL (15 Jul 2021 11:53)    Physical Exam:  General: NAD; A&Ox3  Cardiac: S1/S2, RRR, no murmur, no rubs  Lungs: unlabored respirations, CTA b/l, no wheeze, no rales, no crackles  Abdomen: Soft/NT/protuberant  Sternum: Intact, no click, incision healing well with no drainage  Incisions: Incisions clean/dry/intact  Extremities: No edema b/l lower extremities    Central Venous Catheter: Yes[]  critical patient   Cain Catheter: Yes  [] , critical patient strict I&O  NGT: Yes []   EPICARDIAL WIRES:  [] YES  BOWEL MOVEMENT:  [] YES [] NO, If No, Timing since last BM:      Day #  CHEST TUBE(Left/Right):  [] YES [] NO, If yes -  AIR LEAKS:  [] YES [] NO        LABS:                        8.2<L>  8.68  )-----------( 182      ( 16 Jul 2021 06:31 )             27.4<L>                        9.0<L>  8.17  )-----------( 203      ( 15 Jul 2021 07:05 )             31.3<L>    07-16    139  |  110  |  71<HH>  ----------------------------<  99  5.2<H>   |  20  |  3.6<H>  07-15    137  |  106  |  69<HH>  ----------------------------<  110<H>  5.4<H>   |  21  |  3.5<H>    Ca    8.4<L>      16 Jul 2021 06:31  Mg     2.3     07-16    TPro  4.9<L> [6.0 - 8.0]  /  Alb  2.5<L> [3.5 - 5.2]  /  TBili  0.4 [0.2 - 1.2]  /  DBili  x   /  AST  71<H> [0 - 41]  /  ALT  102<H> [0 - 41]  /  AlkPhos  217<H> [30 - 115]  07-16    PT/INR - ( 15 Jul 2021 07:05 )   PT: ;   INR: 1.03 ratio         PTT - ( 15 Jul 2021 07:05 )  PTT:29.3 sec      RADIOLOGY & ADDITIONAL TESTS:  CXR:  EKG:  MEDICATIONS  (STANDING):  aMIOdarone    Tablet   Oral   aMIOdarone    Tablet 400 milliGRAM(s) Oral every 8 hours  aspirin  chewable 81 milliGRAM(s) Oral daily  atorvastatin 40 milliGRAM(s) Oral at bedtime  brimonidine 0.2% Ophthalmic Solution 1 Drop(s) Both EYES two times a day  carvedilol 12.5 milliGRAM(s) Oral every 12 hours  chlorhexidine 4% Liquid 1 Application(s) Topical <User Schedule>  dextrose 40% Gel 15 Gram(s) Oral once  dextrose 5%. 1000 milliLiter(s) (50 mL/Hr) IV Continuous <Continuous>  dextrose 5%. 1000 milliLiter(s) (100 mL/Hr) IV Continuous <Continuous>  dextrose 50% Injectable 25 Gram(s) IV Push once  dextrose 50% Injectable 12.5 Gram(s) IV Push once  dextrose 50% Injectable 25 Gram(s) IV Push once  dorzolamide 2% Ophthalmic Solution 1 Drop(s) Both EYES three times a day  gabapentin 100 milliGRAM(s) Oral two times a day  glucagon  Injectable 1 milliGRAM(s) IntraMuscular once  heparin   Injectable 5000 Unit(s) SubCutaneous every 8 hours  insulin glargine Injectable (LANTUS) 9 Unit(s) SubCutaneous every morning  insulin lispro (ADMELOG) corrective regimen sliding scale   SubCutaneous Before meals and at bedtime  insulin lispro Injectable (ADMELOG) 3 Unit(s) SubCutaneous three times a day before meals  iron sucrose IVPB 100 milliGRAM(s) IV Intermittent every 24 hours  latanoprost 0.005% Ophthalmic Solution 1 Drop(s) Both EYES at bedtime  sodium zirconium cyclosilicate 10 Gram(s) Oral two times a day  timolol 0.5% Solution 1 Drop(s) Both EYES two times a day    MEDICATIONS  (PRN):  LORazepam   Injectable 1 milliGRAM(s) IV Push once PRN Anxiety    HEPARIN:  [] YES [] NO   LOVENOX:[] YES [] NO   SCD's: YES b/l  GI Prophylaxis: Protonix [], Pepcid [],    Post-Op Beta-Blockers: Yes [], No[], If No, then contraindication:  Post-Op Aspirin: Yes [],  No [], If No, then contraindication:  Post-Op Statin: Yes [], No[], If No, then contraindication:  Allergies    No Known Allergies    Intolerances      Ambulation/Activity Status:    Assessment/Plan:  56y Male status-post .....  - Case and plan discussed with CTU Intensivist and CT Surgeon - Dr. Yee/Shira/Rebekah  - Continue CTU supportive care    - Continue DVT  - Continue GI prophylaxis  - Incentive Spirometry 10 times an hour  - Continue to advance physical activity as tolerated and continue PT/OT as directed  - CAD: Continue ASA, statin, BB  - Anemia secondary to:  _____ Chronic Disease    ______ Acute PO blood loss anemia,              track and trend H/H  - Bradycardia:    - LENNY (serum cr increase 0.3 over 48hr or rises 1.5 fold  over baseline  - fluid overload secondary too: ____   acute non cardiac fluid over load     - Heart failure:   ____ Acute     ___Chronic:   ____ Diastolic    _____ Systolic        ______ Combined Disastolic on Systolic  - A. Fib:  _____ none _____    Persistent   ______ Chronic   ______      Paroxysmal; Treatment -   - COPD/Hypoxia:   - DM/Glucose Control:     Social Service Disposition:     OPERATIVE PROCEDURE(s):                Probable CABG next week                    56yMale  SURGEON(s):   SUBJECTIVE ASSESSMENT: patient resting, oob in chair no complaints    Vital Signs Last 24 Hrs  T(F): 97.5 (16 Jul 2021 05:10), Max: 97.8 (15 Jul 2021 13:43)  HR: 47 (16 Jul 2021 07:26) (47 - 55)  BP: 117/73 (16 Jul 2021 05:10) (88/55 - 121/79)  RR: 18 (16 Jul 2021 05:10) (18 - 18)  SpO2: 98% (16 Jul 2021 07:26) (98% - 98%)    I&O's Detail    15 Jul 2021 07:01  -  16 Jul 2021 07:00  --------------------------------------------------------  IN:    Oral Fluid: 1390 mL    sodium chloride 3%: 300 mL  Total IN: 1690 mL    OUT:    Voided (mL): 2050 mL  Total OUT: 2050 mL    Net:   I&O's Detail    14 Jul 2021 07:01  -  15 Jul 2021 07:00  --------------------------------------------------------  Total NET: 420 mL    15 Jul 2021 07:01  -  16 Jul 2021 07:00  --------------------------------------------------------  Total NET: -360 mL      CAPILLARY BLOOD GLUCOSE      POCT Blood Glucose.: 102 mg/dL (16 Jul 2021 07:24)  POCT Blood Glucose.: 111 mg/dL (15 Jul 2021 21:22)  POCT Blood Glucose.: 135 mg/dL (15 Jul 2021 16:29)  POCT Blood Glucose.: 107 mg/dL (15 Jul 2021 11:53)    Physical Exam:  General: NAD; A&Ox3  Cardiac: S1/S2, RRR, ? murmur, no rubs  Lungs: unlabored shallow  respirations, b/l bs decreased at bases  Abdomen: Soft/NT/protuberant  Extremities:mild edema b/l lower extremities       LABS:                        8.2<L>  8.68  )-----------( 182      ( 16 Jul 2021 06:31 )             27.4<L>                        9.0<L>  8.17  )-----------( 203      ( 15 Jul 2021 07:05 )             31.3<L>    07-16    139  |  110  |  71<HH>  ----------------------------<  99  5.2<H>   |  20  |  3.6<H>  07-15    137  |  106  |  69<HH>  ----------------------------<  110<H>  5.4<H>   |  21  |  3.5<H>    Ca    8.4<L>      16 Jul 2021 06:31  Mg     2.3     07-16    TPro  4.9<L> [6.0 - 8.0]  /  Alb  2.5<L> [3.5 - 5.2]  /  TBili  0.4 [0.2 - 1.2]  /  DBili  x   /  AST  71<H> [0 - 41]  /  ALT  102<H> [0 - 41]  /  AlkPhos  217<H> [30 - 115]  07-16    PT/INR - ( 15 Jul 2021 07:05 )   PT: ;   INR: 1.03 ratio       PTT - ( 15 Jul 2021 07:05 )  PTT:29.3 sec    MEDICATIONS  (STANDING):  aMIOdarone    Tablet   Oral   aMIOdarone    Tablet 400 milliGRAM(s) Oral every 8 hours  aspirin  chewable 81 milliGRAM(s) Oral daily  atorvastatin 40 milliGRAM(s) Oral at bedtime  brimonidine 0.2% Ophthalmic Solution 1 Drop(s) Both EYES two times a day  carvedilol 12.5 milliGRAM(s) Oral every 12 hours  chlorhexidine 4% Liquid 1 Application(s) Topical <User Schedule>  dextrose 40% Gel 15 Gram(s) Oral once  dextrose 5%. 1000 milliLiter(s) (50 mL/Hr) IV Continuous <Continuous>  dextrose 5%. 1000 milliLiter(s) (100 mL/Hr) IV Continuous <Continuous>  dextrose 50% Injectable 25 Gram(s) IV Push once  dextrose 50% Injectable 12.5 Gram(s) IV Push once  dextrose 50% Injectable 25 Gram(s) IV Push once  dorzolamide 2% Ophthalmic Solution 1 Drop(s) Both EYES three times a day  gabapentin 100 milliGRAM(s) Oral two times a day  glucagon  Injectable 1 milliGRAM(s) IntraMuscular once  heparin   Injectable 5000 Unit(s) SubCutaneous every 8 hours  insulin glargine Injectable (LANTUS) 9 Unit(s) SubCutaneous every morning  insulin lispro (ADMELOG) corrective regimen sliding scale   SubCutaneous Before meals and at bedtime  insulin lispro Injectable (ADMELOG) 3 Unit(s) SubCutaneous three times a day before meals  iron sucrose IVPB 100 milliGRAM(s) IV Intermittent every 24 hours  latanoprost 0.005% Ophthalmic Solution 1 Drop(s) Both EYES at bedtime  sodium zirconium cyclosilicate 10 Gram(s) Oral two times a day  timolol 0.5% Solution 1 Drop(s) Both EYES two times a day    MEDICATIONS  (PRN):  LORazepam   Injectable 1 milliGRAM(s) IV Push once PRN Anxiety    Allergies    No Known Allergies    Intolerances      Assessment/Plan:   56M w/ PMHx HTN, HLD, CKD, T2DM on insulin, Glaucoma presented to the Ed c/o worsening LE swelling.  Endorsed SOB on exertion, along w/ increased abdominal girth. Denied orthopnea, PND, wheezing, CP, and diaphoresis. No cardiac Hx.  Patient was diagnosed with new onset Acute HFrEF exacerbation.  CXR showed cardiomegaly and blunting of CP angles. BNP [id number] on admission. Cath showed significant 3 V disease, LAD, RCA, LCx. CTS was consulted. During preop cardiac MRI patient became SOB. There was bilateral effusions with adjacent atelectasis and moderate pericardial effusion on imaging .     While hospitalized patient's cr worsened given time frame of cath, multiple low bp reading (discontinued hydralazine)and diuresis.  He was transferred toCCU for monitoring  kidney function while diuresing and pending CABG. Patient may need dialysis before CABG.     Suggest repeat TTE for EF re-evaluation, repeat CXR for Pleural effusion assessment.    Pt currently too high risk for urgent CABG, patient needs medical optimization prior to surgery.    Transaminitis track and trend  Acute on chronic systolic HF / LENNY/ Fluid overload     will follow    As per medical team

## 2021-07-16 NOTE — PROGRESS NOTE ADULT - SUBJECTIVE AND OBJECTIVE BOX
Date of Admission: 21    Interval History:    - Patient resting in bed, in NAD       HISTORY OF PRESENT ILLNESS:    HPI: 56M w/ PMHx HTN, HLD, T2DM on insulin, Glaucoma. He presents to the Ed c/o worsening LE swelling and pain for the past 1 month. Endorses SOB on exertion, along w/ increased abdominal girth. Denies orthopnea, PND, wheeze, CP, diaphoresis. No cardiac Hx.    Patient reports about two months ago he was able to walk and climb stairs without any limitations. For the past couple weeks he developed a BLE edema. Patient denies c/p, palpitations, headaches, bleeding, LH, dizziness, orthopnea, PND, LOC, cough, feeling bloated, N/V/D.     PAST MEDICAL & SURGICAL HISTORY:     HTN (hypertension)  HLD (hyperlipidemia)  DM (diabetes mellitus)  Glaucoma  No significant past surgical history        FAMILY HISTORY:    Mother:  77 HTN  Father:  at 55 of CKD       SOCIAL HISTORY:      Smokes about 3 cigarettes a week, social alcohol drinking, denies drug use    Allergies    No Known Allergies    Intolerances      PHYSICAL EXAM:    General Appearance: well appearing, normal for age and gender. 	  Neck: normal JVP, no bruit.   Cardiovascular: regular rate and rhythm S1 S2, No JVD, No murmurs, No edema  Respiratory: Lungs clear to auscultation	  Psychiatry: Alert and oriented x 3, Mood & affect appropriate  Gastrointestinal:  Soft, Non-tender  Musculoskeletal/ extremities: Normal range of motion, No clubbing, cyanosis or edema  Vascular: Peripheral pulses palpable 2+ bilaterally      PREVIOUS DIAGNOSTIC TESTING:      TTE 21    Summary:   1. Moderately decreased global left ventricular systolic function.   2. Multiple left ventricular regional wall motion abnormalities exist. See wall motion findings.   3. LV Ejection Fraction by Garcia's Method with a biplane EF of 34 %.   4. Moderately increased LV wall thickness.   5. Normal left ventricular internal cavity size.   6. Mild to moderately enlarged left atrium.   7. Normal right atrial size.   8. Moderate pericardial effusion.   9. Mild to moderate mitral valve regurgitation.  10. Structurally normal mitral valve, with normal leaflet excursion.  11. Moderate tricuspid regurgitation.  12. Mild aortic regurgitation.  13. Normal trileaflet aortic valve with normal opening.  14. Mild pulmonic valve regurgitation.  15. Estimated pulmonary artery systolic pressure is 52.0 mmHg assuming a right atrial pressure of 10 mmHg, which is consistent with moderate pulmonary hypertension.  	    Home Medications:  atorvastatin 40 mg oral tablet: 1 tab(s) orally once a day (2021 04:46)  BASAGLAR 100 UNIT/ML KWIKPEN:  (:46)  brimonidine 0.2% ophthalmic solution: 1 drop(s) to each affected eye 2 times a day (2021 04:46)  dorzolamide-timolol 2%-0.5% preservative-free ophthalmic solution: 1 drop(s) to each affected eye 2 times a day (:46)  furosemide 20 mg oral tablet: 1 tab(s) orally once a day (:46)  latanoprost 0.005% ophthalmic solution: 1 drop(s) to each affected eye once a day (in the evening) (:46)  lisinopril 20 mg oral tablet: 1 tab(s) orally once a day (:46)  pioglitazone-metformin 15 mg-850 mg oral tablet: 1 tab(s) orally 2 times a day (:46)  Rhopressa 0.02% ophthalmic solution: 1 drop(s) to each affected eye once a day (in the evening) (:46)    MEDICATIONS  (STANDING):  atorvastatin 40 milliGRAM(s) Oral at bedtime  brimonidine 0.2% Ophthalmic Solution 1 Drop(s) Both EYES two times a day  chlorhexidine 4% Liquid 1 Application(s) Topical <User Schedule>  dextrose 40% Gel 15 Gram(s) Oral once  dextrose 5%. 1000 milliLiter(s) (50 mL/Hr) IV Continuous <Continuous>  dextrose 5%. 1000 milliLiter(s) (100 mL/Hr) IV Continuous <Continuous>  dextrose 50% Injectable 25 Gram(s) IV Push once  dextrose 50% Injectable 12.5 Gram(s) IV Push once  dextrose 50% Injectable 25 Gram(s) IV Push once  dorzolamide 2% Ophthalmic Solution 1 Drop(s) Both EYES three times a day  enoxaparin Injectable 40 milliGRAM(s) SubCutaneous daily  gabapentin 200 milliGRAM(s) Oral two times a day  glucagon  Injectable 1 milliGRAM(s) IntraMuscular once  insulin glargine Injectable (LANTUS) 10 Unit(s) SubCutaneous every morning  insulin lispro (ADMELOG) corrective regimen sliding scale   SubCutaneous three times a day before meals  insulin lispro Injectable (ADMELOG) 3 Unit(s) SubCutaneous three times a day before meals  latanoprost 0.005% Ophthalmic Solution 1 Drop(s) Both EYES at bedtime  NIFEdipine XL 60 milliGRAM(s) Oral daily  timolol 0.5% Solution 1 Drop(s) Both EYES two times a day    MEDICATIONS  (PRN):

## 2021-07-16 NOTE — PROGRESS NOTE ADULT - ASSESSMENT
HPI: 56M w/ PMHx HTN, HLD, T2DM on insulin, Glaucoma presents to the Ed c/o worsening LE swelling and pain for the past 1 month. Endorses SOB on exertion, along w/ increased abdominal girth. Denies orthopnea, PND, wheeze, CP, diaphoresis. No cardiac Hx. Seen by HF team and cardiology.    #New Onset Acute HFrEF exacerbation  #HFrEF 2/2 uncontrolled HTN  #Moderate pericardial effusion  #Uncontrolled HTN  -no prior cardiac hx  -CXR - cardiomegaly and blunting of CP angles  -BNP 95504 on admission  -trops 0.10 -> 0.11 -> 0.11 -> 0.14 -> 0.09  -Echo - EF 34%, moderate pericardial effusion  -Bumex w/ hypertonic saline   -I<O, daily weights  - s/p  cardiac cath today - Significant 3V disease. CTS on board   -per HF team, d/c metoprolol succinate 25mg qd and switch to Carvedilol 12.5 BID ,and cont isosorbide 20mg TID.   -low BP d/c cont hydralazine 25mg TID 7/15         #LENNY on CKD, Hyperkalemia  #?ROSENDO s/p cath, Cardiorenal Syndrome  -Cr 2.4->2.7->2.7->2.5->2.1 (Baseline 1.7 3/2021)  -oliguria   -Bumex 2mg BID +3% NS  -Lokelma 10 mg BID , low potassium diet , nephro rec   -FeNa = 2.8%, FeUrea 40.2% => likely intrinsic  -UA 7/3 significant for moderate blood, 300 protein, protein/Cr ratio 8.1  -US KUB - no hydro b/l, right renal cysts  -cont to monitor I&O for oliguria/anuria  -SPEP - decreased protein, C3 wnl, C4 wnl  -incr kappa, lambda light chains. Ratio 1.97  -f/u UPEP, SIF, UIF, iron studies, MARLIN      #HLD  -cont atorvastatin    #DM2 w/ peripheral neuropathy  -FS: 171, 143, 250, 98  -cont Lantus, ISS  -on lantus, metformin-pioglitazone at home  -hold pioglitazone now and on d/c - can contribute to CHF exacerbation  -on gabapentin 200mg BID  -duplex negative  -arterial duplex negative    #Glaucoma  -cont eye drops    PT/REHAB: 50ft x2 w/o assistance with PT  ACTIVITY: Activity - Increase As Tolerated  DVT PPX: heparin   GI PPX:  Not indicated  DIET: Diet, DASH/TLC  CODE: Full code  Disposition: from home;

## 2021-07-16 NOTE — CONSULT NOTE ADULT - ATTENDING COMMENTS
Seen / examined and above reviewed.    Multivessel CAD  ICM  Chronic Systolic CHF    Multiple comorbidities as above.  Notably, CKD.    Breathing comfortable.  Bradycardic.  Occasional low BP's.  Mild volume overload.    LENNY / likely ROSENDO post cath.  Diuretics held.    - Stop Amiodarone.  - Cont Coreg.  - ASA (hold P2Y12 blockers / pending CABG).  - Monitor fluid balance.  If positive, resume diuretic.
The patient is a 56-year-old gentleman we were called to the cardiac catheterization laboratory to evaluate by Dr. Jeffery Singh, following his cardiac catheterization that showed severe diffuse triple-vessel diabetic coronary artery disease with a very low ejection fraction.    The patient had been complaining of worsening shortness of breath with dyspnea on exertion and progressive lower extremity edema over the last 3 months and this ultimately progressed with anasarca and abdominal distention and ascites and he was admitted with class IV decompensated acute on chronic systolic congestive heart failure.    The patient has denied any history of chest pain or anginal symptoms.    At the time of his admission he was also in acute renal failure with a creatinine as high as 2.7 and the patient is known to have multiple complications of his poorly controlled non-insulin-dependent diabetes.    He has known retinopathy and is considered legally blind in his right eye.  He has severe peripheral neuropathy and also peripheral artery disease.    He has a known diagnosis of diabetic kidney disease and he also has proteinuria which is a poor prognostic sign.  His baseline creatinine appears to be more than 2.    Currently the patient says that since his admission he appears to be about 75 to 80% better and it took about 2 weeks to optimize him before he could be taken to the cardiac catheterization laboratory to have the work-up.    I had a chance to review the patient's cardiac catheterization which has been described above and it does show a chronically occluded right coronary artery with a very poor distal target.  He also has diffuse disease in the circumflex with poor distal targets.  His main artery for revascularization appears to be the LAD which has a significant proximal lesion.    I also had a chance to review his echocardiogram which shows a very low ejection fraction and even though the report mentioned an ejection fraction of about 30% on personal review it was considerably less and closer to 20%.    The patient will benefit from revascularization but I will need to discuss his care with a heart team approach and I have already put out a call to the heart failure cardiologist, Dr. Cantu who has been following the patient.  We will discuss his condition in a multidisciplinary setting and come up with the optimal plan for the patient.    In the meantime we will continue his preoperative work-up in preparation for high risk surgery.    I explained all this including the options of surgery to the patient and he wants to think about his options before he agrees to any intervention.  More than 50% of my time was spent answering questions, counseling and coordinating his care.  By the time I left the bedside he was happy with the visit and we will continue to follow him as a team.
Seen / examined yesterday evening on rounds.  Above reviewed.    No cardiac history.  Multiple risk factors / comorbidities as above.    Subacute decompensated CHF (volume overload).  Uncontrolled HTN  LENNY / CKD?    Breathing comfortable when evaluated.  Hemodynamics stable / hypertensive.    CXR: Cardiomegaly / PVC.  1+ LE edema.  BNP markedly elevated.    Mild / stable troponin elevation.  Consistent with CHF / CKD.  No clinical MI.  High risk for underlying CAD.    EKG: SR, possible lead reversal.    RECOMMEND:  - Cont IV Lasix until euvolemic.  - Monitor renal function.  - ECHO  - Repeat EKG  - Ischemic evaluation when compensated (cath v. MPI depending on ECHO results / renal function).

## 2021-07-16 NOTE — PROGRESS NOTE ADULT - ATTENDING COMMENTS
55 y/o man with PMH of HTN, CKD from 2020, DM II on insulin with peripheral neuropathy and Glaucoma presented to the ED c/o worsening LE swelling and pain for the past 1 month.    Newly diagnosed acute HFrEF  - moderate pericardial effusion and pulm HTN  - was on IV lasix for diuresis and then held for rising Cr  - heart failure f/u appreciated  - now on bumex 2mg bid and hypertonic saline for volume overload, hepatic congestion and CRS  - on coreg and isosorbide  - hydralazine held due to hypotension  - 3 vessel CAD on cardiac cath 7/12 - for CABG - to be scheduled by CT surgery next week  - preop per CT surgery  - cardiac MRI pending  - monitor BMP, Mg bid while on IV bumex  - daily wts, I's and O's, low sodium diet  - CCU upgrade requested by cardiology for further monitoring in perioop period    HTN   - avoid hypotension     LENNY over CKD 3  - CKD progressed over the past year  - nephrotic range proteinuria now - likely due to DM  - no hydronephrosis on US  - Cr initially improved with holding diuretic but now increased - likely due to CRS  - pt also had cardiac cath on Monday  - diuresis with bumex  - BMP bid  - urine output improving with diuresis  - renal following  - avoid nephrotoxic agents    DM type 2 with peripheral neuropathy   - On Lantus and Metformin-Pioglitazone at home  -  A1c 5.8  - continue insulin inpt - dose increased and monitor FS  - discontinue pioglitazone on discharge - metformin will also need to be stopped if renal function does not improve by discharge  -on gabapentin 200 mg BID renally adjusted  - duplex negative  - arterial duplex with moderate atherosclerotic disease but normal WANDA    Glaucoma  - on Eye drops     DVT prophylaxis -   heparin SQ       PROGRESS NOTE HANDOFF    Pending: Cardiac MRI 7/16, improvement in LENNY, CABG next week    pt aware of plan of care    Disposition: home

## 2021-07-17 LAB
ALBUMIN SERPL ELPH-MCNC: 2.8 G/DL — LOW (ref 3.5–5.2)
ALP SERPL-CCNC: 288 U/L — HIGH (ref 30–115)
ALT FLD-CCNC: 121 U/L — HIGH (ref 0–41)
ANION GAP SERPL CALC-SCNC: 12 MMOL/L — SIGNIFICANT CHANGE UP (ref 7–14)
AST SERPL-CCNC: 63 U/L — HIGH (ref 0–41)
BASOPHILS # BLD AUTO: 0.04 K/UL — SIGNIFICANT CHANGE UP (ref 0–0.2)
BASOPHILS NFR BLD AUTO: 0.4 % — SIGNIFICANT CHANGE UP (ref 0–1)
BILIRUB SERPL-MCNC: 0.4 MG/DL — SIGNIFICANT CHANGE UP (ref 0.2–1.2)
BUN SERPL-MCNC: 79 MG/DL — CRITICAL HIGH (ref 10–20)
CALCIUM SERPL-MCNC: 8.3 MG/DL — LOW (ref 8.5–10.1)
CHLORIDE SERPL-SCNC: 105 MMOL/L — SIGNIFICANT CHANGE UP (ref 98–110)
CO2 SERPL-SCNC: 21 MMOL/L — SIGNIFICANT CHANGE UP (ref 17–32)
CREAT SERPL-MCNC: 3.8 MG/DL — HIGH (ref 0.7–1.5)
EOSINOPHIL # BLD AUTO: 0.44 K/UL — SIGNIFICANT CHANGE UP (ref 0–0.7)
EOSINOPHIL NFR BLD AUTO: 4.9 % — SIGNIFICANT CHANGE UP (ref 0–8)
GLUCOSE BLDC GLUCOMTR-MCNC: 137 MG/DL — HIGH (ref 70–99)
GLUCOSE BLDC GLUCOMTR-MCNC: 146 MG/DL — HIGH (ref 70–99)
GLUCOSE BLDC GLUCOMTR-MCNC: 183 MG/DL — HIGH (ref 70–99)
GLUCOSE BLDC GLUCOMTR-MCNC: 81 MG/DL — SIGNIFICANT CHANGE UP (ref 70–99)
GLUCOSE SERPL-MCNC: 111 MG/DL — HIGH (ref 70–99)
HCT VFR BLD CALC: 31.5 % — LOW (ref 42–52)
HGB BLD-MCNC: 9.5 G/DL — LOW (ref 14–18)
IMM GRANULOCYTES NFR BLD AUTO: 0.6 % — HIGH (ref 0.1–0.3)
LYMPHOCYTES # BLD AUTO: 1.15 K/UL — LOW (ref 1.2–3.4)
LYMPHOCYTES # BLD AUTO: 12.8 % — LOW (ref 20.5–51.1)
MAGNESIUM SERPL-MCNC: 2.3 MG/DL — SIGNIFICANT CHANGE UP (ref 1.8–2.4)
MCHC RBC-ENTMCNC: 24.2 PG — LOW (ref 27–31)
MCHC RBC-ENTMCNC: 30.2 G/DL — LOW (ref 32–37)
MCV RBC AUTO: 80.2 FL — SIGNIFICANT CHANGE UP (ref 80–94)
MONOCYTES # BLD AUTO: 0.92 K/UL — HIGH (ref 0.1–0.6)
MONOCYTES NFR BLD AUTO: 10.3 % — HIGH (ref 1.7–9.3)
MRSA PCR RESULT.: NEGATIVE — SIGNIFICANT CHANGE UP
NEUTROPHILS # BLD AUTO: 6.36 K/UL — SIGNIFICANT CHANGE UP (ref 1.4–6.5)
NEUTROPHILS NFR BLD AUTO: 71 % — SIGNIFICANT CHANGE UP (ref 42.2–75.2)
NRBC # BLD: 0 /100 WBCS — SIGNIFICANT CHANGE UP (ref 0–0)
PLATELET # BLD AUTO: 226 K/UL — SIGNIFICANT CHANGE UP (ref 130–400)
POTASSIUM SERPL-MCNC: 5.1 MMOL/L — HIGH (ref 3.5–5)
POTASSIUM SERPL-SCNC: 5.1 MMOL/L — HIGH (ref 3.5–5)
PROT SERPL-MCNC: 5.5 G/DL — LOW (ref 6–8)
RBC # BLD: 3.93 M/UL — LOW (ref 4.7–6.1)
RBC # FLD: 14.2 % — SIGNIFICANT CHANGE UP (ref 11.5–14.5)
SODIUM SERPL-SCNC: 138 MMOL/L — SIGNIFICANT CHANGE UP (ref 135–146)
WBC # BLD: 8.96 K/UL — SIGNIFICANT CHANGE UP (ref 4.8–10.8)
WBC # FLD AUTO: 8.96 K/UL — SIGNIFICANT CHANGE UP (ref 4.8–10.8)

## 2021-07-17 PROCEDURE — 99233 SBSQ HOSP IP/OBS HIGH 50: CPT

## 2021-07-17 PROCEDURE — 71045 X-RAY EXAM CHEST 1 VIEW: CPT | Mod: 26

## 2021-07-17 RX ORDER — SODIUM ZIRCONIUM CYCLOSILICATE 10 G/10G
10 POWDER, FOR SUSPENSION ORAL
Refills: 0 | Status: DISCONTINUED | OUTPATIENT
Start: 2021-07-17 | End: 2021-07-19

## 2021-07-17 RX ORDER — BUMETANIDE 0.25 MG/ML
2 INJECTION INTRAMUSCULAR; INTRAVENOUS ONCE
Refills: 0 | Status: COMPLETED | OUTPATIENT
Start: 2021-07-17 | End: 2021-07-17

## 2021-07-17 RX ORDER — HYDRALAZINE HCL 50 MG
10 TABLET ORAL EVERY 8 HOURS
Refills: 0 | Status: DISCONTINUED | OUTPATIENT
Start: 2021-07-17 | End: 2021-07-21

## 2021-07-17 RX ORDER — ISOSORBIDE DINITRATE 5 MG/1
10 TABLET ORAL THREE TIMES A DAY
Refills: 0 | Status: DISCONTINUED | OUTPATIENT
Start: 2021-07-17 | End: 2021-07-21

## 2021-07-17 RX ORDER — ERYTHROPOIETIN 10000 [IU]/ML
10000 INJECTION, SOLUTION INTRAVENOUS; SUBCUTANEOUS
Refills: 0 | Status: DISCONTINUED | OUTPATIENT
Start: 2021-07-17 | End: 2021-07-25

## 2021-07-17 RX ADMIN — HEPARIN SODIUM 5000 UNIT(S): 5000 INJECTION INTRAVENOUS; SUBCUTANEOUS at 05:55

## 2021-07-17 RX ADMIN — SODIUM ZIRCONIUM CYCLOSILICATE 10 GRAM(S): 10 POWDER, FOR SUSPENSION ORAL at 12:04

## 2021-07-17 RX ADMIN — BUMETANIDE 2 MILLIGRAM(S): 0.25 INJECTION INTRAMUSCULAR; INTRAVENOUS at 18:52

## 2021-07-17 RX ADMIN — Medication 1 DROP(S): at 05:55

## 2021-07-17 RX ADMIN — BRIMONIDINE TARTRATE 1 DROP(S): 2 SOLUTION/ DROPS OPHTHALMIC at 05:55

## 2021-07-17 RX ADMIN — DORZOLAMIDE HYDROCHLORIDE 1 DROP(S): 20 SOLUTION/ DROPS OPHTHALMIC at 05:56

## 2021-07-17 RX ADMIN — GABAPENTIN 100 MILLIGRAM(S): 400 CAPSULE ORAL at 05:55

## 2021-07-17 RX ADMIN — IRON SUCROSE 210 MILLIGRAM(S): 20 INJECTION, SOLUTION INTRAVENOUS at 11:03

## 2021-07-17 RX ADMIN — CHLORHEXIDINE GLUCONATE 1 APPLICATION(S): 213 SOLUTION TOPICAL at 05:56

## 2021-07-17 RX ADMIN — ATORVASTATIN CALCIUM 40 MILLIGRAM(S): 80 TABLET, FILM COATED ORAL at 21:11

## 2021-07-17 RX ADMIN — LATANOPROST 1 DROP(S): 0.05 SOLUTION/ DROPS OPHTHALMIC; TOPICAL at 21:10

## 2021-07-17 RX ADMIN — HEPARIN SODIUM 5000 UNIT(S): 5000 INJECTION INTRAVENOUS; SUBCUTANEOUS at 14:10

## 2021-07-17 RX ADMIN — Medication 1 DROP(S): at 17:41

## 2021-07-17 RX ADMIN — BRIMONIDINE TARTRATE 1 DROP(S): 2 SOLUTION/ DROPS OPHTHALMIC at 17:40

## 2021-07-17 RX ADMIN — Medication 81 MILLIGRAM(S): at 12:04

## 2021-07-17 RX ADMIN — Medication 2: at 21:19

## 2021-07-17 RX ADMIN — ERYTHROPOIETIN 10000 UNIT(S): 10000 INJECTION, SOLUTION INTRAVENOUS; SUBCUTANEOUS at 13:30

## 2021-07-17 RX ADMIN — SODIUM ZIRCONIUM CYCLOSILICATE 10 GRAM(S): 10 POWDER, FOR SUSPENSION ORAL at 17:43

## 2021-07-17 RX ADMIN — DORZOLAMIDE HYDROCHLORIDE 1 DROP(S): 20 SOLUTION/ DROPS OPHTHALMIC at 21:11

## 2021-07-17 RX ADMIN — HEPARIN SODIUM 5000 UNIT(S): 5000 INJECTION INTRAVENOUS; SUBCUTANEOUS at 21:11

## 2021-07-17 RX ADMIN — GABAPENTIN 100 MILLIGRAM(S): 400 CAPSULE ORAL at 17:40

## 2021-07-17 RX ADMIN — DORZOLAMIDE HYDROCHLORIDE 1 DROP(S): 20 SOLUTION/ DROPS OPHTHALMIC at 14:10

## 2021-07-17 RX ADMIN — CARVEDILOL PHOSPHATE 12.5 MILLIGRAM(S): 80 CAPSULE, EXTENDED RELEASE ORAL at 17:40

## 2021-07-17 RX ADMIN — Medication 10 MILLIGRAM(S): at 21:10

## 2021-07-17 NOTE — PROGRESS NOTE ADULT - ASSESSMENT
IMPRESSION:  CAD - 3V disease, awaiting   Ischemic cardiomyopathy - patient in fluid overload clinically  Bradycardia  LENNY - worsening (ROSENDO vs hypoperfusion from hypotension?)  Elevated LFTs  DM  HTN   HLD    PLAN:    CNS:    HEENT: Oral care    PULMONARY:    - HOB @ 45 degrees    CARDIOVASCULAR:  - Continue CCU monitoring  - Keep on holding Hydralazine and Isordil; will monitor BP and adjust medications accordingly  - Continue aspirin 81mg daily, Atorvastatin 40mg daily  - Continue carvedilol 12.5mg q12h  - Discontinue amiodarone  - Keep off diuretics for now; check strict I&Os; keep I<Os; if balance is positive, will give diuretics challenge  - Monitor renal function, electrolytes, daily weight and volume status   - Couldn't complete MRI due to orthopnea  - F/U with CT surgery; plan for CABG next week    GI: GI prophylaxis.  Feeding     RENAL:  - Follow up renal function and lytes.  Correct as needed  - Avoid nephrotoxic agents  - Nephrology following    INFECTIOUS DISEASE: Monitor VS    HEMATOLOGICAL:    - On iron supplementation  - Monitor cbc; watch for signs and symptoms of bleeding  - DVT prophylaxis.    ENDOCRINE:  Follow up FS.  Insulin protocol IMPRESSION:  CAD - 3V disease, awaiting   Ischemic cardiomyopathy - patient in fluid overload clinically  Bradycardia  LENNY - worsening (ROSENDO vs hypoperfusion from hypotension?)  Elevated LFTs  DM  HTN   HLD    PLAN:    CNS:    HEENT: Oral care    PULMONARY:    - HOB @ 45 degrees    CARDIOVASCULAR:  - Continue CCU monitoring  - Keep on holding Hydralazine and Isordil; will monitor BP and adjust medications accordingly  - Continue aspirin 81mg daily, Atorvastatin 40mg daily  - Continue carvedilol 12.5mg q12h  - Discontinue amiodarone  - Keep off diuretics for now; check strict I&Os; keep I<Os; if balance is positive, will give diuretics challenge  - Monitor renal function, electrolytes, daily weight and volume status   - Couldn't complete MRI due to orthopnea  - F/U with CT surgery; plan for CABG next week    GI: GI prophylaxis.  Feeding     RENAL:  - Follow up renal function and lytes.  Correct as needed  - Work-up as recommended by nephrology  - Avoid nephrotoxic agents  - Nephrology following    INFECTIOUS DISEASE: Monitor VS    HEMATOLOGICAL:    - On iron supplementation  - Monitor cbc; watch for signs and symptoms of bleeding  - DVT prophylaxis.    ENDOCRINE:  Follow up FS.  Insulin protocol

## 2021-07-17 NOTE — PROGRESS NOTE ADULT - ASSESSMENT
56M w/ PMHx HTN, HLD, T2DM on insulin x>10 years, retinopathy, Glaucoma presents to the Ed c/o worsening LE swelling and pain for the past 1 month. Endorses SOB on exertion, along w/ increased abdominal girth.    # LENNY / CKD 3b / CRS / ATN/hypotension / time frame also c/w contrast induced nephropathy - worsening creat due to low BP   # Hyperkalemia / lokelma 10 q1 2  # creatinine stable   # Nephrotic range proteinuria - needs SPEP UPEP SIF UIF Kappa/lambda 1.9, MARLIN c3c4 wnl; likely due to DM  - last phos level noted, does not need binder, repeat phos level  - renal ultrasound noted w/o hydronephrosis, right renal cysts  # BP improved   # Normocytic anemia, iron deficient  0n Venofer, start Procrit 10,000 sq qweek  # ADHF, low EF - s/p cardiac cath, appreciate CT surgery f/u regarding CABG  # cardiology notes appreciated   # no acute indication for RRT   Will follow

## 2021-07-17 NOTE — PROGRESS NOTE ADULT - SUBJECTIVE AND OBJECTIVE BOX
PATIENT:  GILLIAN MENDOZA  [id number]      CHIEF COMPLAINT:  Patient is a 56y old  Male presenting on 21 for CHF exacerbation. Currently in ICU in preparation for CABG.         HPI: 56M w/ PMHx as above presents to the Ed c/o worsening LE swelling and pain for the past 1 month. Endorses SOB on exertion, along w/ increased abdominal girth. Denies orthopnea, PND, wheeze, CP, diaphoresis. No cardiac Hx.        INTERVAL HISTORY/OVERNIGHT EVENTS:  Pt has no complaints, but reports feeling anxious because of the upcoming surgery.     MEDICATIONS:  MEDICATIONS  (STANDING):  aMIOdarone    Tablet 200 milliGRAM(s) Oral daily  aMIOdarone    Tablet   Oral   aspirin  chewable 81 milliGRAM(s) Oral daily  atorvastatin 40 milliGRAM(s) Oral at bedtime  brimonidine 0.2% Ophthalmic Solution 1 Drop(s) Both EYES two times a day  carvedilol 12.5 milliGRAM(s) Oral every 12 hours  chlorhexidine 4% Liquid 1 Application(s) Topical <User Schedule>  dextrose 40% Gel 15 Gram(s) Oral once  dextrose 5%. 1000 milliLiter(s) (50 mL/Hr) IV Continuous <Continuous>  dextrose 5%. 1000 milliLiter(s) (100 mL/Hr) IV Continuous <Continuous>  dextrose 50% Injectable 25 Gram(s) IV Push once  dextrose 50% Injectable 12.5 Gram(s) IV Push once  dextrose 50% Injectable 25 Gram(s) IV Push once  dorzolamide 2% Ophthalmic Solution 1 Drop(s) Both EYES three times a day  gabapentin 100 milliGRAM(s) Oral two times a day  glucagon  Injectable 1 milliGRAM(s) IntraMuscular once  heparin   Injectable 5000 Unit(s) SubCutaneous every 8 hours  insulin glargine Injectable (LANTUS) 9 Unit(s) SubCutaneous every morning  insulin lispro (ADMELOG) corrective regimen sliding scale   SubCutaneous Before meals and at bedtime  insulin lispro Injectable (ADMELOG) 3 Unit(s) SubCutaneous three times a day before meals  iron sucrose IVPB 100 milliGRAM(s) IV Intermittent every 24 hours  latanoprost 0.005% Ophthalmic Solution 1 Drop(s) Both EYES at bedtime  timolol 0.5% Solution 1 Drop(s) Both EYES two times a day    MEDICATIONS  (PRN):  LORazepam   Injectable 1 milliGRAM(s) IV Push once PRN Anxiety      ALLERGIES:  Allergies    No Known Allergies    Intolerances        OBJECTIVE:  ICU Vital Signs Last 24 Hrs  T(C): 36.7 (2021 04:00), Max: 37.1 (2021 21:15)  T(F): 98.1 (2021 04:00), Max: 98.7 (2021 21:15)  HR: 50 (2021 06:00) (46 - 50)  BP: 145/91 (2021 06:00) (97/62 - 155/90)  BP(mean): 109 (2021 06:00) (92 - 119)  ABP: --  ABP(mean): --  RR: 24 (2021 06:00) (18 - 26)  SpO2: 100% (2021 06:00) (96% - 100%)      Adult Advanced Hemodynamics Last 24 Hrs  CVP(mm Hg): --  CVP(cm H2O): --  CO: --  CI: --  PA: --  PA(mean): --  PCWP: --  SVR: --  SVRI: --  PVR: --  PVRI: --  CAPILLARY BLOOD GLUCOSE      POCT Blood Glucose.: 185 mg/dL (2021 22:56)  POCT Blood Glucose.: 170 mg/dL (2021 21:16)  POCT Blood Glucose.: 89 mg/dL (2021 16:55)  POCT Blood Glucose.: 159 mg/dL (2021 11:32)    CAPILLARY BLOOD GLUCOSE      POCT Blood Glucose.: 185 mg/dL (2021 22:56)    I&O's Summary    2021 07:01  -  2021 07:00  --------------------------------------------------------  IN: 760 mL / OUT: 1050 mL / NET: -290 mL      Daily Height in cm: 160.02 (2021 21:15)    Daily Weight in k.6 (2021 06:00)    PHYSICAL EXAMINATION:  General: WN/WD NAD  HEENT: PERRLA, EOMI, moist mucous membranes  Neurology: A&Ox3, nonfocal, PETERS x 4  Respiratory: CTA B/L, normal respiratory effort, no wheezes, crackles, rales  CV: RRR, S1S2, no murmurs, rubs or gallops  Abdominal: Soft, NT, ND +BS, Last BM  Extremities: No edema, + peripheral pulses  Incisions:   Tubes:    LABS:                          9.5    8.96  )-----------( 226      ( 2021 04:48 )             31.5         138  |  105  |  79<HH>  ----------------------------<  111<H>  5.1<H>   |  21  |  3.8<H>    Ca    8.3<L>      2021 04:48  Mg     2.3         TPro  5.5<L>  /  Alb  2.8<L>  /  TBili  0.4  /  DBili  x   /  AST  63<H>  /  ALT  121<H>  /  AlkPhos  288<H>      LIVER FUNCTIONS - ( 2021 04:48 )  Alb: 2.8 g/dL / Pro: 5.5 g/dL / ALK PHOS: 288 U/L / ALT: 121 U/L / AST: 63 U/L / GGT: x                       TELEMETRY: no major events on telemetry     EKG:      PATIENT:  GILLIAN MENDOZA  [id number]      CHIEF COMPLAINT:  Patient is a 56y old  Male presenting on 21 for CHF exacerbation. Currently in ICU for medical optimization in preparation for CABG.         HPI: 56M w/ PMHx as above presents to the Ed c/o worsening LE swelling and pain for the past 1 month. Endorses SOB on exertion, along w/ increased abdominal girth. Denies orthopnea, PND, wheeze, CP, diaphoresis. No cardiac Hx.        INTERVAL HISTORY/OVERNIGHT EVENTS:  Pt has no complaints, but reports feeling anxious because of the upcoming surgery.     MEDICATIONS:  MEDICATIONS  (STANDING):  aMIOdarone    Tablet 200 milliGRAM(s) Oral daily  aMIOdarone    Tablet   Oral   aspirin  chewable 81 milliGRAM(s) Oral daily  atorvastatin 40 milliGRAM(s) Oral at bedtime  brimonidine 0.2% Ophthalmic Solution 1 Drop(s) Both EYES two times a day  carvedilol 12.5 milliGRAM(s) Oral every 12 hours  chlorhexidine 4% Liquid 1 Application(s) Topical <User Schedule>  dextrose 40% Gel 15 Gram(s) Oral once  dextrose 5%. 1000 milliLiter(s) (50 mL/Hr) IV Continuous <Continuous>  dextrose 5%. 1000 milliLiter(s) (100 mL/Hr) IV Continuous <Continuous>  dextrose 50% Injectable 25 Gram(s) IV Push once  dextrose 50% Injectable 12.5 Gram(s) IV Push once  dextrose 50% Injectable 25 Gram(s) IV Push once  dorzolamide 2% Ophthalmic Solution 1 Drop(s) Both EYES three times a day  gabapentin 100 milliGRAM(s) Oral two times a day  glucagon  Injectable 1 milliGRAM(s) IntraMuscular once  heparin   Injectable 5000 Unit(s) SubCutaneous every 8 hours  insulin glargine Injectable (LANTUS) 9 Unit(s) SubCutaneous every morning  insulin lispro (ADMELOG) corrective regimen sliding scale   SubCutaneous Before meals and at bedtime  insulin lispro Injectable (ADMELOG) 3 Unit(s) SubCutaneous three times a day before meals  iron sucrose IVPB 100 milliGRAM(s) IV Intermittent every 24 hours  latanoprost 0.005% Ophthalmic Solution 1 Drop(s) Both EYES at bedtime  timolol 0.5% Solution 1 Drop(s) Both EYES two times a day    MEDICATIONS  (PRN):  LORazepam   Injectable 1 milliGRAM(s) IV Push once PRN Anxiety      ALLERGIES:  Allergies    No Known Allergies    Intolerances        OBJECTIVE:  ICU Vital Signs Last 24 Hrs  T(C): 36.7 (2021 04:00), Max: 37.1 (2021 21:15)  T(F): 98.1 (2021 04:00), Max: 98.7 (2021 21:15)  HR: 50 (2021 06:00) (46 - 50)  BP: 145/91 (2021 06:00) (97/62 - 155/90)  BP(mean): 109 (2021 06:00) (92 - 119)  ABP: --  ABP(mean): --  RR: 24 (2021 06:00) (18 - 26)  SpO2: 100% (2021 06:00) (96% - 100%)      Adult Advanced Hemodynamics Last 24 Hrs  CVP(mm Hg): --  CVP(cm H2O): --  CO: --  CI: --  PA: --  PA(mean): --  PCWP: --  SVR: --  SVRI: --  PVR: --  PVRI: --  CAPILLARY BLOOD GLUCOSE      POCT Blood Glucose.: 185 mg/dL (2021 22:56)  POCT Blood Glucose.: 170 mg/dL (2021 21:16)  POCT Blood Glucose.: 89 mg/dL (2021 16:55)  POCT Blood Glucose.: 159 mg/dL (2021 11:32)    CAPILLARY BLOOD GLUCOSE      POCT Blood Glucose.: 185 mg/dL (2021 22:56)    I&O's Summary    2021 07:01  -  2021 07:00  --------------------------------------------------------  IN: 760 mL / OUT: 1050 mL / NET: -290 mL      Daily Height in cm: 160.02 (2021 21:15)    Daily Weight in k.6 (2021 06:00)    PHYSICAL EXAMINATION:  General:  NAD  Neck: bilat JVD with pulsations   Neurology: A&Ox3, nonfocal, PETERS x 4  Respiratory: CTA B/L, normal respiratory effort, no wheezes, crackles, rales  CV: RRR, S1S2, no murmurs, rubs or gallops  Abdominal: Soft, NT, ND +BS, Last BM  Extremities: +1 pitting edema       LABS:                          9.5    8.96  )-----------( 226      ( 2021 04:48 )             31.5         138  |  105  |  79<HH>  ----------------------------<  111<H>  5.1<H>   |  21  |  3.8<H>    Ca    8.3<L>      2021 04:48  Mg     2.3         TPro  5.5<L>  /  Alb  2.8<L>  /  TBili  0.4  /  DBili  x   /  AST  63<H>  /  ALT  121<H>  /  AlkPhos  288<H>      LIVER FUNCTIONS - ( 2021 04:48 )  Alb: 2.8 g/dL / Pro: 5.5 g/dL / ALK PHOS: 288 U/L / ALT: 121 U/L / AST: 63 U/L / GGT: x                       TELEMETRY: no major events on telemetry     EKG:

## 2021-07-17 NOTE — PROGRESS NOTE ADULT - SUBJECTIVE AND OBJECTIVE BOX
seen and examined   no distress   lying comfortable         PAST HISTORY  --------------------------------------------------------------------------------  No significant changes to PMH, PSH, FHx, SHx, unless otherwise noted    ALLERGIES & MEDICATIONS  --------------------------------------------------------------------------------  Allergies    No Known Allergies    Intolerances      Standing Inpatient Medications  aMIOdarone    Tablet 200 milliGRAM(s) Oral daily  aMIOdarone    Tablet   Oral   aspirin  chewable 81 milliGRAM(s) Oral daily  atorvastatin 40 milliGRAM(s) Oral at bedtime  brimonidine 0.2% Ophthalmic Solution 1 Drop(s) Both EYES two times a day  carvedilol 12.5 milliGRAM(s) Oral every 12 hours  chlorhexidine 4% Liquid 1 Application(s) Topical <User Schedule>  dextrose 40% Gel 15 Gram(s) Oral once  dextrose 5%. 1000 milliLiter(s) IV Continuous <Continuous>  dextrose 5%. 1000 milliLiter(s) IV Continuous <Continuous>  dextrose 50% Injectable 25 Gram(s) IV Push once  dextrose 50% Injectable 12.5 Gram(s) IV Push once  dextrose 50% Injectable 25 Gram(s) IV Push once  dorzolamide 2% Ophthalmic Solution 1 Drop(s) Both EYES three times a day  gabapentin 100 milliGRAM(s) Oral two times a day  glucagon  Injectable 1 milliGRAM(s) IntraMuscular once  heparin   Injectable 5000 Unit(s) SubCutaneous every 8 hours  insulin glargine Injectable (LANTUS) 9 Unit(s) SubCutaneous every morning  insulin lispro (ADMELOG) corrective regimen sliding scale   SubCutaneous Before meals and at bedtime  insulin lispro Injectable (ADMELOG) 3 Unit(s) SubCutaneous three times a day before meals  iron sucrose IVPB 100 milliGRAM(s) IV Intermittent every 24 hours  latanoprost 0.005% Ophthalmic Solution 1 Drop(s) Both EYES at bedtime  timolol 0.5% Solution 1 Drop(s) Both EYES two times a day    PRN Inpatient Medications  LORazepam   Injectable 1 milliGRAM(s) IV Push once PRN      VITALS/PHYSICAL EXAM  --------------------------------------------------------------------------------  T(C): 36.7 (07-17-21 @ 04:00), Max: 37.1 (07-16-21 @ 21:15)  HR: 50 (07-17-21 @ 06:00) (46 - 50)  BP: 145/91 (07-17-21 @ 06:00) (97/62 - 155/90)  RR: 24 (07-17-21 @ 06:00) (18 - 26)  SpO2: 100% (07-17-21 @ 06:00) (96% - 100%)  Wt(kg): --  Height (cm): 160 (07-16-21 @ 21:15)  Weight (kg): 71.9 (07-16-21 @ 21:15)  BMI (kg/m2): 28.1 (07-16-21 @ 21:15)  BSA (m2): 1.75 (07-16-21 @ 21:15)      07-15-21 @ 07:01  -  07-16-21 @ 07:00  --------------------------------------------------------  IN: 1690 mL / OUT: 2050 mL / NET: -360 mL    07-16-21 @ 07:01  -  07-17-21 @ 06:59  --------------------------------------------------------  IN: 760 mL / OUT: 1050 mL / NET: -290 mL      Physical Exam:  	Gen: NAD  	Pulm: CTA B/L  	CV: S1S2; no rub  	Abd: +distended  	    LABS/STUDIES  --------------------------------------------------------------------------------              9.5    8.96  >-----------<  226      [07-17-21 @ 04:48]              31.5     138  |  105  |  79  ----------------------------<  111      [07-17-21 @ 04:48]  5.1   |  21  |  3.8        Ca     8.3     [07-17-21 @ 04:48]      Mg     2.3     [07-17-21 @ 04:48]    TPro  5.5  /  Alb  2.8  /  TBili  0.4  /  DBili  x   /  AST  63  /  ALT  121  /  AlkPhos  288  [07-17-21 @ 04:48]    PT/INR: PT 11.80, INR 1.03       [07-15-21 @ 07:05]  PTT: 29.3       [07-15-21 @ 07:05]      Creatinine Trend:  SCr 3.8 [07-17 @ 04:48]  SCr 3.9 [07-16 @ 17:11]  SCr 3.6 [07-16 @ 06:31]  SCr 3.5 [07-15 @ 17:51]  SCr 3.2 [07-15 @ 11:06]    Urinalysis - [07-03-21 @ 16:34]      Color Light Yellow / Appearance Clear / SG 1.013 / pH 7.0      Gluc 500 mg/dL / Ketone Negative  / Bili Negative / Urobili <2 mg/dL       Blood Moderate / Protein 300 mg/dL / Leuk Est Negative / Nitrite Negative      RBC 8 / WBC 2 / Hyaline 1 / Gran  / Sq Epi  / Non Sq Epi 1 / Bacteria Negative      Iron 43, TIBC 198, %sat 22      [07-02-21 @ 11:37]  Ferritin 255      [07-02-21 @ 11:37]  TSH 5.62      [07-13-21 @ 04:30]    HBsAg Nonreact      [07-02-21 @ 11:37]  HCV 0.10, Nonreact      [07-02-21 @ 11:37]    MARLIN: titer Negative, pattern --      [07-09-21 @ 05:31]  C3 Complement 108      [07-09-21 @ 05:31]  C4 Complement 23      [07-09-21 @ 05:31]  Free Light Chains: kappa 9.68, lambda 4.91, ratio = 1.97      [07-08 @ 15:33]  Immunofixation Serum:   No Monoclonal Band Identified    Reference Range: None Detected      [07-09-21 @ 05:31]  SPEP Interpretation: Normal Electrophoresis Pattern      [07-09-21 @ 05:31]

## 2021-07-17 NOTE — PHARMACOTHERAPY INTERVENTION NOTE - COMMENTS
Recommended Ordering Isosorbide Mononitrate 30 mg (ER) once daily in combination with the hydralazine TID for heart failure but Resident wanted to keep Isosorbide 10 mg TID as per attending.

## 2021-07-18 LAB
ALBUMIN SERPL ELPH-MCNC: 2.8 G/DL — LOW (ref 3.5–5.2)
ALP SERPL-CCNC: 269 U/L — HIGH (ref 30–115)
ALT FLD-CCNC: 105 U/L — HIGH (ref 0–41)
ANION GAP SERPL CALC-SCNC: 11 MMOL/L — SIGNIFICANT CHANGE UP (ref 7–14)
AST SERPL-CCNC: 41 U/L — SIGNIFICANT CHANGE UP (ref 0–41)
BASOPHILS # BLD AUTO: 0.05 K/UL — SIGNIFICANT CHANGE UP (ref 0–0.2)
BASOPHILS NFR BLD AUTO: 0.5 % — SIGNIFICANT CHANGE UP (ref 0–1)
BILIRUB SERPL-MCNC: 0.4 MG/DL — SIGNIFICANT CHANGE UP (ref 0.2–1.2)
BUN SERPL-MCNC: 74 MG/DL — CRITICAL HIGH (ref 10–20)
CALCIUM SERPL-MCNC: 8.2 MG/DL — LOW (ref 8.5–10.1)
CHLORIDE SERPL-SCNC: 105 MMOL/L — SIGNIFICANT CHANGE UP (ref 98–110)
CO2 SERPL-SCNC: 21 MMOL/L — SIGNIFICANT CHANGE UP (ref 17–32)
CREAT SERPL-MCNC: 3.9 MG/DL — HIGH (ref 0.7–1.5)
EOSINOPHIL # BLD AUTO: 0.55 K/UL — SIGNIFICANT CHANGE UP (ref 0–0.7)
EOSINOPHIL NFR BLD AUTO: 5.9 % — SIGNIFICANT CHANGE UP (ref 0–8)
GLUCOSE BLDC GLUCOMTR-MCNC: 131 MG/DL — HIGH (ref 70–99)
GLUCOSE BLDC GLUCOMTR-MCNC: 136 MG/DL — HIGH (ref 70–99)
GLUCOSE BLDC GLUCOMTR-MCNC: 151 MG/DL — HIGH (ref 70–99)
GLUCOSE BLDC GLUCOMTR-MCNC: 185 MG/DL — HIGH (ref 70–99)
GLUCOSE SERPL-MCNC: 127 MG/DL — HIGH (ref 70–99)
HCT VFR BLD CALC: 30.9 % — LOW (ref 42–52)
HGB BLD-MCNC: 9.1 G/DL — LOW (ref 14–18)
IMM GRANULOCYTES NFR BLD AUTO: 0.4 % — HIGH (ref 0.1–0.3)
LYMPHOCYTES # BLD AUTO: 1.31 K/UL — SIGNIFICANT CHANGE UP (ref 1.2–3.4)
LYMPHOCYTES # BLD AUTO: 14 % — LOW (ref 20.5–51.1)
MAGNESIUM SERPL-MCNC: 2.1 MG/DL — SIGNIFICANT CHANGE UP (ref 1.8–2.4)
MCHC RBC-ENTMCNC: 23.3 PG — LOW (ref 27–31)
MCHC RBC-ENTMCNC: 29.4 G/DL — LOW (ref 32–37)
MCV RBC AUTO: 79.2 FL — LOW (ref 80–94)
MONOCYTES # BLD AUTO: 0.99 K/UL — HIGH (ref 0.1–0.6)
MONOCYTES NFR BLD AUTO: 10.6 % — HIGH (ref 1.7–9.3)
NEUTROPHILS # BLD AUTO: 6.43 K/UL — SIGNIFICANT CHANGE UP (ref 1.4–6.5)
NEUTROPHILS NFR BLD AUTO: 68.6 % — SIGNIFICANT CHANGE UP (ref 42.2–75.2)
NRBC # BLD: 0 /100 WBCS — SIGNIFICANT CHANGE UP (ref 0–0)
PHOSPHATE SERPL-MCNC: 5.8 MG/DL — HIGH (ref 2.1–4.9)
PLATELET # BLD AUTO: 240 K/UL — SIGNIFICANT CHANGE UP (ref 130–400)
POTASSIUM SERPL-MCNC: 4.5 MMOL/L — SIGNIFICANT CHANGE UP (ref 3.5–5)
POTASSIUM SERPL-SCNC: 4.5 MMOL/L — SIGNIFICANT CHANGE UP (ref 3.5–5)
PROT ?TM UR-MCNC: 543 MG/DL — HIGH (ref 0–12)
PROT SERPL-MCNC: 5.5 G/DL — LOW (ref 6–8)
RBC # BLD: 3.9 M/UL — LOW (ref 4.7–6.1)
RBC # FLD: 14.4 % — SIGNIFICANT CHANGE UP (ref 11.5–14.5)
SODIUM SERPL-SCNC: 137 MMOL/L — SIGNIFICANT CHANGE UP (ref 135–146)
WBC # BLD: 9.37 K/UL — SIGNIFICANT CHANGE UP (ref 4.8–10.8)
WBC # FLD AUTO: 9.37 K/UL — SIGNIFICANT CHANGE UP (ref 4.8–10.8)

## 2021-07-18 PROCEDURE — 99233 SBSQ HOSP IP/OBS HIGH 50: CPT

## 2021-07-18 PROCEDURE — 99232 SBSQ HOSP IP/OBS MODERATE 35: CPT

## 2021-07-18 PROCEDURE — 71045 X-RAY EXAM CHEST 1 VIEW: CPT | Mod: 26

## 2021-07-18 PROCEDURE — 93010 ELECTROCARDIOGRAM REPORT: CPT

## 2021-07-18 RX ORDER — CARVEDILOL PHOSPHATE 80 MG/1
6.25 CAPSULE, EXTENDED RELEASE ORAL EVERY 12 HOURS
Refills: 0 | Status: DISCONTINUED | OUTPATIENT
Start: 2021-07-18 | End: 2021-07-18

## 2021-07-18 RX ORDER — BUMETANIDE 0.25 MG/ML
2 INJECTION INTRAMUSCULAR; INTRAVENOUS ONCE
Refills: 0 | Status: COMPLETED | OUTPATIENT
Start: 2021-07-18 | End: 2021-07-18

## 2021-07-18 RX ORDER — CARVEDILOL PHOSPHATE 80 MG/1
6.25 CAPSULE, EXTENDED RELEASE ORAL EVERY 12 HOURS
Refills: 0 | Status: DISCONTINUED | OUTPATIENT
Start: 2021-07-19 | End: 2021-07-19

## 2021-07-18 RX ORDER — SEVELAMER CARBONATE 2400 MG/1
800 POWDER, FOR SUSPENSION ORAL
Refills: 0 | Status: DISCONTINUED | OUTPATIENT
Start: 2021-07-18 | End: 2021-07-25

## 2021-07-18 RX ADMIN — LATANOPROST 1 DROP(S): 0.05 SOLUTION/ DROPS OPHTHALMIC; TOPICAL at 21:34

## 2021-07-18 RX ADMIN — Medication 81 MILLIGRAM(S): at 11:15

## 2021-07-18 RX ADMIN — IRON SUCROSE 210 MILLIGRAM(S): 20 INJECTION, SOLUTION INTRAVENOUS at 08:15

## 2021-07-18 RX ADMIN — GABAPENTIN 100 MILLIGRAM(S): 400 CAPSULE ORAL at 17:31

## 2021-07-18 RX ADMIN — ISOSORBIDE DINITRATE 10 MILLIGRAM(S): 5 TABLET ORAL at 11:14

## 2021-07-18 RX ADMIN — DORZOLAMIDE HYDROCHLORIDE 1 DROP(S): 20 SOLUTION/ DROPS OPHTHALMIC at 05:03

## 2021-07-18 RX ADMIN — HEPARIN SODIUM 5000 UNIT(S): 5000 INJECTION INTRAVENOUS; SUBCUTANEOUS at 05:01

## 2021-07-18 RX ADMIN — Medication 10 MILLIGRAM(S): at 14:53

## 2021-07-18 RX ADMIN — ATORVASTATIN CALCIUM 40 MILLIGRAM(S): 80 TABLET, FILM COATED ORAL at 21:33

## 2021-07-18 RX ADMIN — SEVELAMER CARBONATE 800 MILLIGRAM(S): 2400 POWDER, FOR SUSPENSION ORAL at 14:47

## 2021-07-18 RX ADMIN — SODIUM ZIRCONIUM CYCLOSILICATE 10 GRAM(S): 10 POWDER, FOR SUSPENSION ORAL at 06:59

## 2021-07-18 RX ADMIN — HEPARIN SODIUM 5000 UNIT(S): 5000 INJECTION INTRAVENOUS; SUBCUTANEOUS at 14:53

## 2021-07-18 RX ADMIN — Medication 2: at 12:32

## 2021-07-18 RX ADMIN — BRIMONIDINE TARTRATE 1 DROP(S): 2 SOLUTION/ DROPS OPHTHALMIC at 17:31

## 2021-07-18 RX ADMIN — HEPARIN SODIUM 5000 UNIT(S): 5000 INJECTION INTRAVENOUS; SUBCUTANEOUS at 21:34

## 2021-07-18 RX ADMIN — Medication 10 MILLIGRAM(S): at 21:34

## 2021-07-18 RX ADMIN — Medication 10 MILLIGRAM(S): at 05:01

## 2021-07-18 RX ADMIN — BRIMONIDINE TARTRATE 1 DROP(S): 2 SOLUTION/ DROPS OPHTHALMIC at 05:03

## 2021-07-18 RX ADMIN — ISOSORBIDE DINITRATE 10 MILLIGRAM(S): 5 TABLET ORAL at 05:01

## 2021-07-18 RX ADMIN — SEVELAMER CARBONATE 800 MILLIGRAM(S): 2400 POWDER, FOR SUSPENSION ORAL at 17:31

## 2021-07-18 RX ADMIN — DORZOLAMIDE HYDROCHLORIDE 1 DROP(S): 20 SOLUTION/ DROPS OPHTHALMIC at 14:53

## 2021-07-18 RX ADMIN — BUMETANIDE 2 MILLIGRAM(S): 0.25 INJECTION INTRAMUSCULAR; INTRAVENOUS at 18:47

## 2021-07-18 RX ADMIN — SODIUM ZIRCONIUM CYCLOSILICATE 10 GRAM(S): 10 POWDER, FOR SUSPENSION ORAL at 18:46

## 2021-07-18 RX ADMIN — CARVEDILOL PHOSPHATE 12.5 MILLIGRAM(S): 80 CAPSULE, EXTENDED RELEASE ORAL at 05:00

## 2021-07-18 RX ADMIN — Medication 1 DROP(S): at 17:31

## 2021-07-18 RX ADMIN — CHLORHEXIDINE GLUCONATE 1 APPLICATION(S): 213 SOLUTION TOPICAL at 05:02

## 2021-07-18 RX ADMIN — ISOSORBIDE DINITRATE 10 MILLIGRAM(S): 5 TABLET ORAL at 17:31

## 2021-07-18 RX ADMIN — BUMETANIDE 2 MILLIGRAM(S): 0.25 INJECTION INTRAMUSCULAR; INTRAVENOUS at 11:14

## 2021-07-18 RX ADMIN — GABAPENTIN 100 MILLIGRAM(S): 400 CAPSULE ORAL at 05:01

## 2021-07-18 RX ADMIN — DORZOLAMIDE HYDROCHLORIDE 1 DROP(S): 20 SOLUTION/ DROPS OPHTHALMIC at 21:34

## 2021-07-18 RX ADMIN — Medication 2: at 16:33

## 2021-07-18 RX ADMIN — Medication 1 DROP(S): at 05:02

## 2021-07-18 NOTE — PROGRESS NOTE ADULT - SUBJECTIVE AND OBJECTIVE BOX
Nephrology progress note    THIS IS AN INCOMPLETE NOTE . FULL NOTE TO FOLLOW SHORTLY    Patient is seen and examined, events over the last 24 h noted .    Allergies:  No Known Allergies    Hospital Medications:   MEDICATIONS  (STANDING):  aspirin  chewable 81 milliGRAM(s) Oral daily  atorvastatin 40 milliGRAM(s) Oral at bedtime  brimonidine 0.2% Ophthalmic Solution 1 Drop(s) Both EYES two times a day  carvedilol 12.5 milliGRAM(s) Oral every 12 hours  chlorhexidine 4% Liquid 1 Application(s) Topical <User Schedule>  dextrose 40% Gel 15 Gram(s) Oral once  dextrose 5%. 1000 milliLiter(s) (50 mL/Hr) IV Continuous <Continuous>  dextrose 5%. 1000 milliLiter(s) (100 mL/Hr) IV Continuous <Continuous>  dextrose 50% Injectable 25 Gram(s) IV Push once  dextrose 50% Injectable 12.5 Gram(s) IV Push once  dextrose 50% Injectable 25 Gram(s) IV Push once  dorzolamide 2% Ophthalmic Solution 1 Drop(s) Both EYES three times a day  epoetin kinsey-epbx (RETACRIT) Injectable 47264 Unit(s) SubCutaneous every 7 days  gabapentin 100 milliGRAM(s) Oral two times a day  glucagon  Injectable 1 milliGRAM(s) IntraMuscular once  heparin   Injectable 5000 Unit(s) SubCutaneous every 8 hours  hydrALAZINE 10 milliGRAM(s) Oral every 8 hours  insulin lispro (ADMELOG) corrective regimen sliding scale   SubCutaneous Before meals and at bedtime  iron sucrose IVPB 100 milliGRAM(s) IV Intermittent every 24 hours  isosorbide   dinitrate Tablet (ISORDIL) 10 milliGRAM(s) Oral three times a day  latanoprost 0.005% Ophthalmic Solution 1 Drop(s) Both EYES at bedtime  sodium zirconium cyclosilicate 10 Gram(s) Oral two times a day  timolol 0.5% Solution 1 Drop(s) Both EYES two times a day        VITALS:  T(F): 97.2 (07-18-21 @ 04:00), Max: 98 (07-17-21 @ 08:10)  HR: 44 (07-18-21 @ 07:00)  BP: 105/61 (07-18-21 @ 07:00)  RR: 16 (07-18-21 @ 07:00)  SpO2: 100% (07-17-21 @ 20:00)  Wt(kg): --    07-16 @ 07:01  -  07-17 @ 07:00  --------------------------------------------------------  IN: 760 mL / OUT: 1050 mL / NET: -290 mL    07-17 @ 07:01  -  07-18 @ 07:00  --------------------------------------------------------  IN: 950 mL / OUT: 1625 mL / NET: -675 mL          PHYSICAL EXAM:  Constitutional: NAD  HEENT: anicteric sclera, oropharynx clear, MMM  Neck: No JVD  Respiratory: CTAB, no wheezes, rales or rhonchi  Cardiovascular: S1, S2, RRR  Gastrointestinal: BS+, soft, NT/ND  Extremities: No cyanosis or clubbing. No peripheral edema  :  No harris.   Skin: No rashes    LABS:  07-18    137  |  105  |  74<HH>  ----------------------------<  127<H>  4.5   |  21  |  3.9<H>  Creatinine Trend: 3.9<--, 3.8<--, 3.9<--, 3.6<--, 3.5<--, 3.2<--  Ca    8.2<L>      18 Jul 2021 04:55  Phos  5.8     07-18  Mg     2.1     07-18    TPro  5.5<L>  /  Alb  2.8<L>  /  TBili  0.4  /  DBili      /  AST  41  /  ALT  105<H>  /  AlkPhos  269<H>  07-18                          9.1    9.37  )-----------( 240      ( 18 Jul 2021 04:55 )             30.9       Urine Studies:      RADIOLOGY & ADDITIONAL STUDIES:   Nephrology progress note  Patient is seen and examined, events over the last 24 h noted .  sitting in chair comfortable     Allergies:  No Known Allergies    Hospital Medications:   MEDICATIONS  (STANDING):  aspirin  chewable 81 milliGRAM(s) Oral daily  atorvastatin 40 milliGRAM(s) Oral at bedtime  brimonidine 0.2% Ophthalmic Solution 1 Drop(s) Both EYES two times a day  carvedilol 12.5 milliGRAM(s) Oral every 12 hours  dextrose 40% Gel 15 Gram(s) Oral once  dorzolamide 2% Ophthalmic Solution 1 Drop(s) Both EYES three times a day  epoetin kinsey-epbx (RETACRIT) Injectable 13611 Unit(s) SubCutaneous every 7 days  gabapentin 100 milliGRAM(s) Oral two times a day  glucagon  Injectable 1 milliGRAM(s) IntraMuscular once  heparin   Injectable 5000 Unit(s) SubCutaneous every 8 hours  hydrALAZINE 10 milliGRAM(s) Oral every 8 hours  insulin lispro (ADMELOG) corrective regimen sliding scale   SubCutaneous Before meals and at bedtime  iron sucrose IVPB 100 milliGRAM(s) IV Intermittent every 24 hours  isosorbide   dinitrate Tablet (ISORDIL) 10 milliGRAM(s) Oral three times a day  latanoprost 0.005% Ophthalmic Solution 1 Drop(s) Both EYES at bedtime  sodium zirconium cyclosilicate 10 Gram(s) Oral two times a day  timolol 0.5% Solution 1 Drop(s) Both EYES two times a day        VITALS:  T(F): 97.2 (07-18-21 @ 04:00), Max: 98 (07-17-21 @ 08:10)  HR: 44 (07-18-21 @ 07:00)  BP: 105/61 (07-18-21 @ 07:00)  RR: 16 (07-18-21 @ 07:00)  SpO2: 100% (07-17-21 @ 20:00)      07-16 @ 07:01  -  07-17 @ 07:00  --------------------------------------------------------  IN: 760 mL / OUT: 1050 mL / NET: -290 mL    07-17 @ 07:01  -  07-18 @ 07:00  --------------------------------------------------------  IN: 950 mL / OUT: 1625 mL / NET: -675 mL          PHYSICAL EXAM:  Constitutional: NAD  Neck: No JVD  Respiratory: Crackles fine at base   Cardiovascular: S1, S2, RRR  Gastrointestinal: BS+, soft, NT/ND  Extremities: No cyanosis or clubbing. plus one edema   Skin: No rashes    LABS:  07-18    137  |  105  |  74<HH>  ----------------------------<  127<H>  4.5   |  21  |  3.9<H>    Creatinine Trend: 3.9<--, 3.8<--, 3.9<--, 3.6<--, 3.5<--, 3.2<--  Potassium Trend: 4.5<--, 5.1<--, 5.0<--, 5.2<--, 5.4<--    Ca    8.2<L>      18 Jul 2021 04:55  Phos  5.8     07-18  Mg     2.1     07-18    TPro  5.5<L>  /  Alb  2.8<L>  /  TBili  0.4  /  DBili      /  AST  41  /  ALT  105<H>  /  AlkPhos  269<H>  07-18                          9.1    9.37  )-----------( 240      ( 18 Jul 2021 04:55 )             30.9       Urine Studies:      RADIOLOGY & ADDITIONAL STUDIES:

## 2021-07-18 NOTE — PROGRESS NOTE ADULT - ASSESSMENT
56M w/ PMHx HTN, HLD, T2DM on insulin x>10 years, retinopathy, Glaucoma presents to the Ed c/o worsening LE swelling and pain for the past 1 month. Endorses SOB on exertion, along w/ increased abdominal girth.    # LENNY / CKD 3b / CRS / ATN/hypotension / time frame also c/w contrast induced nephropathy - worsening creat due to low BP   # Hyperkalemia continue  lokelma 10 q1 2  # creatinine stable   # Nephrotic range proteinuria - needs SPEP UPEP SIF UIF Kappa/lambda 1.9, MARLIN c3c4 wnl; likely due to DM  - last phosphorus level noted start renvela one tab po qac  - renal ultrasound noted w/o hydronephrosis, right renal cysts  # Normocytic anemia, iron deficient  0n Venofer, started Procrit 10,000 sq qweek  # ADHF, low EF - s/p cardiac cath, appreciate CT surgery f/u regarding CABG  # cardiology notes appreciated   # no acute indication for RRT   Will follow

## 2021-07-18 NOTE — PROGRESS NOTE ADULT - SUBJECTIVE AND OBJECTIVE BOX
PATIENT:  GILLIAN MENDOZA  [id number]    CHIEF COMPLAINT:  Patient is a 56y old  Male who presents with a chief complaint of New onset (2021 06:27)      INTERVAL HISTORY/OVERNIGHT EVENTS:  No acute events overnight, bumex yesterday, ouput -675. Pt has no complaints.       MEDICATIONS:  MEDICATIONS  (STANDING):  aspirin  chewable 81 milliGRAM(s) Oral daily  atorvastatin 40 milliGRAM(s) Oral at bedtime  brimonidine 0.2% Ophthalmic Solution 1 Drop(s) Both EYES two times a day  carvedilol 12.5 milliGRAM(s) Oral every 12 hours  chlorhexidine 4% Liquid 1 Application(s) Topical <User Schedule>  dextrose 40% Gel 15 Gram(s) Oral once  dextrose 5%. 1000 milliLiter(s) (50 mL/Hr) IV Continuous <Continuous>  dextrose 5%. 1000 milliLiter(s) (100 mL/Hr) IV Continuous <Continuous>  dextrose 50% Injectable 25 Gram(s) IV Push once  dextrose 50% Injectable 12.5 Gram(s) IV Push once  dextrose 50% Injectable 25 Gram(s) IV Push once  dorzolamide 2% Ophthalmic Solution 1 Drop(s) Both EYES three times a day  epoetin kinsey-epbx (RETACRIT) Injectable 56034 Unit(s) SubCutaneous every 7 days  gabapentin 100 milliGRAM(s) Oral two times a day  glucagon  Injectable 1 milliGRAM(s) IntraMuscular once  heparin   Injectable 5000 Unit(s) SubCutaneous every 8 hours  hydrALAZINE 10 milliGRAM(s) Oral every 8 hours  insulin lispro (ADMELOG) corrective regimen sliding scale   SubCutaneous Before meals and at bedtime  iron sucrose IVPB 100 milliGRAM(s) IV Intermittent every 24 hours  isosorbide   dinitrate Tablet (ISORDIL) 10 milliGRAM(s) Oral three times a day  latanoprost 0.005% Ophthalmic Solution 1 Drop(s) Both EYES at bedtime  sodium zirconium cyclosilicate 10 Gram(s) Oral two times a day  timolol 0.5% Solution 1 Drop(s) Both EYES two times a day    MEDICATIONS  (PRN):  LORazepam   Injectable 1 milliGRAM(s) IV Push once PRN Anxiety      ALLERGIES:  Allergies    No Known Allergies    Intolerances        OBJECTIVE:  ICU Vital Signs Last 24 Hrs  T(C): 36.2 (2021 04:00), Max: 36.7 (2021 08:10)  T(F): 97.2 (2021 04:00), Max: 98 (2021 08:10)  HR: 44 (2021 07:00) (44 - 55)  BP: 105/61 (2021 07:00) (96/55 - 177/92)  BP(mean): 79 (2021 07:00) (60 - 127)  ABP: --  ABP(mean): --  RR: 16 (2021 07:00) (15 - 25)  SpO2: 100% (2021 20:00) (98% - 100%)      Adult Advanced Hemodynamics Last 24 Hrs  CVP(mm Hg): --  CVP(cm H2O): --  CO: --  CI: --  PA: --  PA(mean): --  PCWP: --  SVR: --  SVRI: --  PVR: --  PVRI: --  CAPILLARY BLOOD GLUCOSE      POCT Blood Glucose.: 183 mg/dL (2021 21:16)  POCT Blood Glucose.: 137 mg/dL (2021 16:32)  POCT Blood Glucose.: 146 mg/dL (2021 12:07)  POCT Blood Glucose.: 81 mg/dL (2021 08:08)    CAPILLARY BLOOD GLUCOSE      POCT Blood Glucose.: 183 mg/dL (2021 21:16)    I&O's Summary    2021 07:01  -  2021 07:00  --------------------------------------------------------  IN: 950 mL / OUT: 1625 mL / NET: -675 mL      Daily     Daily Weight in k (2021 06:00)    PHYSICAL EXAMINATION:  General: WN/WD NAD  HEENT: EOMI, moist mucous membranes  Neurology: A&Ox3, nonfocal  Respiratory: CTA B/L, normal respiratory effort, no wheezes, crackles, rales  CV: RRR, S1S2, no murmurs, rubs or gallops  Abdominal: Soft, NT, ND   Extremities: No edema,  LABS:                          9.1    9.37  )-----------( 240      ( 2021 04:55 )             30.9         137  |  105  |  74<HH>  ----------------------------<  127<H>  4.5   |  21  |  3.9<H>    Ca    8.2<L>      2021 04:55  Phos  5.8       Mg     2.1         TPro  5.5<L>  /  Alb  2.8<L>  /  TBili  0.4  /  DBili  x   /  AST  41  /  ALT  105<H>  /  AlkPhos  269<H>      LIVER FUNCTIONS - ( 2021 04:55 )  Alb: 2.8 g/dL / Pro: 5.5 g/dL / ALK PHOS: 269 U/L / ALT: 105 U/L / AST: 41 U/L / GGT: x                       TELEMETRY:     EK Lead ECG:   Ventricular Rate 52 BPM    Atrial Rate 52 BPM    P-R Interval 180 ms    QRS Duration 136 ms    Q-T Interval 506 ms    QTC Calculation(Bazett) 470 ms    P Axis 48 degrees    R Axis 119 degrees    T Axis 73 degrees    Diagnosis Line Sinus bradycardia  Right bundle branch block  Left posterior fascicular block  *** Bifascicular block ***  Possible Inferior infarct , age undetermined  T wave abnormality, consider lateral ischemia  Abnormal ECG    Confirmed by JUN DELCID MD ([id number]) on 2021 7:07:10 AM (21 @ 05:28)      IMAGING:  Echo:        LVSF:        EF:        RVSF:        LA:        RA:        Mitral Valve:        Aortic Valve:       Tricuspid Valve:        Pulmonic Valve:        Pericardium:     ASSESSMENT & PLAN:           PATIENT:  IGLLIAN MENDOZA  [id number]    CHIEF COMPLAINT:  Patient is a 56y old  Male who presents with a chief complaint of New onset (2021 06:27)      INTERVAL HISTORY/OVERNIGHT EVENTS:  No acute events overnight, bumex yesterday, ouput -675. Pt has no complaints.       MEDICATIONS:  MEDICATIONS  (STANDING):  aspirin  chewable 81 milliGRAM(s) Oral daily  atorvastatin 40 milliGRAM(s) Oral at bedtime  brimonidine 0.2% Ophthalmic Solution 1 Drop(s) Both EYES two times a day  carvedilol 12.5 milliGRAM(s) Oral every 12 hours  chlorhexidine 4% Liquid 1 Application(s) Topical <User Schedule>  dextrose 40% Gel 15 Gram(s) Oral once  dextrose 5%. 1000 milliLiter(s) (50 mL/Hr) IV Continuous <Continuous>  dextrose 5%. 1000 milliLiter(s) (100 mL/Hr) IV Continuous <Continuous>  dextrose 50% Injectable 25 Gram(s) IV Push once  dextrose 50% Injectable 12.5 Gram(s) IV Push once  dextrose 50% Injectable 25 Gram(s) IV Push once  dorzolamide 2% Ophthalmic Solution 1 Drop(s) Both EYES three times a day  epoetin kinsey-epbx (RETACRIT) Injectable 53669 Unit(s) SubCutaneous every 7 days  gabapentin 100 milliGRAM(s) Oral two times a day  glucagon  Injectable 1 milliGRAM(s) IntraMuscular once  heparin   Injectable 5000 Unit(s) SubCutaneous every 8 hours  hydrALAZINE 10 milliGRAM(s) Oral every 8 hours  insulin lispro (ADMELOG) corrective regimen sliding scale   SubCutaneous Before meals and at bedtime  iron sucrose IVPB 100 milliGRAM(s) IV Intermittent every 24 hours  isosorbide   dinitrate Tablet (ISORDIL) 10 milliGRAM(s) Oral three times a day  latanoprost 0.005% Ophthalmic Solution 1 Drop(s) Both EYES at bedtime  sodium zirconium cyclosilicate 10 Gram(s) Oral two times a day  timolol 0.5% Solution 1 Drop(s) Both EYES two times a day    MEDICATIONS  (PRN):  LORazepam   Injectable 1 milliGRAM(s) IV Push once PRN Anxiety      ALLERGIES:  Allergies    No Known Allergies    Intolerances        OBJECTIVE:  ICU Vital Signs Last 24 Hrs  T(C): 36.2 (2021 04:00), Max: 36.7 (2021 08:10)  T(F): 97.2 (2021 04:00), Max: 98 (2021 08:10)  HR: 44 (2021 07:00) (44 - 55)  BP: 105/61 (2021 07:00) (96/55 - 177/92)  BP(mean): 79 (2021 07:00) (60 - 127)  ABP: --  ABP(mean): --  RR: 16 (2021 07:00) (15 - 25)  SpO2: 100% (2021 20:00) (98% - 100%)      Adult Advanced Hemodynamics Last 24 Hrs  CVP(mm Hg): --  CVP(cm H2O): --  CO: --  CI: --  PA: --  PA(mean): --  PCWP: --  SVR: --  SVRI: --  PVR: --  PVRI: --  CAPILLARY BLOOD GLUCOSE      POCT Blood Glucose.: 183 mg/dL (2021 21:16)  POCT Blood Glucose.: 137 mg/dL (2021 16:32)  POCT Blood Glucose.: 146 mg/dL (2021 12:07)  POCT Blood Glucose.: 81 mg/dL (2021 08:08)    CAPILLARY BLOOD GLUCOSE      POCT Blood Glucose.: 183 mg/dL (2021 21:16)    I&O's Summary    2021 07:01  -  2021 07:00  --------------------------------------------------------  IN: 950 mL / OUT: 1625 mL / NET: -675 mL      Daily     Daily Weight in k (2021 06:00)    PHYSICAL EXAMINATION:  General: WN/WD NAD  HEENT: EOMI, moist mucous membranes  Neurology: A&Ox3, nonfocal  Respiratory: CTA B/L, normal respiratory effort, no wheezes, crackles, rales  CV: RRR, S1S2, no murmurs, rubs or gallops  Abdominal: Soft, NT, ND   Extremities: trace edema,    LABS:                          9.1    9.37  )-----------( 240      ( 2021 04:55 )             30.9         137  |  105  |  74<HH>  ----------------------------<  127<H>  4.5   |  21  |  3.9<H>    Ca    8.2<L>      2021 04:55  Phos  5.8       Mg     2.1         TPro  5.5<L>  /  Alb  2.8<L>  /  TBili  0.4  /  DBili  x   /  AST  41  /  ALT  105<H>  /  AlkPhos  269<H>      LIVER FUNCTIONS - ( 2021 04:55 )  Alb: 2.8 g/dL / Pro: 5.5 g/dL / ALK PHOS: 269 U/L / ALT: 105 U/L / AST: 41 U/L / GGT: x                       TELEMETRY:     EK Lead ECG:   Ventricular Rate 52 BPM    Atrial Rate 52 BPM    P-R Interval 180 ms    QRS Duration 136 ms    Q-T Interval 506 ms    QTC Calculation(Bazett) 470 ms    P Axis 48 degrees    R Axis 119 degrees    T Axis 73 degrees    Diagnosis Line Sinus bradycardia  Right bundle branch block  Left posterior fascicular block  *** Bifascicular block ***  Possible Inferior infarct , age undetermined  T wave abnormality, consider lateral ischemia  Abnormal ECG    Confirmed by JUN DELCID MD [id number]) on 2021 7:07:10 AM (21 @ 05:28)      IMAGING:  Echo:        LVSF:        EF:        RVSF:        LA:        RA:        Mitral Valve:        Aortic Valve:       Tricuspid Valve:        Pulmonic Valve:        Pericardium:

## 2021-07-18 NOTE — PROGRESS NOTE ADULT - ATTENDING COMMENTS
Seen / examined and above reviewed.    Multivessel CAD  ICM  Chronic Systolic CHF    UO better with Bumex.  Negative fluid balance.  Less overloaded on exam.  Renal function stable.  BP better controlled with Hydralazine / Isosorbide.  Remains bradycardic off Amiodarone.    - Decrease Cored 6.25 q12.  - ASA (hold P2Y12 blockers / pending CABG).  - Cont IV Bumex to maintain slight negative.  - OOB / ambulate.

## 2021-07-18 NOTE — PROGRESS NOTE ADULT - SUBJECTIVE AND OBJECTIVE BOX
PLANNED OPERATIVE PROCEDURE(s):                HD #                       SURGEON(s): CARYN Welch  Consult requesting by: Dr. Singh  Renal: Dr. Presley  Cardiology: Dr. Cantu    HISTORY OF PRESENT ILLNESS:  56M with a PMH HTN, DM, Glaucoma, DLD, CKD, Diabetic Neuropathy. The patient presents to the Ed c/o worsening LE swelling and pain for the past 2-3 months. Endorses SOB on exertion, along w/ increased abdominal girth. Denies orthopnea, PND, wheeze, CP, diaphoresis. No cardiac Hx.  Echocardiography revealed significantly reduced EF.  Subsequent cardiac catheterization revealed severe 3vCAD ( LAD, RCA, LCx). CTS was consulted for possible myocardial re-vascularization via cabg.      Hospital course: Patient with worsening LENNY, transaminitis w/multiple hypotensive episodes. Pt was upgraded to CCU for monitoring. Pending medical optimization prior to possible CABG.    SUBJECTIVE ASSESSMENT:56y Male patient seen and examined at bedside.    Vital Signs Last 24 Hrs  T(F): 97.2 (18 Jul 2021 04:00), Max: 98 (17 Jul 2021 08:10)  HR: 46 (18 Jul 2021 06:00) (46 - 55)  BP: 96/55 (18 Jul 2021 06:00) (96/55 - 177/92)  BP(mean): 60 (18 Jul 2021 06:00) (60 - 127)  RR: 15 (18 Jul 2021 06:00) (15 - 25)  SpO2: 100% (17 Jul 2021 20:00) (98% - 100%)    I&O's Detail    17 Jul 2021 07:01  -  18 Jul 2021 07:00  --------------------------------------------------------  IN:    IV PiggyBack: 100 mL    Oral Fluid: 850 mL  Total IN: 950 mL    OUT:    Voided (mL): 1625 mL  Total OUT: 1625 mL    Net: I&O's Detail    16 Jul 2021 07:01  -  17 Jul 2021 07:00  --------------------------------------------------------  Total NET: -290 mL    17 Jul 2021 07:01  -  18 Jul 2021 07:00  --------------------------------------------------------  Total NET: -675 mL      CAPILLARY BLOOD GLUCOSE  POCT Blood Glucose.: 183 mg/dL (17 Jul 2021 21:16)  POCT Blood Glucose.: 137 mg/dL (17 Jul 2021 16:32)  POCT Blood Glucose.: 146 mg/dL (17 Jul 2021 12:07)  POCT Blood Glucose.: 81 mg/dL (17 Jul 2021 08:08)      Physical Exam:  General: NAD; A&Ox3  Cardiac: S1/S2, RRR, no murmur, no rubs  Lungs: unlabored but shallow respirations, bs decreased at b/l bases  Abdomen: Soft/NT/ND; positive bowel sounds x 4  Extremities: Mild edema b/l lower extremities      LABS:                        9.5<L>  8.96  )-----------( 226      ( 17 Jul 2021 04:48 )             31.5<L>                        8.2<L>  8.68  )-----------( 182      ( 16 Jul 2021 06:31 )             27.4<L>    07-17    138  |  105  |  79<HH>  ----------------------------<  111<H>  5.1<H>   |  21  |  3.8<H>  07-16    136  |  106  |  75<HH>  ----------------------------<  71  5.0   |  17  |  3.9<H>    Ca    8.3<L>      17 Jul 2021 04:48  Mg     2.3     07-17    TPro  5.5<L> [6.0 - 8.0]  /  Alb  2.8<L> [3.5 - 5.2]  /  TBili  0.4 [0.2 - 1.2]  /  DBili  x   /  AST  63<H> [0 - 41]  /  ALT  121<H> [0 - 41]  /  AlkPhos  288<H> [30 - 115]  07-17      RADIOLOGY & ADDITIONAL TESTS:  CXR: < from: Xray Chest 1 View AP/PA (07.17.21 @ 10:29) >  Impression: Stable globular cardiomegaly/pericardial effusion.  --- End of Report ---  < end of copied text >    TTE: < from: TTE Echo Complete w/o Contrast w/ Doppler (07.02.21 @ 14:54) >  Summary:   1. Moderately decreased global left ventricular systolic function.   2. Multiple left ventricular regional wall motion abnormalities exist. See wall motion findings.   3. LV Ejection Fraction by Garcia's Method with a biplane EF of 34 %.   4. Moderately increased LV wall thickness.   5. Normal left ventricular internal cavity size.   6. Mild to moderately enlarged left atrium.   7. Normal right atrial size.   8. Moderate pericardial effusion.   9. Mild to moderate mitral valve regurgitation.  10. Structurally normal mitral valve, with normal leaflet excursion.  11. Moderate tricuspid regurgitation.  12. Mild aortic regurgitation.  13. Normal trileaflet aortic valve with normal opening.  14. Mild pulmonic valve regurgitation.  15. Estimated pulmonary artery systolic pressure is 52.0 mmHg assuming a right atrial pressure of 10 mmHg, which is consistent with moderate pulmonary hypertension.    PHYSICIAN INTERPRETATION:  Left Ventricle: The left ventricular internal cavity size is normal. Left ventricular wall thickness is moderately increased. Global LV systolic function was moderately decreased.      LV Wall Scoring:  The entire inferior wall and basal and mid inferolateral wall are akinetic.  The basal and mid anterolateral wall, basal inferoseptal segment, apical  lateral segment, and basal anterior segment are hypokinetic. All remaining  scored segments are normal.    Right Ventricle: Normal right ventricular size and function.  Left Atrium: Mild to moderately enlarged left atrium.  Right Atrium: Normal right atrial size.  Pericardium: A moderately sized pericardial effusion is present. The pericardial effusion is globally located around the entire heart. There is no evidence of cardiac tamponade.  Mitral Valve: Structurally normal mitral valve, with normal leaflet excursion. Mild to moderate mitral valve regurgitation is seen.  Tricuspid Valve: Structurally normal tricuspid valve, with normal leaflet excursion. Moderate tricuspid regurgitation is visualized. Estimated pulmonary artery systolic pressure is 52.0 mmHg assuming a right atrial pressure of 10 mmHg, which is consistent with moderate pulmonary hypertension.  Aortic Valve: Normal trileaflet aortic valve with normal opening. The aortic valve is trileaflet. Mild aortic valve regurgitation is seen.  Pulmonic Valve: Structurally normal pulmonicvalve, with normal leaflet excursion. Mild pulmonic valve regurgitation.  Aorta: The aortic root and ascending aorta are structurally normal, with no evidence of dilitation.  Pulmonary Artery: The main pulmonary artery is normal in size.  Venous: Theinferior vena cava was normal sized, with respiratory size variation less than 50%.    < end of copied text >    < from: MR Cardiac No Cont (07.14.21 @ 13:20) >  IMPRESSION:  Incomplete examination. No intravenous contrast administered. Examination prematurely terminated as patient could not continue with the exam due to dyspnea.  Qualitatively biventricular global hypokinesis as described above. Wall thinning and akinesis of the inferior wall.  Small bilateral effusions with adjacent atelectasis. Moderate pericardial effusion.  < end of copied text >    < from: CT Chest No Cont (07.13.21 @ 07:08) >  IMPRESSION:  1. Moderate pericardial effusion. Triple-vessel coronary artery atherosclerotic disease.  2. Small bilateral effusions with superimposed lower lobe atelectasis.  3. Nonspecific bilateral perinephric inflammation. No acute intra-abdominal pathology.  < end of copied text >    < from: [facility] Duplex Lower Ext Vein Scan, Bilat (07.12.21 @ 21:54) >  Measurements are as follows:    Right lower extremity:    Great saphenous vein:        Upper Thigh Proximal: 5.5mm        Upper Thigh Mid: 4.4mm        Upper Thigh Distal: 3.4 mm        GSV At The Knee: 2.1 mm        GSV At The Ankle: 2.4 mm    Left lower extremity:    Great saphenous vein:        Upper Thigh Proximal: 5.9 mm        Upper Thigh Mid: 3.9 mm        Upper Thigh Distal: 3.4 mm        GSV At The Knee: 3.2 mm        GSV Mid Calf: 2.5 mm        GSV At The Ankle: 2.4 mm    Impression:    Bilateral lower extremity vein mapping with the above measurements.    < end of copied text >    < from: [facility] Physiol Extremity Lower 3+ Level, BI (07.13.21 @ 09:10) >  Ankle brachial index was  1.2 on the right & 1.5  on the left.    Impression:    Normal arterial hemodynamics in the lower extremities bilaterally.    --- End of Report ---    < end of copied text >    < from: [facility] Duplex Carotid, Bilat (07.12.21 @ 21:50) >  IMPRESSION: Mild right 20-39% internal carotid artery stenosis. No hemodynamically significant stenosis appreciated on the left.    < end of copied text >    Cardiac Cath: < from: Cardiac Cath Lab - Adult (07.12.21 @ 16:37) >  CORONARY CIRCULATION: The coronary circulation is right dominant. There was    3-vessel coronary artery disease (LAD, RCA, and circumflex). SYNTAX score    of 40. Left main: The vessel was normal sized. Angiography showed mild    atherosclerosis with no flow limiting lesions. LAD: The vessel was normal    sized. Proximal LAD: There was a tubular 90 % stenosis at the site of the    first septal branch. There was DEACON grade 3 flow through the vessel (brisk    flow). Mid LAD: The vessel was small to medium sized. Angiography showed    mild atherosclerosis with no flow limiting lesions. There was a tubular 90    % stenosis at a site with no prior intervention. There was DEACON grade 3    flow through the vessel (brisk flow). Distal LAD: Angiography showed mild    atherosclerosis with no flow limiting lesions. 1st diagonal: The vessel    was small sized. Angiography showed severe atherosclerosis. 2nd diagonal:    The vessel was medium sized. There was a tubular 70 % stenosis in the    proximal third of the vessel segment. Circumflex: The vessel was normal    sized. Proximal circumflex: The vessel was medium sized. There was a    tubular 90 % stenosis at a site with no prior intervention. There was DEACON    grade 3 flow through the vessel (brisk flow). Distal circumflex: There was    a diffuse 85 % stenosis at a site with no prior intervention, at the    origin of OM2. 1st obtuse marginal: The vessel was medium sized. There was    a diffuse 80 % stenosis at a site with no prior intervention, at the    ostium of the vessel segment. 2nd obtuse marginal: The vessel was normal    sized. There was a tubular 80 % stenosis at a site with no prior    intervention, at the ostium of the vessel segment. RCA: The vessel was    normal sized. Angiography showed minor luminal irregularities with no flow    limiting lesions. There grade 2 collaterals supplying rPDA and rPLA    terrtory from LAD and LCx respectively. Proximal RCA: Angiography showed    severe atherosclerosis. Right PDA: The vessel was recieving collaterals    from left system. The vessel was medium sized. Angiography showed moderate    atherosclerosis with no clinical lesions appreciated. Right posterolateral    segment: The vessel was receiving collaterals form left system. The vessel    was normal sized. Angiography showed moderate atherosclerosis with no    clinical lesions appreciated.         COMPLICATIONS: No complications occurred during the cath lab visit.         IMPRESSIONS: There is significant triple vessel coronary artery disease    with a SYNTAX score of 40.    < end of copied text >      Allergies  No Known Allergies  Intolerances      MEDICATIONS  (STANDING):  aspirin  chewable 81 milliGRAM(s) Oral daily  atorvastatin 40 milliGRAM(s) Oral at bedtime  brimonidine 0.2% Ophthalmic Solution 1 Drop(s) Both EYES two times a day  carvedilol 12.5 milliGRAM(s) Oral every 12 hours  chlorhexidine 4% Liquid 1 Application(s) Topical <User Schedule>  dextrose 40% Gel 15 Gram(s) Oral once  dextrose 5%. 1000 milliLiter(s) (50 mL/Hr) IV Continuous <Continuous>  dextrose 5%. 1000 milliLiter(s) (100 mL/Hr) IV Continuous <Continuous>  dextrose 50% Injectable 25 Gram(s) IV Push once  dextrose 50% Injectable 12.5 Gram(s) IV Push once  dextrose 50% Injectable 25 Gram(s) IV Push once  dorzolamide 2% Ophthalmic Solution 1 Drop(s) Both EYES three times a day  epoetin kinsey-epbx (RETACRIT) Injectable 89147 Unit(s) SubCutaneous every 7 days  gabapentin 100 milliGRAM(s) Oral two times a day  glucagon  Injectable 1 milliGRAM(s) IntraMuscular once  heparin   Injectable 5000 Unit(s) SubCutaneous every 8 hours  hydrALAZINE 10 milliGRAM(s) Oral every 8 hours  insulin lispro (ADMELOG) corrective regimen sliding scale   SubCutaneous Before meals and at bedtime  iron sucrose IVPB 100 milliGRAM(s) IV Intermittent every 24 hours  isosorbide   dinitrate Tablet (ISORDIL) 10 milliGRAM(s) Oral three times a day  latanoprost 0.005% Ophthalmic Solution 1 Drop(s) Both EYES at bedtime  sodium zirconium cyclosilicate 10 Gram(s) Oral two times a day  timolol 0.5% Solution 1 Drop(s) Both EYES two times a day    MEDICATIONS  (PRN):  LORazepam   Injectable 1 milliGRAM(s) IV Push once PRN Anxiety        Assessment/Plan:  56y Male Pre-op for possible CABG  - Case and plan discussed with CT Surgeon - Dr. Welch  - Continue supportive care as per primary team.   - Continue DVT/GI prophylaxis  - Continue to advance physical activity as tolerated and continue PT/OT as directed  1. CAD: Continue ASA, statin, and coreg.   2. HTN: agree with hydralazine/isordil and coreg. Avoid hypotension in light of worsening LENNY and transaminitis.   3. ICM: HF note appreciated, diuresis held while pt hypotensive however may require diuresis for medical optimization.  4. LENNY: renal note appreciated, currently no need for RRT at this time as per nephrology, may benefit from HD to medically optimize if planned CABG.       PLANNED OPERATIVE PROCEDURE(s):                HD #                       SURGEON(s): CARYN Welch  Consult requesting by: Dr. Singh  Renal: Dr. Presley  Cardiology: Dr. Cantu    HISTORY OF PRESENT ILLNESS:  56M with a PMH HTN, DM, Glaucoma, DLD, CKD, Diabetic Neuropathy. The patient presents to the Ed c/o worsening LE swelling and pain for the past 2-3 months. Endorses SOB on exertion, along w/ increased abdominal girth. Denies orthopnea, PND, wheeze, CP, diaphoresis. No cardiac Hx.  Echocardiography revealed significantly reduced EF.  Subsequent cardiac catheterization revealed severe 3vCAD ( LAD, RCA, LCx). CTS was consulted for possible myocardial re-vascularization via cabg.      Hospital course: Patient with worsening LENNY, transaminitis . Pt was upgraded to CCU for monitoring. Pending medical optimization prior to possible CABG.    SUBJECTIVE ASSESSMENT:56y Male patient seen and examined at bedside.    Vital Signs Last 24 Hrs  T(F): 97.2 (18 Jul 2021 04:00), Max: 98 (17 Jul 2021 08:10)  HR: 46 (18 Jul 2021 06:00) (46 - 55)  BP: 96/55 (18 Jul 2021 06:00) (96/55 - 177/92)  BP(mean): 60 (18 Jul 2021 06:00) (60 - 127)  RR: 15 (18 Jul 2021 06:00) (15 - 25)  SpO2: 100% (17 Jul 2021 20:00) (98% - 100%)    I&O's Detail    17 Jul 2021 07:01  -  18 Jul 2021 07:00  --------------------------------------------------------  IN:    IV PiggyBack: 100 mL    Oral Fluid: 850 mL  Total IN: 950 mL    OUT:    Voided (mL): 1625 mL  Total OUT: 1625 mL    Net: I&O's Detail    16 Jul 2021 07:01  -  17 Jul 2021 07:00  --------------------------------------------------------  Total NET: -290 mL    17 Jul 2021 07:01  -  18 Jul 2021 07:00  --------------------------------------------------------  Total NET: -675 mL      CAPILLARY BLOOD GLUCOSE  POCT Blood Glucose.: 183 mg/dL (17 Jul 2021 21:16)  POCT Blood Glucose.: 137 mg/dL (17 Jul 2021 16:32)  POCT Blood Glucose.: 146 mg/dL (17 Jul 2021 12:07)  POCT Blood Glucose.: 81 mg/dL (17 Jul 2021 08:08)      Physical Exam:  General: NAD; A&Ox3  Cardiac: S1/S2, RRR, no murmur, no rubs  Lungs: unlabored but shallow respirations, bs decreased at b/l bases  Abdomen: Soft/NT/ND; positive bowel sounds x 4  Extremities: Mild edema b/l lower extremities      LABS:                        9.5<L>  8.96  )-----------( 226      ( 17 Jul 2021 04:48 )             31.5<L>                        8.2<L>  8.68  )-----------( 182      ( 16 Jul 2021 06:31 )             27.4<L>    07-17    138  |  105  |  79<HH>  ----------------------------<  111<H>  5.1<H>   |  21  |  3.8<H>  07-16    136  |  106  |  75<HH>  ----------------------------<  71  5.0   |  17  |  3.9<H>    Ca    8.3<L>      17 Jul 2021 04:48  Mg     2.3     07-17    TPro  5.5<L> [6.0 - 8.0]  /  Alb  2.8<L> [3.5 - 5.2]  /  TBili  0.4 [0.2 - 1.2]  /  DBili  x   /  AST  63<H> [0 - 41]  /  ALT  121<H> [0 - 41]  /  AlkPhos  288<H> [30 - 115]  07-17      RADIOLOGY & ADDITIONAL TESTS:  CXR: < from: Xray Chest 1 View AP/PA (07.17.21 @ 10:29) >  Impression: Stable globular cardiomegaly/pericardial effusion.  --- End of Report ---  < end of copied text >    TTE: < from: TTE Echo Complete w/o Contrast w/ Doppler (07.02.21 @ 14:54) >  Summary:   1. Moderately decreased global left ventricular systolic function.   2. Multiple left ventricular regional wall motion abnormalities exist. See wall motion findings.   3. LV Ejection Fraction by Garcia's Method with a biplane EF of 34 %.   4. Moderately increased LV wall thickness.   5. Normal left ventricular internal cavity size.   6. Mild to moderately enlarged left atrium.   7. Normal right atrial size.   8. Moderate pericardial effusion.   9. Mild to moderate mitral valve regurgitation.  10. Structurally normal mitral valve, with normal leaflet excursion.  11. Moderate tricuspid regurgitation.  12. Mild aortic regurgitation.  13. Normal trileaflet aortic valve with normal opening.  14. Mild pulmonic valve regurgitation.  15. Estimated pulmonary artery systolic pressure is 52.0 mmHg assuming a right atrial pressure of 10 mmHg, which is consistent with moderate pulmonary hypertension.    PHYSICIAN INTERPRETATION:  Left Ventricle: The left ventricular internal cavity size is normal. Left ventricular wall thickness is moderately increased. Global LV systolic function was moderately decreased.      LV Wall Scoring:  The entire inferior wall and basal and mid inferolateral wall are akinetic.  The basal and mid anterolateral wall, basal inferoseptal segment, apical  lateral segment, and basal anterior segment are hypokinetic. All remaining  scored segments are normal.    Right Ventricle: Normal right ventricular size and function.  Left Atrium: Mild to moderately enlarged left atrium.  Right Atrium: Normal right atrial size.  Pericardium: A moderately sized pericardial effusion is present. The pericardial effusion is globally located around the entire heart. There is no evidence of cardiac tamponade.  Mitral Valve: Structurally normal mitral valve, with normal leaflet excursion. Mild to moderate mitral valve regurgitation is seen.  Tricuspid Valve: Structurally normal tricuspid valve, with normal leaflet excursion. Moderate tricuspid regurgitation is visualized. Estimated pulmonary artery systolic pressure is 52.0 mmHg assuming a right atrial pressure of 10 mmHg, which is consistent with moderate pulmonary hypertension.  Aortic Valve: Normal trileaflet aortic valve with normal opening. The aortic valve is trileaflet. Mild aortic valve regurgitation is seen.  Pulmonic Valve: Structurally normal pulmonicvalve, with normal leaflet excursion. Mild pulmonic valve regurgitation.  Aorta: The aortic root and ascending aorta are structurally normal, with no evidence of dilitation.  Pulmonary Artery: The main pulmonary artery is normal in size.  Venous: Theinferior vena cava was normal sized, with respiratory size variation less than 50%.    < end of copied text >    < from: MR Cardiac No Cont (07.14.21 @ 13:20) >  IMPRESSION:  Incomplete examination. No intravenous contrast administered. Examination prematurely terminated as patient could not continue with the exam due to dyspnea.  Qualitatively biventricular global hypokinesis as described above. Wall thinning and akinesis of the inferior wall.  Small bilateral effusions with adjacent atelectasis. Moderate pericardial effusion.  < end of copied text >    < from: CT Chest No Cont (07.13.21 @ 07:08) >  IMPRESSION:  1. Moderate pericardial effusion. Triple-vessel coronary artery atherosclerotic disease.  2. Small bilateral effusions with superimposed lower lobe atelectasis.  3. Nonspecific bilateral perinephric inflammation. No acute intra-abdominal pathology.  < end of copied text >    < from: [facility] Duplex Lower Ext Vein Scan, Bilat (07.12.21 @ 21:54) >  Measurements are as follows:    Right lower extremity:    Great saphenous vein:        Upper Thigh Proximal: 5.5mm        Upper Thigh Mid: 4.4mm        Upper Thigh Distal: 3.4 mm        GSV At The Knee: 2.1 mm        GSV At The Ankle: 2.4 mm    Left lower extremity:    Great saphenous vein:        Upper Thigh Proximal: 5.9 mm        Upper Thigh Mid: 3.9 mm        Upper Thigh Distal: 3.4 mm        GSV At The Knee: 3.2 mm        GSV Mid Calf: 2.5 mm        GSV At The Ankle: 2.4 mm    Impression:    Bilateral lower extremity vein mapping with the above measurements.    < end of copied text >    < from: [facility] Physiol Extremity Lower 3+ Level, BI (07.13.21 @ 09:10) >  Ankle brachial index was  1.2 on the right & 1.5  on the left.    Impression:    Normal arterial hemodynamics in the lower extremities bilaterally.    --- End of Report ---    < end of copied text >    < from: [facility] Duplex Carotid, Bilat (07.12.21 @ 21:50) >  IMPRESSION: Mild right 20-39% internal carotid artery stenosis. No hemodynamically significant stenosis appreciated on the left.    < end of copied text >    Cardiac Cath: < from: Cardiac Cath Lab - Adult (07.12.21 @ 16:37) >  CORONARY CIRCULATION: The coronary circulation is right dominant. There was    3-vessel coronary artery disease (LAD, RCA, and circumflex). SYNTAX score    of 40. Left main: The vessel was normal sized. Angiography showed mild    atherosclerosis with no flow limiting lesions. LAD: The vessel was normal    sized. Proximal LAD: There was a tubular 90 % stenosis at the site of the    first septal branch. There was DEACON grade 3 flow through the vessel (brisk    flow). Mid LAD: The vessel was small to medium sized. Angiography showed    mild atherosclerosis with no flow limiting lesions. There was a tubular 90    % stenosis at a site with no prior intervention. There was DEACON grade 3    flow through the vessel (brisk flow). Distal LAD: Angiography showed mild    atherosclerosis with no flow limiting lesions. 1st diagonal: The vessel    was small sized. Angiography showed severe atherosclerosis. 2nd diagonal:    The vessel was medium sized. There was a tubular 70 % stenosis in the    proximal third of the vessel segment. Circumflex: The vessel was normal    sized. Proximal circumflex: The vessel was medium sized. There was a    tubular 90 % stenosis at a site with no prior intervention. There was DEACON    grade 3 flow through the vessel (brisk flow). Distal circumflex: There was    a diffuse 85 % stenosis at a site with no prior intervention, at the    origin of OM2. 1st obtuse marginal: The vessel was medium sized. There was    a diffuse 80 % stenosis at a site with no prior intervention, at the    ostium of the vessel segment. 2nd obtuse marginal: The vessel was normal    sized. There was a tubular 80 % stenosis at a site with no prior    intervention, at the ostium of the vessel segment. RCA: The vessel was    normal sized. Angiography showed minor luminal irregularities with no flow    limiting lesions. There grade 2 collaterals supplying rPDA and rPLA    terrtory from LAD and LCx respectively. Proximal RCA: Angiography showed    severe atherosclerosis. Right PDA: The vessel was recieving collaterals    from left system. The vessel was medium sized. Angiography showed moderate    atherosclerosis with no clinical lesions appreciated. Right posterolateral    segment: The vessel was receiving collaterals form left system. The vessel    was normal sized. Angiography showed moderate atherosclerosis with no    clinical lesions appreciated.         COMPLICATIONS: No complications occurred during the cath lab visit.         IMPRESSIONS: There is significant triple vessel coronary artery disease    with a SYNTAX score of 40.    < end of copied text >      Allergies  No Known Allergies  Intolerances      MEDICATIONS  (STANDING):  aspirin  chewable 81 milliGRAM(s) Oral daily  atorvastatin 40 milliGRAM(s) Oral at bedtime  brimonidine 0.2% Ophthalmic Solution 1 Drop(s) Both EYES two times a day  carvedilol 12.5 milliGRAM(s) Oral every 12 hours  chlorhexidine 4% Liquid 1 Application(s) Topical <User Schedule>  dextrose 40% Gel 15 Gram(s) Oral once  dextrose 5%. 1000 milliLiter(s) (50 mL/Hr) IV Continuous <Continuous>  dextrose 5%. 1000 milliLiter(s) (100 mL/Hr) IV Continuous <Continuous>  dextrose 50% Injectable 25 Gram(s) IV Push once  dextrose 50% Injectable 12.5 Gram(s) IV Push once  dextrose 50% Injectable 25 Gram(s) IV Push once  dorzolamide 2% Ophthalmic Solution 1 Drop(s) Both EYES three times a day  epoetin kinsey-epbx (RETACRIT) Injectable 72701 Unit(s) SubCutaneous every 7 days  gabapentin 100 milliGRAM(s) Oral two times a day  glucagon  Injectable 1 milliGRAM(s) IntraMuscular once  heparin   Injectable 5000 Unit(s) SubCutaneous every 8 hours  hydrALAZINE 10 milliGRAM(s) Oral every 8 hours  insulin lispro (ADMELOG) corrective regimen sliding scale   SubCutaneous Before meals and at bedtime  iron sucrose IVPB 100 milliGRAM(s) IV Intermittent every 24 hours  isosorbide   dinitrate Tablet (ISORDIL) 10 milliGRAM(s) Oral three times a day  latanoprost 0.005% Ophthalmic Solution 1 Drop(s) Both EYES at bedtime  sodium zirconium cyclosilicate 10 Gram(s) Oral two times a day  timolol 0.5% Solution 1 Drop(s) Both EYES two times a day    MEDICATIONS  (PRN):  LORazepam   Injectable 1 milliGRAM(s) IV Push once PRN Anxiety        Assessment/Plan:  56y Male Pre-op for possible CABG  - Case and plan discussed with CT Surgeon - Dr. Welch  - Continue supportive care as per primary team.   - Continue DVT/GI prophylaxis  - Continue to advance physical activity as tolerated and continue PT/OT as directed  1. CAD: Continue ASA, statin, and coreg.   2. HTN: agree with hydralazine/isordil and coreg. Avoid hypotension in light of worsening LENNY and transaminitis.   3. ICM: HF note appreciated, diuresis held while pt hypotensive however may require diuresis for medical optimization.  4. LENNY: renal note appreciated, currently no need for RRT at this time as per nephrology, may benefit from HD to medically optimize if planned CABG.

## 2021-07-18 NOTE — PROGRESS NOTE ADULT - ATTENDING COMMENTS
56-year-old gentleman we have been following along with the cardiology services over the last couple of days for possible high risk open heart surgery with coronary artery bypass grafting.    Unfortunately he continues to have worsening renal function and it looks increasingly likely that the patient will require dialysis before the operation.  He was evaluated by the nephrology team and for the present time they want to hold off on dialysis.    Additionally there appears to be some concern about myocardial viability but given his young age and native coronary artery disease along with his multiple medical comorbidities including complicated diabetes and a low ejection fraction, high risk open heart surgery may still be his best option.    There is a significant concern for permanent dialysis given the fact that the patient has stage IV kidney disease and also now proteinuria along with hypoalbuminemia.    I discussed his care in detail with the cardiologist who are presently managing the patient and we will try and have a heart team approach to manage this complicated patient with multiple comorbidities as listed above.    I discussed with the patient and explained to him his critical clinical condition and as of yet he has not consented to open heart surgery.  I will be away in the next few days and he will be followed by my colleagues, Dr. Yee and Dr. Silva.

## 2021-07-18 NOTE — PROGRESS NOTE ADULT - ASSESSMENT
IMPRESSION:  CAD - 3V disease, awaiting CABG  Ischemic cardiomyopathy - patient in fluid overload clinically  Bradycardia  LENNY - worsening (ROSENDO vs hypoperfusion from hypotension?)  Elevated LFTs  DM  HTN   HLD    PLAN:    CNS: Avoid sedation    HEENT: Oral care    PULMONARY:    - HOB @ 45 degrees    CARDIOVASCULAR:  - Continue CCU monitoring  - Restarted on Hydralazine and Isordil; will monitor BP and adjust medications accordingly  - Continue aspirin 81mg daily, Atorvastatin 40mg daily  - Decrease to carvedilol 6.25mg q12h  - Discontinue amiodarone  - Cont Bumex 2mg in am. Check I&O in evening. If output is less then 1L then give a second dose; check strict I&Os; keep I<Os;   - Monitor renal function, electrolytes, daily weight and volume status   - Couldn't complete MRI due to orthopnea  - F/U with CT surgery; plan for CABG next week    GI: GI prophylaxis.  Feeding     RENAL:  - Follow up renal function and lytes.  Correct as needed  - Work-up as recommended by nephrology  - Avoid nephrotoxic agents  - Nephrology following    INFECTIOUS DISEASE: Monitor VS    HEMATOLOGICAL:    - On iron supplementation  - Monitor cbc; watch for signs and symptoms of bleeding  - DVT prophylaxis.    ENDOCRINE:  Follow up FS.  Insulin protocol

## 2021-07-19 LAB
ALBUMIN SERPL ELPH-MCNC: 2.7 G/DL — LOW (ref 3.5–5.2)
ALP SERPL-CCNC: 243 U/L — HIGH (ref 30–115)
ALT FLD-CCNC: 81 U/L — HIGH (ref 0–41)
ANION GAP SERPL CALC-SCNC: 10 MMOL/L — SIGNIFICANT CHANGE UP (ref 7–14)
ANION GAP SERPL CALC-SCNC: 11 MMOL/L — SIGNIFICANT CHANGE UP (ref 7–14)
AST SERPL-CCNC: 21 U/L — SIGNIFICANT CHANGE UP (ref 0–41)
BASOPHILS # BLD AUTO: 0.04 K/UL — SIGNIFICANT CHANGE UP (ref 0–0.2)
BASOPHILS NFR BLD AUTO: 0.4 % — SIGNIFICANT CHANGE UP (ref 0–1)
BILIRUB SERPL-MCNC: 0.3 MG/DL — SIGNIFICANT CHANGE UP (ref 0.2–1.2)
BUN SERPL-MCNC: 63 MG/DL — CRITICAL HIGH (ref 10–20)
BUN SERPL-MCNC: 68 MG/DL — CRITICAL HIGH (ref 10–20)
CALCIUM SERPL-MCNC: 8.2 MG/DL — LOW (ref 8.5–10.1)
CALCIUM SERPL-MCNC: 8.2 MG/DL — LOW (ref 8.5–10.1)
CHLORIDE SERPL-SCNC: 107 MMOL/L — SIGNIFICANT CHANGE UP (ref 98–110)
CHLORIDE SERPL-SCNC: 107 MMOL/L — SIGNIFICANT CHANGE UP (ref 98–110)
CO2 SERPL-SCNC: 22 MMOL/L — SIGNIFICANT CHANGE UP (ref 17–32)
CO2 SERPL-SCNC: 23 MMOL/L — SIGNIFICANT CHANGE UP (ref 17–32)
CREAT SERPL-MCNC: 3.3 MG/DL — HIGH (ref 0.7–1.5)
CREAT SERPL-MCNC: 3.6 MG/DL — HIGH (ref 0.7–1.5)
EOSINOPHIL # BLD AUTO: 0.66 K/UL — SIGNIFICANT CHANGE UP (ref 0–0.7)
EOSINOPHIL NFR BLD AUTO: 6.9 % — SIGNIFICANT CHANGE UP (ref 0–8)
GLUCOSE BLDC GLUCOMTR-MCNC: 117 MG/DL — HIGH (ref 70–99)
GLUCOSE BLDC GLUCOMTR-MCNC: 119 MG/DL — HIGH (ref 70–99)
GLUCOSE BLDC GLUCOMTR-MCNC: 221 MG/DL — HIGH (ref 70–99)
GLUCOSE BLDC GLUCOMTR-MCNC: 355 MG/DL — HIGH (ref 70–99)
GLUCOSE SERPL-MCNC: 126 MG/DL — HIGH (ref 70–99)
GLUCOSE SERPL-MCNC: 138 MG/DL — HIGH (ref 70–99)
HCT VFR BLD CALC: 29.4 % — LOW (ref 42–52)
HGB BLD-MCNC: 8.7 G/DL — LOW (ref 14–18)
IMM GRANULOCYTES NFR BLD AUTO: 0.4 % — HIGH (ref 0.1–0.3)
IRON SATN MFR SERPL: 155 UG/DL — HIGH (ref 35–150)
IRON SATN MFR SERPL: 58 % — HIGH (ref 15–50)
LYMPHOCYTES # BLD AUTO: 1.34 K/UL — SIGNIFICANT CHANGE UP (ref 1.2–3.4)
LYMPHOCYTES # BLD AUTO: 14 % — LOW (ref 20.5–51.1)
MAGNESIUM SERPL-MCNC: 2 MG/DL — SIGNIFICANT CHANGE UP (ref 1.8–2.4)
MAGNESIUM SERPL-MCNC: 2.1 MG/DL — SIGNIFICANT CHANGE UP (ref 1.8–2.4)
MCHC RBC-ENTMCNC: 23.5 PG — LOW (ref 27–31)
MCHC RBC-ENTMCNC: 29.6 G/DL — LOW (ref 32–37)
MCV RBC AUTO: 79.5 FL — LOW (ref 80–94)
MONOCYTES # BLD AUTO: 0.95 K/UL — HIGH (ref 0.1–0.6)
MONOCYTES NFR BLD AUTO: 9.9 % — HIGH (ref 1.7–9.3)
NEUTROPHILS # BLD AUTO: 6.57 K/UL — HIGH (ref 1.4–6.5)
NEUTROPHILS NFR BLD AUTO: 68.4 % — SIGNIFICANT CHANGE UP (ref 42.2–75.2)
NRBC # BLD: 0 /100 WBCS — SIGNIFICANT CHANGE UP (ref 0–0)
PHOSPHATE SERPL-MCNC: 5.3 MG/DL — HIGH (ref 2.1–4.9)
PLATELET # BLD AUTO: 227 K/UL — SIGNIFICANT CHANGE UP (ref 130–400)
POTASSIUM SERPL-MCNC: 3.9 MMOL/L — SIGNIFICANT CHANGE UP (ref 3.5–5)
POTASSIUM SERPL-MCNC: 4 MMOL/L — SIGNIFICANT CHANGE UP (ref 3.5–5)
POTASSIUM SERPL-SCNC: 3.9 MMOL/L — SIGNIFICANT CHANGE UP (ref 3.5–5)
POTASSIUM SERPL-SCNC: 4 MMOL/L — SIGNIFICANT CHANGE UP (ref 3.5–5)
PROT SERPL-MCNC: 5.3 G/DL — LOW (ref 6–8.3)
PROT SERPL-MCNC: 5.3 G/DL — LOW (ref 6–8.3)
PROT SERPL-MCNC: 5.4 G/DL — LOW (ref 6–8)
RBC # BLD: 3.7 M/UL — LOW (ref 4.7–6.1)
RBC # FLD: 14.4 % — SIGNIFICANT CHANGE UP (ref 11.5–14.5)
SODIUM SERPL-SCNC: 140 MMOL/L — SIGNIFICANT CHANGE UP (ref 135–146)
SODIUM SERPL-SCNC: 140 MMOL/L — SIGNIFICANT CHANGE UP (ref 135–146)
TIBC SERPL-MCNC: 269 UG/DL — SIGNIFICANT CHANGE UP (ref 220–430)
UIBC SERPL-MCNC: 114 UG/DL — SIGNIFICANT CHANGE UP (ref 110–370)
WBC # BLD: 9.6 K/UL — SIGNIFICANT CHANGE UP (ref 4.8–10.8)
WBC # FLD AUTO: 9.6 K/UL — SIGNIFICANT CHANGE UP (ref 4.8–10.8)

## 2021-07-19 PROCEDURE — 93306 TTE W/DOPPLER COMPLETE: CPT | Mod: 26

## 2021-07-19 PROCEDURE — 71045 X-RAY EXAM CHEST 1 VIEW: CPT | Mod: 26

## 2021-07-19 PROCEDURE — 99233 SBSQ HOSP IP/OBS HIGH 50: CPT

## 2021-07-19 PROCEDURE — 93010 ELECTROCARDIOGRAM REPORT: CPT

## 2021-07-19 RX ORDER — BUMETANIDE 0.25 MG/ML
2 INJECTION INTRAMUSCULAR; INTRAVENOUS ONCE
Refills: 0 | Status: COMPLETED | OUTPATIENT
Start: 2021-07-19 | End: 2021-07-19

## 2021-07-19 RX ORDER — BUMETANIDE 0.25 MG/ML
3 INJECTION INTRAMUSCULAR; INTRAVENOUS ONCE
Refills: 0 | Status: DISCONTINUED | OUTPATIENT
Start: 2021-07-19 | End: 2021-07-19

## 2021-07-19 RX ORDER — SODIUM CHLORIDE 5 G/100ML
150 INJECTION, SOLUTION INTRAVENOUS
Refills: 0 | Status: DISCONTINUED | OUTPATIENT
Start: 2021-07-19 | End: 2021-07-20

## 2021-07-19 RX ORDER — CARVEDILOL PHOSPHATE 80 MG/1
3.12 CAPSULE, EXTENDED RELEASE ORAL EVERY 12 HOURS
Refills: 0 | Status: DISCONTINUED | OUTPATIENT
Start: 2021-07-19 | End: 2021-07-25

## 2021-07-19 RX ADMIN — BUMETANIDE 116 MILLIGRAM(S): 0.25 INJECTION INTRAMUSCULAR; INTRAVENOUS at 12:32

## 2021-07-19 RX ADMIN — SEVELAMER CARBONATE 800 MILLIGRAM(S): 2400 POWDER, FOR SUSPENSION ORAL at 12:15

## 2021-07-19 RX ADMIN — Medication 4: at 21:03

## 2021-07-19 RX ADMIN — ISOSORBIDE DINITRATE 10 MILLIGRAM(S): 5 TABLET ORAL at 17:31

## 2021-07-19 RX ADMIN — ATORVASTATIN CALCIUM 40 MILLIGRAM(S): 80 TABLET, FILM COATED ORAL at 21:02

## 2021-07-19 RX ADMIN — GABAPENTIN 100 MILLIGRAM(S): 400 CAPSULE ORAL at 17:31

## 2021-07-19 RX ADMIN — Medication 81 MILLIGRAM(S): at 12:15

## 2021-07-19 RX ADMIN — BRIMONIDINE TARTRATE 1 DROP(S): 2 SOLUTION/ DROPS OPHTHALMIC at 18:00

## 2021-07-19 RX ADMIN — SEVELAMER CARBONATE 800 MILLIGRAM(S): 2400 POWDER, FOR SUSPENSION ORAL at 17:31

## 2021-07-19 RX ADMIN — Medication 10 MILLIGRAM(S): at 13:00

## 2021-07-19 RX ADMIN — SODIUM CHLORIDE 50 MILLILITER(S): 5 INJECTION, SOLUTION INTRAVENOUS at 20:00

## 2021-07-19 RX ADMIN — IRON SUCROSE 210 MILLIGRAM(S): 20 INJECTION, SOLUTION INTRAVENOUS at 07:43

## 2021-07-19 RX ADMIN — DORZOLAMIDE HYDROCHLORIDE 1 DROP(S): 20 SOLUTION/ DROPS OPHTHALMIC at 05:27

## 2021-07-19 RX ADMIN — Medication 1 DROP(S): at 05:27

## 2021-07-19 RX ADMIN — Medication 10: at 07:44

## 2021-07-19 RX ADMIN — CHLORHEXIDINE GLUCONATE 1 APPLICATION(S): 213 SOLUTION TOPICAL at 05:19

## 2021-07-19 RX ADMIN — ISOSORBIDE DINITRATE 10 MILLIGRAM(S): 5 TABLET ORAL at 05:24

## 2021-07-19 RX ADMIN — BRIMONIDINE TARTRATE 1 DROP(S): 2 SOLUTION/ DROPS OPHTHALMIC at 05:28

## 2021-07-19 RX ADMIN — DORZOLAMIDE HYDROCHLORIDE 1 DROP(S): 20 SOLUTION/ DROPS OPHTHALMIC at 21:02

## 2021-07-19 RX ADMIN — Medication 1 DROP(S): at 18:18

## 2021-07-19 RX ADMIN — Medication 10 MILLIGRAM(S): at 21:02

## 2021-07-19 RX ADMIN — Medication 10 MILLIGRAM(S): at 05:23

## 2021-07-19 RX ADMIN — SODIUM ZIRCONIUM CYCLOSILICATE 10 GRAM(S): 10 POWDER, FOR SUSPENSION ORAL at 05:28

## 2021-07-19 RX ADMIN — BUMETANIDE 2 MILLIGRAM(S): 0.25 INJECTION INTRAMUSCULAR; INTRAVENOUS at 20:00

## 2021-07-19 RX ADMIN — ISOSORBIDE DINITRATE 10 MILLIGRAM(S): 5 TABLET ORAL at 12:15

## 2021-07-19 RX ADMIN — SODIUM CHLORIDE 50 MILLILITER(S): 5 INJECTION, SOLUTION INTRAVENOUS at 12:32

## 2021-07-19 RX ADMIN — SEVELAMER CARBONATE 800 MILLIGRAM(S): 2400 POWDER, FOR SUSPENSION ORAL at 07:44

## 2021-07-19 RX ADMIN — DORZOLAMIDE HYDROCHLORIDE 1 DROP(S): 20 SOLUTION/ DROPS OPHTHALMIC at 14:02

## 2021-07-19 RX ADMIN — HEPARIN SODIUM 5000 UNIT(S): 5000 INJECTION INTRAVENOUS; SUBCUTANEOUS at 21:02

## 2021-07-19 RX ADMIN — GABAPENTIN 100 MILLIGRAM(S): 400 CAPSULE ORAL at 05:23

## 2021-07-19 RX ADMIN — CARVEDILOL PHOSPHATE 3.12 MILLIGRAM(S): 80 CAPSULE, EXTENDED RELEASE ORAL at 17:31

## 2021-07-19 RX ADMIN — LATANOPROST 1 DROP(S): 0.05 SOLUTION/ DROPS OPHTHALMIC; TOPICAL at 21:02

## 2021-07-19 RX ADMIN — HEPARIN SODIUM 5000 UNIT(S): 5000 INJECTION INTRAVENOUS; SUBCUTANEOUS at 14:03

## 2021-07-19 RX ADMIN — CARVEDILOL PHOSPHATE 6.25 MILLIGRAM(S): 80 CAPSULE, EXTENDED RELEASE ORAL at 05:23

## 2021-07-19 RX ADMIN — HEPARIN SODIUM 5000 UNIT(S): 5000 INJECTION INTRAVENOUS; SUBCUTANEOUS at 05:25

## 2021-07-19 NOTE — PROGRESS NOTE ADULT - SUBJECTIVE AND OBJECTIVE BOX
PATIENT:  GILLIAN MENDOZA  [id number]      CHIEF COMPLAINT:  Patient is a 56y old  Male who presents with a chief complaint of New onset (16 Jul 2021 06:27)      INTERVAL HISTORY/OVERNIGHT EVENTS:      MEDICATIONS:  MEDICATIONS  (STANDING):  aspirin  chewable 81 milliGRAM(s) Oral daily  atorvastatin 40 milliGRAM(s) Oral at bedtime  brimonidine 0.2% Ophthalmic Solution 1 Drop(s) Both EYES two times a day  carvedilol 6.25 milliGRAM(s) Oral every 12 hours  chlorhexidine 4% Liquid 1 Application(s) Topical <User Schedule>  dextrose 40% Gel 15 Gram(s) Oral once  dextrose 5%. 1000 milliLiter(s) (50 mL/Hr) IV Continuous <Continuous>  dextrose 5%. 1000 milliLiter(s) (100 mL/Hr) IV Continuous <Continuous>  dextrose 50% Injectable 25 Gram(s) IV Push once  dextrose 50% Injectable 12.5 Gram(s) IV Push once  dextrose 50% Injectable 25 Gram(s) IV Push once  dorzolamide 2% Ophthalmic Solution 1 Drop(s) Both EYES three times a day  epoetin kinsey-epbx (RETACRIT) Injectable 98538 Unit(s) SubCutaneous every 7 days  gabapentin 100 milliGRAM(s) Oral two times a day  glucagon  Injectable 1 milliGRAM(s) IntraMuscular once  heparin   Injectable 5000 Unit(s) SubCutaneous every 8 hours  hydrALAZINE 10 milliGRAM(s) Oral every 8 hours  insulin lispro (ADMELOG) corrective regimen sliding scale   SubCutaneous Before meals and at bedtime  iron sucrose IVPB 100 milliGRAM(s) IV Intermittent every 24 hours  isosorbide   dinitrate Tablet (ISORDIL) 10 milliGRAM(s) Oral three times a day  latanoprost 0.005% Ophthalmic Solution 1 Drop(s) Both EYES at bedtime  sevelamer carbonate 800 milliGRAM(s) Oral three times a day with meals  sodium zirconium cyclosilicate 10 Gram(s) Oral two times a day  timolol 0.5% Solution 1 Drop(s) Both EYES two times a day    MEDICATIONS  (PRN):  LORazepam   Injectable 1 milliGRAM(s) IV Push once PRN Anxiety      ALLERGIES:  Allergies    No Known Allergies    Intolerances        OBJECTIVE:  ICU Vital Signs Last 24 Hrs  T(C): 36.4 (19 Jul 2021 04:00), Max: 36.9 (18 Jul 2021 16:00)  T(F): 97.5 (19 Jul 2021 04:00), Max: 98.4 (18 Jul 2021 16:00)  HR: 50 (19 Jul 2021 06:00) (44 - 54)  BP: 116/65 (19 Jul 2021 06:00) (101/62 - 141/74)  BP(mean): 82 (19 Jul 2021 06:00) (75 - 109)  ABP: --  ABP(mean): --  RR: 16 (19 Jul 2021 06:00) (16 - 20)  SpO2: 99% (19 Jul 2021 06:00) (99% - 100%)      Adult Advanced Hemodynamics Last 24 Hrs  CVP(mm Hg): --  CVP(cm H2O): --  CO: --  CI: --  PA: --  PA(mean): --  PCWP: --  SVR: --  SVRI: --  PVR: --  PVRI: --  CAPILLARY BLOOD GLUCOSE      POCT Blood Glucose.: 131 mg/dL (18 Jul 2021 21:32)  POCT Blood Glucose.: 151 mg/dL (18 Jul 2021 16:24)  POCT Blood Glucose.: 185 mg/dL (18 Jul 2021 12:30)  POCT Blood Glucose.: 136 mg/dL (18 Jul 2021 08:34)    CAPILLARY BLOOD GLUCOSE      POCT Blood Glucose.: 131 mg/dL (18 Jul 2021 21:32)    I&O's Summary    17 Jul 2021 07:01  -  18 Jul 2021 07:00  --------------------------------------------------------  IN: 950 mL / OUT: 1625 mL / NET: -675 mL    18 Jul 2021 07:01  -  19 Jul 2021 06:38  --------------------------------------------------------  IN: 1300 mL / OUT: 2300 mL / NET: -1000 mL      Daily     Daily     PHYSICAL EXAMINATION:  General: WN/WD NAD  HEENT: PERRLA, EOMI, moist mucous membranes  Neurology: A&Ox3, nonfocal, PETERS x 4  Respiratory: CTA B/L, normal respiratory effort, no wheezes, crackles, rales  CV: RRR, S1S2, no murmurs, rubs or gallops  Abdominal: Soft, NT, ND +BS, Last BM  Extremities: No edema, + peripheral pulses  Incisions:   Tubes:    LABS:                          8.7    9.60  )-----------( 227      ( 19 Jul 2021 04:41 )             29.4     07-19    140  |  107  |  68<HH>  ----------------------------<  126<H>  4.0   |  23  |  3.6<H>    Ca    8.2<L>      19 Jul 2021 04:41  Phos  5.3     07-19  Mg     2.1     07-19    TPro  5.4<L>  /  Alb  2.7<L>  /  TBili  0.3  /  DBili  x   /  AST  21  /  ALT  81<H>  /  AlkPhos  243<H>  07-19    LIVER FUNCTIONS - ( 19 Jul 2021 04:41 )  Alb: 2.7 g/dL / Pro: 5.4 g/dL / ALK PHOS: 243 U/L / ALT: 81 U/L / AST: 21 U/L / GGT: x                       TELEMETRY:    EKG:     IMAGING:   PATIENT:  GILLIAN MENDOZA  [id number]      CHIEF COMPLAINT:  Patient is a 56y old Male with PMHx of HTN, HLD, CKD, T2DM on insulin, Glaucoma who presents with a chief complaint of worsening lower extremity swelling. Patient was admitted for new onset CHF.  Today is day 17 of admission.(16 Jul 2021 06:27)      INTERVAL HISTORY/OVERNIGHT EVENTS:  Patient was examined on the chair sitting bedside. Overnight there was no acute events. Patient denies any SOB, chest pain, abdominal pain. He denies any fever, sweats, or chills. Patient reports eating, ambulating and using the restroom.       MEDICATIONS:  MEDICATIONS  (STANDING):  aspirin  chewable 81 milliGRAM(s) Oral daily  atorvastatin 40 milliGRAM(s) Oral at bedtime  brimonidine 0.2% Ophthalmic Solution 1 Drop(s) Both EYES two times a day  carvedilol 6.25 milliGRAM(s) Oral every 12 hours  chlorhexidine 4% Liquid 1 Application(s) Topical <User Schedule>  dextrose 40% Gel 15 Gram(s) Oral once  dextrose 5%. 1000 milliLiter(s) (50 mL/Hr) IV Continuous <Continuous>  dextrose 5%. 1000 milliLiter(s) (100 mL/Hr) IV Continuous <Continuous>  dextrose 50% Injectable 25 Gram(s) IV Push once  dextrose 50% Injectable 12.5 Gram(s) IV Push once  dextrose 50% Injectable 25 Gram(s) IV Push once  dorzolamide 2% Ophthalmic Solution 1 Drop(s) Both EYES three times a day  epoetin kinsey-epbx (RETACRIT) Injectable 01387 Unit(s) SubCutaneous every 7 days  gabapentin 100 milliGRAM(s) Oral two times a day  glucagon  Injectable 1 milliGRAM(s) IntraMuscular once  heparin   Injectable 5000 Unit(s) SubCutaneous every 8 hours  hydrALAZINE 10 milliGRAM(s) Oral every 8 hours  insulin lispro (ADMELOG) corrective regimen sliding scale   SubCutaneous Before meals and at bedtime  iron sucrose IVPB 100 milliGRAM(s) IV Intermittent every 24 hours  isosorbide   dinitrate Tablet (ISORDIL) 10 milliGRAM(s) Oral three times a day  latanoprost 0.005% Ophthalmic Solution 1 Drop(s) Both EYES at bedtime  sevelamer carbonate 800 milliGRAM(s) Oral three times a day with meals  sodium zirconium cyclosilicate 10 Gram(s) Oral two times a day  timolol 0.5% Solution 1 Drop(s) Both EYES two times a day    MEDICATIONS  (PRN):  LORazepam   Injectable 1 milliGRAM(s) IV Push once PRN Anxiety      ALLERGIES:  Allergies    No Known Allergies    Intolerances    OBJECTIVE:  ICU Vital Signs Last 24 Hrs  T(C): 36.4 (19 Jul 2021 04:00), Max: 36.9 (18 Jul 2021 16:00)  T(F): 97.5 (19 Jul 2021 04:00), Max: 98.4 (18 Jul 2021 16:00)  HR: 50 (19 Jul 2021 06:00) (44 - 54)  BP: 116/65 (19 Jul 2021 06:00) (101/62 - 141/74)  BP(mean): 82 (19 Jul 2021 06:00) (75 - 109)  ABP: --  ABP(mean): --  RR: 16 (19 Jul 2021 06:00) (16 - 20)  SpO2: 99% (19 Jul 2021 06:00) (99% - 100%)      Adult Advanced Hemodynamics Last 24 Hrs  CVP(mm Hg): --  CVP(cm H2O): --  CO: --  CI: --  PA: --  PA(mean): --  PCWP: --  SVR: --  SVRI: --  PVR: --  PVRI: --  CAPILLARY BLOOD GLUCOSE      POCT Blood Glucose.: 131 mg/dL (18 Jul 2021 21:32)  POCT Blood Glucose.: 151 mg/dL (18 Jul 2021 16:24)  POCT Blood Glucose.: 185 mg/dL (18 Jul 2021 12:30)  POCT Blood Glucose.: 136 mg/dL (18 Jul 2021 08:34)    CAPILLARY BLOOD GLUCOSE      POCT Blood Glucose.: 131 mg/dL (18 Jul 2021 21:32)    I&O's Summary    17 Jul 2021 07:01  -  18 Jul 2021 07:00  --------------------------------------------------------  IN: 950 mL / OUT: 1625 mL / NET: -675 mL    18 Jul 2021 07:01  -  19 Jul 2021 06:38  --------------------------------------------------------  IN: 1300 mL / OUT: 2300 mL / NET: -1000 mL      Daily     Daily     PHYSICAL EXAMINATION:  General: In no acute distress, alert and oriented x3, sitting comfortably in chair bedside  HEENT: PERRLA, EOMI, moist mucous membranes  Neurology: A&Ox3, nonfocal, PETERS x 4  Respiratory: Lungs cleared BL to ausculation, no accessory muscle use, no wheezes, crackles, rales  CV: regular rate and rythym, loud s2 murmur,   Abdominal: Soft, non tender, non distended, bowel sounds present  Extremities: no edema, venous stasis dermatitis  Incisions:   Tubes:    LABS:                          8.7    9.60  )-----------( 227      ( 19 Jul 2021 04:41 )             29.4     07-19    140  |  107  |  68<HH>  ----------------------------<  126<H>  4.0   |  23  |  3.6<H>    Ca    8.2<L>      19 Jul 2021 04:41  Phos  5.3     07-19  Mg     2.1     07-19    TPro  5.4<L>  /  Alb  2.7<L>  /  TBili  0.3  /  DBili  x   /  AST  21  /  ALT  81<H>  /  AlkPhos  243<H>  07-19    LIVER FUNCTIONS - ( 19 Jul 2021 04:41 )  Alb: 2.7 g/dL / Pro: 5.4 g/dL / ALK PHOS: 243 U/L / ALT: 81 U/L / AST: 21 U/L / GGT: x             TELEMETRY:  Bradycardic - 50    EKG:   from: 12 Lead ECG (07.18.21 @ 05:28)   Sinus bradycardia  Right bundle branch block  Left posterior fascicular block  *** Bifascicular block ***  Possible Inferior infarct , age undetermined  T wave abnormality, consider lateral ischemia  Abnormal ECG    IMAGING:   Xray Chest 1 View- PORTABLE-Routine (Xray Chest 1 View- PORTABLE-Routine in AM.) (07.18.21 @ 05:22)   Stable cardiomegaly/pericardial effusion.       PATIENT:  GILLIAN MENDOZA  [id number]      CHIEF COMPLAINT:  Patient is a 56y old Male with PMHx of HTN, HLD, CKD, T2DM on insulin, Glaucoma who presented with a chief complaint of worsening lower extremity swelling. Patient was admitted for new onset CHF.  Today is day 17 of admission.(16 Jul 2021 06:27)      INTERVAL HISTORY/OVERNIGHT EVENTS:  Patient was examined on the chair sitting bedside. Overnight there was no acute events. Patient denies any SOB, chest pain, abdominal pain. He denies any fever, sweats, or chills. Patient reports eating, ambulating and using the restroom.       MEDICATIONS:  MEDICATIONS  (STANDING):  aspirin  chewable 81 milliGRAM(s) Oral daily  atorvastatin 40 milliGRAM(s) Oral at bedtime  brimonidine 0.2% Ophthalmic Solution 1 Drop(s) Both EYES two times a day  carvedilol 6.25 milliGRAM(s) Oral every 12 hours  chlorhexidine 4% Liquid 1 Application(s) Topical <User Schedule>  dextrose 40% Gel 15 Gram(s) Oral once  dextrose 5%. 1000 milliLiter(s) (50 mL/Hr) IV Continuous <Continuous>  dextrose 5%. 1000 milliLiter(s) (100 mL/Hr) IV Continuous <Continuous>  dextrose 50% Injectable 25 Gram(s) IV Push once  dextrose 50% Injectable 12.5 Gram(s) IV Push once  dextrose 50% Injectable 25 Gram(s) IV Push once  dorzolamide 2% Ophthalmic Solution 1 Drop(s) Both EYES three times a day  epoetin kinsey-epbx (RETACRIT) Injectable 55058 Unit(s) SubCutaneous every 7 days  gabapentin 100 milliGRAM(s) Oral two times a day  glucagon  Injectable 1 milliGRAM(s) IntraMuscular once  heparin   Injectable 5000 Unit(s) SubCutaneous every 8 hours  hydrALAZINE 10 milliGRAM(s) Oral every 8 hours  insulin lispro (ADMELOG) corrective regimen sliding scale   SubCutaneous Before meals and at bedtime  iron sucrose IVPB 100 milliGRAM(s) IV Intermittent every 24 hours  isosorbide   dinitrate Tablet (ISORDIL) 10 milliGRAM(s) Oral three times a day  latanoprost 0.005% Ophthalmic Solution 1 Drop(s) Both EYES at bedtime  sevelamer carbonate 800 milliGRAM(s) Oral three times a day with meals  sodium zirconium cyclosilicate 10 Gram(s) Oral two times a day  timolol 0.5% Solution 1 Drop(s) Both EYES two times a day    MEDICATIONS  (PRN):  LORazepam   Injectable 1 milliGRAM(s) IV Push once PRN Anxiety      ALLERGIES:  Allergies    No Known Allergies    Intolerances    OBJECTIVE:  ICU Vital Signs Last 24 Hrs  T(C): 36.4 (19 Jul 2021 04:00), Max: 36.9 (18 Jul 2021 16:00)  T(F): 97.5 (19 Jul 2021 04:00), Max: 98.4 (18 Jul 2021 16:00)  HR: 50 (19 Jul 2021 06:00) (44 - 54)  BP: 116/65 (19 Jul 2021 06:00) (101/62 - 141/74)  BP(mean): 82 (19 Jul 2021 06:00) (75 - 109)  ABP: --  ABP(mean): --  RR: 16 (19 Jul 2021 06:00) (16 - 20)  SpO2: 99% (19 Jul 2021 06:00) (99% - 100%)      Adult Advanced Hemodynamics Last 24 Hrs  CVP(mm Hg): --  CVP(cm H2O): --  CO: --  CI: --  PA: --  PA(mean): --  PCWP: --  SVR: --  SVRI: --  PVR: --  PVRI: --  CAPILLARY BLOOD GLUCOSE      POCT Blood Glucose.: 131 mg/dL (18 Jul 2021 21:32)  POCT Blood Glucose.: 151 mg/dL (18 Jul 2021 16:24)  POCT Blood Glucose.: 185 mg/dL (18 Jul 2021 12:30)  POCT Blood Glucose.: 136 mg/dL (18 Jul 2021 08:34)    CAPILLARY BLOOD GLUCOSE      POCT Blood Glucose.: 131 mg/dL (18 Jul 2021 21:32)    I&O's Summary    17 Jul 2021 07:01  -  18 Jul 2021 07:00  --------------------------------------------------------  IN: 950 mL / OUT: 1625 mL / NET: -675 mL    18 Jul 2021 07:01  -  19 Jul 2021 06:38  --------------------------------------------------------  IN: 1300 mL / OUT: 2300 mL / NET: -1000 mL      Daily     Daily     PHYSICAL EXAMINATION:  General: In no acute distress, alert and oriented x3, sitting comfortably in chair bedside  HEENT: PERRLA, EOMI, moist mucous membranes  Neurology: A&Ox3, nonfocal, PETERS x 4  Respiratory: Lungs cleared BL to ausculation, no accessory muscle use, no wheezes, crackles, rales  CV: regular rate and rythym, loud s2 murmur,   Abdominal: Soft, non tender, non distended, bowel sounds present  Extremities: no edema, venous stasis dermatitis  Incisions:   Tubes:    LABS:                          8.7    9.60  )-----------( 227      ( 19 Jul 2021 04:41 )             29.4     07-19    140  |  107  |  68<HH>  ----------------------------<  126<H>  4.0   |  23  |  3.6<H>    Ca    8.2<L>      19 Jul 2021 04:41  Phos  5.3     07-19  Mg     2.1     07-19    TPro  5.4<L>  /  Alb  2.7<L>  /  TBili  0.3  /  DBili  x   /  AST  21  /  ALT  81<H>  /  AlkPhos  243<H>  07-19    LIVER FUNCTIONS - ( 19 Jul 2021 04:41 )  Alb: 2.7 g/dL / Pro: 5.4 g/dL / ALK PHOS: 243 U/L / ALT: 81 U/L / AST: 21 U/L / GGT: x             TELEMETRY:  Bradycardic - 50    EKG:   from: 12 Lead ECG (07.18.21 @ 05:28)   Sinus bradycardia  Right bundle branch block  Left posterior fascicular block  *** Bifascicular block ***  Possible Inferior infarct , age undetermined  T wave abnormality, consider lateral ischemia  Abnormal ECG    IMAGING:   Xray Chest 1 View- PORTABLE-Routine (Xray Chest 1 View- PORTABLE-Routine in AM.) (07.18.21 @ 05:22)   Stable cardiomegaly/pericardial effusion.

## 2021-07-19 NOTE — PROGRESS NOTE ADULT - ATTENDING COMMENTS
Severe triple vessel disease/ ICM / acute on chronic systolic HF. CABG delayed due to worsening renal function. Remains bradycardia ~50s.       Bumex 2 mg iv push + 3% hypertonic saline   Decrease dose of BB   Repeat cardiac MRI once stable   CABG per CT surgery once renal function improves  Renal following for possible HD Severe triple vessel disease/ ICM / acute on chronic systolic HF. CABG delayed due to worsening renal function. Remains bradycardia ~50s.       Bumex 2 mg iv push + 3% hypertonic saline once - second dose in the evening if renal function stable   Decrease dose of BB   Repeat cardiac MRI once stable   Discussed with CT surgery and interventional cardiology. Risk of ending in HD might be higher if going for CABG than percutaneous approach to LAD lesion with staged PCI to LCx  Renal following for possible HD

## 2021-07-19 NOTE — PROGRESS NOTE ADULT - ASSESSMENT
56M w/ PMHx HTN, HLD, T2DM on insulin x>10 years, retinopathy, Glaucoma presents to the Ed c/o worsening LE swelling and pain for the past 1 month. Endorses SOB on exertion, along w/ increased abdominal girth.    # LENNY / CKD 3b / CRS / ATN/hypotension / time frame also c/w contrast induced nephropathy - worsening creat due to low BP   # Hyperkalemia better DC lokelma   # creatinine better  # Nephrotic range proteinuria - needs SPEP UPEP SIF UIF Kappa/lambda 1.9, MARLIN c3c4 wnl; likely due to DM  - last phosphorus level noted cont renvela one tab po qac  - renal ultrasound noted w/o hydronephrosis, right renal cysts  # Normocytic anemia, iron deficient  0n Venofer, started Procrit 10,000 sq qweek  # ADHF, low EF - s/p cardiac cath, appreciate CT surgery f/u regarding CABG  # cardiology notes appreciated   # no acute indication for RRT   Will follow

## 2021-07-19 NOTE — PROGRESS NOTE ADULT - SUBJECTIVE AND OBJECTIVE BOX
Nephrology progress note    THIS IS AN INCOMPLETE NOTE . FULL NOTE TO FOLLOW SHORTLY    Patient is seen and examined, events over the last 24 h noted .    Allergies:  No Known Allergies    Hospital Medications:   MEDICATIONS  (STANDING):  aspirin  chewable 81 milliGRAM(s) Oral daily  atorvastatin 40 milliGRAM(s) Oral at bedtime  brimonidine 0.2% Ophthalmic Solution 1 Drop(s) Both EYES two times a day  carvedilol 6.25 milliGRAM(s) Oral every 12 hours  chlorhexidine 4% Liquid 1 Application(s) Topical <User Schedule>  dextrose 40% Gel 15 Gram(s) Oral once  dextrose 5%. 1000 milliLiter(s) (50 mL/Hr) IV Continuous <Continuous>  dextrose 5%. 1000 milliLiter(s) (100 mL/Hr) IV Continuous <Continuous>  dextrose 50% Injectable 25 Gram(s) IV Push once  dextrose 50% Injectable 12.5 Gram(s) IV Push once  dextrose 50% Injectable 25 Gram(s) IV Push once  dorzolamide 2% Ophthalmic Solution 1 Drop(s) Both EYES three times a day  epoetin kinsey-epbx (RETACRIT) Injectable 30971 Unit(s) SubCutaneous every 7 days  gabapentin 100 milliGRAM(s) Oral two times a day  glucagon  Injectable 1 milliGRAM(s) IntraMuscular once  heparin   Injectable 5000 Unit(s) SubCutaneous every 8 hours  hydrALAZINE 10 milliGRAM(s) Oral every 8 hours  insulin lispro (ADMELOG) corrective regimen sliding scale   SubCutaneous Before meals and at bedtime  iron sucrose IVPB 100 milliGRAM(s) IV Intermittent every 24 hours  isosorbide   dinitrate Tablet (ISORDIL) 10 milliGRAM(s) Oral three times a day  latanoprost 0.005% Ophthalmic Solution 1 Drop(s) Both EYES at bedtime  sevelamer carbonate 800 milliGRAM(s) Oral three times a day with meals  sodium zirconium cyclosilicate 10 Gram(s) Oral two times a day  timolol 0.5% Solution 1 Drop(s) Both EYES two times a day        VITALS:  T(F): 97.6 (07-19-21 @ 08:00), Max: 98.4 (07-18-21 @ 16:00)  HR: 48 (07-19-21 @ 08:00)  BP: 132/70 (07-19-21 @ 08:00)  RR: 14 (07-19-21 @ 08:00)  SpO2: 99% (07-19-21 @ 08:00)  Wt(kg): --    07-17 @ 07:01  -  07-18 @ 07:00  --------------------------------------------------------  IN: 950 mL / OUT: 1625 mL / NET: -675 mL    07-18 @ 07:01  -  07-19 @ 07:00  --------------------------------------------------------  IN: 1300 mL / OUT: 2300 mL / NET: -1000 mL          PHYSICAL EXAM:  Constitutional: NAD  HEENT: anicteric sclera, oropharynx clear, MMM  Neck: No JVD  Respiratory: CTAB, no wheezes, rales or rhonchi  Cardiovascular: S1, S2, RRR  Gastrointestinal: BS+, soft, NT/ND  Extremities: No cyanosis or clubbing. No peripheral edema  :  No harirs.   Skin: No rashes    LABS:  07-19    140  |  107  |  68<HH>  ----------------------------<  126<H>  4.0   |  23  |  3.6<H>    Ca    8.2<L>      19 Jul 2021 04:41  Phos  5.3     07-19  Mg     2.1     07-19    TPro  5.4<L>  /  Alb  2.7<L>  /  TBili  0.3  /  DBili      /  AST  21  /  ALT  81<H>  /  AlkPhos  243<H>  07-19                          8.7    9.60  )-----------( 227      ( 19 Jul 2021 04:41 )             29.4       Urine Studies:      RADIOLOGY & ADDITIONAL STUDIES:   Nephrology progress note  Patient is seen and examined, events over the last 24 h noted .  sitting in chair comfortable     Allergies:  No Known Allergies    Hospital Medications:   MEDICATIONS  (STANDING):  aspirin  chewable 81 milliGRAM(s) Oral daily  atorvastatin 40 milliGRAM(s) Oral at bedtime  brimonidine 0.2% Ophthalmic Solution 1 Drop(s) Both EYES two times a day  carvedilol 6.25 milliGRAM(s) Oral every 12 hours  dorzolamide 2% Ophthalmic Solution 1 Drop(s) Both EYES three times a day  epoetin kinsey-epbx (RETACRIT) Injectable 28353 Unit(s) SubCutaneous every 7 days  gabapentin 100 milliGRAM(s) Oral two times a day  glucagon  Injectable 1 milliGRAM(s) IntraMuscular once  heparin   Injectable 5000 Unit(s) SubCutaneous every 8 hours  hydrALAZINE 10 milliGRAM(s) Oral every 8 hours  insulin lispro (ADMELOG) corrective regimen sliding scale   SubCutaneous Before meals and at bedtime  iron sucrose IVPB 100 milliGRAM(s) IV Intermittent every 24 hours  isosorbide   dinitrate Tablet (ISORDIL) 10 milliGRAM(s) Oral three times a day  latanoprost 0.005% Ophthalmic Solution 1 Drop(s) Both EYES at bedtime  sevelamer carbonate 800 milliGRAM(s) Oral three times a day with meals  sodium zirconium cyclosilicate 10 Gram(s) Oral two times a day  timolol 0.5% Solution 1 Drop(s) Both EYES two times a day        VITALS:  T(F): 97.6 (07-19-21 @ 08:00), Max: 98.4 (07-18-21 @ 16:00)  HR: 48 (07-19-21 @ 08:00)  BP: 132/70 (07-19-21 @ 08:00)  RR: 14 (07-19-21 @ 08:00)  SpO2: 99% (07-19-21 @ 08:00)      07-17 @ 07:01  -  07-18 @ 07:00  --------------------------------------------------------  IN: 950 mL / OUT: 1625 mL / NET: -675 mL    07-18 @ 07:01  -  07-19 @ 07:00  --------------------------------------------------------  IN: 1300 mL / OUT: 2300 mL / NET: -1000 mL          PHYSICAL EXAM:  Constitutional: NAD  Neck: No JVD  Respiratory: CTAB,  Cardiovascular: S1, S2, RRR  Gastrointestinal: BS+, soft, NT/ND  Extremities: No cyanosis or clubbing. plus one pitting edema   :  No harris.   Skin: No rashes    LABS:  07-19    140  |  107  |  68<HH>  ----------------------------<  126<H>  4.0   |  23  |  3.6<H>  Creatinine Trend: 3.6<--, 3.9<--, 3.8<--, 3.9<--, 3.6<--, 3.5<--  Ca    8.2<L>      19 Jul 2021 04:41  Phos  5.3     07-19  Mg     2.1     07-19    TPro  5.4<L>  /  Alb  2.7<L>  /  TBili  0.3  /  DBili      /  AST  21  /  ALT  81<H>  /  AlkPhos  243<H>  07-19                          8.7    9.60  )-----------( 227      ( 19 Jul 2021 04:41 )             29.4       Urine Studies:      RADIOLOGY & ADDITIONAL STUDIES:

## 2021-07-19 NOTE — PROGRESS NOTE ADULT - SUBJECTIVE AND OBJECTIVE BOX
OPERATIVE PROCEDURE(s):               Possible pre-op CABG                56yMale  SURGEON(s): DEBORAH Meléndez  SUBJECTIVE ASSESSMENT: patient sitting on  side of bed, a little dyspneic     Vital Signs Last 24 Hrs  T(F): 97.6 (19 Jul 2021 08:00), Max: 98.4 (18 Jul 2021 16:00)  HR: 48 (19 Jul 2021 10:00) (48 - 54)  BP: 114/68 (19 Jul 2021 10:00) (101/62 - 141/74)  BP(mean): 89 (19 Jul 2021 10:00) (75 - 109)  RR: 16 (19 Jul 2021 10:00) (14 - 20)  SpO2: 99% (19 Jul 2021 10:00) (99% - 100%)    I&O's Detail    18 Jul 2021 07:01  -  19 Jul 2021 07:00  --------------------------------------------------------  IN:    Oral Fluid: 1300 mL  Total IN: 1300 mL    OUT:    Voided (mL): 2300 mL  Total OUT: 2300 mL    Net:   I&O's Detail    17 Jul 2021 07:01  -  18 Jul 2021 07:00  --------------------------------------------------------  Total NET: -675 mL    18 Jul 2021 07:01  -  19 Jul 2021 07:00  --------------------------------------------------------  Total NET: -1000 mL      CAPILLARY BLOOD GLUCOSE    POCT Blood Glucose.: 117 mg/dL (19 Jul 2021 11:20)  POCT Blood Glucose.: 355 mg/dL (19 Jul 2021 07:34)  POCT Blood Glucose.: 131 mg/dL (18 Jul 2021 21:32)  POCT Blood Glucose.: 151 mg/dL (18 Jul 2021 16:24)    Physical Exam:  General: NAD; A&Ox3  Cardiac: S1/S2, RRR, + murmur, no rubs  Lungs: unlabored shallow respirations, b/l bs, essentially clear  Abdomen: Soft/NT/protuberant  Extremities: minimal edema b/l lower extremities        LABS:                        8.7<L>  9.60  )-----------( 227      ( 19 Jul 2021 04:41 )             29.4<L>                        9.1<L>  9.37  )-----------( 240      ( 18 Jul 2021 04:55 )             30.9<L>    07-19    140  |  107  |  68<HH>  ----------------------------<  126<H>  4.0   |  23  |  3.6<H>  07-18    137  |  105  |  74<HH>  ----------------------------<  127<H>  4.5   |  21  |  3.9<H>    Ca    8.2<L>      19 Jul 2021 04:41  Phos  5.3     07-19  Mg     2.1     07-19    TPro  5.4<L> [6.0 - 8.0]  /  Alb  2.7<L> [3.5 - 5.2]  /  TBili  0.3 [0.2 - 1.2]  /  DBili  x   /  AST  21 [0 - 41]  /  ALT  81<H> [0 - 41]  /  AlkPhos  243<H> [30 - 115]  07-19    RADIOLOGY & ADDITIONAL TESTS:  CXR: < from: Xray Chest 1 View- PORTABLE-Routine (Xray Chest 1 View- PORTABLE-Routine in AM.) (07.19.21 @ 06:29) >  IMPRESSION:    Redemonstrated enlarged cardiac silhouette likely secondary to large pericardial effusion. Visually this is without significant change. Increased bilateral diffuse opacities/vascular congestion. No pneumothorax. Unchanged osseous structures.    < end of copied text >    EKG: < from: 12 Lead ECG (07.19.21 @ 05:04) >    Ventricular Rate 50 BPM    Atrial Rate 50 BPM    P-R Interval 180 ms    QRS Duration 136 ms    Q-T Interval 518 ms    QTC Calculation(Bazett) 472 ms    P Axis 46 degrees    R Axis 118 degrees    T Axis 65 degrees    Diagnosis Line Sinus bradycardia  Right bundle branch block  Rightward axis  Left posterior fascicular block Possible  *** Bifascicular block ***  Cannot rule out Inferior infarct , age undetermined  T wave abnormality, consider lateral ischemia  Abnormal ECG      MEDICATIONS  (STANDING):  aspirin  chewable 81 milliGRAM(s) Oral daily  atorvastatin 40 milliGRAM(s) Oral at bedtime  brimonidine 0.2% Ophthalmic Solution 1 Drop(s) Both EYES two times a day  carvedilol 3.125 milliGRAM(s) Oral every 12 hours  chlorhexidine 4% Liquid 1 Application(s) Topical <User Schedule>  dorzolamide 2% Ophthalmic Solution 1 Drop(s) Both EYES three times a day  epoetin kinsey-epbx (RETACRIT) Injectable 22455 Unit(s) SubCutaneous every 7 days  gabapentin 100 milliGRAM(s) Oral two times a day  heparin   Injectable 5000 Unit(s) SubCutaneous every 8 hours  hydrALAZINE 10 milliGRAM(s) Oral every 8 hours  insulin lispro (ADMELOG) corrective regimen sliding scale   SubCutaneous Before meals and at bedtime  iron sucrose IVPB 100 milliGRAM(s) IV Intermittent every 24 hours  isosorbide   dinitrate Tablet (ISORDIL) 10 milliGRAM(s) Oral three times a day  latanoprost 0.005% Ophthalmic Solution 1 Drop(s) Both EYES at bedtime  sevelamer carbonate 800 milliGRAM(s) Oral three times a day with meals  sodium chloride 3%. 150 milliLiter(s) (50 mL/Hr) IV Continuous <Continuous>  timolol 0.5% Solution 1 Drop(s) Both EYES two times a day    MEDICATIONS  (PRN):    Allergies    No Known Allergies    Intolerances      Ambulation/Activity Status: ambulate as tolerated    Assessment/Plan:  56M w/ PMHx HTN, HLD, CKD, T2DM on insulin, Glaucoma presented to the ED c/o worsening LE swelling. No cardiac Hx.  Patient diagnosed with new onset Acute HFrEF exacerbation.  CXR showed cardiomegaly and blunting of CP angles. BNP 72939.  Cath showed significant 3 V disease, LAD, RCA, LCx. CTS was consulted. During preop cardiac MRI patient became SOB. There was bilateral effusions with adjacent atelectasis and moderate pericardial effusion on imaging .     While hospitalized patient's cr worsened given time frame of cath, multiple with hypotension.  He was transferred to CCU for monitoring  kidney function while diuresing and pending CABG. Patient may need dialysis before CABG.     Suggest repeat TTE for EF re-evaluation    CXR significant cardiomegaly ? 2/2 pericardial effusion  with increasing pulmonary vascular congestion..    Pt currently too high risk for urgent CABG, Acute on chronic systolic HF / LENNY (GFR 18)/ Fluid overload, with bradycardia and hypotension patient needs medical optimization prior to surgery. Case discussed on rounds this AM. A consideration for assist device with surgery was proposed. Dr. Yee to discuss with Dr. Cantu.    Transaminitis stable track and trend    will follow    As per medical team  Social Service Disposition:     OPERATIVE PROCEDURE(s):               Possible pre-op CABG                56yMale  SURGEON(s): DEBORAH Meléndez  SUBJECTIVE ASSESSMENT: patient sitting on  side of bed, a little dyspneic     Vital Signs Last 24 Hrs  T(F): 97.6 (19 Jul 2021 08:00), Max: 98.4 (18 Jul 2021 16:00)  HR: 48 (19 Jul 2021 10:00) (48 - 54)  BP: 114/68 (19 Jul 2021 10:00) (101/62 - 141/74)  BP(mean): 89 (19 Jul 2021 10:00) (75 - 109)  RR: 16 (19 Jul 2021 10:00) (14 - 20)  SpO2: 99% (19 Jul 2021 10:00) (99% - 100%)    I&O's Detail    18 Jul 2021 07:01  -  19 Jul 2021 07:00  --------------------------------------------------------  IN:    Oral Fluid: 1300 mL  Total IN: 1300 mL    OUT:    Voided (mL): 2300 mL  Total OUT: 2300 mL    Net:   I&O's Detail    17 Jul 2021 07:01  -  18 Jul 2021 07:00  --------------------------------------------------------  Total NET: -675 mL    18 Jul 2021 07:01  -  19 Jul 2021 07:00  --------------------------------------------------------  Total NET: -1000 mL      CAPILLARY BLOOD GLUCOSE    POCT Blood Glucose.: 117 mg/dL (19 Jul 2021 11:20)  POCT Blood Glucose.: 355 mg/dL (19 Jul 2021 07:34)  POCT Blood Glucose.: 131 mg/dL (18 Jul 2021 21:32)  POCT Blood Glucose.: 151 mg/dL (18 Jul 2021 16:24)    Physical Exam:  General: NAD; A&Ox3  Cardiac: S1/S2, RRR, + murmur, no rubs  Lungs: unlabored shallow respirations, b/l bs, essentially clear  Abdomen: Soft/NT/protuberant  Extremities: minimal edema b/l lower extremities        LABS:                        8.7<L>  9.60  )-----------( 227      ( 19 Jul 2021 04:41 )             29.4<L>                        9.1<L>  9.37  )-----------( 240      ( 18 Jul 2021 04:55 )             30.9<L>    07-19    140  |  107  |  68<HH>  ----------------------------<  126<H>  4.0   |  23  |  3.6<H>  07-18    137  |  105  |  74<HH>  ----------------------------<  127<H>  4.5   |  21  |  3.9<H>    Ca    8.2<L>      19 Jul 2021 04:41  Phos  5.3     07-19  Mg     2.1     07-19    TPro  5.4<L> [6.0 - 8.0]  /  Alb  2.7<L> [3.5 - 5.2]  /  TBili  0.3 [0.2 - 1.2]  /  DBili  x   /  AST  21 [0 - 41]  /  ALT  81<H> [0 - 41]  /  AlkPhos  243<H> [30 - 115]  07-19    RADIOLOGY & ADDITIONAL TESTS:  CXR: < from: Xray Chest 1 View- PORTABLE-Routine (Xray Chest 1 View- PORTABLE-Routine in AM.) (07.19.21 @ 06:29) >  IMPRESSION:    Redemonstrated enlarged cardiac silhouette likely secondary to large pericardial effusion. Visually this is without significant change. Increased bilateral diffuse opacities/vascular congestion. No pneumothorax. Unchanged osseous structures.    < end of copied text >    EKG: < from: 12 Lead ECG (07.19.21 @ 05:04) >    Ventricular Rate 50 BPM    Atrial Rate 50 BPM    P-R Interval 180 ms    QRS Duration 136 ms    Q-T Interval 518 ms    QTC Calculation(Bazett) 472 ms    P Axis 46 degrees    R Axis 118 degrees    T Axis 65 degrees    Diagnosis Line Sinus bradycardia  Right bundle branch block  Rightward axis  Left posterior fascicular block Possible  *** Bifascicular block ***  Cannot rule out Inferior infarct , age undetermined  T wave abnormality, consider lateral ischemia  Abnormal ECG      MEDICATIONS  (STANDING):  aspirin  chewable 81 milliGRAM(s) Oral daily  atorvastatin 40 milliGRAM(s) Oral at bedtime  brimonidine 0.2% Ophthalmic Solution 1 Drop(s) Both EYES two times a day  carvedilol 3.125 milliGRAM(s) Oral every 12 hours  chlorhexidine 4% Liquid 1 Application(s) Topical <User Schedule>  dorzolamide 2% Ophthalmic Solution 1 Drop(s) Both EYES three times a day  epoetin kinsey-epbx (RETACRIT) Injectable 32183 Unit(s) SubCutaneous every 7 days  gabapentin 100 milliGRAM(s) Oral two times a day  heparin   Injectable 5000 Unit(s) SubCutaneous every 8 hours  hydrALAZINE 10 milliGRAM(s) Oral every 8 hours  insulin lispro (ADMELOG) corrective regimen sliding scale   SubCutaneous Before meals and at bedtime  iron sucrose IVPB 100 milliGRAM(s) IV Intermittent every 24 hours  isosorbide   dinitrate Tablet (ISORDIL) 10 milliGRAM(s) Oral three times a day  latanoprost 0.005% Ophthalmic Solution 1 Drop(s) Both EYES at bedtime  sevelamer carbonate 800 milliGRAM(s) Oral three times a day with meals  sodium chloride 3%. 150 milliLiter(s) (50 mL/Hr) IV Continuous <Continuous>  timolol 0.5% Solution 1 Drop(s) Both EYES two times a day    MEDICATIONS  (PRN):    Allergies    No Known Allergies    Intolerances      Ambulation/Activity Status: ambulate as tolerated    Assessment/Plan:  56M w/ PMHx HTN, HLD, CKD, T2DM on insulin, Glaucoma presented to the ED c/o worsening LE swelling. No cardiac Hx.  Patient diagnosed with new onset Acute HFrEF exacerbation.  CXR showed cardiomegaly and blunting of CP angles. BNP 40304.  Cath showed significant 3 V disease, LAD, RCA, LCx. CTS was consulted. During preop cardiac MRI patient became SOB. There was bilateral effusions with adjacent atelectasis and moderate pericardial effusion on imaging .     While hospitalized patient's cr worsened given time frame of cath, multiple with hypotension.  He was transferred to CCU for monitoring  kidney function while diuresing and pending CABG. Patient may need dialysis before CABG.     Suggest repeat TTE for EF re-evaluation    CXR significant cardiomegaly ? 2/2 pericardial effusion  with increasing pulmonary vascular congestion..    Pt currently too high risk for urgent CABG, Acute on chronic systolic HF / LENNY (GFR 18)/ Fluid overload, with bradycardia and hypotension patient needs medical optimization prior to surgery. Case discussed on rounds this AM. A consideration for assist device with surgery was proposed. Dr. Yee to discuss with Dr. Cantu.    Transaminitis stable track and trend    will follow    As per medical team  Social Service Disposition:        CTS ATTENDING    Pt interviewed and examined today  Angioram reviewed with Dr. Singh  case deiscussed with Peter Singh and Pepe  Patient has worsening renal function, biventricular failure, poor cardiac reserve and no inferior wall viability due to a large scar  I believe patient will be better served with non-surgical revascularization due to the high surgical risk, which has the likely outcome of precipitating renal failure and permanent dialysis  I spoke with the physicians and the patient, and he is in favor of staged PCI if it is possible    -FMR

## 2021-07-20 LAB
ALBUMIN SERPL ELPH-MCNC: 2.7 G/DL — LOW (ref 3.5–5.2)
ALP SERPL-CCNC: 236 U/L — HIGH (ref 30–115)
ALT FLD-CCNC: 58 U/L — HIGH (ref 0–41)
ANION GAP SERPL CALC-SCNC: 12 MMOL/L — SIGNIFICANT CHANGE UP (ref 7–14)
ANION GAP SERPL CALC-SCNC: 7 MMOL/L — SIGNIFICANT CHANGE UP (ref 7–14)
AST SERPL-CCNC: 14 U/L — SIGNIFICANT CHANGE UP (ref 0–41)
BASOPHILS # BLD AUTO: 0.03 K/UL — SIGNIFICANT CHANGE UP (ref 0–0.2)
BASOPHILS NFR BLD AUTO: 0.3 % — SIGNIFICANT CHANGE UP (ref 0–1)
BILIRUB SERPL-MCNC: 0.3 MG/DL — SIGNIFICANT CHANGE UP (ref 0.2–1.2)
BUN SERPL-MCNC: 55 MG/DL — HIGH (ref 10–20)
BUN SERPL-MCNC: 58 MG/DL — HIGH (ref 10–20)
CALCIUM SERPL-MCNC: 8.1 MG/DL — LOW (ref 8.5–10.1)
CALCIUM SERPL-MCNC: 8.2 MG/DL — LOW (ref 8.5–10.1)
CHLORIDE SERPL-SCNC: 107 MMOL/L — SIGNIFICANT CHANGE UP (ref 98–110)
CHLORIDE SERPL-SCNC: 109 MMOL/L — SIGNIFICANT CHANGE UP (ref 98–110)
CO2 SERPL-SCNC: 21 MMOL/L — SIGNIFICANT CHANGE UP (ref 17–32)
CO2 SERPL-SCNC: 26 MMOL/L — SIGNIFICANT CHANGE UP (ref 17–32)
CREAT SERPL-MCNC: 3 MG/DL — HIGH (ref 0.7–1.5)
CREAT SERPL-MCNC: 3.1 MG/DL — HIGH (ref 0.7–1.5)
EOSINOPHIL # BLD AUTO: 0.64 K/UL — SIGNIFICANT CHANGE UP (ref 0–0.7)
EOSINOPHIL NFR BLD AUTO: 7.4 % — SIGNIFICANT CHANGE UP (ref 0–8)
FERRITIN SERPL-MCNC: 497 NG/ML — HIGH (ref 30–400)
GLUCOSE BLDC GLUCOMTR-MCNC: 118 MG/DL — HIGH (ref 70–99)
GLUCOSE BLDC GLUCOMTR-MCNC: 157 MG/DL — HIGH (ref 70–99)
GLUCOSE BLDC GLUCOMTR-MCNC: 190 MG/DL — HIGH (ref 70–99)
GLUCOSE BLDC GLUCOMTR-MCNC: 278 MG/DL — HIGH (ref 70–99)
GLUCOSE SERPL-MCNC: 154 MG/DL — HIGH (ref 70–99)
GLUCOSE SERPL-MCNC: 268 MG/DL — HIGH (ref 70–99)
HCT VFR BLD CALC: 30.3 % — LOW (ref 42–52)
HGB BLD-MCNC: 9.1 G/DL — LOW (ref 14–18)
IMM GRANULOCYTES NFR BLD AUTO: 0.5 % — HIGH (ref 0.1–0.3)
LYMPHOCYTES # BLD AUTO: 1.34 K/UL — SIGNIFICANT CHANGE UP (ref 1.2–3.4)
LYMPHOCYTES # BLD AUTO: 15.4 % — LOW (ref 20.5–51.1)
MAGNESIUM SERPL-MCNC: 1.9 MG/DL — SIGNIFICANT CHANGE UP (ref 1.8–2.4)
MAGNESIUM SERPL-MCNC: 2.3 MG/DL — SIGNIFICANT CHANGE UP (ref 1.8–2.4)
MCHC RBC-ENTMCNC: 23.9 PG — LOW (ref 27–31)
MCHC RBC-ENTMCNC: 30 G/DL — LOW (ref 32–37)
MCV RBC AUTO: 79.7 FL — LOW (ref 80–94)
MONOCYTES # BLD AUTO: 0.82 K/UL — HIGH (ref 0.1–0.6)
MONOCYTES NFR BLD AUTO: 9.4 % — HIGH (ref 1.7–9.3)
NEUTROPHILS # BLD AUTO: 5.82 K/UL — SIGNIFICANT CHANGE UP (ref 1.4–6.5)
NEUTROPHILS NFR BLD AUTO: 67 % — SIGNIFICANT CHANGE UP (ref 42.2–75.2)
NRBC # BLD: 0 /100 WBCS — SIGNIFICANT CHANGE UP (ref 0–0)
PHOSPHATE SERPL-MCNC: 4 MG/DL — SIGNIFICANT CHANGE UP (ref 2.1–4.9)
PLATELET # BLD AUTO: 244 K/UL — SIGNIFICANT CHANGE UP (ref 130–400)
POTASSIUM SERPL-MCNC: 3.9 MMOL/L — SIGNIFICANT CHANGE UP (ref 3.5–5)
POTASSIUM SERPL-MCNC: 4.5 MMOL/L — SIGNIFICANT CHANGE UP (ref 3.5–5)
POTASSIUM SERPL-SCNC: 3.9 MMOL/L — SIGNIFICANT CHANGE UP (ref 3.5–5)
POTASSIUM SERPL-SCNC: 4.5 MMOL/L — SIGNIFICANT CHANGE UP (ref 3.5–5)
PROT SERPL-MCNC: 5.4 G/DL — LOW (ref 6–8)
RBC # BLD: 3.8 M/UL — LOW (ref 4.7–6.1)
RBC # FLD: 14.4 % — SIGNIFICANT CHANGE UP (ref 11.5–14.5)
SODIUM SERPL-SCNC: 140 MMOL/L — SIGNIFICANT CHANGE UP (ref 135–146)
SODIUM SERPL-SCNC: 142 MMOL/L — SIGNIFICANT CHANGE UP (ref 135–146)
WBC # BLD: 8.69 K/UL — SIGNIFICANT CHANGE UP (ref 4.8–10.8)
WBC # FLD AUTO: 8.69 K/UL — SIGNIFICANT CHANGE UP (ref 4.8–10.8)

## 2021-07-20 PROCEDURE — 99233 SBSQ HOSP IP/OBS HIGH 50: CPT

## 2021-07-20 PROCEDURE — 71045 X-RAY EXAM CHEST 1 VIEW: CPT | Mod: 26

## 2021-07-20 PROCEDURE — 93010 ELECTROCARDIOGRAM REPORT: CPT

## 2021-07-20 RX ORDER — POLYETHYLENE GLYCOL 3350 17 G/17G
17 POWDER, FOR SOLUTION ORAL
Refills: 0 | Status: DISCONTINUED | OUTPATIENT
Start: 2021-07-20 | End: 2021-07-25

## 2021-07-20 RX ORDER — POTASSIUM CHLORIDE 20 MEQ
40 PACKET (EA) ORAL ONCE
Refills: 0 | Status: COMPLETED | OUTPATIENT
Start: 2021-07-20 | End: 2021-07-20

## 2021-07-20 RX ORDER — BUMETANIDE 0.25 MG/ML
2 INJECTION INTRAMUSCULAR; INTRAVENOUS ONCE
Refills: 0 | Status: COMPLETED | OUTPATIENT
Start: 2021-07-20 | End: 2021-07-20

## 2021-07-20 RX ORDER — SODIUM CHLORIDE 5 G/100ML
150 INJECTION, SOLUTION INTRAVENOUS
Refills: 0 | Status: COMPLETED | OUTPATIENT
Start: 2021-07-20 | End: 2021-07-20

## 2021-07-20 RX ORDER — MAGNESIUM SULFATE 500 MG/ML
2 VIAL (ML) INJECTION ONCE
Refills: 0 | Status: COMPLETED | OUTPATIENT
Start: 2021-07-20 | End: 2021-07-20

## 2021-07-20 RX ADMIN — Medication 2: at 07:56

## 2021-07-20 RX ADMIN — SODIUM CHLORIDE 50 MILLILITER(S): 5 INJECTION, SOLUTION INTRAVENOUS at 21:24

## 2021-07-20 RX ADMIN — HEPARIN SODIUM 5000 UNIT(S): 5000 INJECTION INTRAVENOUS; SUBCUTANEOUS at 21:27

## 2021-07-20 RX ADMIN — POLYETHYLENE GLYCOL 3350 17 GRAM(S): 17 POWDER, FOR SOLUTION ORAL at 17:07

## 2021-07-20 RX ADMIN — DORZOLAMIDE HYDROCHLORIDE 1 DROP(S): 20 SOLUTION/ DROPS OPHTHALMIC at 13:03

## 2021-07-20 RX ADMIN — SEVELAMER CARBONATE 800 MILLIGRAM(S): 2400 POWDER, FOR SUSPENSION ORAL at 16:55

## 2021-07-20 RX ADMIN — Medication 50 GRAM(S): at 08:16

## 2021-07-20 RX ADMIN — LATANOPROST 1 DROP(S): 0.05 SOLUTION/ DROPS OPHTHALMIC; TOPICAL at 21:27

## 2021-07-20 RX ADMIN — IRON SUCROSE 210 MILLIGRAM(S): 20 INJECTION, SOLUTION INTRAVENOUS at 07:18

## 2021-07-20 RX ADMIN — HEPARIN SODIUM 5000 UNIT(S): 5000 INJECTION INTRAVENOUS; SUBCUTANEOUS at 06:05

## 2021-07-20 RX ADMIN — Medication 81 MILLIGRAM(S): at 12:02

## 2021-07-20 RX ADMIN — CHLORHEXIDINE GLUCONATE 1 APPLICATION(S): 213 SOLUTION TOPICAL at 06:04

## 2021-07-20 RX ADMIN — CARVEDILOL PHOSPHATE 3.12 MILLIGRAM(S): 80 CAPSULE, EXTENDED RELEASE ORAL at 06:04

## 2021-07-20 RX ADMIN — ISOSORBIDE DINITRATE 10 MILLIGRAM(S): 5 TABLET ORAL at 16:55

## 2021-07-20 RX ADMIN — SEVELAMER CARBONATE 800 MILLIGRAM(S): 2400 POWDER, FOR SUSPENSION ORAL at 12:02

## 2021-07-20 RX ADMIN — Medication 10 MILLIGRAM(S): at 21:26

## 2021-07-20 RX ADMIN — DORZOLAMIDE HYDROCHLORIDE 1 DROP(S): 20 SOLUTION/ DROPS OPHTHALMIC at 21:27

## 2021-07-20 RX ADMIN — Medication 5 MILLIGRAM(S): at 21:26

## 2021-07-20 RX ADMIN — ATORVASTATIN CALCIUM 40 MILLIGRAM(S): 80 TABLET, FILM COATED ORAL at 21:26

## 2021-07-20 RX ADMIN — Medication 40 MILLIEQUIVALENT(S): at 09:50

## 2021-07-20 RX ADMIN — BRIMONIDINE TARTRATE 1 DROP(S): 2 SOLUTION/ DROPS OPHTHALMIC at 06:04

## 2021-07-20 RX ADMIN — DORZOLAMIDE HYDROCHLORIDE 1 DROP(S): 20 SOLUTION/ DROPS OPHTHALMIC at 06:05

## 2021-07-20 RX ADMIN — ISOSORBIDE DINITRATE 10 MILLIGRAM(S): 5 TABLET ORAL at 06:04

## 2021-07-20 RX ADMIN — BUMETANIDE 2 MILLIGRAM(S): 0.25 INJECTION INTRAMUSCULAR; INTRAVENOUS at 21:25

## 2021-07-20 RX ADMIN — HEPARIN SODIUM 5000 UNIT(S): 5000 INJECTION INTRAVENOUS; SUBCUTANEOUS at 13:03

## 2021-07-20 RX ADMIN — CARVEDILOL PHOSPHATE 3.12 MILLIGRAM(S): 80 CAPSULE, EXTENDED RELEASE ORAL at 17:32

## 2021-07-20 RX ADMIN — GABAPENTIN 100 MILLIGRAM(S): 400 CAPSULE ORAL at 17:32

## 2021-07-20 RX ADMIN — Medication 2: at 12:02

## 2021-07-20 RX ADMIN — BRIMONIDINE TARTRATE 1 DROP(S): 2 SOLUTION/ DROPS OPHTHALMIC at 17:32

## 2021-07-20 RX ADMIN — SODIUM CHLORIDE 50 MILLILITER(S): 5 INJECTION, SOLUTION INTRAVENOUS at 13:45

## 2021-07-20 RX ADMIN — Medication 1 DROP(S): at 06:05

## 2021-07-20 RX ADMIN — ISOSORBIDE DINITRATE 10 MILLIGRAM(S): 5 TABLET ORAL at 12:02

## 2021-07-20 RX ADMIN — BUMETANIDE 2 MILLIGRAM(S): 0.25 INJECTION INTRAMUSCULAR; INTRAVENOUS at 13:43

## 2021-07-20 RX ADMIN — Medication 10 MILLIGRAM(S): at 13:03

## 2021-07-20 RX ADMIN — Medication 10 MILLIGRAM(S): at 06:04

## 2021-07-20 RX ADMIN — GABAPENTIN 100 MILLIGRAM(S): 400 CAPSULE ORAL at 06:04

## 2021-07-20 RX ADMIN — SEVELAMER CARBONATE 800 MILLIGRAM(S): 2400 POWDER, FOR SUSPENSION ORAL at 07:57

## 2021-07-20 RX ADMIN — Medication 6: at 21:28

## 2021-07-20 RX ADMIN — Medication 1 DROP(S): at 17:04

## 2021-07-20 NOTE — PROGRESS NOTE ADULT - ATTENDING COMMENTS
Patient remains fluid overloaded, blood pressure not ideally controlled. Creat is trending down.     Bumex 2 mg iv BID + hypertonic saline   Continue carvedilol 3.125 mg bid   Continue hydralazine/isosorbide 10 mg each TID  Strict I/Os  Daily weight

## 2021-07-20 NOTE — CHART NOTE - NSCHARTNOTEFT_GEN_A_CORE
Nutrition Screen completed by Carmencita Briggs MS, RD     Completed nutrition screen due to length of stay.  Being treated for CHF, +1 lower extremity edema.  Diet ordered; patient eating % of meals per RN documentation.  No skin breakdown noted.  Patient does not meet criteria for a high risk nutrition assessment; will sign off.  Please consult nutrition if patient's status changes or if patient's oral intake decreases.

## 2021-07-20 NOTE — PROGRESS NOTE ADULT - SUBJECTIVE AND OBJECTIVE BOX
PATIENT:  GILLIAN MENDOZA  [id number]      CHIEF COMPLAINT:  Patient is a 56y old Male with PMHx of HTN, HLD, CKD, T2DM on insulin, Glaucoma who presented with a chief complaint of worsening lower extremity swelling. Patient was admitted for new onset CHF.  Today is day 17 of admission.    INTERVAL HISTORY/OVERNIGHT EVENTS:  Patient was examined at bedside. Overnight there was no acute events. Patient denies any SOB, chest pain, abdominal pain. He denies any fever, sweats, or chills. Patient reports eating, ambulating and using the restroom.       MEDICATIONS:  MEDICATIONS  (STANDING):  aspirin  chewable 81 milliGRAM(s) Oral daily  atorvastatin 40 milliGRAM(s) Oral at bedtime  bisacodyl 5 milliGRAM(s) Oral at bedtime  brimonidine 0.2% Ophthalmic Solution 1 Drop(s) Both EYES two times a day  carvedilol 3.125 milliGRAM(s) Oral every 12 hours  chlorhexidine 4% Liquid 1 Application(s) Topical <User Schedule>  dorzolamide 2% Ophthalmic Solution 1 Drop(s) Both EYES three times a day  epoetin kinsey-epbx (RETACRIT) Injectable 22345 Unit(s) SubCutaneous every 7 days  gabapentin 100 milliGRAM(s) Oral two times a day  heparin   Injectable 5000 Unit(s) SubCutaneous every 8 hours  hydrALAZINE 10 milliGRAM(s) Oral every 8 hours  insulin lispro (ADMELOG) corrective regimen sliding scale   SubCutaneous Before meals and at bedtime  isosorbide   dinitrate Tablet (ISORDIL) 10 milliGRAM(s) Oral three times a day  latanoprost 0.005% Ophthalmic Solution 1 Drop(s) Both EYES at bedtime  polyethylene glycol 3350 17 Gram(s) Oral two times a day  potassium chloride   Powder 40 milliEquivalent(s) Oral once  sevelamer carbonate 800 milliGRAM(s) Oral three times a day with meals  sodium chloride 3%. 150 milliLiter(s) (50 mL/Hr) IV Continuous <Continuous>  sodium chloride 3%. 150 milliLiter(s) (50 mL/Hr) IV Continuous <Continuous>  timolol 0.5% Solution 1 Drop(s) Both EYES two times a day    MEDICATIONS  (PRN):      ALLERGIES:  Allergies    No Known Allergies    Intolerances        OBJECTIVE:  ICU Vital Signs Last 24 Hrs  T(C): 36.6 (2021 08:00), Max: 37.2 (2021 12:00)  T(F): 97.8 (2021 08:00), Max: 98.9 (2021 12:00)  HR: 50 (2021 08:00) (48 - 56)  BP: 166/89 (2021 08:00) (102/61 - 176/93)  BP(mean): 114 (2021 08:00) (77 - 132)  ABP: --  ABP(mean): --  RR: 16 (2021 08:00) (16 - 19)  SpO2: 99% (2021 08:00) (95% - 99%)      Adult Advanced Hemodynamics Last 24 Hrs  CVP(mm Hg): --  CVP(cm H2O): --  CO: --  CI: --  PA: --  PA(mean): --  PCWP: --  SVR: --  SVRI: --  PVR: --  PVRI: --  CAPILLARY BLOOD GLUCOSE      POCT Blood Glucose.: 157 mg/dL (2021 07:23)  POCT Blood Glucose.: 221 mg/dL (2021 20:30)  POCT Blood Glucose.: 119 mg/dL (2021 17:07)  POCT Blood Glucose.: 117 mg/dL (2021 11:20)    CAPILLARY BLOOD GLUCOSE      POCT Blood Glucose.: 157 mg/dL (2021 07:23)    I&O's Summary    2021 07:01  -  2021 07:00  --------------------------------------------------------  IN: 900 mL / OUT: 2225 mL / NET: -1325 mL    2021 07:01  -  2021 08:42  --------------------------------------------------------  IN: 50 mL / OUT: 300 mL / NET: -250 mL      Daily     Daily Weight in k.9 (2021 06:00)    PHYSICAL EXAMINATION:  General: In no acute distress  HEENT: PERRLA, EOMI, moist mucous membranes  Neurology: A&Ox3, nonfocal  Respiratory: lungs cleared BL to auscultation, normal respiratory effort, no wheezes, crackles, rales  CV: regular rate and rythym, S1S2, no murmurs, rubs or gallops  Abdominal: Soft, non tender, non distended, bowel sounds present  Extremities: No edema, stasis dermatitis  Incisions:   Tubes:    LABS:                          9.1    8.69  )-----------( 244      ( 2021 05:15 )             30.3         142  |  109  |  58<H>  ----------------------------<  154<H>  3.9   |  21  |  3.1<H>    Ca    8.1<L>      2021 05:15  Phos  4.0       Mg     1.9         TPro  5.4<L>  /  Alb  2.7<L>  /  TBili  0.3  /  DBili  x   /  AST  14  /  ALT  58<H>  /  AlkPhos  236<H>      LIVER FUNCTIONS - ( 2021 05:15 )  Alb: 2.7 g/dL / Pro: 5.4 g/dL / ALK PHOS: 236 U/L / ALT: 58 U/L / AST: 14 U/L / GGT: x                 TELEMETRY:  No events    EKG:    from: 12 Lead ECG (21 @ 05:18) >  Diagnosis Line Sinus bradycardia  Right bundle branch block  Cannot rule out Inferior infarct , age undetermined  T wave abnormality, consider lateral ischemia  Abnormal ECG      IMAGING:

## 2021-07-20 NOTE — PROGRESS NOTE ADULT - SUBJECTIVE AND OBJECTIVE BOX
Nephrology progress note    Patient was seen and examined, events over the last 24 h noted .  Cr improving slightly    Allergies:  No Known Allergies    Hospital Medications:   MEDICATIONS  (STANDING):  aspirin  chewable 81 milliGRAM(s) Oral daily  atorvastatin 40 milliGRAM(s) Oral at bedtime  bisacodyl 5 milliGRAM(s) Oral at bedtime  brimonidine 0.2% Ophthalmic Solution 1 Drop(s) Both EYES two times a day  carvedilol 3.125 milliGRAM(s) Oral every 12 hours  chlorhexidine 4% Liquid 1 Application(s) Topical <User Schedule>  dorzolamide 2% Ophthalmic Solution 1 Drop(s) Both EYES three times a day  epoetin kinsey-epbx (RETACRIT) Injectable 70131 Unit(s) SubCutaneous every 7 days  gabapentin 100 milliGRAM(s) Oral two times a day  heparin   Injectable 5000 Unit(s) SubCutaneous every 8 hours  hydrALAZINE 10 milliGRAM(s) Oral every 8 hours  insulin lispro (ADMELOG) corrective regimen sliding scale   SubCutaneous Before meals and at bedtime  isosorbide   dinitrate Tablet (ISORDIL) 10 milliGRAM(s) Oral three times a day  latanoprost 0.005% Ophthalmic Solution 1 Drop(s) Both EYES at bedtime  polyethylene glycol 3350 17 Gram(s) Oral two times a day  sevelamer carbonate 800 milliGRAM(s) Oral three times a day with meals  sodium chloride 3%. 150 milliLiter(s) (50 mL/Hr) IV Continuous <Continuous>  sodium chloride 3%. 150 milliLiter(s) (50 mL/Hr) IV Continuous <Continuous>  timolol 0.5% Solution 1 Drop(s) Both EYES two times a day        VITALS:  T(F): 97.8 (07-20-21 @ 08:00), Max: 98.9 (07-19-21 @ 12:00)  HR: 50 (07-20-21 @ 08:00)  BP: 166/89 (07-20-21 @ 08:00)  RR: 16 (07-20-21 @ 08:00)  SpO2: 99% (07-20-21 @ 08:00)  Wt(kg): --    07-18 @ 07:01  -  07-19 @ 07:00  --------------------------------------------------------  IN: 1300 mL / OUT: 2300 mL / NET: -1000 mL    07-19 @ 07:01 - 07-20 @ 07:00  --------------------------------------------------------  IN: 900 mL / OUT: 2225 mL / NET: -1325 mL    07-20 @ 07:01 - 07-20 @ 09:52  --------------------------------------------------------  IN: 230 mL / OUT: 300 mL / NET: -70 mL          PHYSICAL EXAM:  Constitutional: NAD  Neck: No JVD  Respiratory: CTAB,  Cardiovascular: S1, S2, RRR  Gastrointestinal: BS+, soft, NT/ND  Extremities: No cyanosis or clubbing. plus one pitting edema   :  No harris.   Skin: No rashes    LABS:  07-20    142  |  109  |  58<H>  ----------------------------<  154<H>  3.9   |  21  |  3.1<H>    Ca    8.1<L>      20 Jul 2021 05:15  Phos  4.0     07-20  Mg     1.9     07-20    TPro  5.4<L>  /  Alb  2.7<L>  /  TBili  0.3  /  DBili      /  AST  14  /  ALT  58<H>  /  AlkPhos  236<H>  07-20                          9.1    8.69  )-----------( 244      ( 20 Jul 2021 05:15 )             30.3       Urine Studies:      RADIOLOGY & ADDITIONAL STUDIES:

## 2021-07-20 NOTE — PROGRESS NOTE ADULT - ASSESSMENT
IMPRESSION:  CAD - 3V disease, high risk for CABG - plan for PCI to LAD with staged PCI to LCx  Ischemic cardiomyopathy - patient in fluid overload clinically  Bradycardia  LENNY - improving (ROSENDO vs hypoperfusion from hypotension?)  Elevated LFTs - improving  Anemia  DM  HTN   HLD    PLAN:    CNS: Avoid sedation    HEENT: Oral care    PULMONARY:    - HOB @ 45 degrees    CARDIOVASCULAR:  - Continue CCU monitoring  - Continue Hydralazine 10mg TID and Isordil 10mg TID; will monitor BP and adjust medications accordingly  - Continue aspirin 81mg daily, Atorvastatin 40mg daily  - Continue carvedilol to 3.125mg q12h  - Amiodarone discontinued due to bradycardia  - Bumex 2mg IV with 150cc of hypertonic saline; check strict I&Os; keep I<Os; Give another dose tonight  - Monitor renal function, electrolytes, daily weight and volume status   - Couldn't complete MRI due to orthopnea; will repeat once euvolemic and stable  - Plan to get PCI to LAD then staged PCI to LCx    GI: GI prophylaxis.  Feeding     RENAL:  - Follow up renal function and lytes.  Correct as needed  - Work-up as recommended by nephrology  - Avoid nephrotoxic agents  - Nephrology following    INFECTIOUS DISEASE: Monitor VS    HEMATOLOGICAL:    - On iron supplementation  - Monitor cbc; watch for signs and symptoms of bleeding  - DVT prophylaxis.    ENDOCRINE:  Follow up FS.  Insulin protocol

## 2021-07-20 NOTE — PROGRESS NOTE ADULT - ASSESSMENT
56M w/ PMHx HTN, HLD, T2DM on insulin x>10 years, retinopathy, Glaucoma presents to the Ed c/o worsening LE swelling and pain for the past 1 month. Endorses SOB on exertion, along w/ increased abdominal girth.    # LENNY / CKD 3b / CRS / ATN/hypotension / time frame also c/w contrast induced nephropathy - worsening creat due to low BP   # Hyperkalemia better now off lokelma   # creatinine better  # Nephrotic range proteinuria - needs SPEP UPEP SIF UIF Kappa/lambda 1.9, MARLIN c3c4 wnl; likely due to DM  - last phosphorus level noted cont renvela one tab po qac  - renal ultrasound noted w/o hydronephrosis, right renal cysts  # Normocytic anemia, iron deficient  0n Venofer, started Procrit 10,000 sq qweek  # ADHF, low EF - s/p cardiac cath, appreciate CT surgery f/u regarding CABG  # cardiology notes appreciated   # no acute indication for RRT   Will follow

## 2021-07-21 LAB
ALBUMIN SERPL ELPH-MCNC: 2.7 G/DL — LOW (ref 3.5–5.2)
ALP SERPL-CCNC: 251 U/L — HIGH (ref 30–115)
ALT FLD-CCNC: 51 U/L — HIGH (ref 0–41)
ANION GAP SERPL CALC-SCNC: 8 MMOL/L — SIGNIFICANT CHANGE UP (ref 7–14)
ANION GAP SERPL CALC-SCNC: 8 MMOL/L — SIGNIFICANT CHANGE UP (ref 7–14)
AST SERPL-CCNC: 22 U/L — SIGNIFICANT CHANGE UP (ref 0–41)
BASOPHILS # BLD AUTO: 0.03 K/UL — SIGNIFICANT CHANGE UP (ref 0–0.2)
BASOPHILS NFR BLD AUTO: 0.3 % — SIGNIFICANT CHANGE UP (ref 0–1)
BILIRUB SERPL-MCNC: 0.3 MG/DL — SIGNIFICANT CHANGE UP (ref 0.2–1.2)
BUN SERPL-MCNC: 51 MG/DL — HIGH (ref 10–20)
BUN SERPL-MCNC: 51 MG/DL — HIGH (ref 10–20)
CALCIUM SERPL-MCNC: 8 MG/DL — LOW (ref 8.5–10.1)
CALCIUM SERPL-MCNC: 8.2 MG/DL — LOW (ref 8.5–10.1)
CHLORIDE SERPL-SCNC: 108 MMOL/L — SIGNIFICANT CHANGE UP (ref 98–110)
CHLORIDE SERPL-SCNC: 109 MMOL/L — SIGNIFICANT CHANGE UP (ref 98–110)
CO2 SERPL-SCNC: 24 MMOL/L — SIGNIFICANT CHANGE UP (ref 17–32)
CO2 SERPL-SCNC: 24 MMOL/L — SIGNIFICANT CHANGE UP (ref 17–32)
CREAT SERPL-MCNC: 2.7 MG/DL — HIGH (ref 0.7–1.5)
CREAT SERPL-MCNC: 2.9 MG/DL — HIGH (ref 0.7–1.5)
EOSINOPHIL # BLD AUTO: 0.61 K/UL — SIGNIFICANT CHANGE UP (ref 0–0.7)
EOSINOPHIL NFR BLD AUTO: 6.5 % — SIGNIFICANT CHANGE UP (ref 0–8)
GLUCOSE BLDC GLUCOMTR-MCNC: 144 MG/DL — HIGH (ref 70–99)
GLUCOSE BLDC GLUCOMTR-MCNC: 168 MG/DL — HIGH (ref 70–99)
GLUCOSE BLDC GLUCOMTR-MCNC: 183 MG/DL — HIGH (ref 70–99)
GLUCOSE BLDC GLUCOMTR-MCNC: 214 MG/DL — HIGH (ref 70–99)
GLUCOSE SERPL-MCNC: 136 MG/DL — HIGH (ref 70–99)
GLUCOSE SERPL-MCNC: 196 MG/DL — HIGH (ref 70–99)
HCT VFR BLD CALC: 30.3 % — LOW (ref 42–52)
HGB BLD-MCNC: 9.1 G/DL — LOW (ref 14–18)
IMM GRANULOCYTES NFR BLD AUTO: 0.4 % — HIGH (ref 0.1–0.3)
LYMPHOCYTES # BLD AUTO: 1.02 K/UL — LOW (ref 1.2–3.4)
LYMPHOCYTES # BLD AUTO: 10.8 % — LOW (ref 20.5–51.1)
MAGNESIUM SERPL-MCNC: 2.1 MG/DL — SIGNIFICANT CHANGE UP (ref 1.8–2.4)
MAGNESIUM SERPL-MCNC: 2.2 MG/DL — SIGNIFICANT CHANGE UP (ref 1.8–2.4)
MCHC RBC-ENTMCNC: 24.1 PG — LOW (ref 27–31)
MCHC RBC-ENTMCNC: 30 G/DL — LOW (ref 32–37)
MCV RBC AUTO: 80.4 FL — SIGNIFICANT CHANGE UP (ref 80–94)
MONOCYTES # BLD AUTO: 0.87 K/UL — HIGH (ref 0.1–0.6)
MONOCYTES NFR BLD AUTO: 9.2 % — SIGNIFICANT CHANGE UP (ref 1.7–9.3)
NEUTROPHILS # BLD AUTO: 6.84 K/UL — HIGH (ref 1.4–6.5)
NEUTROPHILS NFR BLD AUTO: 72.8 % — SIGNIFICANT CHANGE UP (ref 42.2–75.2)
NRBC # BLD: 0 /100 WBCS — SIGNIFICANT CHANGE UP (ref 0–0)
PLATELET # BLD AUTO: 214 K/UL — SIGNIFICANT CHANGE UP (ref 130–400)
POTASSIUM SERPL-MCNC: 4.4 MMOL/L — SIGNIFICANT CHANGE UP (ref 3.5–5)
POTASSIUM SERPL-MCNC: 4.6 MMOL/L — SIGNIFICANT CHANGE UP (ref 3.5–5)
POTASSIUM SERPL-SCNC: 4.4 MMOL/L — SIGNIFICANT CHANGE UP (ref 3.5–5)
POTASSIUM SERPL-SCNC: 4.6 MMOL/L — SIGNIFICANT CHANGE UP (ref 3.5–5)
PROT SERPL-MCNC: 5.3 G/DL — LOW (ref 6–8)
RBC # BLD: 3.77 M/UL — LOW (ref 4.7–6.1)
RBC # FLD: 14.6 % — HIGH (ref 11.5–14.5)
SARS-COV-2 RNA SPEC QL NAA+PROBE: SIGNIFICANT CHANGE UP
SODIUM SERPL-SCNC: 140 MMOL/L — SIGNIFICANT CHANGE UP (ref 135–146)
SODIUM SERPL-SCNC: 141 MMOL/L — SIGNIFICANT CHANGE UP (ref 135–146)
WBC # BLD: 9.41 K/UL — SIGNIFICANT CHANGE UP (ref 4.8–10.8)
WBC # FLD AUTO: 9.41 K/UL — SIGNIFICANT CHANGE UP (ref 4.8–10.8)

## 2021-07-21 PROCEDURE — 71045 X-RAY EXAM CHEST 1 VIEW: CPT | Mod: 26

## 2021-07-21 PROCEDURE — 93010 ELECTROCARDIOGRAM REPORT: CPT

## 2021-07-21 PROCEDURE — 99233 SBSQ HOSP IP/OBS HIGH 50: CPT

## 2021-07-21 RX ORDER — HYDRALAZINE HCL 50 MG
25 TABLET ORAL EVERY 8 HOURS
Refills: 0 | Status: DISCONTINUED | OUTPATIENT
Start: 2021-07-21 | End: 2021-07-22

## 2021-07-21 RX ORDER — SODIUM CHLORIDE 5 G/100ML
150 INJECTION, SOLUTION INTRAVENOUS
Refills: 0 | Status: COMPLETED | OUTPATIENT
Start: 2021-07-22 | End: 2021-07-22

## 2021-07-21 RX ORDER — ISOSORBIDE DINITRATE 5 MG/1
20 TABLET ORAL THREE TIMES A DAY
Refills: 0 | Status: DISCONTINUED | OUTPATIENT
Start: 2021-07-21 | End: 2021-07-25

## 2021-07-21 RX ORDER — SODIUM CHLORIDE 5 G/100ML
150 INJECTION, SOLUTION INTRAVENOUS
Refills: 0 | Status: COMPLETED | OUTPATIENT
Start: 2021-07-21 | End: 2021-07-21

## 2021-07-21 RX ORDER — BUMETANIDE 0.25 MG/ML
2 INJECTION INTRAMUSCULAR; INTRAVENOUS
Refills: 0 | Status: DISCONTINUED | OUTPATIENT
Start: 2021-07-21 | End: 2021-07-24

## 2021-07-21 RX ORDER — HYDRALAZINE HCL 50 MG
10 TABLET ORAL ONCE
Refills: 0 | Status: COMPLETED | OUTPATIENT
Start: 2021-07-21 | End: 2021-07-21

## 2021-07-21 RX ADMIN — BUMETANIDE 2 MILLIGRAM(S): 0.25 INJECTION INTRAMUSCULAR; INTRAVENOUS at 20:11

## 2021-07-21 RX ADMIN — POLYETHYLENE GLYCOL 3350 17 GRAM(S): 17 POWDER, FOR SOLUTION ORAL at 17:06

## 2021-07-21 RX ADMIN — HEPARIN SODIUM 5000 UNIT(S): 5000 INJECTION INTRAVENOUS; SUBCUTANEOUS at 13:59

## 2021-07-21 RX ADMIN — Medication 4: at 11:59

## 2021-07-21 RX ADMIN — ATORVASTATIN CALCIUM 40 MILLIGRAM(S): 80 TABLET, FILM COATED ORAL at 21:04

## 2021-07-21 RX ADMIN — SODIUM CHLORIDE 50 MILLILITER(S): 5 INJECTION, SOLUTION INTRAVENOUS at 12:46

## 2021-07-21 RX ADMIN — CHLORHEXIDINE GLUCONATE 1 APPLICATION(S): 213 SOLUTION TOPICAL at 05:09

## 2021-07-21 RX ADMIN — Medication 2: at 16:25

## 2021-07-21 RX ADMIN — POLYETHYLENE GLYCOL 3350 17 GRAM(S): 17 POWDER, FOR SOLUTION ORAL at 05:09

## 2021-07-21 RX ADMIN — Medication 10 MILLIGRAM(S): at 05:08

## 2021-07-21 RX ADMIN — GABAPENTIN 100 MILLIGRAM(S): 400 CAPSULE ORAL at 05:08

## 2021-07-21 RX ADMIN — BRIMONIDINE TARTRATE 1 DROP(S): 2 SOLUTION/ DROPS OPHTHALMIC at 17:06

## 2021-07-21 RX ADMIN — GABAPENTIN 100 MILLIGRAM(S): 400 CAPSULE ORAL at 17:05

## 2021-07-21 RX ADMIN — Medication 25 MILLIGRAM(S): at 21:04

## 2021-07-21 RX ADMIN — DORZOLAMIDE HYDROCHLORIDE 1 DROP(S): 20 SOLUTION/ DROPS OPHTHALMIC at 13:59

## 2021-07-21 RX ADMIN — DORZOLAMIDE HYDROCHLORIDE 1 DROP(S): 20 SOLUTION/ DROPS OPHTHALMIC at 05:09

## 2021-07-21 RX ADMIN — SODIUM CHLORIDE 50 MILLILITER(S): 5 INJECTION, SOLUTION INTRAVENOUS at 20:13

## 2021-07-21 RX ADMIN — Medication 1 DROP(S): at 18:37

## 2021-07-21 RX ADMIN — SEVELAMER CARBONATE 800 MILLIGRAM(S): 2400 POWDER, FOR SUSPENSION ORAL at 08:17

## 2021-07-21 RX ADMIN — HEPARIN SODIUM 5000 UNIT(S): 5000 INJECTION INTRAVENOUS; SUBCUTANEOUS at 05:09

## 2021-07-21 RX ADMIN — BRIMONIDINE TARTRATE 1 DROP(S): 2 SOLUTION/ DROPS OPHTHALMIC at 05:09

## 2021-07-21 RX ADMIN — Medication 25 MILLIGRAM(S): at 13:59

## 2021-07-21 RX ADMIN — CARVEDILOL PHOSPHATE 3.12 MILLIGRAM(S): 80 CAPSULE, EXTENDED RELEASE ORAL at 17:05

## 2021-07-21 RX ADMIN — Medication 10 MILLIGRAM(S): at 11:05

## 2021-07-21 RX ADMIN — Medication 81 MILLIGRAM(S): at 12:01

## 2021-07-21 RX ADMIN — Medication 10 MILLIGRAM(S): at 10:17

## 2021-07-21 RX ADMIN — CARVEDILOL PHOSPHATE 3.12 MILLIGRAM(S): 80 CAPSULE, EXTENDED RELEASE ORAL at 05:08

## 2021-07-21 RX ADMIN — DORZOLAMIDE HYDROCHLORIDE 1 DROP(S): 20 SOLUTION/ DROPS OPHTHALMIC at 21:07

## 2021-07-21 RX ADMIN — Medication 1 DROP(S): at 05:09

## 2021-07-21 RX ADMIN — SEVELAMER CARBONATE 800 MILLIGRAM(S): 2400 POWDER, FOR SUSPENSION ORAL at 17:05

## 2021-07-21 RX ADMIN — SEVELAMER CARBONATE 800 MILLIGRAM(S): 2400 POWDER, FOR SUSPENSION ORAL at 12:00

## 2021-07-21 RX ADMIN — Medication 5 MILLIGRAM(S): at 21:08

## 2021-07-21 RX ADMIN — ISOSORBIDE DINITRATE 10 MILLIGRAM(S): 5 TABLET ORAL at 05:08

## 2021-07-21 RX ADMIN — BUMETANIDE 2 MILLIGRAM(S): 0.25 INJECTION INTRAMUSCULAR; INTRAVENOUS at 12:02

## 2021-07-21 RX ADMIN — LATANOPROST 1 DROP(S): 0.05 SOLUTION/ DROPS OPHTHALMIC; TOPICAL at 21:05

## 2021-07-21 RX ADMIN — HEPARIN SODIUM 5000 UNIT(S): 5000 INJECTION INTRAVENOUS; SUBCUTANEOUS at 21:05

## 2021-07-21 RX ADMIN — ISOSORBIDE DINITRATE 20 MILLIGRAM(S): 5 TABLET ORAL at 16:26

## 2021-07-21 NOTE — PROGRESS NOTE ADULT - ASSESSMENT
IMPRESSION:  CAD - 3V disease, high risk for CABG - plan for PCI to LAD with staged PCI to LCx  Ischemic cardiomyopathy - patient in fluid overload clinically  Bradycardia  LENNY - improving (ROSENDO vs hypoperfusion from hypotension?)  Elevated LFTs - improving  Anemia  DM  HTN   HLD    PLAN:    CNS: Avoid sedation    HEENT: Oral care    PULMONARY:    - HOB @ 45 degrees    CARDIOVASCULAR:  - Continue CCU monitoring  - Continue Hydralazine 10mg TID and Isordil 10mg TID; will monitor BP and adjust medications accordingly  - Continue aspirin 81mg daily, Atorvastatin 40mg daily  - Continue carvedilol to 3.125mg q12h  - Amiodarone discontinued due to bradycardia  - Bumex 2mg IV with 150cc of hypertonic saline; check strict I&Os; keep I<Os  - Monitor renal function, electrolytes, daily weight and volume status   - Couldn't complete MRI due to orthopnea; will repeat once euvolemic and stable  - Plan to get PCI to LAD then staged PCI to LCx    GI: GI prophylaxis.  Feeding     RENAL:  - Follow up renal function and lytes.  Correct as needed  - Avoid nephrotoxic agents  - Nephrology following    INFECTIOUS DISEASE: Monitor VS    HEMATOLOGICAL:    - On iron supplementation  - Monitor cbc; watch for signs and symptoms of bleeding  - DVT prophylaxis.    ENDOCRINE:  Follow up FS.  Insulin protocol IMPRESSION:  CAD - 3V disease, high risk for CABG - plan for PCI to LAD with staged PCI to LCx  Ischemic cardiomyopathy - patient in fluid overload clinically  Bradycardia  LENNY - improving (ROSENDO vs hypoperfusion from hypotension?)  Elevated LFTs - improving  Anemia  DM  HTN   HLD    PLAN:    CNS: Avoid sedation    HEENT: Oral care    PULMONARY:    - HOB @ 45 degrees    CARDIOVASCULAR:  - Continue CCU monitoring  - Increase Hydralazine to 25mg TID and Isordil to 20mg TID; will monitor BP and adjust medications accordingly  - Continue aspirin 81mg daily, Atorvastatin 40mg daily  - Continue carvedilol to 3.125mg q12h  - Amiodarone discontinued due to bradycardia  - Bumex 2mg IV with 150cc of hypertonic saline; check strict I&Os; keep I<Os  - Monitor renal function, electrolytes, daily weight and volume status   - Couldn't complete MRI due to orthopnea; will repeat once euvolemic and stable  - Plan to get PCI to LAD then staged PCI to LCx tomorrow 7/22/2021; please keep NPO, check COVID     GI: GI prophylaxis.  Feeding     RENAL:  - Follow up renal function and lytes.  Correct as needed  - Avoid nephrotoxic agents  - Nephrology following    INFECTIOUS DISEASE: Monitor VS    HEMATOLOGICAL:    - On iron supplementation  - Monitor cbc; watch for signs and symptoms of bleeding  - DVT prophylaxis.    ENDOCRINE:  Follow up FS.  Insulin protocol

## 2021-07-21 NOTE — PROGRESS NOTE ADULT - ASSESSMENT
56M w/ PMHx HTN, HLD, T2DM on insulin x>10 years, retinopathy, Glaucoma presents to the Ed c/o worsening LE swelling and pain for the past 1 month. Endorses SOB on exertion, along w/ increased abdominal girth.    # LENNY / CKD 3b / CRS / ATN/hypotension / time frame also c/w contrast induced nephropathy - worsening creat due to low BP   # Hyperkalemia better now off lokelma   # creatinine better trending down   # Nephrotic range proteinuria - needs SPEP UPEP SIF UIF Kappa/lambda 1.9, MARLIN c3c4 wnl; likely due to DM  - last phosphorus level noted cont renvela one tab po qac  - renal ultrasound noted w/o hydronephrosis, right renal cysts  # Normocytic anemia, iron deficient  0n Venofer, started Procrit 10,000 sq qweek  # ADHF, low EF - s/p cardiac cath, appreciate CT surgery f/u regarding CABG  # HTN can increase hydralazine to 25 q8h if BP still high   # no acute indication for RRT   Will follow      None known

## 2021-07-21 NOTE — PROGRESS NOTE ADULT - SUBJECTIVE AND OBJECTIVE BOX
PATIENT:  GILLIAN MENDOZA  [id number]      CHIEF COMPLAINT:  Patient is a 56y old  Male who presents with a chief complaint of CHF (2021 08:40)        HPI:      INTERVAL HISTORY/OVERNIGHT EVENTS:      MEDICATIONS:  MEDICATIONS  (STANDING):  aspirin  chewable 81 milliGRAM(s) Oral daily  atorvastatin 40 milliGRAM(s) Oral at bedtime  bisacodyl 5 milliGRAM(s) Oral at bedtime  brimonidine 0.2% Ophthalmic Solution 1 Drop(s) Both EYES two times a day  carvedilol 3.125 milliGRAM(s) Oral every 12 hours  chlorhexidine 4% Liquid 1 Application(s) Topical <User Schedule>  dorzolamide 2% Ophthalmic Solution 1 Drop(s) Both EYES three times a day  epoetin kinsey-epbx (RETACRIT) Injectable 32758 Unit(s) SubCutaneous every 7 days  gabapentin 100 milliGRAM(s) Oral two times a day  heparin   Injectable 5000 Unit(s) SubCutaneous every 8 hours  hydrALAZINE 10 milliGRAM(s) Oral every 8 hours  insulin lispro (ADMELOG) corrective regimen sliding scale   SubCutaneous Before meals and at bedtime  isosorbide   dinitrate Tablet (ISORDIL) 10 milliGRAM(s) Oral three times a day  latanoprost 0.005% Ophthalmic Solution 1 Drop(s) Both EYES at bedtime  polyethylene glycol 3350 17 Gram(s) Oral two times a day  sevelamer carbonate 800 milliGRAM(s) Oral three times a day with meals  timolol 0.5% Solution 1 Drop(s) Both EYES two times a day    MEDICATIONS  (PRN):      ALLERGIES:  Allergies    No Known Allergies    Intolerances        OBJECTIVE:  ICU Vital Signs Last 24 Hrs  T(C): 35.8 (2021 07:45), Max: 36.6 (2021 16:00)  T(F): 96.5 (2021 07:45), Max: 97.8 (2021 16:00)  HR: 52 (2021 07:45) (50 - 58)  BP: 172/90 (2021 07:45) (123/70 - 184/99)  BP(mean): 136 (2021 07:45) (84 - 144)  ABP: --  ABP(mean): --  RR: 18 (2021 08:00) (14 - 20)  SpO2: 95% (2021 08:00) (94% - 99%)      Adult Advanced Hemodynamics Last 24 Hrs  CVP(mm Hg): --  CVP(cm H2O): --  CO: --  CI: --  PA: --  PA(mean): --  PCWP: --  SVR: --  SVRI: --  PVR: --  PVRI: --  CAPILLARY BLOOD GLUCOSE      POCT Blood Glucose.: 144 mg/dL (2021 07:48)  POCT Blood Glucose.: 278 mg/dL (2021 21:21)  POCT Blood Glucose.: 118 mg/dL (2021 16:31)  POCT Blood Glucose.: 190 mg/dL (2021 11:49)    CAPILLARY BLOOD GLUCOSE      POCT Blood Glucose.: 144 mg/dL (2021 07:48)    I&O's Summary    2021 07:01  -  2021 07:00  --------------------------------------------------------  IN: 1250 mL / OUT: 1800 mL / NET: -550 mL      Daily     Daily Weight in k.7 (2021 06:00)    PHYSICAL EXAMINATION:  General: WN/WD NAD  HEENT: PERRLA, EOMI, moist mucous membranes  Neurology: A&Ox3, nonfocal, PETERS x 4  Respiratory: CTA B/L, normal respiratory effort, no wheezes, crackles, rales  CV: RRR, S1S2, no murmurs, rubs or gallops  Abdominal: Soft, NT, ND +BS, Last BM  Extremities: No edema, + peripheral pulses  Incisions:   Tubes:    LABS:                          9.1    9.41  )-----------( 214      ( 2021 04:51 )             30.3     07-    140  |  108  |  51<H>  ----------------------------<  136<H>  4.4   |  24  |  2.9<H>    Ca    8.0<L>      2021 04:51  Phos  4.0     07-  Mg     2.2     -    TPro  5.3<L>  /  Alb  2.7<L>  /  TBili  0.3  /  DBili  x   /  AST  22  /  ALT  51<H>  /  AlkPhos  251<H>      LIVER FUNCTIONS - ( 2021 04:51 )  Alb: 2.7 g/dL / Pro: 5.3 g/dL / ALK PHOS: 251 U/L / ALT: 51 U/L / AST: 22 U/L / GGT: x                       TELEMETRY:    EKG:     IMAGING:   PATIENT:  GILLIAN MENDOZA  [id number]      CHIEF COMPLAINT:  Patient is a 56y old Male with PMHx of HTN, HLD, CKD, T2DM on insulin, Glaucoma who presented with a chief complaint of worsening lower extremity swelling. Patient was admitted for new onset CHF. Found to have triple vessels dz but found to be high risk for CABG.  Today is day 19 of admission.    INTERVAL HISTORY/OVERNIGHT EVENTS:  Patient was examined at bedside. Overnight there was no acute events. Patient denies any SOB, chest pain, abdominal pain. He denies any fever, sweats, or chills. Patient reports eating, ambulating. He was able to have a bowel movement last night.        MEDICATIONS:  MEDICATIONS  (STANDING):  aspirin  chewable 81 milliGRAM(s) Oral daily  atorvastatin 40 milliGRAM(s) Oral at bedtime  bisacodyl 5 milliGRAM(s) Oral at bedtime  brimonidine 0.2% Ophthalmic Solution 1 Drop(s) Both EYES two times a day  carvedilol 3.125 milliGRAM(s) Oral every 12 hours  chlorhexidine 4% Liquid 1 Application(s) Topical <User Schedule>  dorzolamide 2% Ophthalmic Solution 1 Drop(s) Both EYES three times a day  epoetin kinsey-epbx (RETACRIT) Injectable 45270 Unit(s) SubCutaneous every 7 days  gabapentin 100 milliGRAM(s) Oral two times a day  heparin   Injectable 5000 Unit(s) SubCutaneous every 8 hours  hydrALAZINE 10 milliGRAM(s) Oral every 8 hours  insulin lispro (ADMELOG) corrective regimen sliding scale   SubCutaneous Before meals and at bedtime  isosorbide   dinitrate Tablet (ISORDIL) 10 milliGRAM(s) Oral three times a day  latanoprost 0.005% Ophthalmic Solution 1 Drop(s) Both EYES at bedtime  polyethylene glycol 3350 17 Gram(s) Oral two times a day  sevelamer carbonate 800 milliGRAM(s) Oral three times a day with meals  timolol 0.5% Solution 1 Drop(s) Both EYES two times a day    MEDICATIONS  (PRN):      ALLERGIES:  Allergies    No Known Allergies    Intolerances        OBJECTIVE:  ICU Vital Signs Last 24 Hrs  T(C): 35.8 (2021 07:45), Max: 36.6 (2021 16:00)  T(F): 96.5 (2021 07:45), Max: 97.8 (2021 16:00)  HR: 52 (2021 07:45) (50 - 58)  BP: 172/90 (2021 07:45) (123/70 - 184/99)  BP(mean): 136 (2021 07:45) (84 - 144)  ABP: --  ABP(mean): --  RR: 18 (2021 08:00) (14 - 20)  SpO2: 95% (2021 08:00) (94% - 99%)      Adult Advanced Hemodynamics Last 24 Hrs  CVP(mm Hg): --  CVP(cm H2O): --  CO: --  CI: --  PA: --  PA(mean): --  PCWP: --  SVR: --  SVRI: --  PVR: --  PVRI: --  CAPILLARY BLOOD GLUCOSE      POCT Blood Glucose.: 144 mg/dL (2021 07:48)  POCT Blood Glucose.: 278 mg/dL (2021 21:21)  POCT Blood Glucose.: 118 mg/dL (2021 16:31)  POCT Blood Glucose.: 190 mg/dL (2021 11:49)    CAPILLARY BLOOD GLUCOSE      POCT Blood Glucose.: 144 mg/dL (2021 07:48)    I&O's Summary    2021 07:01  -  2021 07:00  --------------------------------------------------------  IN: 1250 mL / OUT: 1800 mL / NET: -550 mL      Daily     Daily Weight in k.7 (2021 06:00)    PHYSICAL EXAMINATION:  General: In no acute distress  HEENT: PERRLA, EOMI, moist mucous membranes  Neurology: A&Ox3, nonfocal  Respiratory: lungs cleared BL to auscultation, normal respiratory effort, no wheezes, crackles, rales  CV: regular rate and rythym, S1S2, no murmurs, rubs or gallops  Abdominal: Soft, non tender, non distended, bowel sounds present  Extremities: No edema, stasis dermatitis  Incisions:   Tubes: none    LABS:                          9.1    9.41  )-----------( 214      ( 2021 04:51 )             30.3         140  |  108  |  51<H>  ----------------------------<  136<H>  4.4   |  24  |  2.9<H>    Ca    8.0<L>      2021 04:51  Phos  4.0       Mg     2.2         TPro  5.3<L>  /  Alb  2.7<L>  /  TBili  0.3  /  DBili  x   /  AST  22  /  ALT  51<H>  /  AlkPhos  251<H>      LIVER FUNCTIONS - ( 2021 04:51 )  Alb: 2.7 g/dL / Pro: 5.3 g/dL / ALK PHOS: 251 U/L / ALT: 51 U/L / AST: 22 U/L / GGT: x                       TELEMETRY:  None    EKG:    from: 12 Lead ECG (21 @ 05:10)   Sinus bradycardia  Right bundle branch block  Cannot rule out Inferior infarct , age undetermined  Abnormal ECG    IMAGING:

## 2021-07-21 NOTE — PROGRESS NOTE ADULT - SUBJECTIVE AND OBJECTIVE BOX
Nephrology progress note    THIS IS AN INCOMPLETE NOTE . FULL NOTE TO FOLLOW SHORTLY    Patient is seen and examined, events over the last 24 h noted .    Allergies:  No Known Allergies    Hospital Medications:   MEDICATIONS  (STANDING):  aspirin  chewable 81 milliGRAM(s) Oral daily  atorvastatin 40 milliGRAM(s) Oral at bedtime  bisacodyl 5 milliGRAM(s) Oral at bedtime  brimonidine 0.2% Ophthalmic Solution 1 Drop(s) Both EYES two times a day  carvedilol 3.125 milliGRAM(s) Oral every 12 hours  chlorhexidine 4% Liquid 1 Application(s) Topical <User Schedule>  dorzolamide 2% Ophthalmic Solution 1 Drop(s) Both EYES three times a day  epoetin kinsey-epbx (RETACRIT) Injectable 81098 Unit(s) SubCutaneous every 7 days  gabapentin 100 milliGRAM(s) Oral two times a day  heparin   Injectable 5000 Unit(s) SubCutaneous every 8 hours  hydrALAZINE 10 milliGRAM(s) Oral every 8 hours  insulin lispro (ADMELOG) corrective regimen sliding scale   SubCutaneous Before meals and at bedtime  isosorbide   dinitrate Tablet (ISORDIL) 10 milliGRAM(s) Oral three times a day  latanoprost 0.005% Ophthalmic Solution 1 Drop(s) Both EYES at bedtime  polyethylene glycol 3350 17 Gram(s) Oral two times a day  sevelamer carbonate 800 milliGRAM(s) Oral three times a day with meals  timolol 0.5% Solution 1 Drop(s) Both EYES two times a day        VITALS:  T(F): 96.5 (07-21-21 @ 07:45), Max: 97.8 (07-20-21 @ 16:00)  HR: 52 (07-21-21 @ 07:45)  BP: 172/90 (07-21-21 @ 07:45)  RR: 18 (07-21-21 @ 08:00)  SpO2: 95% (07-21-21 @ 08:00)  Wt(kg): --    07-19 @ 07:01  -  07-20 @ 07:00  --------------------------------------------------------  IN: 900 mL / OUT: 2225 mL / NET: -1325 mL    07-20 @ 07:01  -  07-21 @ 07:00  --------------------------------------------------------  IN: 1250 mL / OUT: 1800 mL / NET: -550 mL          PHYSICAL EXAM:  Constitutional: NAD  HEENT: anicteric sclera, oropharynx clear, MMM  Neck: No JVD  Respiratory: CTAB, no wheezes, rales or rhonchi  Cardiovascular: S1, S2, RRR  Gastrointestinal: BS+, soft, NT/ND  Extremities: No cyanosis or clubbing. No peripheral edema  :  No harris.   Skin: No rashes    LABS:  07-21    140  |  108  |  51<H>  ----------------------------<  136<H>  4.4   |  24  |  2.9<H>    Ca    8.0<L>      21 Jul 2021 04:51  Phos  4.0     07-20  Mg     2.2     07-21    TPro  5.3<L>  /  Alb  2.7<L>  /  TBili  0.3  /  DBili      /  AST  22  /  ALT  51<H>  /  AlkPhos  251<H>  07-21                          9.1    9.41  )-----------( 214      ( 21 Jul 2021 04:51 )             30.3       Urine Studies:      RADIOLOGY & ADDITIONAL STUDIES:   Nephrology progress note  Patient is seen and examined, events over the last 24 h noted .  sitting in chair comfortable     Allergies:  No Known Allergies    Hospital Medications:   MEDICATIONS  (STANDING):  aspirin  chewable 81 milliGRAM(s) Oral daily  atorvastatin 40 milliGRAM(s) Oral at bedtime  bisacodyl 5 milliGRAM(s) Oral at bedtime  brimonidine 0.2% Ophthalmic Solution 1 Drop(s) Both EYES two times a day  carvedilol 3.125 milliGRAM(s) Oral every 12 hours  chlorhexidine 4% Liquid 1 Application(s) Topical <User Schedule>  dorzolamide 2% Ophthalmic Solution 1 Drop(s) Both EYES three times a day  epoetin kinsey-epbx (RETACRIT) Injectable 31585 Unit(s) SubCutaneous every 7 days  gabapentin 100 milliGRAM(s) Oral two times a day  heparin   Injectable 5000 Unit(s) SubCutaneous every 8 hours  hydrALAZINE 10 milliGRAM(s) Oral every 8 hours  insulin lispro (ADMELOG) corrective regimen sliding scale   SubCutaneous Before meals and at bedtime  isosorbide   dinitrate Tablet (ISORDIL) 10 milliGRAM(s) Oral three times a day  latanoprost 0.005% Ophthalmic Solution 1 Drop(s) Both EYES at bedtime  polyethylene glycol 3350 17 Gram(s) Oral two times a day  sevelamer carbonate 800 milliGRAM(s) Oral three times a day with meals  timolol 0.5% Solution 1 Drop(s) Both EYES two times a day        VITALS:  T(F): 96.5 (07-21-21 @ 07:45), Max: 97.8 (07-20-21 @ 16:00)  HR: 52 (07-21-21 @ 07:45)  BP: 172/90 (07-21-21 @ 07:45)  RR: 18 (07-21-21 @ 08:00)  SpO2: 95% (07-21-21 @ 08:00)      07-19 @ 07:01  -  07-20 @ 07:00  --------------------------------------------------------  IN: 900 mL / OUT: 2225 mL / NET: -1325 mL    07-20 @ 07:01  -  07-21 @ 07:00  --------------------------------------------------------  IN: 1250 mL / OUT: 1800 mL / NET: -550 mL          PHYSICAL EXAM:  Constitutional: NAD  Neck: No JVD  Respiratory: CTAB,  Cardiovascular: S1, S2, RRR  Gastrointestinal: BS+, soft, NT/ND  Extremities: No cyanosis or clubbing. No peripheral edema  :  No harris.   Skin: No rashes    LABS:  07-21    140  |  108  |  51<H>  ----------------------------<  136<H>  4.4   |  24  |  2.9<H>    Ca    8.0<L>      21 Jul 2021 04:51  Phos  4.0     07-20  Mg     2.2     07-21    TPro  5.3<L>  /  Alb  2.7<L>  /  TBili  0.3  /  DBili      /  AST  22  /  ALT  51<H>  /  AlkPhos  251<H>  07-21                          9.1    9.41  )-----------( 214      ( 21 Jul 2021 04:51 )             30.3       Urine Studies:      RADIOLOGY & ADDITIONAL STUDIES:

## 2021-07-21 NOTE — PROGRESS NOTE ADULT - ATTENDING COMMENTS
Patient remains grossly overloaded, BP uncontrolled today. Renal function is stable.     Continue Bumex 2 mg BID + hypertonic saline   Increase hydralazine to 25 mg TID   Increase isosorbide to 20 mg TID   Monitor renal function twice daily   Plan for PCI of LAD tomorrow

## 2021-07-22 LAB
ALBUMIN SERPL ELPH-MCNC: 2.8 G/DL — LOW (ref 3.5–5.2)
ALP SERPL-CCNC: 197 U/L — HIGH (ref 30–115)
ALT FLD-CCNC: 37 U/L — SIGNIFICANT CHANGE UP (ref 0–41)
ANION GAP SERPL CALC-SCNC: 8 MMOL/L — SIGNIFICANT CHANGE UP (ref 7–14)
ANION GAP SERPL CALC-SCNC: 9 MMOL/L — SIGNIFICANT CHANGE UP (ref 7–14)
AST SERPL-CCNC: 14 U/L — SIGNIFICANT CHANGE UP (ref 0–41)
BASOPHILS # BLD AUTO: 0.02 K/UL — SIGNIFICANT CHANGE UP (ref 0–0.2)
BASOPHILS NFR BLD AUTO: 0.2 % — SIGNIFICANT CHANGE UP (ref 0–1)
BILIRUB SERPL-MCNC: 0.4 MG/DL — SIGNIFICANT CHANGE UP (ref 0.2–1.2)
BUN SERPL-MCNC: 44 MG/DL — HIGH (ref 10–20)
BUN SERPL-MCNC: 46 MG/DL — HIGH (ref 10–20)
CALCIUM SERPL-MCNC: 7.9 MG/DL — LOW (ref 8.5–10.1)
CALCIUM SERPL-MCNC: 8.3 MG/DL — LOW (ref 8.5–10.1)
CHLORIDE SERPL-SCNC: 109 MMOL/L — SIGNIFICANT CHANGE UP (ref 98–110)
CHLORIDE SERPL-SCNC: 112 MMOL/L — HIGH (ref 98–110)
CO2 SERPL-SCNC: 25 MMOL/L — SIGNIFICANT CHANGE UP (ref 17–32)
CO2 SERPL-SCNC: 25 MMOL/L — SIGNIFICANT CHANGE UP (ref 17–32)
CREAT SERPL-MCNC: 2.7 MG/DL — HIGH (ref 0.7–1.5)
CREAT SERPL-MCNC: 2.7 MG/DL — HIGH (ref 0.7–1.5)
EOSINOPHIL # BLD AUTO: 0.56 K/UL — SIGNIFICANT CHANGE UP (ref 0–0.7)
EOSINOPHIL NFR BLD AUTO: 6.1 % — SIGNIFICANT CHANGE UP (ref 0–8)
GLUCOSE BLDC GLUCOMTR-MCNC: 136 MG/DL — HIGH (ref 70–99)
GLUCOSE BLDC GLUCOMTR-MCNC: 137 MG/DL — HIGH (ref 70–99)
GLUCOSE BLDC GLUCOMTR-MCNC: 163 MG/DL — HIGH (ref 70–99)
GLUCOSE BLDC GLUCOMTR-MCNC: 170 MG/DL — HIGH (ref 70–99)
GLUCOSE SERPL-MCNC: 131 MG/DL — HIGH (ref 70–99)
GLUCOSE SERPL-MCNC: 169 MG/DL — HIGH (ref 70–99)
HCT VFR BLD CALC: 30.9 % — LOW (ref 42–52)
HGB BLD-MCNC: 9.1 G/DL — LOW (ref 14–18)
IMM GRANULOCYTES NFR BLD AUTO: 0.4 % — HIGH (ref 0.1–0.3)
LYMPHOCYTES # BLD AUTO: 1.09 K/UL — LOW (ref 1.2–3.4)
LYMPHOCYTES # BLD AUTO: 11.9 % — LOW (ref 20.5–51.1)
MAGNESIUM SERPL-MCNC: 2 MG/DL — SIGNIFICANT CHANGE UP (ref 1.8–2.4)
MAGNESIUM SERPL-MCNC: 2.1 MG/DL — SIGNIFICANT CHANGE UP (ref 1.8–2.4)
MCHC RBC-ENTMCNC: 23.8 PG — LOW (ref 27–31)
MCHC RBC-ENTMCNC: 29.4 G/DL — LOW (ref 32–37)
MCV RBC AUTO: 80.9 FL — SIGNIFICANT CHANGE UP (ref 80–94)
MONOCYTES # BLD AUTO: 0.71 K/UL — HIGH (ref 0.1–0.6)
MONOCYTES NFR BLD AUTO: 7.8 % — SIGNIFICANT CHANGE UP (ref 1.7–9.3)
NEUTROPHILS # BLD AUTO: 6.74 K/UL — HIGH (ref 1.4–6.5)
NEUTROPHILS NFR BLD AUTO: 73.6 % — SIGNIFICANT CHANGE UP (ref 42.2–75.2)
NRBC # BLD: 0 /100 WBCS — SIGNIFICANT CHANGE UP (ref 0–0)
PHOSPHATE SERPL-MCNC: 3 MG/DL — SIGNIFICANT CHANGE UP (ref 2.1–4.9)
PLATELET # BLD AUTO: 236 K/UL — SIGNIFICANT CHANGE UP (ref 130–400)
POTASSIUM SERPL-MCNC: 4.5 MMOL/L — SIGNIFICANT CHANGE UP (ref 3.5–5)
POTASSIUM SERPL-MCNC: 4.6 MMOL/L — SIGNIFICANT CHANGE UP (ref 3.5–5)
POTASSIUM SERPL-SCNC: 4.5 MMOL/L — SIGNIFICANT CHANGE UP (ref 3.5–5)
POTASSIUM SERPL-SCNC: 4.6 MMOL/L — SIGNIFICANT CHANGE UP (ref 3.5–5)
PROT SERPL-MCNC: 5.4 G/DL — LOW (ref 6–8)
RBC # BLD: 3.82 M/UL — LOW (ref 4.7–6.1)
RBC # FLD: 15 % — HIGH (ref 11.5–14.5)
SODIUM SERPL-SCNC: 142 MMOL/L — SIGNIFICANT CHANGE UP (ref 135–146)
SODIUM SERPL-SCNC: 146 MMOL/L — SIGNIFICANT CHANGE UP (ref 135–146)
WBC # BLD: 9.16 K/UL — SIGNIFICANT CHANGE UP (ref 4.8–10.8)
WBC # FLD AUTO: 9.16 K/UL — SIGNIFICANT CHANGE UP (ref 4.8–10.8)

## 2021-07-22 PROCEDURE — 93010 ELECTROCARDIOGRAM REPORT: CPT

## 2021-07-22 PROCEDURE — 71045 X-RAY EXAM CHEST 1 VIEW: CPT | Mod: 26

## 2021-07-22 RX ORDER — SODIUM CHLORIDE 5 G/100ML
150 INJECTION, SOLUTION INTRAVENOUS
Refills: 0 | Status: DISCONTINUED | OUTPATIENT
Start: 2021-07-22 | End: 2021-07-22

## 2021-07-22 RX ORDER — HYDRALAZINE HCL 50 MG
25 TABLET ORAL ONCE
Refills: 0 | Status: COMPLETED | OUTPATIENT
Start: 2021-07-22 | End: 2021-07-22

## 2021-07-22 RX ORDER — SODIUM CHLORIDE 5 G/100ML
150 INJECTION, SOLUTION INTRAVENOUS
Refills: 0 | Status: DISCONTINUED | OUTPATIENT
Start: 2021-07-23 | End: 2021-07-23

## 2021-07-22 RX ORDER — SODIUM CHLORIDE 5 G/100ML
150 INJECTION, SOLUTION INTRAVENOUS
Refills: 0 | Status: COMPLETED | OUTPATIENT
Start: 2021-07-22 | End: 2021-07-22

## 2021-07-22 RX ORDER — SODIUM CHLORIDE 9 MG/ML
1000 INJECTION INTRAMUSCULAR; INTRAVENOUS; SUBCUTANEOUS
Refills: 0 | Status: DISCONTINUED | OUTPATIENT
Start: 2021-07-22 | End: 2021-07-22

## 2021-07-22 RX ORDER — CLOPIDOGREL BISULFATE 75 MG/1
75 TABLET, FILM COATED ORAL DAILY
Refills: 0 | Status: DISCONTINUED | OUTPATIENT
Start: 2021-07-23 | End: 2021-07-25

## 2021-07-22 RX ORDER — SODIUM CHLORIDE 5 G/100ML
150 INJECTION, SOLUTION INTRAVENOUS
Refills: 0 | Status: COMPLETED | OUTPATIENT
Start: 2021-07-23 | End: 2021-07-23

## 2021-07-22 RX ORDER — HYDRALAZINE HCL 50 MG
10 TABLET ORAL ONCE
Refills: 0 | Status: COMPLETED | OUTPATIENT
Start: 2021-07-22 | End: 2021-07-22

## 2021-07-22 RX ORDER — HYDRALAZINE HCL 50 MG
50 TABLET ORAL EVERY 8 HOURS
Refills: 0 | Status: DISCONTINUED | OUTPATIENT
Start: 2021-07-22 | End: 2021-07-23

## 2021-07-22 RX ADMIN — POLYETHYLENE GLYCOL 3350 17 GRAM(S): 17 POWDER, FOR SOLUTION ORAL at 17:42

## 2021-07-22 RX ADMIN — GABAPENTIN 100 MILLIGRAM(S): 400 CAPSULE ORAL at 05:47

## 2021-07-22 RX ADMIN — GABAPENTIN 100 MILLIGRAM(S): 400 CAPSULE ORAL at 17:39

## 2021-07-22 RX ADMIN — BUMETANIDE 2 MILLIGRAM(S): 0.25 INJECTION INTRAMUSCULAR; INTRAVENOUS at 17:40

## 2021-07-22 RX ADMIN — ATORVASTATIN CALCIUM 40 MILLIGRAM(S): 80 TABLET, FILM COATED ORAL at 21:48

## 2021-07-22 RX ADMIN — SODIUM CHLORIDE 50 MILLILITER(S): 5 INJECTION, SOLUTION INTRAVENOUS at 18:01

## 2021-07-22 RX ADMIN — DORZOLAMIDE HYDROCHLORIDE 1 DROP(S): 20 SOLUTION/ DROPS OPHTHALMIC at 21:49

## 2021-07-22 RX ADMIN — POLYETHYLENE GLYCOL 3350 17 GRAM(S): 17 POWDER, FOR SOLUTION ORAL at 05:46

## 2021-07-22 RX ADMIN — ISOSORBIDE DINITRATE 20 MILLIGRAM(S): 5 TABLET ORAL at 10:59

## 2021-07-22 RX ADMIN — BRIMONIDINE TARTRATE 1 DROP(S): 2 SOLUTION/ DROPS OPHTHALMIC at 17:39

## 2021-07-22 RX ADMIN — BRIMONIDINE TARTRATE 1 DROP(S): 2 SOLUTION/ DROPS OPHTHALMIC at 05:46

## 2021-07-22 RX ADMIN — LATANOPROST 1 DROP(S): 0.05 SOLUTION/ DROPS OPHTHALMIC; TOPICAL at 21:49

## 2021-07-22 RX ADMIN — ISOSORBIDE DINITRATE 20 MILLIGRAM(S): 5 TABLET ORAL at 05:51

## 2021-07-22 RX ADMIN — CHLORHEXIDINE GLUCONATE 1 APPLICATION(S): 213 SOLUTION TOPICAL at 05:48

## 2021-07-22 RX ADMIN — SODIUM CHLORIDE 50 MILLILITER(S): 5 INJECTION, SOLUTION INTRAVENOUS at 06:30

## 2021-07-22 RX ADMIN — BUMETANIDE 2 MILLIGRAM(S): 0.25 INJECTION INTRAMUSCULAR; INTRAVENOUS at 05:49

## 2021-07-22 RX ADMIN — Medication 25 MILLIGRAM(S): at 05:49

## 2021-07-22 RX ADMIN — SEVELAMER CARBONATE 800 MILLIGRAM(S): 2400 POWDER, FOR SUSPENSION ORAL at 17:39

## 2021-07-22 RX ADMIN — ISOSORBIDE DINITRATE 20 MILLIGRAM(S): 5 TABLET ORAL at 17:43

## 2021-07-22 RX ADMIN — DORZOLAMIDE HYDROCHLORIDE 1 DROP(S): 20 SOLUTION/ DROPS OPHTHALMIC at 05:46

## 2021-07-22 RX ADMIN — Medication 2: at 17:40

## 2021-07-22 RX ADMIN — Medication 81 MILLIGRAM(S): at 12:28

## 2021-07-22 RX ADMIN — CARVEDILOL PHOSPHATE 3.12 MILLIGRAM(S): 80 CAPSULE, EXTENDED RELEASE ORAL at 05:47

## 2021-07-22 RX ADMIN — Medication 5 MILLIGRAM(S): at 21:48

## 2021-07-22 RX ADMIN — CARVEDILOL PHOSPHATE 3.12 MILLIGRAM(S): 80 CAPSULE, EXTENDED RELEASE ORAL at 17:39

## 2021-07-22 RX ADMIN — HEPARIN SODIUM 5000 UNIT(S): 5000 INJECTION INTRAVENOUS; SUBCUTANEOUS at 05:46

## 2021-07-22 RX ADMIN — Medication 10 MILLIGRAM(S): at 12:27

## 2021-07-22 RX ADMIN — Medication 1 DROP(S): at 17:42

## 2021-07-22 RX ADMIN — HEPARIN SODIUM 5000 UNIT(S): 5000 INJECTION INTRAVENOUS; SUBCUTANEOUS at 21:49

## 2021-07-22 RX ADMIN — Medication 25 MILLIGRAM(S): at 10:58

## 2021-07-22 RX ADMIN — SODIUM CHLORIDE 75 MILLILITER(S): 9 INJECTION INTRAMUSCULAR; INTRAVENOUS; SUBCUTANEOUS at 16:43

## 2021-07-22 RX ADMIN — Medication 1 DROP(S): at 05:48

## 2021-07-22 NOTE — CHART NOTE - NSCHARTNOTEFT_GEN_A_CORE
Preliminary Cardiac Catheterization Post-Procedure Report    PRE-OP DIAGNOSIS: Known 3 V CAD from recent angiogram. high risk for CABG. Planned PCI of prox LAD     PROCEDURE: Coronary angiogram, [facility], PCI    Attending: Dr. Singh   Fellow: Dr. Colón, Dr. Richard    ANESTHESIA TYPE  [  ]General Anesthesia  [x  ] Sedation  [x  ] Local/Regional    ESTIMATED BLOOD LOSS:   < 10 mL    CONDITION  [  ] Critical  [  ] Serious  [  ]Fair  [x  ]Good    ACCESS & HEMOSTASIS  [x  ] Right radial  ->D stat  [  ] Right femoral  [  ] Left radial  [  ] Left femoral       CONTRAST: 30 ml      FINDINGS    LVEDP-   severely elevated                           CORONARIES:    LAD- 90% discrete stenosis in proximal LAD        PROCEDURE SUMMARY  Significant 3 vessel CAD  Successful low contrast PCI of proximal LAD with SAMMY x 1 (Synergy XD 4.0 x 16)      RECOMMENDATIONS  -Aggressive medical therapy including DAPT and risk factor modification  -GDMT for HFrEF  -monitor BUN/cr  -Staged PCI of LCX Preliminary Cardiac Catheterization Post-Procedure Report    PRE-OP DIAGNOSIS: Known 3 V CAD from recent angiogram. high risk for CABG. Planned PCI of prox LAD     PROCEDURE: Coronary angiogram, [facility], PCI    Attending: Dr. Singh   Fellow: Dr. Colón, Dr. Richard    ANESTHESIA TYPE  [  ]General Anesthesia  [x  ] Sedation  [x  ] Local/Regional    ESTIMATED BLOOD LOSS:   < 10 mL    CONDITION  [  ] Critical  [  ] Serious  [  ]Fair  [x  ]Good    ACCESS & HEMOSTASIS  [x  ] Right radial  ->D stat  [  ] Right femoral  [  ] Left radial  [  ] Left femoral       CONTRAST: 30 ml      FINDINGS    LVEDP-   severely elevated                           CORONARIES:    LAD- 90% discrete stenosis in proximal LAD        PROCEDURE SUMMARY  Significant 3 vessel CAD  Successful low contrast PCI of proximal LAD with SAMMY x 1 (Synergy XD 4.0 x 16) (AUC score 7).       RECOMMENDATIONS  -Aggressive medical therapy including DAPT and risk factor modification  -GDMT for HFrEF  -monitor BUN/cr  -Staged PCI of LCX Preliminary Cardiac Catheterization Post-Procedure Report    PRE-OP DIAGNOSIS: Known 3 V CAD from recent angiogram. high risk for CABG. Planned PCI of prox LAD     PROCEDURE: Coronary angiogram, [facility], PCI    Attending: Dr. Singh   Fellow: Dr. Colón, Dr. Richard    ANESTHESIA TYPE  [  ]General Anesthesia  [x  ] Sedation  [x  ] Local/Regional    ESTIMATED BLOOD LOSS:   < 10 mL    CONDITION  [  ] Critical  [  ] Serious  [  ]Fair  [x  ]Good    ACCESS & HEMOSTASIS  [x  ] Right radial  ->D stat  [  ] Right femoral  [  ] Left radial  [  ] Left femoral       CONTRAST: 30 ml      FINDINGS    LVEDP-   severely elevated                           CORONARIES:    LAD- 90% discrete stenosis in proximal LAD        PROCEDURE SUMMARY  Significant 3 vessel CAD - deemed high risk for CTS  Successful low contrast PCI of proximal LAD with SAMMY x 1 (Synergy XD 4.0 x 16) (AUC score 7).       RECOMMENDATIONS  -Aggressive medical therapy including DAPT and risk factor modification  -GDMT for HFrEF  -monitor BUN/cr  -Staged PCI of LCX

## 2021-07-22 NOTE — PROGRESS NOTE ADULT - ASSESSMENT
56M w/ PMHx HTN, HLD, T2DM on insulin x>10 years, retinopathy, Glaucoma presents to the Ed c/o worsening LE swelling and pain for the past 1 month. Endorses SOB on exertion, along w/ increased abdominal girth.    # LENNY / CKD 3b / CRS / ATN/hypotension / time frame also c/w contrast induced nephropathy - worsening creat due to low BP   # Hyperkalemia better now off lokelma   # creatinine better trending down bask on BUmex IV   # Nephrotic range proteinuria - needs SPEP UPEP SIF UIF Kappa/lambda 1.9, MARLIN c3c4 wnl; likely due to DM  - last phosphorus level noted cont renvela one tab po qac  - renal ultrasound noted w/o hydronephrosis, right renal cysts  # Normocytic anemia, iron deficient  0n Venofer, started Procrit 10,000 sq qweek  # ADHF, low EF - s/p cardiac cath, appreciate CT surgery f/u regarding CABG  # HTN can increase hydralazine to 25 q8h if BP still high   # no acute indication for RRT   # CAD awaiting CABG   Will follow

## 2021-07-22 NOTE — PROGRESS NOTE ADULT - SUBJECTIVE AND OBJECTIVE BOX
Nephrology progress note    THIS IS AN INCOMPLETE NOTE . FULL NOTE TO FOLLOW SHORTLY    Patient is seen and examined, events over the last 24 h noted .    Allergies:  No Known Allergies    Hospital Medications:   MEDICATIONS  (STANDING):  aspirin  chewable 81 milliGRAM(s) Oral daily  atorvastatin 40 milliGRAM(s) Oral at bedtime  bisacodyl 5 milliGRAM(s) Oral at bedtime  brimonidine 0.2% Ophthalmic Solution 1 Drop(s) Both EYES two times a day  buMETAnide Injectable 2 milliGRAM(s) IV Push two times a day  carvedilol 3.125 milliGRAM(s) Oral every 12 hours  chlorhexidine 4% Liquid 1 Application(s) Topical <User Schedule>  dorzolamide 2% Ophthalmic Solution 1 Drop(s) Both EYES three times a day  epoetin kinsey-epbx (RETACRIT) Injectable 36163 Unit(s) SubCutaneous every 7 days  gabapentin 100 milliGRAM(s) Oral two times a day  heparin   Injectable 5000 Unit(s) SubCutaneous every 8 hours  hydrALAZINE 25 milliGRAM(s) Oral every 8 hours  insulin lispro (ADMELOG) corrective regimen sliding scale   SubCutaneous Before meals and at bedtime  isosorbide   dinitrate Tablet (ISORDIL) 20 milliGRAM(s) Oral three times a day  latanoprost 0.005% Ophthalmic Solution 1 Drop(s) Both EYES at bedtime  polyethylene glycol 3350 17 Gram(s) Oral two times a day  sevelamer carbonate 800 milliGRAM(s) Oral three times a day with meals  timolol 0.5% Solution 1 Drop(s) Both EYES two times a day        VITALS:  T(F): 98.6 (07-22-21 @ 04:00), Max: 98.7 (07-21-21 @ 20:00)  HR: 62 (07-22-21 @ 06:00)  BP: 176/91 (07-22-21 @ 06:00)  RR: 22 (07-22-21 @ 06:00)  SpO2: 97% (07-22-21 @ 06:00)  Wt(kg): --    07-20 @ 07:01  -  07-21 @ 07:00  --------------------------------------------------------  IN: 1250 mL / OUT: 1800 mL / NET: -550 mL    07-21 @ 07:01  -  07-22 @ 07:00  --------------------------------------------------------  IN: 780 mL / OUT: 2150 mL / NET: -1370 mL          PHYSICAL EXAM:  Constitutional: NAD  HEENT: anicteric sclera, oropharynx clear, MMM  Neck: No JVD  Respiratory: CTAB, no wheezes, rales or rhonchi  Cardiovascular: S1, S2, RRR  Gastrointestinal: BS+, soft, NT/ND  Extremities: No cyanosis or clubbing. No peripheral edema  :  No harris.   Skin: No rashes    LABS:  07-22    142  |  109  |  46<H>  ----------------------------<  169<H>  4.6   |  25  |  2.7<H>    Ca    8.3<L>      22 Jul 2021 05:15  Phos  3.0     07-22  Mg     2.1     07-22    TPro  5.4<L>  /  Alb  2.8<L>  /  TBili  0.4  /  DBili      /  AST  14  /  ALT  37  /  AlkPhos  197<H>  07-22                          9.1    9.16  )-----------( 236      ( 22 Jul 2021 05:15 )             30.9       Urine Studies:      RADIOLOGY & ADDITIONAL STUDIES:   Nephrology progress note  Patient is seen and examined, events over the last 24 h noted .  No events no complaints   awaiting surgery     Allergies:  No Known Allergies    Hospital Medications:   MEDICATIONS  (STANDING):    aspirin  chewable 81 milliGRAM(s) Oral daily  atorvastatin 40 milliGRAM(s) Oral at bedtime  bisacodyl 5 milliGRAM(s) Oral at bedtime  brimonidine 0.2% Ophthalmic Solution 1 Drop(s) Both EYES two times a day  buMETAnide Injectable 2 milliGRAM(s) IV Push two times a day  carvedilol 3.125 milliGRAM(s) Oral every 12 hours  dorzolamide 2% Ophthalmic Solution 1 Drop(s) Both EYES three times a day  epoetin kinsey-epbx (RETACRIT) Injectable 24072 Unit(s) SubCutaneous every 7 days  gabapentin 100 milliGRAM(s) Oral two times a day  heparin   Injectable 5000 Unit(s) SubCutaneous every 8 hours  hydrALAZINE 25 milliGRAM(s) Oral every 8 hours  insulin lispro (ADMELOG) corrective regimen sliding scale   SubCutaneous Before meals and at bedtime  isosorbide   dinitrate Tablet (ISORDIL) 20 milliGRAM(s) Oral three times a day  latanoprost 0.005% Ophthalmic Solution 1 Drop(s) Both EYES at bedtime  polyethylene glycol 3350 17 Gram(s) Oral two times a day  sevelamer carbonate 800 milliGRAM(s) Oral three times a day with meals  timolol 0.5% Solution 1 Drop(s) Both EYES two times a day        VITALS:  T(F): 98.6 (07-22-21 @ 04:00), Max: 98.7 (07-21-21 @ 20:00)  HR: 62 (07-22-21 @ 06:00)  BP: 176/91 (07-22-21 @ 06:00)  RR: 22 (07-22-21 @ 06:00)  SpO2: 97% (07-22-21 @ 06:00)      07-20 @ 07:01  -  07-21 @ 07:00  --------------------------------------------------------  IN: 1250 mL / OUT: 1800 mL / NET: -550 mL    07-21 @ 07:01  -  07-22 @ 07:00  --------------------------------------------------------  IN: 780 mL / OUT: 2150 mL / NET: -1370 mL          PHYSICAL EXAM:  Constitutional: NAD  Neck: No JVD  Respiratory: CTAB, no wheezes, rales or rhonchi  Cardiovascular: S1, S2, RRR  Gastrointestinal: BS+, soft, NT/ND  Extremities: No cyanosis or clubbing. trace edema   :  No harris.   Skin: No rashes    LABS:  07-22    142  |  109  |  46<H>  ----------------------------<  169<H>  4.6   |  25  |  2.7<H>    Ca    8.3<L>      22 Jul 2021 05:15  Phos  3.0     07-22  Mg     2.1     07-22    TPro  5.4<L>  /  Alb  2.8<L>  /  TBili  0.4  /  DBili      /  AST  14  /  ALT  37  /  AlkPhos  197<H>  07-22                          9.1    9.16  )-----------( 236      ( 22 Jul 2021 05:15 )             30.9       Urine Studies:      RADIOLOGY & ADDITIONAL STUDIES:

## 2021-07-22 NOTE — CHART NOTE - NSCHARTNOTESELECT_GEN_ALL_CORE
hospitalist/Event Note
Brief cath report
CCU Upgrade/Transfer Note
Nutrition Screen/Nutrition Services
post cath note/Event Note

## 2021-07-22 NOTE — PROGRESS NOTE ADULT - ASSESSMENT
IMPRESSION:  CAD - 3V disease, high risk for CABG - plan for PCI to LAD with staged PCI to LCx  Ischemic cardiomyopathy - patient in fluid overload clinically  Bradycardia - improved  LENNY - improving (ROSENDO vs hypoperfusion from hypotension?)  Elevated LFTs - improving  Anemia  DM  HTN   HLD    PLAN:    CNS: Avoid sedation    HEENT: Oral care    PULMONARY:    - HOB @ 45 degrees    CARDIOVASCULAR:  - Continue CCU monitoring  - Continue Hydralazine 25mg TID and Isordil 20mg TID; will monitor BP and adjust medications accordingly  - Continue aspirin 81mg daily, Atorvastatin 40mg daily  - Continue carvedilol to 3.125mg q12h  - Amiodarone discontinued due to bradycardia  - Continue Bumex 2mg IV with 150cc of hypertonic saline; check strict I&Os; keep I<Os  - Monitor renal function, electrolytes, daily weight and volume status   - Couldn't complete MRI due to orthopnea; will repeat once euvolemic and stable  - Plan to get PCI to LAD today (7/22/2021) then staged PCI to LCx in 4 to 6 weeks; f/u results and recommendations post PCI    GI: GI prophylaxis.  Feeding     RENAL:  - Follow up renal function and lytes.  Correct as needed  - Avoid nephrotoxic agents  - Nephrology following    INFECTIOUS DISEASE: Monitor VS    HEMATOLOGICAL:    - On iron supplementation  - Monitor cbc; watch for signs and symptoms of bleeding  - DVT prophylaxis.    ENDOCRINE:  Follow up FS.  Insulin protocol

## 2021-07-22 NOTE — PROGRESS NOTE ADULT - SUBJECTIVE AND OBJECTIVE BOX
PATIENT:  GILLIAN MENDOZA  [id number]      CHIEF COMPLAINT:  Patient is a 56y old Male with PMHx of HTN, HLD, CKD, T2DM on insulin, Glaucoma who presented with a chief complaint of worsening lower extremity swelling. Patient was admitted for new onset CHF. Found to have triple vessels dz but found to be high risk for CABG.  Today is day 20 of admission.    INTERVAL HISTORY/OVERNIGHT EVENTS:  Patient was examined at bedside. Overnight there was no acute events. Patient denies any SOB, chest pain, abdominal pain. He denies any fever, sweats, or chills. Patient reports eating, ambulating and having bowel movement.      MEDICATIONS:  MEDICATIONS  (STANDING):  aspirin  chewable 81 milliGRAM(s) Oral daily  atorvastatin 40 milliGRAM(s) Oral at bedtime  bisacodyl 5 milliGRAM(s) Oral at bedtime  brimonidine 0.2% Ophthalmic Solution 1 Drop(s) Both EYES two times a day  buMETAnide Injectable 2 milliGRAM(s) IV Push two times a day  carvedilol 3.125 milliGRAM(s) Oral every 12 hours  chlorhexidine 4% Liquid 1 Application(s) Topical <User Schedule>  dorzolamide 2% Ophthalmic Solution 1 Drop(s) Both EYES three times a day  epoetin kinsey-epbx (RETACRIT) Injectable 53917 Unit(s) SubCutaneous every 7 days  gabapentin 100 milliGRAM(s) Oral two times a day  heparin   Injectable 5000 Unit(s) SubCutaneous every 8 hours  hydrALAZINE 25 milliGRAM(s) Oral every 8 hours  insulin lispro (ADMELOG) corrective regimen sliding scale   SubCutaneous Before meals and at bedtime  isosorbide   dinitrate Tablet (ISORDIL) 20 milliGRAM(s) Oral three times a day  latanoprost 0.005% Ophthalmic Solution 1 Drop(s) Both EYES at bedtime  polyethylene glycol 3350 17 Gram(s) Oral two times a day  sevelamer carbonate 800 milliGRAM(s) Oral three times a day with meals  timolol 0.5% Solution 1 Drop(s) Both EYES two times a day    MEDICATIONS  (PRN):      ALLERGIES:  Allergies    No Known Allergies    Intolerances        OBJECTIVE:  ICU Vital Signs Last 24 Hrs  T(C): 37 (2021 04:00), Max: 37.1 (2021 20:00)  T(F): 98.6 (2021 04:00), Max: 98.7 (2021 20:00)  HR: 62 (2021 06:00) (56 - 62)  BP: 176/91 (2021 06:00) (105/61 - 220/113)  BP(mean): 134 (2021 06:00) (77 - 158)  ABP: --  ABP(mean): --  RR: 22 (2021 06:00) (16 - 23)  SpO2: 97% (2021 06:00) (96% - 98%)      Adult Advanced Hemodynamics Last 24 Hrs  CVP(mm Hg): --  CVP(cm H2O): --  CO: --  CI: --  PA: --  PA(mean): --  PCWP: --  SVR: --  SVRI: --  PVR: --  PVRI: --  CAPILLARY BLOOD GLUCOSE      POCT Blood Glucose.: 163 mg/dL (2021 07:44)  POCT Blood Glucose.: 183 mg/dL (2021 21:22)  POCT Blood Glucose.: 168 mg/dL (2021 16:19)  POCT Blood Glucose.: 214 mg/dL (2021 11:38)    CAPILLARY BLOOD GLUCOSE      POCT Blood Glucose.: 163 mg/dL (2021 07:44)    I&O's Summary    2021 07:01  -  2021 07:00  --------------------------------------------------------  IN: 780 mL / OUT: 2150 mL / NET: -1370 mL      Daily     Daily Weight in k (2021 06:00)    PHYSICAL EXAMINATION:  General: In no acute distress, resting in bed  HEENT: PERRLA, EOMI, moist mucous membranes  Neurology: A&Ox3, nonfocal  Respiratory: lungs cleared BL to auscultation, normal respiratory effort, no wheezes, crackles, rales  CV: regular rate and rythym, S1S2, no murmurs, rubs or gallops, no JVD  Abdominal: Soft, non tender, non distended, bowel sounds present  Extremities: No edema, stasis dermatitis    Incisions:   Tubes: none    LABS:                          9.1    9.16  )-----------( 236      ( 2021 05:15 )             30.9         142  |  109  |  46<H>  ----------------------------<  169<H>  4.6   |  25  |  2.7<H>    Ca    8.3<L>      2021 05:15  Phos  3.0       Mg     2.1         TPro  5.4<L>  /  Alb  2.8<L>  /  TBili  0.4  /  DBili  x   /  AST  14  /  ALT  37  /  AlkPhos  197<H>      LIVER FUNCTIONS - ( 2021 05:15 )  Alb: 2.8 g/dL / Pro: 5.4 g/dL / ALK PHOS: 197 U/L / ALT: 37 U/L / AST: 14 U/L / GGT: x               TELEMETRY:  No events    EKG:    from: 12 Lead ECG (21 @ 05:10)   Diagnosis Line Sinus bradycardia  Right bundle branch block  Cannot rule out Inferior infarct , age undetermined  Abnormal ECG      IMAGING:

## 2021-07-23 LAB
% ALBUMIN: 46.2 % — SIGNIFICANT CHANGE UP
% ALPHA 1: 6.1 % — SIGNIFICANT CHANGE UP
% ALPHA 2: 14.7 % — SIGNIFICANT CHANGE UP
% BETA: 16.2 % — SIGNIFICANT CHANGE UP
% GAMMA, URINE: 13.7 % — SIGNIFICANT CHANGE UP
% GAMMA: 16.8 % — SIGNIFICANT CHANGE UP
ALBUMIN 24H MFR UR ELPH: 59.3 % — SIGNIFICANT CHANGE UP
ALBUMIN SERPL ELPH-MCNC: 2.4 G/DL — LOW (ref 3.6–5.5)
ALBUMIN SERPL ELPH-MCNC: 2.7 G/DL — LOW (ref 3.5–5.2)
ALBUMIN/GLOB SERPL ELPH: 0.8 RATIO — SIGNIFICANT CHANGE UP
ALP SERPL-CCNC: 185 U/L — HIGH (ref 30–115)
ALPHA1 GLOB 24H MFR UR ELPH: 9.2 % — SIGNIFICANT CHANGE UP
ALPHA1 GLOB SERPL ELPH-MCNC: 0.3 G/DL — SIGNIFICANT CHANGE UP (ref 0.1–0.4)
ALPHA2 GLOB 24H MFR UR ELPH: 8.5 % — SIGNIFICANT CHANGE UP
ALPHA2 GLOB SERPL ELPH-MCNC: 0.8 G/DL — SIGNIFICANT CHANGE UP (ref 0.5–1)
ALT FLD-CCNC: 31 U/L — SIGNIFICANT CHANGE UP (ref 0–41)
ANION GAP SERPL CALC-SCNC: 7 MMOL/L — SIGNIFICANT CHANGE UP (ref 7–14)
ANION GAP SERPL CALC-SCNC: 9 MMOL/L — SIGNIFICANT CHANGE UP (ref 7–14)
AST SERPL-CCNC: 29 U/L — SIGNIFICANT CHANGE UP (ref 0–41)
B-GLOBULIN 24H MFR UR ELPH: 9.3 % — SIGNIFICANT CHANGE UP
B-GLOBULIN SERPL ELPH-MCNC: 0.9 G/DL — SIGNIFICANT CHANGE UP (ref 0.5–1)
BASOPHILS # BLD AUTO: 0.03 K/UL — SIGNIFICANT CHANGE UP (ref 0–0.2)
BASOPHILS NFR BLD AUTO: 0.3 % — SIGNIFICANT CHANGE UP (ref 0–1)
BILIRUB SERPL-MCNC: 0.4 MG/DL — SIGNIFICANT CHANGE UP (ref 0.2–1.2)
BUN SERPL-MCNC: 43 MG/DL — HIGH (ref 10–20)
BUN SERPL-MCNC: 44 MG/DL — HIGH (ref 10–20)
CALCIUM SERPL-MCNC: 8 MG/DL — LOW (ref 8.5–10.1)
CALCIUM SERPL-MCNC: 8 MG/DL — LOW (ref 8.5–10.1)
CHLORIDE SERPL-SCNC: 106 MMOL/L — SIGNIFICANT CHANGE UP (ref 98–110)
CHLORIDE SERPL-SCNC: 110 MMOL/L — SIGNIFICANT CHANGE UP (ref 98–110)
CO2 SERPL-SCNC: 24 MMOL/L — SIGNIFICANT CHANGE UP (ref 17–32)
CO2 SERPL-SCNC: 24 MMOL/L — SIGNIFICANT CHANGE UP (ref 17–32)
CREAT SERPL-MCNC: 2.6 MG/DL — HIGH (ref 0.7–1.5)
CREAT SERPL-MCNC: 2.7 MG/DL — HIGH (ref 0.7–1.5)
EOSINOPHIL # BLD AUTO: 0.77 K/UL — HIGH (ref 0–0.7)
EOSINOPHIL NFR BLD AUTO: 7.5 % — SIGNIFICANT CHANGE UP (ref 0–8)
GAMMA GLOBULIN: 0.9 G/DL — SIGNIFICANT CHANGE UP (ref 0.6–1.6)
GLUCOSE BLDC GLUCOMTR-MCNC: 133 MG/DL — HIGH (ref 70–99)
GLUCOSE BLDC GLUCOMTR-MCNC: 138 MG/DL — HIGH (ref 70–99)
GLUCOSE BLDC GLUCOMTR-MCNC: 228 MG/DL — HIGH (ref 70–99)
GLUCOSE BLDC GLUCOMTR-MCNC: 268 MG/DL — HIGH (ref 70–99)
GLUCOSE SERPL-MCNC: 151 MG/DL — HIGH (ref 70–99)
GLUCOSE SERPL-MCNC: 185 MG/DL — HIGH (ref 70–99)
HCT VFR BLD CALC: 30.4 % — LOW (ref 42–52)
HGB BLD-MCNC: 9.1 G/DL — LOW (ref 14–18)
IMM GRANULOCYTES NFR BLD AUTO: 0.3 % — SIGNIFICANT CHANGE UP (ref 0.1–0.3)
INTERPRETATION 24H UR IFE-IMP: SIGNIFICANT CHANGE UP
INTERPRETATION 24H UR IFE-IMP: SIGNIFICANT CHANGE UP
INTERPRETATION SERPL IFE-IMP: SIGNIFICANT CHANGE UP
IRON SATN MFR SERPL: 24 % — SIGNIFICANT CHANGE UP (ref 15–50)
IRON SATN MFR SERPL: 57 UG/DL — SIGNIFICANT CHANGE UP (ref 35–150)
LYMPHOCYTES # BLD AUTO: 1.06 K/UL — LOW (ref 1.2–3.4)
LYMPHOCYTES # BLD AUTO: 10.4 % — LOW (ref 20.5–51.1)
M PROTEIN 24H UR ELPH-MRATE: SIGNIFICANT CHANGE UP
MAGNESIUM SERPL-MCNC: 2 MG/DL — SIGNIFICANT CHANGE UP (ref 1.8–2.4)
MAGNESIUM SERPL-MCNC: 2.1 MG/DL — SIGNIFICANT CHANGE UP (ref 1.8–2.4)
MCHC RBC-ENTMCNC: 24.1 PG — LOW (ref 27–31)
MCHC RBC-ENTMCNC: 29.9 G/DL — LOW (ref 32–37)
MCV RBC AUTO: 80.6 FL — SIGNIFICANT CHANGE UP (ref 80–94)
MONOCYTES # BLD AUTO: 0.72 K/UL — HIGH (ref 0.1–0.6)
MONOCYTES NFR BLD AUTO: 7 % — SIGNIFICANT CHANGE UP (ref 1.7–9.3)
NEUTROPHILS # BLD AUTO: 7.61 K/UL — HIGH (ref 1.4–6.5)
NEUTROPHILS NFR BLD AUTO: 74.5 % — SIGNIFICANT CHANGE UP (ref 42.2–75.2)
NRBC # BLD: 0 /100 WBCS — SIGNIFICANT CHANGE UP (ref 0–0)
PHOSPHATE SERPL-MCNC: 3.5 MG/DL — SIGNIFICANT CHANGE UP (ref 2.1–4.9)
PLATELET # BLD AUTO: 217 K/UL — SIGNIFICANT CHANGE UP (ref 130–400)
POTASSIUM SERPL-MCNC: 4.5 MMOL/L — SIGNIFICANT CHANGE UP (ref 3.5–5)
POTASSIUM SERPL-MCNC: 4.7 MMOL/L — SIGNIFICANT CHANGE UP (ref 3.5–5)
POTASSIUM SERPL-SCNC: 4.5 MMOL/L — SIGNIFICANT CHANGE UP (ref 3.5–5)
POTASSIUM SERPL-SCNC: 4.7 MMOL/L — SIGNIFICANT CHANGE UP (ref 3.5–5)
PROT ?TM UR-MCNC: 543 MG/DL — HIGH (ref 0–12)
PROT PATTERN 24H UR ELPH-IMP: SIGNIFICANT CHANGE UP
PROT PATTERN SERPL ELPH-IMP: SIGNIFICANT CHANGE UP
PROT SERPL-MCNC: 5.2 G/DL — LOW (ref 6–8)
RBC # BLD: 3.77 M/UL — LOW (ref 4.7–6.1)
RBC # FLD: 15.2 % — HIGH (ref 11.5–14.5)
SODIUM SERPL-SCNC: 139 MMOL/L — SIGNIFICANT CHANGE UP (ref 135–146)
SODIUM SERPL-SCNC: 141 MMOL/L — SIGNIFICANT CHANGE UP (ref 135–146)
TIBC SERPL-MCNC: 240 UG/DL — SIGNIFICANT CHANGE UP (ref 220–430)
TOTAL VOLUME - 24 HOUR: SIGNIFICANT CHANGE UP ML
UIBC SERPL-MCNC: 183 UG/DL — SIGNIFICANT CHANGE UP (ref 110–370)
URINE CREATININE CALCULATION: SIGNIFICANT CHANGE UP G/24 H (ref 1–2)
WBC # BLD: 10.22 K/UL — SIGNIFICANT CHANGE UP (ref 4.8–10.8)
WBC # FLD AUTO: 10.22 K/UL — SIGNIFICANT CHANGE UP (ref 4.8–10.8)

## 2021-07-23 PROCEDURE — 71045 X-RAY EXAM CHEST 1 VIEW: CPT | Mod: 26

## 2021-07-23 PROCEDURE — 93010 ELECTROCARDIOGRAM REPORT: CPT

## 2021-07-23 PROCEDURE — 99233 SBSQ HOSP IP/OBS HIGH 50: CPT

## 2021-07-23 RX ORDER — HYDRALAZINE HCL 50 MG
25 TABLET ORAL ONCE
Refills: 0 | Status: COMPLETED | OUTPATIENT
Start: 2021-07-23 | End: 2021-07-23

## 2021-07-23 RX ORDER — HYDRALAZINE HCL 50 MG
25 TABLET ORAL EVERY 8 HOURS
Refills: 0 | Status: DISCONTINUED | OUTPATIENT
Start: 2021-07-23 | End: 2021-07-25

## 2021-07-23 RX ORDER — SODIUM CHLORIDE 5 G/100ML
150 INJECTION, SOLUTION INTRAVENOUS
Refills: 0 | Status: COMPLETED | OUTPATIENT
Start: 2021-07-24 | End: 2021-07-24

## 2021-07-23 RX ORDER — SODIUM CHLORIDE 5 G/100ML
500 INJECTION, SOLUTION INTRAVENOUS
Refills: 0 | Status: COMPLETED | OUTPATIENT
Start: 2021-07-23 | End: 2021-07-23

## 2021-07-23 RX ORDER — SODIUM CHLORIDE 5 G/100ML
150 INJECTION, SOLUTION INTRAVENOUS
Refills: 0 | Status: COMPLETED | OUTPATIENT
Start: 2021-07-23 | End: 2021-07-23

## 2021-07-23 RX ADMIN — Medication 1 DROP(S): at 17:07

## 2021-07-23 RX ADMIN — CHLORHEXIDINE GLUCONATE 1 APPLICATION(S): 213 SOLUTION TOPICAL at 05:18

## 2021-07-23 RX ADMIN — POLYETHYLENE GLYCOL 3350 17 GRAM(S): 17 POWDER, FOR SOLUTION ORAL at 05:19

## 2021-07-23 RX ADMIN — Medication 81 MILLIGRAM(S): at 11:16

## 2021-07-23 RX ADMIN — SEVELAMER CARBONATE 800 MILLIGRAM(S): 2400 POWDER, FOR SUSPENSION ORAL at 11:17

## 2021-07-23 RX ADMIN — DORZOLAMIDE HYDROCHLORIDE 1 DROP(S): 20 SOLUTION/ DROPS OPHTHALMIC at 13:07

## 2021-07-23 RX ADMIN — BRIMONIDINE TARTRATE 1 DROP(S): 2 SOLUTION/ DROPS OPHTHALMIC at 17:07

## 2021-07-23 RX ADMIN — HEPARIN SODIUM 5000 UNIT(S): 5000 INJECTION INTRAVENOUS; SUBCUTANEOUS at 21:27

## 2021-07-23 RX ADMIN — ISOSORBIDE DINITRATE 20 MILLIGRAM(S): 5 TABLET ORAL at 05:17

## 2021-07-23 RX ADMIN — SODIUM CHLORIDE 30 MILLILITER(S): 5 INJECTION, SOLUTION INTRAVENOUS at 22:49

## 2021-07-23 RX ADMIN — GABAPENTIN 100 MILLIGRAM(S): 400 CAPSULE ORAL at 05:17

## 2021-07-23 RX ADMIN — ATORVASTATIN CALCIUM 40 MILLIGRAM(S): 80 TABLET, FILM COATED ORAL at 21:25

## 2021-07-23 RX ADMIN — BRIMONIDINE TARTRATE 1 DROP(S): 2 SOLUTION/ DROPS OPHTHALMIC at 05:17

## 2021-07-23 RX ADMIN — Medication 25 MILLIGRAM(S): at 12:07

## 2021-07-23 RX ADMIN — CARVEDILOL PHOSPHATE 3.12 MILLIGRAM(S): 80 CAPSULE, EXTENDED RELEASE ORAL at 05:17

## 2021-07-23 RX ADMIN — Medication 5 MILLIGRAM(S): at 21:25

## 2021-07-23 RX ADMIN — ISOSORBIDE DINITRATE 20 MILLIGRAM(S): 5 TABLET ORAL at 16:07

## 2021-07-23 RX ADMIN — POLYETHYLENE GLYCOL 3350 17 GRAM(S): 17 POWDER, FOR SOLUTION ORAL at 17:07

## 2021-07-23 RX ADMIN — Medication 6: at 11:14

## 2021-07-23 RX ADMIN — DORZOLAMIDE HYDROCHLORIDE 1 DROP(S): 20 SOLUTION/ DROPS OPHTHALMIC at 21:25

## 2021-07-23 RX ADMIN — HEPARIN SODIUM 5000 UNIT(S): 5000 INJECTION INTRAVENOUS; SUBCUTANEOUS at 13:08

## 2021-07-23 RX ADMIN — Medication 25 MILLIGRAM(S): at 21:23

## 2021-07-23 RX ADMIN — CLOPIDOGREL BISULFATE 75 MILLIGRAM(S): 75 TABLET, FILM COATED ORAL at 11:16

## 2021-07-23 RX ADMIN — Medication 4: at 21:41

## 2021-07-23 RX ADMIN — HEPARIN SODIUM 5000 UNIT(S): 5000 INJECTION INTRAVENOUS; SUBCUTANEOUS at 05:17

## 2021-07-23 RX ADMIN — SEVELAMER CARBONATE 800 MILLIGRAM(S): 2400 POWDER, FOR SUSPENSION ORAL at 16:08

## 2021-07-23 RX ADMIN — LATANOPROST 1 DROP(S): 0.05 SOLUTION/ DROPS OPHTHALMIC; TOPICAL at 21:22

## 2021-07-23 RX ADMIN — CARVEDILOL PHOSPHATE 3.12 MILLIGRAM(S): 80 CAPSULE, EXTENDED RELEASE ORAL at 17:06

## 2021-07-23 RX ADMIN — ISOSORBIDE DINITRATE 20 MILLIGRAM(S): 5 TABLET ORAL at 11:16

## 2021-07-23 RX ADMIN — SODIUM CHLORIDE 30 MILLILITER(S): 5 INJECTION, SOLUTION INTRAVENOUS at 18:01

## 2021-07-23 RX ADMIN — SODIUM CHLORIDE 50 MILLILITER(S): 5 INJECTION, SOLUTION INTRAVENOUS at 06:01

## 2021-07-23 RX ADMIN — Medication 50 MILLIGRAM(S): at 05:16

## 2021-07-23 RX ADMIN — Medication 1 DROP(S): at 05:17

## 2021-07-23 RX ADMIN — GABAPENTIN 100 MILLIGRAM(S): 400 CAPSULE ORAL at 17:06

## 2021-07-23 RX ADMIN — DORZOLAMIDE HYDROCHLORIDE 1 DROP(S): 20 SOLUTION/ DROPS OPHTHALMIC at 05:17

## 2021-07-23 RX ADMIN — SEVELAMER CARBONATE 800 MILLIGRAM(S): 2400 POWDER, FOR SUSPENSION ORAL at 07:34

## 2021-07-23 RX ADMIN — BUMETANIDE 2 MILLIGRAM(S): 0.25 INJECTION INTRAMUSCULAR; INTRAVENOUS at 05:15

## 2021-07-23 RX ADMIN — BUMETANIDE 2 MILLIGRAM(S): 0.25 INJECTION INTRAMUSCULAR; INTRAVENOUS at 17:39

## 2021-07-23 NOTE — PROGRESS NOTE ADULT - ASSESSMENT
56M w/ PMHx HTN, HLD, T2DM on insulin x>10 years, retinopathy, Glaucoma presents to the Ed c/o worsening LE swelling and pain for the past 1 month. Endorses SOB on exertion, along w/ increased abdominal girth.    # LENNY / CKD 3b / CRS / ATN/hypotension / time frame also c/w contrast induced nephropathy   # Hyperkalemia better now off lokelma   # creatinine stable  bask on BUmex IV   # Nephrotic range proteinuria - needs SPEP UPEP SIF UIF Kappa/lambda 1.9, MARLIN c3c4 wnl; likely due to DM  - last phosphorus level noted cont renvela one tab po qac  - renal ultrasound noted w/o hydronephrosis, right renal cysts  # Normocytic anemia, iron deficient  0n Venofer, started Procrit 10,000 sq qweek  # HTN BP noted ok   # no acute indication for RRT   # CAD ADHF  sp cath yesterday/ appreciate cardio notes   Will follow

## 2021-07-23 NOTE — PROGRESS NOTE ADULT - SUBJECTIVE AND OBJECTIVE BOX
PATIENT:  GILLIAN MENDOZA  [id number]      CHIEF COMPLAINT:  Patient is a 56y old  Male who presents with a chief complaint of CHF exacerbation (2021 08:01)        HPI:      INTERVAL HISTORY/OVERNIGHT EVENTS:      MEDICATIONS:  MEDICATIONS  (STANDING):  aspirin  chewable 81 milliGRAM(s) Oral daily  atorvastatin 40 milliGRAM(s) Oral at bedtime  bisacodyl 5 milliGRAM(s) Oral at bedtime  brimonidine 0.2% Ophthalmic Solution 1 Drop(s) Both EYES two times a day  buMETAnide Injectable 2 milliGRAM(s) IV Push two times a day  carvedilol 3.125 milliGRAM(s) Oral every 12 hours  chlorhexidine 4% Liquid 1 Application(s) Topical <User Schedule>  clopidogrel Tablet 75 milliGRAM(s) Oral daily  dorzolamide 2% Ophthalmic Solution 1 Drop(s) Both EYES three times a day  epoetin kinsey-epbx (RETACRIT) Injectable 86967 Unit(s) SubCutaneous every 7 days  gabapentin 100 milliGRAM(s) Oral two times a day  heparin   Injectable 5000 Unit(s) SubCutaneous every 8 hours  hydrALAZINE 50 milliGRAM(s) Oral every 8 hours  insulin lispro (ADMELOG) corrective regimen sliding scale   SubCutaneous Before meals and at bedtime  isosorbide   dinitrate Tablet (ISORDIL) 20 milliGRAM(s) Oral three times a day  latanoprost 0.005% Ophthalmic Solution 1 Drop(s) Both EYES at bedtime  polyethylene glycol 3350 17 Gram(s) Oral two times a day  sevelamer carbonate 800 milliGRAM(s) Oral three times a day with meals  sodium chloride 3%. 150 milliLiter(s) (50 mL/Hr) IV Continuous <Continuous>  timolol 0.5% Solution 1 Drop(s) Both EYES two times a day    MEDICATIONS  (PRN):      ALLERGIES:  Allergies    No Known Allergies    Intolerances        OBJECTIVE:  ICU Vital Signs Last 24 Hrs  T(C): 36.9 (2021 04:00), Max: 36.9 (2021 04:00)  T(F): 98.4 (2021 04:00), Max: 98.4 (2021 04:00)  HR: 60 (2021 06:00) (54 - 68)  BP: 103/61 (2021 06:00) (98/62 - 203/109)  BP(mean): 77 (2021 06:00) (73 - 161)  ABP: --  ABP(mean): --  RR: 19 (2021 06:00) (17 - 240)  SpO2: 98% (2021 06:00) (96% - 100%)      Adult Advanced Hemodynamics Last 24 Hrs  CVP(mm Hg): --  CVP(cm H2O): --  CO: --  CI: --  PA: --  PA(mean): --  PCWP: --  SVR: --  SVRI: --  PVR: --  PVRI: --  CAPILLARY BLOOD GLUCOSE      POCT Blood Glucose.: 136 mg/dL (2021 22:15)  POCT Blood Glucose.: 170 mg/dL (2021 17:35)  POCT Blood Glucose.: 137 mg/dL (2021 12:32)  POCT Blood Glucose.: 163 mg/dL (2021 07:44)    CAPILLARY BLOOD GLUCOSE      POCT Blood Glucose.: 136 mg/dL (2021 22:15)    I&O's Summary    2021 07:01  -  2021 07:00  --------------------------------------------------------  IN: 780 mL / OUT: 2150 mL / NET: -1370 mL    2021 07:01  -  2021 06:24  --------------------------------------------------------  IN: 1215 mL / OUT: 1500 mL / NET: -285 mL      Daily     Daily Weight in k.1 (2021 06:00)    PHYSICAL EXAMINATION:  General: WN/WD NAD  HEENT: PERRLA, EOMI, moist mucous membranes  Neurology: A&Ox3, nonfocal, PETERS x 4  Respiratory: CTA B/L, normal respiratory effort, no wheezes, crackles, rales  CV: RRR, S1S2, no murmurs, rubs or gallops  Abdominal: Soft, NT, ND +BS, Last BM  Extremities: No edema, + peripheral pulses  Incisions:   Tubes:    LABS:                          9.1    10.22 )-----------( 217      ( 2021 04:40 )             30.4         146  |  112<H>  |  44<H>  ----------------------------<  131<H>  4.5   |  25  |  2.7<H>    Ca    7.9<L>      2021 20:47  Phos  3.0       Mg     2.0         TPro  5.4<L>  /  Alb  2.8<L>  /  TBili  0.4  /  DBili  x   /  AST  14  /  ALT  37  /  AlkPhos  197<H>      LIVER FUNCTIONS - ( 2021 05:15 )  Alb: 2.8 g/dL / Pro: 5.4 g/dL / ALK PHOS: 197 U/L / ALT: 37 U/L / AST: 14 U/L / GGT: x                       TELEMETRY:    EKG:     IMAGING:   PATIENT:  GILLIAN MENDOZA  [id number]      CHIEF COMPLAINT:  Patient is a 56y old Male with PMHx of HTN, HLD, CKD, T2DM on insulin, Glaucoma who presented with a chief complaint of worsening lower extremity swelling. Patient was admitted for new onset CHF. Found to have triple vessels dz but found to be high risk for CABG.  Today is day 21 of admission.    INTERVAL HISTORY/OVERNIGHT EVENTS:  Patient was examined at bedside. Overnight there was no acute events but patient had ventricular tachy on tele. At time of exam patient denies any SOB, chest pain, abdominal pain. He denies any fever, sweats, or chills. Patient reports eating, ambulating and having bowel movement.     MEDICATIONS:  MEDICATIONS  (STANDING):  aspirin  chewable 81 milliGRAM(s) Oral daily  atorvastatin 40 milliGRAM(s) Oral at bedtime  bisacodyl 5 milliGRAM(s) Oral at bedtime  brimonidine 0.2% Ophthalmic Solution 1 Drop(s) Both EYES two times a day  buMETAnide Injectable 2 milliGRAM(s) IV Push two times a day  carvedilol 3.125 milliGRAM(s) Oral every 12 hours  chlorhexidine 4% Liquid 1 Application(s) Topical <User Schedule>  clopidogrel Tablet 75 milliGRAM(s) Oral daily  dorzolamide 2% Ophthalmic Solution 1 Drop(s) Both EYES three times a day  epoetin kinsey-epbx (RETACRIT) Injectable 45550 Unit(s) SubCutaneous every 7 days  gabapentin 100 milliGRAM(s) Oral two times a day  heparin   Injectable 5000 Unit(s) SubCutaneous every 8 hours  hydrALAZINE 50 milliGRAM(s) Oral every 8 hours  insulin lispro (ADMELOG) corrective regimen sliding scale   SubCutaneous Before meals and at bedtime  isosorbide   dinitrate Tablet (ISORDIL) 20 milliGRAM(s) Oral three times a day  latanoprost 0.005% Ophthalmic Solution 1 Drop(s) Both EYES at bedtime  polyethylene glycol 3350 17 Gram(s) Oral two times a day  sevelamer carbonate 800 milliGRAM(s) Oral three times a day with meals  sodium chloride 3%. 150 milliLiter(s) (50 mL/Hr) IV Continuous <Continuous>  timolol 0.5% Solution 1 Drop(s) Both EYES two times a day    MEDICATIONS  (PRN):      ALLERGIES:  Allergies    No Known Allergies    Intolerances    OBJECTIVE:  ICU Vital Signs Last 24 Hrs  T(C): 36.9 (2021 04:00), Max: 36.9 (2021 04:00)  T(F): 98.4 (2021 04:00), Max: 98.4 (2021 04:00)  HR: 60 (2021 06:00) (54 - 68)  BP: 103/61 (2021 06:00) (98/62 - 203/109)  BP(mean): 77 (2021 06:00) (73 - 161)  ABP: --  ABP(mean): --  RR: 19 (2021 06:00) (17 - 240)  SpO2: 98% (2021 06:00) (96% - 100%)      Adult Advanced Hemodynamics Last 24 Hrs  CVP(mm Hg): --  CVP(cm H2O): --  CO: --  CI: --  PA: --  PA(mean): --  PCWP: --  SVR: --  SVRI: --  PVR: --  PVRI: --  CAPILLARY BLOOD GLUCOSE      POCT Blood Glucose.: 136 mg/dL (2021 22:15)  POCT Blood Glucose.: 170 mg/dL (2021 17:35)  POCT Blood Glucose.: 137 mg/dL (2021 12:32)  POCT Blood Glucose.: 163 mg/dL (2021 07:44)    CAPILLARY BLOOD GLUCOSE      POCT Blood Glucose.: 136 mg/dL (2021 22:15)    I&O's Summary    2021 07:01  -  2021 07:00  --------------------------------------------------------  IN: 780 mL / OUT: 2150 mL / NET: -1370 mL    2021 07:01  -  2021 06:24  --------------------------------------------------------  IN: 1215 mL / OUT: 1500 mL / NET: -285 mL      Daily     Daily Weight in k.1 (2021 06:00)    PHYSICAL EXAMINATION:  General: In no acute distress, resting in bed  HEENT: PERRLA, EOMI, moist mucous membranes  Neurology: A&Ox3, nonfocal,   Respiratory: CTA B/L, normal respiratory effort, no wheezes, crackles, rales  CV: JVD present, RRR, S1S2, no murmurs, rubs or gallops  Abdominal: Soft, nontender, non distended, bowel sounds present   Extremities: No edema    Incisions:   Tubes:    LABS:                          9.1    10.22 )-----------( 217      ( 2021 04:40 )             30.4     07-    146  |  112<H>  |  44<H>  ----------------------------<  131<H>  4.5   |  25  |  2.7<H>    Ca    7.9<L>      2021 20:47  Phos  3.0     -  Mg     2.0         TPro  5.4<L>  /  Alb  2.8<L>  /  TBili  0.4  /  DBili  x   /  AST  14  /  ALT  37  /  AlkPhos  197<H>      LIVER FUNCTIONS - ( 2021 05:15 )  Alb: 2.8 g/dL / Pro: 5.4 g/dL / ALK PHOS: 197 U/L / ALT: 37 U/L / AST: 14 U/L / GGT: x           TELEMETRY:  Ventricular Tachy    EKG:   from: 12 Lead ECG (21 @ 05:46)   Diagnosis Line Normal sinus rhythm  Right bundle branch block  Cannot rule out Inferior infarct , age undetermined  T wave abnormality, consider lateral ischemia  Abnormal ECG    IMAGING:   Xray Chest 1 View- PORTABLE-Routine (Xray Chest 1 View- PORTABLE-Routine in AM.) (21 @ 06:34)     Stable globular-shaped enlarged cardiac silhouette.  Diminishing perihilar opacities. No pneumothorax.           PATIENT:  GILLIAN MENDOZA  [id number]      CHIEF COMPLAINT:  Patient is a 56y old Male with PMHx of HTN, HLD, CKD, T2DM on insulin, Glaucoma who presented with a chief complaint of worsening lower extremity swelling. Patient was admitted for new onset CHF. Found to have triple vessels dz but found to be high risk for CABG.  Today is day 21 of admission.    INTERVAL HISTORY/OVERNIGHT EVENTS:  Patient was examined at bedside. Overnight there was no acute events but patient had ventricular tachy on tele. At time of exam patient denies any SOB, chest pain, abdominal pain. He denies any fever, sweats, or chills. Patient reports eating, ambulating and having bowel movement.     MEDICATIONS:  MEDICATIONS  (STANDING):  aspirin  chewable 81 milliGRAM(s) Oral daily  atorvastatin 40 milliGRAM(s) Oral at bedtime  bisacodyl 5 milliGRAM(s) Oral at bedtime  brimonidine 0.2% Ophthalmic Solution 1 Drop(s) Both EYES two times a day  buMETAnide Injectable 2 milliGRAM(s) IV Push two times a day  carvedilol 3.125 milliGRAM(s) Oral every 12 hours  chlorhexidine 4% Liquid 1 Application(s) Topical <User Schedule>  clopidogrel Tablet 75 milliGRAM(s) Oral daily  dorzolamide 2% Ophthalmic Solution 1 Drop(s) Both EYES three times a day  epoetin kinsey-epbx (RETACRIT) Injectable 71557 Unit(s) SubCutaneous every 7 days  gabapentin 100 milliGRAM(s) Oral two times a day  heparin   Injectable 5000 Unit(s) SubCutaneous every 8 hours  hydrALAZINE 50 milliGRAM(s) Oral every 8 hours  insulin lispro (ADMELOG) corrective regimen sliding scale   SubCutaneous Before meals and at bedtime  isosorbide   dinitrate Tablet (ISORDIL) 20 milliGRAM(s) Oral three times a day  latanoprost 0.005% Ophthalmic Solution 1 Drop(s) Both EYES at bedtime  polyethylene glycol 3350 17 Gram(s) Oral two times a day  sevelamer carbonate 800 milliGRAM(s) Oral three times a day with meals  sodium chloride 3%. 150 milliLiter(s) (50 mL/Hr) IV Continuous <Continuous>  timolol 0.5% Solution 1 Drop(s) Both EYES two times a day    MEDICATIONS  (PRN):      ALLERGIES:  Allergies    No Known Allergies    Intolerances    OBJECTIVE:  ICU Vital Signs Last 24 Hrs  T(C): 36.9 (2021 04:00), Max: 36.9 (2021 04:00)  T(F): 98.4 (2021 04:00), Max: 98.4 (2021 04:00)  HR: 60 (2021 06:00) (54 - 68)  BP: 103/61 (2021 06:00) (98/62 - 203/109)  BP(mean): 77 (2021 06:00) (73 - 161)  ABP: --  ABP(mean): --  RR: 19 (2021 06:00) (17 - 240)  SpO2: 98% (2021 06:00) (96% - 100%)      Adult Advanced Hemodynamics Last 24 Hrs  CVP(mm Hg): --  CVP(cm H2O): --  CO: --  CI: --  PA: --  PA(mean): --  PCWP: --  SVR: --  SVRI: --  PVR: --  PVRI: --  CAPILLARY BLOOD GLUCOSE      POCT Blood Glucose.: 136 mg/dL (2021 22:15)  POCT Blood Glucose.: 170 mg/dL (2021 17:35)  POCT Blood Glucose.: 137 mg/dL (2021 12:32)  POCT Blood Glucose.: 163 mg/dL (2021 07:44)    CAPILLARY BLOOD GLUCOSE      POCT Blood Glucose.: 136 mg/dL (2021 22:15)    I&O's Summary    2021 07:01  -  2021 07:00  --------------------------------------------------------  IN: 780 mL / OUT: 2150 mL / NET: -1370 mL    2021 07:01  -  2021 06:24  --------------------------------------------------------  IN: 1215 mL / OUT: 1500 mL / NET: -285 mL      Daily     Daily Weight in k.1 (2021 06:00)    PHYSICAL EXAMINATION:  General: In no acute distress, resting in bed  HEENT: PERRLA, EOMI, moist mucous membranes  Neurology: A&Ox3, nonfocal,   Respiratory: CTA B/L, normal respiratory effort, no wheezes, crackles, rales  CV: JVD present, RRR, S1S2, no murmurs, rubs or gallops  Abdominal: Soft, nontender, non distended, bowel sounds present   Extremities: No edema    Incisions:   Tubes:    LABS:                          9.1    10.22 )-----------( 217      ( 2021 04:40 )             30.4     07-    146  |  112<H>  |  44<H>  ----------------------------<  131<H>  4.5   |  25  |  2.7<H>    Ca    7.9<L>      2021 20:47  Phos  3.0     -  Mg     2.0         TPro  5.4<L>  /  Alb  2.8<L>  /  TBili  0.4  /  DBili  x   /  AST  14  /  ALT  37  /  AlkPhos  197<H>      LIVER FUNCTIONS - ( 2021 05:15 )  Alb: 2.8 g/dL / Pro: 5.4 g/dL / ALK PHOS: 197 U/L / ALT: 37 U/L / AST: 14 U/L / GGT: x           TELEMETRY:  Ventricular Tachy    EKG:   from: 12 Lead ECG (21 @ 05:46)   Diagnosis Line Normal sinus rhythm  Right bundle branch block  Cannot rule out Inferior infarct , age undetermined  T wave abnormality, consider lateral ischemia  Abnormal ECG    IMAGING:   Xray Chest 1 View- PORTABLE-Routine (Xray Chest 1 View- PORTABLE-Routine in AM.) (21 @ 06:34)     Stable globular-shaped enlarged cardiac silhouette.  Diminishing perihilar opacities. No pneumothorax.        CORONARIES:    LAD- 90% discrete stenosis in proximal LAD        PROCEDURE SUMMARY  Significant 3 vessel CAD - deemed high risk for CTS  Successful low contrast PCI of proximal LAD with SAMMY x 1 (Synergy XD 4.0 x 16) (AUC score 7).

## 2021-07-23 NOTE — PROGRESS NOTE ADULT - SUBJECTIVE AND OBJECTIVE BOX
Cardiology Follow up s/p PCI    GILLIAN MENDOZA   56y Male  PAST MEDICAL & SURGICAL HISTORY:  HTN (hypertension)    HLD (hyperlipidemia)    DM (diabetes mellitus)    Glaucoma    No significant past surgical history         HPI:  PC: LE swelling + Pain 1month    PMHx: HTN, HLD, T2DM on insulin, Glaucoma    HPI: 56M w/ PMHx as above presents to the Ed c/o worsening LE swelling and pain for the past 1 month. Endorses SOB on exertion, along w/ increased abdominal girth. Denies orthopnea, PND, wheeze, CP, diaphoresis. No cardiac Hx.    ED course: /130 in triage, otherwise VS. Labs showed Hb 11.1, Cr 2.1, mildly increased ALP and AST/ALT, Trop 0.10, BNP 28075. CXR revealing cardiomegaly and CP angle blunting b/l. (02 Jul 2021 05:08)    Allergies    No Known Allergies    Intolerances      Patient without complaints. Pt ambulated without issues/symptoms  Denies CP, SOB, palpitations, or dizziness  NSVT x 12  on telemetry overnight    Vital Signs Last 24 Hrs  T(C): 36.9 (23 Jul 2021 04:00), Max: 36.9 (23 Jul 2021 04:00)  T(F): 98.4 (23 Jul 2021 04:00), Max: 98.4 (23 Jul 2021 04:00)  HR: 60 (23 Jul 2021 06:00) (54 - 68)  BP: 103/61 (23 Jul 2021 06:00) (98/62 - 193/102)  BP(mean): 77 (23 Jul 2021 06:00) (73 - 130)  RR: 19 (23 Jul 2021 06:00) (17 - 240)  SpO2: 98% (23 Jul 2021 06:00) (96% - 100%)    MEDICATIONS  (STANDING):  aspirin  chewable 81 milliGRAM(s) Oral daily  atorvastatin 40 milliGRAM(s) Oral at bedtime  bisacodyl 5 milliGRAM(s) Oral at bedtime  brimonidine 0.2% Ophthalmic Solution 1 Drop(s) Both EYES two times a day  buMETAnide Injectable 2 milliGRAM(s) IV Push two times a day  carvedilol 3.125 milliGRAM(s) Oral every 12 hours  chlorhexidine 4% Liquid 1 Application(s) Topical <User Schedule>  clopidogrel Tablet 75 milliGRAM(s) Oral daily  dorzolamide 2% Ophthalmic Solution 1 Drop(s) Both EYES three times a day  epoetin kinsey-epbx (RETACRIT) Injectable 66745 Unit(s) SubCutaneous every 7 days  gabapentin 100 milliGRAM(s) Oral two times a day  heparin   Injectable 5000 Unit(s) SubCutaneous every 8 hours  hydrALAZINE 25 milliGRAM(s) Oral every 8 hours  insulin lispro (ADMELOG) corrective regimen sliding scale   SubCutaneous Before meals and at bedtime  isosorbide   dinitrate Tablet (ISORDIL) 20 milliGRAM(s) Oral three times a day  latanoprost 0.005% Ophthalmic Solution 1 Drop(s) Both EYES at bedtime  polyethylene glycol 3350 17 Gram(s) Oral two times a day  sevelamer carbonate 800 milliGRAM(s) Oral three times a day with meals  sodium chloride 3%. 150 milliLiter(s) (50 mL/Hr) IV Continuous <Continuous>  timolol 0.5% Solution 1 Drop(s) Both EYES two times a day    MEDICATIONS  (PRN):      REVIEW OF SYSTEMS:          CONSTITUTIONAL: No weakness, fevers or chills          EYES/ENT: No visual changes;  No vertigo or throat pain           NECK: No pain or stiffness          RESPIRATORY: No cough, wheezing, hemoptysis          CARDIOVASCULAR: no pain, no [place], no palpitations           GASTROINTESTINAL: No abdominal or epigastric pain. No nausea, vomiting, or hematemesis;           GENITOURINARY: No dysuria, frequency or hematuria          NEUROLOGICAL: No numbness or weakness          SKIN: No itching, rashes    PHYSICAL EXAM:           CONSTITUTIONAL: Well-developed; well-nourished; in no acute distress  	SKIN: warm, dry  	HEAD: Normocephalic; atraumatic  	EYES: PERRL.  	ENT: No nasal discharge, airway clear, mucous membranes moist  	NECK: Supple; non tender.  	CARD: +S1, +S2, no murmurs, gallops, or rubs. (Regular) rate and rhythm    	RESP: decreased  	ABD: soft ntnd, + BS x 4 quadrants  	EXT: moves all extremities,  no clubbing, cyanosis or edema  	NEURO: Alert and oriented x3, no focal deficits          PSYCH: Cooperative, appropriate          VASCULAR:  + Rad / + PTs / + DPs          EXTREMITY:  	   Right Radial: Dressing removed, + pulses, access site soft, no hematoma, no pain, no numbness, no signs and symptoms of infection             ECG:   Ventricular Rate 63 BPM    Atrial Rate 63 BPM    P-R Interval 172 ms    QRS Duration 130 ms    Q-T Interval 504 ms    QTC Calculation(Bazett) 515 ms    P Axis 55 degrees    R Axis 85 degrees    T Axis 12 degrees    Diagnosis Line Normal sinus rhythm  Right bundle branch block  Cannot rule out Inferior infarct , age undetermined  T wave abnormality, consider lateral ischemia  Abnormal ECG    Confirmed by KLEVER PEÑA MD ([id number]) on 7/23/2021 8:08:27 AM                                                                                                                  2D ECHO:Summary:   1. Left ventricular ejection fraction, by visual estimation, is 40 to 45%.   2. Mildly to moderately decreased global left ventricular systolic function.   3. Basal and mid anterolateral wall, basal and mid inferior wall, and basal and mid inferolateral wall are abnormal as described above.   4. Spectral Doppler shows pseudonormal pattern of left ventricular myocardial filling (Grade II diastolic dysfunction).   5. Mildly enlarged left atrium.   6. Mildly enlarged right atrium.   7. Mild mitral regurgitation.   8. Moderate tricuspid regurgitation.   9. Small circumferential pericardial effusion.  10. No evidence of tamponade.    PHYSICIAN INTERPRETATION:  Left Ventricle: The left ventricular internal cavity size is normal. Left ventricular wall thickness is normal. Global LV systolic function was mildly to moderately decreased. Left ventricular ejection fraction, by visual estimation, is 40 to 45%. Spectral Doppler shows pseudonormal pattern of left ventricular myocardial filling (Grade II diastolic dysfunction).      LV Wall Scoring:  The basal and mid anterolateral wall, basal and mid inferior wall, and basal  and mid inferolateral wall are hypokinetic.    Right Ventricle: RV systolic function is normal.  Left Atrium: Mildly enlarged left atrium.  Right Atrium: Mildly enlarged right atrium.  Pericardium:Small circumferential pericardial effusion. No evidence of tamponade.  Mitral Valve: The mitral valve is normal in structure. No evidence of mitral stenosis. Mild mitral regurgitation.  Tricuspid Valve: The tricuspid valve is normal in structure. Moderate tricuspid regurgitation is visualized.  Aortic Valve: The aortic valve is trileaflet. No evidence of aortic stenosis. No aortic regurgitation.  Pulmonic Valve: The pulmonic valve is normal. Mild pulmonic regurgitation.  Aorta: Aortic root measured at sinotubular junction is normal.  Pulmonary Artery: The main pulmonary artery is normal in size.  Venous: The inferior vena cava was normal sized, with respiratory size variation greater than 50%.      2D AND M-MODE MEASUREMENTS (normal ranges within parentheses):  Left                  Normal   Aorta/Left             Normal  Ventricle:                     Atrium:  IVSd (2D):  1.11 cm  (0.7-1.1) AoV Cusp       1.99  (1.5-2.6)  LVPWd (2D): 1.13 cm  (0.7-1.1) Separation:     cm  LVIDd (2D): 5.18 cm  (3.4-5.7) Left Atrium    5.31  (1.9-4.0)  LVIDs (2D): 3.76 cm            (Mmode):        cm  LV FS (2D):  27.6 %   (>25%)   LA Volume      36.1  IVSd        1.26 cm  (0.7-1.1) Index         ml/m²  (Mmode):                       Right  LVPWd 1.18 cm  (0.7-1.1) Ventricle:  (Mmode):                       RVd (2D):      3.19 cm  LVIDd       5.88 cm  (3.4-5.7)  (Mmode):  LVIDs       4.33 cm  (Mmode):  LV FS        26.4 %   (>25%)  (Mmode):  Relative      0.44    (<0.42)  Wall  Thickness  Rel. Wall     0.40    (<0.42)  Thickness  Mm  LV Mass      181.9  Index:        g/m²  Mmode    SPECTRAL DOPPLER ANALYSIS:  LV DIASTOLIC FUNCTION:  MV Peak E: 1.02 m/s Decel Time: 295 msec  MV Peak A: 0.30 m/s  E/A Ratio: 3.40    Aortic Valve:  AoV VMax:   1.57 m/s AoV Area, Vmax: 1.93 cm² Vmax Indx: 1.13 cm²/m²  AoV VTI:     0.29 m   AoV Area, VTI:  2.14 cm² VTI Indx:  1.25 cm²/m²  AoV Pk Grad: 9.9 mmHg  AoV Mn Grad: 5.1 mmHg    LVOT Vmax: 0.96 m/s  LVOT VTI:  0.20 m  LVOT Diam: 2.01 cm    Mitral Valve:  MV P1/2 Time: 85.57 msec  MV Area, PHT: 2.57 cm²    Tricuspid Valve and PA/RV Systolic Pressure: TR Max Velocity: 2.80 m/s RA Pressure: 3 mmHg RVSP/PASP: 34.4 mmHg    Pulmonic Valve:  PV Max Velocity: 0.94 m/s PV Max PG: 3.5 mmHg PV Mean PG:      [id number] Paz Malik M.D., Electronically signed on 7/19/2021 at 1:41:57 PM        *** Final ***      LABS:                        9.1    10.22 )-----------( 217      ( 23 Jul 2021 04:40 )             30.4     07-23    141  |  110  |  43<H>  ----------------------------<  151<H>  4.5   |  24  |  2.7<H>    Ca    8.0<L>      23 Jul 2021 04:40  Phos  3.5     07-23  Mg     2.1     07-23    TPro  5.2<L>  /  Alb  2.7<L>  /  TBili  0.4  /  DBili  x   /  AST  29  /  ALT  31  /  AlkPhos  185<H>  07-23        Magnesium, Serum: 2.1 mg/dL [1.8 - 2.4] (07-23-21 @ 04:40)  Magnesium, Serum: 2.0 mg/dL [1.8 - 2.4] (07-22-21 @ 20:47)  LIVER FUNCTIONS - ( 23 Jul 2021 04:40 )  Alb: 2.7 g/dL / Pro: 5.2 g/dL / ALK PHOS: 185 U/L / ALT: 31 U/L / AST: 29 U/L / GGT: x             A/P:  I discussed the case with Cardiologist Dr. Singh and recommend the following:    S/P PCI: Successful low contrast PCI of proximal LAD with SAMMY x 1 (Synergy XD 4.0 x 16) (AUC score 7).     PRE-OP DIAGNOSIS: Known 3 V CAD from recent angiogram. high risk for CABG. Planned PCI of prox LAD     PROCEDURE: Coronary angiogram, [facility], PCI    Attending: Dr. Singh   Fellow: Dr. Colón, Dr. Richard    ANESTHESIA TYPE  [  ]General Anesthesia  [x  ] Sedation  [x  ] Local/Regional    ESTIMATED BLOOD LOSS:   < 10 mL    CONDITION  [  ] Critical  [  ] Serious  [  ]Fair  [x  ]Good    ACCESS & HEMOSTASIS  [x  ] Right radial  ->D stat  [  ] Right femoral  [  ] Left radial  [  ] Left femoral       CONTRAST: 30 ml      FINDINGS    LVEDP-   severely elevated                           CORONARIES:    LAD- 90% discrete stenosis in proximal LAD        PROCEDURE SUMMARY  Significant 3 vessel CAD - deemed high risk for CTS  Successful low contrast PCI of proximal LAD with SAMMY x 1 (Synergy XD 4.0 x 16) (AUC score 7).       RECOMMENDATIONS  -Aggressive medical therapy including DAPT and risk factor modification  -GDMT for HFrEF  -monitor BUN/cr  -Staged PCI of LCX.  	                            Continue DAPT (asa 81mg daily, plavix 75mg daily),  B-Blocker, Statin Therapy                   DVT/GI prophylaxis                   No Ace d/t elevated Cr                   Patient given 30 day supply of ( Aspirin 81 mg daily and Plavix 75 mg daily ) to take at home                   OOB-CH, ambulate with assistance                    monitor access site/pulses                   Patient agreeing to take DAPT for at least one year or as directed by cardiologist                    Pt given instructions on importance of taking antiplatelet medication or risk acute stent thrombosis/death                   Post cath instructions, access site care and activity restrictions reviewed with patient                      Cardiac Rehab info provided/referral and communication to cardiac rehab provided                   Discussed with patient to return to hospital if experience chest pain, shortness breath, dizziness and site bleeding                   Aggressive risk factor modification, diet counseling, smoking cessation discussed with patient                       management ass per CCU team

## 2021-07-23 NOTE — PROGRESS NOTE ADULT - ASSESSMENT
IMPRESSION:  CAD - 3V disease, high risk for CABG - s/p PCI to LAD on 7/22/2021; plan for staged PCI to LCx  Ischemic cardiomyopathy - patient in fluid overload clinically  Bradycardia - improved  LENNY - improving (ROSENDO vs hypoperfusion from hypotension?)  Elevated LFTs - improving  Anemia  DM  HTN   HLD    PLAN:    CNS: Avoid sedation    HEENT: Oral care    PULMONARY:    - HOB @ 45 degrees    CARDIOVASCULAR:  - Continue CCU monitoring  - Continue Hydralazine 25mg TID and Isordil 20mg TID; will monitor BP and adjust medications accordingly; avoid hypotension  - Continue aspirin 81mg daily, Atorvastatin 40mg daily  - Continue carvedilol to 3.125mg q12h  - Amiodarone discontinued due to bradycardia  - Continue Bumex 2mg IV with 150cc of hypertonic saline; check strict I&Os; keep I<Os; will probably change to PO diuretics tomorrow and assess response  - Monitor renal function, electrolytes, daily weight and volume status   - Couldn't complete MRI due to orthopnea; will repeat once euvolemic and stable  - Staged PCI to LCx in 4 to 6 weeks    GI: GI prophylaxis.  Feeding     RENAL:  - Follow up renal function and lytes.  Correct as needed  - Avoid nephrotoxic agents  - Nephrology following    INFECTIOUS DISEASE: Monitor VS    HEMATOLOGICAL:    - Monitor cbc; watch for signs and symptoms of bleeding  - Check iron profile  - DVT prophylaxis.    ENDOCRINE:  Follow up FS.  Insulin protocol

## 2021-07-23 NOTE — PROGRESS NOTE ADULT - REASON FOR ADMISSION
CHF
CHF
LE swelling
New Onset Acute HFrEF exacerbation
CHF
New Onset of Congestive Heart Failure
New onset
LE swelling with associated dyspnea on exertion
new onset CHF
CHF exacerbation
LE swelling, pain
Lower Extremity swelling
acute CHF
new onset CHF

## 2021-07-23 NOTE — PROGRESS NOTE ADULT - SUBJECTIVE AND OBJECTIVE BOX
Nephrology progress note    THIS IS AN INCOMPLETE NOTE . FULL NOTE TO FOLLOW SHORTLY    Patient is seen and examined, events over the last 24 h noted .    Allergies:  No Known Allergies    Hospital Medications:   MEDICATIONS  (STANDING):  aspirin  chewable 81 milliGRAM(s) Oral daily  atorvastatin 40 milliGRAM(s) Oral at bedtime  bisacodyl 5 milliGRAM(s) Oral at bedtime  brimonidine 0.2% Ophthalmic Solution 1 Drop(s) Both EYES two times a day  buMETAnide Injectable 2 milliGRAM(s) IV Push two times a day  carvedilol 3.125 milliGRAM(s) Oral every 12 hours  chlorhexidine 4% Liquid 1 Application(s) Topical <User Schedule>  clopidogrel Tablet 75 milliGRAM(s) Oral daily  dorzolamide 2% Ophthalmic Solution 1 Drop(s) Both EYES three times a day  epoetin kinsey-epbx (RETACRIT) Injectable 08469 Unit(s) SubCutaneous every 7 days  gabapentin 100 milliGRAM(s) Oral two times a day  heparin   Injectable 5000 Unit(s) SubCutaneous every 8 hours  hydrALAZINE 25 milliGRAM(s) Oral every 8 hours  insulin lispro (ADMELOG) corrective regimen sliding scale   SubCutaneous Before meals and at bedtime  isosorbide   dinitrate Tablet (ISORDIL) 20 milliGRAM(s) Oral three times a day  latanoprost 0.005% Ophthalmic Solution 1 Drop(s) Both EYES at bedtime  polyethylene glycol 3350 17 Gram(s) Oral two times a day  sevelamer carbonate 800 milliGRAM(s) Oral three times a day with meals  sodium chloride 3%. 150 milliLiter(s) (50 mL/Hr) IV Continuous <Continuous>  timolol 0.5% Solution 1 Drop(s) Both EYES two times a day        VITALS:  T(F): 98.4 (07-23-21 @ 04:00), Max: 98.4 (07-23-21 @ 04:00)  HR: 60 (07-23-21 @ 06:00)  BP: 103/61 (07-23-21 @ 06:00)  RR: 19 (07-23-21 @ 06:00)  SpO2: 98% (07-23-21 @ 06:00)  Wt(kg): --    07-21 @ 07:01  -  07-22 @ 07:00  --------------------------------------------------------  IN: 780 mL / OUT: 2150 mL / NET: -1370 mL    07-22 @ 07:01  -  07-23 @ 07:00  --------------------------------------------------------  IN: 1215 mL / OUT: 2250 mL / NET: -1035 mL          PHYSICAL EXAM:  Constitutional: NAD  HEENT: anicteric sclera, oropharynx clear, MMM  Neck: No JVD  Respiratory: CTAB, no wheezes, rales or rhonchi  Cardiovascular: S1, S2, RRR  Gastrointestinal: BS+, soft, NT/ND  Extremities: No cyanosis or clubbing. No peripheral edema  :  No harris.   Skin: No rashes    LABS:  07-23    141  |  110  |  43<H>  ----------------------------<  151<H>  4.5   |  24  |  2.7<H>    Ca    8.0<L>      23 Jul 2021 04:40  Phos  3.5     07-23  Mg     2.1     07-23    TPro  5.2<L>  /  Alb  2.7<L>  /  TBili  0.4  /  DBili      /  AST  29  /  ALT  31  /  AlkPhos  185<H>  07-23                          9.1    10.22 )-----------( 217      ( 23 Jul 2021 04:40 )             30.4       Urine Studies:      RADIOLOGY & ADDITIONAL STUDIES:   Nephrology progress note  Patient is seen and examined, events over the last 24 h noted .  sp cath yesterday  feels well / no chest pain no SOB     Allergies:  No Known Allergies    Hospital Medications:   MEDICATIONS  (STANDING):  aspirin  chewable 81 milliGRAM(s) Oral daily  atorvastatin 40 milliGRAM(s) Oral at bedtime  bisacodyl 5 milliGRAM(s) Oral at bedtime  brimonidine 0.2% Ophthalmic Solution 1 Drop(s) Both EYES two times a day  buMETAnide Injectable 2 milliGRAM(s) IV Push two times a day  carvedilol 3.125 milliGRAM(s) Oral every 12 hours  chlorhexidine 4% Liquid 1 Application(s) Topical <User Schedule>  clopidogrel Tablet 75 milliGRAM(s) Oral daily  dorzolamide 2% Ophthalmic Solution 1 Drop(s) Both EYES three times a day  epoetin kinsey-epbx (RETACRIT) Injectable 34667 Unit(s) SubCutaneous every 7 days  gabapentin 100 milliGRAM(s) Oral two times a day  heparin   Injectable 5000 Unit(s) SubCutaneous every 8 hours  hydrALAZINE 25 milliGRAM(s) Oral every 8 hours  insulin lispro (ADMELOG) corrective regimen sliding scale   SubCutaneous Before meals and at bedtime  isosorbide   dinitrate Tablet (ISORDIL) 20 milliGRAM(s) Oral three times a day  latanoprost 0.005% Ophthalmic Solution 1 Drop(s) Both EYES at bedtime  polyethylene glycol 3350 17 Gram(s) Oral two times a day  sevelamer carbonate 800 milliGRAM(s) Oral three times a day with meals  sodium chloride 3%. 150 milliLiter(s) (50 mL/Hr) IV Continuous <Continuous>  timolol 0.5% Solution 1 Drop(s) Both EYES two times a day        VITALS:  T(F): 98.4 (07-23-21 @ 04:00), Max: 98.4 (07-23-21 @ 04:00)  HR: 60 (07-23-21 @ 06:00)  BP: 103/61 (07-23-21 @ 06:00)  RR: 19 (07-23-21 @ 06:00)  SpO2: 98% (07-23-21 @ 06:00)      07-21 @ 07:01  -  07-22 @ 07:00  --------------------------------------------------------  IN: 780 mL / OUT: 2150 mL / NET: -1370 mL    07-22 @ 07:01  -  07-23 @ 07:00  --------------------------------------------------------  IN: 1215 mL / OUT: 2250 mL / NET: -1035 mL          PHYSICAL EXAM:  Constitutional: NAD  Neck: No JVD  Respiratory: CTAB, no wheezes, rales or rhonchi  Cardiovascular: S1, S2, RRR  Gastrointestinal: BS+, soft, NT/ND  Extremities: No cyanosis or clubbing. No peripheral edema  :  No harris.   Skin: No rashes    LABS:  07-23    141  |  110  |  43<H>  ----------------------------<  151<H>  4.5   |  24  |  2.7<H>    Ca    8.0<L>      23 Jul 2021 04:40  Phos  3.5     07-23  Mg     2.1     07-23    TPro  5.2<L>  /  Alb  2.7<L>  /  TBili  0.4  /  DBili      /  AST  29  /  ALT  31  /  AlkPhos  185<H>  07-23                          9.1    10.22 )-----------( 217      ( 23 Jul 2021 04:40 )             30.4       Urine Studies:      RADIOLOGY & ADDITIONAL STUDIES:

## 2021-07-23 NOTE — PROGRESS NOTE ADULT - ATTENDING COMMENTS
Triple vessel disease, ICM, acute on chronic systolic HF s/p PCI to LAD yesterday. Patient remains fluid overloaded.    Continue diuresis with Bumex 2 mg BID + hypertonic saline   BMP twice daily   Continue carvedilol 3.125 mg BID   Continue hydralazine/isosorbide - target BP ~140s  Repeat iron profile

## 2021-07-24 LAB
ALBUMIN SERPL ELPH-MCNC: 2.9 G/DL — LOW (ref 3.5–5.2)
ALP SERPL-CCNC: 172 U/L — HIGH (ref 30–115)
ALT FLD-CCNC: 24 U/L — SIGNIFICANT CHANGE UP (ref 0–41)
ANION GAP SERPL CALC-SCNC: 6 MMOL/L — LOW (ref 7–14)
AST SERPL-CCNC: 18 U/L — SIGNIFICANT CHANGE UP (ref 0–41)
BILIRUB SERPL-MCNC: 0.4 MG/DL — SIGNIFICANT CHANGE UP (ref 0.2–1.2)
BUN SERPL-MCNC: 42 MG/DL — HIGH (ref 10–20)
CALCIUM SERPL-MCNC: 7.9 MG/DL — LOW (ref 8.5–10.1)
CHLORIDE SERPL-SCNC: 111 MMOL/L — HIGH (ref 98–110)
CO2 SERPL-SCNC: 25 MMOL/L — SIGNIFICANT CHANGE UP (ref 17–32)
CREAT SERPL-MCNC: 2.6 MG/DL — HIGH (ref 0.7–1.5)
CREATININE, URINE RESULT: 110 MG/DL — SIGNIFICANT CHANGE UP
FERRITIN SERPL-MCNC: 461 NG/ML — HIGH (ref 30–400)
GLUCOSE BLDC GLUCOMTR-MCNC: 162 MG/DL — HIGH (ref 70–99)
GLUCOSE BLDC GLUCOMTR-MCNC: 192 MG/DL — HIGH (ref 70–99)
GLUCOSE BLDC GLUCOMTR-MCNC: 196 MG/DL — HIGH (ref 70–99)
GLUCOSE SERPL-MCNC: 166 MG/DL — HIGH (ref 70–99)
HCT VFR BLD CALC: 29.4 % — LOW (ref 42–52)
HGB BLD-MCNC: 8.8 G/DL — LOW (ref 14–18)
INTERPRETATION 24H UR IFE-IMP: SIGNIFICANT CHANGE UP
MAGNESIUM SERPL-MCNC: 2.1 MG/DL — SIGNIFICANT CHANGE UP (ref 1.8–2.4)
MCHC RBC-ENTMCNC: 24.2 PG — LOW (ref 27–31)
MCHC RBC-ENTMCNC: 29.9 G/DL — LOW (ref 32–37)
MCV RBC AUTO: 80.8 FL — SIGNIFICANT CHANGE UP (ref 80–94)
NRBC # BLD: 0 /100 WBCS — SIGNIFICANT CHANGE UP (ref 0–0)
PLATELET # BLD AUTO: 186 K/UL — SIGNIFICANT CHANGE UP (ref 130–400)
POTASSIUM SERPL-MCNC: 4.6 MMOL/L — SIGNIFICANT CHANGE UP (ref 3.5–5)
POTASSIUM SERPL-SCNC: 4.6 MMOL/L — SIGNIFICANT CHANGE UP (ref 3.5–5)
PROT SERPL-MCNC: 5.2 G/DL — LOW (ref 6–8)
RBC # BLD: 3.64 M/UL — LOW (ref 4.7–6.1)
RBC # FLD: 14.8 % — HIGH (ref 11.5–14.5)
SODIUM SERPL-SCNC: 142 MMOL/L — SIGNIFICANT CHANGE UP (ref 135–146)
WBC # BLD: 9.07 K/UL — SIGNIFICANT CHANGE UP (ref 4.8–10.8)
WBC # FLD AUTO: 9.07 K/UL — SIGNIFICANT CHANGE UP (ref 4.8–10.8)

## 2021-07-24 PROCEDURE — 99233 SBSQ HOSP IP/OBS HIGH 50: CPT

## 2021-07-24 PROCEDURE — 71045 X-RAY EXAM CHEST 1 VIEW: CPT | Mod: 26

## 2021-07-24 PROCEDURE — 93010 ELECTROCARDIOGRAM REPORT: CPT

## 2021-07-24 RX ADMIN — ISOSORBIDE DINITRATE 20 MILLIGRAM(S): 5 TABLET ORAL at 16:58

## 2021-07-24 RX ADMIN — Medication 25 MILLIGRAM(S): at 13:58

## 2021-07-24 RX ADMIN — GABAPENTIN 100 MILLIGRAM(S): 400 CAPSULE ORAL at 17:00

## 2021-07-24 RX ADMIN — Medication 5 MILLIGRAM(S): at 22:31

## 2021-07-24 RX ADMIN — DORZOLAMIDE HYDROCHLORIDE 1 DROP(S): 20 SOLUTION/ DROPS OPHTHALMIC at 05:32

## 2021-07-24 RX ADMIN — Medication 2: at 22:29

## 2021-07-24 RX ADMIN — GABAPENTIN 100 MILLIGRAM(S): 400 CAPSULE ORAL at 05:35

## 2021-07-24 RX ADMIN — CLOPIDOGREL BISULFATE 75 MILLIGRAM(S): 75 TABLET, FILM COATED ORAL at 11:31

## 2021-07-24 RX ADMIN — SODIUM CHLORIDE 30 MILLILITER(S): 5 INJECTION, SOLUTION INTRAVENOUS at 06:30

## 2021-07-24 RX ADMIN — HEPARIN SODIUM 5000 UNIT(S): 5000 INJECTION INTRAVENOUS; SUBCUTANEOUS at 22:32

## 2021-07-24 RX ADMIN — ATORVASTATIN CALCIUM 40 MILLIGRAM(S): 80 TABLET, FILM COATED ORAL at 22:31

## 2021-07-24 RX ADMIN — Medication 25 MILLIGRAM(S): at 05:33

## 2021-07-24 RX ADMIN — BRIMONIDINE TARTRATE 1 DROP(S): 2 SOLUTION/ DROPS OPHTHALMIC at 05:34

## 2021-07-24 RX ADMIN — Medication 1 DROP(S): at 17:05

## 2021-07-24 RX ADMIN — POLYETHYLENE GLYCOL 3350 17 GRAM(S): 17 POWDER, FOR SOLUTION ORAL at 17:02

## 2021-07-24 RX ADMIN — HEPARIN SODIUM 5000 UNIT(S): 5000 INJECTION INTRAVENOUS; SUBCUTANEOUS at 13:57

## 2021-07-24 RX ADMIN — Medication 40 MILLIGRAM(S): at 14:01

## 2021-07-24 RX ADMIN — BUMETANIDE 2 MILLIGRAM(S): 0.25 INJECTION INTRAMUSCULAR; INTRAVENOUS at 05:32

## 2021-07-24 RX ADMIN — BRIMONIDINE TARTRATE 1 DROP(S): 2 SOLUTION/ DROPS OPHTHALMIC at 17:07

## 2021-07-24 RX ADMIN — Medication 1 DROP(S): at 05:33

## 2021-07-24 RX ADMIN — ERYTHROPOIETIN 10000 UNIT(S): 10000 INJECTION, SOLUTION INTRAVENOUS; SUBCUTANEOUS at 12:25

## 2021-07-24 RX ADMIN — HEPARIN SODIUM 5000 UNIT(S): 5000 INJECTION INTRAVENOUS; SUBCUTANEOUS at 05:33

## 2021-07-24 RX ADMIN — CHLORHEXIDINE GLUCONATE 1 APPLICATION(S): 213 SOLUTION TOPICAL at 05:32

## 2021-07-24 RX ADMIN — SEVELAMER CARBONATE 800 MILLIGRAM(S): 2400 POWDER, FOR SUSPENSION ORAL at 11:31

## 2021-07-24 RX ADMIN — Medication 2: at 16:54

## 2021-07-24 RX ADMIN — Medication 20 MILLIGRAM(S): at 22:45

## 2021-07-24 RX ADMIN — DORZOLAMIDE HYDROCHLORIDE 1 DROP(S): 20 SOLUTION/ DROPS OPHTHALMIC at 13:56

## 2021-07-24 RX ADMIN — SEVELAMER CARBONATE 800 MILLIGRAM(S): 2400 POWDER, FOR SUSPENSION ORAL at 08:12

## 2021-07-24 RX ADMIN — Medication 25 MILLIGRAM(S): at 22:31

## 2021-07-24 RX ADMIN — CARVEDILOL PHOSPHATE 3.12 MILLIGRAM(S): 80 CAPSULE, EXTENDED RELEASE ORAL at 05:31

## 2021-07-24 RX ADMIN — DORZOLAMIDE HYDROCHLORIDE 1 DROP(S): 20 SOLUTION/ DROPS OPHTHALMIC at 22:31

## 2021-07-24 RX ADMIN — Medication 2: at 07:53

## 2021-07-24 RX ADMIN — SEVELAMER CARBONATE 800 MILLIGRAM(S): 2400 POWDER, FOR SUSPENSION ORAL at 16:58

## 2021-07-24 RX ADMIN — CARVEDILOL PHOSPHATE 3.12 MILLIGRAM(S): 80 CAPSULE, EXTENDED RELEASE ORAL at 17:03

## 2021-07-24 RX ADMIN — POLYETHYLENE GLYCOL 3350 17 GRAM(S): 17 POWDER, FOR SOLUTION ORAL at 05:32

## 2021-07-24 RX ADMIN — LATANOPROST 1 DROP(S): 0.05 SOLUTION/ DROPS OPHTHALMIC; TOPICAL at 22:31

## 2021-07-24 RX ADMIN — ISOSORBIDE DINITRATE 20 MILLIGRAM(S): 5 TABLET ORAL at 11:32

## 2021-07-24 RX ADMIN — ISOSORBIDE DINITRATE 20 MILLIGRAM(S): 5 TABLET ORAL at 05:34

## 2021-07-24 RX ADMIN — Medication 81 MILLIGRAM(S): at 11:30

## 2021-07-24 RX ADMIN — Medication 2: at 11:27

## 2021-07-24 NOTE — PROGRESS NOTE ADULT - SUBJECTIVE AND OBJECTIVE BOX
PATIENT:  GILLIAN MENDOZA  [id number]      CHIEF COMPLAINT:  Patient is a 56y old  Male who presents with a chief complaint of CHF (2021 06:23)        HPI:      INTERVAL HISTORY/OVERNIGHT EVENTS:      MEDICATIONS:  MEDICATIONS  (STANDING):  aspirin  chewable 81 milliGRAM(s) Oral daily  atorvastatin 40 milliGRAM(s) Oral at bedtime  bisacodyl 5 milliGRAM(s) Oral at bedtime  brimonidine 0.2% Ophthalmic Solution 1 Drop(s) Both EYES two times a day  buMETAnide Injectable 2 milliGRAM(s) IV Push two times a day  carvedilol 3.125 milliGRAM(s) Oral every 12 hours  chlorhexidine 4% Liquid 1 Application(s) Topical <User Schedule>  clopidogrel Tablet 75 milliGRAM(s) Oral daily  dorzolamide 2% Ophthalmic Solution 1 Drop(s) Both EYES three times a day  epoetin kinsey-epbx (RETACRIT) Injectable 77459 Unit(s) SubCutaneous every 7 days  gabapentin 100 milliGRAM(s) Oral two times a day  heparin   Injectable 5000 Unit(s) SubCutaneous every 8 hours  hydrALAZINE 25 milliGRAM(s) Oral every 8 hours  insulin lispro (ADMELOG) corrective regimen sliding scale   SubCutaneous Before meals and at bedtime  isosorbide   dinitrate Tablet (ISORDIL) 20 milliGRAM(s) Oral three times a day  latanoprost 0.005% Ophthalmic Solution 1 Drop(s) Both EYES at bedtime  polyethylene glycol 3350 17 Gram(s) Oral two times a day  sevelamer carbonate 800 milliGRAM(s) Oral three times a day with meals  timolol 0.5% Solution 1 Drop(s) Both EYES two times a day    MEDICATIONS  (PRN):      ALLERGIES:  Allergies    No Known Allergies    Intolerances        OBJECTIVE:  ICU Vital Signs Last 24 Hrs  T(C): 36.8 (2021 04:00), Max: 37 (2021 12:00)  T(F): 98.2 (2021 04:00), Max: 98.6 (2021 12:00)  HR: 60 (2021 06:00) (56 - 68)  BP: 140/85 (2021 06:00) (119/74 - 182/108)  BP(mean): 103 (2021 06:00) (89 - 131)  ABP: --  ABP(mean): --  RR: 20 (2021 06:00) (17 - 20)  SpO2: 98% (2021 06:00) (98% - 100%)      Adult Advanced Hemodynamics Last 24 Hrs  CVP(mm Hg): --  CVP(cm H2O): --  CO: --  CI: --  PA: --  PA(mean): --  PCWP: --  SVR: --  SVRI: --  PVR: --  PVRI: --  CAPILLARY BLOOD GLUCOSE      POCT Blood Glucose.: 228 mg/dL (2021 21:19)  POCT Blood Glucose.: 133 mg/dL (2021 16:02)  POCT Blood Glucose.: 268 mg/dL (2021 11:05)    CAPILLARY BLOOD GLUCOSE      POCT Blood Glucose.: 228 mg/dL (2021 21:19)    I&O's Summary    2021 07:01  -  2021 07:00  --------------------------------------------------------  IN: 1620 mL / OUT: 2050 mL / NET: -430 mL      Daily     Daily Weight in k.3 (2021 05:00)    PHYSICAL EXAMINATION:  General: WN/WD NAD  HEENT: PERRLA, EOMI, moist mucous membranes  Neurology: A&Ox3, nonfocal, PETERS x 4  Respiratory: CTA B/L, normal respiratory effort, no wheezes, crackles, rales  CV: RRR, S1S2, no murmurs, rubs or gallops  Abdominal: Soft, NT, ND +BS, Last BM  Extremities: No edema, + peripheral pulses  Incisions:   Tubes:    LABS:                          8.8    9.07  )-----------( 186      ( 2021 04:30 )             29.4     07-24    142  |  111<H>  |  42<H>  ----------------------------<  166<H>  4.6   |  25  |  2.6<H>    Ca    7.9<L>      2021 04:30  Phos  3.5     07-  Mg     2.1     07-24    TPro  5.2<L>  /  Alb  2.9<L>  /  TBili  0.4  /  DBili  x   /  AST  18  /  ALT  24  /  AlkPhos  172<H>  07-24    LIVER FUNCTIONS - ( 2021 04:30 )  Alb: 2.9 g/dL / Pro: 5.2 g/dL / ALK PHOS: 172 U/L / ALT: 24 U/L / AST: 18 U/L / GGT: x                       TELEMETRY:    EKG:     IMAGING:   PATIENT:  GILLIAN MENDOZA  [id number]      CHIEF COMPLAINT:  Patient is a 56y old  Male who presents with a chief complaint of CHF (2021 06:23)        HPI:  Patient is a 56y old Male with PMHx of HTN, HLD, CKD, T2DM on insulin, Glaucoma who presented with a chief complaint of worsening lower extremity swelling. Patient was admitted for new onset CHF. Found to have triple vessels dz but found to be high risk for CABG.  Today is day 21 of admission.      INTERVAL HISTORY/OVERNIGHT EVENTS:  Patient is feeling well. No shortness of breath no dyspnea, no fever or chills. Eating and ambulating as tolerated.    MEDICATIONS:  MEDICATIONS  (STANDING):  aspirin  chewable 81 milliGRAM(s) Oral daily  atorvastatin 40 milliGRAM(s) Oral at bedtime  bisacodyl 5 milliGRAM(s) Oral at bedtime  brimonidine 0.2% Ophthalmic Solution 1 Drop(s) Both EYES two times a day  buMETAnide Injectable 2 milliGRAM(s) IV Push two times a day  carvedilol 3.125 milliGRAM(s) Oral every 12 hours  chlorhexidine 4% Liquid 1 Application(s) Topical <User Schedule>  clopidogrel Tablet 75 milliGRAM(s) Oral daily  dorzolamide 2% Ophthalmic Solution 1 Drop(s) Both EYES three times a day  epoetin kinsey-epbx (RETACRIT) Injectable 95351 Unit(s) SubCutaneous every 7 days  gabapentin 100 milliGRAM(s) Oral two times a day  heparin   Injectable 5000 Unit(s) SubCutaneous every 8 hours  hydrALAZINE 25 milliGRAM(s) Oral every 8 hours  insulin lispro (ADMELOG) corrective regimen sliding scale   SubCutaneous Before meals and at bedtime  isosorbide   dinitrate Tablet (ISORDIL) 20 milliGRAM(s) Oral three times a day  latanoprost 0.005% Ophthalmic Solution 1 Drop(s) Both EYES at bedtime  polyethylene glycol 3350 17 Gram(s) Oral two times a day  sevelamer carbonate 800 milliGRAM(s) Oral three times a day with meals  timolol 0.5% Solution 1 Drop(s) Both EYES two times a day    MEDICATIONS  (PRN):      ALLERGIES:  Allergies    No Known Allergies    Intolerances        OBJECTIVE:  ICU Vital Signs Last 24 Hrs  T(C): 36.8 (2021 04:00), Max: 37 (2021 12:00)  T(F): 98.2 (2021 04:00), Max: 98.6 (2021 12:00)  HR: 60 (2021 06:00) (56 - 68)  BP: 140/85 (2021 06:00) (119/74 - 182/108)  BP(mean): 103 (2021 06:00) (89 - 131)  ABP: --  ABP(mean): --  RR: 20 (2021 06:00) (17 - 20)  SpO2: 98% (2021 06:00) (98% - 100%)      Adult Advanced Hemodynamics Last 24 Hrs  CVP(mm Hg): --  CVP(cm H2O): --  CO: --  CI: --  PA: --  PA(mean): --  PCWP: --  SVR: --  SVRI: --  PVR: --  PVRI: --  CAPILLARY BLOOD GLUCOSE      POCT Blood Glucose.: 228 mg/dL (2021 21:19)  POCT Blood Glucose.: 133 mg/dL (2021 16:02)  POCT Blood Glucose.: 268 mg/dL (2021 11:05)    CAPILLARY BLOOD GLUCOSE      POCT Blood Glucose.: 228 mg/dL (2021 21:19)    I&O's Summary    2021 07:01  -  2021 07:00  --------------------------------------------------------  IN: 1620 mL / OUT: 2050 mL / NET: -430 mL      Daily     Daily Weight in k.3 (2021 05:00)    PHYSICAL EXAMINATION:  General: WN/WD NAD  HEENT: PERRLA, EOMI, moist mucous membranes  Neurology: A&Ox3, nonfocal, PETERS x 4  Respiratory: CTA B/L, normal respiratory effort, no wheezes, crackles, rales  CV: RRR, S1S2, no murmurs, rubs or gallops  Abdominal: Soft, NT, ND +BS, Last BM  Extremities: No edema, + peripheral pulses  Incisions:   Tubes:    LABS:                          8.8    9.07  )-----------( 186      ( 2021 04:30 )             29.4     07-    142  |  111<H>  |  42<H>  ----------------------------<  166<H>  4.6   |  25  |  2.6<H>    Ca    7.9<L>      2021 04:30  Phos  3.5       Mg     2.1         TPro  5.2<L>  /  Alb  2.9<L>  /  TBili  0.4  /  DBili  x   /  AST  18  /  ALT  24  /  AlkPhos  172<H>      LIVER FUNCTIONS - ( 2021 04:30 )  Alb: 2.9 g/dL / Pro: 5.2 g/dL / ALK PHOS: 172 U/L / ALT: 24 U/L / AST: 18 U/L / GGT: x                       TELEMETRY:  no events  EKG:   NSR, RBBB, LFPB, right axis  IMAGING:  CXR on :  improving congestion PATIENT:  GILLIAN MENDOZA  [id number]      CHIEF COMPLAINT:  Patient is a 56y old  Male who presents with a chief complaint of CHF (2021 06:23)        HPI:  Patient is a 56y old Male with PMHx of HTN, HLD, CKD, T2DM on insulin, Glaucoma who presented with a chief complaint of worsening lower extremity swelling. Patient was admitted for new onset CHF. Found to have triple vessels dz but found to be high risk for CABG.  Today is day 21 of admission.      INTERVAL HISTORY/OVERNIGHT EVENTS:  Patient is feeling well. No shortness of breath no dyspnea, no fever or chills. Eating and ambulating as tolerated.    MEDICATIONS:  MEDICATIONS  (STANDING):  aspirin  chewable 81 milliGRAM(s) Oral daily  atorvastatin 40 milliGRAM(s) Oral at bedtime  bisacodyl 5 milliGRAM(s) Oral at bedtime  brimonidine 0.2% Ophthalmic Solution 1 Drop(s) Both EYES two times a day  buMETAnide Injectable 2 milliGRAM(s) IV Push two times a day  carvedilol 3.125 milliGRAM(s) Oral every 12 hours  chlorhexidine 4% Liquid 1 Application(s) Topical <User Schedule>  clopidogrel Tablet 75 milliGRAM(s) Oral daily  dorzolamide 2% Ophthalmic Solution 1 Drop(s) Both EYES three times a day  epoetin kinsey-epbx (RETACRIT) Injectable 00403 Unit(s) SubCutaneous every 7 days  gabapentin 100 milliGRAM(s) Oral two times a day  heparin   Injectable 5000 Unit(s) SubCutaneous every 8 hours  hydrALAZINE 25 milliGRAM(s) Oral every 8 hours  insulin lispro (ADMELOG) corrective regimen sliding scale   SubCutaneous Before meals and at bedtime  isosorbide   dinitrate Tablet (ISORDIL) 20 milliGRAM(s) Oral three times a day  latanoprost 0.005% Ophthalmic Solution 1 Drop(s) Both EYES at bedtime  polyethylene glycol 3350 17 Gram(s) Oral two times a day  sevelamer carbonate 800 milliGRAM(s) Oral three times a day with meals  timolol 0.5% Solution 1 Drop(s) Both EYES two times a day    MEDICATIONS  (PRN):      ALLERGIES:  Allergies    No Known Allergies    Intolerances        OBJECTIVE:  ICU Vital Signs Last 24 Hrs  T(C): 36.8 (2021 04:00), Max: 37 (2021 12:00)  T(F): 98.2 (2021 04:00), Max: 98.6 (2021 12:00)  HR: 60 (2021 06:00) (56 - 68)  BP: 140/85 (2021 06:00) (119/74 - 182/108)  BP(mean): 103 (2021 06:00) (89 - 131)  ABP: --  ABP(mean): --  RR: 20 (2021 06:00) (17 - 20)  SpO2: 98% (2021 06:00) (98% - 100%)      Adult Advanced Hemodynamics Last 24 Hrs  CVP(mm Hg): --  CVP(cm H2O): --  CO: --  CI: --  PA: --  PA(mean): --  PCWP: --  SVR: --  SVRI: --  PVR: --  PVRI: --  CAPILLARY BLOOD GLUCOSE      POCT Blood Glucose.: 228 mg/dL (2021 21:19)  POCT Blood Glucose.: 133 mg/dL (2021 16:02)  POCT Blood Glucose.: 268 mg/dL (2021 11:05)    CAPILLARY BLOOD GLUCOSE      POCT Blood Glucose.: 228 mg/dL (2021 21:19)    I&O's Summary    2021 07:01  -  2021 07:00  --------------------------------------------------------  IN: 1620 mL / OUT: 2050 mL / NET: -430 mL      Daily     Daily Weight in k.3 (2021 05:00)    PHYSICAL EXAMINATION:  General: WN/WD NAD  HEENT: PERRLA, EOMI, moist mucous membranes  Neurology: A&Ox3, nonfocal, PETERS x 4  Respiratory: CTA B/L, normal respiratory effort, no wheezes, crackles, rales  CV: RRR, S1S2, no murmurs, rubs or gallops, JVD up to ear  Abdominal: Soft, NT, ND +BS, Last BM  Extremities: Edema +2 up to knees, + peripheral pulses  Incisions:   Tubes:    LABS:                          8.8    9.07  )-----------( 186      ( 2021 04:30 )             29.4     07-24    142  |  111<H>  |  42<H>  ----------------------------<  166<H>  4.6   |  25  |  2.6<H>    Ca    7.9<L>      2021 04:30  Phos  3.5     07-  Mg     2.1     07-24    TPro  5.2<L>  /  Alb  2.9<L>  /  TBili  0.4  /  DBili  x   /  AST  18  /  ALT  24  /  AlkPhos  172<H>  07-24    LIVER FUNCTIONS - ( 2021 04:30 )  Alb: 2.9 g/dL / Pro: 5.2 g/dL / ALK PHOS: 172 U/L / ALT: 24 U/L / AST: 18 U/L / GGT: x                       TELEMETRY:  no events  EKG:   NSR, RBBB, LFPB, right axis  IMAGING:  CXR on :  improving congestion PATIENT:  GILLIAN MENDOZA  [id number].      CHIEF COMPLAINT:  Patient is a 56y old  Male who presents with a chief complaint of CHF (2021 06:23)        HPI:  Patient is a 56y old Male with PMHx of HTN, HLD, CKD, T2DM on insulin, Glaucoma who presented with a chief complaint of worsening lower extremity swelling. Patient was admitted for new onset CHF. Found to have triple vessels dz but found to be high risk for CABG.  Today is day 21 of admission.      INTERVAL HISTORY/OVERNIGHT EVENTS:  Patient is feeling well. No shortness of breath no dyspnea, no fever or chills. Eating and ambulating as tolerated.    MEDICATIONS:  MEDICATIONS  (STANDING):  aspirin  chewable 81 milliGRAM(s) Oral daily  atorvastatin 40 milliGRAM(s) Oral at bedtime  bisacodyl 5 milliGRAM(s) Oral at bedtime  brimonidine 0.2% Ophthalmic Solution 1 Drop(s) Both EYES two times a day  buMETAnide Injectable 2 milliGRAM(s) IV Push two times a day  carvedilol 3.125 milliGRAM(s) Oral every 12 hours  chlorhexidine 4% Liquid 1 Application(s) Topical <User Schedule>  clopidogrel Tablet 75 milliGRAM(s) Oral daily  dorzolamide 2% Ophthalmic Solution 1 Drop(s) Both EYES three times a day  epoetin kinsey-epbx (RETACRIT) Injectable 91849 Unit(s) SubCutaneous every 7 days  gabapentin 100 milliGRAM(s) Oral two times a day  heparin   Injectable 5000 Unit(s) SubCutaneous every 8 hours  hydrALAZINE 25 milliGRAM(s) Oral every 8 hours  insulin lispro (ADMELOG) corrective regimen sliding scale   SubCutaneous Before meals and at bedtime  isosorbide   dinitrate Tablet (ISORDIL) 20 milliGRAM(s) Oral three times a day  latanoprost 0.005% Ophthalmic Solution 1 Drop(s) Both EYES at bedtime  polyethylene glycol 3350 17 Gram(s) Oral two times a day  sevelamer carbonate 800 milliGRAM(s) Oral three times a day with meals  timolol 0.5% Solution 1 Drop(s) Both EYES two times a day    MEDICATIONS  (PRN):      ALLERGIES:  Allergies    No Known Allergies    Intolerances        OBJECTIVE:  ICU Vital Signs Last 24 Hrs  T(C): 36.8 (2021 04:00), Max: 37 (2021 12:00)  T(F): 98.2 (2021 04:00), Max: 98.6 (2021 12:00)  HR: 60 (2021 06:00) (56 - 68)  BP: 140/85 (2021 06:00) (119/74 - 182/108)  BP(mean): 103 (2021 06:00) (89 - 131)  ABP: --  ABP(mean): --  RR: 20 (2021 06:00) (17 - 20)  SpO2: 98% (2021 06:00) (98% - 100%)      Adult Advanced Hemodynamics Last 24 Hrs  CVP(mm Hg): --  CVP(cm H2O): --  CO: --  CI: --  PA: --  PA(mean): --  PCWP: --  SVR: --  SVRI: --  PVR: --  PVRI: --  CAPILLARY BLOOD GLUCOSE      POCT Blood Glucose.: 228 mg/dL (2021 21:19)  POCT Blood Glucose.: 133 mg/dL (2021 16:02)  POCT Blood Glucose.: 268 mg/dL (2021 11:05)    CAPILLARY BLOOD GLUCOSE      POCT Blood Glucose.: 228 mg/dL (2021 21:19)    I&O's Summary    2021 07:01  -  2021 07:00  --------------------------------------------------------  IN: 1620 mL / OUT: 2050 mL / NET: -430 mL      Daily     Daily Weight in k.3 (2021 05:00)    PHYSICAL EXAMINATION:  General: WN/WD NAD  HEENT: PERRLA, EOMI, moist mucous membranes  Neurology: A&Ox3, nonfocal, PETERS x 4  Respiratory: CTA B/L, normal respiratory effort, no wheezes, crackles, rales  CV: RRR, S1S2, no murmurs, rubs or gallops, JVD up to ear  Abdominal: Soft, NT, ND +BS, Last BM  Extremities: Edema +2 up to knees, + peripheral pulses  Incisions:   Tubes:    LABS:                          8.8    9.07  )-----------( 186      ( 2021 04:30 )             29.4     07-24    142  |  111<H>  |  42<H>  ----------------------------<  166<H>  4.6   |  25  |  2.6<H>    Ca    7.9<L>      2021 04:30  Phos  3.5     07-  Mg     2.1     07-    TPro  5.2<L>  /  Alb  2.9<L>  /  TBili  0.4  /  DBili  x   /  AST  18  /  ALT  24  /  AlkPhos  172<H>  07-24    LIVER FUNCTIONS - ( 2021 04:30 )  Alb: 2.9 g/dL / Pro: 5.2 g/dL / ALK PHOS: 172 U/L / ALT: 24 U/L / AST: 18 U/L / GGT: x                       TELEMETRY:  no events  EKG:   NSR, RBBB, LFPB, right axis  IMAGING:  CXR on :  improving congestion

## 2021-07-24 NOTE — PROGRESS NOTE ADULT - ASSESSMENT
56M w/ PMHx HTN, HLD, T2DM on insulin x>10 years, retinopathy, Glaucoma presents to the Ed c/o worsening LE swelling and pain for the past 1 month. Endorses SOB on exertion, along w/ increased abdominal girth.    # LENNY / CKD 3b / CRS / ATN/hypotension / time frame also c/w contrast induced nephropathy   # Hyperkalemia better now off lokelma   # creatinine stable  bask on BUmex IV   # Nephrotic range proteinuria - Kappa/lambda 1.9, MARLIN c3c4 wnl; likely due to DM  - last phosphorus level noted cont renvela one tab po qac  - renal ultrasound noted w/o hydronephrosis, right renal cysts  # Normocytic anemia, iron deficient  0n Venofer, started Procrit 10,000 sq qweek  # HTN BP noted ok   # no acute indication for RRT   # CAD ADHF  sp cath appreciate cardio notes   will sign off recall PRN / call using TEAMS or on [phone]

## 2021-07-24 NOTE — PROGRESS NOTE ADULT - ATTENDING COMMENTS
Patient remains fluid overloaded, -180 mmHg. S/p PCI to LAD on DAPT.      DC iv diuretics  Start trial of torsemide 40 mg in and 20 mg in PM   Continue carvedilol 3.125 mg BID   Hydralazine and isosorbide   Continue DAPT and statin therapy    DC planning if good response to oral diuretics with close follow up

## 2021-07-24 NOTE — PROGRESS NOTE ADULT - ASSESSMENT
IMPRESSION:  CAD - 3V disease, high risk for CABG - s/p PCI to LAD on 7/22/2021; plan for staged PCI to LCx  Ischemic cardiomyopathy - patient in fluid overload clinically  Bradycardia - improved  LENNY - improving (ROSENDO vs hypoperfusion from hypotension?)  Elevated LFTs - improving  Anemia  DM  HTN   HLD    PLAN:    CNS: Avoid sedation    HEENT: Oral care    PULMONARY:    - HOB @ 45 degrees    CARDIOVASCULAR:  - Continue CCU monitoring  - Continue Hydralazine 25mg TID and Isordil 20mg TID; will monitor BP and adjust medications accordingly; avoid hypotension  - Continue aspirin 81mg daily, Atorvastatin 40mg daily  - Continue carvedilol to 3.125mg q12h  - Amiodarone discontinued due to bradycardia  - Continue Bumex 2mg IV with 150cc of hypertonic saline; check strict I&Os; keep I<Os; will probably change to PO diuretics today and assess response  - Monitor renal function, electrolytes, daily weight and volume status   - Couldn't complete MRI due to orthopnea; will repeat once euvolemic and stable  - Staged PCI to LCx in 4 to 6 weeks    GI: GI prophylaxis.  Feeding     RENAL:  - Follow up renal function and lytes.  Correct as needed  - Avoid nephrotoxic agents  - Nephrology following    INFECTIOUS DISEASE: Monitor VS    HEMATOLOGICAL:    - Monitor cbc; watch for signs and symptoms of bleeding  - Check iron profile  - DVT prophylaxis.    ENDOCRINE:  Follow up FS.  Insulin protocol     IMPRESSION:  CAD - 3V disease, high risk for CABG - s/p PCI to LAD on 7/22/2021; plan for staged PCI to LCx  Ischemic cardiomyopathy - patient in fluid overload clinically  Bradycardia - improved  LENNY - improving (ROSENDO vs hypoperfusion from hypotension?)  Elevated LFTs - improving  Anemia  DM  HTN   HLD    PLAN:    CNS: Avoid sedation    HEENT: Oral care    PULMONARY:    - HOB @ 45 degrees    CARDIOVASCULAR:  - Continue CCU monitoring  - Continue Hydralazine 25mg TID and Isordil 20mg TID; ; avoid hypotension  - Continue aspirin 81mg daily, Atorvastatin 40mg daily  - Continue carvedilol to 3.125mg q12h  - Amiodarone discontinued due to bradycardia  - DC IV diuretics ,Start trial of torsemide 40 mg in and 20 mg in PM   - Monitor renal function, electrolytes, daily weight and volume status   - Couldn't complete MRI due to orthopnea; will repeat once euvolemic and stable  - Staged PCI to LCx in 4 to 6 weeks    GI: GI prophylaxis.  Feeding     RENAL:  - Follow up renal function and lytes.  Correct as needed  - Avoid nephrotoxic agents  - Nephrology following    INFECTIOUS DISEASE: Monitor VS    HEMATOLOGICAL:    - Monitor cbc; watch for signs and symptoms of bleeding  - Check iron profile  - DVT prophylaxis.    ENDOCRINE:  Follow up FS.  Insulin protocol

## 2021-07-24 NOTE — PROGRESS NOTE ADULT - SUBJECTIVE AND OBJECTIVE BOX
Nephrology progress note    THIS IS AN INCOMPLETE NOTE . FULL NOTE TO FOLLOW SHORTLY    Patient is seen and examined, events over the last 24 h noted .    Allergies:  No Known Allergies    Hospital Medications:   MEDICATIONS  (STANDING):  aspirin  chewable 81 milliGRAM(s) Oral daily  atorvastatin 40 milliGRAM(s) Oral at bedtime  bisacodyl 5 milliGRAM(s) Oral at bedtime  brimonidine 0.2% Ophthalmic Solution 1 Drop(s) Both EYES two times a day  buMETAnide Injectable 2 milliGRAM(s) IV Push two times a day  carvedilol 3.125 milliGRAM(s) Oral every 12 hours  chlorhexidine 4% Liquid 1 Application(s) Topical <User Schedule>  clopidogrel Tablet 75 milliGRAM(s) Oral daily  dorzolamide 2% Ophthalmic Solution 1 Drop(s) Both EYES three times a day  epoetin kinsey-epbx (RETACRIT) Injectable 84487 Unit(s) SubCutaneous every 7 days  gabapentin 100 milliGRAM(s) Oral two times a day  heparin   Injectable 5000 Unit(s) SubCutaneous every 8 hours  hydrALAZINE 25 milliGRAM(s) Oral every 8 hours  insulin lispro (ADMELOG) corrective regimen sliding scale   SubCutaneous Before meals and at bedtime  isosorbide   dinitrate Tablet (ISORDIL) 20 milliGRAM(s) Oral three times a day  latanoprost 0.005% Ophthalmic Solution 1 Drop(s) Both EYES at bedtime  polyethylene glycol 3350 17 Gram(s) Oral two times a day  sevelamer carbonate 800 milliGRAM(s) Oral three times a day with meals  timolol 0.5% Solution 1 Drop(s) Both EYES two times a day        VITALS:  T(F): 98.2 (07-24-21 @ 04:00), Max: 98.6 (07-23-21 @ 12:00)  HR: 60 (07-24-21 @ 06:00)  BP: 140/85 (07-24-21 @ 06:00)  RR: 20 (07-24-21 @ 06:00)  SpO2: 98% (07-24-21 @ 06:00)  Wt(kg): --    07-22 @ 07:01  -  07-23 @ 07:00  --------------------------------------------------------  IN: 1215 mL / OUT: 2250 mL / NET: -1035 mL    07-23 @ 07:01  -  07-24 @ 07:00  --------------------------------------------------------  IN: 1620 mL / OUT: 2050 mL / NET: -430 mL          PHYSICAL EXAM:  Constitutional: NAD  HEENT: anicteric sclera, oropharynx clear, MMM  Neck: No JVD  Respiratory: CTAB, no wheezes, rales or rhonchi  Cardiovascular: S1, S2, RRR  Gastrointestinal: BS+, soft, NT/ND  Extremities: No cyanosis or clubbing. No peripheral edema  :  No harris.   Skin: No rashes    LABS:  07-24    142  |  111<H>  |  42<H>  ----------------------------<  166<H>  4.6   |  25  |  2.6<H>    Ca    7.9<L>      24 Jul 2021 04:30  Phos  3.5     07-23  Mg     2.1     07-24    TPro  5.2<L>  /  Alb  2.9<L>  /  TBili  0.4  /  DBili      /  AST  18  /  ALT  24  /  AlkPhos  172<H>  07-24                          8.8    9.07  )-----------( 186      ( 24 Jul 2021 04:30 )             29.4       Urine Studies:      RADIOLOGY & ADDITIONAL STUDIES:   Nephrology progress note  Patient is seen and examined, events over the last 24 h noted .  denied any complaints     Allergies:  No Known Allergies    Hospital Medications:   MEDICATIONS  (STANDING):  aspirin  chewable 81 milliGRAM(s) Oral daily  atorvastatin 40 milliGRAM(s) Oral at bedtime  bisacodyl 5 milliGRAM(s) Oral at bedtime  brimonidine 0.2% Ophthalmic Solution 1 Drop(s) Both EYES two times a day  buMETAnide Injectable 2 milliGRAM(s) IV Push two times a day  carvedilol 3.125 milliGRAM(s) Oral every 12 hours  clopidogrel Tablet 75 milliGRAM(s) Oral daily  dorzolamide 2% Ophthalmic Solution 1 Drop(s) Both EYES three times a day  epoetin kinsey-epbx (RETACRIT) Injectable 69794 Unit(s) SubCutaneous every 7 days  gabapentin 100 milliGRAM(s) Oral two times a day  heparin   Injectable 5000 Unit(s) SubCutaneous every 8 hours  hydrALAZINE 25 milliGRAM(s) Oral every 8 hours  insulin lispro (ADMELOG) corrective regimen sliding scale   SubCutaneous Before meals and at bedtime  isosorbide   dinitrate Tablet (ISORDIL) 20 milliGRAM(s) Oral three times a day  latanoprost 0.005% Ophthalmic Solution 1 Drop(s) Both EYES at bedtime  polyethylene glycol 3350 17 Gram(s) Oral two times a day  sevelamer carbonate 800 milliGRAM(s) Oral three times a day with meals  timolol 0.5% Solution 1 Drop(s) Both EYES two times a day        VITALS:  T(F): 98.2 (07-24-21 @ 04:00), Max: 98.6 (07-23-21 @ 12:00)  HR: 60 (07-24-21 @ 06:00)  BP: 140/85 (07-24-21 @ 06:00)  RR: 20 (07-24-21 @ 06:00)  SpO2: 98% (07-24-21 @ 06:00)      07-22 @ 07:01  -  07-23 @ 07:00  --------------------------------------------------------  IN: 1215 mL / OUT: 2250 mL / NET: -1035 mL    07-23 @ 07:01  -  07-24 @ 07:00  --------------------------------------------------------  IN: 1620 mL / OUT: 2050 mL / NET: -430 mL          PHYSICAL EXAM:  Constitutional: NAD  Neck: No JVD  Respiratory: CTAB,   Cardiovascular: S1, S2, RRR  Gastrointestinal: BS+, soft, NT/ND  Extremities: No cyanosis or clubbing. No peripheral edema  :  No harris.   Skin: No rashes    LABS:  07-24    142  |  111<H>  |  42<H>  ----------------------------<  166<H>  4.6   |  25  |  2.6<H>    Ca    7.9<L>      24 Jul 2021 04:30  Phos  3.5     07-23  Mg     2.1     07-24    TPro  5.2<L>  /  Alb  2.9<L>  /  TBili  0.4  /  DBili      /  AST  18  /  ALT  24  /  AlkPhos  172<H>  07-24                          8.8    9.07  )-----------( 186      ( 24 Jul 2021 04:30 )             29.4       Urine Studies:      RADIOLOGY & ADDITIONAL STUDIES:

## 2021-07-25 VITALS — DIASTOLIC BLOOD PRESSURE: 64 MMHG | SYSTOLIC BLOOD PRESSURE: 146 MMHG | HEART RATE: 73 BPM | RESPIRATION RATE: 18 BRPM

## 2021-07-25 LAB
ALBUMIN SERPL ELPH-MCNC: 2.8 G/DL — LOW (ref 3.5–5.2)
ALP SERPL-CCNC: 168 U/L — HIGH (ref 30–115)
ALT FLD-CCNC: 20 U/L — SIGNIFICANT CHANGE UP (ref 0–41)
ANION GAP SERPL CALC-SCNC: 7 MMOL/L — SIGNIFICANT CHANGE UP (ref 7–14)
AST SERPL-CCNC: 14 U/L — SIGNIFICANT CHANGE UP (ref 0–41)
BILIRUB SERPL-MCNC: 0.5 MG/DL — SIGNIFICANT CHANGE UP (ref 0.2–1.2)
BUN SERPL-MCNC: 42 MG/DL — HIGH (ref 10–20)
CALCIUM SERPL-MCNC: 8.3 MG/DL — LOW (ref 8.5–10.1)
CHLORIDE SERPL-SCNC: 106 MMOL/L — SIGNIFICANT CHANGE UP (ref 98–110)
CO2 SERPL-SCNC: 26 MMOL/L — SIGNIFICANT CHANGE UP (ref 17–32)
CREAT SERPL-MCNC: 2.7 MG/DL — HIGH (ref 0.7–1.5)
GLUCOSE BLDC GLUCOMTR-MCNC: 127 MG/DL — HIGH (ref 70–99)
GLUCOSE BLDC GLUCOMTR-MCNC: 190 MG/DL — HIGH (ref 70–99)
GLUCOSE BLDC GLUCOMTR-MCNC: 191 MG/DL — HIGH (ref 70–99)
GLUCOSE SERPL-MCNC: 200 MG/DL — HIGH (ref 70–99)
HCT VFR BLD CALC: 31 % — LOW (ref 42–52)
HGB BLD-MCNC: 9.3 G/DL — LOW (ref 14–18)
MAGNESIUM SERPL-MCNC: 2 MG/DL — SIGNIFICANT CHANGE UP (ref 1.8–2.4)
MCHC RBC-ENTMCNC: 24.3 PG — LOW (ref 27–31)
MCHC RBC-ENTMCNC: 30 G/DL — LOW (ref 32–37)
MCV RBC AUTO: 80.9 FL — SIGNIFICANT CHANGE UP (ref 80–94)
NRBC # BLD: 0 /100 WBCS — SIGNIFICANT CHANGE UP (ref 0–0)
PLATELET # BLD AUTO: 189 K/UL — SIGNIFICANT CHANGE UP (ref 130–400)
POTASSIUM SERPL-MCNC: 4.6 MMOL/L — SIGNIFICANT CHANGE UP (ref 3.5–5)
POTASSIUM SERPL-SCNC: 4.6 MMOL/L — SIGNIFICANT CHANGE UP (ref 3.5–5)
PROT SERPL-MCNC: 5.3 G/DL — LOW (ref 6–8)
RBC # BLD: 3.83 M/UL — LOW (ref 4.7–6.1)
RBC # FLD: 14.9 % — HIGH (ref 11.5–14.5)
SODIUM SERPL-SCNC: 139 MMOL/L — SIGNIFICANT CHANGE UP (ref 135–146)
WBC # BLD: 9.82 K/UL — SIGNIFICANT CHANGE UP (ref 4.8–10.8)
WBC # FLD AUTO: 9.82 K/UL — SIGNIFICANT CHANGE UP (ref 4.8–10.8)

## 2021-07-25 PROCEDURE — 71045 X-RAY EXAM CHEST 1 VIEW: CPT | Mod: 26

## 2021-07-25 PROCEDURE — 93010 ELECTROCARDIOGRAM REPORT: CPT

## 2021-07-25 PROCEDURE — 99233 SBSQ HOSP IP/OBS HIGH 50: CPT

## 2021-07-25 RX ORDER — FUROSEMIDE 40 MG
1 TABLET ORAL
Qty: 0 | Refills: 0 | DISCHARGE

## 2021-07-25 RX ORDER — LISINOPRIL 2.5 MG/1
1 TABLET ORAL
Qty: 0 | Refills: 0 | DISCHARGE

## 2021-07-25 RX ORDER — CLOPIDOGREL BISULFATE 75 MG/1
1 TABLET, FILM COATED ORAL
Qty: 0 | Refills: 0 | DISCHARGE
Start: 2021-07-25

## 2021-07-25 RX ORDER — SEVELAMER CARBONATE 2400 MG/1
1 POWDER, FOR SUSPENSION ORAL
Qty: 0 | Refills: 0 | DISCHARGE
Start: 2021-07-25

## 2021-07-25 RX ORDER — HYDRALAZINE HCL 50 MG
1 TABLET ORAL
Qty: 90 | Refills: 2
Start: 2021-07-25 | End: 2021-10-22

## 2021-07-25 RX ORDER — LATANOPROST 0.05 MG/ML
1 SOLUTION/ DROPS OPHTHALMIC; TOPICAL
Qty: 0 | Refills: 0 | DISCHARGE

## 2021-07-25 RX ORDER — CARVEDILOL PHOSPHATE 80 MG/1
1 CAPSULE, EXTENDED RELEASE ORAL
Qty: 0 | Refills: 0 | DISCHARGE
Start: 2021-07-25

## 2021-07-25 RX ORDER — ASPIRIN/CALCIUM CARB/MAGNESIUM 324 MG
1 TABLET ORAL
Qty: 0 | Refills: 0 | DISCHARGE
Start: 2021-07-25

## 2021-07-25 RX ORDER — GABAPENTIN 400 MG/1
1 CAPSULE ORAL
Qty: 60 | Refills: 2
Start: 2021-07-25 | End: 2021-10-22

## 2021-07-25 RX ORDER — GABAPENTIN 400 MG/1
1 CAPSULE ORAL
Qty: 0 | Refills: 0 | DISCHARGE
Start: 2021-07-25

## 2021-07-25 RX ORDER — HYDRALAZINE HCL 50 MG
1 TABLET ORAL
Qty: 0 | Refills: 0 | DISCHARGE
Start: 2021-07-25

## 2021-07-25 RX ORDER — LATANOPROST 0.05 MG/ML
1 SOLUTION/ DROPS OPHTHALMIC; TOPICAL
Qty: 0 | Refills: 0 | DISCHARGE
Start: 2021-07-25

## 2021-07-25 RX ORDER — ISOSORBIDE DINITRATE 5 MG/1
1 TABLET ORAL
Qty: 90 | Refills: 2
Start: 2021-07-25 | End: 2021-10-22

## 2021-07-25 RX ORDER — ISOSORBIDE DINITRATE 5 MG/1
1 TABLET ORAL
Qty: 0 | Refills: 0 | DISCHARGE
Start: 2021-07-25

## 2021-07-25 RX ORDER — HYDRALAZINE HCL 50 MG
50 TABLET ORAL EVERY 8 HOURS
Refills: 0 | Status: DISCONTINUED | OUTPATIENT
Start: 2021-07-25 | End: 2021-07-25

## 2021-07-25 RX ADMIN — CLOPIDOGREL BISULFATE 75 MILLIGRAM(S): 75 TABLET, FILM COATED ORAL at 11:27

## 2021-07-25 RX ADMIN — ISOSORBIDE DINITRATE 20 MILLIGRAM(S): 5 TABLET ORAL at 11:26

## 2021-07-25 RX ADMIN — Medication 2: at 08:35

## 2021-07-25 RX ADMIN — ISOSORBIDE DINITRATE 20 MILLIGRAM(S): 5 TABLET ORAL at 16:13

## 2021-07-25 RX ADMIN — GABAPENTIN 100 MILLIGRAM(S): 400 CAPSULE ORAL at 17:08

## 2021-07-25 RX ADMIN — ISOSORBIDE DINITRATE 20 MILLIGRAM(S): 5 TABLET ORAL at 05:47

## 2021-07-25 RX ADMIN — SEVELAMER CARBONATE 800 MILLIGRAM(S): 2400 POWDER, FOR SUSPENSION ORAL at 08:37

## 2021-07-25 RX ADMIN — HEPARIN SODIUM 5000 UNIT(S): 5000 INJECTION INTRAVENOUS; SUBCUTANEOUS at 05:48

## 2021-07-25 RX ADMIN — SEVELAMER CARBONATE 800 MILLIGRAM(S): 2400 POWDER, FOR SUSPENSION ORAL at 12:10

## 2021-07-25 RX ADMIN — DORZOLAMIDE HYDROCHLORIDE 1 DROP(S): 20 SOLUTION/ DROPS OPHTHALMIC at 05:47

## 2021-07-25 RX ADMIN — Medication 81 MILLIGRAM(S): at 11:27

## 2021-07-25 RX ADMIN — Medication 20 MILLIGRAM(S): at 12:09

## 2021-07-25 RX ADMIN — CARVEDILOL PHOSPHATE 3.12 MILLIGRAM(S): 80 CAPSULE, EXTENDED RELEASE ORAL at 05:47

## 2021-07-25 RX ADMIN — BRIMONIDINE TARTRATE 1 DROP(S): 2 SOLUTION/ DROPS OPHTHALMIC at 05:47

## 2021-07-25 RX ADMIN — Medication 25 MILLIGRAM(S): at 05:47

## 2021-07-25 RX ADMIN — Medication 50 MILLIGRAM(S): at 13:08

## 2021-07-25 RX ADMIN — Medication 1 DROP(S): at 17:10

## 2021-07-25 RX ADMIN — GABAPENTIN 100 MILLIGRAM(S): 400 CAPSULE ORAL at 05:47

## 2021-07-25 RX ADMIN — POLYETHYLENE GLYCOL 3350 17 GRAM(S): 17 POWDER, FOR SOLUTION ORAL at 17:09

## 2021-07-25 RX ADMIN — CARVEDILOL PHOSPHATE 3.12 MILLIGRAM(S): 80 CAPSULE, EXTENDED RELEASE ORAL at 17:08

## 2021-07-25 RX ADMIN — HEPARIN SODIUM 5000 UNIT(S): 5000 INJECTION INTRAVENOUS; SUBCUTANEOUS at 13:07

## 2021-07-25 RX ADMIN — DORZOLAMIDE HYDROCHLORIDE 1 DROP(S): 20 SOLUTION/ DROPS OPHTHALMIC at 13:10

## 2021-07-25 RX ADMIN — SEVELAMER CARBONATE 800 MILLIGRAM(S): 2400 POWDER, FOR SUSPENSION ORAL at 16:14

## 2021-07-25 RX ADMIN — Medication 2: at 12:25

## 2021-07-25 RX ADMIN — BRIMONIDINE TARTRATE 1 DROP(S): 2 SOLUTION/ DROPS OPHTHALMIC at 17:12

## 2021-07-25 NOTE — DISCHARGE NOTE PROVIDER - NSDCMRMEDTOKEN_GEN_ALL_CORE_FT
aspirin 81 mg oral tablet, chewable: 1 tab(s) orally once a day  atorvastatin 40 mg oral tablet: 1 tab(s) orally once a day  BASAGLAR 100 UNIT/ML KWIKPEN:   brimonidine 0.2% ophthalmic solution: 1 drop(s) to each affected eye 2 times a day  carvedilol 3.125 mg oral tablet: 1 tab(s) orally every 12 hours  clopidogrel 75 mg oral tablet: 1 tab(s) orally once a day  dorzolamide-timolol 2%-0.5% preservative-free ophthalmic solution: 1 drop(s) to each affected eye 2 times a day  gabapentin 100 mg oral capsule: 1 cap(s) orally 2 times a day  hydrALAZINE 50 mg oral tablet: 1 tab(s) orally every 8 hours  isosorbide dinitrate 20 mg oral tablet: 1 tab(s) orally 3 times a day  latanoprost 0.005% ophthalmic solution: 1 drop(s) to each affected eye once a day (at bedtime)  pioglitazone-metformin 15 mg-850 mg oral tablet: 1 tab(s) orally 2 times a day  Rhopressa 0.02% ophthalmic solution: 1 drop(s) to each affected eye once a day (in the evening)  sevelamer carbonate 800 mg oral tablet: 1 tab(s) orally 3 times a day (with meals)  torsemide 20 mg oral tablet: 1 tab(s) orally once a day (in the afternoon)   torsemide 20 mg oral tablet: 2 tab(s) orally once a day (in the morning)

## 2021-07-25 NOTE — DISCHARGE NOTE PROVIDER - NSDCCPCAREPLAN_GEN_ALL_CORE_FT
PRINCIPAL DISCHARGE DIAGNOSIS  Diagnosis: 3-vessel CAD  Assessment and Plan of Treatment: Coronary artery disease (CAD) is narrowing of the arteries to your heart caused by a buildup of plaque. Plaque is made up of cholesterol and other substances. The narrowing in your arteries decreases the amount of blood that can flow to your heart. This causes your heart to get less oxygen.  You need to have a procedure done to open up these arteries. Please follow up with DR. Singh after discharged  Call 911 for any of the following:  You have any of the following signs of a heart attack:  Squeezing, pressure, or pain in your chest  You may also have any of the following:  Discomfort or pain in your back, neck, jaw, stomach, or arm  Shortness of breath  Nausea or vomiting  Lightheadedness or a sudden cold sweat  La enfermedad de las arterias coronarias (CAD) es el estrechamiento de las arterias que van al corazón debido a la acumulación de placa. La placa está formada por colesterol y otras sustancias. El estrechamiento de noe arterias disminuye la cantidad de brooke que puede fluir hacia mathew corazón. [place] hace que mathew corazón reciba menos oxígeno.  Es necesario que le realicen un procedimiento para abrir estas arterias. Juanito un seguimiento con el DR. Singh después del bianca  Llame al 911 para cualquiera de los siguientes:  Tiene alguno de los siguientes signos de un ataque cardíaco:  Opresión, presión o dolor en el pecho  También puede tener cualquiera de los siguientes:  Malestar o dolor en la espalda, el edgardo, la mandíbula, el estómago o el brazo  Dificultad para respirar  Náuseas o vómitos        SECONDARY DISCHARGE DIAGNOSES  Diagnosis: CHF (congestive heart failure)  Assessment and Plan of Treatment: Heart failure is a condition that does not allow your heart to fill or pump properly. Not enough oxygen in your blood gets to your organs and tissues. Fluid builds up and causes swelling and trouble breathing. This is known as congestive heart failure.   You were sent home with some water pills to help get rid of the fluids. Please take these medications as prescribed  Please take your medication as prescribed and please follow up with Dr. Cantu  La insuficiencia cardíaca es paty afección que no permite que mathew corazón se llene o bombee correctamente. No llega suficiente oxígeno en mathew brooke a noe órganos y tejidos. El líquido se acumula y causa hinchazón y dificultad para respirar. Friant se conoce jhonatan insuficiencia cardíaca congestiva.  Lo enviaron a casa con unas píldoras de agua para ayudar a eliminar los líquidos. [place] estos medicamentos según lo prescrito  Por favor, tome mathew medicamento según lo prescrito y juanito un seguimiento con el Dr. Cantu.      Diagnosis: Diabetes mellitus  Assessment and Plan of Treatment: Please continue to take your medications as prescribed  Continúe tomando noe medicamentos según lo prescrito    Diagnosis: HTN (hypertension)  Assessment and Plan of Treatment: Please continue to take your medication as prescribed  Continúe tomando noe medicamentos según lo prescrito    Diagnosis: HLD (hyperlipidemia)  Assessment and Plan of Treatment: Please continue to take your medication as prescribed  Continúe tomando noe medicamentos según lo prescrito

## 2021-07-25 NOTE — PROGRESS NOTE ADULT - ATTENDING SUPERVISION STATEMENT
ACP
Resident
ACP/Student
Fellow
Resident
Resident/Fellow
Resident/Fellow
Fellow
Resident
Resident/Fellow
Resident

## 2021-07-25 NOTE — PROGRESS NOTE ADULT - PROVIDER SPECIALTY LIST ADULT
CCU
CT Surgery
Hospitalist
Internal Medicine
Intervent Cardiology
Nephrology
CCU
CCU
CT Surgery
Heart Failure
Hospitalist
Internal Medicine
Nephrology
CCU
CCU
Heart Failure
Internal Medicine
Internal Medicine
Nephrology
Nephrology
CCU
CCU
Heart Failure
Hospitalist
Internal Medicine
Nephrology
CCU
CCU
Internal Medicine

## 2021-07-25 NOTE — PROGRESS NOTE ADULT - NSICDXPILOT_GEN_ALL_CORE
[place]
[employer]
[place]

## 2021-07-25 NOTE — DISCHARGE NOTE PROVIDER - HOSPITAL COURSE
56M w/ PMHx HTN, HLD, CKD, T2DM on insulin, Glaucoma presented to the Ed c/o worsening LE swelling.  Endorsed SOB on exertion, along w/ increased abdominal girth. Denied orthopnea, PND, wheezing, CP, and diaphoresis..  Patient was diagnosed with new onset Acute HFrEF exacerbation.  CXR showed cardiomegaly and blunting of CP angles. BNP 60083 on admission. Cath showed significant 3 V disease, LAD, RCA, LCx. CTS was consulted. During preop cardiac MRI patient became SOB. There was bilateral effusions with adjacent atelectasis and moderate pericardial effusion on imaging . Bumex 2 mg BID + hypertonic saline was started.     While hospitalized patient's cr worsened given time frame of cath, multiple low bp reading (discontinued hydralazine)and diuresis. He was admitted to the CCU for monitoring kidney function  while diuresing. CABG was decided against because of increased risk and the risk of ending in HD might be higher if going for CABG than percutaneous approach to LAD lesion with staged PCI to LCx  . Patient is s/p [facility] on 7/22/2021. He will be planned for a staged PCI to LCX in 4 - 6 weeks.    Patient was massively fluid overloaded. He underwent aggressive diuresis with Bumex and Hypertonic saline. Patient will be discharged on Torsemide 40mg in AM and 20mg in PM. He will be expected to follow up with Dr. Cantu upon discharge for further management.   Patient is hemodynamically stable and cleared for discharge

## 2021-07-25 NOTE — PROGRESS NOTE ADULT - ATTENDING COMMENTS
Remains overloaded, BP remains uncontrolled    Increase hydralazine to 50 mg TID   Continue isosorbide 20 mg TID   Coreg 3.125 mg BID   Torsemide 20 mg BID for DC   DC planning   Follow up in office in 10 days

## 2021-07-25 NOTE — DISCHARGE NOTE PROVIDER - PROVIDER TOKENS
PROVIDER:[TOKEN:[[id number]],FOLLOWUP:[2 weeks]],PROVIDER:[TOKEN:[[id number]],FOLLOWUP:[2 weeks]],PROVIDER:[TOKEN:[[id number]],FOLLOWUP:[2 weeks]]

## 2021-07-25 NOTE — PROGRESS NOTE ADULT - ASSESSMENT
IMPRESSION:  CAD - 3V disease, high risk for CABG - s/p PCI to LAD on 7/22/2021; plan for staged PCI to LCx  Ischemic cardiomyopathy - patient in fluid overload clinically  Bradycardia - improved  LENNY - improving (ROSENDO vs hypoperfusion from hypotension?)  Elevated LFTs - improving  Anemia  DM  HTN   HLD    PLAN:    CNS: Avoid sedation    HEENT: Oral care    PULMONARY:    - HOB @ 45 degrees    CARDIOVASCULAR:  - Continue CCU monitoring  - Continue Hydralazine 25mg TID and Isordil 20mg TID; ; avoid hypotension  - Continue aspirin 81mg daily, Atorvastatin 40mg daily  - Continue carvedilol to 3.125mg q12h  - Amiodarone discontinued due to bradycardia  - DC IV diuretics ,Start trial of torsemide 40 mg in and 20 mg in PM   - Monitor renal function, electrolytes, daily weight and volume status   - Couldn't complete MRI due to orthopnea; will repeat once euvolemic and stable  - Staged PCI to LCx in 4 to 6 weeks    GI: GI prophylaxis.  Feeding     RENAL:  - Follow up renal function and lytes.  Correct as needed  - Avoid nephrotoxic agents  - Nephrology following    INFECTIOUS DISEASE: Monitor VS    HEMATOLOGICAL:    - Monitor cbc; watch for signs and symptoms of bleeding  - Check iron profile  - DVT prophylaxis.    ENDOCRINE:  Follow up FS.  Insulin protocol

## 2021-07-25 NOTE — DISCHARGE NOTE PROVIDER - CARE PROVIDERS DIRECT ADDRESSES
[email or web address]

## 2021-07-25 NOTE — DISCHARGE NOTE PROVIDER - CARE PROVIDER_API CALL
Tania Scott (MD; MD)  [facility]  [address] [place] [address] [address] [place] [address]  Phone: [phone]  Fax: [phone]  Follow Up Time: 2 weeks    Jeffery Singh)  Cardiovascular Disease; Internal Medicine; Interventional Cardiology; Nuclear Cardiology  [address] [facility] [address] [facility], [address]  [place] [address]  Phone: [phone]  Fax: [phone]  Follow Up Time: 2 weeks    Davis Knapp)  Internal Medicine; Nephrology  [address] [place] [address]  Phone: [phone]  Fax: [phone]  Follow Up Time: 2 weeks

## 2021-07-25 NOTE — PROGRESS NOTE ADULT - SUBJECTIVE AND OBJECTIVE BOX
PATIENT:  GILLIAN MENDOZA  [id number]    CHIEF COMPLAINT:  Patient is a 56y old  Male who presents with a chief complaint of CHF (2021 06:23)      INTERVAL HISTORY/OVERNIGHT EVENTS:      REVIEW OF SYSTEMS:    Constitutional:     [ ] negative [ ] fevers [ ] chills [ ] weight loss [ ] weight gain  HEENT:                  [ ] negative [ ] dry eyes [ ] eye irritation [ ] postnasal drip [ ] nasal congestion  CV:                         [ ] negative  [ ] chest pain [ ] orthopnea [ ] palpitations [ ] murmur  Resp:                     [ ] negative [ ] cough [ ] shortness of breath [ ] dyspnea [ ] wheezing [ ] sputum [ ] hemoptysis  GI:                          [ ] negative [ ] nausea [ ] vomiting [ ] diarrhea [ ] constipation [ ] abd pain [ ] dysphagia   :                        [ ] negative [ ] dysuria [ ] nocturia [ ] hematuria [ ] increased urinary frequency  Musculoskeletal: [ ] negative [ ] back pain [ ] myalgias [ ] arthralgias [ ] fracture  Skin:                       [ ] negative [ ] rash [ ] itch  Neurological:        [ ] negative [ ] headache [ ] dizziness [ ] syncope [ ] weakness [ ] numbness  Psychiatric:           [ ] negative [ ] anxiety [ ] depression  Endocrine:            [ ] negative [ ] diabetes [ ] thyroid problem  Heme/Lymph:      [ ] negative [ ] anemia [ ] bleeding problem  Allergic/Immune: [ ] negative [ ] itchy eyes [ ] nasal discharge [ ] hives [ ] angioedema    [ ] All other systems negative  [ ] Unable to assess ROS because ________.    MEDICATIONS:  MEDICATIONS  (STANDING):  aspirin  chewable 81 milliGRAM(s) Oral daily  atorvastatin 40 milliGRAM(s) Oral at bedtime  bisacodyl 5 milliGRAM(s) Oral at bedtime  brimonidine 0.2% Ophthalmic Solution 1 Drop(s) Both EYES two times a day  carvedilol 3.125 milliGRAM(s) Oral every 12 hours  chlorhexidine 4% Liquid 1 Application(s) Topical <User Schedule>  clopidogrel Tablet 75 milliGRAM(s) Oral daily  dorzolamide 2% Ophthalmic Solution 1 Drop(s) Both EYES three times a day  epoetin kinsey-epbx (RETACRIT) Injectable 42596 Unit(s) SubCutaneous every 7 days  gabapentin 100 milliGRAM(s) Oral two times a day  heparin   Injectable 5000 Unit(s) SubCutaneous every 8 hours  hydrALAZINE 25 milliGRAM(s) Oral every 8 hours  insulin lispro (ADMELOG) corrective regimen sliding scale   SubCutaneous Before meals and at bedtime  isosorbide   dinitrate Tablet (ISORDIL) 20 milliGRAM(s) Oral three times a day  latanoprost 0.005% Ophthalmic Solution 1 Drop(s) Both EYES at bedtime  polyethylene glycol 3350 17 Gram(s) Oral two times a day  sevelamer carbonate 800 milliGRAM(s) Oral three times a day with meals  timolol 0.5% Solution 1 Drop(s) Both EYES two times a day    MEDICATIONS  (PRN):      ALLERGIES:  Allergies    No Known Allergies    Intolerances        OBJECTIVE:  ICU Vital Signs Last 24 Hrs  T(C): 36.5 (2021 02:00), Max: 36.8 (2021 08:00)  T(F): 97.7 (2021 02:00), Max: 98.2 (2021 08:00)  HR: 60 (2021 06:00) (56 - 606)  BP: 157/92 (2021 06:00) (118/90 - 188/94)  BP(mean): 116 (2021 06:00) (98 - 147)  ABP: --  ABP(mean): --  RR: 18 (2021 02:00) (16 - 18)  SpO2: 97% (2021 04:00) (97% - 98%)      Adult Advanced Hemodynamics Last 24 Hrs  CVP(mm Hg): --  CVP(cm H2O): --  CO: --  CI: --  PA: --  PA(mean): --  PCWP: --  SVR: --  SVRI: --  PVR: --  PVRI: --  CAPILLARY BLOOD GLUCOSE      POCT Blood Glucose.: 196 mg/dL (2021 22:09)  POCT Blood Glucose.: 192 mg/dL (2021 16:51)    CAPILLARY BLOOD GLUCOSE      POCT Blood Glucose.: 196 mg/dL (2021 22:09)    I&O's Summary    2021 07:01  -  2021 07:00  --------------------------------------------------------  IN: 860 mL / OUT: 3050 mL / NET: -2190 mL      Daily     Daily Weight in k.7 (2021 06:00)    PHYSICAL EXAMINATION:  General: WN/WD NAD  HEENT: PERRLA, EOMI, moist mucous membranes  Neurology: A&Ox3, nonfocal, PETERS x 4  Respiratory: CTA B/L, normal respiratory effort, no wheezes, crackles, rales  CV: RRR, S1S2, no murmurs, rubs or gallops  Abdominal: Soft, NT, ND +BS, Last BM  Extremities: No edema, + peripheral pulses  Incisions:   Tubes:    LABS:                          9.3    9.82  )-----------( 189      ( 2021 04:30 )             31.0         139  |  106  |  42<H>  ----------------------------<  200<H>  4.6   |  26  |  2.7<H>    Ca    8.3<L>      2021 04:30  Mg     2.0         TPro  5.3<L>  /  Alb  2.8<L>  /  TBili  0.5  /  DBili  x   /  AST  14  /  ALT  20  /  AlkPhos  168<H>      LIVER FUNCTIONS - ( 2021 04:30 )  Alb: 2.8 g/dL / Pro: 5.3 g/dL / ALK PHOS: 168 U/L / ALT: 20 U/L / AST: 14 U/L / GGT: x                       TELEMETRY:     EK Lead ECG:   Ventricular Rate 63 BPM    Atrial Rate 63 BPM    P-R Interval 190 ms    QRS Duration 128 ms    Q-T Interval 476 ms    QTC Calculation(Bazett) 487 ms    P Axis 39 degrees    R Axis 115 degrees    T Axis 49 degrees    Diagnosis Line Normal sinus rhythm  Right bundle branch block  Left posterior fascicular block  *** Bifascicular block ***  Left ventricular hypertrophy with repolarization abnormality  T wave abnormality, consider anterolateral ischemia  Abnormal ECG    Confirmed by Paz Malik MD ([id number]) on 2021 2:45:30 PM (21 @ 05:59)      IMAGING:  Echo:        LVSF:        EF:        RVSF:        LA:        RA:        Mitral Valve:        Aortic Valve:       Tricuspid Valve:        Pulmonic Valve:        Pericardium:     ASSESSMENT & PLAN:           PATIENT:  GILLIAN MENDOAZ  [id number]    CHIEF COMPLAINT:  Patient is a 56y old  Male who presents with a chief complaint of CHF (2021 06:23)      INTERVAL HISTORY/OVERNIGHT EVENTS:  No acute events overnight. Patient denies any CP, SOB, headaches. He has no complaints at this time.     REVIEW OF SYSTEMS:    Constitutional:     [ ] negative [ ] fevers [ ] chills [ ] weight loss [ ] weight gain  HEENT:                  [ ] negative [ ] dry eyes [ ] eye irritation [ ] postnasal drip [ ] nasal congestion  CV:                         [ ] negative  [ ] chest pain [ ] orthopnea [ ] palpitations [ ] murmur  Resp:                     [ ] negative [ ] cough [ ] shortness of breath [ ] dyspnea [ ] wheezing [ ] sputum [ ] hemoptysis  GI:                          [ ] negative [ ] nausea [ ] vomiting [ ] diarrhea [ ] constipation [ ] abd pain [ ] dysphagia   :                        [ ] negative [ ] dysuria [ ] nocturia [ ] hematuria [ ] increased urinary frequency  Musculoskeletal: [ ] negative [ ] back pain [ ] myalgias [ ] arthralgias [ ] fracture  Skin:                       [ ] negative [ ] rash [ ] itch  Neurological:        [ ] negative [ ] headache [ ] dizziness [ ] syncope [ ] weakness [ ] numbness  Psychiatric:           [ ] negative [ ] anxiety [ ] depression  Endocrine:            [ ] negative [ ] diabetes [ ] thyroid problem  Heme/Lymph:      [ ] negative [ ] anemia [ ] bleeding problem  Allergic/Immune: [ ] negative [ ] itchy eyes [ ] nasal discharge [ ] hives [ ] angioedema    [ ] All other systems negative  [ ] Unable to assess ROS because ________.    MEDICATIONS:  MEDICATIONS  (STANDING):  aspirin  chewable 81 milliGRAM(s) Oral daily  atorvastatin 40 milliGRAM(s) Oral at bedtime  bisacodyl 5 milliGRAM(s) Oral at bedtime  brimonidine 0.2% Ophthalmic Solution 1 Drop(s) Both EYES two times a day  carvedilol 3.125 milliGRAM(s) Oral every 12 hours  chlorhexidine 4% Liquid 1 Application(s) Topical <User Schedule>  clopidogrel Tablet 75 milliGRAM(s) Oral daily  dorzolamide 2% Ophthalmic Solution 1 Drop(s) Both EYES three times a day  epoetin kinsey-epbx (RETACRIT) Injectable 48872 Unit(s) SubCutaneous every 7 days  gabapentin 100 milliGRAM(s) Oral two times a day  heparin   Injectable 5000 Unit(s) SubCutaneous every 8 hours  hydrALAZINE 25 milliGRAM(s) Oral every 8 hours  insulin lispro (ADMELOG) corrective regimen sliding scale   SubCutaneous Before meals and at bedtime  isosorbide   dinitrate Tablet (ISORDIL) 20 milliGRAM(s) Oral three times a day  latanoprost 0.005% Ophthalmic Solution 1 Drop(s) Both EYES at bedtime  polyethylene glycol 3350 17 Gram(s) Oral two times a day  sevelamer carbonate 800 milliGRAM(s) Oral three times a day with meals  timolol 0.5% Solution 1 Drop(s) Both EYES two times a day    MEDICATIONS  (PRN):      ALLERGIES:  Allergies    No Known Allergies    Intolerances        OBJECTIVE:  ICU Vital Signs Last 24 Hrs  T(C): 36.5 (2021 02:00), Max: 36.8 (2021 08:00)  T(F): 97.7 (2021 02:00), Max: 98.2 (2021 08:00)  HR: 60 (2021 06:00) (56 - 606)  BP: 157/92 (2021 06:00) (118/90 - 188/94)  BP(mean): 116 (2021 06:00) (98 - 147)  ABP: --  ABP(mean): --  RR: 18 (2021 02:00) (16 - 18)  SpO2: 97% (2021 04:00) (97% - 98%)      Adult Advanced Hemodynamics Last 24 Hrs  CVP(mm Hg): --  CVP(cm H2O): --  CO: --  CI: --  PA: --  PA(mean): --  PCWP: --  SVR: --  SVRI: --  PVR: --  PVRI: --  CAPILLARY BLOOD GLUCOSE      POCT Blood Glucose.: 196 mg/dL (2021 22:09)  POCT Blood Glucose.: 192 mg/dL (2021 16:51)    CAPILLARY BLOOD GLUCOSE      POCT Blood Glucose.: 196 mg/dL (2021 22:09)    I&O's Summary    2021 07:01  -  2021 07:00  --------------------------------------------------------  IN: 860 mL / OUT: 3050 mL / NET: -2190 mL      Daily     Daily Weight in k.7 (2021 06:00)    PHYSICAL EXAMINATION:  General: In no acute distress, resting in bed  HEENT: PERRLA, EOMI, moist mucous membranes  Neurology: A&Ox3, nonfocal,   Respiratory: CTA B/L, normal respiratory effort, no wheezes, crackles, rales  CV: S1S2, no murmurs, rubs or gallops, no JVD  Abdominal: Soft, nontender, non distended, bowel sounds present   Extremities: No edema      LABS:                          9.3    9.82  )-----------( 189      ( 2021 04:30 )             31.0         139  |  106  |  42<H>  ----------------------------<  200<H>  4.6   |  26  |  2.7<H>    Ca    8.3<L>      2021 04:30  Mg     2.0         TPro  5.3<L>  /  Alb  2.8<L>  /  TBili  0.5  /  DBili  x   /  AST  14  /  ALT  20  /  AlkPhos  168<H>  07    LIVER FUNCTIONS - ( 2021 04:30 )  Alb: 2.8 g/dL / Pro: 5.3 g/dL / ALK PHOS: 168 U/L / ALT: 20 U/L / AST: 14 U/L / GGT: x                       TELEMETRY:     EK Lead ECG:   Ventricular Rate 63 BPM    Atrial Rate 63 BPM    P-R Interval 190 ms    QRS Duration 128 ms    Q-T Interval 476 ms    QTC Calculation(Bazett) 487 ms    P Axis 39 degrees    R Axis 115 degrees    T Axis 49 degrees    Diagnosis Line Normal sinus rhythm    Right bundle branch block  Left posterior fascicular block  *** Bifascicular block ***  Left ventricular hypertrophy with repolarization abnormality  T wave abnormality, consider anterolateral ischemia  Abnormal ECG    Confirmed by Paz Malik MD ([id number]) on 2021 2:45:30 PM (21 @ 05:59)      IMAGING:  Echo:        LVSF:        EF:        RVSF:        LA:        RA:        Mitral Valve:        Aortic Valve:       Tricuspid Valve:        Pulmonic Valve:        Pericardium:     ASSESSMENT & PLAN:

## 2021-07-26 LAB
INTERPRETATION 24H UR IFE-IMP: SIGNIFICANT CHANGE UP
INTERPRETATION 24H UR IFE-IMP: SIGNIFICANT CHANGE UP

## 2021-07-28 PROBLEM — H40.9 UNSPECIFIED GLAUCOMA: Chronic | Status: ACTIVE | Noted: 2021-07-02

## 2021-07-28 PROBLEM — I10 ESSENTIAL (PRIMARY) HYPERTENSION: Chronic | Status: ACTIVE | Noted: 2021-07-02

## 2021-07-28 PROBLEM — E78.5 HYPERLIPIDEMIA, UNSPECIFIED: Chronic | Status: ACTIVE | Noted: 2021-07-02

## 2021-07-28 PROBLEM — E11.9 TYPE 2 DIABETES MELLITUS WITHOUT COMPLICATIONS: Chronic | Status: ACTIVE | Noted: 2021-07-02

## 2021-08-03 DIAGNOSIS — E87.5 HYPERKALEMIA: ICD-10-CM

## 2021-08-03 DIAGNOSIS — I50.23 ACUTE ON CHRONIC SYSTOLIC (CONGESTIVE) HEART FAILURE: ICD-10-CM

## 2021-08-03 DIAGNOSIS — I25.10 ATHEROSCLEROTIC HEART DISEASE OF NATIVE CORONARY ARTERY WITHOUT ANGINA PECTORIS: ICD-10-CM

## 2021-08-03 DIAGNOSIS — E11.69 TYPE 2 DIABETES MELLITUS WITH OTHER SPECIFIED COMPLICATION: ICD-10-CM

## 2021-08-03 DIAGNOSIS — E11.22 TYPE 2 DIABETES MELLITUS WITH DIABETIC CHRONIC KIDNEY DISEASE: ICD-10-CM

## 2021-08-03 DIAGNOSIS — I25.5 ISCHEMIC CARDIOMYOPATHY: ICD-10-CM

## 2021-08-03 DIAGNOSIS — R74.01 ELEVATION OF LEVELS OF LIVER TRANSAMINASE LEVELS: ICD-10-CM

## 2021-08-03 DIAGNOSIS — N17.9 ACUTE KIDNEY FAILURE, UNSPECIFIED: ICD-10-CM

## 2021-08-03 DIAGNOSIS — E11.319 TYPE 2 DIABETES MELLITUS WITH UNSPECIFIED DIABETIC RETINOPATHY WITHOUT MACULAR EDEMA: ICD-10-CM

## 2021-08-03 DIAGNOSIS — R00.1 BRADYCARDIA, UNSPECIFIED: ICD-10-CM

## 2021-08-03 DIAGNOSIS — R63.8 OTHER SYMPTOMS AND SIGNS CONCERNING FOOD AND FLUID INTAKE: ICD-10-CM

## 2021-08-03 DIAGNOSIS — R80.8 OTHER PROTEINURIA: ICD-10-CM

## 2021-08-03 DIAGNOSIS — E11.42 TYPE 2 DIABETES MELLITUS WITH DIABETIC POLYNEUROPATHY: ICD-10-CM

## 2021-08-03 DIAGNOSIS — F17.210 NICOTINE DEPENDENCE, CIGARETTES, UNCOMPLICATED: ICD-10-CM

## 2021-08-03 DIAGNOSIS — I95.9 HYPOTENSION, UNSPECIFIED: ICD-10-CM

## 2021-08-03 DIAGNOSIS — I13.0 HYPERTENSIVE HEART AND CHRONIC KIDNEY DISEASE WITH HEART FAILURE AND STAGE 1 THROUGH STAGE 4 CHRONIC KIDNEY DISEASE, OR UNSPECIFIED CHRONIC KIDNEY DISEASE: ICD-10-CM

## 2021-08-03 DIAGNOSIS — E88.09 OTHER DISORDERS OF PLASMA-PROTEIN METABOLISM, NOT ELSEWHERE CLASSIFIED: ICD-10-CM

## 2021-08-03 DIAGNOSIS — J98.11 ATELECTASIS: ICD-10-CM

## 2021-08-03 DIAGNOSIS — E11.649 TYPE 2 DIABETES MELLITUS WITH HYPOGLYCEMIA WITHOUT COMA: ICD-10-CM

## 2021-08-03 DIAGNOSIS — I50.9 HEART FAILURE, UNSPECIFIED: ICD-10-CM

## 2021-08-03 DIAGNOSIS — D63.1 ANEMIA IN CHRONIC KIDNEY DISEASE: ICD-10-CM

## 2021-08-03 DIAGNOSIS — I27.20 PULMONARY HYPERTENSION, UNSPECIFIED: ICD-10-CM

## 2021-08-03 DIAGNOSIS — N18.32 CHRONIC KIDNEY DISEASE, STAGE 3B: ICD-10-CM

## 2021-08-03 DIAGNOSIS — D50.9 IRON DEFICIENCY ANEMIA, UNSPECIFIED: ICD-10-CM

## 2021-08-03 DIAGNOSIS — N28.1 CYST OF KIDNEY, ACQUIRED: ICD-10-CM

## 2021-08-03 DIAGNOSIS — H54.8 LEGAL BLINDNESS, AS DEFINED IN USA: ICD-10-CM

## 2021-08-03 DIAGNOSIS — Z79.4 LONG TERM (CURRENT) USE OF INSULIN: ICD-10-CM

## 2021-08-03 DIAGNOSIS — I31.3 PERICARDIAL EFFUSION (NONINFLAMMATORY): ICD-10-CM

## 2021-08-03 DIAGNOSIS — E78.5 HYPERLIPIDEMIA, UNSPECIFIED: ICD-10-CM

## 2021-08-03 DIAGNOSIS — H40.9 UNSPECIFIED GLAUCOMA: ICD-10-CM

## 2021-08-06 DIAGNOSIS — Z91.89 OTHER SPECIFIED PERSONAL RISK FACTORS, NOT ELSEWHERE CLASSIFIED: ICD-10-CM

## 2021-08-06 PROCEDURE — 92928 PRQ TCAT PLMT NTRAC ST 1 LES: CPT | Mod: LD

## 2021-08-10 ENCOUNTER — RESULT CHARGE (OUTPATIENT)
Age: 56
End: 2021-08-10

## 2021-08-10 ENCOUNTER — APPOINTMENT (OUTPATIENT)
Dept: CARDIOLOGY | Facility: CLINIC | Age: 56
End: 2021-08-10
Payer: COMMERCIAL

## 2021-08-10 VITALS
DIASTOLIC BLOOD PRESSURE: 100 MMHG | OXYGEN SATURATION: 98 % | HEART RATE: 85 BPM | SYSTOLIC BLOOD PRESSURE: 170 MMHG | HEIGHT: 60 IN | BODY MASS INDEX: 28.07 KG/M2 | TEMPERATURE: 97.9 F | WEIGHT: 143 LBS

## 2021-08-10 DIAGNOSIS — Z00.00 ENCOUNTER FOR GENERAL ADULT MEDICAL EXAMINATION W/OUT ABNORMAL FINDINGS: ICD-10-CM

## 2021-08-10 PROCEDURE — 93000 ELECTROCARDIOGRAM COMPLETE: CPT

## 2021-08-10 PROCEDURE — 99214 OFFICE O/P EST MOD 30 MIN: CPT

## 2021-08-11 LAB
ANION GAP SERPL CALC-SCNC: 11 MMOL/L
BASOPHILS # BLD AUTO: 0.04 K/UL
BASOPHILS NFR BLD AUTO: 0.6 %
BUN SERPL-MCNC: 32 MG/DL
CALCIUM SERPL-MCNC: 8.4 MG/DL
CHLORIDE SERPL-SCNC: 107 MMOL/L
CO2 SERPL-SCNC: 23 MMOL/L
CREAT SERPL-MCNC: 2.5 MG/DL
EOSINOPHIL # BLD AUTO: 0.56 K/UL
EOSINOPHIL NFR BLD AUTO: 8.5 %
FERRITIN SERPL-MCNC: 348 NG/ML
GLUCOSE SERPL-MCNC: 100 MG/DL
HCT VFR BLD CALC: 32.9 %
HGB BLD-MCNC: 9.7 G/DL
IMM GRANULOCYTES NFR BLD AUTO: 0.5 %
IRON SATN MFR SERPL: 25 %
IRON SERPL-MCNC: 52 UG/DL
LYMPHOCYTES # BLD AUTO: 1.13 K/UL
LYMPHOCYTES NFR BLD AUTO: 17.3 %
MAN DIFF?: NORMAL
MCHC RBC-ENTMCNC: 24.1 PG
MCHC RBC-ENTMCNC: 29.5 G/DL
MCV RBC AUTO: 81.6 FL
MONOCYTES # BLD AUTO: 0.52 K/UL
MONOCYTES NFR BLD AUTO: 7.9 %
NEUTROPHILS # BLD AUTO: 4.27 K/UL
NEUTROPHILS NFR BLD AUTO: 65.2 %
PLATELET # BLD AUTO: 165 K/UL
POTASSIUM SERPL-SCNC: 4.6 MMOL/L
RBC # BLD: 4.03 M/UL
RBC # FLD: 14.2 %
SODIUM SERPL-SCNC: 141 MMOL/L
TIBC SERPL-MCNC: 209 UG/DL
UIBC SERPL-MCNC: 157 UG/DL
WBC # FLD AUTO: 6.55 K/UL

## 2021-08-16 NOTE — HISTORY OF PRESENT ILLNESS
[FreeTextEntry1] : 57 y/o male  w/ PMHx HTN, HLD, CKD, T2DM on insulin, Glaucoma, recent hospitalization for SANABRIA, pedal edema. He was found to have severely decreased LV systolic function. Ischemic work up showed triple vessel disease. Hospital course complicated by LENNY with creatinine rising to 3.9. Heart team discussing held and determined percutaneous approach was the safest option to avoid HD at all cost. Patient underwent intervention of  LAD with plan to do staged PCI of LCx as outpatient. \par \par Patient is here for post discharge follow up. He reports SOB but no significant limitation on his activity tolerance. He reports being compliant with meds but is not fully compliant with low sodium diet. \par

## 2021-08-24 LAB
ANION GAP SERPL CALC-SCNC: 12 MMOL/L
BUN SERPL-MCNC: 58 MG/DL
CALCIUM SERPL-MCNC: 8.2 MG/DL
CHLORIDE SERPL-SCNC: 108 MMOL/L
CO2 SERPL-SCNC: 25 MMOL/L
CREAT SERPL-MCNC: 3.3 MG/DL
GLUCOSE SERPL-MCNC: 123 MG/DL
MAGNESIUM SERPL-MCNC: 2.3 MG/DL
POTASSIUM SERPL-SCNC: 4.5 MMOL/L
SODIUM SERPL-SCNC: 145 MMOL/L

## 2021-08-27 ENCOUNTER — RESULT CHARGE (OUTPATIENT)
Age: 56
End: 2021-08-27

## 2021-08-27 ENCOUNTER — APPOINTMENT (OUTPATIENT)
Dept: CARDIOLOGY | Facility: CLINIC | Age: 56
End: 2021-08-27
Payer: COMMERCIAL

## 2021-08-27 VITALS
RESPIRATION RATE: 16 BRPM | HEIGHT: 63 IN | TEMPERATURE: 97.5 F | SYSTOLIC BLOOD PRESSURE: 130 MMHG | WEIGHT: 147 LBS | HEART RATE: 68 BPM | BODY MASS INDEX: 26.05 KG/M2 | DIASTOLIC BLOOD PRESSURE: 80 MMHG | OXYGEN SATURATION: 97 %

## 2021-08-27 DIAGNOSIS — I50.22 CHRONIC SYSTOLIC (CONGESTIVE) HEART FAILURE: ICD-10-CM

## 2021-08-27 PROCEDURE — 93000 ELECTROCARDIOGRAM COMPLETE: CPT

## 2021-08-27 PROCEDURE — 99214 OFFICE O/P EST MOD 30 MIN: CPT

## 2021-08-27 RX ORDER — ISOSORBIDE DINITRATE 20 MG/1
20 TABLET ORAL
Qty: 2160 | Refills: 0 | Status: DISCONTINUED | COMMUNITY
Start: 2021-08-10 | End: 2021-08-27

## 2021-08-27 NOTE — HISTORY OF PRESENT ILLNESS
[FreeTextEntry1] : 55 y/o male  w/ PMHx HTN, HLD, CKD, T2DM on insulin, Glaucoma, recent hospitalization for [place], pedal edema. He was found to have severely decreased LV systolic function. Ischemic work up showed triple vessel disease. Hospital course complicated by LENNY with creatinine rising to 3.9. Heart team discussion held and determined percutaneous approach was the safest option to avoid HD at all cost, which was the patient wish. He underwent intervention of  LAD with plan to do staged PCI of LCx as outpatient.\par \par Patient reports being active, no significant limitation to exercise. He denies any LOC, CP, N/V.

## 2021-09-29 ENCOUNTER — INPATIENT (INPATIENT)
Facility: HOSPITAL | Age: 56
LOS: 7 days | Discharge: HOME HEALTH | End: 2021-10-07
Attending: HOSPITALIST | Admitting: HOSPITALIST
Payer: MEDICAID

## 2021-09-29 VITALS
RESPIRATION RATE: 16 BRPM | WEIGHT: 145.06 LBS | SYSTOLIC BLOOD PRESSURE: 177 MMHG | HEIGHT: 63 IN | TEMPERATURE: 98 F | HEART RATE: 82 BPM | DIASTOLIC BLOOD PRESSURE: 88 MMHG | OXYGEN SATURATION: 98 %

## 2021-09-29 LAB
ALBUMIN SERPL ELPH-MCNC: 3.5 G/DL — SIGNIFICANT CHANGE UP (ref 3.5–5.2)
ALP SERPL-CCNC: 126 U/L — HIGH (ref 30–115)
ALT FLD-CCNC: 20 U/L — SIGNIFICANT CHANGE UP (ref 0–41)
ANION GAP SERPL CALC-SCNC: 12 MMOL/L — SIGNIFICANT CHANGE UP (ref 7–14)
APTT BLD: 32.1 SEC — SIGNIFICANT CHANGE UP (ref 27–39.2)
AST SERPL-CCNC: 22 U/L — SIGNIFICANT CHANGE UP (ref 0–41)
BASOPHILS # BLD AUTO: 0.02 K/UL — SIGNIFICANT CHANGE UP (ref 0–0.2)
BASOPHILS NFR BLD AUTO: 0.2 % — SIGNIFICANT CHANGE UP (ref 0–1)
BILIRUB SERPL-MCNC: 0.4 MG/DL — SIGNIFICANT CHANGE UP (ref 0.2–1.2)
BUN SERPL-MCNC: 44 MG/DL — HIGH (ref 10–20)
CALCIUM SERPL-MCNC: 8.5 MG/DL — SIGNIFICANT CHANGE UP (ref 8.5–10.1)
CHLORIDE SERPL-SCNC: 105 MMOL/L — SIGNIFICANT CHANGE UP (ref 98–110)
CK MB CFR SERPL CALC: 7 NG/ML — HIGH (ref 0.6–6.3)
CO2 SERPL-SCNC: 19 MMOL/L — SIGNIFICANT CHANGE UP (ref 17–32)
CREAT SERPL-MCNC: 2.7 MG/DL — HIGH (ref 0.7–1.5)
EOSINOPHIL # BLD AUTO: 0.4 K/UL — SIGNIFICANT CHANGE UP (ref 0–0.7)
EOSINOPHIL NFR BLD AUTO: 3.7 % — SIGNIFICANT CHANGE UP (ref 0–8)
GLUCOSE SERPL-MCNC: 84 MG/DL — SIGNIFICANT CHANGE UP (ref 70–99)
HCT VFR BLD CALC: 34.9 % — LOW (ref 42–52)
HGB BLD-MCNC: 10.7 G/DL — LOW (ref 14–18)
IMM GRANULOCYTES NFR BLD AUTO: 0.3 % — SIGNIFICANT CHANGE UP (ref 0.1–0.3)
INR BLD: 0.95 RATIO — SIGNIFICANT CHANGE UP (ref 0.65–1.3)
IRON SATN MFR SERPL: 26 % — SIGNIFICANT CHANGE UP (ref 15–50)
IRON SATN MFR SERPL: 59 UG/DL — SIGNIFICANT CHANGE UP (ref 35–150)
LYMPHOCYTES # BLD AUTO: 0.99 K/UL — LOW (ref 1.2–3.4)
LYMPHOCYTES # BLD AUTO: 9.2 % — LOW (ref 20.5–51.1)
MAGNESIUM SERPL-MCNC: 2.3 MG/DL — SIGNIFICANT CHANGE UP (ref 1.8–2.4)
MCHC RBC-ENTMCNC: 23.6 PG — LOW (ref 27–31)
MCHC RBC-ENTMCNC: 30.7 G/DL — LOW (ref 32–37)
MCV RBC AUTO: 76.9 FL — LOW (ref 80–94)
MONOCYTES # BLD AUTO: 0.7 K/UL — HIGH (ref 0.1–0.6)
MONOCYTES NFR BLD AUTO: 6.5 % — SIGNIFICANT CHANGE UP (ref 1.7–9.3)
NEUTROPHILS # BLD AUTO: 8.59 K/UL — HIGH (ref 1.4–6.5)
NEUTROPHILS NFR BLD AUTO: 80.1 % — HIGH (ref 42.2–75.2)
NRBC # BLD: 0 /100 WBCS — SIGNIFICANT CHANGE UP (ref 0–0)
NT-PROBNP SERPL-SCNC: HIGH PG/ML (ref 0–300)
PLATELET # BLD AUTO: 222 K/UL — SIGNIFICANT CHANGE UP (ref 130–400)
POTASSIUM SERPL-MCNC: 4.4 MMOL/L — SIGNIFICANT CHANGE UP (ref 3.5–5)
POTASSIUM SERPL-SCNC: 4.4 MMOL/L — SIGNIFICANT CHANGE UP (ref 3.5–5)
PROT SERPL-MCNC: 6.5 G/DL — SIGNIFICANT CHANGE UP (ref 6–8)
PROTHROM AB SERPL-ACNC: 10.9 SEC — SIGNIFICANT CHANGE UP (ref 9.95–12.87)
RBC # BLD: 4.54 M/UL — LOW (ref 4.7–6.1)
RBC # FLD: 13.6 % — SIGNIFICANT CHANGE UP (ref 11.5–14.5)
SARS-COV-2 RNA SPEC QL NAA+PROBE: SIGNIFICANT CHANGE UP
SODIUM SERPL-SCNC: 136 MMOL/L — SIGNIFICANT CHANGE UP (ref 135–146)
TIBC SERPL-MCNC: 229 UG/DL — SIGNIFICANT CHANGE UP (ref 220–430)
TROPONIN T SERPL-MCNC: 0.12 NG/ML — CRITICAL HIGH
TROPONIN T SERPL-MCNC: 0.12 NG/ML — CRITICAL HIGH
UIBC SERPL-MCNC: 170 UG/DL — SIGNIFICANT CHANGE UP (ref 110–370)
WBC # BLD: 10.73 K/UL — SIGNIFICANT CHANGE UP (ref 4.8–10.8)
WBC # FLD AUTO: 10.73 K/UL — SIGNIFICANT CHANGE UP (ref 4.8–10.8)

## 2021-09-29 PROCEDURE — 99285 EMERGENCY DEPT VISIT HI MDM: CPT

## 2021-09-29 PROCEDURE — 71045 X-RAY EXAM CHEST 1 VIEW: CPT | Mod: 26

## 2021-09-29 PROCEDURE — 99222 1ST HOSP IP/OBS MODERATE 55: CPT

## 2021-09-29 PROCEDURE — 93010 ELECTROCARDIOGRAM REPORT: CPT

## 2021-09-29 RX ORDER — CLOPIDOGREL BISULFATE 75 MG/1
75 TABLET, FILM COATED ORAL DAILY
Refills: 0 | Status: DISCONTINUED | OUTPATIENT
Start: 2021-09-29 | End: 2021-10-07

## 2021-09-29 RX ORDER — CARVEDILOL PHOSPHATE 80 MG/1
12.5 CAPSULE, EXTENDED RELEASE ORAL EVERY 12 HOURS
Refills: 0 | Status: DISCONTINUED | OUTPATIENT
Start: 2021-09-29 | End: 2021-10-07

## 2021-09-29 RX ORDER — ATORVASTATIN CALCIUM 80 MG/1
40 TABLET, FILM COATED ORAL DAILY
Refills: 0 | Status: DISCONTINUED | OUTPATIENT
Start: 2021-09-29 | End: 2021-09-30

## 2021-09-29 RX ORDER — SEVELAMER CARBONATE 2400 MG/1
800 POWDER, FOR SUSPENSION ORAL THREE TIMES A DAY
Refills: 0 | Status: DISCONTINUED | OUTPATIENT
Start: 2021-09-29 | End: 2021-10-07

## 2021-09-29 RX ORDER — GABAPENTIN 400 MG/1
100 CAPSULE ORAL
Refills: 0 | Status: DISCONTINUED | OUTPATIENT
Start: 2021-09-29 | End: 2021-10-07

## 2021-09-29 RX ORDER — ISOSORBIDE DINITRATE 5 MG/1
20 TABLET ORAL THREE TIMES A DAY
Refills: 0 | Status: DISCONTINUED | OUTPATIENT
Start: 2021-09-29 | End: 2021-09-30

## 2021-09-29 RX ORDER — CARVEDILOL PHOSPHATE 80 MG/1
3.12 CAPSULE, EXTENDED RELEASE ORAL EVERY 12 HOURS
Refills: 0 | Status: DISCONTINUED | OUTPATIENT
Start: 2021-09-29 | End: 2021-09-29

## 2021-09-29 RX ORDER — GABAPENTIN 400 MG/1
100 CAPSULE ORAL ONCE
Refills: 0 | Status: COMPLETED | OUTPATIENT
Start: 2021-09-29 | End: 2021-09-29

## 2021-09-29 RX ORDER — ASPIRIN/CALCIUM CARB/MAGNESIUM 324 MG
81 TABLET ORAL DAILY
Refills: 0 | Status: DISCONTINUED | OUTPATIENT
Start: 2021-09-29 | End: 2021-10-07

## 2021-09-29 RX ORDER — HEPARIN SODIUM 5000 [USP'U]/ML
5000 INJECTION INTRAVENOUS; SUBCUTANEOUS EVERY 12 HOURS
Refills: 0 | Status: DISCONTINUED | OUTPATIENT
Start: 2021-09-29 | End: 2021-10-07

## 2021-09-29 RX ORDER — HYDRALAZINE HCL 50 MG
50 TABLET ORAL THREE TIMES A DAY
Refills: 0 | Status: DISCONTINUED | OUTPATIENT
Start: 2021-09-29 | End: 2021-09-30

## 2021-09-29 RX ORDER — SODIUM CHLORIDE 9 MG/ML
1000 INJECTION INTRAMUSCULAR; INTRAVENOUS; SUBCUTANEOUS
Refills: 0 | Status: DISCONTINUED | OUTPATIENT
Start: 2021-09-29 | End: 2021-09-29

## 2021-09-29 RX ORDER — SODIUM CHLORIDE 9 MG/ML
1000 INJECTION INTRAMUSCULAR; INTRAVENOUS; SUBCUTANEOUS
Refills: 0 | Status: DISCONTINUED | OUTPATIENT
Start: 2021-09-29 | End: 2021-09-30

## 2021-09-29 RX ADMIN — GABAPENTIN 100 MILLIGRAM(S): 400 CAPSULE ORAL at 16:06

## 2021-09-29 RX ADMIN — SEVELAMER CARBONATE 800 MILLIGRAM(S): 2400 POWDER, FOR SUSPENSION ORAL at 22:21

## 2021-09-29 RX ADMIN — Medication 50 MILLIGRAM(S): at 22:20

## 2021-09-29 NOTE — H&P ADULT - ATTENDING COMMENTS
HPI:  56 year old gentleman with a PMHx of  HTN, HLD, CKD IV, T2DM on insulin a1c 5.8%, Glaucoma, and CAD s/p PCI in July 2021 admitted for chest pain. The patient presented to the ED with complaint of left sided chest pain and difficulty sleeping 2/2 to chest pain and BL LE neuropathy (chronic). He described the chest pain as burning in nature.   He was seen by cardio with plans for cath in the morning .  Trop negative, and CP currently resolved.      REVIEW OF SYSTEMS:  CONSTITUTIONAL:  No weakness, fevers, chills, night sweats, weight loss  EYES/ENT: No visual changes. No vertigo or dysphagia  NECK: No neck pain or stiffness  RESPIRATORY: No cough, wheezing, hemoptysis. No shortness of breath  CARDIOVASCULAR: +chest pain, no palpitations. No lower extremity edema  GASTROINTESTINAL: No abdominal pain. No nausea, vomiting, diarrhea, or hematemesis  GENITOURINARY: No dysuria or hematuria   NEUROLOGICAL: No focal numbness or weakness  SKIN: No rashes or itching  HEMATOLOGIC: No easy bruising or prolonged bleeding.      PHYSICAL EXAM:  GENERAL: NAD, well-developed, Non-toxic, stated age   HEAD:  Atraumatic, Normocephalic  EYES: EOMI, Sclera White   NECK: Supple, No JVD  CHEST/LUNG: Clear to auscultation bilaterally; No wheezing, rhonchi, or crackles  HEART: Regular rate and rhythm; s1, s2, No murmurs, rubs, or gallops  ABDOMEN: Soft, Nontender, Nondistended; Bowel sounds present, No rebound or guarding noted   EXTREMITIES:  No lower extremity edema or calf tenderness to palpation.  No clubbing or cyanosis  PSYCH: AAOx3, pleasant, cooperative, not anxious  NEUROLOGY: 5/5 strength in all extremities, no downward drift. Sensation grossly intact.   SKIN: No rashes or lesions      ASSESSMENT AND PLAN:  Atypical chest pain: r/o ACS   CAD S/p PCI, 3V disease   HTN / HLD   HFw/REF - euvolemic   -Cont with tele monitoring  -Cardio following,: planned for cath tomorrow  -Trend trops, Check 2D Echo, AM EKG  -On ASA 81mg, plavix,  Atorvastatin, hydralazine 100mg TID, carvedilol 12.5  -COnt NS at 75cc/hr     Microcytosis anemia: no evidence of bleeding   -F/u iron studies     Diabetes: Basal bolus insulin, keep fingerstick glucose <180.     CKD IV: Cr at baseline  -Trend Cr, monitor for ROSENDO following cath   -Cont NS at 75CC/hr       My note supersedes the residents in the event of a discrepancy.

## 2021-09-29 NOTE — ED ADULT NURSE NOTE - OBJECTIVE STATEMENT
Pt c/o generalized pain because "I ran out of my gabapentin". Pt states chest pain and abdominal pain is more a "discomfort" and admits not taking medication regularly.

## 2021-09-29 NOTE — H&P ADULT - NSHPREVIEWOFSYSTEMS_GEN_ALL_CORE
CONSTITUTIONAL: No weakness, fevers or chills; No headaches  EYES: No visual changes, eye pain, or discharge  ENT: No vertigo; No ear pain or change in hearing; No sore throat or difficulty swallowing  NECK: No pain or stiffness  RESPIRATORY: No cough, wheezing, or hemoptysis; No shortness of breath  CARDIOVASCULAR: No chest pain or palpitations  GASTROINTESTINAL: No abdominal or epigastric pain; No nausea, vomiting, or hematemesis; No diarrhea or constipation; No melena or hematochezia  GENITOURINARY: No dysuria, frequency or hematuria  MUSCULOSKELETAL: No joint pain, no muscle pain, no weakness  NEUROLOGICAL: No numbness or weakness  SKIN: No itching or rashes CONSTITUTIONAL: No weakness, fevers or chills; No headaches  EYES: No visual changes, eye pain, or discharge  ENT: No vertigo; No ear pain or change in hearing; No sore throat or difficulty swallowing  NECK: No pain or stiffness  RESPIRATORY: No cough, wheezing, or hemoptysis; No shortness of breath  CARDIOVASCULAR: + burning chest pain   GASTROINTESTINAL: No abdominal or epigastric pain; No nausea, vomiting, or hematemesis; No diarrhea or constipation; No melena or hematochezia  GENITOURINARY: No dysuria, frequency or hematuria  MUSCULOSKELETAL: No joint pain, no muscle pain, no weakness  NEUROLOGICAL: + neuropathy of BL feet   SKIN: No itching or rashes

## 2021-09-29 NOTE — CONSULT NOTE ADULT - ATTENDING COMMENTS
pt seen and examined  know severe 3v cad and icm  s/p pci of lad with residual severe lcx and rca ds  repeat echo in am  gentle hydration overnight - NS 75 cc/hr  cath ludivina  increase bb to 12.5 bid  cont rest of meds  will follow

## 2021-09-29 NOTE — ED ADULT TRIAGE NOTE - CHIEF COMPLAINT QUOTE
Pt c/o left sided chest pain since last night, BP has been running high for several days. Pt states he is in need of another stent. Pt took 4 baby aspirin w/ EMS.

## 2021-09-29 NOTE — ED PROVIDER NOTE - ATTENDING CONTRIBUTION TO CARE
55 yo m with pmh of cad with stent, dm, htn, hld presents with c/o chest pain and feet tingling.  pt denies diaphoresis, n/v, leg swelling or pain.  no sob, no abd pain.  admits has ran out of his gabapentin and has been having feet tingling since then.  exam: nad, ncat, perrl, eomi, mmm, rrr, ctab, abd soft, nt,nd aox3, no pedal edema imp: pt with cad with chest pain, ekg, cxr, labs, will likely need admission to tele

## 2021-09-29 NOTE — CONSULT NOTE ADULT - SUBJECTIVE AND OBJECTIVE BOX
HPI:  56M w/ PMHx HTN, HLD, CKD IV, T2DM on insulin a1c 5.8% on 7/3/2021, Glaucoma presented to the Ed c/o L sided chest discomfort and difficulty sleeping last night. Pt states chest pain is like the one he had before, feels more like discomfort, located on the left side, 5/10 in intensity, started last night and currently present, nonradiating, he does not recognize and aggravating or alleviating factors. He was admitted to the hospital with CCU course 7/1 - 7/25 for management of acute HFrEF and worsening kidney function. BNP 49496 on admission. Cath 7/22/2021 showed significant 3 V disease, LAD, RCA, LCx. CTS was consulted. During preop cardiac MRI patient became SOB. He received bumex and hypertonic saline for diuresis. CABG was decided against because of increased risk and the risk of ending in HD might be higher if going for CABG than percutaneous approach to LAD lesion with staged PCI to LCx. Patient is s/p LHC on 7/22/2021. He was supposed to go for a staged PCI to LCX in 4 - 6 weeks.  He was discharged on Torsemide, DAPT, hydralazine, carvedilol and isosorbide. He saw Dr. Cantu outpatient.   At the time of my interview patient complained of weakness in his arms, bedside FS showed BG of 58, pt given some juice. He states he took 20U of insulin last night.       PAST MEDICAL & SURGICAL HISTORY  HTN (hypertension)    HLD (hyperlipidemia)    DM (diabetes mellitus)    Glaucoma    No significant past surgical history        FAMILY HISTORY:  FAMILY HISTORY:  No pertinent family history in first degree relatives        SOCIAL HISTORY:  [x]smoker - smokes 1-2 cigs a day  [x]Alcohol - drinks alcohol occasionally, last drink was 1 beer last night  []Drug    ALLERGIES:  No Known Allergies      MEDICATIONS:  MEDICATIONS  (STANDING):  gabapentin 100 milliGRAM(s) Oral Once    MEDICATIONS  (PRN):      HOME MEDICATIONS:  Home Medications:  aspirin 81 mg oral tablet, chewable: 1 tab(s) orally once a day (25 Jul 2021 17:23)  atorvastatin 40 mg oral tablet: 1 tab(s) orally once a day (02 Jul 2021 04:46)  BASAGLAR 100 UNIT/ML KWIKPEN:  (02 Jul 2021 04:46)  brimonidine 0.2% ophthalmic solution: 1 drop(s) to each affected eye 2 times a day (02 Jul 2021 04:46)  carvedilol 3.125 mg oral tablet: 1 tab(s) orally every 12 hours (25 Jul 2021 17:23)  clopidogrel 75 mg oral tablet: 1 tab(s) orally once a day (25 Jul 2021 17:23)  dorzolamide-timolol 2%-0.5% preservative-free ophthalmic solution: 1 drop(s) to each affected eye 2 times a day (02 Jul 2021 04:46)  latanoprost 0.005% ophthalmic solution: 1 drop(s) to each affected eye once a day (at bedtime) (25 Jul 2021 17:23)  pioglitazone-metformin 15 mg-850 mg oral tablet: 1 tab(s) orally 2 times a day (02 Jul 2021 04:46)  Rhopressa 0.02% ophthalmic solution: 1 drop(s) to each affected eye once a day (in the evening) (02 Jul 2021 04:46)  sevelamer carbonate 800 mg oral tablet: 1 tab(s) orally 3 times a day (with meals) (25 Jul 2021 17:23)      VITALS:   T(F): 98.4 (09-29 @ 11:20), Max: 98.4 (09-29 @ 11:20)  HR: 82 (09-29 @ 11:20) (82 - 82)  BP: 177/88 (09-29 @ 11:20) (177/88 - 177/88)  BP(mean): --  RR: 16 (09-29 @ 11:20) (16 - 16)  SpO2: 98% (09-29 @ 11:20) (98% - 98%)    I&O's Summary      REVIEW OF SYSTEMS:  CONSTITUTIONAL: No weakness, fevers or chills  EYES: No visual changes  ENT: No vertigo or throat pain   NECK: No pain or stiffness  RESPIRATORY: No cough, wheezing, hemoptysis; No shortness of breath  CARDIOVASCULAR: see above  GASTROINTESTINAL: No abdominal or epigastric pain. No nausea, vomiting, or hematemesis; No diarrhea or constipation. No melena or hematochezia.  GENITOURINARY: No dysuria, frequency or hematuria  NEUROLOGICAL: No numbness or weakness  SKIN: No itching, no rashes  MSK: no    PHYSICAL EXAM:  NEURO: patient is awake , alert and oriented  GEN: Not in acute distress  NECK: no thyroid enlargement, no JVD  LUNGS: Clear to auscultation bilaterally   CARDIOVASCULAR: S1/S2 present, RRR , no murmurs or rubs, no carotid bruits,    ABD: Soft, non-tender, non-distended, +BS  EXT: No COCO  SKIN: Intact    LABS:                        10.7   10.73 )-----------( 222      ( 29 Sep 2021 12:51 )             34.9     09-29    136  |  105  |  44<H>  ----------------------------<  84  4.4   |  19  |  2.7<H>    Ca    8.5      29 Sep 2021 12:51  Mg     2.3     09-29    TPro  6.5  /  Alb  3.5  /  TBili  0.4  /  DBili  x   /  AST  22  /  ALT  20  /  AlkPhos  126<H>  09-29    PT/INR - ( 29 Sep 2021 12:51 )   PT: 10.90 sec;   INR: 0.95 ratio         PTT - ( 29 Sep 2021 12:51 )  PTT:32.1 sec  Troponin T, Serum: 0.12 ng/mL *HH* (09-29-21 @ 12:51)    CARDIAC MARKERS ( 29 Sep 2021 12:51 )  x     / 0.12 ng/mL / x     / x     / x            Troponin trend:     Serum Pro-Brain Natriuretic Peptide: 20870 pg/mL (09-29-21 @ 12:51)          RADIOLOGY:  -CXR: < from: Xray Chest 1 View- PORTABLE-Urgent (Xray Chest 1 View- PORTABLE-Urgent .) (09.29.21 @ 12:42) >  IMPRESSION:    No radiographic evidence of acute cardiopulmonary disease. stable cardiomegaly     < end of copied text >    -TTE: < from: TTE Echo Complete w/o Contrast w/ Doppler (07.19.21 @ 10:46) >  Summary:   1. Left ventricular ejection fraction, by visual estimation, is 40 to 45%.   2. Mildly to moderately decreased global left ventricular systolic function.   3. Basal and mid anterolateral wall, basal and mid inferior wall, and basal and mid inferolateral wall are abnormal as described above.   4. Spectral Doppler shows pseudonormal pattern of left ventricular myocardial filling (Grade II diastolic dysfunction).   5. Mildly enlarged left atrium.   6. Mildly enlarged right atrium.   7. Mild mitral regurgitation.   8. Moderate tricuspid regurgitation.   9. Small circumferential pericardial effusion.  10. No evidence of tamponade.    < end of copied text >    -CCTA:   -STRESS TEST:   -CATHETERIZATION:  LEFT HEART CATHETERIZATION  Left main: Mild disease  LAD: 90% mid LAD stenosis  Diag1: small vessel. Severe disease  Diag2: Medium size. 80% lesion  Left Circumflex: 90% lesion proximal segment. 80% lesion distal segment  OM1: severe disease proximal segment  OM2: severe disease (bifurcation with the LCx)  Right Coronary Artery: 100%  proximal segment  RPDA: receiving collaterals from LAD    INTERVENTION  SPECIMEN REMOVED: Not applicable  IMPLANTS: none    POST-OP DIAGNOSIS:  Significant 3V disease with SYNTAX score of 40FINDINGS    LVEDP-   severely elevated                           CORONARIES:    LAD- 90% discrete stenosis in proximal LAD        PROCEDURE SUMMARY  Significant 3 vessel CAD - deemed high risk for CTS  Successful low contrast PCI of proximal LAD with SAMMY x 1 (Synergy XD 4.0 x 16) (AUC score 7).     ECG:  < from: 12 Lead ECG (09.29.21 @ 11:22) >  Ventricular Rate 80 BPM    Atrial Rate 80 BPM    P-R Interval 144 ms    QRS Duration 118 ms    Q-T Interval 400 ms    QTC Calculation(Bazett) 461 ms    P Axis 70 degrees    R Axis 134 degrees    T Axis 46 degrees    Diagnosis Line Normal sinus rhythm  Right bundle branch block  Left posterior fascicular block  *** Bifascicular block ***  Cannot rule out Inferior infarct , age undetermined  Abnormal ECG    < end of copied text >    TELEMETRY EVENTS:

## 2021-09-29 NOTE — H&P ADULT - NSHPPHYSICALEXAM_GEN_ALL_CORE
CONSTITUTIONAL: No acute distress, well-developed, well-groomed, AAOx3  HEAD: Atraumatic, normocephalic  EYES: EOM intact, PERRLA, conjunctiva and sclera clear  ENT: Supple, no masses, no thyromegaly, no bruits, no JVD; moist mucous membranes  PULMONARY: Clear to auscultation bilaterally; no wheezes, rales, or rhonchi  CARDIOVASCULAR: Regular rate and rhythm; no murmurs, rubs, or gallops  GASTROINTESTINAL: Soft, non-tender, non-distended; bowel sounds present  MUSCULOSKELETAL: 2+ peripheral pulses; no clubbing, no cyanosis, no edema  NEUROLOGY: non-focal  SKIN: No rashes or lesions; warm and dry CONSTITUTIONAL: No acute distress, well-developed, well-groomed, AAOx3  HEAD: Atraumatic, normocephalic  EYES: EOM intact, PERRLA, conjunctiva and sclera clear  PULMONARY: Clear to auscultation bilaterally  CARDIOVASCULAR: Regular rate and rhythm  GASTROINTESTINAL: Soft, non-tender, non-distended  MUSCULOSKELETAL:  trace pitting edema  NEUROLOGY: non-focal  SKIN: No rashes or lesions

## 2021-09-29 NOTE — H&P ADULT - HISTORY OF PRESENT ILLNESS
Patient is a 56 year old male with PMHx of  HTN, HLD, CKD IV, T2DM on insulin a1c 5.8% on 7/3/2021, Glaucoma, and CAD s/p PCI in July 2021. The patient presented to the ED with complaint of left sided chest pain and difficulty sleeping 2/2 to  chest pain and BL LE neuropathy. The patient states that last night he woke up and from sleep due to burning substernal chest pain, non radiating, no exacerbation or alleviating factors. Of not the patient had recent PCI in July 2021. On this admission patient was admitted to CCU for acute HFrEF failure and worsening kidney function. Cath on 7/22/21 showed significant 3 V disease, LAD, RCA, LCx. CTS was consulted. During preop cardiac MRI patient became SOB. He received bumex and hypertonic saline for diuresis. CABG was decided against because of increased risk and the risk of ending in HD might be higher if going for CABG than percutaneous approach to LAD lesion with staged PCI to LCx. The patient was discharged on DAPT and follows with Dr. Cantu.     In the ED, patient is hemodynamically stable with elevated troponin 0.12, microcytic anemia, elevated BNP 18789. The patient was evaluated in the ED by cardiology and is planed for cardiac cath on 9/30.  Patient is a 56 year old male with PMHx of  HTN, HLD, CKD IV, T2DM on insulin a1c 5.8%, Glaucoma, and CAD s/p PCI in July 2021. The patient presented to the ED with complaint of left sided chest pain and difficulty sleeping 2/2 to chest pain and BL LE neuropathy (chronic). The patient states that last night he woke up and from sleep due to burning substernal chest pain, non radiating, no exacerbation or alleviating factors. Of notr the patient had recent PCI in July 2021. During that admission patient had a prolonged stay in CCU, was treated for acute CHF exacerbation and worsening kidney function. Cath on 7/22/21 showed significant 3 vessle disease, LAD, RCA, LCx. CTS was consulted. During preop cardiac MRI patient became SOB. He received bumex and hypertonic saline for diuresis.  CABG was decided against due to increased risk of progression of CKDIV to ESRD.  The patient was discharged on DAPT and follows with Dr. Cantu as his cardiologist    In the ED, patient is hemodynamically stable with elevated troponin 0.12, microcytic anemia, elevated BNP 67385. The patient was evaluated in the ED by cardiology and is planed for cardiac cath on 9/30.

## 2021-09-29 NOTE — ED PROVIDER NOTE - OBJECTIVE STATEMENT
55 yo male, pmh of htn hld, dm, presents to ed for cp, several days, mild, aching, no radiation. denies fever, chills, sob, le swelling, abd pain, nvd.

## 2021-09-29 NOTE — H&P ADULT - NSHPLABSRESULTS_GEN_ALL_CORE
VITAL SIGNS: Last 24 Hours  T(C): 36.9 (29 Sep 2021 11:20), Max: 36.9 (29 Sep 2021 11:20)  T(F): 98.4 (29 Sep 2021 11:20), Max: 98.4 (29 Sep 2021 11:20)  HR: 82 (29 Sep 2021 11:20) (82 - 82)  BP: 170/83 (29 Sep 2021 16:25) (170/83 - 177/88)  BP(mean): --  RR: 16 (29 Sep 2021 11:20) (16 - 16)  SpO2: 98% (29 Sep 2021 11:20) (98% - 98%)    LABS:                        10.7   10.73 )-----------( 222      ( 29 Sep 2021 12:51 )             34.9     09-29    136  |  105  |  44<H>  ----------------------------<  84  4.4   |  19  |  2.7<H>    Ca    8.5      29 Sep 2021 12:51  Mg     2.3     09-29    TPro  6.5  /  Alb  3.5  /  TBili  0.4  /  DBili  x   /  AST  22  /  ALT  20  /  AlkPhos  126<H>  09-29    PT/INR - ( 29 Sep 2021 12:51 )   PT: 10.90 sec;   INR: 0.95 ratio         PTT - ( 29 Sep 2021 12:51 )  PTT:32.1 sec      Troponin T, Serum: 0.12 ng/mL *HH* (09-29-21 @ 12:51)      CARDIAC MARKERS ( 29 Sep 2021 12:51 )  x     / 0.12 ng/mL / x     / x     / x          RADIOLOGY: VITAL SIGNS: Last 24 Hours  T(C): 36.9 (29 Sep 2021 11:20), Max: 36.9 (29 Sep 2021 11:20)  T(F): 98.4 (29 Sep 2021 11:20), Max: 98.4 (29 Sep 2021 11:20)  HR: 82 (29 Sep 2021 11:20) (82 - 82)  BP: 170/83 (29 Sep 2021 16:25) (170/83 - 177/88)  BP(mean): --  RR: 16 (29 Sep 2021 11:20) (16 - 16)  SpO2: 98% (29 Sep 2021 11:20) (98% - 98%)    LABS:                        10.7   10.73 )-----------( 222      ( 29 Sep 2021 12:51 )             34.9     09-29    136  |  105  |  44<H>  ----------------------------<  84  4.4   |  19  |  2.7<H>    Ca    8.5      29 Sep 2021 12:51  Mg     2.3     09-29    TPro  6.5  /  Alb  3.5  /  TBili  0.4  /  DBili  x   /  AST  22  /  ALT  20  /  AlkPhos  126<H>  09-29    PT/INR - ( 29 Sep 2021 12:51 )   PT: 10.90 sec;   INR: 0.95 ratio       PTT - ( 29 Sep 2021 12:51 )  PTT:32.1 sec    Troponin T, Serum: 0.12 ng/mL *HH* (09-29-21 @ 12:51)    CARDIAC MARKERS ( 29 Sep 2021 12:51 )  x     / 0.12 ng/mL / x     / x     / x

## 2021-09-29 NOTE — H&P ADULT - ASSESSMENT
GILLIAN MENDOZA 56y Male  MRN#: [id number]   Hospital Day:     SUBJECTIVE  Patient is a 56y old Male who presents with a chief complaint of Currently admitted to medicine with the primary diagnosis of Chest pain      INTERVAL HPI AND OVERNIGHT EVENTS:  Patient was examined and seen at bedside. This morning he is resting comfortably in bed and reports no issues or overnight events.    REVIEW OF SYMPTOMS:  CONSTITUTIONAL: No weakness, fevers or chills; No headaches  EYES: No visual changes, eye pain, or discharge  ENT: No vertigo; No ear pain or change in hearing; No sore throat or difficulty swallowing  NECK: No pain or stiffness  RESPIRATORY: No cough, wheezing, or hemoptysis; No shortness of breath  CARDIOVASCULAR: No chest pain or palpitations  GASTROINTESTINAL: No abdominal or epigastric pain; No nausea, vomiting, or hematemesis; No diarrhea or constipation; No melena or hematochezia  GENITOURINARY: No dysuria, frequency or hematuria  MUSCULOSKELETAL: No joint pain, no muscle pain, no weakness  NEUROLOGICAL: No numbness or weakness  SKIN: No itching or rashes    OBJECTIVE  PAST MEDICAL & SURGICAL HISTORY  HTN (hypertension)    HLD (hyperlipidemia)    DM (diabetes mellitus)    Glaucoma    No significant past surgical history      ALLERGIES:  No Known Allergies    MEDICATIONS:  STANDING MEDICATIONS    PRN MEDICATIONS      VITAL SIGNS: Last 24 Hours  T(C): 36.9 (29 Sep 2021 11:20), Max: 36.9 (29 Sep 2021 11:20)  T(F): 98.4 (29 Sep 2021 11:20), Max: 98.4 (29 Sep 2021 11:20)  HR: 82 (29 Sep 2021 11:20) (82 - 82)  BP: 170/83 (29 Sep 2021 16:25) (170/83 - 177/88)  BP(mean): --  RR: 16 (29 Sep 2021 11:20) (16 - 16)  SpO2: 98% (29 Sep 2021 11:20) (98% - 98%)    LABS:                        10.7   10.73 )-----------( 222      ( 29 Sep 2021 12:51 )             34.9     09-29    136  |  105  |  44<H>  ----------------------------<  84  4.4   |  19  |  2.7<H>    Ca    8.5      29 Sep 2021 12:51  Mg     2.3     09-29    TPro  6.5  /  Alb  3.5  /  TBili  0.4  /  DBili  x   /  AST  22  /  ALT  20  /  AlkPhos  126<H>  09-29    PT/INR - ( 29 Sep 2021 12:51 )   PT: 10.90 sec;   INR: 0.95 ratio         PTT - ( 29 Sep 2021 12:51 )  PTT:32.1 sec      Troponin T, Serum: 0.12 ng/mL *HH* (09-29-21 @ 12:51)      CARDIAC MARKERS ( 29 Sep 2021 12:51 )  x     / 0.12 ng/mL / x     / x     / x          RADIOLOGY:      PHYSICAL EXAM:  CONSTITUTIONAL: No acute distress, well-developed, well-groomed, AAOx3  HEAD: Atraumatic, normocephalic  EYES: EOM intact, PERRLA, conjunctiva and sclera clear  ENT: Supple, no masses, no thyromegaly, no bruits, no JVD; moist mucous membranes  PULMONARY: Clear to auscultation bilaterally; no wheezes, rales, or rhonchi  CARDIOVASCULAR: Regular rate and rhythm; no murmurs, rubs, or gallops  GASTROINTESTINAL: Soft, non-tender, non-distended; bowel sounds present  MUSCULOSKELETAL: 2+ peripheral pulses; no clubbing, no cyanosis, no edema  NEUROLOGY: non-focal  SKIN: No rashes or lesions; warm and dry    ASSESSMENT & PLAN  #    PAST MEDICAL & SURGICAL HISTORY:  HTN (hypertension)    HLD (hyperlipidemia)    DM (diabetes mellitus)    Glaucoma    No significant past surgical history        #Misc  - DVT Prophylaxis:  - GI Prophylaxis:  - Diet:  - Activity: IAT  - IV Fluids: n/a  - Code Status: Full code  - Dispo: admit to telemetry  *INCOMPLETE NOTE*    Patient is a 56 year old male with PMHx of  HTN, HLD, CKD IV, T2DM on insulin a1c 5.8% on 7/3/2021, Glaucoma presented to the ED with complaint of left sided chest pain and difficulty sleeping 2/2 to pain.     Pt states chest pain is like the one he had before, feels more like discomfort, located on the left side, 5/10 in intensity, started last night and currently present, nonradiating, he does not recognize and aggravating or alleviating factors. He was admitted to the hospital with CCU course 7/1 - 7/25 for management of acute HFrEF and worsening kidney function. BNP 66763 on admission. Cath 7/22/2021 showed significant 3 V disease, LAD, RCA, LCx. CTS was consulted. During preop cardiac MRI patient became SOB. He received bumex and hypertonic saline for diuresis. CABG was decided against because of increased risk and the risk of ending in HD might be higher if going for CABG than percutaneous approach to LAD lesion with staged PCI to LCx. Patient is s/p LHC on 7/22/2021. He was supposed to go for a staged PCI to LCX in 4 - 6 weeks.  He was discharged on Torsemide, DAPT, hydralazine, carvedilol and isosorbide. He saw Dr. Cantu outpatient.   At the time of my interview patient complained of weakness in his arms, bedside FS showed BG of 58, pt given some juice. He states he took 20U of insulin last night.       Chest pain w/ elevated Troponin R/o ACS  Hx of CAD s/p PCI  Hx of HFrEF (40-45%)  - admit to telemetry   - follow up cardiology recs  - NPO after midnight for cardiac cath in AM   - troponin elevated 0.12 > trend trop & CKMB  - follow up 2D Echo, echo in July 2021 showing EF 40-45%  - c/w hydralazine 100mg TID &  isosorbide 20mg TID, if bp remains uncontrolled will increase carvedilol to 12.5 mg BID   - c/w ASA  - c/w Atorvastatin  - c/w plavix  - Recommend gentle IV fluid hydration 250cc over 5 hours overnight prior to cath tomorrow       Microcytic Anemia  - MCV 76.9, Hb 10.7, denies bleeding  - follow up iron studies    CKD4  - baseline Cr 2.6- 2.7, at baseline    HTN (hypertension)  - c/w home meds     HLD (hyperlipidemia)  - follow up lipid panel       DM (diabetes mellitus) Type 2  - follow up A1C  - monitor FSG q ACHS, maintain <180    #Misc  - DVT Prophylaxis:  - GI Prophylaxis:  - Diet: DASH/TLC  - Activity: IAT  - IV Fluids: n/a  - Code Status: Full code  - Dispo: admit to telemetry  Patient is a 56 year old male with PMHx of  HTN, HLD, CKD IV, T2DM on insulin a1c 5.8% on 7/3/2021, Glaucoma, and CAD s/p PCI in July 2021. The patient presented to the ED with complaint of left sided chest pain and difficulty sleeping 2/2 to  chest pain and BL LE neuropathy. The patient states that last night he woke up and from sleep due to burning substernal chest pain, non radiating, no exacerbation or alleviating factors. Of not the patient had recent PCI in July 2021. On this admission patient was admitted to CCU for acute HFrEF failure and worsening kidney function. Cath on 7/22/21 showed significant 3 V disease, LAD, RCA, LCx. CTS was consulted. During preop cardiac MRI patient became SOB. He received bumex and hypertonic saline for diuresis. CABG was decided against because of increased risk and the risk of ending in HD might be higher if going for CABG than percutaneous approach to LAD lesion with staged PCI to LCx. The patient was discharged on DAPT and follows with Dr. Cantu.     In the ED, patient is hemodynamically stable with elevated troponin 0.12, microcytic anemia, elevated BNP 80061. The patient was evaluated in the ED by cardiology and is planed for cardiac cath on 9/30.     Chest pain w/ elevated Troponin r/o ACS  Hx of CAD s/p PCI July 2021  Hx of HFrEF (40-45%)  - admit to telemetry   - follow up cardiology recs  - NPO after midnight for cardiac cath in AM   - troponin elevated 0.12 > trend trop & CKMB  - follow up 2D Echo, echo in July 2021 showing EF 40-45%  - c/w hydralazine 50 mg TID, isosorbide 20mg TID, torsemide 20 mg BID, increasing carvedilol to 12.5 mg BID   - c/w ASA  - c/w Atorvastatin  - c/w plavix  - c/w IV Hydration 75 cc/hr overnight pre-cath     Microcytic Anemia  - MCV 76.9, Hb 10.7, denies bleeding  - follow up iron studies    CKD4  - Nephrologist: Dr. Jan Cabrera  - baseline Cr 2.6- 2.7, at baseline  - c/w abhijit    HTN (hypertension)  - c/w home meds per above     HLD (hyperlipidemia)  - follow up lipid panel   - c/w atorvastatin    DM (diabetes mellitus) Type 2  - follow up A1C  - monitor FSG q ACHS, maintain <180  - c/w gabapentin for neuropathy     #Misc  - DVT Prophylaxis: heparin   - GI Prophylaxis: n/a  - Diet: NPO after midnight for cath   - Activity: IAT  - IV Fluids: n/a  - Code Status: Full code  - Dispo: admit to telemetry  Patient is a 56 year old male with PMHx of  HTN, HLD, CKD IV, T2DM on insulin a1c 5.8% on 7/3/2021, Glaucoma, and CAD s/p PCI in July 2021. The patient presented to the ED with complaint of left sided chest pain and difficulty sleeping 2/2 to  chest pain.     Chest pain w/ elevated Troponin r/o ACS  Hx of CAD s/p PCI July 2021  Hx of HFrEF (40-45%)  - admit to telemetry   - CXR: no evidence of cardiopulmonary disease   - follow up cardiology recs  - NPO after midnight for cardiac cath in AM   - troponin elevated 0.12 > trend trop & CKMB  - follow up 2D Echo, echo in July 2021 showing EF 40-45%  - c/w hydralazine 50 mg TID, isosorbide 20mg TID, torsemide 20 mg BID, increasing carvedilol to 12.5 mg BID   - c/w ASA  - c/w Atorvastatin  - c/w plavix  - c/w IV Hydration 75 cc/hr overnight pre-cath, monitor for symptoms of fluid overload     Microcytic Anemia  - MCV 76.9, Hb 10.7, denies bleeding  - follow up iron studies    CKD4  - Nephrologist: Dr. Jan Cabrera  - baseline Cr 2.6- 2.7, at baseline  - c/w abhijit    HTN (hypertension)  - c/w home meds per above     HLD (hyperlipidemia)  - follow up lipid panel   - c/w atorvastatin    DM (diabetes mellitus) Type 2  - follow up A1C  - monitor FSG q ACHS, maintain <180  - c/w gabapentin for neuropathy     #Misc  - DVT Prophylaxis: heparin   - GI Prophylaxis: n/a  - Diet: NPO after midnight for cath   - Activity: IAT  - IV Fluids: n/a  - Code Status: Full code  - Dispo: admit to telemetry

## 2021-09-29 NOTE — ED ADULT NURSE NOTE - NSFALLRSKASSESSTYPE_ED_ALL_ED
HPI:     Chief Complaint   Patient presents with    [facility] [place] [facility]    Medication Refill     BC- reclipsen    Thyroid Problem     has hx of thypothyroidism, was previously on levothyroxine        Patient is a 30 y.o. female with significant history of hashimoto thyroiditis/hypothyroidism who presents as new patient for evaluation of thyroid and birth control refill. Patient is originally from [place], [place] but relocated here about a year ago for [email or web address] job. Will be getting  next year. Patient reports she was diagnosed with hashimoto thyroiditis/hypothyroidism about 4-5 years ago in college. She was on levothyroxine for few years, but stopped about a year ago when she lost her parent's insurance. She now has insurance again and would like to restart med. Does not remember dose she was on previously. Her mother and aunt also have hashimoto. Mother also had partial thyroidectomy d/t thyroid cancer. Patient requests refill of OCP Reclipsen. Has been on this for about 3 years and happy with this form of contraception. Has been out of med for past 4 months. Requests refill. Patient denies history of CV disease, HTN, DVT/stroke, thromboembolic disorder, unexplained vaginal bleeding, liver disease, diabetes, migraine. Patient does not smoke. No family history of thromboembolic disease. Patient denies fever, chills, dizziness, headache, fatigue, syncope, chest pain, palpitations, dyspnea, abdominal pain, change in appetite, nausea, vomiting, constipation, and diarrhea. Patient Active Problem List   Diagnosis Code    Severe obesity ([facility] [address] E66.01    Hypothyroidism due to Hashimoto's thyroiditis E03.8, E06.3     Current Outpatient Medications   Medication Sig Dispense Refill    desogestrel-ethinyl estradiol (RECLIPSEN, 28,) 0.15-0.03 mg tab Take 1 Tab by mouth daily.  4 Dose Pack 3     No Known Allergies  Past Medical History:   Diagnosis Date    Hashimoto's thyroiditis     Hypothyroidism      Past Surgical History:   Procedure Laterality Date    HX WISDOM TEETH EXTRACTION       Family History   Problem Relation Age of Onset    Thyroid Disease Mother         hashimoto thyroiditis; thyroid cancer    Cancer Mother         thyroid     No Known Problems Father     Breast Cancer Maternal Grandmother         early 76s    Colon Cancer Paternal Grandmother         age 76    Thyroid Disease Maternal Aunt         hashimoto thyroiditis    Diabetes Paternal Aunt         type 1    Breast Cancer Maternal Aunt         in 46s    Breast Cancer Maternal Aunt         in 62s    Heart Disease Neg Hx     Heart Attack Neg Hx     Hypertension Neg Hx     High Cholesterol Neg Hx     Stroke Neg Hx     Ovarian Cancer Neg Hx      Social History     Tobacco Use    Smoking status: Never Smoker    Smokeless tobacco: Never Used   Substance Use Topics    Alcohol use: Yes     Comment: 1 or 2 drinks on the weekends          ROS:   Pertinent items are noted in HPI. Objective:     Vitals:    09/17/19 0949   BP: 118/82   Pulse: 78   Resp: 16   Temp: 98.9 °F (37.2 °C)   TempSrc: Oral   SpO2: 97%   Weight: 237 lb (107.5 kg)   Height: 5' 5\" (1.651 m)        Vitals and Nurse Documentation reviewed.     Physical Examination:   General appearance - alert, well appearing, and in no distress  Mental status - alert, oriented to person, place, and time, normal mood, behavior, speech, dress, motor activity, and thought processes  Eyes - pupils equal and reactive, extraocular eye movements intact  Ears - bilateral TM's and external ear canals normal  Nose - normal and patent, no erythema, discharge or polyps  Mouth - mucous membranes moist, pharynx normal without lesions  Neck - supple, no significant adenopathy, thyroid exam: thyroid is normal in size without nodules or tenderness  Chest - clear to auscultation, no wheezes, rales or rhonchi, symmetric air entry  Heart - normal rate, regular rhythm, normal S1, S2, no murmurs, rubs, clicks or gallops  Neurological - alert, oriented, normal speech, no focal findings or movement disorder noted  Extremities - peripheral pulses normal, no pedal edema, no clubbing or cyanosis      Assessment/ Plan:   Diagnoses and all orders for this visit:    1. Encounter for birth control pills maintenance  -     AMB POC URINE PREGNANCY TEST, VISUAL COLOR COMPARISON is negative. -     desogestrel-ethinyl estradiol (RECLIPSEN, 28,) 0.15-0.03 mg tab; Take 1 Tab by mouth daily. Medication benefits, risks, indication, dosage, potential adverse effects, and alternate medication options were discussed with patient who expressed understanding.          -     Side effects were reviewed, risk of blood clots, DVT, stroke, MI. Reviewed ACHES symptoms. 2. Hypothyroidism due to Hashimoto's thyroiditis  -     Previously on Synthroid, off med for past year. Check TFT today and will notify of result and recommendation.  -     TSH AND FREE T4    3. Severe obesity ([address]        -     BMI discussed with patient. Discussed lifestyle changes, daily physical activity, and advised 150 minutes of exercise weekly. Discussed healthy diet choices and limiting fried, fatty foods, fast foods, processed foods, sugar-sweetened beverages/soda, and added sugars. Increase fruits, vegetables, low-fat dairy products, lean proteins, and whole grains. Follow-up and Dispositions    · Return in about 4 weeks (around 10/15/2019) for Return for CPE (Physical Exam), fasting labs. I have discussed the diagnosis with the patient and the intended plan as seen in the above orders. Advised prompt follow-up if symptoms worsen or fail to improve and symptoms that would warrant emergent evaluation in ED. The patient has received an after-visit summary and questions were answered concerning future plans. I have discussed medication side effects and warnings with the patient as well.   Patient expressed understanding and is in agreement with the diagnosis and plan. Initial (On Arrival)

## 2021-09-29 NOTE — ED ADULT NURSE NOTE - NSIMPLEMENTINTERV_GEN_ALL_ED
Implemented All Fall Risk Interventions:  [place] to call system. Call bell, personal items and telephone within reach. Instruct patient to call for assistance. Room bathroom lighting operational. Non-slip footwear when patient is off stretcher. Physically safe environment: no spills, clutter or unnecessary equipment. Stretcher in lowest position, wheels locked, appropriate side rails in place. Provide visual cue, wrist band, yellow gown, etc. Monitor gait and stability. Monitor for mental status changes and reorient to person, place, and time. Review medications for side effects contributing to fall risk. Reinforce activity limits and safety measures with patient and family.

## 2021-09-29 NOTE — CONSULT NOTE ADULT - ASSESSMENT
56M w/ PMHx HTN, HLD, CKD IV, T2DM on insulin a1c 5.8% on 7/3/2021, Glaucoma presented to the Ed c/o L sided chest discomfort and difficulty sleeping last night.    #ICM  #HFrEF (last known EF 40-45%)  #CAD s/p cath with PCI of prox LAD with SAMMY x1  #CKD IV  #DMII  #HTN    RECOMMENDATIONS:  Continue torsemide 20mg daily  Continue hydralazine 100mg TID, isosorbide 20mg TID, carvedilol 6.25mg BID   Continue ASA and plavix   Continue atorvastatin 40mg  Check A1c and lipid profile   Trend trop     *THIS IS AN INCOMPLETE NOTE. PENDING EVALUATION BY ATTENDING*  56M w/ PMHx HTN, HLD, CKD IV, T2DM on insulin a1c 5.8% on 7/3/2021, Glaucoma presented to the Ed c/o L sided chest discomfort and difficulty sleeping last night.    #ICM  #HFrEF (last known EF 40-45%)  #CAD s/p cath with PCI of prox LAD with SAMMY x1  #CKD IV  #DMII  #HTN    RECOMMENDATIONS:  Continue torsemide 20mg daily  Continue hydralazine 100mg TID, isosorbide 20mg TID,  Recommend increasing carvedilol to 12.5 mg BID   Continue ASA and plavix   Continue atorvastatin 40mg  Check A1c and lipid profile   Trend trop   Repeat TTE   NPO after midnight for possible cath tomorrow  Monitor FS  Recommend gentle IV fluid hydration prior to cath tomorrow  56M w/ PMHx HTN, HLD, CKD IV, T2DM on insulin a1c 5.8% on 7/3/2021, Glaucoma presented to the Ed c/o L sided chest discomfort and difficulty sleeping last night.    #ICM  #HFrEF (last known EF 40-45%)  #CAD s/p cath with PCI of prox LAD with SAMMY x1  #CKD IV  #DMII  #HTN    RECOMMENDATIONS:  - Patient on schedule for cath in the am. NPO after MN.  - Recommend gentle IV fluid hydration 250cc over 5 hours overnight prior to cath tomorrow   - Restart home bp regimen including hydralazine 100mg TID, isosorbide 20mg TID,  - If bp remains ununcontrolled will increase carvedilol to 12.5 mg BID   - Continue ASA, plavix and atorvastatin 40mg  - Repeat TTE   - Monitor FS  - Plan discussed with attending cardiologist.

## 2021-09-30 LAB
A1C WITH ESTIMATED AVERAGE GLUCOSE RESULT: 6.3 % — HIGH (ref 4–5.6)
ALBUMIN SERPL ELPH-MCNC: 3 G/DL — LOW (ref 3.5–5.2)
ALP SERPL-CCNC: 115 U/L — SIGNIFICANT CHANGE UP (ref 30–115)
ALT FLD-CCNC: 14 U/L — SIGNIFICANT CHANGE UP (ref 0–41)
ANION GAP SERPL CALC-SCNC: 12 MMOL/L — SIGNIFICANT CHANGE UP (ref 7–14)
AST SERPL-CCNC: 18 U/L — SIGNIFICANT CHANGE UP (ref 0–41)
BASOPHILS # BLD AUTO: 0.03 K/UL — SIGNIFICANT CHANGE UP (ref 0–0.2)
BASOPHILS NFR BLD AUTO: 0.3 % — SIGNIFICANT CHANGE UP (ref 0–1)
BILIRUB SERPL-MCNC: 0.4 MG/DL — SIGNIFICANT CHANGE UP (ref 0.2–1.2)
BUN SERPL-MCNC: 39 MG/DL — HIGH (ref 10–20)
CALCIUM SERPL-MCNC: 8.2 MG/DL — LOW (ref 8.5–10.1)
CHLORIDE SERPL-SCNC: 107 MMOL/L — SIGNIFICANT CHANGE UP (ref 98–110)
CHOLEST SERPL-MCNC: 220 MG/DL — HIGH
CK MB CFR SERPL CALC: 5.2 NG/ML — SIGNIFICANT CHANGE UP (ref 0.6–6.3)
CK MB CFR SERPL CALC: 6.5 NG/ML — HIGH (ref 0.6–6.3)
CO2 SERPL-SCNC: 19 MMOL/L — SIGNIFICANT CHANGE UP (ref 17–32)
CREAT SERPL-MCNC: 2.6 MG/DL — HIGH (ref 0.7–1.5)
EOSINOPHIL # BLD AUTO: 0.53 K/UL — SIGNIFICANT CHANGE UP (ref 0–0.7)
EOSINOPHIL NFR BLD AUTO: 5.1 % — SIGNIFICANT CHANGE UP (ref 0–8)
ESTIMATED AVERAGE GLUCOSE: 134 MG/DL — HIGH (ref 68–114)
FERRITIN SERPL-MCNC: 341 NG/ML — SIGNIFICANT CHANGE UP (ref 30–400)
FOLATE SERPL-MCNC: 3.7 NG/ML — LOW
GLUCOSE BLDC GLUCOMTR-MCNC: 108 MG/DL — HIGH (ref 70–99)
GLUCOSE BLDC GLUCOMTR-MCNC: 261 MG/DL — HIGH (ref 70–99)
GLUCOSE BLDC GLUCOMTR-MCNC: 44 MG/DL — CRITICAL LOW (ref 70–99)
GLUCOSE BLDC GLUCOMTR-MCNC: 47 MG/DL — CRITICAL LOW (ref 70–99)
GLUCOSE BLDC GLUCOMTR-MCNC: 48 MG/DL — CRITICAL LOW (ref 70–99)
GLUCOSE BLDC GLUCOMTR-MCNC: 88 MG/DL — SIGNIFICANT CHANGE UP (ref 70–99)
GLUCOSE SERPL-MCNC: 43 MG/DL — CRITICAL LOW (ref 70–99)
HCT VFR BLD CALC: 31.8 % — LOW (ref 42–52)
HDLC SERPL-MCNC: 67 MG/DL — SIGNIFICANT CHANGE UP
HGB BLD-MCNC: 9.6 G/DL — LOW (ref 14–18)
IMM GRANULOCYTES NFR BLD AUTO: 0.3 % — SIGNIFICANT CHANGE UP (ref 0.1–0.3)
LIPID PNL WITH DIRECT LDL SERPL: 150 MG/DL — HIGH
LYMPHOCYTES # BLD AUTO: 1.12 K/UL — LOW (ref 1.2–3.4)
LYMPHOCYTES # BLD AUTO: 10.7 % — LOW (ref 20.5–51.1)
MAGNESIUM SERPL-MCNC: 2.3 MG/DL — SIGNIFICANT CHANGE UP (ref 1.8–2.4)
MCHC RBC-ENTMCNC: 23.5 PG — LOW (ref 27–31)
MCHC RBC-ENTMCNC: 30.2 G/DL — LOW (ref 32–37)
MCV RBC AUTO: 77.9 FL — LOW (ref 80–94)
MONOCYTES # BLD AUTO: 0.86 K/UL — HIGH (ref 0.1–0.6)
MONOCYTES NFR BLD AUTO: 8.2 % — SIGNIFICANT CHANGE UP (ref 1.7–9.3)
NEUTROPHILS # BLD AUTO: 7.88 K/UL — HIGH (ref 1.4–6.5)
NEUTROPHILS NFR BLD AUTO: 75.4 % — HIGH (ref 42.2–75.2)
NON HDL CHOLESTEROL: 153 MG/DL — HIGH
NRBC # BLD: 0 /100 WBCS — SIGNIFICANT CHANGE UP (ref 0–0)
PHOSPHATE SERPL-MCNC: 3.8 MG/DL — SIGNIFICANT CHANGE UP (ref 2.1–4.9)
PLATELET # BLD AUTO: 213 K/UL — SIGNIFICANT CHANGE UP (ref 130–400)
POTASSIUM SERPL-MCNC: 4.4 MMOL/L — SIGNIFICANT CHANGE UP (ref 3.5–5)
POTASSIUM SERPL-SCNC: 4.4 MMOL/L — SIGNIFICANT CHANGE UP (ref 3.5–5)
PROT SERPL-MCNC: 5.8 G/DL — LOW (ref 6–8)
RBC # BLD: 4.08 M/UL — LOW (ref 4.7–6.1)
RBC # FLD: 13.5 % — SIGNIFICANT CHANGE UP (ref 11.5–14.5)
SODIUM SERPL-SCNC: 138 MMOL/L — SIGNIFICANT CHANGE UP (ref 135–146)
TRIGL SERPL-MCNC: 84 MG/DL — SIGNIFICANT CHANGE UP
TROPONIN T SERPL-MCNC: 0.13 NG/ML — CRITICAL HIGH
TROPONIN T SERPL-MCNC: 0.14 NG/ML — CRITICAL HIGH
TSH SERPL-MCNC: 2.5 UIU/ML — SIGNIFICANT CHANGE UP (ref 0.27–4.2)
VIT B12 SERPL-MCNC: 465 PG/ML — SIGNIFICANT CHANGE UP (ref 232–1245)
WBC # BLD: 10.45 K/UL — SIGNIFICANT CHANGE UP (ref 4.8–10.8)
WBC # FLD AUTO: 10.45 K/UL — SIGNIFICANT CHANGE UP (ref 4.8–10.8)

## 2021-09-30 PROCEDURE — 92978 ENDOLUMINL IVUS OCT C 1ST: CPT | Mod: 26,LC

## 2021-09-30 PROCEDURE — 93010 ELECTROCARDIOGRAM REPORT: CPT

## 2021-09-30 PROCEDURE — 93458 L HRT ARTERY/VENTRICLE ANGIO: CPT | Mod: 26,XU

## 2021-09-30 PROCEDURE — 92928 PRQ TCAT PLMT NTRAC ST 1 LES: CPT | Mod: LC

## 2021-09-30 PROCEDURE — 99223 1ST HOSP IP/OBS HIGH 75: CPT

## 2021-09-30 RX ORDER — ATORVASTATIN CALCIUM 80 MG/1
80 TABLET, FILM COATED ORAL AT BEDTIME
Refills: 0 | Status: DISCONTINUED | OUTPATIENT
Start: 2021-09-30 | End: 2021-10-07

## 2021-09-30 RX ORDER — DEXTROSE 50 % IN WATER 50 %
25 SYRINGE (ML) INTRAVENOUS ONCE
Refills: 0 | Status: DISCONTINUED | OUTPATIENT
Start: 2021-09-30 | End: 2021-10-07

## 2021-09-30 RX ORDER — DEXTROSE 50 % IN WATER 50 %
10 SYRINGE (ML) INTRAVENOUS ONCE
Refills: 0 | Status: DISCONTINUED | OUTPATIENT
Start: 2021-09-30 | End: 2021-10-07

## 2021-09-30 RX ORDER — BRIMONIDINE TARTRATE 2 MG/MG
1 SOLUTION/ DROPS OPHTHALMIC
Refills: 0 | Status: DISCONTINUED | OUTPATIENT
Start: 2021-09-30 | End: 2021-10-07

## 2021-09-30 RX ORDER — SODIUM CHLORIDE 9 MG/ML
1000 INJECTION, SOLUTION INTRAVENOUS
Refills: 0 | Status: DISCONTINUED | OUTPATIENT
Start: 2021-09-30 | End: 2021-10-07

## 2021-09-30 RX ORDER — DEXTROSE 50 % IN WATER 50 %
15 SYRINGE (ML) INTRAVENOUS ONCE
Refills: 0 | Status: DISCONTINUED | OUTPATIENT
Start: 2021-09-30 | End: 2021-10-07

## 2021-09-30 RX ORDER — DORZOLAMIDE HYDROCHLORIDE 20 MG/ML
1 SOLUTION/ DROPS OPHTHALMIC
Refills: 0 | Status: DISCONTINUED | OUTPATIENT
Start: 2021-09-30 | End: 2021-10-07

## 2021-09-30 RX ORDER — IVABRADINE 7.5 MG/1
5 TABLET, FILM COATED ORAL
Refills: 0 | Status: DISCONTINUED | OUTPATIENT
Start: 2021-09-30 | End: 2021-10-07

## 2021-09-30 RX ORDER — ISOSORBIDE MONONITRATE 60 MG/1
30 TABLET, EXTENDED RELEASE ORAL DAILY
Refills: 0 | Status: DISCONTINUED | OUTPATIENT
Start: 2021-09-30 | End: 2021-10-07

## 2021-09-30 RX ORDER — LATANOPROST 0.05 MG/ML
1 SOLUTION/ DROPS OPHTHALMIC; TOPICAL AT BEDTIME
Refills: 0 | Status: DISCONTINUED | OUTPATIENT
Start: 2021-09-30 | End: 2021-10-07

## 2021-09-30 RX ORDER — INSULIN LISPRO 100/ML
VIAL (ML) SUBCUTANEOUS
Refills: 0 | Status: DISCONTINUED | OUTPATIENT
Start: 2021-09-30 | End: 2021-10-01

## 2021-09-30 RX ORDER — TIMOLOL 0.5 %
1 DROPS OPHTHALMIC (EYE)
Refills: 0 | Status: DISCONTINUED | OUTPATIENT
Start: 2021-09-30 | End: 2021-10-07

## 2021-09-30 RX ORDER — SODIUM CHLORIDE 9 MG/ML
1000 INJECTION INTRAMUSCULAR; INTRAVENOUS; SUBCUTANEOUS
Refills: 0 | Status: DISCONTINUED | OUTPATIENT
Start: 2021-09-30 | End: 2021-10-01

## 2021-09-30 RX ORDER — DEXTROSE 50 % IN WATER 50 %
12.5 SYRINGE (ML) INTRAVENOUS ONCE
Refills: 0 | Status: DISCONTINUED | OUTPATIENT
Start: 2021-09-30 | End: 2021-10-07

## 2021-09-30 RX ORDER — GLUCAGON INJECTION, SOLUTION 0.5 MG/.1ML
1 INJECTION, SOLUTION SUBCUTANEOUS ONCE
Refills: 0 | Status: DISCONTINUED | OUTPATIENT
Start: 2021-09-30 | End: 2021-10-07

## 2021-09-30 RX ORDER — DEXTROSE 50 % IN WATER 50 %
25 SYRINGE (ML) INTRAVENOUS ONCE
Refills: 0 | Status: COMPLETED | OUTPATIENT
Start: 2021-09-30 | End: 2021-09-30

## 2021-09-30 RX ORDER — HYDRALAZINE HCL 50 MG
100 TABLET ORAL THREE TIMES A DAY
Refills: 0 | Status: DISCONTINUED | OUTPATIENT
Start: 2021-09-30 | End: 2021-10-07

## 2021-09-30 RX ORDER — SODIUM CHLORIDE 9 MG/ML
1000 INJECTION, SOLUTION INTRAVENOUS
Refills: 0 | Status: DISCONTINUED | OUTPATIENT
Start: 2021-09-30 | End: 2021-10-01

## 2021-09-30 RX ORDER — DEXTROSE 50 % IN WATER 50 %
15 SYRINGE (ML) INTRAVENOUS ONCE
Refills: 0 | Status: COMPLETED | OUTPATIENT
Start: 2021-09-30 | End: 2021-09-30

## 2021-09-30 RX ADMIN — LATANOPROST 1 DROP(S): 0.05 SOLUTION/ DROPS OPHTHALMIC; TOPICAL at 21:45

## 2021-09-30 RX ADMIN — CLOPIDOGREL BISULFATE 75 MILLIGRAM(S): 75 TABLET, FILM COATED ORAL at 11:26

## 2021-09-30 RX ADMIN — ATORVASTATIN CALCIUM 80 MILLIGRAM(S): 80 TABLET, FILM COATED ORAL at 21:45

## 2021-09-30 RX ADMIN — Medication 15 GRAM(S): at 21:45

## 2021-09-30 RX ADMIN — Medication 100 MILLIGRAM(S): at 05:42

## 2021-09-30 RX ADMIN — CARVEDILOL PHOSPHATE 12.5 MILLIGRAM(S): 80 CAPSULE, EXTENDED RELEASE ORAL at 17:42

## 2021-09-30 RX ADMIN — SEVELAMER CARBONATE 800 MILLIGRAM(S): 2400 POWDER, FOR SUSPENSION ORAL at 21:47

## 2021-09-30 RX ADMIN — HEPARIN SODIUM 5000 UNIT(S): 5000 INJECTION INTRAVENOUS; SUBCUTANEOUS at 05:42

## 2021-09-30 RX ADMIN — Medication 25 MILLILITER(S): at 09:23

## 2021-09-30 RX ADMIN — Medication 81 MILLIGRAM(S): at 11:26

## 2021-09-30 RX ADMIN — Medication 20 MILLIGRAM(S): at 05:41

## 2021-09-30 RX ADMIN — CARVEDILOL PHOSPHATE 12.5 MILLIGRAM(S): 80 CAPSULE, EXTENDED RELEASE ORAL at 05:42

## 2021-09-30 RX ADMIN — ISOSORBIDE DINITRATE 20 MILLIGRAM(S): 5 TABLET ORAL at 05:42

## 2021-09-30 RX ADMIN — SEVELAMER CARBONATE 800 MILLIGRAM(S): 2400 POWDER, FOR SUSPENSION ORAL at 05:42

## 2021-09-30 RX ADMIN — SODIUM CHLORIDE 75 MILLILITER(S): 9 INJECTION INTRAMUSCULAR; INTRAVENOUS; SUBCUTANEOUS at 01:34

## 2021-09-30 RX ADMIN — GABAPENTIN 100 MILLIGRAM(S): 400 CAPSULE ORAL at 05:42

## 2021-09-30 RX ADMIN — Medication 100 MILLIGRAM(S): at 21:45

## 2021-09-30 RX ADMIN — SODIUM CHLORIDE 75 MILLILITER(S): 9 INJECTION, SOLUTION INTRAVENOUS at 21:44

## 2021-09-30 RX ADMIN — Medication 100 MILLIGRAM(S): at 17:43

## 2021-09-30 RX ADMIN — SODIUM CHLORIDE 75 MILLILITER(S): 9 INJECTION, SOLUTION INTRAVENOUS at 09:41

## 2021-09-30 NOTE — ED ADULT NURSE REASSESSMENT NOTE - NS ED NURSE REASSESS COMMENT FT1
pt received from previous shift  sleeping comfortably in bed  awaiting bed assignment  remains on cardiac monitoring   will continue care

## 2021-09-30 NOTE — PROGRESS NOTE ADULT - ASSESSMENT
1. Chest pain with 3VCAD S/P PCI in July 2021 R/O ACS: Chest pain free. Awaiting cardiac cath. Disposition/D/C planning per cardiology following cardiac cath. Continue ASA/plavix/isosorbide/statin  2. DM type 2 with diabetic polyneuropathy: Insulin/gabapentin as ordered  3. HTN: Antihypertensives as ordered  4. CKD stage 4: At baseline. Avoid nephrotoxins. Monitor renal function following contrast administration for coronary angiogram  5. HFrEF: Carvedilol/torsemide as ordered  6. Fall/aspiration/decubitus precautions. DVT prophylaxis  7. Prognosis guarded

## 2021-09-30 NOTE — PROGRESS NOTE ADULT - SUBJECTIVE AND OBJECTIVE BOX
** This is an incomplete note - pending AM rounds**   GILLIAN MENDOZA 56y Male  MRN#: [id number]   CODE STATUS:    Hospital Day: 1d    Pt is currently admitted with the primary diagnosis of       SUBJECTIVE  Hospital Course  HPI:  Patient is a 56 year old male with PMHx of  HTN, HLD, CKD IV, T2DM on insulin a1c 5.8%, Glaucoma, and CAD s/p PCI in July 2021. The patient presented to the ED with complaint of left sided chest pain and difficulty sleeping 2/2 to chest pain and BL LE neuropathy (chronic). The patient states that last night he woke up and from sleep due to burning substernal chest pain, non radiating, no exacerbation or alleviating factors. Of notr the patient had recent PCI in July 2021. During that admission patient had a prolonged stay in CCU, was treated for acute CHF exacerbation and worsening kidney function. Cath on 7/22/21 showed significant 3 vessle disease, LAD, RCA, LCx. CTS was consulted. During preop cardiac MRI patient became SOB. He received bumex and hypertonic saline for diuresis.  CABG was decided against due to increased risk of progression of CKDIV to ESRD.  The patient was discharged on DAPT and follows with Dr. Cantu as his cardiologist    In the ED, patient is hemodynamically stable with elevated troponin 0.12, microcytic anemia, elevated BNP 28906. The patient was evaluated in the ED by cardiology and is planed for cardiac cath on 9/30.  (29 Sep 2021 19:16)      Overnight events/Subjective complaints  Patient was seen at the bedside this morning. He is lying comfortably on the bed. There were no acute events overnight.   He denies any chest pain, shortness of breath, cough, fever, chills, abdominal pain, nausea, vomiting, diarrhea, dizziness or headache.                                               ----------------------------------------------------------  OBJECTIVE  PAST MEDICAL & SURGICAL HISTORY  HTN (hypertension)    HLD (hyperlipidemia)    DM (diabetes mellitus)    Glaucoma    No significant past surgical history                                              -----------------------------------------------------------  ALLERGIES:  No Known Allergies                                            ------------------------------------------------------------    HOME MEDICATIONS  Home Medications:  aspirin 81 mg oral tablet, chewable: 1 tab(s) orally once a day (25 Jul 2021 17:23)  atorvastatin 40 mg oral tablet: 1 tab(s) orally once a day (02 Jul 2021 04:46)  BASAGLAR 100 UNIT/ML KWIKPEN:  (02 Jul 2021 04:46)  brimonidine 0.2% ophthalmic solution: 1 drop(s) to each affected eye 2 times a day (02 Jul 2021 04:46)  carvedilol 3.125 mg oral tablet: 1 tab(s) orally every 12 hours (25 Jul 2021 17:23)  clopidogrel 75 mg oral tablet: 1 tab(s) orally once a day (25 Jul 2021 17:23)  dorzolamide-timolol 2%-0.5% preservative-free ophthalmic solution: 1 drop(s) to each affected eye 2 times a day (02 Jul 2021 04:46)  latanoprost 0.005% ophthalmic solution: 1 drop(s) to each affected eye once a day (at bedtime) (25 Jul 2021 17:23)  pioglitazone-metformin 15 mg-850 mg oral tablet: 1 tab(s) orally 2 times a day (02 Jul 2021 04:46)  Rhopressa 0.02% ophthalmic solution: 1 drop(s) to each affected eye once a day (in the evening) (02 Jul 2021 04:46)  sevelamer carbonate 800 mg oral tablet: 1 tab(s) orally 3 times a day (with meals) (25 Jul 2021 17:23)                           MEDICATIONS:  STANDING MEDICATIONS  aspirin  chewable 81 milliGRAM(s) Oral daily  atorvastatin Oral Tab/Cap - Peds 40 milliGRAM(s) Oral daily  brimonidine 0.2% Ophthalmic Solution 1 Drop(s) Both EYES two times a day  carvedilol 12.5 milliGRAM(s) Oral every 12 hours  clopidogrel Tablet 75 milliGRAM(s) Oral daily  dextrose 5% + sodium chloride 0.9%. 1000 milliLiter(s) IV Continuous <Continuous>  dorzolamide 2% Ophthalmic Solution 1 Drop(s) Both EYES <User Schedule>  gabapentin 100 milliGRAM(s) Oral two times a day  heparin   Injectable 5000 Unit(s) SubCutaneous every 12 hours  hydrALAZINE 100 milliGRAM(s) Oral three times a day  isosorbide   dinitrate Tablet (ISORDIL) 20 milliGRAM(s) Oral three times a day  ivabradine 5 milliGRAM(s) Oral two times a day  latanoprost 0.005% Ophthalmic Solution 1 Drop(s) Both EYES at bedtime  sevelamer carbonate 800 milliGRAM(s) Oral three times a day  timolol 0.5% Solution 1 Drop(s) Both EYES two times a day  torsemide 20 milliGRAM(s) Oral two times a day    PRN MEDICATIONS                                            ------------------------------------------------------------  VITAL SIGNS: Last 24 Hours  T(C): 36.7 (30 Sep 2021 07:31), Max: 36.9 (29 Sep 2021 22:18)  T(F): 98.1 (30 Sep 2021 07:31), Max: 98.5 (29 Sep 2021 22:18)  HR: 60 (30 Sep 2021 07:31) (60 - 75)  BP: 106/64 (30 Sep 2021 07:31) (106/64 - 176/88)  BP(mean): --  RR: 18 (30 Sep 2021 07:31) (18 - 18)  SpO2: 97% (30 Sep 2021 07:31) (97% - 98%)        Daily Height in cm: 160.02 (29 Sep 2021 11:20), Height in cm: 160.02 (29 Sep 2021 11:20)    Daily                                         --------------------------------------------------------------  LABS:                        9.6    10.45 )-----------( 213      ( 30 Sep 2021 06:05 )             31.8                         10.7   10.73 )-----------( 222      ( 29 Sep 2021 12:51 )             34.9     09-30 @ 06:05    138  |  107  |  39<H>  ----------------------------<  43<LL>  4.4   |  19  |  2.6<H>  09-29 @ 12:51    136  |  105  |  44<H>  ----------------------------<  84  4.4   |  19  |  2.7<H>    Ca    8.2<L>      09-30 @ 06:05  Ca    8.5      09-29 @ 12:51  Phos  3.8     09-30 @ 06:05  Mg     2.3     09-30  Mg     2.3     09-29    TPro  5.8<L>  /  Alb  3.0<L>  /  TBili  0.4  /  DBili  x   /  AST  18  /  ALT  14  /  AlkPhos  115  09-30  TPro  6.5  /  Alb  3.5  /  TBili  0.4  /  DBili  x   /  AST  22  /  ALT  20  /  AlkPhos  126<H>  09-29    PT/INR - ( 29 Sep 2021 12:51 )   PT: 10.90 sec;   INR: 0.95 ratio         PTT - ( 29 Sep 2021 12:51 )  PTT:32.1 sec      Troponin T, Serum: 0.14 *HH* (09-30 @ 06:05)          CARDIAC MARKERS ( 09-30-21 @ 06:05 )  x     / 0.14 ng/mL / x     / x     / 5.2 ng/mL  CARDIAC MARKERS ( 09-30-21 @ 01:00 )  x     / 0.13 ng/mL / x     / x     / 6.5 ng/mL  CARDIAC MARKERS ( 09-29-21 @ 20:56 )  x     / 0.12 ng/mL / x     / x     / 7.0 ng/mL                                              -------------------------------------------------------------  RADIOLOGY:  < from: Xray Chest 1 View- PORTABLE-Urgent (Xray Chest 1 View- PORTABLE-Urgent .) (09.29.21 @ 12:42) >  IMPRESSION:    No radiographic evidence of acute cardiopulmonary disease.    < end of copied text >                                            --------------------------------------------------------------    PHYSICAL EXAM:  General: No acute distress; Pallor (-), Icterus (-), Cyanosis (-), Clubbing (-)  HEENT: Normocephalic, atraumatic, PERRLA, EOMI  PULM: Bilaterally equal and clear breath sounds, wheeze (-), rubs (-), crackles (-)  CVS: Normal S1 and S2, murmurs (-), rubs (-), gallops (-)   GI: Soft, nondistended, nontender, BS +  MSK: Edema (-), no muscle, bone or joint tenderness noted  SKIN: Warm and well perfused, no rashes noted  NEURO:  Alert and Oriented x 3; No gross focal neurological deficit noted                                             -------------------------------------------------------------- GILLIAN MENDOZA 56y Male  MRN#: [id number]   CODE STATUS:    Hospital Day: 1d    Pt is currently admitted with the primary diagnosis of atypical chest pain, r/o ACS    SUBJECTIVE  Hospital Course  HPI:  Patient is a 56 year old male with PMHx of  HTN, HLD, CKD IV, T2DM on insulin a1c 5.8%, Glaucoma, and CAD s/p PCI in July 2021. The patient presented to the ED with complaint of left sided chest pain and difficulty sleeping 2/2 to chest pain and BL LE neuropathy (chronic). The patient states that last night he woke up and from sleep due to burning substernal chest pain, non radiating, no exacerbation or alleviating factors. Of notr the patient had recent PCI in July 2021. During that admission patient had a prolonged stay in CCU, was treated for acute CHF exacerbation and worsening kidney function. Cath on 7/22/21 showed significant 3 vessle disease, LAD, RCA, LCx. CTS was consulted. During preop cardiac MRI patient became SOB. He received bumex and hypertonic saline for diuresis.  CABG was decided against due to increased risk of progression of CKDIV to ESRD.  The patient was discharged on DAPT and follows with Dr. Cantu as his cardiologist    In the ED, patient is hemodynamically stable with elevated troponin 0.12, microcytic anemia, elevated BNP 19123. The patient was evaluated in the ED by cardiology and is planed for cardiac cath on 9/30.  (29 Sep 2021 19:16)      Overnight events/Subjective complaints  Patient was seen at the bedside this morning. He is lying comfortably on the bed. There were no acute events overnight.   He denies any chest pain, shortness of breath, cough, fever, chills, abdominal pain, nausea, vomiting, diarrhea, dizziness or headache.                                               ----------------------------------------------------------  OBJECTIVE  PAST MEDICAL & SURGICAL HISTORY  HTN (hypertension)    HLD (hyperlipidemia)    DM (diabetes mellitus)    Glaucoma    No significant past surgical history                                              -----------------------------------------------------------  ALLERGIES:  No Known Allergies                                            ------------------------------------------------------------    HOME MEDICATIONS  Home Medications:  aspirin 81 mg oral tablet, chewable: 1 tab(s) orally once a day (25 Jul 2021 17:23)  atorvastatin 40 mg oral tablet: 1 tab(s) orally once a day (02 Jul 2021 04:46)  BASAGLAR 100 UNIT/ML KWIKPEN:  (02 Jul 2021 04:46)  brimonidine 0.2% ophthalmic solution: 1 drop(s) to each affected eye 2 times a day (02 Jul 2021 04:46)  carvedilol 3.125 mg oral tablet: 1 tab(s) orally every 12 hours (25 Jul 2021 17:23)  clopidogrel 75 mg oral tablet: 1 tab(s) orally once a day (25 Jul 2021 17:23)  dorzolamide-timolol 2%-0.5% preservative-free ophthalmic solution: 1 drop(s) to each affected eye 2 times a day (02 Jul 2021 04:46)  latanoprost 0.005% ophthalmic solution: 1 drop(s) to each affected eye once a day (at bedtime) (25 Jul 2021 17:23)  pioglitazone-metformin 15 mg-850 mg oral tablet: 1 tab(s) orally 2 times a day (02 Jul 2021 04:46)  Rhopressa 0.02% ophthalmic solution: 1 drop(s) to each affected eye once a day (in the evening) (02 Jul 2021 04:46)  sevelamer carbonate 800 mg oral tablet: 1 tab(s) orally 3 times a day (with meals) (25 Jul 2021 17:23)                           MEDICATIONS:  STANDING MEDICATIONS  aspirin  chewable 81 milliGRAM(s) Oral daily  atorvastatin Oral Tab/Cap - Peds 40 milliGRAM(s) Oral daily  brimonidine 0.2% Ophthalmic Solution 1 Drop(s) Both EYES two times a day  carvedilol 12.5 milliGRAM(s) Oral every 12 hours  clopidogrel Tablet 75 milliGRAM(s) Oral daily  dextrose 5% + sodium chloride 0.9%. 1000 milliLiter(s) IV Continuous <Continuous>  dorzolamide 2% Ophthalmic Solution 1 Drop(s) Both EYES <User Schedule>  gabapentin 100 milliGRAM(s) Oral two times a day  heparin   Injectable 5000 Unit(s) SubCutaneous every 12 hours  hydrALAZINE 100 milliGRAM(s) Oral three times a day  isosorbide   dinitrate Tablet (ISORDIL) 20 milliGRAM(s) Oral three times a day  ivabradine 5 milliGRAM(s) Oral two times a day  latanoprost 0.005% Ophthalmic Solution 1 Drop(s) Both EYES at bedtime  sevelamer carbonate 800 milliGRAM(s) Oral three times a day  timolol 0.5% Solution 1 Drop(s) Both EYES two times a day  torsemide 20 milliGRAM(s) Oral two times a day    PRN MEDICATIONS                                            ------------------------------------------------------------  VITAL SIGNS: Last 24 Hours  T(C): 36.7 (30 Sep 2021 07:31), Max: 36.9 (29 Sep 2021 22:18)  T(F): 98.1 (30 Sep 2021 07:31), Max: 98.5 (29 Sep 2021 22:18)  HR: 60 (30 Sep 2021 07:31) (60 - 75)  BP: 106/64 (30 Sep 2021 07:31) (106/64 - 176/88)  BP(mean): --  RR: 18 (30 Sep 2021 07:31) (18 - 18)  SpO2: 97% (30 Sep 2021 07:31) (97% - 98%)        Daily Height in cm: 160.02 (29 Sep 2021 11:20), Height in cm: 160.02 (29 Sep 2021 11:20)    Daily                                         --------------------------------------------------------------  LABS:                        9.6    10.45 )-----------( 213      ( 30 Sep 2021 06:05 )             31.8                         10.7   10.73 )-----------( 222      ( 29 Sep 2021 12:51 )             34.9     09-30 @ 06:05    138  |  107  |  39<H>  ----------------------------<  43<LL>  4.4   |  19  |  2.6<H>  09-29 @ 12:51    136  |  105  |  44<H>  ----------------------------<  84  4.4   |  19  |  2.7<H>    Ca    8.2<L>      09-30 @ 06:05  Ca    8.5      09-29 @ 12:51  Phos  3.8     09-30 @ 06:05  Mg     2.3     09-30  Mg     2.3     09-29    TPro  5.8<L>  /  Alb  3.0<L>  /  TBili  0.4  /  DBili  x   /  AST  18  /  ALT  14  /  AlkPhos  115  09-30  TPro  6.5  /  Alb  3.5  /  TBili  0.4  /  DBili  x   /  AST  22  /  ALT  20  /  AlkPhos  126<H>  09-29    PT/INR - ( 29 Sep 2021 12:51 )   PT: 10.90 sec;   INR: 0.95 ratio         PTT - ( 29 Sep 2021 12:51 )  PTT:32.1 sec      Troponin T, Serum: 0.14 *HH* (09-30 @ 06:05)          CARDIAC MARKERS ( 09-30-21 @ 06:05 )  x     / 0.14 ng/mL / x     / x     / 5.2 ng/mL  CARDIAC MARKERS ( 09-30-21 @ 01:00 )  x     / 0.13 ng/mL / x     / x     / 6.5 ng/mL  CARDIAC MARKERS ( 09-29-21 @ 20:56 )  x     / 0.12 ng/mL / x     / x     / 7.0 ng/mL                                              -------------------------------------------------------------  RADIOLOGY:  < from: Xray Chest 1 View- PORTABLE-Urgent (Xray Chest 1 View- PORTABLE-Urgent .) (09.29.21 @ 12:42) >  IMPRESSION:    No radiographic evidence of acute cardiopulmonary disease.    < end of copied text >                                            --------------------------------------------------------------    PHYSICAL EXAM:  General: No acute distress; Pallor (-), Icterus (-), Cyanosis (-), Clubbing (-)  HEENT: Normocephalic, atraumatic, PERRLA, EOMI  PULM: Bilaterally equal and clear breath sounds, wheeze (-), rubs (-), crackles (-)  CVS: Normal S1 and S2, murmurs (-), rubs (-), gallops (-)   GI: Soft, nondistended, nontender, BS +  MSK: Edema (-), no muscle, bone or joint tenderness noted  SKIN: Warm and well perfused, no rashes noted  NEURO:  Alert and Oriented x 3; No gross focal neurological deficit noted                                             --------------------------------------------------------------

## 2021-09-30 NOTE — CHART NOTE - NSCHARTNOTEFT_GEN_A_CORE
PRE-OP DIAGNOSIS:  Unstable angina, prior PCI of LAD      PROCEDURE:     [x] Coronary Angiogram     [x] LHC     [] LVG     [] RHC     [x] Intervention (see below)         PHYSICIAN:  Dr. Singh    ASSISTANT: Ander Nathan       PROCEDURE DESCRIPTION:     Consent:      [x] Patient     [] Family Member     []  Used        Anesthesia:     [] General     [x] Sedation     [] Local        Access & Closure:     [x] 6 Fr Right Radial Artery     [x] 6 Fr Right Femoral Artery     [] Fr Femoral Vein     [] Fr Brachial Vein       IV Contrast: 180 mL ([place])       Intervention:   -Successful IVUS guided PCI with balloon angioplasty, Intravascular lithotripsy, and drug eluting stent implantation x 1 of proximal LCX.  -Successful IVUS guided PCI with balloon angioplasty, and drug eluting stent implantation x 1 of distal LCX.    Implants:   -3.5x20 mm Synergy stent of prox LCX  -2.5x24 mm Synergy stent of distal LCX     FINDINGS:     Coronary Dominance: Right dominant     LM:  mild disease    LAD: patent stent in prox LAD with minor irregularities    CX:   prox LCX: 90% tubular stenosis  distal LCX: 85% diffuse stenosis    RCA: known to be occluded from prior cath, distal vessel supplied by collaterals from LCX and LAD.     LVEDP: 36 mmHg      ESTIMATED BLOOD LOSS: < 10 mL        CONDITION:     [x] Good     [] Fair     [] Critical        SPECIMEN REMOVED: N/A       POST-OP DIAGNOSIS:      [] Normal Coronary Angiogram     [] Mild Coronary Artery Disease (< 50% stenosis)     [x] 2 Vessel Coronary Artery Disease (LCX and RCA)       PLAN OF CARE:     [x] Return to In-patient bed     [x] Medications:   -ASA 81mg PO Q24  -Plavix 75mg PO Q24  -Coreg 12.5mg PO Q12  -Imdur 30mg PO Q24  -Lipitor 80mg PO QHS  -Torsemide 20mg PO Q12     [x] IV Fluids:  -NS@150cc/hr x 6 hours

## 2021-09-30 NOTE — PROGRESS NOTE ADULT - SUBJECTIVE AND OBJECTIVE BOX
GILLIAN MENDOZA  56y  Male      Patient is a 56y old  Male who presents with a chief complaint of     INTERVAL HPI/OVERNIGHT EVENTS: Patient in ER awaiting cardiac cath, NPO. Chest pain free      REVIEW OF SYSTEMS:  CONSTITUTIONAL: No fever, weight loss, or fatigue  EYES: No eye pain, visual disturbances, or discharge  ENMT:  No difficulty hearing, tinnitus, vertigo; No sinus or throat pain  NECK: No pain or stiffness  BREASTS: No pain, masses, or nipple discharge  RESPIRATORY: No cough, wheezing, chills or hemoptysis; No shortness of breath  CARDIOVASCULAR: No chest pain, palpitations, dizziness, or leg swelling  GASTROINTESTINAL: No abdominal or epigastric pain. No nausea, vomiting, or hematemesis; No diarrhea or constipation. No melena or hematochezia.  GENITOURINARY: No dysuria, frequency, hematuria, or incontinence  NEUROLOGICAL: No headaches, memory loss, loss of strength, numbness, or tremors  SKIN: No itching, burning, rashes, or lesions   LYMPH NODES: No enlarged glands  ENDOCRINE: No heat or cold intolerance; No hair loss  MUSCULOSKELETAL: No joint pain or swelling; No muscle, back, or extremity pain  PSYCHIATRIC: No depression, anxiety, mood swings, or difficulty sleeping  HEME/LYMPH: No easy bruising, or bleeding gums  ALLERY AND IMMUNOLOGIC: No hives or eczema    T(C): 36.7 (09-30-21 @ 07:31), Max: 36.9 (09-29-21 @ 11:20)  HR: 60 (09-30-21 @ 07:31) (60 - 82)  BP: 106/64 (09-30-21 @ 07:31) (106/64 - 177/88)  RR: 18 (09-30-21 @ 07:31) (16 - 18)  SpO2: 97% (09-30-21 @ 07:31) (97% - 98%)  Wt(kg): --Vital Signs Last 24 Hrs  T(C): 36.7 (30 Sep 2021 07:31), Max: 36.9 (29 Sep 2021 11:20)  T(F): 98.1 (30 Sep 2021 07:31), Max: 98.5 (29 Sep 2021 22:18)  HR: 60 (30 Sep 2021 07:31) (60 - 82)  BP: 106/64 (30 Sep 2021 07:31) (106/64 - 177/88)  BP(mean): --  RR: 18 (30 Sep 2021 07:31) (16 - 18)  SpO2: 97% (30 Sep 2021 07:31) (97% - 98%)    PHYSICAL EXAM:  GENERAL: NAD, well-groomed, well-developed  HEAD:  Atraumatic, Normocephalic  EYES: EOMI, PERRLA, conjunctiva and sclera clear  ENMT: No tonsillar erythema, exudates, or enlargement; Moist mucous membranes, Good dentition, No lesions  NECK: Supple, No JVD, Normal thyroid  NERVOUS SYSTEM:  Alert & Oriented X3, Good concentration; Motor Strength 5/5 B/L upper and lower extremities; DTRs 2+ intact and symmetric  CHEST/LUNG: Clear to percussion bilaterally; No rales, rhonchi, wheezing, or rubs  HEART: Regular rate and rhythm; No murmurs, rubs, or gallops  ABDOMEN: Soft, Nontender, Nondistended; Bowel sounds present  EXTREMITIES:  2+ Peripheral Pulses, No clubbing, cyanosis, or edema  LYMPH: No lymphadenopathy noted  SKIN: No rashes or lesions    Consultant(s) Notes Reviewed:  [x ] YES  [ ] NO    Discussed with Consultants/Other Providers [ x] YES     LABS                         9.6    10.45 )-----------( 213      ( 30 Sep 2021 06:05 )             31.8   09-30    138  |  107  |  39<H>  ----------------------------<  43<LL>  4.4   |  19  |  2.6<H>    Ca    8.2<L>      30 Sep 2021 06:05  Phos  3.8     09-30  Mg     2.3     09-30    TPro  5.8<L>  /  Alb  3.0<L>  /  TBili  0.4  /  DBili  x   /  AST  18  /  ALT  14  /  AlkPhos  115  09-30        RADIOLOGY & ADDITIONAL TESTS:    Imaging Personally Reviewed:  [ ] YES  [ ] NO    CARDIAC MARKERS ( 30 Sep 2021 06:05 )  x     / 0.14 ng/mL / x     / x     / 5.2 ng/mL  CARDIAC MARKERS ( 30 Sep 2021 01:00 )  x     / 0.13 ng/mL / x     / x     / 6.5 ng/mL  CARDIAC MARKERS ( 29 Sep 2021 20:56 )  x     / 0.12 ng/mL / x     / x     / 7.0 ng/mL  CARDIAC MARKERS ( 29 Sep 2021 12:51 )  x     / 0.12 ng/mL / x     / x     / x        HEALTH ISSUES - PROBLEM Dx:  MEDICATIONS  (STANDING):  aspirin  chewable 81 milliGRAM(s) Oral daily  atorvastatin Oral Tab/Cap - Peds 40 milliGRAM(s) Oral daily  carvedilol 12.5 milliGRAM(s) Oral every 12 hours  clopidogrel Tablet 75 milliGRAM(s) Oral daily  dextrose 5% + sodium chloride 0.9%. 1000 milliLiter(s) (75 mL/Hr) IV Continuous <Continuous>  gabapentin 100 milliGRAM(s) Oral two times a day  heparin   Injectable 5000 Unit(s) SubCutaneous every 12 hours  hydrALAZINE 100 milliGRAM(s) Oral three times a day  isosorbide   dinitrate Tablet (ISORDIL) 20 milliGRAM(s) Oral three times a day  sevelamer carbonate 800 milliGRAM(s) Oral three times a day  torsemide 20 milliGRAM(s) Oral two times a day    MEDICATIONS  (PRN):

## 2021-09-30 NOTE — PROGRESS NOTE ADULT - ASSESSMENT
Patient is a 56 year old male with PMHx of  HTN, HLD, CKD IV, T2DM on insulin a1c 5.8% on 7/3/2021, Glaucoma, and CAD s/p PCI in July 2021. The patient presented to the ED with complaint of left sided chest pain and difficulty sleeping 2/2 to  chest pain.     # Chest pain w/ elevated Troponin r/o ACS  # Hx of CAD s/p PCI July 2021  # Hx of HFrEF (40-45%)  - Tele monitoring  - CXR: no evidence of cardiopulmonary disease   - Planned for cardiac cath today  - troponin trend: 0.14 ng/mL (09-30-21)<--0.13 ng/mL (09-30-21)<--0.12 ng/mL (09-29-21)  - follow up 2D Echo, echo in July 2021 showing EF 40-45%  - c/w hydralazine 50 mg TID, isosorbide 20mg TID, torsemide 20 mg BID, increasing carvedilol to 12.5 mg BID   - c/w ASA  - c/w Atorvastatin  - c/w plavix  - c/w IV Hydration 75 cc/hr precath    # Microcytic Anemia  - MCV 76.9, Hb 10.7, denies bleeding  - follow up iron studies    # CKD4  - Nephrologist: Dr. Jan Cabrera  - baseline Cr 2.6- 2.7, at baseline  - c/w abhijit    # HTN (hypertension)  - c/w home meds per above     # HLD (hyperlipidemia)  - LDL (09/30) - 150   - c/w atorvastatin - change to 80mg OD    # DM (diabetes mellitus) Type 2  - A1C - 6.3  - monitor FSG q ACHS, maintain <180  - c/w gabapentin for neuropathy     #Misc  - DVT Prophylaxis: heparin   - GI Prophylaxis: n/a  - Diet: NPO for cath; restart after cath  - Activity: IAT  - Code Status: Full code

## 2021-10-01 ENCOUNTER — TRANSCRIPTION ENCOUNTER (OUTPATIENT)
Age: 56
End: 2021-10-01

## 2021-10-01 ENCOUNTER — APPOINTMENT (OUTPATIENT)
Dept: CARDIOLOGY | Facility: CLINIC | Age: 56
End: 2021-10-01

## 2021-10-01 LAB
ALBUMIN SERPL ELPH-MCNC: 2.9 G/DL — LOW (ref 3.5–5.2)
ALP SERPL-CCNC: 111 U/L — SIGNIFICANT CHANGE UP (ref 30–115)
ALT FLD-CCNC: 12 U/L — SIGNIFICANT CHANGE UP (ref 0–41)
ANION GAP SERPL CALC-SCNC: 11 MMOL/L — SIGNIFICANT CHANGE UP (ref 7–14)
ANION GAP SERPL CALC-SCNC: 12 MMOL/L — SIGNIFICANT CHANGE UP (ref 7–14)
AST SERPL-CCNC: 21 U/L — SIGNIFICANT CHANGE UP (ref 0–41)
BASOPHILS # BLD AUTO: 0.03 K/UL — SIGNIFICANT CHANGE UP (ref 0–0.2)
BASOPHILS NFR BLD AUTO: 0.4 % — SIGNIFICANT CHANGE UP (ref 0–1)
BILIRUB SERPL-MCNC: 0.3 MG/DL — SIGNIFICANT CHANGE UP (ref 0.2–1.2)
BUN SERPL-MCNC: 40 MG/DL — HIGH (ref 10–20)
BUN SERPL-MCNC: 46 MG/DL — HIGH (ref 10–20)
CALCIUM SERPL-MCNC: 7.9 MG/DL — LOW (ref 8.5–10.1)
CALCIUM SERPL-MCNC: 7.9 MG/DL — LOW (ref 8.5–10.1)
CHLORIDE SERPL-SCNC: 109 MMOL/L — SIGNIFICANT CHANGE UP (ref 98–110)
CHLORIDE SERPL-SCNC: 109 MMOL/L — SIGNIFICANT CHANGE UP (ref 98–110)
CO2 SERPL-SCNC: 18 MMOL/L — SIGNIFICANT CHANGE UP (ref 17–32)
CO2 SERPL-SCNC: 19 MMOL/L — SIGNIFICANT CHANGE UP (ref 17–32)
CREAT SERPL-MCNC: 2.7 MG/DL — HIGH (ref 0.7–1.5)
CREAT SERPL-MCNC: 3.4 MG/DL — HIGH (ref 0.7–1.5)
EOSINOPHIL # BLD AUTO: 0.45 K/UL — SIGNIFICANT CHANGE UP (ref 0–0.7)
EOSINOPHIL NFR BLD AUTO: 5.3 % — SIGNIFICANT CHANGE UP (ref 0–8)
GLUCOSE BLDC GLUCOMTR-MCNC: 127 MG/DL — HIGH (ref 70–99)
GLUCOSE BLDC GLUCOMTR-MCNC: 157 MG/DL — HIGH (ref 70–99)
GLUCOSE BLDC GLUCOMTR-MCNC: 198 MG/DL — HIGH (ref 70–99)
GLUCOSE BLDC GLUCOMTR-MCNC: 221 MG/DL — HIGH (ref 70–99)
GLUCOSE BLDC GLUCOMTR-MCNC: 99 MG/DL — SIGNIFICANT CHANGE UP (ref 70–99)
GLUCOSE SERPL-MCNC: 134 MG/DL — HIGH (ref 70–99)
GLUCOSE SERPL-MCNC: 143 MG/DL — HIGH (ref 70–99)
HCT VFR BLD CALC: 30 % — LOW (ref 42–52)
HGB BLD-MCNC: 9.2 G/DL — LOW (ref 14–18)
IMM GRANULOCYTES NFR BLD AUTO: 0.2 % — SIGNIFICANT CHANGE UP (ref 0.1–0.3)
LYMPHOCYTES # BLD AUTO: 0.63 K/UL — LOW (ref 1.2–3.4)
LYMPHOCYTES # BLD AUTO: 7.4 % — LOW (ref 20.5–51.1)
MAGNESIUM SERPL-MCNC: 2.3 MG/DL — SIGNIFICANT CHANGE UP (ref 1.8–2.4)
MCHC RBC-ENTMCNC: 24 PG — LOW (ref 27–31)
MCHC RBC-ENTMCNC: 30.7 G/DL — LOW (ref 32–37)
MCV RBC AUTO: 78.1 FL — LOW (ref 80–94)
MONOCYTES # BLD AUTO: 0.78 K/UL — HIGH (ref 0.1–0.6)
MONOCYTES NFR BLD AUTO: 9.1 % — SIGNIFICANT CHANGE UP (ref 1.7–9.3)
NEUTROPHILS # BLD AUTO: 6.63 K/UL — HIGH (ref 1.4–6.5)
NEUTROPHILS NFR BLD AUTO: 77.6 % — HIGH (ref 42.2–75.2)
NRBC # BLD: 0 /100 WBCS — SIGNIFICANT CHANGE UP (ref 0–0)
PLATELET # BLD AUTO: 179 K/UL — SIGNIFICANT CHANGE UP (ref 130–400)
POTASSIUM SERPL-MCNC: 4.7 MMOL/L — SIGNIFICANT CHANGE UP (ref 3.5–5)
POTASSIUM SERPL-MCNC: 4.7 MMOL/L — SIGNIFICANT CHANGE UP (ref 3.5–5)
POTASSIUM SERPL-SCNC: 4.7 MMOL/L — SIGNIFICANT CHANGE UP (ref 3.5–5)
POTASSIUM SERPL-SCNC: 4.7 MMOL/L — SIGNIFICANT CHANGE UP (ref 3.5–5)
PROT SERPL-MCNC: 5.4 G/DL — LOW (ref 6–8)
RBC # BLD: 3.84 M/UL — LOW (ref 4.7–6.1)
RBC # FLD: 13.7 % — SIGNIFICANT CHANGE UP (ref 11.5–14.5)
SODIUM SERPL-SCNC: 139 MMOL/L — SIGNIFICANT CHANGE UP (ref 135–146)
SODIUM SERPL-SCNC: 139 MMOL/L — SIGNIFICANT CHANGE UP (ref 135–146)
WBC # BLD: 8.54 K/UL — SIGNIFICANT CHANGE UP (ref 4.8–10.8)
WBC # FLD AUTO: 8.54 K/UL — SIGNIFICANT CHANGE UP (ref 4.8–10.8)

## 2021-10-01 PROCEDURE — 93010 ELECTROCARDIOGRAM REPORT: CPT

## 2021-10-01 PROCEDURE — 99233 SBSQ HOSP IP/OBS HIGH 50: CPT

## 2021-10-01 PROCEDURE — 99223 1ST HOSP IP/OBS HIGH 75: CPT

## 2021-10-01 RX ORDER — BRIMONIDINE TARTRATE 2 MG/MG
1 SOLUTION/ DROPS OPHTHALMIC
Qty: 0 | Refills: 0 | DISCHARGE

## 2021-10-01 RX ORDER — ATORVASTATIN CALCIUM 80 MG/1
1 TABLET, FILM COATED ORAL
Qty: 0 | Refills: 0 | DISCHARGE

## 2021-10-01 RX ORDER — SODIUM CHLORIDE 5 G/100ML
150 INJECTION, SOLUTION INTRAVENOUS
Refills: 0 | Status: DISCONTINUED | OUTPATIENT
Start: 2021-10-01 | End: 2021-10-01

## 2021-10-01 RX ORDER — SODIUM CHLORIDE 5 G/100ML
150 INJECTION, SOLUTION INTRAVENOUS
Refills: 0 | Status: DISCONTINUED | OUTPATIENT
Start: 2021-10-01 | End: 2021-10-02

## 2021-10-01 RX ORDER — NETARSUDIL 0.2 MG/ML
1 SOLUTION/ DROPS OPHTHALMIC; TOPICAL
Qty: 0 | Refills: 0 | DISCHARGE

## 2021-10-01 RX ORDER — PIOGLITAZONE HCL AND METFORMIN HCL 850; 15 MG/1; MG/1
1 TABLET ORAL
Qty: 0 | Refills: 0 | DISCHARGE

## 2021-10-01 RX ORDER — INSULIN GLARGINE 100 [IU]/ML
0 INJECTION, SOLUTION SUBCUTANEOUS
Qty: 0 | Refills: 0 | DISCHARGE

## 2021-10-01 RX ORDER — BUMETANIDE 0.25 MG/ML
1 INJECTION INTRAMUSCULAR; INTRAVENOUS
Qty: 20 | Refills: 0 | Status: DISCONTINUED | OUTPATIENT
Start: 2021-10-01 | End: 2021-10-02

## 2021-10-01 RX ORDER — INSULIN LISPRO 100/ML
VIAL (ML) SUBCUTANEOUS
Refills: 0 | Status: DISCONTINUED | OUTPATIENT
Start: 2021-10-01 | End: 2021-10-07

## 2021-10-01 RX ORDER — BUMETANIDE 0.25 MG/ML
1 INJECTION INTRAMUSCULAR; INTRAVENOUS ONCE
Refills: 0 | Status: DISCONTINUED | OUTPATIENT
Start: 2021-10-01 | End: 2021-10-01

## 2021-10-01 RX ORDER — BUMETANIDE 0.25 MG/ML
2 INJECTION INTRAMUSCULAR; INTRAVENOUS ONCE
Refills: 0 | Status: COMPLETED | OUTPATIENT
Start: 2021-10-01 | End: 2021-10-01

## 2021-10-01 RX ORDER — DORZOLAMIDE HYDROCHLORIDE TIMOLOL MALEATE 20; 5 MG/ML; MG/ML
1 SOLUTION/ DROPS OPHTHALMIC
Qty: 0 | Refills: 0 | DISCHARGE

## 2021-10-01 RX ORDER — SODIUM CHLORIDE 9 MG/ML
1000 INJECTION INTRAMUSCULAR; INTRAVENOUS; SUBCUTANEOUS
Refills: 0 | Status: DISCONTINUED | OUTPATIENT
Start: 2021-10-01 | End: 2021-10-01

## 2021-10-01 RX ADMIN — BUMETANIDE 5 MG/HR: 0.25 INJECTION INTRAMUSCULAR; INTRAVENOUS at 17:19

## 2021-10-01 RX ADMIN — IVABRADINE 5 MILLIGRAM(S): 7.5 TABLET, FILM COATED ORAL at 08:16

## 2021-10-01 RX ADMIN — SODIUM CHLORIDE 50 MILLILITER(S): 5 INJECTION, SOLUTION INTRAVENOUS at 14:46

## 2021-10-01 RX ADMIN — HEPARIN SODIUM 5000 UNIT(S): 5000 INJECTION INTRAVENOUS; SUBCUTANEOUS at 17:22

## 2021-10-01 RX ADMIN — HEPARIN SODIUM 5000 UNIT(S): 5000 INJECTION INTRAVENOUS; SUBCUTANEOUS at 06:15

## 2021-10-01 RX ADMIN — ISOSORBIDE MONONITRATE 30 MILLIGRAM(S): 60 TABLET, EXTENDED RELEASE ORAL at 11:57

## 2021-10-01 RX ADMIN — LATANOPROST 1 DROP(S): 0.05 SOLUTION/ DROPS OPHTHALMIC; TOPICAL at 22:50

## 2021-10-01 RX ADMIN — CARVEDILOL PHOSPHATE 12.5 MILLIGRAM(S): 80 CAPSULE, EXTENDED RELEASE ORAL at 06:15

## 2021-10-01 RX ADMIN — SEVELAMER CARBONATE 800 MILLIGRAM(S): 2400 POWDER, FOR SUSPENSION ORAL at 14:46

## 2021-10-01 RX ADMIN — CARVEDILOL PHOSPHATE 12.5 MILLIGRAM(S): 80 CAPSULE, EXTENDED RELEASE ORAL at 17:22

## 2021-10-01 RX ADMIN — DORZOLAMIDE HYDROCHLORIDE 1 DROP(S): 20 SOLUTION/ DROPS OPHTHALMIC at 06:14

## 2021-10-01 RX ADMIN — ATORVASTATIN CALCIUM 80 MILLIGRAM(S): 80 TABLET, FILM COATED ORAL at 22:51

## 2021-10-01 RX ADMIN — GABAPENTIN 100 MILLIGRAM(S): 400 CAPSULE ORAL at 17:24

## 2021-10-01 RX ADMIN — Medication 100 MILLIGRAM(S): at 06:15

## 2021-10-01 RX ADMIN — Medication 100 MILLIGRAM(S): at 13:29

## 2021-10-01 RX ADMIN — CLOPIDOGREL BISULFATE 75 MILLIGRAM(S): 75 TABLET, FILM COATED ORAL at 11:57

## 2021-10-01 RX ADMIN — BRIMONIDINE TARTRATE 1 DROP(S): 2 SOLUTION/ DROPS OPHTHALMIC at 06:14

## 2021-10-01 RX ADMIN — Medication 1 DROP(S): at 06:14

## 2021-10-01 RX ADMIN — Medication 100 MILLIGRAM(S): at 22:51

## 2021-10-01 RX ADMIN — BRIMONIDINE TARTRATE 1 DROP(S): 2 SOLUTION/ DROPS OPHTHALMIC at 17:21

## 2021-10-01 RX ADMIN — SEVELAMER CARBONATE 800 MILLIGRAM(S): 2400 POWDER, FOR SUSPENSION ORAL at 06:16

## 2021-10-01 RX ADMIN — Medication 1 DROP(S): at 17:21

## 2021-10-01 RX ADMIN — SEVELAMER CARBONATE 800 MILLIGRAM(S): 2400 POWDER, FOR SUSPENSION ORAL at 22:52

## 2021-10-01 RX ADMIN — Medication 81 MILLIGRAM(S): at 11:57

## 2021-10-01 RX ADMIN — DORZOLAMIDE HYDROCHLORIDE 1 DROP(S): 20 SOLUTION/ DROPS OPHTHALMIC at 17:20

## 2021-10-01 RX ADMIN — Medication 20 MILLIGRAM(S): at 06:15

## 2021-10-01 RX ADMIN — Medication 2: at 12:14

## 2021-10-01 RX ADMIN — GABAPENTIN 100 MILLIGRAM(S): 400 CAPSULE ORAL at 06:15

## 2021-10-01 RX ADMIN — BUMETANIDE 2 MILLIGRAM(S): 0.25 INJECTION INTRAMUSCULAR; INTRAVENOUS at 14:47

## 2021-10-01 RX ADMIN — IVABRADINE 5 MILLIGRAM(S): 7.5 TABLET, FILM COATED ORAL at 17:22

## 2021-10-01 NOTE — DISCHARGE NOTE PROVIDER - CARE PROVIDERS DIRECT ADDRESSES
,DirectAddress_Unknown,[email or web address] ,[email or web address],[email or web address]

## 2021-10-01 NOTE — PROGRESS NOTE ADULT - SUBJECTIVE AND OBJECTIVE BOX
************************************************  Regan Bradford MD (PGY-1)  Spectra: [phone]  ************************************************    SUBJECTIVE / OVERNIGHT EVENTS  Patient slept well overnight. No acute complaints this AM. Patient does not report fevers, chills, CP, SOB, or n/v/d    ========================MEDICATIONS========================    aspirin  chewable   81 milliGRAM(s) Oral (10-01-21 @ 11:57)    atorvastatin   80 milliGRAM(s) Oral (09-30-21 @ 21:45)    brimonidine 0.2% Ophthalmic Solution   1 Drop(s) Both EYES (10-01-21 @ 06:14)    buMETAnide Injectable   2 milliGRAM(s) IV Push (10-01-21 @ 14:47)    carvedilol   12.5 milliGRAM(s) Oral (10-01-21 @ 06:15)   12.5 milliGRAM(s) Oral (09-30-21 @ 17:42)    clopidogrel Tablet   75 milliGRAM(s) Oral (10-01-21 @ 11:57)    dextrose 40% Gel   15 Gram(s) Oral (09-30-21 @ 21:45)    dorzolamide 2% Ophthalmic Solution   1 Drop(s) Both EYES (10-01-21 @ 06:14)    gabapentin   100 milliGRAM(s) Oral (10-01-21 @ 06:15)    heparin   Injectable   5000 Unit(s) SubCutaneous (10-01-21 @ 06:15)    hydrALAZINE   100 milliGRAM(s) Oral (10-01-21 @ 13:29)   100 milliGRAM(s) Oral (10-01-21 @ 06:15)   100 milliGRAM(s) Oral (09-30-21 @ 21:45)   100 milliGRAM(s) Oral (09-30-21 @ 17:43)    insulin lispro (ADMELOG) corrective regimen sliding scale   2 Unit(s) SubCutaneous (10-01-21 @ 12:14)    isosorbide   mononitrate ER Tablet (IMDUR)   30 milliGRAM(s) Oral (10-01-21 @ 11:57)    ivabradine   5 milliGRAM(s) Oral (10-01-21 @ 08:16)    latanoprost 0.005% Ophthalmic Solution   1 Drop(s) Both EYES (09-30-21 @ 21:45)    sevelamer carbonate   800 milliGRAM(s) Oral (10-01-21 @ 14:46)   800 milliGRAM(s) Oral (10-01-21 @ 06:16)   800 milliGRAM(s) Oral (09-30-21 @ 21:47)    sodium chloride 3%.   50 mL/Hr IV Continuous (10-01-21 @ 13:43)    timolol 0.5% Solution   1 Drop(s) Both EYES (10-01-21 @ 06:14)    torsemide   20 milliGRAM(s) Oral (10-01-21 @ 06:15)      ========================VITALS/EXAM========================  VITALS  T(C): 36.4 (10-01-21 @ 13:17), Max: 36.6 (09-30-21 @ 20:36)  HR: 64 (10-01-21 @ 13:17) (61 - 68)  BP: 145/81 (10-01-21 @ 13:17) (118/73 - 145/81)  RR: 18 (10-01-21 @ 06:25) (18 - 18)  SpO2: --  POCT Blood Glucose.: 221 mg/dL (10-01-21 @ 12:09)  POCT Blood Glucose.: 198 mg/dL (10-01-21 @ 07:58)  POCT Blood Glucose.: 108 mg/dL (09-30-21 @ 22:06)  POCT Blood Glucose.: 48 mg/dL (09-30-21 @ 21:23)  POCT Blood Glucose.: 47 mg/dL (09-30-21 @ 21:20)    PHYSICAL EXAM  GENERAL: NAD, well-developed  CHEST/LUNG: Clear to auscultation bilaterally; No wheezes, rales or rhonchi  HEART: Regular rate and rhythm; No murmurs, rubs, or gallops  ABDOMEN: Soft, Nontender, Nondistended; Bowel sounds present, no masses.  EXTREMITIES:  2+ Peripheral Pulses, No clubbing, cyanosis, or edema    =============================I/O=============================     09-30-21 @ 07:01  -  10-01-21 @ 07:00  --------------------------------------------------------  IN:  Total IN: 0 mL    OUT:    Voided (mL): 200 mL  Total OUT: 200 mL    Total NET: -200 mL      10-01-21 @ 07:01  -  10-01-21 @ 15:18  --------------------------------------------------------  IN:    Oral Fluid: 650 mL  Total IN: 650 mL    OUT:    Voided (mL): 350 mL  Total OUT: 350 mL    Total NET: 300 mL        ============================LABS============================             9.2<L>  8.54  )-----------( 179      ( 10-01-21 @ 06:51 )             30.0<L>    139  |  109  |  40<H>  ----------------------------<  143<H>   10-01-21 @ 06:51  4.7  |  18  |  2.7<H>    Ca      7.9<L>     10-01-21 @ 06:51  Phos   3.8     09-30-21 @ 06:05  Mg     2.3     10-01-21 @ 06:51    TPro  5.4<L>  /  Alb  2.9<L>  /  TBili  0.3  /  DBili  x   /  AST  21  /  ALT  12  /  AlkPhos  111  /  GGT  x     10-01-21 @ 06:51      Troponin T, Serum: 0.14 ng/mL (09-30-21 @ 06:05)  Troponin T, Serum: 0.13 ng/mL (09-30-21 @ 01:00)  Troponin T, Serum: 0.12 ng/mL (09-29-21 @ 20:56)  Troponin T, Serum: 0.12 ng/mL (09-29-21 @ 12:51)    CKMB Units: 5.2 ng/mL (09-30-21 @ 06:05)  CKMB Units: 6.5 ng/mL (09-30-21 @ 01:00)  CKMB Units: 7.0 ng/mL (09-29-21 @ 20:56)    Serum Pro-Brain Natriuretic Peptide: 52732 pg/mL (09-29-21 @ 12:51)    =======================Micro/Rad/Cardio=======================  Telemetry: Reviewed   EKG: Reviewed    TTE Echo Complete w/o Contrast w/ Doppler (09.30.21 @ 16:45)   1. Moderately decreased segmental left ventricular systolic function.   2. Mid and apical inferior wall, basal and mid inferolateral wall, mid anterolateral segment, and apical lateral segment are abnormal as described above.   3. LV Ejection Fraction by Garcia's Method with a biplane EF of 42 %.   4. Mildly increased LV wall thickness.   5. Mildly enlarged left atrium.   6. Normal right atrial size.   7. Small to moderate pericardial effusion.   8. Mild to moderate mitral valve regurgitation.   9. Moderate tricuspid regurgitation.  10. Mild pulmonic valve regurgitation.  11. Spectral Doppler shows restrictive pattern of left ventricular myocardial filling (Grade III diastolic dysfunction).  12. Mild thickening of the anterior and posterior mitral valve leaflets.    Cardiac Catheterization 9/30/2021  INTERVENTION:  -Successful IVUS guided PCI with balloon angioplasty, Intravascular lithotripsy, and drug eluting stent implantation x 1 of proximal LCX.  -Successful IVUS guided PCI with balloon angioplasty, and drug eluting stent implantation x 1 of distal LCX.  IMPLANTS:   -3.5x20 mm Synergy stent of prox LCX  -2.5x24 mm Synergy stent of distal LCX   FINDINGS:   Coronary Dominance: Right dominant   LM:  mild disease  LAD: patent stent in prox LAD with minor irregularities  CX: pLCX 90% tubular stenosis; dLCX 85% diffuse stenosis  RCA: known to be occluded from prior cath, distal vessel supplied by collaterals from LCX and LAD.

## 2021-10-01 NOTE — PROGRESS NOTE ADULT - ASSESSMENT
A 56 year old male with PMHx of  HTN, HLD, CKD IV, T2DM on insulin a1c 5.8%, Glaucoma, and CAD presented to the ED with left sided chest pain and difficulty sleeping 2/2 to chest pain.    [place]  CAD / HTN  DM-2  CKD-4  Acute on chronic HFmrEF  Ischemic CM            PLAN:    ·	S/P cath on 9/30 and placement of 2 SAMMY in LCRX  ·	On ASA, Plavix and Lipitor  ·	Pt has elevated JVD and is fluid overload   ·	ECHO showed EF is 42%  ·	Care D/W the HF specialist. Will give Bumex 2 mg ivp and then Bumex infusion 1 mg/hr for 5 hrs. 3% saline 150 cc over three hrs.   ·	On D/C oral Torsemide 40 mg in AM and 20 mg in PM  ·	Check BMP tonight and tomorrow AM  ·	Check i's and o's and daily wt  ·	Low salt diet and water restriction to 1.5 L/D  ·	On Coreg, Hydralazine and Isosorbide dinitrate.     Progress Note Handoff    Pending (specify):  Consults_________, Tests________, Test Results_______, Other__Anticipate D/C in AM_______  Family discussion:  Disposition: Home___/SNF___/Other________/Unknown at this time________    Blake Chacon MD  Spectra: [phone]

## 2021-10-01 NOTE — DISCHARGE NOTE PROVIDER - CARE PROVIDER_API CALL
Norberto Kitchen  Internal Medicine  N  Paul JESUS,    Phone: ()-  Fax: ()-  Follow Up Time: Routine    Davis Knapp)  Internal Medicine; Nephrology  [address] [place] [address]  Phone: [phone]  Fax: [phone]  Follow Up Time: 1 week    Tania Scott; MD)  [facility]  [address] [place] [address], [address] [place] [address]  Phone: [phone]  Fax: [phone]  Follow Up Time: 1 week   Norberto Kitchen  Internal Medicine  N  Paul JESUS,    Phone: ()-  Fax: ()-  Follow Up Time: Routine    Davis Knapp)  Internal Medicine; Nephrology  [address] [place] [address]  Phone: [phone]  Fax: [phone]  Follow Up Time: 1 week    Tania Scott; MD)  [facility]  [address] [place] [address], [address] [place] [address]  Phone: [phone]  Fax: [phone]  Follow Up Time: 1 week    Jeffery Singh)  Cardiovascular Disease; Internal Medicine; Interventional Cardiology; Nuclear Cardiology  [address] [place] [address], [address]  [place] [address]  Phone: [phone]  Fax: [phone]  Follow Up Time:

## 2021-10-01 NOTE — DISCHARGE NOTE PROVIDER - NSDCCPCAREPLAN_GEN_ALL_CORE_FT
PRINCIPAL DISCHARGE DIAGNOSIS  Diagnosis: Chest pain  Assessment and Plan of Treatment: You came into the hospital with chest pain. On admission we found you to have evidence of reduced blood flow to your heart so cardiology placed stents in one of your coronary arteries. Please contine to follow with your cardiologist.      SECONDARY DISCHARGE DIAGNOSES  Diagnosis: Contrast dye induced nephropathy  Assessment and Plan of Treatment: After your cardiac catheterization your developed worsening kidney function, likely due to the contrast used during the cardiac catheterization. This is a unavoidable complication of using IV contrast that occasionally occurs. During your time in the hospital, we gave you diuretics to avoid another heart failure exacerbation and your kidney function began to improve. It is continuing to improve, however it is very important you continue to follow with your kidney doctor as a outpatient in 1 week to make sure your kidney continues to improve.     PRINCIPAL DISCHARGE DIAGNOSIS  Diagnosis: Acute coronary syndrome  Assessment and Plan of Treatment: You came into the hospital with chest pain. On admission we found you to have evidence of reduced blood flow to your heart so cardiology placed 2 stents in one of your coronary arteries to improve the blood flow to your heart. After this your chest pain resolved. Please contine to follow with your cardiologist and return to the hospital if the chest pain returns.      SECONDARY DISCHARGE DIAGNOSES  Diagnosis: Contrast dye induced nephropathy  Assessment and Plan of Treatment: After your cardiac catheterization your developed worsening kidney function, possibly due to the contrast used during the cardiac catheterization. This is a complication of using IV contrast that occasionally occurs. During your time in the hospital, we gave you diuretics to avoid another heart failure exacerbation and your kidney function began to improve. It is continuing to improve, however it is very important you continue to follow with your kidney doctor as a outpatient in 1 week to make sure your kidney continues to improve.     PRINCIPAL DISCHARGE DIAGNOSIS  Diagnosis: Acute coronary syndrome  Assessment and Plan of Treatment: You came into the hospital with chest pain. On admission we found you to have evidence of reduced blood flow to your heart so cardiology placed 2 stents in one of your coronary arteries to improve the blood flow to your heart. After this your chest pain resolved. Please contine to follow with your cardiologist and return to the hospital if the chest pain returns.      SECONDARY DISCHARGE DIAGNOSES  Diagnosis: Contrast dye induced nephropathy  Assessment and Plan of Treatment: After your cardiac catheterization your developed worsening kidney function, possibly due to the contrast used during the cardiac catheterization. This is a complication of using IV contrast that occasionally occurs. During your time in the hospital, we gave you diuretics to avoid another heart failure exacerbation and your kidney function began to improve. It is continuing to improve, however it is very important you continue to follow with your kidney doctor as a outpatient in 1 week to make sure your kidney continues to improve.  Please follow up with Dr. Rollins on 10/13/21 at 2pm     PRINCIPAL DISCHARGE DIAGNOSIS  Diagnosis: Acute coronary syndrome  Assessment and Plan of Treatment: You came into the hospital with chest pain. On admission we found you to have evidence of reduced blood flow to your heart so cardiology placed 2 stents in one of your coronary arteries to improve the blood flow to your heart. After this your chest pain resolved. Please contine to follow with your cardiologist and return to the hospital if the chest pain returns.  Acute HFmrEF   - continued with meds as instructed   - take all your medication and follow up with the cardiologist      SECONDARY DISCHARGE DIAGNOSES  Diagnosis: Contrast dye induced nephropathy  Assessment and Plan of Treatment: After your cardiac catheterization your developed worsening kidney function, possibly due to the contrast used during the cardiac catheterization. This is a complication of using IV contrast that occasionally occurs. During your time in the hospital, we gave you diuretics to avoid another heart failure exacerbation and your kidney function began to improve. It is continuing to improve, however it is very important you continue to follow with your kidney doctor as a outpatient in 1 week to make sure your kidney continues to improve.  Please follow up with Dr. Rollins on 10/13/21 at 2pm

## 2021-10-01 NOTE — DISCHARGE NOTE PROVIDER - HOSPITAL COURSE
Patient is a 56 year old male with PMHx of  HTN, HLD, CKD IV, T2DM on insulin a1c 5.8%, Glaucoma, and CAD s/p PCI in July 2021. The patient presented to the ED with complaint of left sided chest pain and difficulty sleeping 2/2 to chest pain and BL LE neuropathy (chronic). The patient states that last night he woke up and from sleep due to burning substernal chest pain, non radiating, no exacerbation or alleviating factors. Of notr the patient had recent PCI in July 2021. During that admission patient had a prolonged stay in CCU, was treated for acute CHF exacerbation and worsening kidney function. Cath on 7/22/21 showed significant 3 vessle disease, LAD, RCA, LCx. CTS was consulted. During preop cardiac MRI patient became SOB. He received bumex and hypertonic saline for diuresis.  CABG was decided against due to increased risk of progression of CKDIV to ESRD.  The patient was discharged on DAPT and follows with Dr. Cantu as his cardiologist    In the ED, patient is hemodynamically stable with elevated troponin 0.12, microcytic anemia, elevated BNP [id number]. The patient was evaluated in the ED by cardiology and is planed for cardiac cath on 9/30.  (09-29-21 @ 19:16)   Patient is a 56 year old male with PMHx of  HTN, HLD, CKD IV, T2DM on insulin a1c 5.8%, Glaucoma, and CAD s/p PCI in July 2021. The patient presented to the ED with complaint of left sided chest pain and difficulty sleeping 2/2 to chest pain and BL LE neuropathy (chronic). The patient states that last night he woke up and from sleep due to burning substernal chest pain, non radiating, no exacerbation or alleviating factors.   Of note, the patient had recent PCI in July 2021. During that admission patient had a prolonged stay in CCU, was treated for acute CHF exacerbation and worsening kidney function. Cath on 7/22/21 showed significant 3 vessel disease, LAD, RCA, LCx. CTS was consulted. During preop cardiac MRI patient became SOB. He received bumex and hypertonic saline for diuresis.  CABG was decided against due to increased risk of progression of CKDIV to ESRD.  The patient was discharged on DAPT and follows with Dr. Cantu as his cardiologist.    In the ED, patient was hemodynamically stable with elevated troponin 0.12, microcytic anemia, elevated BNP 43384. The patient was evaluated in the ED by cardiology, who recommended an increase of his home carvedilol 6.25mg to 12.5mg bid and requested a TTE which showed an unchanged 40-45% EF. The patient was then placed on gentle hydration and NPO, planned for cardiac cath in the morning.  (09-29-21 @ 19:16)    The patient was awake and alert on 9/30 prior to catheterization and denied any further chest pain, SOB, or other symptoms. His creatinine levels were closely monitored before and after catheterization due to history of CKD IV and administration of IV contrast for pre-cath coronary angiography. A successful IVUS-guided balloon angioplasty, intravascular lithotripsy, and drug eluting stent implantation was performed in two areas with one 3.5x20mm stent placed in the proximal LCX and one 2.5x24mm stent placed in the distal LCX. The patient was then returned to telemetry monitoring on a regimen of ASA, Plavix, COreg, Imdur, Lipitor, Torsemide and fluids (NS) to be cleared by cardiology for discharge. Patient is a 56 year old male with PMHx of  HTN, HLD, CKD IV, T2DM on insulin a1c 5.8%, Glaucoma, and CAD s/p PCI in July 2021. The patient presented to the ED with complaint of left sided chest pain and difficulty sleeping 2/2 to chest pain and BL LE neuropathy (chronic). The patient states that last night he woke up and from sleep due to burning substernal chest pain, non radiating, no exacerbation or alleviating factors.   Of note, the patient had recent PCI in July 2021. During that admission patient had a prolonged stay in CCU, was treated for acute CHF exacerbation and worsening kidney function. Cath on 7/22/21 showed significant 3 vessel disease, LAD, RCA, LCx. CTS was consulted. During preop cardiac MRI patient became SOB. He received bumex and hypertonic saline for diuresis. CABG was decided against due to increased risk of progression of CKDIV to ESRD.  The patient was discharged on DAPT and follows with Dr. Cantu as his cardiologist.  In the ED, patient was hemodynamically stable with elevated troponin 0.12, microcytic anemia, elevated BNP 97376. The patient was evaluated in the ED by cardiology, who recommended an increase of his home carvedilol 6.25mg to 12.5mg bid and requested a TTE which showed an unchanged 40-45% EF. The patient was then placed on gentle hydration and NPO, planned for cardiac cath in the morning.   The patient was awake and alert on 9/30 prior to catheterization and denied any further chest pain, SOB, or other symptoms. His creatinine levels were closely monitored before and after catheterization due to history of CKD IV and administration of IV contrast for pre-cath coronary angiography. A successful IVUS-guided balloon angioplasty, intravascular lithotripsy, and drug eluting stent implantation was performed in two areas with one 3.5x20mm stent placed in the proximal LCX and one 2.5x24mm stent placed in the distal LCX. The patient was then returned to telemetry monitoring on a regimen of ASA, Plavix, COreg, Imdur, Lipitor, Torsemide and fluids (NS). Pt's stay on telemetry unit was complicated by contrast induced nephropathy with cr reaching as high as 6. Pts urine output decreased and became more fluid overload, so was started on bumex ggt to avoid acute CHF exacerbation. Pt's volume status and creatinine improved with diuresis. Cr remains elevated however cleared by renal and cardiology to resume diuresis with torsemide 20 BID at home and very close outpatient f/u with neprho and cardiology in one week.

## 2021-10-01 NOTE — PROGRESS NOTE ADULT - SUBJECTIVE AND OBJECTIVE BOX
GILLIAN MENDOZA  56y Male    CHIEF COMPLAINT:    Patient is a 56y old  Male who presents with a chief complaint of Chest Pain (01 Oct 2021 10:05)      INTERVAL HPI/OVERNIGHT EVENTS:    Patient seen and examined. S/P cath and PCI. No cp. No sob at rest. Fluid overload    ROS: All other systems are negative.    Vital Signs:    T(F): 97.7 (10-01-21 @ 06:25), Max: 97.8 (21 @ 20:36)  HR: 68 (10-01-21 @ 06:25) (61 - 68)  BP: 120/70 (10-01-21 @ 06:25) (118/73 - 131/79)  RR: 18 (10-01-21 @ 06:25) (18 - 18)  SpO2: --  I&O's Summary    30 Sep 2021 07:01  -  01 Oct 2021 07:00  --------------------------------------------------------  IN: 0 mL / OUT: 200 mL / NET: -200 mL      Daily     Daily Weight in k.4 (01 Oct 2021 06:25)  CAPILLARY BLOOD GLUCOSE      POCT Blood Glucose.: 198 mg/dL (01 Oct 2021 07:58)  POCT Blood Glucose.: 108 mg/dL (30 Sep 2021 22:06)  POCT Blood Glucose.: 48 mg/dL (30 Sep 2021 21:23)  POCT Blood Glucose.: 47 mg/dL (30 Sep 2021 21:20)      PHYSICAL EXAM:    GENERAL:  NAD  SKIN: No rashes or lesions  HENT: Atraumatic. Normocephalic. PERRL. Moist membranes.  NECK: Supple, +ve JVD. No lymphadenopathy.  PULMONARY: CTA B/L. No wheezing. No rales  CVS: Normal S1, S2. Rate and Rhythm are regular. No murmurs.  ABDOMEN/GI: Soft, Nontender, Nondistended; BS present  EXTREMITIES: Peripheral pulses intact. No edema B/L LE.  NEUROLOGIC:  No motor or sensory deficit.  PSYCH: Alert & oriented x 3    Consultant(s) Notes Reviewed:  [x ] YES  [ ] NO  Care Discussed with Consultants/Other Providers [ x] YES  [ ] NO    EKG reviewed  Telemetry reviewed    LABS:                        9.2    8.54  )-----------( 179      ( 01 Oct 2021 06:51 )             30.0     10    139  |  109  |  40<H>  ----------------------------<  143<H>   Creatinine Trend: 2.7<--, 2.6<--, 2.7<--  4.7   |  18  |  2.7<H>    Ca    7.9<L>      01 Oct 2021 06:51  Phos  3.8       Mg     2.3     10-01    TPro  5.4<L>  /  Alb  2.9<L>  /  TBili  0.3  /  DBili  x   /  AST  21  /  ALT  12  /  AlkPhos  111  10-01    PT/INR - ( 29 Sep 2021 12:51 )   PT: 10.90 sec;   INR: 0.95 ratio         PTT - ( 29 Sep 2021 12:51 )  PTT:32.1 sec  Serum Pro-Brain Natriuretic Peptide: 64981 pg/mL (21 @ 12:51)    Trop 0.14, CKMB 5.2, CK --, 21 @ 06:05  Trop 0.13, CKMB 6.5, CK --, 21 @ 01:00  Trop 0.12, CKMB 7.0, CK --, 21 @ 20:56  Trop 0.12, CKMB --, CK --, 21 @ 12:51        RADIOLOGY & ADDITIONAL TESTS:    < from: TTE Echo Complete w/o Contrast w/ Doppler (21 @ 16:45) >    Summary:   1. Moderately decreased segmental left ventricular systolic function.   2. Mid and apical inferior wall, basal and mid inferolateral wall, mid anterolateral segment, and apical lateral segment are abnormal as described above.   3. LV Ejection Fraction by Garcia's Method with a biplane EF of 42 %.   4. Mildly increased LV wall thickness.   5. Mildly enlarged left atrium.   6. Normal right atrial size.   7. Small to moderate pericardial effusion.   8. Mild to moderate mitral valve regurgitation.   9. Moderate tricuspid regurgitation.  10. Mild pulmonic valve regurgitation.  11. Spectral Doppler shows restrictive pattern of left ventricular myocardial filling (Grade III diastolic dysfunction).  12. Mild thickening of the anterior and posterior mitral valve leaflets.    < end of copied text >    Imaging or report Personally Reviewed:  [ ] YES  [ ] NO    Medications:  Standing  aspirin  chewable 81 milliGRAM(s) Oral daily  atorvastatin 80 milliGRAM(s) Oral at bedtime  brimonidine 0.2% Ophthalmic Solution 1 Drop(s) Both EYES two times a day  buMETAnide Injectable 2 milliGRAM(s) IV Push once  buMETAnide IVPB 1 milliGRAM(s) IV Intermittent once  carvedilol 12.5 milliGRAM(s) Oral every 12 hours  clopidogrel Tablet 75 milliGRAM(s) Oral daily  dextrose 40% Gel 15 Gram(s) Oral once  dextrose 5%. 1000 milliLiter(s) IV Continuous <Continuous>  dextrose 5%. 1000 milliLiter(s) IV Continuous <Continuous>  dextrose 50% Injectable 25 Gram(s) IV Push once  dextrose 50% Injectable 12.5 Gram(s) IV Push once  dextrose 50% Injectable 25 Gram(s) IV Push once  dextrose 50% Injectable 10 milliLiter(s) IV Push once  dorzolamide 2% Ophthalmic Solution 1 Drop(s) Both EYES <User Schedule>  gabapentin 100 milliGRAM(s) Oral two times a day  glucagon  Injectable 1 milliGRAM(s) IntraMuscular once  heparin   Injectable 5000 Unit(s) SubCutaneous every 12 hours  hydrALAZINE 100 milliGRAM(s) Oral three times a day  insulin lispro (ADMELOG) corrective regimen sliding scale   SubCutaneous three times a day before meals  isosorbide   mononitrate ER Tablet (IMDUR) 30 milliGRAM(s) Oral daily  ivabradine 5 milliGRAM(s) Oral two times a day  latanoprost 0.005% Ophthalmic Solution 1 Drop(s) Both EYES at bedtime  sevelamer carbonate 800 milliGRAM(s) Oral three times a day  sodium chloride 0.9%. 1000 milliLiter(s) IV Continuous <Continuous>  sodium chloride 3%. 150 milliLiter(s) IV Continuous <Continuous>  timolol 0.5% Solution 1 Drop(s) Both EYES two times a day  torsemide 20 milliGRAM(s) Oral two times a day    PRN Meds      Case discussed with resident    Care discussed with pt/family

## 2021-10-01 NOTE — DISCHARGE NOTE PROVIDER - NSDCMRMEDTOKEN_GEN_ALL_CORE_FT
aspirin 81 mg oral tablet, chewable: 1 tab(s) orally once a day  atorvastatin 40 mg oral tablet: 1 tab(s) orally once a day  BASAGLAR 100 UNIT/ML KWIKPEN:   brimonidine 0.2% ophthalmic solution: 1 drop(s) to each affected eye 2 times a day  carvedilol 3.125 mg oral tablet: 1 tab(s) orally every 12 hours  clopidogrel 75 mg oral tablet: 1 tab(s) orally once a day  dorzolamide-timolol 2%-0.5% preservative-free ophthalmic solution: 1 drop(s) to each affected eye 2 times a day  gabapentin 100 mg oral capsule: 1 cap(s) orally 2 times a day  hydrALAZINE 50 mg oral tablet: 1 tab(s) orally every 8 hours  isosorbide dinitrate 20 mg oral tablet: 1 tab(s) orally 3 times a day  latanoprost 0.005% ophthalmic solution: 1 drop(s) to each affected eye once a day (at bedtime)  pioglitazone-metformin 15 mg-850 mg oral tablet: 1 tab(s) orally 2 times a day  HOLD MEDICATION FOR 48 HR AFTER CARDIAC CATH   Rhopressa 0.02% ophthalmic solution: 1 drop(s) to each affected eye once a day (in the evening)  sevelamer carbonate 800 mg oral tablet: 1 tab(s) orally 3 times a day (with meals)  torsemide 20 mg oral tablet: 1 tab(s) orally 2 times a day,    aspirin 81 mg oral tablet, chewable: 1 tab(s) orally once a day  atorvastatin 40 mg oral tablet: 1 tab(s) orally once a day  BASAGLAR 100 UNIT/ML KWIKPEN:   brimonidine 0.2% ophthalmic solution: 1 drop(s) to each affected eye 2 times a day  carvedilol 3.125 mg oral tablet: 1 tab(s) orally every 12 hours  clopidogrel 75 mg oral tablet: 1 tab(s) orally once a day  dorzolamide-timolol 2%-0.5% preservative-free ophthalmic solution: 1 drop(s) to each affected eye 2 times a day  gabapentin 100 mg oral capsule: 1 cap(s) orally 2 times a day  latanoprost 0.005% ophthalmic solution: 1 drop(s) to each affected eye once a day (at bedtime)  pioglitazone-metformin 15 mg-850 mg oral tablet: 1 tab(s) orally 2 times a day  HOLD MEDICATION FOR 48 HR AFTER CARDIAC CATH   Rhopressa 0.02% ophthalmic solution: 1 drop(s) to each affected eye once a day (in the evening)  sevelamer carbonate 800 mg oral tablet: 1 tab(s) orally 3 times a day (with meals)  torsemide 20 mg oral tablet: 1 tab(s) orally 2 times a day,    aspirin 81 mg oral tablet, chewable: 1 tab(s) orally once a day  atorvastatin 40 mg oral tablet: 1 tab(s) orally once a day  BASAGLAR 100 UNIT/ML KWIKPEN:   brimonidine 0.2% ophthalmic solution: 1 drop(s) to each affected eye 2 times a day  clopidogrel 75 mg oral tablet: 1 tab(s) orally once a day  dorzolamide-timolol 2%-0.5% preservative-free ophthalmic solution: 1 drop(s) to each affected eye 2 times a day  gabapentin 100 mg oral capsule: 1 cap(s) orally 2 times a day  latanoprost 0.005% ophthalmic solution: 1 drop(s) to each affected eye once a day (at bedtime)  pioglitazone-metformin 15 mg-850 mg oral tablet: 1 tab(s) orally 2 times a day  HOLD MEDICATION FOR 48 HR AFTER CARDIAC CATH   Rhopressa 0.02% ophthalmic solution: 1 drop(s) to each affected eye once a day (in the evening)  sevelamer carbonate 800 mg oral tablet: 1 tab(s) orally 3 times a day (with meals)  torsemide 20 mg oral tablet: 1 tab(s) orally 2 times a day,

## 2021-10-01 NOTE — DISCHARGE NOTE NURSING/CASE MANAGEMENT/SOCIAL WORK - PATIENT PORTAL LINK FT
You can access the FollowMyHealth Patient Portal offered by [facility] by registering at the following website: [email or web address]. By joining [facility]’s FollowMyHealth portal, you will also be able to view your health information using other applications (apps) compatible with our system.

## 2021-10-01 NOTE — DISCHARGE NOTE PROVIDER - PROVIDER TOKENS
PROVIDER:[TOKEN:[[id number]],FOLLOWUP:[Routine]],PROVIDER:[TOKEN:[[id number]],FOLLOWUP:[1 week]],PROVIDER:[TOKEN:[[id number]],FOLLOWUP:[1 week]] PROVIDER:[TOKEN:[[id number]],FOLLOWUP:[Routine]],PROVIDER:[TOKEN:[[id number]],FOLLOWUP:[1 week]],PROVIDER:[TOKEN:[[id number]],FOLLOWUP:[1 week]],PROVIDER:[TOKEN:[[id number]]]

## 2021-10-01 NOTE — CONSULT NOTE ADULT - SUBJECTIVE AND OBJECTIVE BOX
Date of Admission:    HISTORY OF PRESENT ILLNESS:     Patient is a 56 year old male with PMHx of  HTN, HLD, CKD IV, T2DM on insulin a1c 5.8%, Glaucoma, and CAD s/p PCI in July 2021. The patient presented to the ED with complaint of left sided chest pain and difficulty sleeping 2/2 to chest pain and BL LE neuropathy (chronic). The patient states that last night he woke up and from sleep due to burning substernal chest pain, non radiating, no exacerbation or alleviating factors. Of note the patient had recent PCI in July 2021. During that admission patient had a prolonged stay in CCU, was treated for acute CHF exacerbation and worsening kidney function. Cath on 7/22/21 showed significant 3 vessels disease, LAD, RCA, LCx. CTS was consulted. During preop cardiac MRI patient became SOB. He received bumex and hypertonic saline for diuresis.  CABG was decided against due to increased risk of progression of CKD IV to ESRD.  The patient was discharged on DAPT.     In the ED, patient is hemodynamically stable with elevated troponin 0.12, microcytic anemia, elevated BNP 62800. The patient was evaluated in the ED by cardiology and is planed for cardiac cath on 9/30.  (29 Sep 2021 19:16)      PAST MEDICAL & SURGICAL HISTORY:  HTN (hypertension)    HLD (hyperlipidemia)    DM (diabetes mellitus)    Glaucoma    No significant past surgical history        FAMILY HISTORY:  [ ] no pertinent family history of premature cardiovascular disease in first degree relatives.  Mother:   Father:   Siblings:     SOCIAL HISTORY:    [ ] Non-smoker  [ ] Smoker  [ ] Alcohol    Allergies    No Known Allergies    Intolerances    	    REVIEW OF SYSTEMS:  CONSTITUTIONAL: No fever, weight loss, or fatigue  CARDIOLOGY: PAtient denies chest pain, shortness of breath or syncopal episodes.   RESPIRATORY: denies shortness of breath, wheezeing.   NEUROLOGICAL: NO weakness, no focal deficits to report.  ENDOCRINOLOGICAL: no recent change in diabetic medications.   GI: no BRBPR, no N,V,diarrhea.    PSYCHIATRY: normal mood and affect  HEENT: no nasal discharge, no ecchymosis  SKIN: no ecchymosis, no breakdown  MUSCULOSKELETAL: Full range of motion x4.     PHYSICAL EXAM:  T(C): 36.4 (10-01-21 @ 13:17), Max: 36.6 (09-30-21 @ 20:36)  HR: 64 (10-01-21 @ 13:17) (61 - 68)  BP: 145/81 (10-01-21 @ 13:17) (118/73 - 145/81)  RR: 18 (10-01-21 @ 06:25) (18 - 18)  SpO2: --  Wt(kg): --  I&O's Summary    30 Sep 2021 07:01  -  01 Oct 2021 07:00  --------------------------------------------------------  IN: 0 mL / OUT: 200 mL / NET: -200 mL    01 Oct 2021 07:01  -  01 Oct 2021 13:51  --------------------------------------------------------  IN: 650 mL / OUT: 350 mL / NET: 300 mL        General Appearance: well appearing, normal for age and gender. 	  Neck: normal JVP, no bruit.   Eyes: No xanthomalasia, Extra Ocular muscles intact.   Cardiovascular: regular rate and rhythm S1 S2, No JVD, No murmurs, No edema  Respiratory: Lungs clear to auscultation	  Psychiatry: Alert and oriented x 3, Mood & affect appropriate  Gastrointestinal:  Soft, Non-tender  Skin/Integumen: No rashes, No ecchymoses, No cyanosis	  Neurologic: Non-focal  Musculoskeletal/ extremities: Normal range of motion, No clubbing, cyanosis or edema  Vascular: Peripheral pulses palpable 2+ bilaterally    LABS:	 	                          9.2    8.54  )-----------( 179      ( 01 Oct 2021 06:51 )             30.0     10-01    139  |  109  |  40<H>  ----------------------------<  143<H>  4.7   |  18  |  2.7<H>    Ca    7.9<L>      01 Oct 2021 06:51  Phos  3.8     09-30  Mg     2.3     10-01    TPro  5.4<L>  /  Alb  2.9<L>  /  TBili  0.3  /  DBili  x   /  AST  21  /  ALT  12  /  AlkPhos  111  10-01    CARDIAC MARKERS ( 30 Sep 2021 06:05 )  x     / 0.14 ng/mL / x     / x     / 5.2 ng/mL  CARDIAC MARKERS ( 30 Sep 2021 01:00 )  x     / 0.13 ng/mL / x     / x     / 6.5 ng/mL  CARDIAC MARKERS ( 29 Sep 2021 20:56 )  x     / 0.12 ng/mL / x     / x     / 7.0 ng/mL          CARDIAC MARKERS:            TELEMETRY EVENTS: 	    ECG:  	  RADIOLOGY:  OTHER: 	    PREVIOUS DIAGNOSTIC TESTING:    [ ] Echocardiogram:  [ ]  Catheterization:  [ ] Stress Test:  	  	    Home Medications:  aspirin 81 mg oral tablet, chewable: 1 tab(s) orally once a day (01 Oct 2021 10:03)  atorvastatin 40 mg oral tablet: 1 tab(s) orally once a day (01 Oct 2021 10:03)  BASAGLAR 100 UNIT/ML KWIKPEN:  (01 Oct 2021 10:03)  brimonidine 0.2% ophthalmic solution: 1 drop(s) to each affected eye 2 times a day (01 Oct 2021 10:03)  carvedilol 3.125 mg oral tablet: 1 tab(s) orally every 12 hours (01 Oct 2021 10:03)  clopidogrel 75 mg oral tablet: 1 tab(s) orally once a day (01 Oct 2021 10:03)  dorzolamide-timolol 2%-0.5% preservative-free ophthalmic solution: 1 drop(s) to each affected eye 2 times a day (01 Oct 2021 10:03)  latanoprost 0.005% ophthalmic solution: 1 drop(s) to each affected eye once a day (at bedtime) (01 Oct 2021 10:03)  pioglitazone-metformin 15 mg-850 mg oral tablet: 1 tab(s) orally 2 times a day  HOLD MEDICATION FOR 48 HR AFTER CARDIAC CATH  (01 Oct 2021 10:03)  Rhopressa 0.02% ophthalmic solution: 1 drop(s) to each affected eye once a day (in the evening) (01 Oct 2021 10:03)  sevelamer carbonate 800 mg oral tablet: 1 tab(s) orally 3 times a day (with meals) (01 Oct 2021 10:03)    MEDICATIONS  (STANDING):  aspirin  chewable 81 milliGRAM(s) Oral daily  atorvastatin 80 milliGRAM(s) Oral at bedtime  brimonidine 0.2% Ophthalmic Solution 1 Drop(s) Both EYES two times a day  buMETAnide Infusion 1 mG/Hr (5 mL/Hr) IV Continuous <Continuous>  buMETAnide Injectable 2 milliGRAM(s) IV Push once  carvedilol 12.5 milliGRAM(s) Oral every 12 hours  clopidogrel Tablet 75 milliGRAM(s) Oral daily  dextrose 40% Gel 15 Gram(s) Oral once  dextrose 5%. 1000 milliLiter(s) (50 mL/Hr) IV Continuous <Continuous>  dextrose 5%. 1000 milliLiter(s) (100 mL/Hr) IV Continuous <Continuous>  dextrose 50% Injectable 25 Gram(s) IV Push once  dextrose 50% Injectable 12.5 Gram(s) IV Push once  dextrose 50% Injectable 25 Gram(s) IV Push once  dextrose 50% Injectable 10 milliLiter(s) IV Push once  dorzolamide 2% Ophthalmic Solution 1 Drop(s) Both EYES <User Schedule>  gabapentin 100 milliGRAM(s) Oral two times a day  glucagon  Injectable 1 milliGRAM(s) IntraMuscular once  heparin   Injectable 5000 Unit(s) SubCutaneous every 12 hours  hydrALAZINE 100 milliGRAM(s) Oral three times a day  insulin lispro (ADMELOG) corrective regimen sliding scale   SubCutaneous three times a day before meals  isosorbide   mononitrate ER Tablet (IMDUR) 30 milliGRAM(s) Oral daily  ivabradine 5 milliGRAM(s) Oral two times a day  latanoprost 0.005% Ophthalmic Solution 1 Drop(s) Both EYES at bedtime  sevelamer carbonate 800 milliGRAM(s) Oral three times a day  sodium chloride 3%. 150 milliLiter(s) (50 mL/Hr) IV Continuous <Continuous>  timolol 0.5% Solution 1 Drop(s) Both EYES two times a day  torsemide 20 milliGRAM(s) Oral two times a day    MEDICATIONS  (PRN):         Date of Admission:    HISTORY OF PRESENT ILLNESS:     Patient is a 56 year old male with PMHx of  HTN, HLD, CKD IV, T2DM on insulin a1c 5.8%, Glaucoma, and CAD s/p PCI in 2021. The patient presented to the ED with complaint of left sided chest pain and difficulty sleeping 2/2 to chest pain and BL LE neuropathy (chronic). The patient states that last night he woke up and from sleep due to burning substernal chest pain, non radiating, no exacerbation or alleviating factors. Of note the patient had recent PCI in 2021. During that admission patient had a prolonged stay in CCU, was treated for acute CHF exacerbation and worsening kidney function. Cath on 21 showed significant 3 vessels disease, LAD, RCA, LCx. CTS was consulted. During preop cardiac MRI patient became SOB. He received bumex and hypertonic saline for diuresis.  CABG was decided against due to increased risk of progression of CKD IV to ESRD.          PAST MEDICAL & SURGICAL HISTORY:  HTN (hypertension)    HLD (hyperlipidemia)    DM (diabetes mellitus)    Glaucoma    No significant past surgical history        FAMILY HISTORY:    Mother:  77 HTN  Father:  at 55 of CKD       SOCIAL HISTORY:      Smokes about 3 cigarettes a week, social alcohol drinking, denies drug use      Allergies    No Known Allergies    Intolerances    	    REVIEW OF SYSTEMS:  CONSTITUTIONAL: No fever, weight loss, or fatigue  CARDIOLOGY: denies chest pain, shortness of breath or syncopal episodes.   RESPIRATORY: denies shortness of breath  NEUROLOGICAL: NO weakness, no focal deficits to report.  ENDOCRINOLOGICAL: no recent change in diabetic medications.   GI: no BRBPR, no N,V, diarrhea.    PSYCHIATRY: normal mood and affect  HEENT: no nasal discharge, no ecchymosis  SKIN: no ecchymosis, no breakdown  MUSCULOSKELETAL: Full range of motion x4.     PHYSICAL EXAM:    General Appearance: well appearing, normal for age and gender. 	  Neck: normal JVP, no bruit.   Eyes:  Extra Ocular muscles intact.   Cardiovascular: regular rate and rhythm S1 S2, ++ JVD, No murmurs, BLE edema  Respiratory: Lungs clear to auscultation	  Psychiatry: Alert and oriented x 3, Mood & affect appropriate  Gastrointestinal:  Soft, Non-tender  Skin/Integumen: No rashes, No ecchymoses, No cyanosis	  Neurologic: Non-focal  Musculoskeletal/ extremities: Normal range of motion, No clubbing, cyanosis or edema  Vascular: Peripheral pulses palpable 2+ bilaterally      PREVIOUS DIAGNOSTIC TESTING:      tte 21    Summary:   1. Moderately decreased segmental left ventricular systolic function.   2. Mid and apical inferior wall, basal and mid inferolateral wall, mid anterolateral segment, and apical lateral segment are abnormal as described above.   3. LV Ejection Fraction by Garcia's Method with a biplane EF of 42 %.   4. Mildly increased LV wall thickness.   5. Mildly enlarged left atrium.   6. Normal right atrial size.   7. Small to moderate pericardial effusion.   8. Mild to moderate mitral valve regurgitation.   9. Moderate tricuspid regurgitation.  10. Mild pulmonic valve regurgitation.  11. Spectral Doppler shows restrictive pattern of left ventricular myocardial filling (Grade III diastolic dysfunction).  12. Mild thickening of the anterior and posterior mitral valve leaflets.    	    Home Medications:  aspirin 81 mg oral tablet, chewable: 1 tab(s) orally once a day (01 Oct 2021 10:03)  atorvastatin 40 mg oral tablet: 1 tab(s) orally once a day (01 Oct 2021 10:03)  BASAGLAR 100 UNIT/ML KWIKPEN:  (01 Oct 2021 10:03)  brimonidine 0.2% ophthalmic solution: 1 drop(s) to each affected eye 2 times a day (01 Oct 2021 10:03)  carvedilol 3.125 mg oral tablet: 1 tab(s) orally every 12 hours (01 Oct 2021 10:03)  clopidogrel 75 mg oral tablet: 1 tab(s) orally once a day (01 Oct 2021 10:03)  dorzolamide-timolol 2%-0.5% preservative-free ophthalmic solution: 1 drop(s) to each affected eye 2 times a day (01 Oct 2021 10:03)  latanoprost 0.005% ophthalmic solution: 1 drop(s) to each affected eye once a day (at bedtime) (01 Oct 2021 10:03)  pioglitazone-metformin 15 mg-850 mg oral tablet: 1 tab(s) orally 2 times a day  HOLD MEDICATION FOR 48 HR AFTER CARDIAC CATH  (01 Oct 2021 10:)  Rhopressa 0.02% ophthalmic solution: 1 drop(s) to each affected eye once a day (in the evening) (01 Oct 2021 10:03)  sevelamer carbonate 800 mg oral tablet: 1 tab(s) orally 3 times a day (with meals) (01 Oct 2021 10:03)    MEDICATIONS  (STANDING):  aspirin  chewable 81 milliGRAM(s) Oral daily  atorvastatin 80 milliGRAM(s) Oral at bedtime  brimonidine 0.2% Ophthalmic Solution 1 Drop(s) Both EYES two times a day  buMETAnide Infusion 1 mG/Hr (5 mL/Hr) IV Continuous <Continuous>  buMETAnide Injectable 2 milliGRAM(s) IV Push once  carvedilol 12.5 milliGRAM(s) Oral every 12 hours  clopidogrel Tablet 75 milliGRAM(s) Oral daily  dextrose 40% Gel 15 Gram(s) Oral once  dextrose 5%. 1000 milliLiter(s) (50 mL/Hr) IV Continuous <Continuous>  dextrose 5%. 1000 milliLiter(s) (100 mL/Hr) IV Continuous <Continuous>  dextrose 50% Injectable 25 Gram(s) IV Push once  dextrose 50% Injectable 12.5 Gram(s) IV Push once  dextrose 50% Injectable 25 Gram(s) IV Push once  dextrose 50% Injectable 10 milliLiter(s) IV Push once  dorzolamide 2% Ophthalmic Solution 1 Drop(s) Both EYES <User Schedule>  gabapentin 100 milliGRAM(s) Oral two times a day  glucagon  Injectable 1 milliGRAM(s) IntraMuscular once  heparin   Injectable 5000 Unit(s) SubCutaneous every 12 hours  hydrALAZINE 100 milliGRAM(s) Oral three times a day  insulin lispro (ADMELOG) corrective regimen sliding scale   SubCutaneous three times a day before meals  isosorbide   mononitrate ER Tablet (IMDUR) 30 milliGRAM(s) Oral daily  ivabradine 5 milliGRAM(s) Oral two times a day  latanoprost 0.005% Ophthalmic Solution 1 Drop(s) Both EYES at bedtime  sevelamer carbonate 800 milliGRAM(s) Oral three times a day  sodium chloride 3%. 150 milliLiter(s) (50 mL/Hr) IV Continuous <Continuous>  timolol 0.5% Solution 1 Drop(s) Both EYES two times a day  torsemide 20 milliGRAM(s) Oral two times a day    MEDICATIONS  (PRN):

## 2021-10-01 NOTE — CONSULT NOTE ADULT - ASSESSMENT
Patient is fluid overloaded on exam  Start Bumex 2 mg IV   3% Hypertonic Saline 150ml   Get BMP twice daily   Maintain potassium >4.0, Mg >2.1  Strict intake and output  Daily weight   Obtain Iron profile  plan discussed with primary team  Will continue to follow    Acute on chronic systolic HF /ICM/ CKD/ Anemia     Patient is fluid overloaded on exam  Start Bumex 2 mg IV only for today  3% Hypertonic Saline 150ml   DC planning tomorrow assuming renal function stable   Will see in the office next week  Get iron profile   Get BMP twice daily   Maintain potassium >4.0, Mg >2.1  Strict intake and output  Daily weight   Obtain Iron profile  plan discussed with primary team  Will continue to follow

## 2021-10-01 NOTE — PROGRESS NOTE ADULT - SUBJECTIVE AND OBJECTIVE BOX
Cardiology Follow up    GILLIAN MENDOZA   56y Male  PAST MEDICAL & SURGICAL HISTORY:  HTN (hypertension)    HLD (hyperlipidemia)    DM (diabetes mellitus)    Glaucoma    No significant past surgical history         HPI:  Patient is a 56 year old male with PMHx of  HTN, HLD, CKD IV, T2DM on insulin a1c 5.8%, Glaucoma, and CAD s/p PCI in July 2021. The patient presented to the ED with complaint of left sided chest pain and difficulty sleeping 2/2 to chest pain and BL LE neuropathy (chronic). The patient states that last night he woke up and from sleep due to burning substernal chest pain, non radiating, no exacerbation or alleviating factors. Of notr the patient had recent PCI in July 2021. During that admission patient had a prolonged stay in CCU, was treated for acute CHF exacerbation and worsening kidney function. Cath on 7/22/21 showed significant 3 vessle disease, LAD, RCA, LCx. CTS was consulted. During preop cardiac MRI patient became SOB. He received bumex and hypertonic saline for diuresis.  CABG was decided against due to increased risk of progression of CKDIV to ESRD.  The patient was discharged on DAPT and follows with Dr. Cantu as his cardiologist    In the ED, patient is hemodynamically stable with elevated troponin 0.12, microcytic anemia, elevated BNP [id number]. The patient was evaluated in the ED by cardiology and is planed for cardiac cath on 9/30.  (29 Sep 2021 19:16)    Allergies    No Known Allergies    Intolerances    Patient seen and examined at bedside. No acute events overnight.  Patient without complaints. Pt ambulated without issues/symptoms   Denies CP, SOB, palpitations, or dizziness  No events on telemetry overnight    Vital Signs Last 24 Hrs  T(C): 36.5 (01 Oct 2021 06:25), Max: 36.6 (30 Sep 2021 20:36)  T(F): 97.7 (01 Oct 2021 06:25), Max: 97.8 (30 Sep 2021 20:36)  HR: 68 (01 Oct 2021 06:25) (61 - 68)  BP: 120/70 (01 Oct 2021 06:25) (118/73 - 131/79)  BP(mean): --  RR: 18 (01 Oct 2021 06:25) (18 - 18)  SpO2: --    MEDICATIONS  (STANDING):  aspirin  chewable 81 milliGRAM(s) Oral daily  atorvastatin 80 milliGRAM(s) Oral at bedtime  brimonidine 0.2% Ophthalmic Solution 1 Drop(s) Both EYES two times a day  carvedilol 12.5 milliGRAM(s) Oral every 12 hours  clopidogrel Tablet 75 milliGRAM(s) Oral daily  dextrose 40% Gel 15 Gram(s) Oral once  dextrose 5% + sodium chloride 0.9%. 1000 milliLiter(s) (75 mL/Hr) IV Continuous <Continuous>  dextrose 5%. 1000 milliLiter(s) (50 mL/Hr) IV Continuous <Continuous>  dextrose 5%. 1000 milliLiter(s) (100 mL/Hr) IV Continuous <Continuous>  dextrose 50% Injectable 25 Gram(s) IV Push once  dextrose 50% Injectable 12.5 Gram(s) IV Push once  dextrose 50% Injectable 25 Gram(s) IV Push once  dextrose 50% Injectable 10 milliLiter(s) IV Push once  dorzolamide 2% Ophthalmic Solution 1 Drop(s) Both EYES <User Schedule>  gabapentin 100 milliGRAM(s) Oral two times a day  glucagon  Injectable 1 milliGRAM(s) IntraMuscular once  heparin   Injectable 5000 Unit(s) SubCutaneous every 12 hours  hydrALAZINE 100 milliGRAM(s) Oral three times a day  insulin lispro (ADMELOG) corrective regimen sliding scale   SubCutaneous three times a day before meals  isosorbide   mononitrate ER Tablet (IMDUR) 30 milliGRAM(s) Oral daily  ivabradine 5 milliGRAM(s) Oral two times a day  latanoprost 0.005% Ophthalmic Solution 1 Drop(s) Both EYES at bedtime  sevelamer carbonate 800 milliGRAM(s) Oral three times a day  sodium chloride 0.9%. 1000 milliLiter(s) (150 mL/Hr) IV Continuous <Continuous>  timolol 0.5% Solution 1 Drop(s) Both EYES two times a day  torsemide 20 milliGRAM(s) Oral two times a day    MEDICATIONS  (PRN):      REVIEW OF SYSTEMS:          All negative except as mentioned in HPI    PHYSICAL EXAM:           CONSTITUTIONAL: Well-developed; well-nourished; in no acute distress  	SKIN: warm, dry  	HEAD: Normocephalic; atraumatic  	EYES: PERRL.  	ENT: No nasal discharge, airway clear, mucous membranes moist  	NECK: Supple; non tender.  	CARD: +S1, +S2, no murmurs, gallops, or rubs. Regular rate and rhythm    	RESP: No wheezes, rales or rhonchi. CTA B/L  	ABD: soft ntnd, + BS x 4 quadrants  	EXT: moves all extremities,  no clubbing, cyanosis or edema  	NEURO: Alert and oriented x3, no focal deficits          PSYCH: Cooperative, appropriate          VASCULAR:  + Rad / + PTs / +  DPs          EXTREMITY:              Right Groin: pressure dressing removed, access site soft, no hematoma, no pain, + pulses, no sign of infection, no numbness  	Right Radial: pressure dressing removed, access site soft, no hematoma, no pain, + pulses, no sign of infection, no numbness            ECG:   < from: 12 Lead ECG (09.30.21 @ 21:44) >  Ventricular Rate 63 BPM    Atrial Rate 63 BPM    P-R Interval 164 ms    QRS Duration 116 ms    Q-T Interval 478 ms    QTC Calculation(Bazett) 489 ms    P Axis 57 degrees    R Axis 117 degrees    T Axis 118 degrees    Diagnosis Line Normal sinus rhythm  Inferior-posterior infarct , age undetermined  ST & T wave abnormality, consider lateral ischemia  Abnormal ECG    Confirmed by KLEVER PEÑA MD ([id number]) on 10/1/2021 6:09:48 AM                                                                                    2D ECHO:  < from: TTE Echo Complete w/o Contrast w/ Doppler (09.30.21 @ 16:45) >  Summary:   1. Moderately decreased segmental left ventricular systolic function.   2. Mid and apical inferior wall, basal and mid inferolateral wall, mid anterolateral segment, and apical lateral segment are abnormal as described above.   3. LV Ejection Fraction by Garcia's Method with a biplane EF of 42 %.   4. Mildly increased LV wall thickness.   5. Mildly enlarged left atrium.   6. Normal right atrial size.   7. Small to moderate pericardial effusion.   8. Mild to moderate mitral valve regurgitation.   9. Moderate tricuspid regurgitation.  10. Mild pulmonic valve regurgitation.  11. Spectral Doppler shows restrictive pattern of left ventricular myocardial filling (Grade III diastolic dysfunction).  12. Mild thickening of the anterior and posterior mitral valve leaflets.    LABS:                        9.2    8.54  )-----------( 179      ( 01 Oct 2021 06:51 )             30.0     10-01    139  |  109  |  40<H>  ----------------------------<  143<H>  4.7   |  18  |  2.7<H>    Ca    7.9<L>      01 Oct 2021 06:51  Phos  3.8     09-30  Mg     2.3     10-01    TPro  5.4<L>  /  Alb  2.9<L>  /  TBili  0.3  /  DBili  x   /  AST  21  /  ALT  12  /  AlkPhos  111  10-01    CARDIAC MARKERS ( 30 Sep 2021 06:05 )  x     / 0.14 ng/mL / x     / x     / 5.2 ng/mL  CARDIAC MARKERS ( 30 Sep 2021 01:00 )  x     / 0.13 ng/mL / x     / x     / 6.5 ng/mL  CARDIAC MARKERS ( 29 Sep 2021 20:56 )  x     / 0.12 ng/mL / x     / x     / 7.0 ng/mL  CARDIAC MARKERS ( 29 Sep 2021 12:51 )  x     / 0.12 ng/mL / x     / x     / x        LDL Cholesterol Calculated: 150 mg/dL   A1C with Estimated Average Glucose Result: 6.3    Magnesium, Serum: 2.3 mg/dL [1.8 - 2.4] (10-01-21 @ 06:51)  LIVER FUNCTIONS - ( 01 Oct 2021 06:51 )  Alb: 2.9 g/dL / Pro: 5.4 g/dL / ALK PHOS: 111 U/L / ALT: 12 U/L / AST: 21 U/L / GGT: x             A/P:  I discussed the case with Cardiologist Dr. Singh and recommend the following:    S/P PCI:   Intervention:   -Successful IVUS guided PCI with balloon angioplasty, Intravascular lithotripsy, and drug eluting stent implantation x 1 of proximal LCX.  -Successful IVUS guided PCI with balloon angioplasty, and drug eluting stent implantation x 1 of distal LCX.    Implants:   -3.5x20 mm Synergy stent of prox LCX  -2.5x24 mm Synergy stent of distal LCX                         Strict glucose monitoring                    No ACEi/ARB due to elevated Cr                    give NS  cc x 4 hr                    HOLD Metformin for 48 hr after Cardiac Cath                    Ambulate around the unit, monitor access sites s/p Cardiac Cath                    Continue DAPT ( Aspirin 81 mg PO Daily and Plavix 75 mg PO Daily ), B-Blocker, Isosorbide, torsemide, Statin Therapy                   Patient given 30 day supply of ( Aspirin 81 mg daily and Plavix 75 mg daily ) to take at home                   Patient agreeing to take DAPT for at least one year or as directed by cardiologist                    Pt given instructions on importance of taking antiplatelet medication or risk acute stent thrombosis/death                   Post cath instructions, access site care and activity restrictions reviewed with patient                     Discussed with patient to return to hospital if experience chest pain, shortness breath, dizziness and site bleeding                   Aggressive risk factor modification, diet counseling, smoking cessation discussed with patient                       Can discharge patient from cardiac standpoint later in the day after ambulating without symptoms and access site wnl, today's ECG and blood work reviewed                    F/U with CHF Dr. Cantu in 1 week as OP.                    Follow up with Cardiology Dr. Singh in two weeks. Instructed to call and make an appointment

## 2021-10-02 LAB
ALBUMIN SERPL ELPH-MCNC: 3.2 G/DL — LOW (ref 3.5–5.2)
ALP SERPL-CCNC: 109 U/L — SIGNIFICANT CHANGE UP (ref 30–115)
ALT FLD-CCNC: 11 U/L — SIGNIFICANT CHANGE UP (ref 0–41)
ANION GAP SERPL CALC-SCNC: 13 MMOL/L — SIGNIFICANT CHANGE UP (ref 7–14)
AST SERPL-CCNC: 17 U/L — SIGNIFICANT CHANGE UP (ref 0–41)
BASOPHILS # BLD AUTO: 0.03 K/UL — SIGNIFICANT CHANGE UP (ref 0–0.2)
BASOPHILS NFR BLD AUTO: 0.3 % — SIGNIFICANT CHANGE UP (ref 0–1)
BILIRUB SERPL-MCNC: 0.4 MG/DL — SIGNIFICANT CHANGE UP (ref 0.2–1.2)
BLD GP AB SCN SERPL QL: SIGNIFICANT CHANGE UP
BUN SERPL-MCNC: 47 MG/DL — HIGH (ref 10–20)
CALCIUM SERPL-MCNC: 8.1 MG/DL — LOW (ref 8.5–10.1)
CHLORIDE SERPL-SCNC: 107 MMOL/L — SIGNIFICANT CHANGE UP (ref 98–110)
CO2 SERPL-SCNC: 20 MMOL/L — SIGNIFICANT CHANGE UP (ref 17–32)
CREAT SERPL-MCNC: 3.9 MG/DL — HIGH (ref 0.7–1.5)
EOSINOPHIL # BLD AUTO: 0.33 K/UL — SIGNIFICANT CHANGE UP (ref 0–0.7)
EOSINOPHIL NFR BLD AUTO: 3.7 % — SIGNIFICANT CHANGE UP (ref 0–8)
GLUCOSE BLDC GLUCOMTR-MCNC: 176 MG/DL — HIGH (ref 70–99)
GLUCOSE BLDC GLUCOMTR-MCNC: 210 MG/DL — HIGH (ref 70–99)
GLUCOSE BLDC GLUCOMTR-MCNC: 232 MG/DL — HIGH (ref 70–99)
GLUCOSE SERPL-MCNC: 165 MG/DL — HIGH (ref 70–99)
HCT VFR BLD CALC: 33 % — LOW (ref 42–52)
HGB BLD-MCNC: 9.8 G/DL — LOW (ref 14–18)
IMM GRANULOCYTES NFR BLD AUTO: 0.4 % — HIGH (ref 0.1–0.3)
LYMPHOCYTES # BLD AUTO: 0.77 K/UL — LOW (ref 1.2–3.4)
LYMPHOCYTES # BLD AUTO: 8.6 % — LOW (ref 20.5–51.1)
MAGNESIUM SERPL-MCNC: 2.3 MG/DL — SIGNIFICANT CHANGE UP (ref 1.8–2.4)
MCHC RBC-ENTMCNC: 23.6 PG — LOW (ref 27–31)
MCHC RBC-ENTMCNC: 29.7 G/DL — LOW (ref 32–37)
MCV RBC AUTO: 79.5 FL — LOW (ref 80–94)
MONOCYTES # BLD AUTO: 0.73 K/UL — HIGH (ref 0.1–0.6)
MONOCYTES NFR BLD AUTO: 8.1 % — SIGNIFICANT CHANGE UP (ref 1.7–9.3)
NEUTROPHILS # BLD AUTO: 7.06 K/UL — HIGH (ref 1.4–6.5)
NEUTROPHILS NFR BLD AUTO: 78.9 % — HIGH (ref 42.2–75.2)
NRBC # BLD: 0 /100 WBCS — SIGNIFICANT CHANGE UP (ref 0–0)
PLATELET # BLD AUTO: 163 K/UL — SIGNIFICANT CHANGE UP (ref 130–400)
POTASSIUM SERPL-MCNC: 4.8 MMOL/L — SIGNIFICANT CHANGE UP (ref 3.5–5)
POTASSIUM SERPL-SCNC: 4.8 MMOL/L — SIGNIFICANT CHANGE UP (ref 3.5–5)
PROT SERPL-MCNC: 5.9 G/DL — LOW (ref 6–8)
RBC # BLD: 4.15 M/UL — LOW (ref 4.7–6.1)
RBC # FLD: 13.9 % — SIGNIFICANT CHANGE UP (ref 11.5–14.5)
SODIUM SERPL-SCNC: 140 MMOL/L — SIGNIFICANT CHANGE UP (ref 135–146)
WBC # BLD: 8.96 K/UL — SIGNIFICANT CHANGE UP (ref 4.8–10.8)
WBC # FLD AUTO: 8.96 K/UL — SIGNIFICANT CHANGE UP (ref 4.8–10.8)

## 2021-10-02 PROCEDURE — 99233 SBSQ HOSP IP/OBS HIGH 50: CPT

## 2021-10-02 RX ADMIN — IVABRADINE 5 MILLIGRAM(S): 7.5 TABLET, FILM COATED ORAL at 17:53

## 2021-10-02 RX ADMIN — CARVEDILOL PHOSPHATE 12.5 MILLIGRAM(S): 80 CAPSULE, EXTENDED RELEASE ORAL at 05:38

## 2021-10-02 RX ADMIN — BRIMONIDINE TARTRATE 1 DROP(S): 2 SOLUTION/ DROPS OPHTHALMIC at 17:55

## 2021-10-02 RX ADMIN — LATANOPROST 1 DROP(S): 0.05 SOLUTION/ DROPS OPHTHALMIC; TOPICAL at 22:05

## 2021-10-02 RX ADMIN — IVABRADINE 5 MILLIGRAM(S): 7.5 TABLET, FILM COATED ORAL at 07:55

## 2021-10-02 RX ADMIN — CARVEDILOL PHOSPHATE 12.5 MILLIGRAM(S): 80 CAPSULE, EXTENDED RELEASE ORAL at 17:54

## 2021-10-02 RX ADMIN — Medication 1: at 07:54

## 2021-10-02 RX ADMIN — BRIMONIDINE TARTRATE 1 DROP(S): 2 SOLUTION/ DROPS OPHTHALMIC at 05:40

## 2021-10-02 RX ADMIN — SEVELAMER CARBONATE 800 MILLIGRAM(S): 2400 POWDER, FOR SUSPENSION ORAL at 13:12

## 2021-10-02 RX ADMIN — Medication 81 MILLIGRAM(S): at 11:26

## 2021-10-02 RX ADMIN — CLOPIDOGREL BISULFATE 75 MILLIGRAM(S): 75 TABLET, FILM COATED ORAL at 11:26

## 2021-10-02 RX ADMIN — SEVELAMER CARBONATE 800 MILLIGRAM(S): 2400 POWDER, FOR SUSPENSION ORAL at 05:38

## 2021-10-02 RX ADMIN — Medication 100 MILLIGRAM(S): at 05:38

## 2021-10-02 RX ADMIN — GABAPENTIN 100 MILLIGRAM(S): 400 CAPSULE ORAL at 17:54

## 2021-10-02 RX ADMIN — GABAPENTIN 100 MILLIGRAM(S): 400 CAPSULE ORAL at 05:39

## 2021-10-02 RX ADMIN — SEVELAMER CARBONATE 800 MILLIGRAM(S): 2400 POWDER, FOR SUSPENSION ORAL at 22:03

## 2021-10-02 RX ADMIN — HEPARIN SODIUM 5000 UNIT(S): 5000 INJECTION INTRAVENOUS; SUBCUTANEOUS at 05:39

## 2021-10-02 RX ADMIN — DORZOLAMIDE HYDROCHLORIDE 1 DROP(S): 20 SOLUTION/ DROPS OPHTHALMIC at 17:55

## 2021-10-02 RX ADMIN — ATORVASTATIN CALCIUM 80 MILLIGRAM(S): 80 TABLET, FILM COATED ORAL at 22:03

## 2021-10-02 RX ADMIN — Medication 1 DROP(S): at 05:40

## 2021-10-02 RX ADMIN — ISOSORBIDE MONONITRATE 30 MILLIGRAM(S): 60 TABLET, EXTENDED RELEASE ORAL at 11:26

## 2021-10-02 RX ADMIN — Medication 2: at 11:27

## 2021-10-02 RX ADMIN — Medication 100 MILLIGRAM(S): at 13:12

## 2021-10-02 RX ADMIN — Medication 100 MILLIGRAM(S): at 22:03

## 2021-10-02 RX ADMIN — HEPARIN SODIUM 5000 UNIT(S): 5000 INJECTION INTRAVENOUS; SUBCUTANEOUS at 17:56

## 2021-10-02 RX ADMIN — Medication 20 MILLIGRAM(S): at 05:38

## 2021-10-02 RX ADMIN — DORZOLAMIDE HYDROCHLORIDE 1 DROP(S): 20 SOLUTION/ DROPS OPHTHALMIC at 05:40

## 2021-10-02 RX ADMIN — Medication 1 DROP(S): at 17:55

## 2021-10-02 NOTE — PROGRESS NOTE ADULT - ASSESSMENT
A 56 year old male with PMHx of  HTN, HLD, CKD IV, T2DM on insulin a1c 5.8%, Glaucoma, and CAD presented to the ED with left sided chest pain and difficulty sleeping 2/2 to chest pain.    [place]  CAD / HTN  DM-2  LENNY on CKD-4  Acute on chronic HFmrEF  Ischemic CM            PLAN:    ·	Cr trending up. Likely ROSENDO  ·	Hold diuretics. Monitor renal function  ·	Nephrology consult  ·	Care D/W the cardiology  ·	S/P cath on 9/30 and placement of 2 SAMMY in LCRX  ·	On ASA, Plavix and Lipitor  ·	ECHO showed EF is 42%  ·	On D/C oral Torsemide 40 mg in AM and 20 mg in PM  ·	Check i's and o's and daily wt  ·	Low salt diet and water restriction to 1.5 L/D  ·	On Coreg, Hydralazine and Isosorbide dinitrate.     Progress Note Handoff    Pending (specify):  Consults_________, Tests________, Test Results_______, Other__AKI______  Family discussion:  Disposition: Home___/SNF___/Other________/Unknown at this time________    Blake Chacon MD  Spectra: [phone]

## 2021-10-02 NOTE — PROGRESS NOTE ADULT - SUBJECTIVE AND OBJECTIVE BOX
************************************************  Regan Bradford MD (PGY-1)  Spectra: [phone]  ************************************************    SUBJECTIVE / OVERNIGHT EVENTS  Patient slept well overnight. No acute complaints this AM. Patient does not report fevers, chills, CP, SOB, or n/v/d    ========================MEDICATIONS========================    aspirin  chewable   81 milliGRAM(s) Oral (10-01-21 @ 11:57)    atorvastatin   80 milliGRAM(s) Oral (10-01-21 @ 22:51)    brimonidine 0.2% Ophthalmic Solution   1 Drop(s) Both EYES (10-02-21 @ 05:40)   1 Drop(s) Both EYES (10-01-21 @ 17:21)    buMETAnide Infusion   5 mL/Hr IV Continuous (10-01-21 @ 13:46)    buMETAnide Injectable   2 milliGRAM(s) IV Push (10-01-21 @ 14:47)    carvedilol   12.5 milliGRAM(s) Oral (10-02-21 @ 05:38)   12.5 milliGRAM(s) Oral (10-01-21 @ 17:22)    clopidogrel Tablet   75 milliGRAM(s) Oral (10-01-21 @ 11:57)    dorzolamide 2% Ophthalmic Solution   1 Drop(s) Both EYES (10-02-21 @ 05:40)   1 Drop(s) Both EYES (10-01-21 @ 17:20)    gabapentin   100 milliGRAM(s) Oral (10-02-21 @ 05:39)   100 milliGRAM(s) Oral (10-01-21 @ 17:24)    heparin   Injectable   5000 Unit(s) SubCutaneous (10-02-21 @ 05:39)   5000 Unit(s) SubCutaneous (10-01-21 @ 17:22)    hydrALAZINE   100 milliGRAM(s) Oral (10-02-21 @ 05:38)   100 milliGRAM(s) Oral (10-01-21 @ 22:51)   100 milliGRAM(s) Oral (10-01-21 @ 13:29)    insulin lispro (ADMELOG) corrective regimen sliding scale   1 Unit(s) SubCutaneous (10-02-21 @ 07:54)   2 Unit(s) SubCutaneous (10-01-21 @ 12:14)    isosorbide   mononitrate ER Tablet (IMDUR)   30 milliGRAM(s) Oral (10-01-21 @ 11:57)    ivabradine   5 milliGRAM(s) Oral (10-02-21 @ 07:55)   5 milliGRAM(s) Oral (10-01-21 @ 17:22)    latanoprost 0.005% Ophthalmic Solution   1 Drop(s) Both EYES (10-01-21 @ 22:50)    sevelamer carbonate   800 milliGRAM(s) Oral (10-02-21 @ 05:38)   800 milliGRAM(s) Oral (10-01-21 @ 22:52)   800 milliGRAM(s) Oral (10-01-21 @ 14:46)    sodium chloride 3%.   50 mL/Hr IV Continuous (10-01-21 @ 13:43)    timolol 0.5% Solution   1 Drop(s) Both EYES (10-02-21 @ 05:40)   1 Drop(s) Both EYES (10-01-21 @ 17:21)    torsemide   20 milliGRAM(s) Oral (10-02-21 @ 05:38)    ========================VITALS/EXAM========================  VITALS  T(C): 35.9 (10-02-21 @ 07:59), Max: 37.2 (10-01-21 @ 17:00)  HR: 62 (10-02-21 @ 07:59) (62 - 65)  BP: 123/72 (10-02-21 @ 07:59) (115/68 - 145/81)  RR: 18 (10-02-21 @ 07:59) (18 - 18)  SpO2: --  POCT Blood Glucose.: 176 mg/dL (10-02-21 @ 07:34)  POCT Blood Glucose.: 157 mg/dL (10-01-21 @ 21:30)  POCT Blood Glucose.: 99 mg/dL (10-01-21 @ 17:03)  POCT Blood Glucose.: 221 mg/dL (10-01-21 @ 12:09)    PHYSICAL EXAM  GENERAL: NAD, well-developed  CHEST/LUNG: Clear to auscultation bilaterally; No wheezes, rales or rhonchi  HEART: Regular rate and rhythm; No murmurs, rubs, or gallops  ABDOMEN: Soft, Nontender, Nondistended; Bowel sounds present, no masses.  EXTREMITIES:  2+ Peripheral Pulses, No clubbing, cyanosis, or edema    =============================I/O=============================     10-01-21 @ 07:01  -  10-02-21 @ 07:00  --------------------------------------------------------  IN:    Bumetanide: 15 mL    Oral Fluid: 650 mL    sodium chloride 3%: 150 mL  Total IN: 815 mL    OUT:    Voided (mL): 350 mL  Total OUT: 350 mL    Total NET: 465 mL      10-02-21 @ 07:01  -  10-02-21 @ 10:54  --------------------------------------------------------  IN:  Total IN: 0 mL    OUT:    Voided (mL): 300 mL  Total OUT: 300 mL    Total NET: -300 mL    ============================LABS============================             9.8<L>  8.96  )-----------( 163      ( 10-02-21 @ 07:59 )             33.0<L>    140  |  107  |  47<H>  ----------------------------<  165<H>   10-02-21 @ 07:59  4.8  |  20  |  3.9<H>    Ca      8.1<L>     10-02-21 @ 07:59  Mg     2.3     10-02-21 @ 07:59    TPro  5.9<L>  /  Alb  3.2<L>  /  TBili  0.4  /  DBili  x   /  AST  17  /  ALT  11  /  AlkPhos  109  /  GGT  x     10-02-21 @ 07:59      Troponin T, Serum: 0.14 ng/mL (09-30-21 @ 06:05)  Troponin T, Serum: 0.13 ng/mL (09-30-21 @ 01:00)  Troponin T, Serum: 0.12 ng/mL (09-29-21 @ 20:56)  Troponin T, Serum: 0.12 ng/mL (09-29-21 @ 12:51)    CKMB Units: 5.2 ng/mL (09-30-21 @ 06:05)  CKMB Units: 6.5 ng/mL (09-30-21 @ 01:00)  CKMB Units: 7.0 ng/mL (09-29-21 @ 20:56)    Serum Pro-Brain Natriuretic Peptide: 80773 pg/mL (09-29-21 @ 12:51)    =======================Micro/Rad/Cardio=======================  Telemetry: Reviewed   EKG: Reviewed

## 2021-10-02 NOTE — PROGRESS NOTE ADULT - SUBJECTIVE AND OBJECTIVE BOX
Cardiology Follow up s/p PCI LCX    GILLIAN MENDOZA   56y Male  PAST MEDICAL & SURGICAL HISTORY:  HTN (hypertension)    HLD (hyperlipidemia)    DM (diabetes mellitus)    Glaucoma    No significant past surgical history         HPI:  Patient is a 56 year old male with PMHx of  HTN, HLD, CKD IV, T2DM on insulin a1c 5.8%, Glaucoma, and CAD s/p PCI in July 2021. The patient presented to the ED with complaint of left sided chest pain and difficulty sleeping 2/2 to chest pain and BL LE neuropathy (chronic). The patient states that last night he woke up and from sleep due to burning substernal chest pain, non radiating, no exacerbation or alleviating factors. Of notr the patient had recent PCI in July 2021. During that admission patient had a prolonged stay in CCU, was treated for acute CHF exacerbation and worsening kidney function. Cath on 7/22/21 showed significant 3 vessle disease, LAD, RCA, LCx. CTS was consulted. During preop cardiac MRI patient became SOB. He received bumex and hypertonic saline for diuresis.  CABG was decided against due to increased risk of progression of CKDIV to ESRD.  The patient was discharged on DAPT and follows with Dr. Cantu as his cardiologist    In the ED, patient is hemodynamically stable with elevated troponin 0.12, microcytic anemia, elevated BNP [id number]. The patient was evaluated in the ED by cardiology and is planed for cardiac cath on 9/30.  (29 Sep 2021 19:16)    Allergies    No Known Allergies    Intolerances      Patient without complaints.  Denies CP, SOB, palpitations, or dizziness  No events on telemetry overnight    Vital Signs Last 24 Hrs  T(C): 35.9 (02 Oct 2021 07:59), Max: 37.2 (01 Oct 2021 17:00)  T(F): 96.7 (02 Oct 2021 07:59), Max: 99 (01 Oct 2021 17:00)  HR: 62 (02 Oct 2021 07:59) (62 - 65)  BP: 123/72 (02 Oct 2021 07:59) (115/68 - 145/81)  BP(mean): --  RR: 18 (02 Oct 2021 07:59) (18 - 18)  SpO2: --    MEDICATIONS  (STANDING):  aspirin  chewable 81 milliGRAM(s) Oral daily  atorvastatin 80 milliGRAM(s) Oral at bedtime  brimonidine 0.2% Ophthalmic Solution 1 Drop(s) Both EYES two times a day  buMETAnide Infusion 1 mG/Hr (5 mL/Hr) IV Continuous <Continuous>  carvedilol 12.5 milliGRAM(s) Oral every 12 hours  clopidogrel Tablet 75 milliGRAM(s) Oral daily  dextrose 40% Gel 15 Gram(s) Oral once  dextrose 5%. 1000 milliLiter(s) (100 mL/Hr) IV Continuous <Continuous>  dextrose 5%. 1000 milliLiter(s) (50 mL/Hr) IV Continuous <Continuous>  dextrose 50% Injectable 25 Gram(s) IV Push once  dextrose 50% Injectable 12.5 Gram(s) IV Push once  dextrose 50% Injectable 25 Gram(s) IV Push once  dextrose 50% Injectable 10 milliLiter(s) IV Push once  dorzolamide 2% Ophthalmic Solution 1 Drop(s) Both EYES <User Schedule>  gabapentin 100 milliGRAM(s) Oral two times a day  glucagon  Injectable 1 milliGRAM(s) IntraMuscular once  heparin   Injectable 5000 Unit(s) SubCutaneous every 12 hours  hydrALAZINE 100 milliGRAM(s) Oral three times a day  insulin lispro (ADMELOG) corrective regimen sliding scale   SubCutaneous three times a day before meals  isosorbide   mononitrate ER Tablet (IMDUR) 30 milliGRAM(s) Oral daily  ivabradine 5 milliGRAM(s) Oral two times a day  latanoprost 0.005% Ophthalmic Solution 1 Drop(s) Both EYES at bedtime  sevelamer carbonate 800 milliGRAM(s) Oral three times a day  sodium chloride 3%. 150 milliLiter(s) (50 mL/Hr) IV Continuous <Continuous>  timolol 0.5% Solution 1 Drop(s) Both EYES two times a day  torsemide 20 milliGRAM(s) Oral two times a day    MEDICATIONS  (PRN):      REVIEW OF SYSTEMS:          CONSTITUTIONAL: No weakness, fevers or chills          EYES/ENT: No visual changes;  No vertigo or throat pain           NECK: No pain or stiffness          RESPIRATORY: No cough, wheezing, hemoptysis          CARDIOVASCULAR: no pain, no SANA[place]RI[place], no palpitations           GASTROINTESTINAL: No abdominal or epigastric pain. No nausea, vomiting, or hematemesis;           GENITOURINARY: No dysuria, frequency or hematuria          NEUROLOGICAL: No numbness or weakness          SKIN: No itching, rashes    PHYSICAL EXAM:           CONSTITUTIONAL: Well-developed; well-nourished; in no acute distress  	SKIN: warm, dry  	HEAD: Normocephalic; atraumatic  	EYES: PERRL.  	ENT: No nasal discharge, airway clear, mucous membranes moist  	NECK: Supple; non tender, + JVD  	CARD: +S1, +S2, no murmurs, gallops, or rubs. (Regular) rate and rhythm    	RESP: No wheezes, rales or rhonchi. CTA B/L  	ABD: soft ntnd, + BS x 4 quadrants  	EXT: moves all extremities,  no clubbing, cyanosis or edema  	NEURO: Alert and oriented x3, no focal deficits          PSYCH: Cooperative, appropriate          VASCULAR:  + Rad / + PTs / + DPs          EXTREMITY:             Right Groin:  Dressing removed, C/D/I, + pulses,  access site soft, no hematoma, no pain, no numbness, no signs and symptoms of infection  	   Right Radial: Dressing removed, C/D/I, + pulses, access site soft, no hematoma, no pain, no numbness, no signs and symptoms of infection             ECG: P                                                                                                                2D ECHO:    EXAM:  ECHO TTE WO CON COMP W DOPP        PROCEDURE DATE:  09/30/2021      INTERPRETATION:   [place] [address]                Phone: [phone].   TRANSTHORACIC ECHOCARDIOGRAM REPORT        Patient Name:   GILLIAN MENDOZA Accession #: [id number]  Medical Rec #:  [id number]      Height:      63.0 in 160.0 cm  YOB: 1965     Weight:      145.0 lb 65.77 kg  Patient Age:    56 yearsBSA:         1.69 m²  Patient Gender: M             BP:          177/93 mmHg      Date of Exam:        9/30/2021 4:45:56 PM  Referring Physician: [id number] COY COLLIER  Sonographer:         Barbara Barraza  Reading Physician:   Colby Dey MD, [facility].    Procedure:   2D Echo/Doppler/Color Doppler Complete.  Indications: R07.9 - Chest Pain, unspecified  Diagnosis:   Chest pain, unspecified - R07.9        Summary:   1. Moderately decreased segmental left ventricular systolic function.   2. Mid and apical inferior wall, basal and mid inferolateral wall, mid anterolateral segment, and apical lateral segment are abnormal as described above.   3. LV Ejection Fraction by Garcia's Method with a biplane EF of 42 %.   4. Mildly increased LV wall thickness.   5. Mildly enlarged left atrium.   6. Normal right atrial size.   7. Small to moderate pericardial effusion.   8. Mild to moderate mitral valve regurgitation.   9. Moderate tricuspid regurgitation.  10. Mild pulmonic valve regurgitation.  11. Spectral Doppler shows restrictive pattern of left ventricular myocardial filling (Grade III diastolic dysfunction).  12. Mild thickening of the anterior and posterior mitral valve leaflets.    PHYSICIAN INTERPRETATION:  Left Ventricle: The left ventricular internal cavity size is normal. Left ventricular wall thickness is mildly increased. Moderately decreased segmental left ventricular systolic function. Spectral Doppler shows restrictive pattern of left ventricular myocardial filling (Grade III diastolic dysfunction).      LV Wall Scoring:  The mid and apical inferior wall, basal and mid inferolateral wall, mid  anterolateral segment, and apical lateral segment are hypokinetic. All  remaining scored segments are normal.    Right Ventricle: Normal right ventricular size and function.  Left Atrium: Mildly enlarged left atrium.  Right Atrium: Normal right atrial size.  Pericardium: A small to moderate pericardial effusion is present. The pericardial effusion is globally located around the entire heart.  Mitral Valve: Structurally normal mitral valve, with normal leaflet excursion. Mild thickening of the anterior and posterior mitral valve leaflets. Mild to moderate mitral valve regurgitation is seen.  Tricuspid Valve: Structurally normal tricuspid valve, with normal leaflet excursion. Moderate tricuspid regurgitation is visualized.  Aortic Valve: Normal trileaflet aortic valve with normal opening. The aortic valve is trileaflet. No evidence of aortic valve regurgitation is seen.  Pulmonic Valve: Structurally normal pulmonic valve, with normal leaflet excursion. Mild pulmonic valve regurgitation.  Aorta: The aortic root is normal in size and structure.  Venous: The inferior vena cava was normal sized, with respiratory size variationless than 50%.      2D AND M-MODE MEASUREMENTS (normal ranges within parentheses):  Left Ventricle:                  Normal   Aorta/Left Atrium:             Normal  IVSd (2D):              1.10 cm (0.7-1.1) AoV Cusp Separation: 1.72 cm (1.5-2.6)  LVPWd (2D):             1.01 cm (0.7-1.1) Left Atrium (Mmode): 4.29 cm (1.9-4.0)  LVIDd (2D):             5.39 cm (3.4-5.7) Right Ventricle:  LVIDs (2D):             3.83 cm           RVd (2D):        3.35 cm  LV FS (2D):             28.9 %   (>25%)  Relative Wall Thickness  0.37    (<0.42)    SPECTRAL DOPPLER ANALYSIS:  LV DIASTOLIC FUNCTION:  MV Peak E: 1.18 m/s Decel Time: 165 msec  MV Peak A: 0.28 m/s  E/A Ratio: 4.14    Aortic Valve:  AoV VMax:    1.24 m/s AoV Area, Vmax:    1.93 cm² Vmax Indx:  1.15 cm²/m²  AoV VTI:     0.21 m   AoV Area, VTI:     2.06 cm² VTI Indx:     1.22 cm²/m²  AoV Pk Grad: 6.1 mmHg AoV Area, Mn Grad: 1.90 cm² Mn Grad Indx: 1.13 cm²/m²  AoV Mn Grad: 3.5 mmHg    LVOT Vmax: 0.81 m/s  LVOT VTI:  0.15 m  LVOT Diam: 1.93 cm    Mitral Valve:  MV P1/2 Time: 47.71 msec  MV Area, PHT: 4.61 cm²    Tricuspid Valve and PA/RV Systolic Pressure: TR Max Velocity: 2.55 m/s RA Pressure: 3 mmHg RVSP/PASP: 28.9 mmHg      [id number] Colby Dey MD, [facility], Electronically signed on 10/1/2021 at 6:56:53 AM        *** Final ***                COLBY DEY MD; Attending Cardiologist  This document has been electronically signed. Sep 30 2021  4:45PM      LABS:                        9.8    8.96  )-----------( 163      ( 02 Oct 2021 07:59 )             33.0     10-02    140  |  107  |  47<H>  ----------------------------<  165<H>  4.8   |  20  |  3.9<H>    Ca    8.1<L>      02 Oct 2021 07:59  Mg     2.3     10-02    TPro  5.9<L>  /  Alb  3.2<L>  /  TBili  0.4  /  DBili  x   /  AST  17  /  ALT  11  /  AlkPhos  109  10-02        Magnesium, Serum: 2.3 mg/dL [1.8 - 2.4] (10-02-21 @ 07:59)  LIVER FUNCTIONS - ( 02 Oct 2021 07:59 )  Alb: 3.2 g/dL / Pro: 5.9 g/dL / ALK PHOS: 109 U/L / ALT: 11 U/L / AST: 17 U/L / GGT: x             A/P:  I discussed the case with Cardiologist Dr. Singh and recommend the following:    S/P PCI:   Intervention:   -Successful IVUS guided PCI with balloon angioplasty, Intravascular lithotripsy, and drug eluting stent implantation x 1 of proximal LCX.  -Successful IVUS guided PCI with balloon angioplasty, and drug eluting stent implantation x 1 of distal LCX.    Implants:   -3.5x20 mm Synergy stent of prox LCX  -2.5x24 mm Synergy stent of distal LCX                       Continue DAPT ( Aspirin 81 mg PO Daily and Plavix 75 mg PO Daily ), B-Blocker, Isosorbide, torsemide, Statin Therapy                   add zetia for lipid management                   No ACEi/ARB due to elevated Cr, trend Cr                   strict I&O's, daily wts, fluid restriction, low sodium diet                   Strict glucose monitoring                    HOLD Metformin for 48 hr after Cardiac Cath                    OOB-CH, ambulate with assistance, monitor access sites s/p Cardiac Cath                    Patient given 30 day supply of ( Aspirin 81 mg daily and Plavix 75 mg daily ) to take at home                   Patient agreeing to take DAPT for at least one year or as directed by cardiologist                    Pt given instructions on importance of taking antiplatelet medication or risk acute stent thrombosis/death                   Post cath instructions, access site care and activity restrictions reviewed with patient                     Discussed with patient to return to hospital if experience chest pain, shortness breath, dizziness and site bleeding                   Aggressive risk factor modification, diet counseling, smoking cessation discussed with patient                                          Cardiology Follow up s/p PCI LCX    GILLIAN MENDOZA   56y Male  PAST MEDICAL & SURGICAL HISTORY:  HTN (hypertension)    HLD (hyperlipidemia)    DM (diabetes mellitus)    Glaucoma    No significant past surgical history         HPI:  Patient is a 56 year old male with PMHx of  HTN, HLD, CKD IV, T2DM on insulin a1c 5.8%, Glaucoma, and CAD s/p PCI in July 2021. The patient presented to the ED with complaint of left sided chest pain and difficulty sleeping 2/2 to chest pain and BL LE neuropathy (chronic). The patient states that last night he woke up and from sleep due to burning substernal chest pain, non radiating, no exacerbation or alleviating factors. Of notr the patient had recent PCI in July 2021. During that admission patient had a prolonged stay in CCU, was treated for acute CHF exacerbation and worsening kidney function. Cath on 7/22/21 showed significant 3 vessle disease, LAD, RCA, LCx. CTS was consulted. During preop cardiac MRI patient became SOB. He received bumex and hypertonic saline for diuresis.  CABG was decided against due to increased risk of progression of CKDIV to ESRD.  The patient was discharged on DAPT and follows with Dr. Cantu as his cardiologist    In the ED, patient is hemodynamically stable with elevated troponin 0.12, microcytic anemia, elevated BNP 46883. The patient was evaluated in the ED by cardiology and is planed for cardiac cath on 9/30.  (29 Sep 2021 19:16)    Allergies    No Known Allergies    Intolerances      Patient without complaints.  Denies CP, SOB, palpitations, or dizziness  No events on telemetry overnight    Vital Signs Last 24 Hrs  T(C): 35.9 (02 Oct 2021 07:59), Max: 37.2 (01 Oct 2021 17:00)  T(F): 96.7 (02 Oct 2021 07:59), Max: 99 (01 Oct 2021 17:00)  HR: 62 (02 Oct 2021 07:59) (62 - 65)  BP: 123/72 (02 Oct 2021 07:59) (115/68 - 145/81)  BP(mean): --  RR: 18 (02 Oct 2021 07:59) (18 - 18)  SpO2: --    MEDICATIONS  (STANDING):  aspirin  chewable 81 milliGRAM(s) Oral daily  atorvastatin 80 milliGRAM(s) Oral at bedtime  brimonidine 0.2% Ophthalmic Solution 1 Drop(s) Both EYES two times a day  buMETAnide Infusion 1 mG/Hr (5 mL/Hr) IV Continuous <Continuous>  carvedilol 12.5 milliGRAM(s) Oral every 12 hours  clopidogrel Tablet 75 milliGRAM(s) Oral daily  dextrose 40% Gel 15 Gram(s) Oral once  dextrose 5%. 1000 milliLiter(s) (100 mL/Hr) IV Continuous <Continuous>  dextrose 5%. 1000 milliLiter(s) (50 mL/Hr) IV Continuous <Continuous>  dextrose 50% Injectable 25 Gram(s) IV Push once  dextrose 50% Injectable 12.5 Gram(s) IV Push once  dextrose 50% Injectable 25 Gram(s) IV Push once  dextrose 50% Injectable 10 milliLiter(s) IV Push once  dorzolamide 2% Ophthalmic Solution 1 Drop(s) Both EYES <User Schedule>  gabapentin 100 milliGRAM(s) Oral two times a day  glucagon  Injectable 1 milliGRAM(s) IntraMuscular once  heparin   Injectable 5000 Unit(s) SubCutaneous every 12 hours  hydrALAZINE 100 milliGRAM(s) Oral three times a day  insulin lispro (ADMELOG) corrective regimen sliding scale   SubCutaneous three times a day before meals  isosorbide   mononitrate ER Tablet (IMDUR) 30 milliGRAM(s) Oral daily  ivabradine 5 milliGRAM(s) Oral two times a day  latanoprost 0.005% Ophthalmic Solution 1 Drop(s) Both EYES at bedtime  sevelamer carbonate 800 milliGRAM(s) Oral three times a day  sodium chloride 3%. 150 milliLiter(s) (50 mL/Hr) IV Continuous <Continuous>  timolol 0.5% Solution 1 Drop(s) Both EYES two times a day  torsemide 20 milliGRAM(s) Oral two times a day    MEDICATIONS  (PRN):      REVIEW OF SYSTEMS:          CONSTITUTIONAL: No weakness, fevers or chills          EYES/ENT: No visual changes;  No vertigo or throat pain           NECK: No pain or stiffness          RESPIRATORY: No cough, wheezing, hemoptysis          CARDIOVASCULAR: no pain, no [place], no palpitations           GASTROINTESTINAL: No abdominal or epigastric pain. No nausea, vomiting, or hematemesis;           GENITOURINARY: No dysuria, frequency or hematuria          NEUROLOGICAL: No numbness or weakness          SKIN: No itching, rashes    PHYSICAL EXAM:           CONSTITUTIONAL: Well-developed; well-nourished; in no acute distress  	SKIN: warm, dry  	HEAD: Normocephalic; atraumatic  	EYES: PERRL.  	ENT: No nasal discharge, airway clear, mucous membranes moist  	NECK: Supple; non tender  	CARD: +S1, +S2, no murmurs, gallops, or rubs. (Regular) rate and rhythm    	RESP: No wheezes, rales or rhonchi. CTA B/L  	ABD: soft ntnd, + BS x 4 quadrants  	EXT: moves all extremities,  no clubbing, cyanosis or edema  	NEURO: Alert and oriented x3, no focal deficits          PSYCH: Cooperative, appropriate          VASCULAR:  + Rad / + PTs / + DPs          EXTREMITY:             Right Groin:  Dressing removed, C/D/I, + pulses,  access site soft, no hematoma, no pain, no numbness, no signs and symptoms of infection  	   Right Radial: Dressing removed, C/D/I, + pulses, access site soft, no hematoma, no pain, no numbness, no signs and symptoms of infection             ECG: P                                                                                                                2D ECHO:    EXAM:  ECHO TTE WO CON COMP W DOPP        PROCEDURE DATE:  09/30/2021      INTERPRETATION:   [place] [address]                Phone: [phone].   TRANSTHORACIC ECHOCARDIOGRAM REPORT        Patient Name:   GILLIAN MENDOZA Accession #: [id number]  Medical Rec #:  [id number]      Height:      63.0 in 160.0 cm  YOB: 1965     Weight:      145.0 lb 65.77 kg  Patient Age:    56 yearsBSA:         1.69 m²  Patient Gender: M             BP:          177/93 mmHg      Date of Exam:        9/30/2021 4:45:56 PM  Referring Physician: [id number] COY COLLIER  Sonographer:         Barbara Barraza  Reading Physician:   Colby Dey MD, [facility].    Procedure:   2D Echo/Doppler/Color Doppler Complete.  Indications: R07.9 - Chest Pain, unspecified  Diagnosis:   Chest pain, unspecified - R07.9        Summary:   1. Moderately decreased segmental left ventricular systolic function.   2. Mid and apical inferior wall, basal and mid inferolateral wall, mid anterolateral segment, and apical lateral segment are abnormal as described above.   3. LV Ejection Fraction by Garcia's Method with a biplane EF of 42 %.   4. Mildly increased LV wall thickness.   5. Mildly enlarged left atrium.   6. Normal right atrial size.   7. Small to moderate pericardial effusion.   8. Mild to moderate mitral valve regurgitation.   9. Moderate tricuspid regurgitation.  10. Mild pulmonic valve regurgitation.  11. Spectral Doppler shows restrictive pattern of left ventricular myocardial filling (Grade III diastolic dysfunction).  12. Mild thickening of the anterior and posterior mitral valve leaflets.    PHYSICIAN INTERPRETATION:  Left Ventricle: The left ventricular internal cavity size is normal. Left ventricular wall thickness is mildly increased. Moderately decreased segmental left ventricular systolic function. Spectral Doppler shows restrictive pattern of left ventricular myocardial filling (Grade III diastolic dysfunction).      LV Wall Scoring:  The mid and apical inferior wall, basal and mid inferolateral wall, mid  anterolateral segment, and apical lateral segment are hypokinetic. All  remaining scored segments are normal.    Right Ventricle: Normal right ventricular size and function.  Left Atrium: Mildly enlarged left atrium.  Right Atrium: Normal right atrial size.  Pericardium: A small to moderate pericardial effusion is present. The pericardial effusion is globally located around the entire heart.  Mitral Valve: Structurally normal mitral valve, with normal leaflet excursion. Mild thickening of the anterior and posterior mitral valve leaflets. Mild to moderate mitral valve regurgitation is seen.  Tricuspid Valve: Structurally normal tricuspid valve, with normal leaflet excursion. Moderate tricuspid regurgitation is visualized.  Aortic Valve: Normal trileaflet aortic valve with normal opening. The aortic valve is trileaflet. No evidence of aortic valve regurgitation is seen.  Pulmonic Valve: Structurally normal pulmonic valve, with normal leaflet excursion. Mild pulmonic valve regurgitation.  Aorta: The aortic root is normal in size and structure.  Venous: The inferior vena cava was normal sized, with respiratory size variationless than 50%.      2D AND M-MODE MEASUREMENTS (normal ranges within parentheses):  Left Ventricle:                  Normal   Aorta/Left Atrium:             Normal  IVSd (2D):              1.10 cm (0.7-1.1) AoV Cusp Separation: 1.72 cm (1.5-2.6)  LVPWd (2D):             1.01 cm (0.7-1.1) Left Atrium (Mmode): 4.29 cm (1.9-4.0)  LVIDd (2D):             5.39 cm (3.4-5.7) Right Ventricle:  LVIDs (2D):             3.83 cm           RVd (2D):        3.35 cm  LV FS (2D):             28.9 %   (>25%)  Relative Wall Thickness  0.37    (<0.42)    SPECTRAL DOPPLER ANALYSIS:  LV DIASTOLIC FUNCTION:  MV Peak E: 1.18 m/s Decel Time: 165 msec  MV Peak A: 0.28 m/s  E/A Ratio: 4.14    Aortic Valve:  AoV VMax:    1.24 m/s AoV Area, Vmax:    1.93 cm² Vmax Indx:  1.15 cm²/m²  AoV VTI:     0.21 m   AoV Area, VTI:     2.06 cm² VTI Indx:     1.22 cm²/m²  AoV Pk Grad: 6.1 mmHg AoV Area, Mn Grad: 1.90 cm² Mn Grad Indx: 1.13 cm²/m²  AoV Mn Grad: 3.5 mmHg    LVOT Vmax: 0.81 m/s  LVOT VTI:  0.15 m  LVOT Diam: 1.93 cm    Mitral Valve:  MV P1/2 Time: 47.71 msec  MV Area, PHT: 4.61 cm²    Tricuspid Valve and PA/RV Systolic Pressure: TR Max Velocity: 2.55 m/s RA Pressure: 3 mmHg RVSP/PASP: 28.9 mmHg      [id number] Colby Dey MD, [facility], Electronically signed on 10/1/2021 at 6:56:53 AM        *** Final ***                COLBY DEY MD; Attending Cardiologist  This document has been electronically signed. Sep 30 2021  4:45PM      LABS:                        9.8    8.96  )-----------( 163      ( 02 Oct 2021 07:59 )             33.0     10-02    140  |  107  |  47<H>  ----------------------------<  165<H>  4.8   |  20  |  3.9<H>    Ca    8.1<L>      02 Oct 2021 07:59  Mg     2.3     10-02    TPro  5.9<L>  /  Alb  3.2<L>  /  TBili  0.4  /  DBili  x   /  AST  17  /  ALT  11  /  AlkPhos  109  10-02        Magnesium, Serum: 2.3 mg/dL [1.8 - 2.4] (10-02-21 @ 07:59)  LIVER FUNCTIONS - ( 02 Oct 2021 07:59 )  Alb: 3.2 g/dL / Pro: 5.9 g/dL / ALK PHOS: 109 U/L / ALT: 11 U/L / AST: 17 U/L / GGT: x             A/P:  I discussed the case with Cardiologist Dr. Singh and recommend the following:    S/P PCI:   Intervention:   -Successful IVUS guided PCI with balloon angioplasty, Intravascular lithotripsy, and drug eluting stent implantation x 1 of proximal LCX.  -Successful IVUS guided PCI with balloon angioplasty, and drug eluting stent implantation x 1 of distal LCX.    Implants:   -3.5x20 mm Synergy stent of prox LCX  -2.5x24 mm Synergy stent of distal LCX                       Continue DAPT ( Aspirin 81 mg PO Daily and Plavix 75 mg PO Daily ), B-Blocker, Isosorbide, torsemide, Statin Therapy                   add zetia for lipid management                   No ACEi/ARB due to elevated Cr, trend Cr                    no IVF, rise in Cr possibly d/t recent contrast administration                     HOLD DIURETICS FOR NOW, MONITOR URINE OUTPUT CLOSELY                   strict I&O's, daily wts, fluid restriction, low sodium diet                   HOLD Metformin for 48 hr after Cardiac Cath                    OOB-CH, ambulate with assistance, monitor access sites s/p Cardiac Cath                    Patient given 30 day supply of ( Aspirin 81 mg daily and Plavix 75 mg daily ) to take at home                   Patient agreeing to take DAPT for at least one year or as directed by cardiologist                    Pt given instructions on importance of taking antiplatelet medication or risk acute stent thrombosis/death                   Post cath instructions, access site care and activity restrictions reviewed with patient                     Discussed with patient to return to hospital if experience chest pain, shortness breath, dizziness and site bleeding                   Aggressive risk factor modification, diet counseling, smoking cessation discussed with patient

## 2021-10-02 NOTE — PROGRESS NOTE ADULT - SUBJECTIVE AND OBJECTIVE BOX
GILLIAN MENDOZA  56y Male    CHIEF COMPLAINT:    Patient is a 56y old  Male who presents with a chief complaint of Chest pain (02 Oct 2021 10:54)      INTERVAL HPI/OVERNIGHT EVENTS:    Patient seen and examined. No sob. No cp. Renal function is deteriorating.     ROS: All other systems are negative.    Vital Signs:    T(F): 96.7 (10-02-21 @ 07:59), Max: 99 (10-01-21 @ 17:00)  HR: 62 (10-02-21 @ 07:59) (62 - 65)  BP: 123/72 (10-02-21 @ 07:59) (115/68 - 145/81)  RR: 18 (10-02-21 @ 07:59) (18 - 18)  SpO2: --  I&O's Summary    01 Oct 2021 07:01  -  02 Oct 2021 07:00  --------------------------------------------------------  IN: 815 mL / OUT: 350 mL / NET: 465 mL    02 Oct 2021 07:01  -  02 Oct 2021 13:16  --------------------------------------------------------  IN: 790 mL / OUT: 675 mL / NET: 115 mL      Daily Height in cm: 160.02 (02 Oct 2021 09:17)    Daily Weight in k.8 (02 Oct 2021 01:26)  CAPILLARY BLOOD GLUCOSE      POCT Blood Glucose.: 232 mg/dL (02 Oct 2021 11:23)  POCT Blood Glucose.: 176 mg/dL (02 Oct 2021 07:34)  POCT Blood Glucose.: 157 mg/dL (01 Oct 2021 21:30)  POCT Blood Glucose.: 99 mg/dL (01 Oct 2021 17:03)      PHYSICAL EXAM:    GENERAL:  NAD  SKIN: No rashes or lesions  HENT: Atraumatic. Normocephalic. PERRL. Moist membranes.  NECK: Supple, No JVD. No lymphadenopathy.  PULMONARY: CTA B/L. No wheezing. No rales  CVS: Normal S1, S2. Rate and Rhythm are regular. No murmurs.  ABDOMEN/GI: Soft, Nontender, Nondistended; BS present  EXTREMITIES: Peripheral pulses intact. No edema B/L LE.  NEUROLOGIC:  No motor or sensory deficit.  PSYCH: Alert & oriented x 3    Consultant(s) Notes Reviewed:  [x ] YES  [ ] NO  Care Discussed with Consultants/Other Providers [ x] YES  [ ] NO    EKG reviewed  Telemetry reviewed    LABS:                        9.8    8.96  )-----------( 163      ( 02 Oct 2021 07:59 )             33.0     10-    140  |  107  |  47<H>  ----------------------------<  165<H>    Creatinine Trend: 3.9<--, 3.4<--, 2.7<--, 2.6<--, 2.7<--  4.8   |  20  |  3.9<H>    Ca    8.1<L>      02 Oct 2021 07:59  Mg     2.3     10    TPro  5.9<L>  /  Alb  3.2<L>  /  TBili  0.4  /  DBili  x   /  AST  17  /  ALT  11  /  AlkPhos  109  10-02      Serum Pro-Brain Natriuretic Peptide: 42661 pg/mL (21 @ 12:51)    Trop 0.14, CKMB 5.2, CK --, 21 @ 06:05  Trop 0.13, CKMB 6.5, CK --, 21 @ 01:00  Trop 0.12, CKMB 7.0, CK --, 21 @ 20:56        RADIOLOGY & ADDITIONAL TESTS:      Imaging or report Personally Reviewed:  [ ] YES  [ ] NO    Medications:  Standing  aspirin  chewable 81 milliGRAM(s) Oral daily  atorvastatin 80 milliGRAM(s) Oral at bedtime  brimonidine 0.2% Ophthalmic Solution 1 Drop(s) Both EYES two times a day  carvedilol 12.5 milliGRAM(s) Oral every 12 hours  clopidogrel Tablet 75 milliGRAM(s) Oral daily  dextrose 40% Gel 15 Gram(s) Oral once  dextrose 5%. 1000 milliLiter(s) IV Continuous <Continuous>  dextrose 5%. 1000 milliLiter(s) IV Continuous <Continuous>  dextrose 50% Injectable 25 Gram(s) IV Push once  dextrose 50% Injectable 12.5 Gram(s) IV Push once  dextrose 50% Injectable 25 Gram(s) IV Push once  dextrose 50% Injectable 10 milliLiter(s) IV Push once  dorzolamide 2% Ophthalmic Solution 1 Drop(s) Both EYES <User Schedule>  gabapentin 100 milliGRAM(s) Oral two times a day  glucagon  Injectable 1 milliGRAM(s) IntraMuscular once  heparin   Injectable 5000 Unit(s) SubCutaneous every 12 hours  hydrALAZINE 100 milliGRAM(s) Oral three times a day  insulin lispro (ADMELOG) corrective regimen sliding scale   SubCutaneous three times a day before meals  isosorbide   mononitrate ER Tablet (IMDUR) 30 milliGRAM(s) Oral daily  ivabradine 5 milliGRAM(s) Oral two times a day  latanoprost 0.005% Ophthalmic Solution 1 Drop(s) Both EYES at bedtime  sevelamer carbonate 800 milliGRAM(s) Oral three times a day  timolol 0.5% Solution 1 Drop(s) Both EYES two times a day    PRN Meds      Case discussed with resident    Care discussed with pt/family

## 2021-10-02 NOTE — PROGRESS NOTE ADULT - ASSESSMENT
56M w/ h/o HTN, HLD, CKD IV, T2DM on insulin a1c 5.8% on 7/3/2021, Glaucoma, and CAD s/p PCI in July 2021. The patient presented to the ED with complaint of left sided chest pain and difficulty sleeping 2/2 to  chest pain.     #Acute Kidney Injury  #CKD, Stage 4  Baseline Cr 2.6-2.7. Follows w/ Dr. Rollins as outpatient. LENNY likely secondary to contrast from [facility] vs overdiuresis from bumex/hypertonic saline.  - d/c torsemide  - c/w sevelamer carbonate 1 tab PO TIDAC  - monitor UO  - avoid ACEi/ARB  - Nephrology consulted, f/u recs    #Acute Coronary Syndrome  #CAD s/p PCI July 2021  #HFrEF (40-45%)  #HTN  Initially p/w chest pain. Admission CXR demonstrated no acute cardiopulmonary disease. Troponin up-trending on admission (0.12->.13->.14). Last [facility] 7/22/21 demonstrated severe triple vessel disease, but deemed high risk for CTS. Stent subsequently placed in pLAD. TTE 9/30 showed LVEF 42% and moderately decreased segmental left ventricular systolic function. On 9/30, underwent cath w/ stent to pLCX and dLCX.   - d/c torsemide as above  - c/w hydralazine 100 mg PO TID  - c/w Imdur 30mg PO QD  - c/w carvedilol 12.5mg PO BID   - c/w ASA 81mg OP QD and clopidogrel 75mg PO QD (DAPT)  - c/w atorvastatin 80mg PO QHS  - f/u outpatient w/ Dr. Cantu as outpatient one week after discharge    #Microcytic Anemia  Hb 10.7 on admission. Iron Studies 9/29: Serum Iron 59, TIBC 229, Sat 26%, Ferritin 341.  - Trend Hb via daily CBC  - maintain active T&S    # HLD (hyperlipidemia)  Lipid Profile 9/30: LDL-C 150, HDL 67, Chol 220, TG 84  - c/w atorvastatin 80mg PO QHS  - Consider adding zetia (ezetimibe) on discharge given elevated LDL    #DM Type 2  #Diabetic Neuropathy  A1C 6.3% on 9/30/21. At home, take pioglitazone-metformin 15mg-850mg BID. Holding PO meds while inpatient.  - monitor FS TIDAC and QHS  - c/w ISS  - c/w gabapentin for neuropathy     DVT Prophylaxis: heparin 5000U SQ BID  GI Prophylaxis: none  Diet: DASH/TLC  Activity: IAT  Code Status: Full Code    Dispo: f/u BMP; f/u nephrology

## 2021-10-03 LAB
ALBUMIN SERPL ELPH-MCNC: 3.1 G/DL — LOW (ref 3.5–5.2)
ALP SERPL-CCNC: 117 U/L — HIGH (ref 30–115)
ALT FLD-CCNC: 12 U/L — SIGNIFICANT CHANGE UP (ref 0–41)
ANION GAP SERPL CALC-SCNC: 14 MMOL/L — SIGNIFICANT CHANGE UP (ref 7–14)
AST SERPL-CCNC: 15 U/L — SIGNIFICANT CHANGE UP (ref 0–41)
BASOPHILS # BLD AUTO: 0.02 K/UL — SIGNIFICANT CHANGE UP (ref 0–0.2)
BASOPHILS NFR BLD AUTO: 0.2 % — SIGNIFICANT CHANGE UP (ref 0–1)
BILIRUB SERPL-MCNC: 0.4 MG/DL — SIGNIFICANT CHANGE UP (ref 0.2–1.2)
BUN SERPL-MCNC: 59 MG/DL — HIGH (ref 10–20)
CALCIUM SERPL-MCNC: 8.2 MG/DL — LOW (ref 8.5–10.1)
CHLORIDE SERPL-SCNC: 106 MMOL/L — SIGNIFICANT CHANGE UP (ref 98–110)
CO2 SERPL-SCNC: 18 MMOL/L — SIGNIFICANT CHANGE UP (ref 17–32)
CREAT SERPL-MCNC: 5 MG/DL — CRITICAL HIGH (ref 0.7–1.5)
EOSINOPHIL # BLD AUTO: 0.26 K/UL — SIGNIFICANT CHANGE UP (ref 0–0.7)
EOSINOPHIL NFR BLD AUTO: 2.8 % — SIGNIFICANT CHANGE UP (ref 0–8)
GLUCOSE BLDC GLUCOMTR-MCNC: 173 MG/DL — HIGH (ref 70–99)
GLUCOSE BLDC GLUCOMTR-MCNC: 173 MG/DL — HIGH (ref 70–99)
GLUCOSE BLDC GLUCOMTR-MCNC: 176 MG/DL — HIGH (ref 70–99)
GLUCOSE BLDC GLUCOMTR-MCNC: 176 MG/DL — HIGH (ref 70–99)
GLUCOSE SERPL-MCNC: 162 MG/DL — HIGH (ref 70–99)
HCT VFR BLD CALC: 30 % — LOW (ref 42–52)
HGB BLD-MCNC: 9 G/DL — LOW (ref 14–18)
IMM GRANULOCYTES NFR BLD AUTO: 0.3 % — SIGNIFICANT CHANGE UP (ref 0.1–0.3)
LYMPHOCYTES # BLD AUTO: 0.6 K/UL — LOW (ref 1.2–3.4)
LYMPHOCYTES # BLD AUTO: 6.6 % — LOW (ref 20.5–51.1)
MAGNESIUM SERPL-MCNC: 2.4 MG/DL — SIGNIFICANT CHANGE UP (ref 1.8–2.4)
MCHC RBC-ENTMCNC: 23.3 PG — LOW (ref 27–31)
MCHC RBC-ENTMCNC: 30 G/DL — LOW (ref 32–37)
MCV RBC AUTO: 77.5 FL — LOW (ref 80–94)
MONOCYTES # BLD AUTO: 0.65 K/UL — HIGH (ref 0.1–0.6)
MONOCYTES NFR BLD AUTO: 7.1 % — SIGNIFICANT CHANGE UP (ref 1.7–9.3)
NEUTROPHILS # BLD AUTO: 7.59 K/UL — HIGH (ref 1.4–6.5)
NEUTROPHILS NFR BLD AUTO: 83 % — HIGH (ref 42.2–75.2)
NRBC # BLD: 0 /100 WBCS — SIGNIFICANT CHANGE UP (ref 0–0)
PLATELET # BLD AUTO: 147 K/UL — SIGNIFICANT CHANGE UP (ref 130–400)
POTASSIUM SERPL-MCNC: 5.2 MMOL/L — HIGH (ref 3.5–5)
POTASSIUM SERPL-SCNC: 5.2 MMOL/L — HIGH (ref 3.5–5)
PROT SERPL-MCNC: 5.7 G/DL — LOW (ref 6–8)
RBC # BLD: 3.87 M/UL — LOW (ref 4.7–6.1)
RBC # FLD: 13.8 % — SIGNIFICANT CHANGE UP (ref 11.5–14.5)
SODIUM SERPL-SCNC: 138 MMOL/L — SIGNIFICANT CHANGE UP (ref 135–146)
WBC # BLD: 9.15 K/UL — SIGNIFICANT CHANGE UP (ref 4.8–10.8)
WBC # FLD AUTO: 9.15 K/UL — SIGNIFICANT CHANGE UP (ref 4.8–10.8)

## 2021-10-03 PROCEDURE — 99232 SBSQ HOSP IP/OBS MODERATE 35: CPT

## 2021-10-03 PROCEDURE — 99233 SBSQ HOSP IP/OBS HIGH 50: CPT

## 2021-10-03 RX ORDER — LANOLIN ALCOHOL/MO/W.PET/CERES
5 CREAM (GRAM) TOPICAL AT BEDTIME
Refills: 0 | Status: DISCONTINUED | OUTPATIENT
Start: 2021-10-03 | End: 2021-10-07

## 2021-10-03 RX ADMIN — GABAPENTIN 100 MILLIGRAM(S): 400 CAPSULE ORAL at 05:07

## 2021-10-03 RX ADMIN — ISOSORBIDE MONONITRATE 30 MILLIGRAM(S): 60 TABLET, EXTENDED RELEASE ORAL at 11:23

## 2021-10-03 RX ADMIN — SEVELAMER CARBONATE 800 MILLIGRAM(S): 2400 POWDER, FOR SUSPENSION ORAL at 14:44

## 2021-10-03 RX ADMIN — IVABRADINE 5 MILLIGRAM(S): 7.5 TABLET, FILM COATED ORAL at 17:07

## 2021-10-03 RX ADMIN — DORZOLAMIDE HYDROCHLORIDE 1 DROP(S): 20 SOLUTION/ DROPS OPHTHALMIC at 17:07

## 2021-10-03 RX ADMIN — Medication 5 MILLIGRAM(S): at 21:51

## 2021-10-03 RX ADMIN — GABAPENTIN 100 MILLIGRAM(S): 400 CAPSULE ORAL at 17:08

## 2021-10-03 RX ADMIN — SEVELAMER CARBONATE 800 MILLIGRAM(S): 2400 POWDER, FOR SUSPENSION ORAL at 21:51

## 2021-10-03 RX ADMIN — Medication 81 MILLIGRAM(S): at 11:23

## 2021-10-03 RX ADMIN — Medication 1 DROP(S): at 05:07

## 2021-10-03 RX ADMIN — BRIMONIDINE TARTRATE 1 DROP(S): 2 SOLUTION/ DROPS OPHTHALMIC at 05:07

## 2021-10-03 RX ADMIN — Medication 1: at 12:22

## 2021-10-03 RX ADMIN — BRIMONIDINE TARTRATE 1 DROP(S): 2 SOLUTION/ DROPS OPHTHALMIC at 17:07

## 2021-10-03 RX ADMIN — IVABRADINE 5 MILLIGRAM(S): 7.5 TABLET, FILM COATED ORAL at 08:19

## 2021-10-03 RX ADMIN — Medication 100 MILLIGRAM(S): at 05:07

## 2021-10-03 RX ADMIN — SEVELAMER CARBONATE 800 MILLIGRAM(S): 2400 POWDER, FOR SUSPENSION ORAL at 05:07

## 2021-10-03 RX ADMIN — Medication 100 MILLIGRAM(S): at 21:51

## 2021-10-03 RX ADMIN — CARVEDILOL PHOSPHATE 12.5 MILLIGRAM(S): 80 CAPSULE, EXTENDED RELEASE ORAL at 17:08

## 2021-10-03 RX ADMIN — CARVEDILOL PHOSPHATE 12.5 MILLIGRAM(S): 80 CAPSULE, EXTENDED RELEASE ORAL at 05:07

## 2021-10-03 RX ADMIN — ATORVASTATIN CALCIUM 80 MILLIGRAM(S): 80 TABLET, FILM COATED ORAL at 21:51

## 2021-10-03 RX ADMIN — Medication 100 MILLIGRAM(S): at 14:44

## 2021-10-03 RX ADMIN — HEPARIN SODIUM 5000 UNIT(S): 5000 INJECTION INTRAVENOUS; SUBCUTANEOUS at 05:07

## 2021-10-03 RX ADMIN — Medication 1: at 08:17

## 2021-10-03 RX ADMIN — DORZOLAMIDE HYDROCHLORIDE 1 DROP(S): 20 SOLUTION/ DROPS OPHTHALMIC at 05:07

## 2021-10-03 RX ADMIN — LATANOPROST 1 DROP(S): 0.05 SOLUTION/ DROPS OPHTHALMIC; TOPICAL at 21:52

## 2021-10-03 RX ADMIN — Medication 1 DROP(S): at 17:07

## 2021-10-03 RX ADMIN — CLOPIDOGREL BISULFATE 75 MILLIGRAM(S): 75 TABLET, FILM COATED ORAL at 11:23

## 2021-10-03 RX ADMIN — HEPARIN SODIUM 5000 UNIT(S): 5000 INJECTION INTRAVENOUS; SUBCUTANEOUS at 17:09

## 2021-10-03 NOTE — CONSULT NOTE ADULT - ASSESSMENT
# LENNY on CKD 4 / r/o ROSENDO / CRS  - non oliguric, creat up-trending  - no significant hypervolemia on exam, denies orthopnea, weights if accurate down trending  - hold torsemide until tomorrow, then reassess renal function/volume status, will not be able to stay off diuretics for more than short duration  - strict i/o  - renal diet  - check renal/bladder ultrasound w/ pvr  - dose meds for eGFR < 15  - start sodium bicarb 1300mg q8h  - start lokelma 5g q12h  - check phos level, on sevelamer   - check UA, urine lytes, urine urea, urine creat  - no urgent indication for RRT today, pt at high risk for progressive CKD/LENNY requiring RRT shortly   - cardio f/u   Will follow

## 2021-10-03 NOTE — PROGRESS NOTE ADULT - SUBJECTIVE AND OBJECTIVE BOX
GILLIAN MENDOZA  56y Male    CHIEF COMPLAINT:    Patient is a 56y old  Male who presents with a chief complaint of Chest pain (02 Oct 2021 10:54)      INTERVAL HPI/OVERNIGHT EVENTS:    Patient seen and examined.    ROS: All other systems are negative.    Vital Signs:    T(F): 98.2 (10-03-21 @ 05:35), Max: 98.2 (10-03-21 @ 05:35)  HR: 59 (10-03-21 @ 05:35) (55 - 59)  BP: 175/103 (10-03-21 @ 05:35) (121/71 - 175/103)  RR: 18 (10-03-21 @ 05:35) (18 - 18)  SpO2: 98% (10-02-21 @ 19:37) (98% - 98%)  I&O's Summary    02 Oct 2021 07:01  -  03 Oct 2021 07:00  --------------------------------------------------------  IN: 1030 mL / OUT: 675 mL / NET: 355 mL    03 Oct 2021 07:01  -  03 Oct 2021 12:02  --------------------------------------------------------  IN: 240 mL / OUT: 400 mL / NET: -160 mL      Daily     Daily Weight in k (03 Oct 2021 05:35)  CAPILLARY BLOOD GLUCOSE      POCT Blood Glucose.: 176 mg/dL (03 Oct 2021 07:47)  POCT Blood Glucose.: 210 mg/dL (02 Oct 2021 21:29)      PHYSICAL EXAM:    GENERAL:  NAD  SKIN: No rashes or lesions  HENT: Atraumatic. Normocephalic. PERRL. Moist membranes.  NECK: Supple, No JVD. No lymphadenopathy.  PULMONARY: CTA B/L. No wheezing. No rales  CVS: Normal S1, S2. Rate and Rhythm are regular. No murmurs.  ABDOMEN/GI: Soft, Nontender, Nondistended; BS present  EXTREMITIES: Peripheral pulses intact. No edema B/L LE.  NEUROLOGIC:  No motor or sensory deficit.  PSYCH: Alert & oriented x 3    Consultant(s) Notes Reviewed:  [x ] YES  [ ] NO  Care Discussed with Consultants/Other Providers [ x] YES  [ ] NO    EKG reviewed  Telemetry reviewed    LABS:                        9.0    9.15  )-----------( 147      ( 03 Oct 2021 07:12 )             30.0     10    138  |  106  |  59<H>  ----------------------------<  162<H>  5.2<H>   |  18  |  5.0<HH>    Ca    8.2<L>      03 Oct 2021 07:12  Mg     2.4     10-03    TPro  5.7<L>  /  Alb  3.1<L>  /  TBili  0.4  /  DBili  x   /  AST  15  /  ALT  12  /  AlkPhos  117<H>  10-03      Serum Pro-Brain Natriuretic Peptide: 68347 pg/mL (21 @ 12:51)          RADIOLOGY & ADDITIONAL TESTS:      Imaging or report Personally Reviewed:  [ ] YES  [ ] NO    Medications:  Standing  aspirin  chewable 81 milliGRAM(s) Oral daily  atorvastatin 80 milliGRAM(s) Oral at bedtime  brimonidine 0.2% Ophthalmic Solution 1 Drop(s) Both EYES two times a day  carvedilol 12.5 milliGRAM(s) Oral every 12 hours  clopidogrel Tablet 75 milliGRAM(s) Oral daily  dextrose 40% Gel 15 Gram(s) Oral once  dextrose 5%. 1000 milliLiter(s) IV Continuous <Continuous>  dextrose 5%. 1000 milliLiter(s) IV Continuous <Continuous>  dextrose 50% Injectable 25 Gram(s) IV Push once  dextrose 50% Injectable 12.5 Gram(s) IV Push once  dextrose 50% Injectable 25 Gram(s) IV Push once  dextrose 50% Injectable 10 milliLiter(s) IV Push once  dorzolamide 2% Ophthalmic Solution 1 Drop(s) Both EYES <User Schedule>  gabapentin 100 milliGRAM(s) Oral two times a day  glucagon  Injectable 1 milliGRAM(s) IntraMuscular once  heparin   Injectable 5000 Unit(s) SubCutaneous every 12 hours  hydrALAZINE 100 milliGRAM(s) Oral three times a day  insulin lispro (ADMELOG) corrective regimen sliding scale   SubCutaneous three times a day before meals  isosorbide   mononitrate ER Tablet (IMDUR) 30 milliGRAM(s) Oral daily  ivabradine 5 milliGRAM(s) Oral two times a day  latanoprost 0.005% Ophthalmic Solution 1 Drop(s) Both EYES at bedtime  sevelamer carbonate 800 milliGRAM(s) Oral three times a day  timolol 0.5% Solution 1 Drop(s) Both EYES two times a day    PRN Meds      Case discussed with resident    Care discussed with pt/family           GILLIAN MENDOZA  56y Male    CHIEF COMPLAINT:    Patient is a 56y old  Male who presents with a chief complaint of Chest pain (02 Oct 2021 10:54)      INTERVAL HPI/OVERNIGHT EVENTS:    Patient seen and examined. Renal function cont to deteriorate. Having good urine out put. No sob. No urinary complaints.     ROS: All other systems are negative.    Vital Signs:    T(F): 98.2 (10-03-21 @ 05:35), Max: 98.2 (10-03-21 @ 05:35)  HR: 59 (10-03-21 @ 05:35) (55 - 59)  BP: 175/103 (10-03-21 @ 05:35) (121/71 - 175/103)  RR: 18 (10-03-21 @ 05:35) (18 - 18)  SpO2: 98% (10-02-21 @ 19:37) (98% - 98%)  I&O's Summary    02 Oct 2021 07:01  -  03 Oct 2021 07:00  --------------------------------------------------------  IN: 1030 mL / OUT: 675 mL / NET: 355 mL    03 Oct 2021 07:01  -  03 Oct 2021 12:02  --------------------------------------------------------  IN: 240 mL / OUT: 400 mL / NET: -160 mL      Daily     Daily Weight in k (03 Oct 2021 05:35)  CAPILLARY BLOOD GLUCOSE      POCT Blood Glucose.: 176 mg/dL (03 Oct 2021 07:47)  POCT Blood Glucose.: 210 mg/dL (02 Oct 2021 21:29)      PHYSICAL EXAM:    GENERAL:  NAD  SKIN: No rashes or lesions  HENT: Atraumatic. Normocephalic. PERRL. Moist membranes.  NECK: Supple, +ve JVD. No lymphadenopathy.  PULMONARY: CTA B/L. No wheezing. No rales  CVS: Normal S1, S2. Rate and Rhythm are regular. No murmurs.  ABDOMEN/GI: Soft, Nontender, Nondistended; BS present  EXTREMITIES: Peripheral pulses intact. No edema B/L LE.  NEUROLOGIC:  No motor or sensory deficit.  PSYCH: Alert & oriented x 3    Consultant(s) Notes Reviewed:  [x ] YES  [ ] NO  Care Discussed with Consultants/Other Providers [ x] YES  [ ] NO    EKG reviewed  Telemetry reviewed    LABS:                        9.0    9.15  )-----------( 147      ( 03 Oct 2021 07:12 )             30.0     10    138  |  106  |  59<H>  ----------------------------<  162<H>    Creatinine Trend: 5.0<--, 3.9<--, 3.4<--, 2.7<--, 2.6<--, 2.7<--  5.2<H>   |  18  |  5.0<HH>    Ca    8.2<L>      03 Oct 2021 07:12  Mg     2.4     10-03    TPro  5.7<L>  /  Alb  3.1<L>  /  TBili  0.4  /  DBili  x   /  AST  15  /  ALT  12  /  AlkPhos  117<H>  10-03      Serum Pro-Brain Natriuretic Peptide: 80061 pg/mL (21 @ 12:51)          RADIOLOGY & ADDITIONAL TESTS:      Imaging or report Personally Reviewed:  [ ] YES  [ ] NO    Medications:  Standing  aspirin  chewable 81 milliGRAM(s) Oral daily  atorvastatin 80 milliGRAM(s) Oral at bedtime  brimonidine 0.2% Ophthalmic Solution 1 Drop(s) Both EYES two times a day  carvedilol 12.5 milliGRAM(s) Oral every 12 hours  clopidogrel Tablet 75 milliGRAM(s) Oral daily  dextrose 40% Gel 15 Gram(s) Oral once  dextrose 5%. 1000 milliLiter(s) IV Continuous <Continuous>  dextrose 5%. 1000 milliLiter(s) IV Continuous <Continuous>  dextrose 50% Injectable 25 Gram(s) IV Push once  dextrose 50% Injectable 12.5 Gram(s) IV Push once  dextrose 50% Injectable 25 Gram(s) IV Push once  dextrose 50% Injectable 10 milliLiter(s) IV Push once  dorzolamide 2% Ophthalmic Solution 1 Drop(s) Both EYES <User Schedule>  gabapentin 100 milliGRAM(s) Oral two times a day  glucagon  Injectable 1 milliGRAM(s) IntraMuscular once  heparin   Injectable 5000 Unit(s) SubCutaneous every 12 hours  hydrALAZINE 100 milliGRAM(s) Oral three times a day  insulin lispro (ADMELOG) corrective regimen sliding scale   SubCutaneous three times a day before meals  isosorbide   mononitrate ER Tablet (IMDUR) 30 milliGRAM(s) Oral daily  ivabradine 5 milliGRAM(s) Oral two times a day  latanoprost 0.005% Ophthalmic Solution 1 Drop(s) Both EYES at bedtime  sevelamer carbonate 800 milliGRAM(s) Oral three times a day  timolol 0.5% Solution 1 Drop(s) Both EYES two times a day    PRN Meds      Case discussed with resident    Care discussed with pt/family

## 2021-10-03 NOTE — PROGRESS NOTE ADULT - ASSESSMENT
CAD/PCI  ICM/CHF  CKD  HTN/DM    ROSENDO post cath/pci  Renal consult  hold diuretics  monitor renal fnx and urinary outpt closely  cont all card meds  will follow

## 2021-10-03 NOTE — PROGRESS NOTE ADULT - ASSESSMENT
A 56 year old male with PMHx of  HTN, HLD, CKD IV, T2DM on insulin a1c 5.8%, Glaucoma, and CAD presented to the ED with left sided chest pain and difficulty sleeping 2/2 to chest pain.    [place]  CAD / HTN  DM-2  LENNY on CKD-4  Acute on chronic HFmrEF  Ischemic CM            PLAN:    ·	Cr trending up. Likely ROSENDO  ·	Hold diuretics. Monitor renal function  ·	Nephrology consult  ·	Care D/W the cardiology  ·	S/P cath on 9/30 and placement of 2 SAMMY in LCRX  ·	On ASA, Plavix and Lipitor  ·	ECHO showed EF is 42%  ·	On D/C oral Torsemide 40 mg in AM and 20 mg in PM  ·	Check i's and o's and daily wt  ·	Low salt diet and water restriction to 1.5 L/D  ·	On Coreg, Hydralazine and Isosorbide dinitrate.     Progress Note Handoff    Pending (specify):  Consults_________, Tests________, Test Results_______, Other__AKI______  Family discussion:  Disposition: Home___/SNF___/Other________/Unknown at this time________    Blake Chacon MD  Spectra: [phone]   A 56 year old male with PMHx of  HTN, HLD, CKD IV, T2DM on insulin a1c 5.8%, Glaucoma, and CAD presented to the ED with left sided chest pain and difficulty sleeping 2/2 to chest pain.    [place]  CAD / HTN  DM-2  LENNY on CKD-4  Acute on chronic HFmrEF  Ischemic CM            PLAN:    ·	Cr continues to trend up. Likely ROSENDO  ·	Renal consult  ·	Cont to hold diuretics. Monitor renal function. Care D/W the HF specialist on telephone.   ·	Care D/W the cardiology  ·	S/P cath on 9/30 and placement of 2 SAMMY in LCRX  ·	On ASA, Plavix and Lipitor  ·	ECHO showed EF is 42%  ·	On D/C oral Torsemide 40 mg in AM and 20 mg in PM  ·	Check i's and o's and daily wt  ·	Low salt diet and water restriction to 1.5 L/D  ·	On Coreg, Hydralazine and Isosorbide dinitrate.     Progress Note Handoff    Pending (specify):  Consults_________, Tests________, Test Results_______, Other__AKI______  Family discussion:  Disposition: Home___/SNF___/Other________/Unknown at this time________    Blake Chacon MD  Spectra: [phone]

## 2021-10-03 NOTE — CONSULT NOTE ADULT - SUBJECTIVE AND OBJECTIVE BOX
NEPHROLOGY CONSULTATION NOTE    GILLIAN MENDOZA  56y  Male  MRN-[id number]    CC:   Patient is a 56y old  Male who presents with a chief complaint of cad/chf (03 Oct 2021 15:50)      HPI:  Patient is a 56 year old male with PMHx of  HTN, HLD, CKD IV, T2DM on insulin a1c 5.8%, Glaucoma, and CAD s/p PCI in 2021. The patient presented to the ED with complaint of left sided chest pain and difficulty sleeping 2/2 to chest pain and BL LE neuropathy (chronic). The patient states that last night he woke up and from sleep due to burning substernal chest pain, non radiating, no exacerbation or alleviating factors. Of notr the patient had recent PCI in 2021. During that admission patient had a prolonged stay in CCU, was treated for acute CHF exacerbation and worsening kidney function. Cath on 21 showed significant 3 vessle disease, LAD, RCA, LCx. CTS was consulted. During preop cardiac MRI patient became SOB. He received bumex and hypertonic saline for diuresis.  CABG was decided against due to increased risk of progression of CKDIV to ESRD.  The patient was discharged on DAPT and follows with Dr. Cantu as his cardiologist    In the ED, patient is hemodynamically stable with elevated troponin 0.12, microcytic anemia, elevated BNP 47084. The patient was evaluated in the ED by cardiology and is planed for cardiac cath on .  (29 Sep 2021 19:16)      PAST MEDICAL & SURGICAL HISTORY:  HTN (hypertension)    HLD (hyperlipidemia)    DM (diabetes mellitus)    Glaucoma    No significant past surgical history      Allergies:  No Known Allergies    Home Medications Reviewed  Hospital Medications:   MEDICATIONS  (STANDING):  aspirin  chewable 81 milliGRAM(s) Oral daily  atorvastatin 80 milliGRAM(s) Oral at bedtime  brimonidine 0.2% Ophthalmic Solution 1 Drop(s) Both EYES two times a day  carvedilol 12.5 milliGRAM(s) Oral every 12 hours  clopidogrel Tablet 75 milliGRAM(s) Oral daily  dorzolamide 2% Ophthalmic Solution 1 Drop(s) Both EYES <User Schedule>  gabapentin 100 milliGRAM(s) Oral two times a day  heparin   Injectable 5000 Unit(s) SubCutaneous every 12 hours  hydrALAZINE 100 milliGRAM(s) Oral three times a day  insulin lispro (ADMELOG) corrective regimen sliding scale   SubCutaneous three times a day before meals  isosorbide   mononitrate ER Tablet (IMDUR) 30 milliGRAM(s) Oral daily  ivabradine 5 milliGRAM(s) Oral two times a day  latanoprost 0.005% Ophthalmic Solution 1 Drop(s) Both EYES at bedtime  sevelamer carbonate 800 milliGRAM(s) Oral three times a day  timolol 0.5% Solution 1 Drop(s) Both EYES two times a day      Home medications:  aspirin 81 mg oral tablet, chewable: 1 tab(s) orally once a day (01 Oct 2021 10:03)  atorvastatin 40 mg oral tablet: 1 tab(s) orally once a day (01 Oct 2021 10:03)  BASAGLAR 100 UNIT/ML KWIKPEN:  (01 Oct 2021 10:03)  brimonidine 0.2% ophthalmic solution: 1 drop(s) to each affected eye 2 times a day (01 Oct 2021 10:03)  carvedilol 3.125 mg oral tablet: 1 tab(s) orally every 12 hours (01 Oct 2021 10:03)  clopidogrel 75 mg oral tablet: 1 tab(s) orally once a day (01 Oct 2021 10:03)  dorzolamide-timolol 2%-0.5% preservative-free ophthalmic solution: 1 drop(s) to each affected eye 2 times a day (01 Oct 2021 10:03)  latanoprost 0.005% ophthalmic solution: 1 drop(s) to each affected eye once a day (at bedtime) (01 Oct 2021 10:03)  pioglitazone-metformin 15 mg-850 mg oral tablet: 1 tab(s) orally 2 times a day  HOLD MEDICATION FOR 48 HR AFTER CARDIAC CATH  (01 Oct 2021 10:03)  Rhopressa 0.02% ophthalmic solution: 1 drop(s) to each affected eye once a day (in the evening) (01 Oct 2021 10:03)  sevelamer carbonate 800 mg oral tablet: 1 tab(s) orally 3 times a day (with meals) (01 Oct 2021 10:03)      SOCIAL HISTORY:  Social History:      FAMILY HISTORY:  No pertinent family history in first degree relatives        REVIEW OF SYSTEMS:   All other review of systems is negative unless indicated above.    VITALS:  T(F): 97.6 (10-03-21 @ 13:01), Max: 98.2 (10-03-21 @ 05:35)  HR: 57 (10-03-21 @ 13:01)  BP: 149/85 (10-03-21 @ 13:01)  RR: 18 (10-03-21 @ 13:01)  SpO2: 98% (10-02-21 @ 19:37)      I&O's Detail    02 Oct 2021 07:01  -  03 Oct 2021 07:00  --------------------------------------------------------  IN:    Oral Fluid: 1030 mL  Total IN: 1030 mL    OUT:    Voided (mL): 675 mL  Total OUT: 675 mL    Total NET: 355 mL      03 Oct 2021 07:  -  03 Oct 2021 16:25  --------------------------------------------------------  IN:    Oral Fluid: 600 mL  Total IN: 600 mL    OUT:    Voided (mL): 400 mL  Total OUT: 400 mL    Total NET: 200 mL            I&O's Summary    02 Oct 2021 07:01  -  03 Oct 2021 07:00  --------------------------------------------------------  IN: 1030 mL / OUT: 675 mL / NET: 355 mL    03 Oct 2021 07:01  -  03 Oct 2021 16:25  --------------------------------------------------------  IN: 600 mL / OUT: 400 mL / NET: 200 mL        PHYSICAL EXAM:  Gen: NAD  resp: decreased bs at the bases  card: S1/S2  abd: soft  ext: no edema        LABS:  Daily Weight in k (03 Oct 2021 05:35)      10-03    138  |  106  |  59<H>  ----------------------------<  162<H>  5.2<H>   |  18  |  5.0<HH>    Ca    8.2<L>      03 Oct 2021 07:12  Mg     2.4     10-03    TPro  5.7<L>  /  Alb  3.1<L>  /  TBili  0.4  /  DBili      /  AST  15  /  ALT  12  /  AlkPhos  117<H>  10-03    Creatinine Trend:   Creatinine, Serum: 5.0 mg/dL (10-03-21 @ 07:12)  Creatinine, Serum: 3.9 mg/dL (10-02-21 @ 07:59)  Creatinine, Serum: 3.4 mg/dL (10-01-21 @ 22:18)  Creatinine, Serum: 2.7 mg/dL (10-01-21 @ 06:51)  Creatinine, Serum: 2.6 mg/dL (21 @ 06:05)  Creatinine, Serum: 2.7 mg/dL (21 @ 12:51)  Creatinine, Serum: 2.7 mg/dL (21 @ 04:30)  Creatinine, Serum: 2.6 mg/dL (21 @ 04:30)                            9.0    9.15  )-----------( 147      ( 03 Oct 2021 07:12 )             30.0     Mean Cell Volume: 77.5 fL (10-03-21 @ 07:12)    Urine Studies:    Protein/Creatinine Ratio Calculation: 8.1 Ratio *H* ( @ 16:34)  Creatinine, Random Urine: 58 mg/dL ( @ 16:34)            RADIOLOGY & ADDITIONAL STUDIES:    US Kidney and Bladder:   EXAM:  US KIDNEYS AND BLADDER            PROCEDURE DATE:  2021            INTERPRETATION:  CLINICAL INFORMATION: Acute kidney injury    COMPARISON: None available.    TECHNIQUE: Sonography of the kidneys and bladder.    FINDINGS:    Right kidney: 10.8 cm. A simple interpolar cyst measures up to 1.4 cm. No solid renal mass, hydronephrosis or calculi.    Left kidney:  11.2 cm. No renal mass, hydronephrosis or calculi.    Urinary bladder: No debris or calculus. Bilateral ureteral jets are visualized. Prevoid volume of approximately 360 mL.  Postvoid volume is approximately 164 mL.    Prostate: Measures 4.2 x 3.0 x 3.7 cm, proximally 25 mL in volume.    Other: Bilateral pleural effusions noted.    IMPRESSION:  1.  No hydronephrosis bilaterally.  2.  Right renal cyst.  3.  Postvoid residual bladder volume is 164 mL.  4.  Incidentally noted bilateral pleural effusions.              LINDA GODINEZ MD; Attending Radiologist  This document has been electronically signed. 2021  6:42AM (21 @ 06:07)      Xray Chest 1 View- PORTABLE-Urgent:   EXAM:  XR CHEST PORTABLE URGENT 1V            PROCEDURE DATE:  2021            INTERPRETATION:  CLINICAL HISTORY: chest pain.    COMPARISON: 2021.    TECHNIQUE: Portable frontal chest radiograph. Adequate positioning.    FINDINGS:    Support devices: None.    Cardiac/mediastinum/hilum: Stable enlarged cardiac silhouette.    Lung parenchyma/Pleura: No focal parenchymal opacities, pleural effusions, or pneumothorax.    Skeleton/soft tissues: Stable.      IMPRESSION:    No radiographic evidence of acute cardiopulmonary disease.

## 2021-10-03 NOTE — PROGRESS NOTE ADULT - SUBJECTIVE AND OBJECTIVE BOX
INTERVAL HISTORY: No overnight events.  creatinine cont ot rise  good UO though  no active cv complains  	  MEDICATIONS:  aspirin  chewable 81 milliGRAM(s) Oral daily  atorvastatin 80 milliGRAM(s) Oral at bedtime  brimonidine 0.2% Ophthalmic Solution 1 Drop(s) Both EYES two times a day  carvedilol 12.5 milliGRAM(s) Oral every 12 hours  clopidogrel Tablet 75 milliGRAM(s) Oral daily  dextrose 40% Gel 15 Gram(s) Oral once  dextrose 5%. 1000 milliLiter(s) IV Continuous <Continuous>  dextrose 5%. 1000 milliLiter(s) IV Continuous <Continuous>  dextrose 50% Injectable 25 Gram(s) IV Push once  dextrose 50% Injectable 12.5 Gram(s) IV Push once  dextrose 50% Injectable 25 Gram(s) IV Push once  dextrose 50% Injectable 10 milliLiter(s) IV Push once  dorzolamide 2% Ophthalmic Solution 1 Drop(s) Both EYES <User Schedule>  gabapentin 100 milliGRAM(s) Oral two times a day  glucagon  Injectable 1 milliGRAM(s) IntraMuscular once  heparin   Injectable 5000 Unit(s) SubCutaneous every 12 hours  hydrALAZINE 100 milliGRAM(s) Oral three times a day  insulin lispro (ADMELOG) corrective regimen sliding scale   SubCutaneous three times a day before meals  isosorbide   mononitrate ER Tablet (IMDUR) 30 milliGRAM(s) Oral daily  ivabradine 5 milliGRAM(s) Oral two times a day  latanoprost 0.005% Ophthalmic Solution 1 Drop(s) Both EYES at bedtime  sevelamer carbonate 800 milliGRAM(s) Oral three times a day  timolol 0.5% Solution 1 Drop(s) Both EYES two times a day      REVIEW OF SYSTEMS:  as above, otherwise as below    PHYSICAL EXAM:  T(C): 36.4 (10-03-21 @ 13:01), Max: 36.8 (10-03-21 @ 05:35)  HR: 57 (10-03-21 @ 13:01) (56 - 59)  BP: 149/85 (10-03-21 @ 13:01) (121/71 - 175/103)  RR: 18 (10-03-21 @ 13:01) (18 - 18)  SpO2: 98% (10-02-21 @ 19:37) (98% - 98%)  Wt(kg): --  I&O's Summary    02 Oct 2021 07:01  -  03 Oct 2021 07:00  --------------------------------------------------------  IN: 1030 mL / OUT: 675 mL / NET: 355 mL    03 Oct 2021 07:01  -  03 Oct 2021 15:51  --------------------------------------------------------  IN: 600 mL / OUT: 400 mL / NET: 200 mL          GENERAL: NAD  HEAD:  Atraumatic, Normocephalic  NECK: Supple, No JVD, Normal thyroid  NERVOUS SYSTEM:  Alert & Oriented X  CHEST/LUNG: No rales, rhonchi, wheezing, or rubs  HEART: Regular rate and rhythm; 2/6 syst M, no rubs, or gallops  ABDOMEN: Soft, Nontender, Nondistended; Bowel sounds present  EXTREMITIES:  No clubbing, cyanosis, or edema  SKIN: No rashes or lesions    LABS:	 	                              9.0    9.15  )-----------( 147      ( 03 Oct 2021 07:12 )             30.0     10-03    138  |  106  |  59<H>  ----------------------------<  162<H>  5.2<H>   |  18  |  5.0<HH>    Ca    8.2<L>      03 Oct 2021 07:12  Mg     2.4     10-03    TPro  5.7<L>  /  Alb  3.1<L>  /  TBili  0.4  /  DBili  x   /  AST  15  /  ALT  12  /  AlkPhos  117<H>  10-03

## 2021-10-04 LAB
ALBUMIN SERPL ELPH-MCNC: 2.8 G/DL — LOW (ref 3.5–5.2)
ALP SERPL-CCNC: 97 U/L — SIGNIFICANT CHANGE UP (ref 30–115)
ALT FLD-CCNC: 12 U/L — SIGNIFICANT CHANGE UP (ref 0–41)
ANION GAP SERPL CALC-SCNC: 13 MMOL/L — SIGNIFICANT CHANGE UP (ref 7–14)
ANION GAP SERPL CALC-SCNC: 13 MMOL/L — SIGNIFICANT CHANGE UP (ref 7–14)
AST SERPL-CCNC: 15 U/L — SIGNIFICANT CHANGE UP (ref 0–41)
BASOPHILS # BLD AUTO: 0.03 K/UL — SIGNIFICANT CHANGE UP (ref 0–0.2)
BASOPHILS NFR BLD AUTO: 0.4 % — SIGNIFICANT CHANGE UP (ref 0–1)
BILIRUB SERPL-MCNC: 0.3 MG/DL — SIGNIFICANT CHANGE UP (ref 0.2–1.2)
BUN SERPL-MCNC: 66 MG/DL — CRITICAL HIGH (ref 10–20)
BUN SERPL-MCNC: 71 MG/DL — CRITICAL HIGH (ref 10–20)
CALCIUM SERPL-MCNC: 7.7 MG/DL — LOW (ref 8.5–10.1)
CALCIUM SERPL-MCNC: 8.1 MG/DL — LOW (ref 8.5–10.1)
CHLORIDE SERPL-SCNC: 104 MMOL/L — SIGNIFICANT CHANGE UP (ref 98–110)
CHLORIDE SERPL-SCNC: 105 MMOL/L — SIGNIFICANT CHANGE UP (ref 98–110)
CO2 SERPL-SCNC: 17 MMOL/L — SIGNIFICANT CHANGE UP (ref 17–32)
CO2 SERPL-SCNC: 20 MMOL/L — SIGNIFICANT CHANGE UP (ref 17–32)
CREAT SERPL-MCNC: 5.4 MG/DL — CRITICAL HIGH (ref 0.7–1.5)
CREAT SERPL-MCNC: 6 MG/DL — CRITICAL HIGH (ref 0.7–1.5)
EOSINOPHIL # BLD AUTO: 0.61 K/UL — SIGNIFICANT CHANGE UP (ref 0–0.7)
EOSINOPHIL NFR BLD AUTO: 7.8 % — SIGNIFICANT CHANGE UP (ref 0–8)
GLUCOSE BLDC GLUCOMTR-MCNC: 114 MG/DL — HIGH (ref 70–99)
GLUCOSE BLDC GLUCOMTR-MCNC: 147 MG/DL — HIGH (ref 70–99)
GLUCOSE BLDC GLUCOMTR-MCNC: 153 MG/DL — HIGH (ref 70–99)
GLUCOSE BLDC GLUCOMTR-MCNC: 237 MG/DL — HIGH (ref 70–99)
GLUCOSE BLDC GLUCOMTR-MCNC: 98 MG/DL — SIGNIFICANT CHANGE UP (ref 70–99)
GLUCOSE SERPL-MCNC: 103 MG/DL — HIGH (ref 70–99)
GLUCOSE SERPL-MCNC: 127 MG/DL — HIGH (ref 70–99)
HCT VFR BLD CALC: 25.7 % — LOW (ref 42–52)
HCT VFR BLD CALC: 30.4 % — LOW (ref 42–52)
HGB BLD-MCNC: 7.8 G/DL — LOW (ref 14–18)
HGB BLD-MCNC: 9.3 G/DL — LOW (ref 14–18)
IMM GRANULOCYTES NFR BLD AUTO: 0.4 % — HIGH (ref 0.1–0.3)
IRON SATN MFR SERPL: 26 % — SIGNIFICANT CHANGE UP (ref 15–50)
IRON SATN MFR SERPL: 53 UG/DL — SIGNIFICANT CHANGE UP (ref 35–150)
LYMPHOCYTES # BLD AUTO: 0.92 K/UL — LOW (ref 1.2–3.4)
LYMPHOCYTES # BLD AUTO: 11.7 % — LOW (ref 20.5–51.1)
MAGNESIUM SERPL-MCNC: 2.3 MG/DL — SIGNIFICANT CHANGE UP (ref 1.8–2.4)
MCHC RBC-ENTMCNC: 23.7 PG — LOW (ref 27–31)
MCHC RBC-ENTMCNC: 23.7 PG — LOW (ref 27–31)
MCHC RBC-ENTMCNC: 30.4 G/DL — LOW (ref 32–37)
MCHC RBC-ENTMCNC: 30.6 G/DL — LOW (ref 32–37)
MCV RBC AUTO: 77.6 FL — LOW (ref 80–94)
MCV RBC AUTO: 78.1 FL — LOW (ref 80–94)
MONOCYTES # BLD AUTO: 0.69 K/UL — HIGH (ref 0.1–0.6)
MONOCYTES NFR BLD AUTO: 8.8 % — SIGNIFICANT CHANGE UP (ref 1.7–9.3)
NEUTROPHILS # BLD AUTO: 5.59 K/UL — SIGNIFICANT CHANGE UP (ref 1.4–6.5)
NEUTROPHILS NFR BLD AUTO: 70.9 % — SIGNIFICANT CHANGE UP (ref 42.2–75.2)
NRBC # BLD: 0 /100 WBCS — SIGNIFICANT CHANGE UP (ref 0–0)
NRBC # BLD: 0 /100 WBCS — SIGNIFICANT CHANGE UP (ref 0–0)
PLATELET # BLD AUTO: 150 K/UL — SIGNIFICANT CHANGE UP (ref 130–400)
PLATELET # BLD AUTO: 153 K/UL — SIGNIFICANT CHANGE UP (ref 130–400)
POTASSIUM SERPL-MCNC: 5.1 MMOL/L — HIGH (ref 3.5–5)
POTASSIUM SERPL-MCNC: 5.1 MMOL/L — HIGH (ref 3.5–5)
POTASSIUM SERPL-SCNC: 5.1 MMOL/L — HIGH (ref 3.5–5)
POTASSIUM SERPL-SCNC: 5.1 MMOL/L — HIGH (ref 3.5–5)
PROT SERPL-MCNC: 5.1 G/DL — LOW (ref 6–8)
RBC # BLD: 3.29 M/UL — LOW (ref 4.7–6.1)
RBC # BLD: 3.92 M/UL — LOW (ref 4.7–6.1)
RBC # FLD: 13.6 % — SIGNIFICANT CHANGE UP (ref 11.5–14.5)
RBC # FLD: 13.7 % — SIGNIFICANT CHANGE UP (ref 11.5–14.5)
SODIUM SERPL-SCNC: 135 MMOL/L — SIGNIFICANT CHANGE UP (ref 135–146)
SODIUM SERPL-SCNC: 137 MMOL/L — SIGNIFICANT CHANGE UP (ref 135–146)
TIBC SERPL-MCNC: 207 UG/DL — LOW (ref 220–430)
UIBC SERPL-MCNC: 154 UG/DL — SIGNIFICANT CHANGE UP (ref 110–370)
WBC # BLD: 7.87 K/UL — SIGNIFICANT CHANGE UP (ref 4.8–10.8)
WBC # BLD: 8.88 K/UL — SIGNIFICANT CHANGE UP (ref 4.8–10.8)
WBC # FLD AUTO: 7.87 K/UL — SIGNIFICANT CHANGE UP (ref 4.8–10.8)
WBC # FLD AUTO: 8.88 K/UL — SIGNIFICANT CHANGE UP (ref 4.8–10.8)

## 2021-10-04 PROCEDURE — 76770 US EXAM ABDO BACK WALL COMP: CPT | Mod: 26

## 2021-10-04 PROCEDURE — 99233 SBSQ HOSP IP/OBS HIGH 50: CPT

## 2021-10-04 PROCEDURE — 99232 SBSQ HOSP IP/OBS MODERATE 35: CPT

## 2021-10-04 RX ORDER — SODIUM CHLORIDE 5 G/100ML
150 INJECTION, SOLUTION INTRAVENOUS
Refills: 0 | Status: DISCONTINUED | OUTPATIENT
Start: 2021-10-04 | End: 2021-10-06

## 2021-10-04 RX ORDER — BUMETANIDE 0.25 MG/ML
1 INJECTION INTRAMUSCULAR; INTRAVENOUS
Qty: 20 | Refills: 0 | Status: DISCONTINUED | OUTPATIENT
Start: 2021-10-04 | End: 2021-10-06

## 2021-10-04 RX ORDER — SODIUM CHLORIDE 5 G/100ML
150 INJECTION, SOLUTION INTRAVENOUS
Refills: 0 | Status: DISCONTINUED | OUTPATIENT
Start: 2021-10-05 | End: 2021-10-06

## 2021-10-04 RX ORDER — SODIUM ZIRCONIUM CYCLOSILICATE 10 G/10G
5 POWDER, FOR SUSPENSION ORAL EVERY 12 HOURS
Refills: 0 | Status: DISCONTINUED | OUTPATIENT
Start: 2021-10-04 | End: 2021-10-05

## 2021-10-04 RX ORDER — SODIUM BICARBONATE 1 MEQ/ML
1300 SYRINGE (ML) INTRAVENOUS EVERY 8 HOURS
Refills: 0 | Status: DISCONTINUED | OUTPATIENT
Start: 2021-10-04 | End: 2021-10-07

## 2021-10-04 RX ORDER — SODIUM CHLORIDE 5 G/100ML
150 INJECTION, SOLUTION INTRAVENOUS
Refills: 0 | Status: COMPLETED | OUTPATIENT
Start: 2021-10-05 | End: 2021-10-05

## 2021-10-04 RX ORDER — BUMETANIDE 0.25 MG/ML
2 INJECTION INTRAMUSCULAR; INTRAVENOUS ONCE
Refills: 0 | Status: COMPLETED | OUTPATIENT
Start: 2021-10-04 | End: 2021-10-04

## 2021-10-04 RX ADMIN — GABAPENTIN 100 MILLIGRAM(S): 400 CAPSULE ORAL at 17:12

## 2021-10-04 RX ADMIN — Medication 81 MILLIGRAM(S): at 11:22

## 2021-10-04 RX ADMIN — BRIMONIDINE TARTRATE 1 DROP(S): 2 SOLUTION/ DROPS OPHTHALMIC at 05:29

## 2021-10-04 RX ADMIN — CARVEDILOL PHOSPHATE 12.5 MILLIGRAM(S): 80 CAPSULE, EXTENDED RELEASE ORAL at 05:28

## 2021-10-04 RX ADMIN — SODIUM ZIRCONIUM CYCLOSILICATE 5 GRAM(S): 10 POWDER, FOR SUSPENSION ORAL at 17:12

## 2021-10-04 RX ADMIN — HEPARIN SODIUM 5000 UNIT(S): 5000 INJECTION INTRAVENOUS; SUBCUTANEOUS at 17:12

## 2021-10-04 RX ADMIN — SEVELAMER CARBONATE 800 MILLIGRAM(S): 2400 POWDER, FOR SUSPENSION ORAL at 05:29

## 2021-10-04 RX ADMIN — SEVELAMER CARBONATE 800 MILLIGRAM(S): 2400 POWDER, FOR SUSPENSION ORAL at 22:32

## 2021-10-04 RX ADMIN — IVABRADINE 5 MILLIGRAM(S): 7.5 TABLET, FILM COATED ORAL at 11:21

## 2021-10-04 RX ADMIN — SODIUM CHLORIDE 50 MILLILITER(S): 5 INJECTION, SOLUTION INTRAVENOUS at 18:01

## 2021-10-04 RX ADMIN — DORZOLAMIDE HYDROCHLORIDE 1 DROP(S): 20 SOLUTION/ DROPS OPHTHALMIC at 05:29

## 2021-10-04 RX ADMIN — DORZOLAMIDE HYDROCHLORIDE 1 DROP(S): 20 SOLUTION/ DROPS OPHTHALMIC at 17:13

## 2021-10-04 RX ADMIN — BRIMONIDINE TARTRATE 1 DROP(S): 2 SOLUTION/ DROPS OPHTHALMIC at 17:12

## 2021-10-04 RX ADMIN — LATANOPROST 1 DROP(S): 0.05 SOLUTION/ DROPS OPHTHALMIC; TOPICAL at 22:36

## 2021-10-04 RX ADMIN — GABAPENTIN 100 MILLIGRAM(S): 400 CAPSULE ORAL at 05:28

## 2021-10-04 RX ADMIN — HEPARIN SODIUM 5000 UNIT(S): 5000 INJECTION INTRAVENOUS; SUBCUTANEOUS at 05:29

## 2021-10-04 RX ADMIN — Medication 100 MILLIGRAM(S): at 05:28

## 2021-10-04 RX ADMIN — Medication 100 MILLIGRAM(S): at 13:45

## 2021-10-04 RX ADMIN — Medication 100 MILLIGRAM(S): at 22:33

## 2021-10-04 RX ADMIN — Medication 1300 MILLIGRAM(S): at 13:44

## 2021-10-04 RX ADMIN — BUMETANIDE 5 MG/HR: 0.25 INJECTION INTRAMUSCULAR; INTRAVENOUS at 13:44

## 2021-10-04 RX ADMIN — Medication 2: at 11:50

## 2021-10-04 RX ADMIN — CLOPIDOGREL BISULFATE 75 MILLIGRAM(S): 75 TABLET, FILM COATED ORAL at 11:22

## 2021-10-04 RX ADMIN — Medication 1 DROP(S): at 05:29

## 2021-10-04 RX ADMIN — SEVELAMER CARBONATE 800 MILLIGRAM(S): 2400 POWDER, FOR SUSPENSION ORAL at 13:45

## 2021-10-04 RX ADMIN — ATORVASTATIN CALCIUM 80 MILLIGRAM(S): 80 TABLET, FILM COATED ORAL at 22:33

## 2021-10-04 RX ADMIN — Medication 1 DROP(S): at 17:13

## 2021-10-04 RX ADMIN — Medication 5 MILLIGRAM(S): at 22:32

## 2021-10-04 RX ADMIN — IVABRADINE 5 MILLIGRAM(S): 7.5 TABLET, FILM COATED ORAL at 17:15

## 2021-10-04 RX ADMIN — BUMETANIDE 2 MILLIGRAM(S): 0.25 INJECTION INTRAMUSCULAR; INTRAVENOUS at 13:44

## 2021-10-04 RX ADMIN — ISOSORBIDE MONONITRATE 30 MILLIGRAM(S): 60 TABLET, EXTENDED RELEASE ORAL at 11:21

## 2021-10-04 RX ADMIN — Medication 1300 MILLIGRAM(S): at 22:32

## 2021-10-04 RX ADMIN — CARVEDILOL PHOSPHATE 12.5 MILLIGRAM(S): 80 CAPSULE, EXTENDED RELEASE ORAL at 17:11

## 2021-10-04 NOTE — PROGRESS NOTE ADULT - SUBJECTIVE AND OBJECTIVE BOX
Date of Admission: 21    Interval History:    - Patient resting in bed in NAD  - remains fluid overloaded on exam IVC 2.1 cm non-collapsing  - Worsening renal function      HISTORY OF PRESENT ILLNESS:     Patient is a 56 year old male with PMHx of  HTN, HLD, CKD IV, T2DM on insulin a1c 5.8%, Glaucoma, and CAD s/p PCI in 2021. The patient presented to the ED with complaint of left sided chest pain and difficulty sleeping 2/2 to chest pain and BL LE neuropathy (chronic). The patient states that last night he woke up and from sleep due to burning substernal chest pain, non radiating, no exacerbation or alleviating factors. Of note the patient had recent PCI in 2021. During that admission patient had a prolonged stay in CCU, was treated for acute CHF exacerbation and worsening kidney function. Cath on 21 showed significant 3 vessels disease, LAD, RCA, LCx. CTS was consulted. During preop cardiac MRI patient became SOB. He received bumex and hypertonic saline for diuresis.  CABG was decided against due to increased risk of progression of CKD IV to ESRD.          PAST MEDICAL & SURGICAL HISTORY:  HTN (hypertension)    HLD (hyperlipidemia)    DM (diabetes mellitus)    Glaucoma    No significant past surgical history        FAMILY HISTORY:    Mother:  77 HTN  Father:  at 55 of CKD       SOCIAL HISTORY:      Smokes about 3 cigarettes a week, social alcohol drinking, denies drug use      Allergies    No Known Allergies    Intolerances    	    REVIEW OF SYSTEMS:  CONSTITUTIONAL: No fever, weight loss, or fatigue  CARDIOLOGY: denies chest pain, shortness of breath or syncopal episodes.   RESPIRATORY: denies shortness of breath  NEUROLOGICAL: NO weakness, no focal deficits to report.  ENDOCRINOLOGICAL: no recent change in diabetic medications.   GI: no BRBPR, no N,V, diarrhea.    PSYCHIATRY: normal mood and affect  HEENT: no nasal discharge, no ecchymosis  SKIN: no ecchymosis, no breakdown  MUSCULOSKELETAL: Full range of motion x4.     PHYSICAL EXAM:    General Appearance: well appearing, normal for age and gender. 	  Neck: normal JVP, no bruit.   Eyes:  Extra Ocular muscles intact.   Cardiovascular: regular rate and rhythm S1 S2, ++ JVD, No murmurs, BLE edema  Respiratory: Lungs clear to auscultation	  Psychiatry: Alert and oriented x 3, Mood & affect appropriate  Gastrointestinal:  Soft, Non-tender  Skin/Integumen: No rashes, No ecchymoses, No cyanosis	  Neurologic: Non-focal  Musculoskeletal/ extremities: Normal range of motion, No clubbing, cyanosis or edema  Vascular: Peripheral pulses palpable 2+ bilaterally      PREVIOUS DIAGNOSTIC TESTING:      tte 21    Summary:   1. Moderately decreased segmental left ventricular systolic function.   2. Mid and apical inferior wall, basal and mid inferolateral wall, mid anterolateral segment, and apical lateral segment are abnormal as described above.   3. LV Ejection Fraction by Garcia's Method with a biplane EF of 42 %.   4. Mildly increased LV wall thickness.   5. Mildly enlarged left atrium.   6. Normal right atrial size.   7. Small to moderate pericardial effusion.   8. Mild to moderate mitral valve regurgitation.   9. Moderate tricuspid regurgitation.  10. Mild pulmonic valve regurgitation.  11. Spectral Doppler shows restrictive pattern of left ventricular myocardial filling (Grade III diastolic dysfunction).  12. Mild thickening of the anterior and posterior mitral valve leaflets.    	    Home Medications:  aspirin 81 mg oral tablet, chewable: 1 tab(s) orally once a day (01 Oct 2021 10:03)  atorvastatin 40 mg oral tablet: 1 tab(s) orally once a day (01 Oct 2021 10:03)  BASAGLAR 100 UNIT/ML KWIKPEN:  (01 Oct 2021 10:03)  brimonidine 0.2% ophthalmic solution: 1 drop(s) to each affected eye 2 times a day (01 Oct 2021 10:03)  carvedilol 3.125 mg oral tablet: 1 tab(s) orally every 12 hours (01 Oct 2021 10:03)  clopidogrel 75 mg oral tablet: 1 tab(s) orally once a day (01 Oct 2021 10:03)  dorzolamide-timolol 2%-0.5% preservative-free ophthalmic solution: 1 drop(s) to each affected eye 2 times a day (01 Oct 2021 10:03)  latanoprost 0.005% ophthalmic solution: 1 drop(s) to each affected eye once a day (at bedtime) (01 Oct 2021 10:03)  pioglitazone-metformin 15 mg-850 mg oral tablet: 1 tab(s) orally 2 times a day  HOLD MEDICATION FOR 48 HR AFTER CARDIAC CATH  (01 Oct 2021 10:03)  Rhopressa 0.02% ophthalmic solution: 1 drop(s) to each affected eye once a day (in the evening) (01 Oct 2021 10:03)  sevelamer carbonate 800 mg oral tablet: 1 tab(s) orally 3 times a day (with meals) (01 Oct 2021 10:03)    MEDICATIONS  (STANDING):  aspirin  chewable 81 milliGRAM(s) Oral daily  atorvastatin 80 milliGRAM(s) Oral at bedtime  brimonidine 0.2% Ophthalmic Solution 1 Drop(s) Both EYES two times a day  buMETAnide Infusion 1 mG/Hr (5 mL/Hr) IV Continuous <Continuous>  buMETAnide Injectable 2 milliGRAM(s) IV Push once  carvedilol 12.5 milliGRAM(s) Oral every 12 hours  clopidogrel Tablet 75 milliGRAM(s) Oral daily  dextrose 40% Gel 15 Gram(s) Oral once  dextrose 5%. 1000 milliLiter(s) (50 mL/Hr) IV Continuous <Continuous>  dextrose 5%. 1000 milliLiter(s) (100 mL/Hr) IV Continuous <Continuous>  dextrose 50% Injectable 25 Gram(s) IV Push once  dextrose 50% Injectable 12.5 Gram(s) IV Push once  dextrose 50% Injectable 25 Gram(s) IV Push once  dextrose 50% Injectable 10 milliLiter(s) IV Push once  dorzolamide 2% Ophthalmic Solution 1 Drop(s) Both EYES <User Schedule>  gabapentin 100 milliGRAM(s) Oral two times a day  glucagon  Injectable 1 milliGRAM(s) IntraMuscular once  heparin   Injectable 5000 Unit(s) SubCutaneous every 12 hours  hydrALAZINE 100 milliGRAM(s) Oral three times a day  insulin lispro (ADMELOG) corrective regimen sliding scale   SubCutaneous three times a day before meals  isosorbide   mononitrate ER Tablet (IMDUR) 30 milliGRAM(s) Oral daily  ivabradine 5 milliGRAM(s) Oral two times a day  latanoprost 0.005% Ophthalmic Solution 1 Drop(s) Both EYES at bedtime  sevelamer carbonate 800 milliGRAM(s) Oral three times a day  sodium chloride 3%. 150 milliLiter(s) (50 mL/Hr) IV Continuous <Continuous>  timolol 0.5% Solution 1 Drop(s) Both EYES two times a day  torsemide 20 milliGRAM(s) Oral two times a day    MEDICATIONS  (PRN):

## 2021-10-04 NOTE — PROGRESS NOTE ADULT - SUBJECTIVE AND OBJECTIVE BOX
GILLIAN MENDOZA 56y Male  MRN#: [id number]   CODE STATUS: FULL     Hospital Day: 5d    Pt is currently admitted with the chief complaint of chest pain     SUBJECTIVE  HPI: Patient is a 56 year old male with PMHx of  HTN, HLD, CKD IV, T2DM on insulin a1c 5.8%, Glaucoma, and CAD s/p PCI in July 2021. The patient presented to the ED with complaint of left sided chest pain and difficulty sleeping 2/2 to chest pain and BL LE neuropathy (chronic). The patient states that last night he woke up and from sleep due to burning substernal chest pain, non radiating, no exacerbation or alleviating factors. Of notr the patient had recent PCI in July 2021. During that admission patient had a prolonged stay in CCU, was treated for acute CHF exacerbation and worsening kidney function. Cath on 7/22/21 showed significant 3 vessle disease, LAD, RCA, LCx. CTS was consulted. During preop cardiac MRI patient became SOB. He received bumex and hypertonic saline for diuresis.  CABG was decided against due to increased risk of progression of CKDIV to ESRD.  The patient was discharged on DAPT and follows with Dr. Cantu as his cardiologist In the ED, patient is hemodynamically stable with elevated troponin 0.12, microcytic anemia, elevated BNP 48325. The patient was evaluated in the ED by cardiology s/p cardiac cath on 9/30.    Overnight events: none     Interval hx/Subjective complaints: Pt feels well, notes increased edema in face. Urinating well despite ROSENDO. Will continue to monitor CMP, may start diuresis today pending HF recs.     Pt denies any fevers, chills, fatigue, CP, palpitations, cough, SOB, abdominal pain, n/v/d, hematochezia, melena, weakness, numbness, tingling, or other FND.                          ----------------------------------------------------------  OBJECTIVE  PAST MEDICAL & SURGICAL HISTORY  HTN (hypertension)    HLD (hyperlipidemia)    DM (diabetes mellitus)    Glaucoma    No significant past surgical history                                              -----------------------------------------------------------  ALLERGIES:  No Known Allergies                                            ------------------------------------------------------------    HOME MEDICATIONS  Home Medications:  aspirin 81 mg oral tablet, chewable: 1 tab(s) orally once a day (01 Oct 2021 10:03)  atorvastatin 40 mg oral tablet: 1 tab(s) orally once a day (01 Oct 2021 10:03)  BASAGLAR 100 UNIT/ML KWIKPEN:  (01 Oct 2021 10:03)  brimonidine 0.2% ophthalmic solution: 1 drop(s) to each affected eye 2 times a day (01 Oct 2021 10:03)  carvedilol 3.125 mg oral tablet: 1 tab(s) orally every 12 hours (01 Oct 2021 10:03)  clopidogrel 75 mg oral tablet: 1 tab(s) orally once a day (01 Oct 2021 10:03)  dorzolamide-timolol 2%-0.5% preservative-free ophthalmic solution: 1 drop(s) to each affected eye 2 times a day (01 Oct 2021 10:03)  latanoprost 0.005% ophthalmic solution: 1 drop(s) to each affected eye once a day (at bedtime) (01 Oct 2021 10:03)  pioglitazone-metformin 15 mg-850 mg oral tablet: 1 tab(s) orally 2 times a day  HOLD MEDICATION FOR 48 HR AFTER CARDIAC CATH  (01 Oct 2021 10:03)  Rhopressa 0.02% ophthalmic solution: 1 drop(s) to each affected eye once a day (in the evening) (01 Oct 2021 10:03)  sevelamer carbonate 800 mg oral tablet: 1 tab(s) orally 3 times a day (with meals) (01 Oct 2021 10:03)                           MEDICATIONS:  STANDING MEDICATIONS  aspirin  chewable 81 milliGRAM(s) Oral daily  atorvastatin 80 milliGRAM(s) Oral at bedtime  brimonidine 0.2% Ophthalmic Solution 1 Drop(s) Both EYES two times a day  carvedilol 12.5 milliGRAM(s) Oral every 12 hours  clopidogrel Tablet 75 milliGRAM(s) Oral daily  dextrose 40% Gel 15 Gram(s) Oral once  dextrose 5%. 1000 milliLiter(s) IV Continuous <Continuous>  dextrose 5%. 1000 milliLiter(s) IV Continuous <Continuous>  dextrose 50% Injectable 10 milliLiter(s) IV Push once  dextrose 50% Injectable 25 Gram(s) IV Push once  dextrose 50% Injectable 12.5 Gram(s) IV Push once  dextrose 50% Injectable 25 Gram(s) IV Push once  dorzolamide 2% Ophthalmic Solution 1 Drop(s) Both EYES <User Schedule>  gabapentin 100 milliGRAM(s) Oral two times a day  glucagon  Injectable 1 milliGRAM(s) IntraMuscular once  heparin   Injectable 5000 Unit(s) SubCutaneous every 12 hours  hydrALAZINE 100 milliGRAM(s) Oral three times a day  insulin lispro (ADMELOG) corrective regimen sliding scale   SubCutaneous three times a day before meals  isosorbide   mononitrate ER Tablet (IMDUR) 30 milliGRAM(s) Oral daily  ivabradine 5 milliGRAM(s) Oral two times a day  latanoprost 0.005% Ophthalmic Solution 1 Drop(s) Both EYES at bedtime  melatonin 5 milliGRAM(s) Oral at bedtime  sevelamer carbonate 800 milliGRAM(s) Oral three times a day  timolol 0.5% Solution 1 Drop(s) Both EYES two times a day    PRN MEDICATIONS                                            ------------------------------------------------------------  VITAL SIGNS: Last 24 Hours  T(C): 36.2 (04 Oct 2021 05:47), Max: 36.4 (03 Oct 2021 13:01)  T(F): 97.2 (04 Oct 2021 05:47), Max: 97.6 (03 Oct 2021 13:01)  HR: 52 (04 Oct 2021 05:47) (52 - 57)  BP: 111/55 (04 Oct 2021 05:47) (111/55 - 149/85)  BP(mean): --  RR: 18 (04 Oct 2021 05:47) (18 - 22)  SpO2: 97% (03 Oct 2021 21:48) (97% - 99%)      10-03-21 @ 07:01  -  10-04-21 @ 07:00  --------------------------------------------------------  IN: 780 mL / OUT: 700 mL / NET: 80 mL                                             --------------------------------------------------------------  LABS:                        7.8    7.87  )-----------( 150      ( 04 Oct 2021 06:26 )             25.7     10-04    135  |  105  |  66<HH>  ----------------------------<  127<H>  5.1<H>   |  17  |  5.4<HH>    Ca    7.7<L>      04 Oct 2021 06:26  Mg     2.3     10-04    TPro  5.1<L>  /  Alb  2.8<L>  /  TBili  0.3  /  DBili  x   /  AST  15  /  ALT  12  /  AlkPhos  97  10-04                                            -------------------------------------------------------------  RADIOLOGY:                                            --------------------------------------------------------------  PHYSICAL EXAM:  General: Man laying in bed in nad  HEENT: ncat, eomi, MMM  LUNGS: ctab  HEART: s1/s2, rrr, no m/r/g, +JVD with pressure applied to IVC  ABDOMEN: soft, ntnd, bs+  EXT: wwp, no cyanosis, clubbing, edema  NEURO: aox4, moves all extremities   SKIN: intact, no rashes or lesion                                          --------------------------------------------------------------    ASSESSMENT & PLAN  56M w/ h/o HTN, HLD, CKD IV, T2DM on insulin a1c 5.8% on 7/3/2021, Glaucoma, and CAD s/p PCI in July 2021. The patient presented to the ED with complaint of left sided chest pain and difficulty sleeping 2/2 to chest pain.     #Acute Kidney Injury  #CKD, Stage 4  Baseline Cr 2.6-2.7. Follows w/ Dr. Rollins as outpatient. LENNY likely secondary to ROSENDO from [facility]   - off torsemide for now however pt fluid overloaded on exam, cr remains elevated to 5.4 today, making good urine, will likely restart diuresis today pending HF recs   - c/w sevelamer carbonate 1 tab PO TIDAC  - monitor UO  - avoid ACEi/ARB  - Nephrology consulted 10/4 recs`: renal bladder US with pvr, start sodium bicarb 1300mg q8h, lokelma 5g q12h, phos level, UA, urine lytes, urine urea, urine cr    #Acute Coronary Syndrome  #CAD s/p PCI July 2021  #HFrEF (40-45%)  #HTN  Initially p/w chest pain. Admission CXR demonstrated no acute cardiopulmonary disease. Troponin up-trending on admission (0.12->.13->.14). Last [facility] 7/22/21 demonstrated severe triple vessel disease, but deemed high risk for CTS. Stent subsequently placed in pLAD. TTE 9/30 showed LVEF 42% and moderately decreased segmental left ventricular systolic function. On 9/30, underwent cath w/ stent to pLCX and dLCX.   - d/c torsemide for now, will likely resume diuresis today given pt making urine, overloaded on exam   - c/w hydralazine 100 mg PO TID  - c/w Imdur 30mg PO QD  - c/w carvedilol 12.5mg PO BID   - c/w ASA 81mg OP QD and clopidogrel 75mg PO QD (DAPT)  - c/w atorvastatin 80mg PO QHS  - f/u outpatient w/ Dr. Cantu as outpatient one week after discharge  - f/u HF recs     #Microcytic Anemia  Hb 10.7 on admission. Iron Studies 9/29: Serum Iron 59, TIBC 229, Sat 26%, Ferritin 341.  - Hgb 9.2>9.8>9.0>7.8  - possibly dilutional given volume overloaded  - f/u 11am CBC, will transfuse PRN   - Trend Hb via daily CBC  - maintain active T&S    # HLD (hyperlipidemia)  Lipid Profile 9/30: LDL-C 150, HDL 67, Chol 220, TG 84  - c/w atorvastatin 80mg PO QHS  - Consider adding zetia (ezetimibe) on discharge given elevated LDL    #DM Type 2  #Diabetic Neuropathy  A1C 6.3% on 9/30/21. At home, take pioglitazone-metformin 15mg-850mg BID. Holding PO meds while inpatient.  - monitor FS TIDAC and QHS  - c/w ISS  - c/w gabapentin for neuropathy     #Misc   - DVT Prophylaxis: heparin 5000U SQ BID  - GI Prophylaxis: not indicated   - Diet: DASH/TLC  - Activity: IAT  - Code Status: Full Code    Handoff   - f/u nephro recs   - f/u HF recs re diuresis   - f/u 11am cbc transfuse PRN      GILLIAN MENDOZA 56y Male  MRN#: [id number]   CODE STATUS: FULL     Hospital Day: 5d    Pt is currently admitted with the chief complaint of chest pain     SUBJECTIVE  HPI: Patient is a 56 year old male with PMHx of  HTN, HLD, CKD IV, T2DM on insulin a1c 5.8%, Glaucoma, and CAD s/p PCI in July 2021. The patient presented to the ED with complaint of left sided chest pain and difficulty sleeping 2/2 to chest pain and BL LE neuropathy (chronic). The patient states that last night he woke up and from sleep due to burning substernal chest pain, non radiating, no exacerbation or alleviating factors. Of notr the patient had recent PCI in July 2021. During that admission patient had a prolonged stay in CCU, was treated for acute CHF exacerbation and worsening kidney function. Cath on 7/22/21 showed significant 3 vessle disease, LAD, RCA, LCx. CTS was consulted. During preop cardiac MRI patient became SOB. He received bumex and hypertonic saline for diuresis.  CABG was decided against due to increased risk of progression of CKDIV to ESRD.  The patient was discharged on DAPT and follows with Dr. Cantu as his cardiologist In the ED, patient is hemodynamically stable with elevated troponin 0.12, microcytic anemia, elevated BNP 97262. The patient was evaluated in the ED by cardiology s/p cardiac cath on 9/30.    Overnight events: none     Interval hx/Subjective complaints: Pt feels well, notes increased edema in face. Urinating well despite ROSENDO. Will continue to monitor CMP, may start diuresis today pending HF recs.     Pt denies any fevers, chills, fatigue, CP, palpitations, cough, SOB, abdominal pain, n/v/d, hematochezia, melena, weakness, numbness, tingling, or other FND.                          ----------------------------------------------------------  OBJECTIVE  PAST MEDICAL & SURGICAL HISTORY  HTN (hypertension)    HLD (hyperlipidemia)    DM (diabetes mellitus)    Glaucoma    No significant past surgical history                                              -----------------------------------------------------------  ALLERGIES:  No Known Allergies                                            ------------------------------------------------------------    HOME MEDICATIONS  Home Medications:  aspirin 81 mg oral tablet, chewable: 1 tab(s) orally once a day (01 Oct 2021 10:03)  atorvastatin 40 mg oral tablet: 1 tab(s) orally once a day (01 Oct 2021 10:03)  BASAGLAR 100 UNIT/ML KWIKPEN:  (01 Oct 2021 10:03)  brimonidine 0.2% ophthalmic solution: 1 drop(s) to each affected eye 2 times a day (01 Oct 2021 10:03)  carvedilol 3.125 mg oral tablet: 1 tab(s) orally every 12 hours (01 Oct 2021 10:03)  clopidogrel 75 mg oral tablet: 1 tab(s) orally once a day (01 Oct 2021 10:03)  dorzolamide-timolol 2%-0.5% preservative-free ophthalmic solution: 1 drop(s) to each affected eye 2 times a day (01 Oct 2021 10:03)  latanoprost 0.005% ophthalmic solution: 1 drop(s) to each affected eye once a day (at bedtime) (01 Oct 2021 10:03)  pioglitazone-metformin 15 mg-850 mg oral tablet: 1 tab(s) orally 2 times a day  HOLD MEDICATION FOR 48 HR AFTER CARDIAC CATH  (01 Oct 2021 10:03)  Rhopressa 0.02% ophthalmic solution: 1 drop(s) to each affected eye once a day (in the evening) (01 Oct 2021 10:03)  sevelamer carbonate 800 mg oral tablet: 1 tab(s) orally 3 times a day (with meals) (01 Oct 2021 10:03)                           MEDICATIONS:  STANDING MEDICATIONS  aspirin  chewable 81 milliGRAM(s) Oral daily  atorvastatin 80 milliGRAM(s) Oral at bedtime  brimonidine 0.2% Ophthalmic Solution 1 Drop(s) Both EYES two times a day  carvedilol 12.5 milliGRAM(s) Oral every 12 hours  clopidogrel Tablet 75 milliGRAM(s) Oral daily  dextrose 40% Gel 15 Gram(s) Oral once  dextrose 5%. 1000 milliLiter(s) IV Continuous <Continuous>  dextrose 5%. 1000 milliLiter(s) IV Continuous <Continuous>  dextrose 50% Injectable 10 milliLiter(s) IV Push once  dextrose 50% Injectable 25 Gram(s) IV Push once  dextrose 50% Injectable 12.5 Gram(s) IV Push once  dextrose 50% Injectable 25 Gram(s) IV Push once  dorzolamide 2% Ophthalmic Solution 1 Drop(s) Both EYES <User Schedule>  gabapentin 100 milliGRAM(s) Oral two times a day  glucagon  Injectable 1 milliGRAM(s) IntraMuscular once  heparin   Injectable 5000 Unit(s) SubCutaneous every 12 hours  hydrALAZINE 100 milliGRAM(s) Oral three times a day  insulin lispro (ADMELOG) corrective regimen sliding scale   SubCutaneous three times a day before meals  isosorbide   mononitrate ER Tablet (IMDUR) 30 milliGRAM(s) Oral daily  ivabradine 5 milliGRAM(s) Oral two times a day  latanoprost 0.005% Ophthalmic Solution 1 Drop(s) Both EYES at bedtime  melatonin 5 milliGRAM(s) Oral at bedtime  sevelamer carbonate 800 milliGRAM(s) Oral three times a day  timolol 0.5% Solution 1 Drop(s) Both EYES two times a day    PRN MEDICATIONS                                            ------------------------------------------------------------  VITAL SIGNS: Last 24 Hours  T(C): 36.2 (04 Oct 2021 05:47), Max: 36.4 (03 Oct 2021 13:01)  T(F): 97.2 (04 Oct 2021 05:47), Max: 97.6 (03 Oct 2021 13:01)  HR: 52 (04 Oct 2021 05:47) (52 - 57)  BP: 111/55 (04 Oct 2021 05:47) (111/55 - 149/85)  BP(mean): --  RR: 18 (04 Oct 2021 05:47) (18 - 22)  SpO2: 97% (03 Oct 2021 21:48) (97% - 99%)      10-03-21 @ 07:01  -  10-04-21 @ 07:00  --------------------------------------------------------  IN: 780 mL / OUT: 700 mL / NET: 80 mL                                             --------------------------------------------------------------  LABS:                        7.8    7.87  )-----------( 150      ( 04 Oct 2021 06:26 )             25.7     10-04    135  |  105  |  66<HH>  ----------------------------<  127<H>  5.1<H>   |  17  |  5.4<HH>    Ca    7.7<L>      04 Oct 2021 06:26  Mg     2.3     10-04    TPro  5.1<L>  /  Alb  2.8<L>  /  TBili  0.3  /  DBili  x   /  AST  15  /  ALT  12  /  AlkPhos  97  10-04                                            -------------------------------------------------------------  RADIOLOGY:                                            --------------------------------------------------------------  PHYSICAL EXAM:  General: Man laying in bed in nad  HEENT: ncat, eomi, MMM  LUNGS: ctab  HEART: s1/s2, rrr, no m/r/g, +JVD with pressure applied to IVC  ABDOMEN: soft, ntnd, bs+  EXT: wwp, no cyanosis, clubbing, edema  NEURO: aox4, moves all extremities   SKIN: intact, no rashes or lesion                                          --------------------------------------------------------------    ASSESSMENT & PLAN  56M w/ h/o HTN, HLD, CKD IV, T2DM on insulin a1c 5.8% on 7/3/2021, Glaucoma, and CAD s/p PCI in July 2021. The patient presented to the ED with complaint of left sided chest pain and difficulty sleeping 2/2 to chest pain.     #Acute Kidney Injury  #CKD, Stage 4  Baseline Cr 2.6-2.7. Follows w/ Dr. Rollins as outpatient. LENNY likely secondary to ROSENDO from [facility]   - off torsemide for now however pt fluid overloaded on exam, cr remains elevated to 5.4 today, making good urine  - c/w sevelamer carbonate 1 tab PO TIDAC  - monitor UO  - avoid ACEi/ARB  - Nephrology consulted 10/4 recs`: renal bladder US with pvr, start sodium bicarb 1300mg q8h, lokelma 5g q12h, phos level, UA, urine lytes, urine urea, urine cr    #Acute Coronary Syndrome  #CAD s/p PCI July 2021  #HFrEF (40-45%)  #HTN  Initially p/w chest pain. Admission CXR demonstrated no acute cardiopulmonary disease. Troponin up-trending on admission (0.12->.13->.14). Last [facility] 7/22/21 demonstrated severe triple vessel disease, but deemed high risk for CTS. Stent subsequently placed in pLAD. TTE 9/30 showed LVEF 42% and moderately decreased segmental left ventricular systolic function. On 9/30, underwent cath w/ stent to pLCX and dLCX.   - resume diuresis today given pt making urine, overloaded on exam   - As per HF: started bumex gtt, hypertonic saline BID  - c/w hydralazine 100 mg PO TID  - c/w Imdur 30mg PO QD  - c/w carvedilol 12.5mg PO BID   - c/w ASA 81mg OP QD and clopidogrel 75mg PO QD (DAPT)  - c/w atorvastatin 80mg PO QHS  - f/u outpatient w/ Dr. Cantu as outpatient one week after discharge    #Microcytic Anemia  Hb 10.7 on admission. Iron Studies 9/29: Serum Iron 59, TIBC 229, Sat 26%, Ferritin 341.  - Hgb 9.2>9.8>9.0>7.8  - possibly dilutional given volume overloaded  - f/u 11am CBC, will transfuse PRN   - Trend Hb via daily CBC  - maintain active T&S    # HLD (hyperlipidemia)  Lipid Profile 9/30: LDL-C 150, HDL 67, Chol 220, TG 84  - c/w atorvastatin 80mg PO QHS  - Consider adding zetia (ezetimibe) on discharge given elevated LDL    #DM Type 2  #Diabetic Neuropathy  A1C 6.3% on 9/30/21. At home, take pioglitazone-metformin 15mg-850mg BID. Holding PO meds while inpatient.  - monitor FS TIDAC and QHS  - c/w ISS  - c/w gabapentin for neuropathy     #Misc   - DVT Prophylaxis: heparin 5000U SQ BID  - GI Prophylaxis: not indicated   - Diet: DASH/TLC  - Activity: IAT  - Code Status: Full Code    Handoff   - f/u nephro recs   - f/u 11am cbc transfuse PRN      GILLIAN MENDOZA 56y Male  MRN#: [id number]   CODE STATUS: FULL     Hospital Day: 5d    Pt is currently admitted with the chief complaint of chest pain     SUBJECTIVE  HPI: Patient is a 56 year old male with PMHx of  HTN, HLD, CKD IV, T2DM on insulin a1c 5.8%, Glaucoma, and CAD s/p PCI in July 2021. The patient presented to the ED with complaint of left sided chest pain and difficulty sleeping 2/2 to chest pain and BL LE neuropathy (chronic). The patient states that last night he woke up and from sleep due to burning substernal chest pain, non radiating, no exacerbation or alleviating factors. Of notr the patient had recent PCI in July 2021. During that admission patient had a prolonged stay in CCU, was treated for acute CHF exacerbation and worsening kidney function. Cath on 7/22/21 showed significant 3 vessle disease, LAD, RCA, LCx. CTS was consulted. During preop cardiac MRI patient became SOB. He received bumex and hypertonic saline for diuresis.  CABG was decided against due to increased risk of progression of CKDIV to ESRD.  The patient was discharged on DAPT and follows with Dr. Cantu as his cardiologist In the ED, patient is hemodynamically stable with elevated troponin 0.12, microcytic anemia, elevated BNP 06511. The patient was evaluated in the ED by cardiology s/p cardiac cath on 9/30.    Overnight events: none     Interval hx/Subjective complaints: Pt feels well, notes increased edema in face. Urinating well despite ROSENDO. Will continue to monitor CMP, may start diuresis today pending HF recs.     Pt denies any fevers, chills, fatigue, CP, palpitations, cough, SOB, abdominal pain, n/v/d, hematochezia, melena, weakness, numbness, tingling, or other FND.                          ----------------------------------------------------------  OBJECTIVE  PAST MEDICAL & SURGICAL HISTORY  HTN (hypertension)    HLD (hyperlipidemia)    DM (diabetes mellitus)    Glaucoma    No significant past surgical history                                              -----------------------------------------------------------  ALLERGIES:  No Known Allergies                                            ------------------------------------------------------------    HOME MEDICATIONS  Home Medications:  aspirin 81 mg oral tablet, chewable: 1 tab(s) orally once a day (01 Oct 2021 10:03)  atorvastatin 40 mg oral tablet: 1 tab(s) orally once a day (01 Oct 2021 10:03)  BASAGLAR 100 UNIT/ML KWIKPEN:  (01 Oct 2021 10:03)  brimonidine 0.2% ophthalmic solution: 1 drop(s) to each affected eye 2 times a day (01 Oct 2021 10:03)  carvedilol 3.125 mg oral tablet: 1 tab(s) orally every 12 hours (01 Oct 2021 10:03)  clopidogrel 75 mg oral tablet: 1 tab(s) orally once a day (01 Oct 2021 10:03)  dorzolamide-timolol 2%-0.5% preservative-free ophthalmic solution: 1 drop(s) to each affected eye 2 times a day (01 Oct 2021 10:03)  latanoprost 0.005% ophthalmic solution: 1 drop(s) to each affected eye once a day (at bedtime) (01 Oct 2021 10:03)  pioglitazone-metformin 15 mg-850 mg oral tablet: 1 tab(s) orally 2 times a day  HOLD MEDICATION FOR 48 HR AFTER CARDIAC CATH  (01 Oct 2021 10:03)  Rhopressa 0.02% ophthalmic solution: 1 drop(s) to each affected eye once a day (in the evening) (01 Oct 2021 10:03)  sevelamer carbonate 800 mg oral tablet: 1 tab(s) orally 3 times a day (with meals) (01 Oct 2021 10:03)                           MEDICATIONS:  STANDING MEDICATIONS  aspirin  chewable 81 milliGRAM(s) Oral daily  atorvastatin 80 milliGRAM(s) Oral at bedtime  brimonidine 0.2% Ophthalmic Solution 1 Drop(s) Both EYES two times a day  carvedilol 12.5 milliGRAM(s) Oral every 12 hours  clopidogrel Tablet 75 milliGRAM(s) Oral daily  dextrose 40% Gel 15 Gram(s) Oral once  dextrose 5%. 1000 milliLiter(s) IV Continuous <Continuous>  dextrose 5%. 1000 milliLiter(s) IV Continuous <Continuous>  dextrose 50% Injectable 10 milliLiter(s) IV Push once  dextrose 50% Injectable 25 Gram(s) IV Push once  dextrose 50% Injectable 12.5 Gram(s) IV Push once  dextrose 50% Injectable 25 Gram(s) IV Push once  dorzolamide 2% Ophthalmic Solution 1 Drop(s) Both EYES <User Schedule>  gabapentin 100 milliGRAM(s) Oral two times a day  glucagon  Injectable 1 milliGRAM(s) IntraMuscular once  heparin   Injectable 5000 Unit(s) SubCutaneous every 12 hours  hydrALAZINE 100 milliGRAM(s) Oral three times a day  insulin lispro (ADMELOG) corrective regimen sliding scale   SubCutaneous three times a day before meals  isosorbide   mononitrate ER Tablet (IMDUR) 30 milliGRAM(s) Oral daily  ivabradine 5 milliGRAM(s) Oral two times a day  latanoprost 0.005% Ophthalmic Solution 1 Drop(s) Both EYES at bedtime  melatonin 5 milliGRAM(s) Oral at bedtime  sevelamer carbonate 800 milliGRAM(s) Oral three times a day  timolol 0.5% Solution 1 Drop(s) Both EYES two times a day    PRN MEDICATIONS                                            ------------------------------------------------------------  VITAL SIGNS: Last 24 Hours  T(C): 36.2 (04 Oct 2021 05:47), Max: 36.4 (03 Oct 2021 13:01)  T(F): 97.2 (04 Oct 2021 05:47), Max: 97.6 (03 Oct 2021 13:01)  HR: 52 (04 Oct 2021 05:47) (52 - 57)  BP: 111/55 (04 Oct 2021 05:47) (111/55 - 149/85)  BP(mean): --  RR: 18 (04 Oct 2021 05:47) (18 - 22)  SpO2: 97% (03 Oct 2021 21:48) (97% - 99%)      10-03-21 @ 07:01  -  10-04-21 @ 07:00  --------------------------------------------------------  IN: 780 mL / OUT: 700 mL / NET: 80 mL                                             --------------------------------------------------------------  LABS:                        7.8    7.87  )-----------( 150      ( 04 Oct 2021 06:26 )             25.7     10-04    135  |  105  |  66<HH>  ----------------------------<  127<H>  5.1<H>   |  17  |  5.4<HH>    Ca    7.7<L>      04 Oct 2021 06:26  Mg     2.3     10-04    TPro  5.1<L>  /  Alb  2.8<L>  /  TBili  0.3  /  DBili  x   /  AST  15  /  ALT  12  /  AlkPhos  97  10-04                                            -------------------------------------------------------------  RADIOLOGY:                                            --------------------------------------------------------------  PHYSICAL EXAM:  General: Man laying in bed in nad  HEENT: ncat, eomi, MMM  LUNGS: ctab  HEART: s1/s2, rrr, no m/r/g, +JVD with pressure applied to IVC  ABDOMEN: soft, ntnd, bs+  EXT: wwp, no cyanosis, clubbing, edema  NEURO: aox4, moves all extremities   SKIN: intact, no rashes or lesion                                          --------------------------------------------------------------    ASSESSMENT & PLAN  56M w/ h/o HTN, HLD, CKD IV, T2DM on insulin a1c 5.8% on 7/3/2021, Glaucoma, and CAD s/p PCI in July 2021. The patient presented to the ED with complaint of left sided chest pain and difficulty sleeping 2/2 to chest pain.     #Acute Kidney Injury  #CKD, Stage 4  Baseline Cr 2.6-2.7. Follows w/ Dr. Rollins as outpatient. LENNY likely secondary to ROSENDO from [facility]   - off torsemide for now however pt fluid overloaded on exam, cr remains elevated to 5.4 today, making good urine  - c/w sevelamer carbonate 1 tab PO TIDAC  - monitor UO  - avoid ACEi/ARB  - Nephrology consulted 10/4 recs`: renal bladder US with pvr, start sodium bicarb 1300mg q8h, lokelma 5g q12h, phos level, UA, urine lytes, urine urea, urine cr    #Acute Coronary Syndrome  #CAD s/p PCI July 2021  #HFrEF (40-45%)  #HTN  Initially p/w chest pain. Admission CXR demonstrated no acute cardiopulmonary disease. Troponin up-trending on admission (0.12->.13->.14). Last [facility] 7/22/21 demonstrated severe triple vessel disease, but deemed high risk for CTS. Stent subsequently placed in pLAD. TTE 9/30 showed LVEF 42% and moderately decreased segmental left ventricular systolic function. On 9/30, underwent cath w/ stent to pLCX and dLCX.   - resume diuresis today given pt making urine, overloaded on exam   - As per HF: started bumex gtt, hypertonic saline BID  - c/w hydralazine 100 mg PO TID  - c/w Imdur 30mg PO QD  - c/w carvedilol 12.5mg PO BID   - c/w ASA 81mg OP QD and clopidogrel 75mg PO QD (DAPT)  - c/w atorvastatin 80mg PO QHS  - f/u outpatient w/ Dr. Cantu as outpatient one week after discharge    #Microcytic Anemia  Hb 10.7 on admission. Iron Studies 9/29: Serum Iron 59, TIBC 229, Sat 26%, Ferritin 341.  - Hgb 9.2>9.8>9.0>7.8  - possibly dilutional given volume overloaded  - f/u 11am CBC, will transfuse PRN   - Trend Hb via daily CBC  - maintain active T&S    # HLD (hyperlipidemia)  Lipid Profile 9/30: LDL-C 150, HDL 67, Chol 220, TG 84  - c/w atorvastatin 80mg PO QHS  - Consider adding zetia (ezetimibe) on discharge given elevated LDL    #DM Type 2  #Diabetic Neuropathy  A1C 6.3% on 9/30/21. At home, take pioglitazone-metformin 15mg-850mg BID. Holding PO meds while inpatient.  - monitor FS TIDAC and QHS  - c/w ISS  - c/w gabapentin for neuropathy     #Misc   - DVT Prophylaxis: heparin 5000U SQ BID  - GI Prophylaxis: not indicated   - Diet: DASH/TLC  - Activity: IAT  - Code Status: Full Code    Handoff   - f/u nephro recs   - f/u 11am cbc transfuse PRN

## 2021-10-04 NOTE — PROGRESS NOTE ADULT - ASSESSMENT
Acute on chronic systolic HF /ICM/ CKD/ Anemia     Patient is fluid overloaded on exam  Give Bumex 2 mg followed by Bumex drip 1 mg/hr  3% Hypertonic Saline 150ml BID  Get BMP twice daily   Maintain potassium >4.0, Mg >2.1  Strict intake and output  Daily weight   Obtain Iron profile  plan discussed with primary team  Will continue to follow    Acute on chronic systolic HF /ICM/ CKD/ Anemia     Patient is fluid overloaded on exam  Give Bumex 2 mg followed by Bumex drip 1 mg/hr  3% Hypertonic Saline 150ml BID  Get BMP twice daily   Maintain potassium >4.0, Mg >2.1  Strict intake and output  Daily weight   Obtain Iron profile  Plan discussed with primary team  Will continue to follow

## 2021-10-04 NOTE — PROGRESS NOTE ADULT - ASSESSMENT
# LENNY on CKD 4 / r/o ROSENDO / CRS  - non oliguric  - creat up-trending  - off diuretics   - check renal/bladder ultrasound w/ pvr  - start sodium bicarb  650 mg q8h  - start lokelma 5g q12h  - check phos level, on sevelamer   - check UA, urine lytes, urine urea, urine creat  - no urgent indication for RRT  - cardio f/u noted   - if BP remains on the low side, decrease hydralazine   Will follow

## 2021-10-04 NOTE — PROGRESS NOTE ADULT - ASSESSMENT
A 56 year old male with PMHx of  HTN, HLD, CKD IV, T2DM on insulin a1c 5.8%, Glaucoma, and CAD presented to the ED with left sided chest pain and difficulty sleeping 2/2 to chest pain.    [place] S/P PCI  CAD / HTN  DM-2  LENNY (Likely ROSENDO) on CKD-4  Acute on chronic HFmrEF  Ischemic CM            PLAN:    ·	Drop in Hb. Repeat CBC.   ·	Iron studies & stool guaiac.   ·	Cr continues to trend up. Likely ROSENDO  ·	Renal consult appreciated. Started him on PO NaHCO3  ·	Care D/W the cardiologist and HF specialist. Give Bumex 2 mg ivp x 1 now, then infusion @ 1 mg/hr. Also start 3% saline 150 cc twice a day over 2-3 hours.   ·	S/P cath on 9/30 and placement of 2 SAMMY in LCRX  ·	On ASA, Plavix and Lipitor  ·	ECHO showed EF is 42%  ·	Check i's and o's and daily wt  ·	Low salt diet and water restriction to 1.5 L/D  ·	On Coreg, Hydralazine and Isosorbide dinitrate.     Progress Note Handoff    Pending (specify):  Consults_________, Tests________, Test Results_______, Other__AKI______  Family discussion:  Disposition: Home___/SNF___/Other________/Unknown at this time________    Blake Chacon MD  Spectra: [phone]

## 2021-10-04 NOTE — PROGRESS NOTE ADULT - SUBJECTIVE AND OBJECTIVE BOX
Cardiology Follow up    GILLIAN MENDOZA   56y Male  PAST MEDICAL & SURGICAL HISTORY:  HTN (hypertension)    HLD (hyperlipidemia)    DM (diabetes mellitus)    Glaucoma    No significant past surgical history         HPI:  Patient is a 56 year old male with PMHx of  HTN, HLD, CKD IV, T2DM on insulin a1c 5.8%, Glaucoma, and CAD s/p PCI in July 2021. The patient presented to the ED with complaint of left sided chest pain and difficulty sleeping 2/2 to chest pain and BL LE neuropathy (chronic). The patient states that last night he woke up and from sleep due to burning substernal chest pain, non radiating, no exacerbation or alleviating factors. Of notr the patient had recent PCI in July 2021. During that admission patient had a prolonged stay in CCU, was treated for acute CHF exacerbation and worsening kidney function. Cath on 7/22/21 showed significant 3 vessle disease, LAD, RCA, LCx. CTS was consulted. During preop cardiac MRI patient became SOB. He received bumex and hypertonic saline for diuresis.  CABG was decided against due to increased risk of progression of CKDIV to ESRD.  The patient was discharged on DAPT and follows with Dr. Cantu as his cardiologist    In the ED, patient is hemodynamically stable with elevated troponin 0.12, microcytic anemia, elevated BNP 78022. The patient was evaluated in the ED by cardiology and is planed for cardiac cath on 9/30.  (29 Sep 2021 19:16)    Allergies    No Known Allergies      Patient reports c/o feeling tired. Pt ambulated without issues/symptoms  Denies CP, SOB, palpitations, or dizziness  No events on telemetry overnight    Vital Signs Last 24 Hrs  T(C): 36.2 (04 Oct 2021 05:47), Max: 36.4 (03 Oct 2021 13:01)  T(F): 97.2 (04 Oct 2021 05:47), Max: 97.6 (03 Oct 2021 13:01)  HR: 52 (04 Oct 2021 05:47) (52 - 57)  BP: 111/55 (04 Oct 2021 05:47) (111/55 - 149/85)  BP(mean): --  RR: 18 (04 Oct 2021 05:47) (18 - 22)  SpO2: 97% (03 Oct 2021 21:48) (97% - 99%)    MEDICATIONS  (STANDING):  aspirin  chewable 81 milliGRAM(s) Oral daily  atorvastatin 80 milliGRAM(s) Oral at bedtime  brimonidine 0.2% Ophthalmic Solution 1 Drop(s) Both EYES two times a day  buMETAnide Infusion 1 mG/Hr (5 mL/Hr) IV Continuous <Continuous>  buMETAnide Injectable 2 milliGRAM(s) IV Push once  carvedilol 12.5 milliGRAM(s) Oral every 12 hours  clopidogrel Tablet 75 milliGRAM(s) Oral daily  dextrose 40% Gel 15 Gram(s) Oral once  dextrose 5%. 1000 milliLiter(s) (50 mL/Hr) IV Continuous <Continuous>  dextrose 5%. 1000 milliLiter(s) (100 mL/Hr) IV Continuous <Continuous>  dextrose 50% Injectable 10 milliLiter(s) IV Push once  dextrose 50% Injectable 25 Gram(s) IV Push once  dextrose 50% Injectable 12.5 Gram(s) IV Push once  dextrose 50% Injectable 25 Gram(s) IV Push once  dorzolamide 2% Ophthalmic Solution 1 Drop(s) Both EYES <User Schedule>  gabapentin 100 milliGRAM(s) Oral two times a day  glucagon  Injectable 1 milliGRAM(s) IntraMuscular once  heparin   Injectable 5000 Unit(s) SubCutaneous every 12 hours  hydrALAZINE 100 milliGRAM(s) Oral three times a day  insulin lispro (ADMELOG) corrective regimen sliding scale   SubCutaneous three times a day before meals  isosorbide   mononitrate ER Tablet (IMDUR) 30 milliGRAM(s) Oral daily  ivabradine 5 milliGRAM(s) Oral two times a day  latanoprost 0.005% Ophthalmic Solution 1 Drop(s) Both EYES at bedtime  melatonin 5 milliGRAM(s) Oral at bedtime  sevelamer carbonate 800 milliGRAM(s) Oral three times a day  sodium bicarbonate 1300 milliGRAM(s) Oral every 8 hours  sodium chloride 3%. 150 milliLiter(s) (50 mL/Hr) IV Continuous <Continuous>  sodium zirconium cyclosilicate 5 Gram(s) Oral every 12 hours  timolol 0.5% Solution 1 Drop(s) Both EYES two times a day    MEDICATIONS  (PRN):      REVIEW OF SYSTEMS:          All negative except as mentioned in HPI    PHYSICAL EXAM:           CONSTITUTIONAL: Well-developed; well-nourished; in no acute distress  	SKIN: warm, dry  	HEAD: Normocephalic; atraumatic  	EYES: PERRL.  	ENT: No nasal discharge, airway clear, mucous membranes moist  	NECK: Supple; non tender.  	CARD: +S1, +S2, no murmurs, gallops, or rubs. Regular rate and rhythm    	RESP: No wheezes, rales or rhonchi. CTA B/L  	ABD: soft ntnd, + BS x 4 quadrants  	EXT: moves all extremities,  no clubbing, cyanosis or edema  	NEURO: Alert and oriented x3, no focal deficits          PSYCH: Cooperative, appropriate          VASCULAR:  + Rad / + PTs / + DPs          EXTREMITY:               Right Groin:   C/D/I, + pulses,  access site soft, no hematoma, no pain, no numbness, no signs and symptoms of infection  	   Right Radial:  C/D/I, + pulses, access site soft, no hematoma, no pain, no numbness, no signs and symptoms of infection             ECG:        < from: 12 Lead ECG (10.01.21 @ 08:07) >  Ventricular Rate 65 BPM    Atrial Rate 65 BPM    P-R Interval 158 ms    QRS Duration 122 ms    Q-T Interval 462 ms    QTC Calculation(Bazett) 480 ms    P Axis 62 degrees    R Axis 106 degrees    T Axis 116 degrees    Diagnosis Line Normal sinus rhythm  Right bundle branch block  Cannot rule out Inferior infarct , age undetermined  T wave abnormality, consider lateral ischemia  Abnormal ECG    Confirmed by KLEVER PEÑA MD ([id number]) on 10/1/2021 12:40:59 PM    < end of copied text >                                                                                                           2D ECHO:  < from: TTE Echo Complete w/o Contrast w/ Doppler (09.30.21 @ 16:45) >   EXAM:  ECHO TTE WO CON COMP W DOPP        PROCEDURE DATE:  09/30/2021      INTERPRETATION:   [place] [address]                Phone: [phone].   TRANSTHORACIC ECHOCARDIOGRAM REPORT        Patient Name:   GILLIAN MENDOZA Accession #: [id number]  Medical Rec #:  [id number]      Height:      63.0 in 160.0 cm  YOB: 1965     Weight:      145.0 lb 65.77 kg  Patient Age:    56 yearsBSA:         1.69 m²  Patient Gender: M             BP:          177/93 mmHg      Date of Exam:        9/30/2021 4:45:56 PM  Referring Physician: [id number] COY COLLIER  Sonographer:         Barbara Barraza  Reading Physician:   Colby Dey MD, [facility].    Procedure:   2D Echo/Doppler/Color Doppler Complete.  Indications: R07.9 - Chest Pain, unspecified  Diagnosis:   Chest pain, unspecified - R07.9        Summary:   1. Moderately decreased segmental left ventricular systolic function.   2. Mid and apical inferior wall, basal and mid inferolateral wall, mid anterolateral segment, and apical lateral segment are abnormal as described above.   3. LV Ejection Fraction by Garcia's Method with a biplane EF of 42 %.   4. Mildly increased LV wall thickness.   5. Mildly enlarged left atrium.   6. Normal right atrial size.   7. Small to moderate pericardial effusion.   8. Mild to moderate mitral valve regurgitation.   9. Moderate tricuspid regurgitation.  10. Mild pulmonic valve regurgitation.  11. Spectral Doppler shows restrictive pattern of left ventricular myocardial filling (Grade III diastolic dysfunction).  12. Mild thickening of the anterior and posterior mitral valve leaflets.    PHYSICIAN INTERPRETATION:  Left Ventricle: The left ventricular internal cavity size is normal. Left ventricular wall thickness is mildly increased. Moderately decreased segmental left ventricular systolic function. Spectral Doppler shows restrictive pattern of left ventricular myocardial filling (Grade III diastolic dysfunction).      LV Wall Scoring:  The mid and apical inferior wall, basal and mid inferolateral wall, mid  anterolateral segment, and apical lateral segment are hypokinetic. All  remaining scored segments are normal.    Right Ventricle: Normal right ventricular size and function.  Left Atrium: Mildly enlarged left atrium.  Right Atrium: Normal right atrial size.  Pericardium: A small to moderate pericardial effusion is present. The pericardial effusion is globally located around the entire heart.  Mitral Valve: Structurally normal mitral valve, with normal leaflet excursion. Mild thickening of the anterior and posterior mitral valve leaflets. Mild to moderate mitral valve regurgitation is seen.  Tricuspid Valve: Structurally normal tricuspid valve, with normal leaflet excursion. Moderate tricuspid regurgitation is visualized.  Aortic Valve: Normal trileaflet aortic valve with normal opening. The aortic valve is trileaflet. No evidence of aortic valve regurgitation is seen.  Pulmonic Valve: Structurally normal pulmonic valve, with normal leaflet excursion. Mild pulmonic valve regurgitation.  Aorta: The aortic root is normal in size and structure.  Venous: The inferior vena cava was normal sized, with respiratory size variationless than 50%.        Tricuspid Valve and PA/RV Systolic Pressure: TR Max Velocity: 2.55 m/s RA Pressure: 3 mmHg RVSP/PASP: 28.9 mmHg      [id number] Colby Dey MD, [facility], Electronically signed on 10/1/2021 at 6:56:53 AM        *** Final ***      COLBY DEY MD; Attending Cardiologist  This document has been electronically signed. Sep 30 2021  4:45PM    < end of copied text >    LABS:                        7.8    7.87  )-----------( 150      ( 04 Oct 2021 06:26 )             25.7     10-04    135  |  105  |  66<HH>  ----------------------------<  127<H>  5.1<H>   |  17  |  5.4<HH>    Ca    7.7<L>      04 Oct 2021 06:26  Mg     2.3     10-04    TPro  5.1<L>  /  Alb  2.8<L>  /  TBili  0.3  /  DBili  x   /  AST  15  /  ALT  12  /  AlkPhos  97  10-04        Magnesium, Serum: 2.3 mg/dL [1.8 - 2.4] (10-04-21 @ 06:26)  LIVER FUNCTIONS - ( 04 Oct 2021 06:26 )  Alb: 2.8 g/dL / Pro: 5.1 g/dL / ALK PHOS: 97 U/L / ALT: 12 U/L / AST: 15 U/L / GGT: x             A/P:  I discussed the case with Cardiologist Dr. Singh and recommend the following:    S/P PCI:   Intervention:   -Successful IVUS guided PCI with balloon angioplasty, Intravascular lithotripsy, and drug eluting stent implantation x 1 of proximal LCX.  -Successful IVUS guided PCI with balloon angioplasty, and drug eluting stent implantation x 1 of distal LCX.    Implants:   -3.5x20 mm Synergy stent of prox LCX  -2.5x24 mm Synergy stent of distal LCX                    Repeat CBC, Trend CBC                   Maintain K=4 and Mg=2                   Continue DAPT ( Aspirin 81 mg PO Daily and Plavix 75 mg PO Daily ), B-Blocker, Isosorbide, torsemide, Statin Therapy                   add zetia for lipid management                   Hold ACEi/ARB due to elevated Cr, trend Cr                    no IVF, rise in Cr possibly d/t recent contrast administration                     DIURETICS AS PER HF TEAM ,CONTINUE TO MONITOR URINE OUTPUT CLOSELY                   strict I&O's, daily wts, fluid restriction, low sodium diet                   HOLD Metformin for 48 hr after Cardiac Cath                    OOB-CH, ambulate with assistance, Continue to monitor access sites s/p Cardiac Cath                    Patient was given 30 day supply of ( Aspirin 81 mg daily and Plavix 75 mg daily ) to take at home                   Patient agreeing to take DAPT for at least one year or as directed by cardiologist                    Pt given instructions on importance of taking antiplatelet medication or risk acute stent thrombosis/death                   Post cath instructions, access site care and activity restrictions reviewed with patient                     Discussed with patient to return to hospital if experience chest pain, shortness breath, dizziness and site bleeding                   Aggressive risk factor modification, diet counseling, smoking cessation discussed with patient                                              Cardiology Follow up    GILLIAN MENDOZA   56y Male  PAST MEDICAL & SURGICAL HISTORY:  HTN (hypertension)    HLD (hyperlipidemia)    DM (diabetes mellitus)    Glaucoma    No significant past surgical history         HPI:  Patient is a 56 year old male with PMHx of  HTN, HLD, CKD IV, T2DM on insulin a1c 5.8%, Glaucoma, and CAD s/p PCI in July 2021. The patient presented to the ED with complaint of left sided chest pain and difficulty sleeping 2/2 to chest pain and BL LE neuropathy (chronic). The patient states that last night he woke up and from sleep due to burning substernal chest pain, non radiating, no exacerbation or alleviating factors. Of notr the patient had recent PCI in July 2021. During that admission patient had a prolonged stay in CCU, was treated for acute CHF exacerbation and worsening kidney function. Cath on 7/22/21 showed significant 3 vessle disease, LAD, RCA, LCx. CTS was consulted. During preop cardiac MRI patient became SOB. He received bumex and hypertonic saline for diuresis.  CABG was decided against due to increased risk of progression of CKDIV to ESRD.  The patient was discharged on DAPT and follows with Dr. Cantu as his cardiologist    In the ED, patient is hemodynamically stable with elevated troponin 0.12, microcytic anemia, elevated BNP 43181. The patient was evaluated in the ED by cardiology and is planed for cardiac cath on 9/30.  (29 Sep 2021 19:16)    Allergies    No Known Allergies      Patient reports c/o feeling tired. Pt ambulated without issues/symptoms  Denies CP, SOB, palpitations, or dizziness  No events on telemetry overnight    Vital Signs Last 24 Hrs  T(C): 36.2 (04 Oct 2021 05:47), Max: 36.4 (03 Oct 2021 13:01)  T(F): 97.2 (04 Oct 2021 05:47), Max: 97.6 (03 Oct 2021 13:01)  HR: 52 (04 Oct 2021 05:47) (52 - 57)  BP: 111/55 (04 Oct 2021 05:47) (111/55 - 149/85)  BP(mean): --  RR: 18 (04 Oct 2021 05:47) (18 - 22)  SpO2: 97% (03 Oct 2021 21:48) (97% - 99%)    MEDICATIONS  (STANDING):  aspirin  chewable 81 milliGRAM(s) Oral daily  atorvastatin 80 milliGRAM(s) Oral at bedtime  brimonidine 0.2% Ophthalmic Solution 1 Drop(s) Both EYES two times a day  buMETAnide Infusion 1 mG/Hr (5 mL/Hr) IV Continuous <Continuous>  buMETAnide Injectable 2 milliGRAM(s) IV Push once  carvedilol 12.5 milliGRAM(s) Oral every 12 hours  clopidogrel Tablet 75 milliGRAM(s) Oral daily  dextrose 40% Gel 15 Gram(s) Oral once  dextrose 5%. 1000 milliLiter(s) (50 mL/Hr) IV Continuous <Continuous>  dextrose 5%. 1000 milliLiter(s) (100 mL/Hr) IV Continuous <Continuous>  dextrose 50% Injectable 10 milliLiter(s) IV Push once  dextrose 50% Injectable 25 Gram(s) IV Push once  dextrose 50% Injectable 12.5 Gram(s) IV Push once  dextrose 50% Injectable 25 Gram(s) IV Push once  dorzolamide 2% Ophthalmic Solution 1 Drop(s) Both EYES <User Schedule>  gabapentin 100 milliGRAM(s) Oral two times a day  glucagon  Injectable 1 milliGRAM(s) IntraMuscular once  heparin   Injectable 5000 Unit(s) SubCutaneous every 12 hours  hydrALAZINE 100 milliGRAM(s) Oral three times a day  insulin lispro (ADMELOG) corrective regimen sliding scale   SubCutaneous three times a day before meals  isosorbide   mononitrate ER Tablet (IMDUR) 30 milliGRAM(s) Oral daily  ivabradine 5 milliGRAM(s) Oral two times a day  latanoprost 0.005% Ophthalmic Solution 1 Drop(s) Both EYES at bedtime  melatonin 5 milliGRAM(s) Oral at bedtime  sevelamer carbonate 800 milliGRAM(s) Oral three times a day  sodium bicarbonate 1300 milliGRAM(s) Oral every 8 hours  sodium chloride 3%. 150 milliLiter(s) (50 mL/Hr) IV Continuous <Continuous>  sodium zirconium cyclosilicate 5 Gram(s) Oral every 12 hours  timolol 0.5% Solution 1 Drop(s) Both EYES two times a day    MEDICATIONS  (PRN):      REVIEW OF SYSTEMS:          All negative except as mentioned in HPI    PHYSICAL EXAM:           CONSTITUTIONAL: Well-developed; well-nourished; in no acute distress  	SKIN: warm, dry  	HEAD: Normocephalic; atraumatic  	EYES: PERRL.  	ENT: No nasal discharge, airway clear, mucous membranes moist  	NECK: Supple; non tender.  	CARD: +S1, +S2, no murmurs, gallops, or rubs. Regular rate and rhythm    	RESP: No wheezes, rales or rhonchi. CTA B/L  	ABD: soft ntnd, + BS x 4 quadrants  	EXT: moves all extremities,  no clubbing, cyanosis or edema  	NEURO: Alert and oriented x3, no focal deficits          PSYCH: Cooperative, appropriate          VASCULAR:  + Rad / + PTs / + DPs          EXTREMITY:               Right Groin:   C/D/I, + pulses,  access site soft, no hematoma, no pain, no numbness, no signs and symptoms of infection  	   Right Radial:  C/D/I, + pulses, access site soft, no hematoma, no pain, no numbness, no signs and symptoms of infection             ECG:        < from: 12 Lead ECG (10.01.21 @ 08:07) >  Ventricular Rate 65 BPM    Atrial Rate 65 BPM    P-R Interval 158 ms    QRS Duration 122 ms    Q-T Interval 462 ms    QTC Calculation(Bazett) 480 ms    P Axis 62 degrees    R Axis 106 degrees    T Axis 116 degrees    Diagnosis Line Normal sinus rhythm  Right bundle branch block  Cannot rule out Inferior infarct , age undetermined  T wave abnormality, consider lateral ischemia  Abnormal ECG    Confirmed by KLEVER PEÑA MD ([id number]) on 10/1/2021 12:40:59 PM    < end of copied text >                                                                                                           2D ECHO:  < from: TTE Echo Complete w/o Contrast w/ Doppler (09.30.21 @ 16:45) >   EXAM:  ECHO TTE WO CON COMP W DOPP        PROCEDURE DATE:  09/30/2021      INTERPRETATION:   [place] [address]                Phone: [phone].   TRANSTHORACIC ECHOCARDIOGRAM REPORT        Patient Name:   GILLIAN MENDOZA Accession #: [id number]  Medical Rec #:  [id number]      Height:      63.0 in 160.0 cm  YOB: 1965     Weight:      145.0 lb 65.77 kg  Patient Age:    56 yearsBSA:         1.69 m²  Patient Gender: M             BP:          177/93 mmHg      Date of Exam:        9/30/2021 4:45:56 PM  Referring Physician: [id number] COY COLLIER  Sonographer:         Barbara Barraza  Reading Physician:   Colby Dey MD, [facility].    Procedure:   2D Echo/Doppler/Color Doppler Complete.  Indications: R07.9 - Chest Pain, unspecified  Diagnosis:   Chest pain, unspecified - R07.9        Summary:   1. Moderately decreased segmental left ventricular systolic function.   2. Mid and apical inferior wall, basal and mid inferolateral wall, mid anterolateral segment, and apical lateral segment are abnormal as described above.   3. LV Ejection Fraction by Garcia's Method with a biplane EF of 42 %.   4. Mildly increased LV wall thickness.   5. Mildly enlarged left atrium.   6. Normal right atrial size.   7. Small to moderate pericardial effusion.   8. Mild to moderate mitral valve regurgitation.   9. Moderate tricuspid regurgitation.  10. Mild pulmonic valve regurgitation.  11. Spectral Doppler shows restrictive pattern of left ventricular myocardial filling (Grade III diastolic dysfunction).  12. Mild thickening of the anterior and posterior mitral valve leaflets.    PHYSICIAN INTERPRETATION:  Left Ventricle: The left ventricular internal cavity size is normal. Left ventricular wall thickness is mildly increased. Moderately decreased segmental left ventricular systolic function. Spectral Doppler shows restrictive pattern of left ventricular myocardial filling (Grade III diastolic dysfunction).      LV Wall Scoring:  The mid and apical inferior wall, basal and mid inferolateral wall, mid  anterolateral segment, and apical lateral segment are hypokinetic. All  remaining scored segments are normal.    Right Ventricle: Normal right ventricular size and function.  Left Atrium: Mildly enlarged left atrium.  Right Atrium: Normal right atrial size.  Pericardium: A small to moderate pericardial effusion is present. The pericardial effusion is globally located around the entire heart.  Mitral Valve: Structurally normal mitral valve, with normal leaflet excursion. Mild thickening of the anterior and posterior mitral valve leaflets. Mild to moderate mitral valve regurgitation is seen.  Tricuspid Valve: Structurally normal tricuspid valve, with normal leaflet excursion. Moderate tricuspid regurgitation is visualized.  Aortic Valve: Normal trileaflet aortic valve with normal opening. The aortic valve is trileaflet. No evidence of aortic valve regurgitation is seen.  Pulmonic Valve: Structurally normal pulmonic valve, with normal leaflet excursion. Mild pulmonic valve regurgitation.  Aorta: The aortic root is normal in size and structure.  Venous: The inferior vena cava was normal sized, with respiratory size variationless than 50%.        Tricuspid Valve and PA/RV Systolic Pressure: TR Max Velocity: 2.55 m/s RA Pressure: 3 mmHg RVSP/PASP: 28.9 mmHg      [id number] Colby Dey MD, [facility], Electronically signed on 10/1/2021 at 6:56:53 AM        *** Final ***      COLBY DEY MD; Attending Cardiologist  This document has been electronically signed. Sep 30 2021  4:45PM    < end of copied text >    LABS:                        7.8    7.87  )-----------( 150      ( 04 Oct 2021 06:26 )             25.7     10-04    135  |  105  |  66<HH>  ----------------------------<  127<H>  5.1<H>   |  17  |  5.4<HH>    Ca    7.7<L>      04 Oct 2021 06:26  Mg     2.3     10-04    TPro  5.1<L>  /  Alb  2.8<L>  /  TBili  0.3  /  DBili  x   /  AST  15  /  ALT  12  /  AlkPhos  97  10-04        Magnesium, Serum: 2.3 mg/dL [1.8 - 2.4] (10-04-21 @ 06:26)  LIVER FUNCTIONS - ( 04 Oct 2021 06:26 )  Alb: 2.8 g/dL / Pro: 5.1 g/dL / ALK PHOS: 97 U/L / ALT: 12 U/L / AST: 15 U/L / GGT: x             A/P:  I discussed the case with Cardiologist Dr. Singh and recommend the following:    S/P PCI:   Intervention:   -Successful IVUS guided PCI with balloon angioplasty, Intravascular lithotripsy, and drug eluting stent implantation x 1 of proximal LCX.  -Successful IVUS guided PCI with balloon angioplasty, and drug eluting stent implantation x 1 of distal LCX.    Implants:   -3.5x20 mm Synergy stent of prox LCX  -2.5x24 mm Synergy stent of distal LCX                    Repeat CBC, Please discuss with Dr Singh prior to transfusing                    Maintain K=4 and Mg=2                   Continue DAPT ( Aspirin 81 mg PO Daily and Plavix 75 mg PO Daily ), B-Blocker, Isosorbide, torsemide, Statin Therapy                   add zetia for lipid management                   Hold ACEi/ARB due to elevated Cr, trend Cr                    no IVF, rise in Cr possibly d/t recent contrast administration                     DIURETICS AS PER HF TEAM ,CONTINUE TO MONITOR URINE OUTPUT CLOSELY                   strict I&O's, daily wts, fluid restriction, low sodium diet                   HOLD Metformin for 48 hr after Cardiac Cath                    OOB-CH, ambulate with assistance, Continue to monitor access sites s/p Cardiac Cath                    Patient was given 30 day supply of ( Aspirin 81 mg daily and Plavix 75 mg daily ) to take at home                   Patient agreeing to take DAPT for at least one year or as directed by cardiologist                    Pt given instructions on importance of taking antiplatelet medication or risk acute stent thrombosis/death                   Post cath instructions, access site care and activity restrictions reviewed with patient                     Discussed with patient to return to hospital if experience chest pain, shortness breath, dizziness and site bleeding                   Aggressive risk factor modification, diet counseling, smoking cessation discussed with patient

## 2021-10-04 NOTE — PROGRESS NOTE ADULT - SUBJECTIVE AND OBJECTIVE BOX
seen and examined  24 h events noted         PAST HISTORY  --------------------------------------------------------------------------------  No significant changes to PMH, PSH, FHx, SHx, unless otherwise noted    ALLERGIES & MEDICATIONS  --------------------------------------------------------------------------------  Allergies    No Known Allergies    Intolerances      Standing Inpatient Medications  aspirin  chewable 81 milliGRAM(s) Oral daily  atorvastatin 80 milliGRAM(s) Oral at bedtime  brimonidine 0.2% Ophthalmic Solution 1 Drop(s) Both EYES two times a day  carvedilol 12.5 milliGRAM(s) Oral every 12 hours  clopidogrel Tablet 75 milliGRAM(s) Oral daily  dextrose 40% Gel 15 Gram(s) Oral once  dextrose 5%. 1000 milliLiter(s) IV Continuous <Continuous>  dextrose 5%. 1000 milliLiter(s) IV Continuous <Continuous>  dextrose 50% Injectable 10 milliLiter(s) IV Push once  dextrose 50% Injectable 25 Gram(s) IV Push once  dextrose 50% Injectable 12.5 Gram(s) IV Push once  dextrose 50% Injectable 25 Gram(s) IV Push once  dorzolamide 2% Ophthalmic Solution 1 Drop(s) Both EYES <User Schedule>  gabapentin 100 milliGRAM(s) Oral two times a day  glucagon  Injectable 1 milliGRAM(s) IntraMuscular once  heparin   Injectable 5000 Unit(s) SubCutaneous every 12 hours  hydrALAZINE 100 milliGRAM(s) Oral three times a day  insulin lispro (ADMELOG) corrective regimen sliding scale   SubCutaneous three times a day before meals  isosorbide   mononitrate ER Tablet (IMDUR) 30 milliGRAM(s) Oral daily  ivabradine 5 milliGRAM(s) Oral two times a day  latanoprost 0.005% Ophthalmic Solution 1 Drop(s) Both EYES at bedtime  melatonin 5 milliGRAM(s) Oral at bedtime  sevelamer carbonate 800 milliGRAM(s) Oral three times a day  timolol 0.5% Solution 1 Drop(s) Both EYES two times a day          VITALS/PHYSICAL EXAM  --------------------------------------------------------------------------------  T(C): 36.2 (10-04-21 @ 05:47), Max: 36.4 (10-03-21 @ 13:01)  HR: 52 (10-04-21 @ 05:47) (52 - 57)  BP: 111/55 (10-04-21 @ 05:47) (111/55 - 149/85)  RR: 18 (10-04-21 @ 05:47) (18 - 22)  SpO2: 97% (10-03-21 @ 21:48) (97% - 99%)  Wt(kg): --  Height (cm): 160 (10-02-21 @ 09:17)  Weight (kg): 65.8 (10-02-21 @ 09:17)  BMI (kg/m2): 25.7 (10-02-21 @ 09:17)  BSA (m2): 1.69 (10-02-21 @ 09:17)      10-03-21 @ 07:01  -  10-04-21 @ 07:00  --------------------------------------------------------  IN: 780 mL / OUT: 700 mL / NET: 80 mL        LABS/STUDIES  --------------------------------------------------------------------------------              7.8    7.87  >-----------<  150      [10-04-21 @ 06:26]              25.7     138  |  106  |  59  ----------------------------<  162      [10-03-21 @ 07:12]  5.2   |  18  |  5.0        Ca     8.2     [10-03-21 @ 07:12]      Mg     2.4     [10-03-21 @ 07:12]    TPro  5.7  /  Alb  3.1  /  TBili  0.4  /  DBili  x   /  AST  15  /  ALT  12  /  AlkPhos  117  [10-03-21 @ 07:12]          Creatinine Trend:  SCr 5.0 [10-03 @ 07:12]  SCr 3.9 [10-02 @ 07:59]  SCr 3.4 [10-01 @ 22:18]  SCr 2.7 [10-01 @ 06:51]  SCr 2.6 [09-30 @ 06:05]        Iron 59, TIBC 229, %sat 26      [09-29-21 @ 20:56]  Ferritin 341      [09-29-21 @ 20:56]  TSH 2.50      [09-30-21 @ 06:05]  Lipid: chol 220, TG 84, HDL 67, LDL --      [09-30-21 @ 06:05]

## 2021-10-04 NOTE — PROGRESS NOTE ADULT - SUBJECTIVE AND OBJECTIVE BOX
GILLIAN MENDOZA  56y Male    CHIEF COMPLAINT:    Patient is a 56y old  Male who presents with a chief complaint of cad/chf (03 Oct 2021 15:50)      INTERVAL HPI/OVERNIGHT EVENTS:    Patient seen and examined. C/O swelling of the face. No sob. Having good urine output. Fluid overload again    ROS: All other systems are negative.    Vital Signs:    T(F): 97.2 (10-04-21 @ 05:47), Max: 97.6 (10-03-21 @ 13:01)  HR: 52 (10-04-21 @ 05:47) (52 - 57)  BP: 111/55 (10-04-21 @ 05:47) (111/55 - 149/85)  RR: 18 (10-04-21 @ 05:47) (18 - 22)  SpO2: 97% (10-03-21 @ 21:48) (97% - 99%)  I&O's Summary    03 Oct 2021 07:01  -  04 Oct 2021 07:00  --------------------------------------------------------  IN: 780 mL / OUT: 700 mL / NET: 80 mL      Daily     Daily Weight in k.6 (04 Oct 2021 05:47)  CAPILLARY BLOOD GLUCOSE      POCT Blood Glucose.: 153 mg/dL (04 Oct 2021 08:37)  POCT Blood Glucose.: 176 mg/dL (03 Oct 2021 21:19)  POCT Blood Glucose.: 173 mg/dL (03 Oct 2021 16:14)  POCT Blood Glucose.: 173 mg/dL (03 Oct 2021 12:08)      PHYSICAL EXAM:    GENERAL:  NAD  SKIN: No rashes or lesions  HENT: Atraumatic. Normocephalic. PERRL. Moist membranes.  NECK: Supple, +ve JVD. No lymphadenopathy.  PULMONARY: CTA B/L. No wheezing. No rales  CVS: Normal S1, S2. Rate and Rhythm are regular. No murmurs.  ABDOMEN/GI: Soft, Nontender, Nondistended; BS present  EXTREMITIES: Peripheral pulses intact. Trace edema B/L LE.  NEUROLOGIC:  No motor or sensory deficit.  PSYCH: Alert & oriented x 3    Consultant(s) Notes Reviewed:  [x ] YES  [ ] NO  Care Discussed with Consultants/Other Providers [ x] YES  [ ] NO    EKG reviewed  Telemetry reviewed    LABS:                        7.8    7.87  )-----------( 150      ( 04 Oct 2021 06:26 )             25.7   Hemoglobin: 7.8 g/dL (10-04 @ 06:26)  Hemoglobin: 9.0 g/dL (10-03 @ 07:12)  Hemoglobin: 9.8 g/dL (10-02 @ 07:59)  Hemoglobin: 9.2 g/dL (10-01 @ 06:51)  Hemoglobin: 9.6 g/dL ( @ 06:05)  Hemoglobin: 10.7 g/dL ( @ 12:51)    10-04    135  |  105  |  66<HH>  ----------------------------<  127<H>   Creatinine Trend: 5.4<--, 5.0<--, 3.9<--, 3.4<--, 2.7<--, 2.6<--  5.1<H>   |  17  |  5.4<HH>    Ca    7.7<L>      04 Oct 2021 06:26  Mg     2.3     10-04    TPro  5.1<L>  /  Alb  2.8<L>  /  TBili  0.3  /  DBili  x   /  AST  15  /  ALT  12  /  AlkPhos  97  10-04      Serum Pro-Brain Natriuretic Peptide: 88042 pg/mL (21 @ 12:51)          RADIOLOGY & ADDITIONAL TESTS:      Imaging or report Personally Reviewed:  [ ] YES  [ ] NO    Medications:  Standing  aspirin  chewable 81 milliGRAM(s) Oral daily  atorvastatin 80 milliGRAM(s) Oral at bedtime  brimonidine 0.2% Ophthalmic Solution 1 Drop(s) Both EYES two times a day  carvedilol 12.5 milliGRAM(s) Oral every 12 hours  clopidogrel Tablet 75 milliGRAM(s) Oral daily  dextrose 40% Gel 15 Gram(s) Oral once  dextrose 5%. 1000 milliLiter(s) IV Continuous <Continuous>  dextrose 5%. 1000 milliLiter(s) IV Continuous <Continuous>  dextrose 50% Injectable 10 milliLiter(s) IV Push once  dextrose 50% Injectable 25 Gram(s) IV Push once  dextrose 50% Injectable 12.5 Gram(s) IV Push once  dextrose 50% Injectable 25 Gram(s) IV Push once  dorzolamide 2% Ophthalmic Solution 1 Drop(s) Both EYES <User Schedule>  gabapentin 100 milliGRAM(s) Oral two times a day  glucagon  Injectable 1 milliGRAM(s) IntraMuscular once  heparin   Injectable 5000 Unit(s) SubCutaneous every 12 hours  hydrALAZINE 100 milliGRAM(s) Oral three times a day  insulin lispro (ADMELOG) corrective regimen sliding scale   SubCutaneous three times a day before meals  isosorbide   mononitrate ER Tablet (IMDUR) 30 milliGRAM(s) Oral daily  ivabradine 5 milliGRAM(s) Oral two times a day  latanoprost 0.005% Ophthalmic Solution 1 Drop(s) Both EYES at bedtime  melatonin 5 milliGRAM(s) Oral at bedtime  sevelamer carbonate 800 milliGRAM(s) Oral three times a day  sodium bicarbonate 1300 milliGRAM(s) Oral every 8 hours  sodium zirconium cyclosilicate 5 Gram(s) Oral every 12 hours  timolol 0.5% Solution 1 Drop(s) Both EYES two times a day    PRN Meds      Case discussed with resident    Care discussed with pt/family

## 2021-10-05 LAB
ALBUMIN SERPL ELPH-MCNC: 3 G/DL — LOW (ref 3.5–5.2)
ALP SERPL-CCNC: 104 U/L — SIGNIFICANT CHANGE UP (ref 30–115)
ALT FLD-CCNC: 13 U/L — SIGNIFICANT CHANGE UP (ref 0–41)
ANION GAP SERPL CALC-SCNC: 16 MMOL/L — HIGH (ref 7–14)
ANION GAP SERPL CALC-SCNC: 16 MMOL/L — HIGH (ref 7–14)
AST SERPL-CCNC: 13 U/L — SIGNIFICANT CHANGE UP (ref 0–41)
BASOPHILS # BLD AUTO: 0.02 K/UL — SIGNIFICANT CHANGE UP (ref 0–0.2)
BASOPHILS NFR BLD AUTO: 0.2 % — SIGNIFICANT CHANGE UP (ref 0–1)
BILIRUB SERPL-MCNC: 0.3 MG/DL — SIGNIFICANT CHANGE UP (ref 0.2–1.2)
BUN SERPL-MCNC: 74 MG/DL — CRITICAL HIGH (ref 10–20)
BUN SERPL-MCNC: 74 MG/DL — CRITICAL HIGH (ref 10–20)
CALCIUM SERPL-MCNC: 7.8 MG/DL — LOW (ref 8.5–10.1)
CALCIUM SERPL-MCNC: 8 MG/DL — LOW (ref 8.5–10.1)
CHLORIDE SERPL-SCNC: 104 MMOL/L — SIGNIFICANT CHANGE UP (ref 98–110)
CHLORIDE SERPL-SCNC: 105 MMOL/L — SIGNIFICANT CHANGE UP (ref 98–110)
CO2 SERPL-SCNC: 16 MMOL/L — LOW (ref 17–32)
CO2 SERPL-SCNC: 18 MMOL/L — SIGNIFICANT CHANGE UP (ref 17–32)
CREAT SERPL-MCNC: 5.5 MG/DL — CRITICAL HIGH (ref 0.7–1.5)
CREAT SERPL-MCNC: 5.5 MG/DL — CRITICAL HIGH (ref 0.7–1.5)
EOSINOPHIL # BLD AUTO: 0.84 K/UL — HIGH (ref 0–0.7)
EOSINOPHIL NFR BLD AUTO: 9.6 % — HIGH (ref 0–8)
FERRITIN SERPL-MCNC: 405 NG/ML — HIGH (ref 30–400)
GLUCOSE BLDC GLUCOMTR-MCNC: 139 MG/DL — HIGH (ref 70–99)
GLUCOSE BLDC GLUCOMTR-MCNC: 147 MG/DL — HIGH (ref 70–99)
GLUCOSE BLDC GLUCOMTR-MCNC: 213 MG/DL — HIGH (ref 70–99)
GLUCOSE BLDC GLUCOMTR-MCNC: 85 MG/DL — SIGNIFICANT CHANGE UP (ref 70–99)
GLUCOSE SERPL-MCNC: 121 MG/DL — HIGH (ref 70–99)
GLUCOSE SERPL-MCNC: 69 MG/DL — LOW (ref 70–99)
HCT VFR BLD CALC: 28.4 % — LOW (ref 42–52)
HGB BLD-MCNC: 8.6 G/DL — LOW (ref 14–18)
IMM GRANULOCYTES NFR BLD AUTO: 0.2 % — SIGNIFICANT CHANGE UP (ref 0.1–0.3)
LYMPHOCYTES # BLD AUTO: 0.95 K/UL — LOW (ref 1.2–3.4)
LYMPHOCYTES # BLD AUTO: 10.8 % — LOW (ref 20.5–51.1)
MAGNESIUM SERPL-MCNC: 2.3 MG/DL — SIGNIFICANT CHANGE UP (ref 1.8–2.4)
MCHC RBC-ENTMCNC: 23.4 PG — LOW (ref 27–31)
MCHC RBC-ENTMCNC: 30.3 G/DL — LOW (ref 32–37)
MCV RBC AUTO: 77.4 FL — LOW (ref 80–94)
MONOCYTES # BLD AUTO: 0.74 K/UL — HIGH (ref 0.1–0.6)
MONOCYTES NFR BLD AUTO: 8.4 % — SIGNIFICANT CHANGE UP (ref 1.7–9.3)
NEUTROPHILS # BLD AUTO: 6.22 K/UL — SIGNIFICANT CHANGE UP (ref 1.4–6.5)
NEUTROPHILS NFR BLD AUTO: 70.8 % — SIGNIFICANT CHANGE UP (ref 42.2–75.2)
NRBC # BLD: 0 /100 WBCS — SIGNIFICANT CHANGE UP (ref 0–0)
PHOSPHATE SERPL-MCNC: 5 MG/DL — HIGH (ref 2.1–4.9)
PLATELET # BLD AUTO: 159 K/UL — SIGNIFICANT CHANGE UP (ref 130–400)
POTASSIUM SERPL-MCNC: 4.7 MMOL/L — SIGNIFICANT CHANGE UP (ref 3.5–5)
POTASSIUM SERPL-MCNC: 4.7 MMOL/L — SIGNIFICANT CHANGE UP (ref 3.5–5)
POTASSIUM SERPL-SCNC: 4.7 MMOL/L — SIGNIFICANT CHANGE UP (ref 3.5–5)
POTASSIUM SERPL-SCNC: 4.7 MMOL/L — SIGNIFICANT CHANGE UP (ref 3.5–5)
PROT SERPL-MCNC: 5.4 G/DL — LOW (ref 6–8)
RBC # BLD: 3.67 M/UL — LOW (ref 4.7–6.1)
RBC # FLD: 13.5 % — SIGNIFICANT CHANGE UP (ref 11.5–14.5)
SODIUM SERPL-SCNC: 137 MMOL/L — SIGNIFICANT CHANGE UP (ref 135–146)
SODIUM SERPL-SCNC: 138 MMOL/L — SIGNIFICANT CHANGE UP (ref 135–146)
WBC # BLD: 8.79 K/UL — SIGNIFICANT CHANGE UP (ref 4.8–10.8)
WBC # FLD AUTO: 8.79 K/UL — SIGNIFICANT CHANGE UP (ref 4.8–10.8)

## 2021-10-05 PROCEDURE — 99233 SBSQ HOSP IP/OBS HIGH 50: CPT

## 2021-10-05 PROCEDURE — 99232 SBSQ HOSP IP/OBS MODERATE 35: CPT

## 2021-10-05 RX ORDER — FOLIC ACID 0.8 MG
1 TABLET ORAL DAILY
Refills: 0 | Status: DISCONTINUED | OUTPATIENT
Start: 2021-10-05 | End: 2021-10-07

## 2021-10-05 RX ADMIN — BRIMONIDINE TARTRATE 1 DROP(S): 2 SOLUTION/ DROPS OPHTHALMIC at 06:32

## 2021-10-05 RX ADMIN — Medication 100 MILLIGRAM(S): at 22:48

## 2021-10-05 RX ADMIN — SODIUM CHLORIDE 50 MILLILITER(S): 5 INJECTION, SOLUTION INTRAVENOUS at 22:47

## 2021-10-05 RX ADMIN — Medication 1 DROP(S): at 17:23

## 2021-10-05 RX ADMIN — BUMETANIDE 5 MG/HR: 0.25 INJECTION INTRAMUSCULAR; INTRAVENOUS at 22:13

## 2021-10-05 RX ADMIN — SEVELAMER CARBONATE 800 MILLIGRAM(S): 2400 POWDER, FOR SUSPENSION ORAL at 13:31

## 2021-10-05 RX ADMIN — IVABRADINE 5 MILLIGRAM(S): 7.5 TABLET, FILM COATED ORAL at 17:24

## 2021-10-05 RX ADMIN — DORZOLAMIDE HYDROCHLORIDE 1 DROP(S): 20 SOLUTION/ DROPS OPHTHALMIC at 06:28

## 2021-10-05 RX ADMIN — GABAPENTIN 100 MILLIGRAM(S): 400 CAPSULE ORAL at 06:23

## 2021-10-05 RX ADMIN — LATANOPROST 1 DROP(S): 0.05 SOLUTION/ DROPS OPHTHALMIC; TOPICAL at 22:17

## 2021-10-05 RX ADMIN — HEPARIN SODIUM 5000 UNIT(S): 5000 INJECTION INTRAVENOUS; SUBCUTANEOUS at 17:22

## 2021-10-05 RX ADMIN — Medication 1 MILLIGRAM(S): at 12:28

## 2021-10-05 RX ADMIN — CARVEDILOL PHOSPHATE 12.5 MILLIGRAM(S): 80 CAPSULE, EXTENDED RELEASE ORAL at 06:25

## 2021-10-05 RX ADMIN — Medication 5 MILLIGRAM(S): at 22:11

## 2021-10-05 RX ADMIN — Medication 81 MILLIGRAM(S): at 12:28

## 2021-10-05 RX ADMIN — GABAPENTIN 100 MILLIGRAM(S): 400 CAPSULE ORAL at 17:22

## 2021-10-05 RX ADMIN — Medication 1300 MILLIGRAM(S): at 13:31

## 2021-10-05 RX ADMIN — HEPARIN SODIUM 5000 UNIT(S): 5000 INJECTION INTRAVENOUS; SUBCUTANEOUS at 06:25

## 2021-10-05 RX ADMIN — Medication 2: at 12:26

## 2021-10-05 RX ADMIN — ATORVASTATIN CALCIUM 80 MILLIGRAM(S): 80 TABLET, FILM COATED ORAL at 22:48

## 2021-10-05 RX ADMIN — Medication 1300 MILLIGRAM(S): at 22:13

## 2021-10-05 RX ADMIN — SEVELAMER CARBONATE 800 MILLIGRAM(S): 2400 POWDER, FOR SUSPENSION ORAL at 06:26

## 2021-10-05 RX ADMIN — IVABRADINE 5 MILLIGRAM(S): 7.5 TABLET, FILM COATED ORAL at 12:29

## 2021-10-05 RX ADMIN — ISOSORBIDE MONONITRATE 30 MILLIGRAM(S): 60 TABLET, EXTENDED RELEASE ORAL at 12:28

## 2021-10-05 RX ADMIN — DORZOLAMIDE HYDROCHLORIDE 1 DROP(S): 20 SOLUTION/ DROPS OPHTHALMIC at 17:23

## 2021-10-05 RX ADMIN — CLOPIDOGREL BISULFATE 75 MILLIGRAM(S): 75 TABLET, FILM COATED ORAL at 12:28

## 2021-10-05 RX ADMIN — Medication 1 DROP(S): at 06:28

## 2021-10-05 RX ADMIN — Medication 1300 MILLIGRAM(S): at 06:28

## 2021-10-05 RX ADMIN — Medication 100 MILLIGRAM(S): at 13:31

## 2021-10-05 RX ADMIN — SEVELAMER CARBONATE 800 MILLIGRAM(S): 2400 POWDER, FOR SUSPENSION ORAL at 22:11

## 2021-10-05 RX ADMIN — BRIMONIDINE TARTRATE 1 DROP(S): 2 SOLUTION/ DROPS OPHTHALMIC at 17:23

## 2021-10-05 RX ADMIN — Medication 100 MILLIGRAM(S): at 06:24

## 2021-10-05 RX ADMIN — CARVEDILOL PHOSPHATE 12.5 MILLIGRAM(S): 80 CAPSULE, EXTENDED RELEASE ORAL at 17:22

## 2021-10-05 NOTE — PROGRESS NOTE ADULT - SUBJECTIVE AND OBJECTIVE BOX
Cardiology Follow up    GILLIAN MENDOZA   56y Male  PAST MEDICAL & SURGICAL HISTORY:  HTN (hypertension)    HLD (hyperlipidemia)    DM (diabetes mellitus)    Glaucoma    No significant past surgical history         HPI:  Patient is a 56 year old male with PMHx of  HTN, HLD, CKD IV, T2DM on insulin a1c 5.8%, Glaucoma, and CAD s/p PCI in July 2021. The patient presented to the ED with complaint of left sided chest pain and difficulty sleeping 2/2 to chest pain and BL LE neuropathy (chronic). The patient states that last night he woke up and from sleep due to burning substernal chest pain, non radiating, no exacerbation or alleviating factors. Of notr the patient had recent PCI in July 2021. During that admission patient had a prolonged stay in CCU, was treated for acute CHF exacerbation and worsening kidney function. Cath on 7/22/21 showed significant 3 vessle disease, LAD, RCA, LCx. CTS was consulted. During preop cardiac MRI patient became SOB. He received bumex and hypertonic saline for diuresis.  CABG was decided against due to increased risk of progression of CKDIV to ESRD.  The patient was discharged on DAPT and follows with Dr. Cantu as his cardiologist    In the ED, patient is hemodynamically stable with elevated troponin 0.12, microcytic anemia, elevated BNP 21502. The patient was evaluated in the ED by cardiology and is planed for cardiac cath on 9/30.  (29 Sep 2021 19:16)    Allergies    No Known Allergies    Intolerances    Patient seen and examined at bedside. No acute events overnight.  Patient without complaints. Pt ambulated without issues/symptoms  Denies CP, SOB, palpitations, or dizziness  No events on telemetry overnight    Vital Signs Last 24 Hrs  T(C): 36.2 (05 Oct 2021 08:00), Max: 36.2 (05 Oct 2021 08:00)  T(F): 97.2 (05 Oct 2021 08:00), Max: 97.2 (05 Oct 2021 08:00)  HR: 57 (05 Oct 2021 08:00) (54 - 58)  BP: 115/66 (05 Oct 2021 08:00) (113/61 - 156/87)  BP(mean): --  RR: 16 (05 Oct 2021 08:00) (16 - 18)  SpO2: 98% (04 Oct 2021 18:29) (98% - 98%)    MEDICATIONS  (STANDING):  aspirin  chewable 81 milliGRAM(s) Oral daily  atorvastatin 80 milliGRAM(s) Oral at bedtime  brimonidine 0.2% Ophthalmic Solution 1 Drop(s) Both EYES two times a day  buMETAnide Infusion 1 mG/Hr (5 mL/Hr) IV Continuous <Continuous>  carvedilol 12.5 milliGRAM(s) Oral every 12 hours  clopidogrel Tablet 75 milliGRAM(s) Oral daily  dextrose 40% Gel 15 Gram(s) Oral once  dextrose 5%. 1000 milliLiter(s) (50 mL/Hr) IV Continuous <Continuous>  dextrose 5%. 1000 milliLiter(s) (100 mL/Hr) IV Continuous <Continuous>  dextrose 50% Injectable 25 Gram(s) IV Push once  dextrose 50% Injectable 12.5 Gram(s) IV Push once  dextrose 50% Injectable 25 Gram(s) IV Push once  dextrose 50% Injectable 10 milliLiter(s) IV Push once  dorzolamide 2% Ophthalmic Solution 1 Drop(s) Both EYES <User Schedule>  gabapentin 100 milliGRAM(s) Oral two times a day  glucagon  Injectable 1 milliGRAM(s) IntraMuscular once  heparin   Injectable 5000 Unit(s) SubCutaneous every 12 hours  hydrALAZINE 100 milliGRAM(s) Oral three times a day  insulin lispro (ADMELOG) corrective regimen sliding scale   SubCutaneous three times a day before meals  isosorbide   mononitrate ER Tablet (IMDUR) 30 milliGRAM(s) Oral daily  ivabradine 5 milliGRAM(s) Oral two times a day  latanoprost 0.005% Ophthalmic Solution 1 Drop(s) Both EYES at bedtime  melatonin 5 milliGRAM(s) Oral at bedtime  sevelamer carbonate 800 milliGRAM(s) Oral three times a day  sodium bicarbonate 1300 milliGRAM(s) Oral every 8 hours  sodium chloride 3%. 150 milliLiter(s) (50 mL/Hr) IV Continuous <Continuous>  sodium chloride 3%. 150 milliLiter(s) (50 mL/Hr) IV Continuous <Continuous>  sodium chloride 3%. 150 milliLiter(s) (50 mL/Hr) IV Continuous <Continuous>  timolol 0.5% Solution 1 Drop(s) Both EYES two times a day    MEDICATIONS  (PRN):      REVIEW OF SYSTEMS:          All negative except as mentioned in HPI    PHYSICAL EXAM:           CONSTITUTIONAL: Well-developed; well-nourished; in no acute distress  	SKIN: warm, dry  	HEAD: Normocephalic; atraumatic  	EYES: PERRL.  	ENT: No nasal discharge, airway clear, mucous membranes moist  	NECK: Supple; non tender.  	CARD: +S1, +S2, no murmurs, gallops, or rubs. Regular rate and rhythm    	RESP: No wheezes, rales or rhonchi. CTA B/L  	ABD: soft ntnd, + BS x 4 quadrants  	EXT: moves all extremities,  no clubbing, cyanosis or edema  	NEURO: Alert and oriented x3, no focal deficits          PSYCH: Cooperative, appropriate          VASCULAR:  + Rad / + PTs / +  DPs          EXTREMITY:             Right Groin: access site soft, no hematoma, no pain, + pulses, no sign of infection, no numbness             Right Radial: access site soft, no hematoma, no pain, + pulses, no sign of infection, no numbness            ECG:   < from: 12 Lead ECG (10.01.21 @ 08:07) >  Ventricular Rate 65 BPM    Atrial Rate 65 BPM    P-R Interval 158 ms    QRS Duration 122 ms    Q-T Interval 462 ms    QTC Calculation(Bazett) 480 ms    P Axis 62 degrees    R Axis 106 degrees    T Axis 116 degrees    Diagnosis Line Normal sinus rhythm  Right bundle branch block  Cannot rule out Inferior infarct , age undetermined  T wave abnormality, consider lateral ischemia  Abnormal ECG    Confirmed by KLEVER PEÑA MD [id number]) on 10/1/2021 12:40:59 PM                                                                                   2D ECHO:  < from: TTE Echo Complete w/o Contrast w/ Doppler (09.30.21 @ 16:45) >  Summary:   1. Moderately decreased segmental left ventricular systolic function.   2. Mid and apical inferior wall, basal and mid inferolateral wall, mid anterolateral segment, and apical lateral segment are abnormal as described above.   3. LV Ejection Fraction by Garcia's Method with a biplane EF of 42 %.   4. Mildly increased LV wall thickness.   5. Mildly enlarged left atrium.   6. Normal right atrial size.   7. Small to moderate pericardial effusion.   8. Mild to moderate mitral valve regurgitation.   9. Moderate tricuspid regurgitation.  10. Mild pulmonic valve regurgitation.  11. Spectral Doppler shows restrictive pattern of left ventricular myocardial filling (Grade III diastolic dysfunction).  12. Mild thickening of the anterior and posterior mitral valve leaflets.    LABS:                        8.6    8.79  )-----------( 159      ( 05 Oct 2021 02:47 )             28.4     10-05    137  |  105  |  74<HH>  ----------------------------<  121<H>  4.7   |  16<L>  |  5.5<HH>    Ca    7.8<L>      05 Oct 2021 02:47  Phos  5.0     10-05  Mg     2.3     10-05    TPro  5.4<L>  /  Alb  3.0<L>  /  TBili  0.3  /  DBili  x   /  AST  13  /  ALT  13  /  AlkPhos  104  10-05    Magnesium, Serum: 2.3 mg/dL [1.8 - 2.4] (10-05-21 @ 02:47)  LIVER FUNCTIONS - ( 05 Oct 2021 02:47 )  Alb: 3.0 g/dL / Pro: 5.4 g/dL / ALK PHOS: 104 U/L / ALT: 13 U/L / AST: 13 U/L / GGT: x             A/P:  I discussed the case with Cardiologist Dr. Singh and recommend the following:  S/P PCI:   Intervention:   -Successful IVUS guided PCI with balloon angioplasty, Intravascular lithotripsy, and drug eluting stent implantation x 1 of proximal LCX.  -Successful IVUS guided PCI with balloon angioplasty, and drug eluting stent implantation x 1 of distal LCX.    Implants:   -3.5x20 mm Synergy stent of prox LCX  -2.5x24 mm Synergy stent of distal LCX                       CHF team f/u                    Monitor Cr, Hg  	     Continue DAPT ( Aspirin 81 mg daily and Plavix 75 mg daily ), Isosorbide, Hydralazine, B-Blocker, Statin Therapy                        add Zetia for lipid management                   Hold ACEi/ARB due to elevated Cr                   strict I&O's, daily wts, fluid restriction, low sodium diet                   HOLD Metformin for 48 hr after Cardiac Cath                    Nephrology f/u appreciated                     Patient given 30 day supply of ( Aspirin 81 mg daily and Plavix 75 mg daily ) to take at home                   Patient agreeing to take DAPT for at least one year or as directed by cardiologist                    Pt given instructions on importance of taking antiplatelet medication or risk acute stent thrombosis/death                   Post cath instructions, access site care and activity restrictions reviewed with patient                     Discussed with patient to return to hospital if experience chest pain, shortness breath, dizziness and site bleeding                   Aggressive risk factor modification, diet counseling, smoking cessation discussed with patient                       Monitor on TELE

## 2021-10-05 NOTE — PROGRESS NOTE ADULT - SUBJECTIVE AND OBJECTIVE BOX
seen and examined   no distress   lying comfortable         PAST HISTORY  --------------------------------------------------------------------------------  No significant changes to PMH, PSH, FHx, SHx, unless otherwise noted    ALLERGIES & MEDICATIONS  --------------------------------------------------------------------------------  Allergies    No Known Allergies    Intolerances      Standing Inpatient Medications  aspirin  chewable 81 milliGRAM(s) Oral daily  atorvastatin 80 milliGRAM(s) Oral at bedtime  brimonidine 0.2% Ophthalmic Solution 1 Drop(s) Both EYES two times a day  buMETAnide Infusion 1 mG/Hr IV Continuous <Continuous>  carvedilol 12.5 milliGRAM(s) Oral every 12 hours  clopidogrel Tablet 75 milliGRAM(s) Oral daily  dextrose 40% Gel 15 Gram(s) Oral once  dextrose 5%. 1000 milliLiter(s) IV Continuous <Continuous>  dextrose 5%. 1000 milliLiter(s) IV Continuous <Continuous>  dextrose 50% Injectable 25 Gram(s) IV Push once  dextrose 50% Injectable 12.5 Gram(s) IV Push once  dextrose 50% Injectable 25 Gram(s) IV Push once  dextrose 50% Injectable 10 milliLiter(s) IV Push once  dorzolamide 2% Ophthalmic Solution 1 Drop(s) Both EYES <User Schedule>  gabapentin 100 milliGRAM(s) Oral two times a day  glucagon  Injectable 1 milliGRAM(s) IntraMuscular once  heparin   Injectable 5000 Unit(s) SubCutaneous every 12 hours  hydrALAZINE 100 milliGRAM(s) Oral three times a day  insulin lispro (ADMELOG) corrective regimen sliding scale   SubCutaneous three times a day before meals  isosorbide   mononitrate ER Tablet (IMDUR) 30 milliGRAM(s) Oral daily  ivabradine 5 milliGRAM(s) Oral two times a day  latanoprost 0.005% Ophthalmic Solution 1 Drop(s) Both EYES at bedtime  melatonin 5 milliGRAM(s) Oral at bedtime  sevelamer carbonate 800 milliGRAM(s) Oral three times a day  sodium bicarbonate 1300 milliGRAM(s) Oral every 8 hours  sodium chloride 3%. 150 milliLiter(s) IV Continuous <Continuous>  sodium chloride 3%. 150 milliLiter(s) IV Continuous <Continuous>  sodium chloride 3%. 150 milliLiter(s) IV Continuous <Continuous>  sodium zirconium cyclosilicate 5 Gram(s) Oral every 12 hours  timolol 0.5% Solution 1 Drop(s) Both EYES two times a day        VITALS/PHYSICAL EXAM  --------------------------------------------------------------------------------  T(C): 35.8 (10-05-21 @ 05:30), Max: 36 (10-04-21 @ 14:47)  HR: 58 (10-05-21 @ 05:30) (54 - 58)  BP: 156/75 (10-05-21 @ 05:30) (113/61 - 156/87)  RR: 18 (10-05-21 @ 05:30) (18 - 18)  SpO2: 98% (10-04-21 @ 18:29) (98% - 98%)  Wt(kg): --        10-04-21 @ 07:01  -  10-05-21 @ 07:00  --------------------------------------------------------  IN: 1000 mL / OUT: 1100 mL / NET: -100 mL        LABS/STUDIES  --------------------------------------------------------------------------------              8.6    8.79  >-----------<  159      [10-05-21 @ 02:47]              28.4     137  |  105  |  74  ----------------------------<  121      [10-05-21 @ 02:47]  4.7   |  16  |  5.5        Ca     7.8     [10-05-21 @ 02:47]      Mg     2.3     [10-05-21 @ 02:47]    TPro  5.4  /  Alb  3.0  /  TBili  0.3  /  DBili  x   /  AST  13  /  ALT  13  /  AlkPhos  104  [10-05-21 @ 02:47]    Creatinine Trend:  SCr 5.5 [10-05 @ 02:47]  SCr 6.0 [10-04 @ 17:06]  SCr 5.4 [10-04 @ 06:26]  SCr 5.0 [10-03 @ 07:12]  SCr 3.9 [10-02 @ 07:59]        Iron 53, TIBC 207, %sat 26      [10-04-21 @ 12:33]  Ferritin 405      [10-04-21 @ 12:33]  TSH 2.50      [09-30-21 @ 06:05]  Lipid: chol 220, TG 84, HDL 67, LDL --      [09-30-21 @ 06:05]

## 2021-10-05 NOTE — PROGRESS NOTE ADULT - SUBJECTIVE AND OBJECTIVE BOX
Date of Admission: 21    Interval History:    - Patient resting in bed in NAD  - remains fluid overloaded on exam   - Renal function improving today      HISTORY OF PRESENT ILLNESS:     Patient is a 56 year old male with PMHx of  HTN, HLD, CKD IV, T2DM on insulin a1c 5.8%, Glaucoma, and CAD s/p PCI in 2021. The patient presented to the ED with complaint of left sided chest pain and difficulty sleeping 2/2 to chest pain and BL LE neuropathy (chronic). The patient states that last night he woke up and from sleep due to burning substernal chest pain, non radiating, no exacerbation or alleviating factors. Of note the patient had recent PCI in 2021. During that admission patient had a prolonged stay in CCU, was treated for acute CHF exacerbation and worsening kidney function. Cath on 21 showed significant 3 vessels disease, LAD, RCA, LCx. CTS was consulted. During preop cardiac MRI patient became SOB. He received bumex and hypertonic saline for diuresis.  CABG was decided against due to increased risk of progression of CKD IV to ESRD.          PAST MEDICAL & SURGICAL HISTORY:  HTN (hypertension)    HLD (hyperlipidemia)    DM (diabetes mellitus)    Glaucoma    No significant past surgical history        FAMILY HISTORY:    Mother:  77 HTN  Father:  at 55 of CKD       SOCIAL HISTORY:      Smokes about 3 cigarettes a week, social alcohol drinking, denies drug use      Allergies    No Known Allergies    Intolerances      PHYSICAL EXAM:    General Appearance: well appearing, normal for age and gender. 	  Neck: normal JVP, no bruit.   Cardiovascular: regular rate and rhythm S1 S2, ++ JVD, No murmurs, BLE edema  Respiratory: Lungs clear to auscultation	  Psychiatry: Alert and oriented x 3, Mood & affect appropriate  Gastrointestinal:  Soft, Non-tender  Skin/Integumen: No rashes, No ecchymoses, No cyanosis	  Neurologic: Non-focal  Musculoskeletal/ extremities: Normal range of motion, No clubbing, cyanosis or edema  Vascular: Peripheral pulses palpable 2+ bilaterally      PREVIOUS DIAGNOSTIC TESTING:      tte 21    Summary:   1. Moderately decreased segmental left ventricular systolic function.   2. Mid and apical inferior wall, basal and mid inferolateral wall, mid anterolateral segment, and apical lateral segment are abnormal as described above.   3. LV Ejection Fraction by Garcia's Method with a biplane EF of 42 %.   4. Mildly increased LV wall thickness.   5. Mildly enlarged left atrium.   6. Normal right atrial size.   7. Small to moderate pericardial effusion.   8. Mild to moderate mitral valve regurgitation.   9. Moderate tricuspid regurgitation.  10. Mild pulmonic valve regurgitation.  11. Spectral Doppler shows restrictive pattern of left ventricular myocardial filling (Grade III diastolic dysfunction).  12. Mild thickening of the anterior and posterior mitral valve leaflets.    	    Home Medications:  aspirin 81 mg oral tablet, chewable: 1 tab(s) orally once a day (01 Oct 2021 10:03)  atorvastatin 40 mg oral tablet: 1 tab(s) orally once a day (01 Oct 2021 10:03)  BASAGLAR 100 UNIT/ML KWIKPEN:  (01 Oct 2021 10:)  brimonidine 0.2% ophthalmic solution: 1 drop(s) to each affected eye 2 times a day (01 Oct 2021 10:)  carvedilol 3.125 mg oral tablet: 1 tab(s) orally every 12 hours (01 Oct 2021 10:)  clopidogrel 75 mg oral tablet: 1 tab(s) orally once a day (01 Oct 2021 10:)  dorzolamide-timolol 2%-0.5% preservative-free ophthalmic solution: 1 drop(s) to each affected eye 2 times a day (01 Oct 2021 10:)  latanoprost 0.005% ophthalmic solution: 1 drop(s) to each affected eye once a day (at bedtime) (01 Oct 2021 10:03)  pioglitazone-metformin 15 mg-850 mg oral tablet: 1 tab(s) orally 2 times a day  HOLD MEDICATION FOR 48 HR AFTER CARDIAC CATH  (01 Oct 2021 10:)  Rhopressa 0.02% ophthalmic solution: 1 drop(s) to each affected eye once a day (in the evening) (01 Oct 2021 10:03)  sevelamer carbonate 800 mg oral tablet: 1 tab(s) orally 3 times a day (with meals) (01 Oct 2021 10:)    MEDICATIONS  (STANDING):  aspirin  chewable 81 milliGRAM(s) Oral daily  atorvastatin 80 milliGRAM(s) Oral at bedtime  brimonidine 0.2% Ophthalmic Solution 1 Drop(s) Both EYES two times a day  buMETAnide Infusion 1 mG/Hr (5 mL/Hr) IV Continuous <Continuous>  buMETAnide Injectable 2 milliGRAM(s) IV Push once  carvedilol 12.5 milliGRAM(s) Oral every 12 hours  clopidogrel Tablet 75 milliGRAM(s) Oral daily  dextrose 40% Gel 15 Gram(s) Oral once  dextrose 5%. 1000 milliLiter(s) (50 mL/Hr) IV Continuous <Continuous>  dextrose 5%. 1000 milliLiter(s) (100 mL/Hr) IV Continuous <Continuous>  dextrose 50% Injectable 25 Gram(s) IV Push once  dextrose 50% Injectable 12.5 Gram(s) IV Push once  dextrose 50% Injectable 25 Gram(s) IV Push once  dextrose 50% Injectable 10 milliLiter(s) IV Push once  dorzolamide 2% Ophthalmic Solution 1 Drop(s) Both EYES <User Schedule>  gabapentin 100 milliGRAM(s) Oral two times a day  glucagon  Injectable 1 milliGRAM(s) IntraMuscular once  heparin   Injectable 5000 Unit(s) SubCutaneous every 12 hours  hydrALAZINE 100 milliGRAM(s) Oral three times a day  insulin lispro (ADMELOG) corrective regimen sliding scale   SubCutaneous three times a day before meals  isosorbide   mononitrate ER Tablet (IMDUR) 30 milliGRAM(s) Oral daily  ivabradine 5 milliGRAM(s) Oral two times a day  latanoprost 0.005% Ophthalmic Solution 1 Drop(s) Both EYES at bedtime  sevelamer carbonate 800 milliGRAM(s) Oral three times a day  sodium chloride 3%. 150 milliLiter(s) (50 mL/Hr) IV Continuous <Continuous>  timolol 0.5% Solution 1 Drop(s) Both EYES two times a day  torsemide 20 milliGRAM(s) Oral two times a day    MEDICATIONS  (PRN):         Date of Admission: 21    Interval History:    - Patient resting in bed in NAD  - Remains fluid overloaded on exam   - Renal function improving today      HISTORY OF PRESENT ILLNESS:     Patient is a 56 year old male with PMHx of  HTN, HLD, CKD IV, T2DM on insulin a1c 5.8%, Glaucoma, and CAD s/p PCI in 2021. The patient presented to the ED with complaint of left sided chest pain and difficulty sleeping 2/2 to chest pain and BL LE neuropathy (chronic). The patient states that last night he woke up and from sleep due to burning substernal chest pain, non radiating, no exacerbation or alleviating factors. Of note the patient had recent PCI in 2021. During that admission patient had a prolonged stay in CCU, was treated for acute CHF exacerbation and worsening kidney function. Cath on 21 showed significant 3 vessels disease, LAD, RCA, LCx. CTS was consulted. During preop cardiac MRI patient became SOB. He received bumex and hypertonic saline for diuresis.  CABG was decided against due to increased risk of progression of CKD IV to ESRD.          PAST MEDICAL & SURGICAL HISTORY:  HTN (hypertension)    HLD (hyperlipidemia)    DM (diabetes mellitus)    Glaucoma    No significant past surgical history        FAMILY HISTORY:    Mother:  77 HTN  Father:  at 55 of CKD       SOCIAL HISTORY:      Smokes about 3 cigarettes a week, social alcohol drinking, denies drug use      Allergies    No Known Allergies    Intolerances      PHYSICAL EXAM:    General Appearance: well appearing, normal for age and gender. 	  Neck: normal JVP, no bruit.   Cardiovascular: regular rate and rhythm S1 S2, ++ JVD, No murmurs, BLE edema  Respiratory: Lungs clear to auscultation	  Psychiatry: Alert and oriented x 3, Mood & affect appropriate  Gastrointestinal:  Soft, Non-tender  Skin/Integumen: No rashes, No ecchymoses, No cyanosis	  Neurologic: Non-focal  Musculoskeletal/ extremities: Normal range of motion, No clubbing, cyanosis or edema  Vascular: Peripheral pulses palpable 2+ bilaterally      PREVIOUS DIAGNOSTIC TESTING:      tte 21    Summary:   1. Moderately decreased segmental left ventricular systolic function.   2. Mid and apical inferior wall, basal and mid inferolateral wall, mid anterolateral segment, and apical lateral segment are abnormal as described above.   3. LV Ejection Fraction by Garcia's Method with a biplane EF of 42 %.   4. Mildly increased LV wall thickness.   5. Mildly enlarged left atrium.   6. Normal right atrial size.   7. Small to moderate pericardial effusion.   8. Mild to moderate mitral valve regurgitation.   9. Moderate tricuspid regurgitation.  10. Mild pulmonic valve regurgitation.  11. Spectral Doppler shows restrictive pattern of left ventricular myocardial filling (Grade III diastolic dysfunction).  12. Mild thickening of the anterior and posterior mitral valve leaflets.    	    Home Medications:  aspirin 81 mg oral tablet, chewable: 1 tab(s) orally once a day (01 Oct 2021 10:03)  atorvastatin 40 mg oral tablet: 1 tab(s) orally once a day (01 Oct 2021 10:03)  BASAGLAR 100 UNIT/ML KWIKPEN:  (01 Oct 2021 10:)  brimonidine 0.2% ophthalmic solution: 1 drop(s) to each affected eye 2 times a day (01 Oct 2021 10:)  carvedilol 3.125 mg oral tablet: 1 tab(s) orally every 12 hours (01 Oct 2021 10:)  clopidogrel 75 mg oral tablet: 1 tab(s) orally once a day (01 Oct 2021 10:)  dorzolamide-timolol 2%-0.5% preservative-free ophthalmic solution: 1 drop(s) to each affected eye 2 times a day (01 Oct 2021 10:)  latanoprost 0.005% ophthalmic solution: 1 drop(s) to each affected eye once a day (at bedtime) (01 Oct 2021 10:03)  pioglitazone-metformin 15 mg-850 mg oral tablet: 1 tab(s) orally 2 times a day  HOLD MEDICATION FOR 48 HR AFTER CARDIAC CATH  (01 Oct 2021 10:)  Rhopressa 0.02% ophthalmic solution: 1 drop(s) to each affected eye once a day (in the evening) (01 Oct 2021 10:03)  sevelamer carbonate 800 mg oral tablet: 1 tab(s) orally 3 times a day (with meals) (01 Oct 2021 10:)    MEDICATIONS  (STANDING):  aspirin  chewable 81 milliGRAM(s) Oral daily  atorvastatin 80 milliGRAM(s) Oral at bedtime  brimonidine 0.2% Ophthalmic Solution 1 Drop(s) Both EYES two times a day  buMETAnide Infusion 1 mG/Hr (5 mL/Hr) IV Continuous <Continuous>  buMETAnide Injectable 2 milliGRAM(s) IV Push once  carvedilol 12.5 milliGRAM(s) Oral every 12 hours  clopidogrel Tablet 75 milliGRAM(s) Oral daily  dextrose 40% Gel 15 Gram(s) Oral once  dextrose 5%. 1000 milliLiter(s) (50 mL/Hr) IV Continuous <Continuous>  dextrose 5%. 1000 milliLiter(s) (100 mL/Hr) IV Continuous <Continuous>  dextrose 50% Injectable 25 Gram(s) IV Push once  dextrose 50% Injectable 12.5 Gram(s) IV Push once  dextrose 50% Injectable 25 Gram(s) IV Push once  dextrose 50% Injectable 10 milliLiter(s) IV Push once  dorzolamide 2% Ophthalmic Solution 1 Drop(s) Both EYES <User Schedule>  gabapentin 100 milliGRAM(s) Oral two times a day  glucagon  Injectable 1 milliGRAM(s) IntraMuscular once  heparin   Injectable 5000 Unit(s) SubCutaneous every 12 hours  hydrALAZINE 100 milliGRAM(s) Oral three times a day  insulin lispro (ADMELOG) corrective regimen sliding scale   SubCutaneous three times a day before meals  isosorbide   mononitrate ER Tablet (IMDUR) 30 milliGRAM(s) Oral daily  ivabradine 5 milliGRAM(s) Oral two times a day  latanoprost 0.005% Ophthalmic Solution 1 Drop(s) Both EYES at bedtime  sevelamer carbonate 800 milliGRAM(s) Oral three times a day  sodium chloride 3%. 150 milliLiter(s) (50 mL/Hr) IV Continuous <Continuous>  timolol 0.5% Solution 1 Drop(s) Both EYES two times a day  torsemide 20 milliGRAM(s) Oral two times a day    MEDICATIONS  (PRN):

## 2021-10-05 NOTE — PROGRESS NOTE ADULT - ASSESSMENT
Acute on chronic systolic HF /ICM/ CKD/ Anemia     Patient remains fluid overloaded on exam  Continue Give Bumex 2 mg followed by Bumex drip 1 mg/hr  Continue 3% Hypertonic Saline 150ml BID  Get BMP twice daily   Maintain potassium >4.0, Mg >2.1  Strict intake and output  Daily weight   Plan discussed with primary team  Will continue to follow    Acute on chronic systolic HF /ICM/ CKD/ Anemia     Patient remains fluid overloaded on exam  Continue Bumex drip 1 mg/hr  Continue 3% Hypertonic Saline 150ml BID  Get BMP twice daily   Maintain potassium >4.0, Mg >2.1  Strict intake and output  Daily weight   Extensive counseling provided to wife and patient. Wife confirmed that patient is not compliant with low sodium diet and he smokes and drinks alcohol   Plan discussed with primary team  Will continue to follow

## 2021-10-05 NOTE — PROGRESS NOTE ADULT - SUBJECTIVE AND OBJECTIVE BOX
GILLIAN MENDOZA 56y Male  MRN#: [id number]   CODE STATUS: FULL     Hospital Day: 6d    Pt is currently admitted with the chief complaint of chest pain     SUBJECTIVE  HPI: Patient is a 56 year old male with PMHx of  HTN, HLD, CKD IV, T2DM on insulin a1c 5.8%, Glaucoma, and CAD s/p PCI in July 2021. The patient presented to the ED with complaint of left sided chest pain and difficulty sleeping 2/2 to chest pain and BL LE neuropathy (chronic). The patient states that last night he woke up and from sleep due to burning substernal chest pain, non radiating, no exacerbation or alleviating factors. Of notr the patient had recent PCI in July 2021. During that admission patient had a prolonged stay in CCU, was treated for acute CHF exacerbation and worsening kidney function. Cath on 7/22/21 showed significant 3 vessle disease, LAD, RCA, LCx. CTS was consulted. During preop cardiac MRI patient became SOB. He received bumex and hypertonic saline for diuresis.  CABG was decided against due to increased risk of progression of CKDIV to ESRD.  The patient was discharged on DAPT and follows with Dr. Cantu as his cardiologist    In the ED, patient is hemodynamically stable with elevated troponin 0.12, microcytic anemia, elevated BNP 67421. The patient was evaluated in the ED by cardiology and is planed for cardiac cath on 9/30.    Overnight events: none     Interval hx/Subjective complaints: Pt feeling well this morning, no complaints. Notes improved facial edema.     Pt denies any fevers, chills, fatigue, CP, palpitations, cough, SOB, abdominal pain, n/v/d, hematochezia, melena, weakness, numbness, tingling, or other FND.                      ----------------------------------------------------------  OBJECTIVE  PAST MEDICAL & SURGICAL HISTORY  HTN (hypertension)    HLD (hyperlipidemia)    DM (diabetes mellitus)    Glaucoma    No significant past surgical history                                              -----------------------------------------------------------  ALLERGIES:  No Known Allergies                                            ------------------------------------------------------------    HOME MEDICATIONS  Home Medications:  aspirin 81 mg oral tablet, chewable: 1 tab(s) orally once a day (01 Oct 2021 10:03)  atorvastatin 40 mg oral tablet: 1 tab(s) orally once a day (01 Oct 2021 10:03)  BASAGLAR 100 UNIT/ML KWIKPEN:  (01 Oct 2021 10:03)  brimonidine 0.2% ophthalmic solution: 1 drop(s) to each affected eye 2 times a day (01 Oct 2021 10:03)  carvedilol 3.125 mg oral tablet: 1 tab(s) orally every 12 hours (01 Oct 2021 10:03)  clopidogrel 75 mg oral tablet: 1 tab(s) orally once a day (01 Oct 2021 10:03)  dorzolamide-timolol 2%-0.5% preservative-free ophthalmic solution: 1 drop(s) to each affected eye 2 times a day (01 Oct 2021 10:03)  latanoprost 0.005% ophthalmic solution: 1 drop(s) to each affected eye once a day (at bedtime) (01 Oct 2021 10:03)  pioglitazone-metformin 15 mg-850 mg oral tablet: 1 tab(s) orally 2 times a day  HOLD MEDICATION FOR 48 HR AFTER CARDIAC CATH  (01 Oct 2021 10:03)  Rhopressa 0.02% ophthalmic solution: 1 drop(s) to each affected eye once a day (in the evening) (01 Oct 2021 10:03)  sevelamer carbonate 800 mg oral tablet: 1 tab(s) orally 3 times a day (with meals) (01 Oct 2021 10:03)                           MEDICATIONS:  STANDING MEDICATIONS  aspirin  chewable 81 milliGRAM(s) Oral daily  atorvastatin 80 milliGRAM(s) Oral at bedtime  brimonidine 0.2% Ophthalmic Solution 1 Drop(s) Both EYES two times a day  buMETAnide Infusion 1 mG/Hr IV Continuous <Continuous>  carvedilol 12.5 milliGRAM(s) Oral every 12 hours  clopidogrel Tablet 75 milliGRAM(s) Oral daily  dextrose 40% Gel 15 Gram(s) Oral once  dextrose 5%. 1000 milliLiter(s) IV Continuous <Continuous>  dextrose 5%. 1000 milliLiter(s) IV Continuous <Continuous>  dextrose 50% Injectable 25 Gram(s) IV Push once  dextrose 50% Injectable 12.5 Gram(s) IV Push once  dextrose 50% Injectable 25 Gram(s) IV Push once  dextrose 50% Injectable 10 milliLiter(s) IV Push once  dorzolamide 2% Ophthalmic Solution 1 Drop(s) Both EYES <User Schedule>  folic acid 1 milliGRAM(s) Oral daily  gabapentin 100 milliGRAM(s) Oral two times a day  glucagon  Injectable 1 milliGRAM(s) IntraMuscular once  heparin   Injectable 5000 Unit(s) SubCutaneous every 12 hours  hydrALAZINE 100 milliGRAM(s) Oral three times a day  insulin lispro (ADMELOG) corrective regimen sliding scale   SubCutaneous three times a day before meals  isosorbide   mononitrate ER Tablet (IMDUR) 30 milliGRAM(s) Oral daily  ivabradine 5 milliGRAM(s) Oral two times a day  latanoprost 0.005% Ophthalmic Solution 1 Drop(s) Both EYES at bedtime  melatonin 5 milliGRAM(s) Oral at bedtime  sevelamer carbonate 800 milliGRAM(s) Oral three times a day  sodium bicarbonate 1300 milliGRAM(s) Oral every 8 hours  sodium chloride 3%. 150 milliLiter(s) IV Continuous <Continuous>  sodium chloride 3%. 150 milliLiter(s) IV Continuous <Continuous>  sodium chloride 3%. 150 milliLiter(s) IV Continuous <Continuous>  timolol 0.5% Solution 1 Drop(s) Both EYES two times a day    PRN MEDICATIONS                                            ------------------------------------------------------------  VITAL SIGNS: Last 24 Hours  T(C): 36.2 (05 Oct 2021 08:00), Max: 36.2 (05 Oct 2021 08:00)  T(F): 97.2 (05 Oct 2021 08:00), Max: 97.2 (05 Oct 2021 08:00)  HR: 57 (05 Oct 2021 08:00) (54 - 58)  BP: 115/66 (05 Oct 2021 08:00) (113/61 - 156/87)  BP(mean): --  RR: 16 (05 Oct 2021 08:00) (16 - 18)  SpO2: 98% (04 Oct 2021 18:29) (98% - 98%)      10-04-21 @ 07:01  -  10-05-21 @ 07:00  --------------------------------------------------------  IN: 1000 mL / OUT: 1100 mL / NET: -100 mL                                             --------------------------------------------------------------  LABS:                        8.6    8.79  )-----------( 159      ( 05 Oct 2021 02:47 )             28.4     10-05    137  |  105  |  74<HH>  ----------------------------<  121<H>  4.7   |  16<L>  |  5.5<HH>    Ca    7.8<L>      05 Oct 2021 02:47  Phos  5.0     10-05  Mg     2.3     10-05    TPro  5.4<L>  /  Alb  3.0<L>  /  TBili  0.3  /  DBili  x   /  AST  13  /  ALT  13  /  AlkPhos  104  10-05                            -------------------------------------------------------------  RADIOLOGY:  < from: US Kidney and Bladder (10.04.21 @ 20:02) >  IMPRESSION:      1. No hydronephrosis or evidence of acute urinary tract obstruction.  2. Mildly increased renal echogenicity, particularly on the left, unchanged from the prior, in keeping with chronic medical renal disease    < end of copied text >                                            --------------------------------------------------------------  PHYSICAL EXAM:  General: Well appearing man sitting up in bed in nad  HEENT: ncat, eomi, mmm  LUNGS: ctab  HEART: s1/s2, rrr, no m/r/g, mild JVD with compression of IVC, improved from yesterday   ABDOMEN: soft, NTND, BS+  EXT: WWP, no cyanosis, clubbing, edema  NEURO: aox4, moves all extremities   SKIN: intact, no rashes or lesions                                          --------------------------------------------------------------    ASSESSMENT & PLAN  56M w/ h/o HTN, HLD, CKD IV, T2DM on insulin a1c 5.8% on 7/3/2021, Glaucoma, and CAD s/p P`CI in July 2021. The patient presented to the ED with complaint of left sided chest pain and difficulty sleeping 2/2 to chest pain.     #Acute Kidney Injury  #CKD, Stage 4  Baseline Cr 2.6-2.7. Follows w/ Dr. Rollins as outpatient. LENNY likely secondary to ROSENDO from C   - off torsemide for now however pt fluid overloaded on exam, cr remains elevated to 5.5 today, making good urine  - c/w sevelamer carbonate 1 tab PO TIDAC  - monitor UO  - avoid ACEi/ARB  - Renal/bladder Ultrasound with no hydronephrosis   - Serum phos 5.0  - c/w sodium bicarb 1000mg q8h  - c/w lokelma 5g q12h   - f/u urine lytes, urine urea, urine cr   - Nephro recs appreciated     #Acute Coronary Syndrome  #CAD s/p PCI July 2021  #HFrEF (40-45%)  #HTN  Initially p/w chest pain. Admission CXR demonstrated no acute cardiopulmonary disease. Troponin up-trending on admission (0.12->.13->.14). Last [facility] 7/22/21 demonstrated severe triple vessel disease, but deemed high risk for CTS. Stent subsequently placed in pLAD. TTE 9/30 showed LVEF 42% and moderately decreased segmental left ventricular systolic function. On 9/30, underwent cath w/ stent to pLCX and dLCX.   - resume diuresis today given pt making urine, overloaded on exam   - c/w started bumex gtt  - c/w hypertonic saline BID  - c/w hydralazine 100 mg PO TID  - c/w Imdur 30mg PO QD  - c/w carvedilol 12.5mg PO BID   - c/w ASA 81mg OP QD and clopidogrel 75mg PO QD (DAPT)  - c/w atorvastatin 80mg PO QHS  - f/u outpatient w/ Dr. Cantu as outpatient one week after discharge    #Microcytic Anemia  Hb 10.7 on admission. Iron Studies 9/29: Serum Iron 59, TIBC 229, Sat 26%, Ferritin 341.  - Hgb 9.2>9.8>9.0>7.8>9.3>8.6  -Trend Hb via daily CBC  - maintain active T&S    # HLD (hyperlipidemia)  Lipid Profile 9/30: LDL-C 150, HDL 67, Chol 220, TG 84  - c/w atorvastatin 80mg PO QHS  - Consider adding zetia (ezetimibe) on discharge given elevated LDL    #DM Type 2  #Diabetic Neuropathy  A1C 6.3% on 9/30/21. At home, take pioglitazone-metformin 15mg-850mg BID. Holding PO meds while inpatient.  - monitor FS TIDAC and QHS  - c/w ISS  - c/w gabapentin for neuropathy     #Misc   - DVT Prophylaxis: heparin 5000U SQ BID  - GI Prophylaxis: not indicated   - Diet: DASH/TLC  - Activity: IAT  - Code Status: Full Code    Handoff   - f/u nephro recs   - continue diuresis

## 2021-10-05 NOTE — PROGRESS NOTE ADULT - ASSESSMENT
A 56 year old male with PMHx of  HTN, HLD, CKD IV, T2DM on insulin a1c 5.8%, Glaucoma, and CAD presented to the ED with left sided chest pain and difficulty sleeping 2/2 to chest pain.    [place] S/P PCI  CAD / HTN  DM-2  LENNY (Likely ROSENDO) on CKD-4  Acute on chronic HFmrEF  Ischemic CM            PLAN:    ·	Iron studies are with in normal range. Serum folate is 3.7 which is low. Started him on Folic acid 1 mg po daily  ·	Cr is now trending down  ·	Renal US is negative for hydronephrosis  ·	Renal f/u noted. Cont PO NaHCO3 1300 mg po q 8h  ·	Check UA, urine lytes, urine urea, urine creat  ·	Check BMP today at 4 PM round  ·	Daily BMP  ·	Care D/W the cardiologist and HF specialist. Cont Bumex infusion @ 1 mg/hr and 3% saline 150 cc twice a day over 2-3 hours.   ·	S/P cath on 9/30 and placement of 2 SAMMY in LCRX  ·	On ASA, Plavix and Lipitor  ·	ECHO showed EF is 42%  ·	Check i's and o's and daily wt  ·	Low salt diet and water restriction to 1.5 L/D  ·	On Coreg, Hydralazine and Isosorbide dinitrate.     Progress Note Handoff    Pending (specify):  Consults_________, Tests________, Test Results_______, Other__AKI______  Family discussion:  Disposition: Home___/SNF___/Other________/Unknown at this time________    Blake Chacon MD  Spectra: [id number]

## 2021-10-05 NOTE — PROGRESS NOTE ADULT - SUBJECTIVE AND OBJECTIVE BOX
GILLIAN MENDOZA  56y Male    CHIEF COMPLAINT:    Patient is a 56y old  Male who presents with a chief complaint of chest pain (04 Oct 2021 12:05)      INTERVAL HPI/OVERNIGHT EVENTS:    Patient seen and examined. Still fluid overload. Denies sob. Having good urine output. On Bumex infusion    ROS: All other systems are negative.    Vital Signs:    T(F): 97.2 (10-05-21 @ 08:00), Max: 97.2 (10-05-21 @ 08:00)  HR: 57 (10-05-21 @ 08:00) (54 - 58)  BP: 115/66 (10-05-21 @ 08:00) (113/61 - 156/87)  RR: 16 (10-05-21 @ 08:00) (16 - 18)  SpO2: 98% (10-04-21 @ 18:29) (98% - 98%)  I&O's Summary    04 Oct 2021 07:01  -  05 Oct 2021 07:00  --------------------------------------------------------  IN: 1000 mL / OUT: 1100 mL / NET: -100 mL      Daily     Daily Weight in k.8 (04 Oct 2021 23:00)  CAPILLARY BLOOD GLUCOSE      POCT Blood Glucose.: 139 mg/dL (05 Oct 2021 08:07)  POCT Blood Glucose.: 147 mg/dL (04 Oct 2021 21:15)  POCT Blood Glucose.: 114 mg/dL (04 Oct 2021 16:53)  POCT Blood Glucose.: 237 mg/dL (04 Oct 2021 11:42)      PHYSICAL EXAM:    GENERAL:  NAD  SKIN: No rashes or lesions  HENT: Atraumatic. Normocephalic. PERRL. Moist membranes.  NECK: Supple, +ve JVD. No lymphadenopathy.  PULMONARY: CTA B/L. No wheezing. No rales  CVS: Normal S1, S2. Rate and Rhythm are regular. No murmurs.  ABDOMEN/GI: Soft, Nontender, Nondistended; BS present  EXTREMITIES: Peripheral pulses intact. No edema B/L LE.  NEUROLOGIC:  No motor or sensory deficit.  PSYCH: Alert & oriented x 3    Consultant(s) Notes Reviewed:  [x ] YES  [ ] NO  Care Discussed with Consultants/Other Providers [ x] YES  [ ] NO    EKG reviewed  Telemetry reviewed    LABS:                        8.6    8.79  )-----------( 159      ( 05 Oct 2021 02:47 )             28.4   Hemoglobin: 8.6 g/dL (10-05 @ 02:47)  Hemoglobin: 9.3 g/dL (10-04 @ 12:33)  Hemoglobin: 7.8 g/dL (10-04 @ 06:26)  Hemoglobin: 9.0 g/dL (10-03 @ 07:12)  Hemoglobin: 9.8 g/dL (10-02 @ 07:59)  Hemoglobin: 9.2 g/dL (10-01 @ 06:51)    10-05    137  |  105  |  74<HH>  ----------------------------<  121<H>   Creatinine Trend: 5.5<--, 6.0<--, 5.4<--, 5.0<--, 3.9<--, 3.4<--  4.7   |  16<L>  |  5.5<HH>    Ca    7.8<L>      05 Oct 2021 02:47  Phos  5.0     10-05  Mg     2.3     10-05    TPro  5.4<L>  /  Alb  3.0<L>  /  TBili  0.3  /  DBili  x   /  AST  13  /  ALT  13  /  AlkPhos  104  10-05      Serum Pro-Brain Natriuretic Peptide: 93780 pg/mL (21 @ 12:51)          RADIOLOGY & ADDITIONAL TESTS:      Imaging or report Personally Reviewed:  [ ] YES  [ ] NO    Medications:  Standing  aspirin  chewable 81 milliGRAM(s) Oral daily  atorvastatin 80 milliGRAM(s) Oral at bedtime  brimonidine 0.2% Ophthalmic Solution 1 Drop(s) Both EYES two times a day  buMETAnide Infusion 1 mG/Hr IV Continuous <Continuous>  carvedilol 12.5 milliGRAM(s) Oral every 12 hours  clopidogrel Tablet 75 milliGRAM(s) Oral daily  dextrose 40% Gel 15 Gram(s) Oral once  dextrose 5%. 1000 milliLiter(s) IV Continuous <Continuous>  dextrose 5%. 1000 milliLiter(s) IV Continuous <Continuous>  dextrose 50% Injectable 25 Gram(s) IV Push once  dextrose 50% Injectable 12.5 Gram(s) IV Push once  dextrose 50% Injectable 25 Gram(s) IV Push once  dextrose 50% Injectable 10 milliLiter(s) IV Push once  dorzolamide 2% Ophthalmic Solution 1 Drop(s) Both EYES <User Schedule>  gabapentin 100 milliGRAM(s) Oral two times a day  glucagon  Injectable 1 milliGRAM(s) IntraMuscular once  heparin   Injectable 5000 Unit(s) SubCutaneous every 12 hours  hydrALAZINE 100 milliGRAM(s) Oral three times a day  insulin lispro (ADMELOG) corrective regimen sliding scale   SubCutaneous three times a day before meals  isosorbide   mononitrate ER Tablet (IMDUR) 30 milliGRAM(s) Oral daily  ivabradine 5 milliGRAM(s) Oral two times a day  latanoprost 0.005% Ophthalmic Solution 1 Drop(s) Both EYES at bedtime  melatonin 5 milliGRAM(s) Oral at bedtime  sevelamer carbonate 800 milliGRAM(s) Oral three times a day  sodium bicarbonate 1300 milliGRAM(s) Oral every 8 hours  sodium chloride 3%. 150 milliLiter(s) IV Continuous <Continuous>  sodium chloride 3%. 150 milliLiter(s) IV Continuous <Continuous>  sodium chloride 3%. 150 milliLiter(s) IV Continuous <Continuous>  timolol 0.5% Solution 1 Drop(s) Both EYES two times a day    PRN Meds      Case discussed with resident    Care discussed with pt/family

## 2021-10-05 NOTE — PROGRESS NOTE ADULT - ASSESSMENT
# LENNY on CKD 4 / r/o ROSENDO / CRS  - non oliguric  - creat trending down   - cardiology notes appreciated, also seen by CHF team / bumex and 3%  - check renal/bladder ultrasound w/ pvr  - continue sodium bicarb 1300  mg q8h  - can d/c  lokelma if k <5  - check phos level, on sevelamer   - check UA, urine lytes, urine urea, urine creat  - no urgent indication for RRT  Will follow

## 2021-10-06 LAB
ALBUMIN SERPL ELPH-MCNC: 2.9 G/DL — LOW (ref 3.5–5.2)
ALP SERPL-CCNC: 103 U/L — SIGNIFICANT CHANGE UP (ref 30–115)
ALT FLD-CCNC: 13 U/L — SIGNIFICANT CHANGE UP (ref 0–41)
ANION GAP SERPL CALC-SCNC: 15 MMOL/L — HIGH (ref 7–14)
ANION GAP SERPL CALC-SCNC: 16 MMOL/L — HIGH (ref 7–14)
AST SERPL-CCNC: 14 U/L — SIGNIFICANT CHANGE UP (ref 0–41)
BASOPHILS # BLD AUTO: 0.03 K/UL — SIGNIFICANT CHANGE UP (ref 0–0.2)
BASOPHILS NFR BLD AUTO: 0.4 % — SIGNIFICANT CHANGE UP (ref 0–1)
BILIRUB SERPL-MCNC: 0.2 MG/DL — SIGNIFICANT CHANGE UP (ref 0.2–1.2)
BUN SERPL-MCNC: 73 MG/DL — CRITICAL HIGH (ref 10–20)
BUN SERPL-MCNC: 74 MG/DL — CRITICAL HIGH (ref 10–20)
CALCIUM SERPL-MCNC: 7.9 MG/DL — LOW (ref 8.5–10.1)
CALCIUM SERPL-MCNC: 8.1 MG/DL — LOW (ref 8.5–10.1)
CHLORIDE SERPL-SCNC: 104 MMOL/L — SIGNIFICANT CHANGE UP (ref 98–110)
CHLORIDE SERPL-SCNC: 107 MMOL/L — SIGNIFICANT CHANGE UP (ref 98–110)
CO2 SERPL-SCNC: 18 MMOL/L — SIGNIFICANT CHANGE UP (ref 17–32)
CO2 SERPL-SCNC: 23 MMOL/L — SIGNIFICANT CHANGE UP (ref 17–32)
CREAT SERPL-MCNC: 5 MG/DL — CRITICAL HIGH (ref 0.7–1.5)
CREAT SERPL-MCNC: 5.1 MG/DL — CRITICAL HIGH (ref 0.7–1.5)
EOSINOPHIL # BLD AUTO: 0.79 K/UL — HIGH (ref 0–0.7)
EOSINOPHIL NFR BLD AUTO: 9.4 % — HIGH (ref 0–8)
GLUCOSE BLDC GLUCOMTR-MCNC: 143 MG/DL — HIGH (ref 70–99)
GLUCOSE BLDC GLUCOMTR-MCNC: 219 MG/DL — HIGH (ref 70–99)
GLUCOSE BLDC GLUCOMTR-MCNC: 220 MG/DL — HIGH (ref 70–99)
GLUCOSE BLDC GLUCOMTR-MCNC: 96 MG/DL — SIGNIFICANT CHANGE UP (ref 70–99)
GLUCOSE SERPL-MCNC: 127 MG/DL — HIGH (ref 70–99)
GLUCOSE SERPL-MCNC: 145 MG/DL — HIGH (ref 70–99)
HCT VFR BLD CALC: 27.4 % — LOW (ref 42–52)
HGB BLD-MCNC: 8.2 G/DL — LOW (ref 14–18)
IMM GRANULOCYTES NFR BLD AUTO: 0.5 % — HIGH (ref 0.1–0.3)
LYMPHOCYTES # BLD AUTO: 0.89 K/UL — LOW (ref 1.2–3.4)
LYMPHOCYTES # BLD AUTO: 10.6 % — LOW (ref 20.5–51.1)
MAGNESIUM SERPL-MCNC: 2.2 MG/DL — SIGNIFICANT CHANGE UP (ref 1.8–2.4)
MCHC RBC-ENTMCNC: 23.3 PG — LOW (ref 27–31)
MCHC RBC-ENTMCNC: 29.9 G/DL — LOW (ref 32–37)
MCV RBC AUTO: 77.8 FL — LOW (ref 80–94)
MONOCYTES # BLD AUTO: 0.67 K/UL — HIGH (ref 0.1–0.6)
MONOCYTES NFR BLD AUTO: 8 % — SIGNIFICANT CHANGE UP (ref 1.7–9.3)
NEUTROPHILS # BLD AUTO: 5.94 K/UL — SIGNIFICANT CHANGE UP (ref 1.4–6.5)
NEUTROPHILS NFR BLD AUTO: 71.1 % — SIGNIFICANT CHANGE UP (ref 42.2–75.2)
NRBC # BLD: 0 /100 WBCS — SIGNIFICANT CHANGE UP (ref 0–0)
PLATELET # BLD AUTO: 181 K/UL — SIGNIFICANT CHANGE UP (ref 130–400)
POTASSIUM SERPL-MCNC: 4.6 MMOL/L — SIGNIFICANT CHANGE UP (ref 3.5–5)
POTASSIUM SERPL-MCNC: 4.9 MMOL/L — SIGNIFICANT CHANGE UP (ref 3.5–5)
POTASSIUM SERPL-SCNC: 4.6 MMOL/L — SIGNIFICANT CHANGE UP (ref 3.5–5)
POTASSIUM SERPL-SCNC: 4.9 MMOL/L — SIGNIFICANT CHANGE UP (ref 3.5–5)
PROT SERPL-MCNC: 5.5 G/DL — LOW (ref 6–8)
RBC # BLD: 3.52 M/UL — LOW (ref 4.7–6.1)
RBC # FLD: 13.4 % — SIGNIFICANT CHANGE UP (ref 11.5–14.5)
SODIUM SERPL-SCNC: 141 MMOL/L — SIGNIFICANT CHANGE UP (ref 135–146)
SODIUM SERPL-SCNC: 142 MMOL/L — SIGNIFICANT CHANGE UP (ref 135–146)
WBC # BLD: 8.36 K/UL — SIGNIFICANT CHANGE UP (ref 4.8–10.8)
WBC # FLD AUTO: 8.36 K/UL — SIGNIFICANT CHANGE UP (ref 4.8–10.8)

## 2021-10-06 PROCEDURE — 99233 SBSQ HOSP IP/OBS HIGH 50: CPT

## 2021-10-06 PROCEDURE — 99232 SBSQ HOSP IP/OBS MODERATE 35: CPT

## 2021-10-06 RX ORDER — SODIUM CHLORIDE 5 G/100ML
150 INJECTION, SOLUTION INTRAVENOUS
Refills: 0 | Status: DISCONTINUED | OUTPATIENT
Start: 2021-10-06 | End: 2021-10-06

## 2021-10-06 RX ADMIN — BRIMONIDINE TARTRATE 1 DROP(S): 2 SOLUTION/ DROPS OPHTHALMIC at 06:17

## 2021-10-06 RX ADMIN — DORZOLAMIDE HYDROCHLORIDE 1 DROP(S): 20 SOLUTION/ DROPS OPHTHALMIC at 17:31

## 2021-10-06 RX ADMIN — Medication 1 MILLIGRAM(S): at 12:41

## 2021-10-06 RX ADMIN — CLOPIDOGREL BISULFATE 75 MILLIGRAM(S): 75 TABLET, FILM COATED ORAL at 12:41

## 2021-10-06 RX ADMIN — BRIMONIDINE TARTRATE 1 DROP(S): 2 SOLUTION/ DROPS OPHTHALMIC at 17:31

## 2021-10-06 RX ADMIN — CARVEDILOL PHOSPHATE 12.5 MILLIGRAM(S): 80 CAPSULE, EXTENDED RELEASE ORAL at 17:25

## 2021-10-06 RX ADMIN — Medication 2: at 12:38

## 2021-10-06 RX ADMIN — GABAPENTIN 100 MILLIGRAM(S): 400 CAPSULE ORAL at 06:14

## 2021-10-06 RX ADMIN — Medication 100 MILLIGRAM(S): at 12:44

## 2021-10-06 RX ADMIN — HEPARIN SODIUM 5000 UNIT(S): 5000 INJECTION INTRAVENOUS; SUBCUTANEOUS at 06:17

## 2021-10-06 RX ADMIN — SEVELAMER CARBONATE 800 MILLIGRAM(S): 2400 POWDER, FOR SUSPENSION ORAL at 12:42

## 2021-10-06 RX ADMIN — SEVELAMER CARBONATE 800 MILLIGRAM(S): 2400 POWDER, FOR SUSPENSION ORAL at 22:05

## 2021-10-06 RX ADMIN — Medication 1 DROP(S): at 06:17

## 2021-10-06 RX ADMIN — Medication 5 MILLIGRAM(S): at 22:04

## 2021-10-06 RX ADMIN — LATANOPROST 1 DROP(S): 0.05 SOLUTION/ DROPS OPHTHALMIC; TOPICAL at 22:06

## 2021-10-06 RX ADMIN — IVABRADINE 5 MILLIGRAM(S): 7.5 TABLET, FILM COATED ORAL at 08:31

## 2021-10-06 RX ADMIN — Medication 1300 MILLIGRAM(S): at 06:14

## 2021-10-06 RX ADMIN — GABAPENTIN 100 MILLIGRAM(S): 400 CAPSULE ORAL at 17:27

## 2021-10-06 RX ADMIN — SEVELAMER CARBONATE 800 MILLIGRAM(S): 2400 POWDER, FOR SUSPENSION ORAL at 06:15

## 2021-10-06 RX ADMIN — CARVEDILOL PHOSPHATE 12.5 MILLIGRAM(S): 80 CAPSULE, EXTENDED RELEASE ORAL at 06:15

## 2021-10-06 RX ADMIN — Medication 100 MILLIGRAM(S): at 06:16

## 2021-10-06 RX ADMIN — Medication 81 MILLIGRAM(S): at 12:41

## 2021-10-06 RX ADMIN — ISOSORBIDE MONONITRATE 30 MILLIGRAM(S): 60 TABLET, EXTENDED RELEASE ORAL at 12:42

## 2021-10-06 RX ADMIN — Medication 1300 MILLIGRAM(S): at 22:05

## 2021-10-06 RX ADMIN — ATORVASTATIN CALCIUM 80 MILLIGRAM(S): 80 TABLET, FILM COATED ORAL at 22:05

## 2021-10-06 RX ADMIN — SODIUM CHLORIDE 50 MILLILITER(S): 5 INJECTION, SOLUTION INTRAVENOUS at 18:01

## 2021-10-06 RX ADMIN — Medication 100 MILLIGRAM(S): at 22:05

## 2021-10-06 RX ADMIN — DORZOLAMIDE HYDROCHLORIDE 1 DROP(S): 20 SOLUTION/ DROPS OPHTHALMIC at 06:18

## 2021-10-06 RX ADMIN — HEPARIN SODIUM 5000 UNIT(S): 5000 INJECTION INTRAVENOUS; SUBCUTANEOUS at 17:25

## 2021-10-06 RX ADMIN — Medication 1 DROP(S): at 17:31

## 2021-10-06 RX ADMIN — Medication 1300 MILLIGRAM(S): at 12:43

## 2021-10-06 NOTE — PROGRESS NOTE ADULT - SUBJECTIVE AND OBJECTIVE BOX
Nephrology Progress Note    GILLIAN MENDOZA  MRN-[id number]  56y  Male    S:  Patient is seen and examined, events over the last 24h noted.    O:  Allergies:  No Known Allergies    Hospital Medications:   MEDICATIONS  (STANDING):  aspirin  chewable 81 milliGRAM(s) Oral daily  atorvastatin 80 milliGRAM(s) Oral at bedtime  brimonidine 0.2% Ophthalmic Solution 1 Drop(s) Both EYES two times a day  buMETAnide Infusion 1 mG/Hr (5 mL/Hr) IV Continuous <Continuous>  carvedilol 12.5 milliGRAM(s) Oral every 12 hours  clopidogrel Tablet 75 milliGRAM(s) Oral daily  dorzolamide 2% Ophthalmic Solution 1 Drop(s) Both EYES <User Schedule>  folic acid 1 milliGRAM(s) Oral daily  gabapentin 100 milliGRAM(s) Oral two times a day  glucagon  Injectable 1 milliGRAM(s) IntraMuscular once  heparin   Injectable 5000 Unit(s) SubCutaneous every 12 hours  hydrALAZINE 100 milliGRAM(s) Oral three times a day  insulin lispro (ADMELOG) corrective regimen sliding scale   SubCutaneous three times a day before meals  isosorbide   mononitrate ER Tablet (IMDUR) 30 milliGRAM(s) Oral daily  ivabradine 5 milliGRAM(s) Oral two times a day  latanoprost 0.005% Ophthalmic Solution 1 Drop(s) Both EYES at bedtime  melatonin 5 milliGRAM(s) Oral at bedtime  sevelamer carbonate 800 milliGRAM(s) Oral three times a day  sodium bicarbonate 1300 milliGRAM(s) Oral every 8 hours  timolol 0.5% Solution 1 Drop(s) Both EYES two times a day    Home Medications:  aspirin 81 mg oral tablet, chewable: 1 tab(s) orally once a day (01 Oct 2021 10:03)  atorvastatin 40 mg oral tablet: 1 tab(s) orally once a day (01 Oct 2021 10:03)  BASAGLAR 100 UNIT/ML KWIKPEN:  (01 Oct 2021 10:03)  brimonidine 0.2% ophthalmic solution: 1 drop(s) to each affected eye 2 times a day (01 Oct 2021 10:03)  carvedilol 3.125 mg oral tablet: 1 tab(s) orally every 12 hours (01 Oct 2021 10:03)  clopidogrel 75 mg oral tablet: 1 tab(s) orally once a day (01 Oct 2021 10:03)  dorzolamide-timolol 2%-0.5% preservative-free ophthalmic solution: 1 drop(s) to each affected eye 2 times a day (01 Oct 2021 10:03)  latanoprost 0.005% ophthalmic solution: 1 drop(s) to each affected eye once a day (at bedtime) (01 Oct 2021 10:03)  pioglitazone-metformin 15 mg-850 mg oral tablet: 1 tab(s) orally 2 times a day  HOLD MEDICATION FOR 48 HR AFTER CARDIAC CATH  (01 Oct 2021 10:03)  Rhopressa 0.02% ophthalmic solution: 1 drop(s) to each affected eye once a day (in the evening) (01 Oct 2021 10:03)  sevelamer carbonate 800 mg oral tablet: 1 tab(s) orally 3 times a day (with meals) (01 Oct 2021 10:03)      VITALS:  T(F): 97.9 (10-06-21 @ 12:40), Max: 97.9 (10-06-21 @ 12:40)  HR: 58 (10-06-21 @ 12:40)  BP: 150/89 (10-06-21 @ 12:40)  RR: 18 (10-06-21 @ 12:40)  SpO2: 98% (10-05-21 @ 21:07)  Wt(kg): --  I&O's Detail    05 Oct 2021 07:01  -  06 Oct 2021 07:00  --------------------------------------------------------  IN:  Total IN: 0 mL    OUT:    Voided (mL): 1350 mL  Total OUT: 1350 mL    Total NET: -1350 mL      06 Oct 2021 07:01  -  06 Oct 2021 17:36  --------------------------------------------------------  IN:    Oral Fluid: 420 mL  Total IN: 420 mL    OUT:    Voided (mL): 1800 mL  Total OUT: 1800 mL    Total NET: -1380 mL        I&O's Summary    05 Oct 2021 07:01  -  06 Oct 2021 07:00  --------------------------------------------------------  IN: 0 mL / OUT: 1350 mL / NET: -1350 mL    06 Oct 2021 07:01  -  06 Oct 2021 17:36  --------------------------------------------------------  IN: 420 mL / OUT: 1800 mL / NET: -1380 mL          PHYSICAL EXAM:  Gen: NAD  Resp: CTAB  Card: S1/S2  Abd: soft  Extremities: no edema      LABS:      10-06    141  |  107  |  74<HH>  ----------------------------<  127<H>  4.9   |  18  |  5.1<HH>    Ca    7.9<L>      06 Oct 2021 01:20  Phos  5.0     10-05  Mg     2.2     10-06    TPro  5.5<L>  /  Alb  2.9<L>  /  TBili  0.2  /  DBili      /  AST  14  /  ALT  13  /  AlkPhos  103  10-06    eGFR if Non African American: 12 mL/min/1.73M2 (10-06-21 @ 01:20)  eGFR if African American: 13 mL/min/1.73M2 (10-06-21 @ 01:20)    Phosphorus Level, Serum: 5.0 mg/dL (10-05-21 @ 09:45)  Phosphorus Level, Serum: 3.8 mg/dL (09-30-21 @ 06:05)    Osmolality, Serum: 288 mos/kg (07-02-21 @ 01:09)                          8.2    8.36  )-----------( 181      ( 06 Oct 2021 01:20 )             27.4     Mean Cell Volume: 77.8 fL (10-06-21 @ 01:20)    % Saturation, Iron: 26 % (10-04-21 @ 12:33)  Ferritin, Serum: 405 ng/mL (10-04-21 @ 12:33)      Urine Studies:      Urea Nitrogen,  Random Urine: 389 mg/dL (07-03-21 @ 16:34)    Protein/Creatinine Ratio Calculation: 8.1 Ratio (07-03 @ 16:34)  Creatinine, Random Urine: 58 mg/dL (07-03 @ 16:34)    Creatinine trend:  Creatinine, Serum: 5.1 mg/dL (10-06-21 @ 01:20)  Creatinine, Serum: 5.5 mg/dL (10-05-21 @ 17:14)  Creatinine, Serum: 5.5 mg/dL (10-05-21 @ 02:47)  Creatinine, Serum: 6.0 mg/dL (10-04-21 @ 17:06)  Creatinine, Serum: 5.4 mg/dL (10-04-21 @ 06:26)  Creatinine, Serum: 5.0 mg/dL (10-03-21 @ 07:12)  Creatinine, Serum: 3.9 mg/dL (10-02-21 @ 07:59)  Creatinine, Serum: 3.4 mg/dL (10-01-21 @ 22:18)  Creatinine, Serum: 2.7 mg/dL (10-01-21 @ 06:51)  Creatinine, Serum: 2.6 mg/dL (09-30-21 @ 06:05)  Creatinine, Serum: 3.0 mg/dL (07-20-21 @ 20:56)    Immunofixation, Urine: Reference Range: None Detected (07-19-21 @ 15:39)  Serum Protein Electrophoresis Interp: Normal Electrophoresis Pattern (07-18-21 @ 04:55)  Immunofixation, Serum:   No Monoclonal Band Identified  Reference Range: None Detected (07-18-21 @ 04:55)  [place]/Lambda Free Light Chain Ratio, Serum: 1.97 Ratio (07.08.21 @ 15:33)        US Kidney and Bladder:   EXAM:  US KIDNEYS AND BLADDER            PROCEDURE DATE:  10/04/2021            INTERPRETATION:  CLINICAL INFORMATION: Acute renal insufficiency, possible contrast induced nephropathy    COMPARISON: Ultrasound from July 2, 2021    TECHNIQUE: Sonography of the kidneys and bladder.    FINDINGS:    Right kidney: 11.6 cm. No hydronephrosis or perinephric fluid. Renal echotexture is borderline increased, unchanged from the prior. Incidental 1.6 cm cyst in the midpole..    Left kidney:  11.2 cm. No hydronephrosis or perinephric fluid. Renal echotexture is mildly increased, unchanged from the prior.    Urinary bladder: No debris or calculus. Bilateral ureteral jets are visualized. Prevoid volume of approximately 234 mL.    Prostate:  The calculated volume is 38 mL.    IMPRESSION:      1. No hydronephrosis or evidence of acute urinary tract obstruction.  2. Mildly increased renal echogenicity, particularly on the left, unchanged from the prior, in keeping with chronic medical renal disease

## 2021-10-06 NOTE — PROGRESS NOTE ADULT - SUBJECTIVE AND OBJECTIVE BOX
GILLIAN MENDOZA 56y Male  MRN#: [id number]   CODE STATUS: FULL     Hospital Day: 7d    Pt is currently admitted with the primary diagnosis of chest pain     SUBJECTIVE  Hospital Course:Patient is a 56 year old male with PMHx of  HTN, HLD, CKD IV, T2DM on insulin a1c 5.8%, Glaucoma, and CAD s/p PCI in July 2021. The patient presented to the ED with complaint of left sided chest pain and difficulty sleeping 2/2 to chest pain and BL LE neuropathy (chronic). The patient states that last night he woke up and from sleep due to burning substernal chest pain, non radiating, no exacerbation or alleviating factors. Of notr the patient had recent PCI in July 2021. During that admission patient had a prolonged stay in CCU, was treated for acute CHF exacerbation and worsening kidney function. Cath on 7/22/21 showed significant 3 vessle disease, LAD, RCA, LCx. CTS was consulted. During preop cardiac MRI patient became SOB. He received bumex and hypertonic saline for diuresis.  CABG was decided against due to increased risk of progression of CKDIV to ESRD.  The patient was discharged on DAPT and follows with Dr. Cantu as his cardiologist In the ED, patient is hemodynamically stable with elevated troponin 0.12, microcytic anemia, elevated BNP 46833. TTE 9/30 showed LVEF 42% and moderately decreased segmental left ventricular systolic function. On 9/30, underwent cath w/ stent to pLCX and dLCX. Developed worsening cr post cath, likely 2/2 ROSENDO. Was fluid overloaded, started on bumex, both fluid status and cr improving.     Overnight events: none    Interval hx/Subjective complaints: pt feels well, notes decreased facial swelling. Cr improving on Bumex     Pt denies any fevers, chills, fatigue, CP, palpitations, cough, SOB, abdominal pain, n/v/d, hematochezia, melena, weakness, numbness, tingling, or other FND.                                           ----------------------------------------------------------  OBJECTIVE  PAST MEDICAL & SURGICAL HISTORY  HTN (hypertension)    HLD (hyperlipidemia)    DM (diabetes mellitus)    Glaucoma    No significant past surgical history                                              -----------------------------------------------------------  ALLERGIES:  No Known Allergies                                            ------------------------------------------------------------    HOME MEDICATIONS  Home Medications:  aspirin 81 mg oral tablet, chewable: 1 tab(s) orally once a day (01 Oct 2021 10:03)  atorvastatin 40 mg oral tablet: 1 tab(s) orally once a day (01 Oct 2021 10:03)  BASAGLAR 100 UNIT/ML KWIKPEN:  (01 Oct 2021 10:03)  brimonidine 0.2% ophthalmic solution: 1 drop(s) to each affected eye 2 times a day (01 Oct 2021 10:03)  carvedilol 3.125 mg oral tablet: 1 tab(s) orally every 12 hours (01 Oct 2021 10:03)  clopidogrel 75 mg oral tablet: 1 tab(s) orally once a day (01 Oct 2021 10:03)  dorzolamide-timolol 2%-0.5% preservative-free ophthalmic solution: 1 drop(s) to each affected eye 2 times a day (01 Oct 2021 10:03)  latanoprost 0.005% ophthalmic solution: 1 drop(s) to each affected eye once a day (at bedtime) (01 Oct 2021 10:03)  pioglitazone-metformin 15 mg-850 mg oral tablet: 1 tab(s) orally 2 times a day  HOLD MEDICATION FOR 48 HR AFTER CARDIAC CATH  (01 Oct 2021 10:03)  Rhopressa 0.02% ophthalmic solution: 1 drop(s) to each affected eye once a day (in the evening) (01 Oct 2021 10:03)  sevelamer carbonate 800 mg oral tablet: 1 tab(s) orally 3 times a day (with meals) (01 Oct 2021 10:03)                           MEDICATIONS:  STANDING MEDICATIONS  aspirin  chewable 81 milliGRAM(s) Oral daily  atorvastatin 80 milliGRAM(s) Oral at bedtime  brimonidine 0.2% Ophthalmic Solution 1 Drop(s) Both EYES two times a day  buMETAnide Infusion 1 mG/Hr IV Continuous <Continuous>  carvedilol 12.5 milliGRAM(s) Oral every 12 hours  clopidogrel Tablet 75 milliGRAM(s) Oral daily  dextrose 40% Gel 15 Gram(s) Oral once  dextrose 5%. 1000 milliLiter(s) IV Continuous <Continuous>  dextrose 5%. 1000 milliLiter(s) IV Continuous <Continuous>  dextrose 50% Injectable 25 Gram(s) IV Push once  dextrose 50% Injectable 12.5 Gram(s) IV Push once  dextrose 50% Injectable 25 Gram(s) IV Push once  dextrose 50% Injectable 10 milliLiter(s) IV Push once  dorzolamide 2% Ophthalmic Solution 1 Drop(s) Both EYES <User Schedule>  folic acid 1 milliGRAM(s) Oral daily  gabapentin 100 milliGRAM(s) Oral two times a day  glucagon  Injectable 1 milliGRAM(s) IntraMuscular once  heparin   Injectable 5000 Unit(s) SubCutaneous every 12 hours  hydrALAZINE 100 milliGRAM(s) Oral three times a day  insulin lispro (ADMELOG) corrective regimen sliding scale   SubCutaneous three times a day before meals  isosorbide   mononitrate ER Tablet (IMDUR) 30 milliGRAM(s) Oral daily  ivabradine 5 milliGRAM(s) Oral two times a day  latanoprost 0.005% Ophthalmic Solution 1 Drop(s) Both EYES at bedtime  melatonin 5 milliGRAM(s) Oral at bedtime  sevelamer carbonate 800 milliGRAM(s) Oral three times a day  sodium bicarbonate 1300 milliGRAM(s) Oral every 8 hours  sodium chloride 3%. 150 milliLiter(s) IV Continuous <Continuous>  sodium chloride 3%. 150 milliLiter(s) IV Continuous <Continuous>  sodium chloride 3%. 150 milliLiter(s) IV Continuous <Continuous>  timolol 0.5% Solution 1 Drop(s) Both EYES two times a day    PRN MEDICATIONS                                            ------------------------------------------------------------  VITAL SIGNS: Last 24 Hours  T(C): 36.2 (06 Oct 2021 05:24), Max: 36.9 (05 Oct 2021 17:24)  T(F): 97.1 (06 Oct 2021 05:24), Max: 98.4 (05 Oct 2021 17:24)  HR: 59 (06 Oct 2021 05:24) (53 - 63)  BP: 134/68 (06 Oct 2021 05:24) (134/68 - 161/88)  BP(mean): --  RR: 18 (06 Oct 2021 05:24) (18 - 20)  SpO2: 98% (05 Oct 2021 21:07) (98% - 98%)      10-05-21 @ 07:01  -  10-06-21 @ 07:00  --------------------------------------------------------  IN: 0 mL / OUT: 1350 mL / NET: -1350 mL    10-06-21 @ 07:01  -  10-06-21 @ 11:46  --------------------------------------------------------  IN: 420 mL / OUT: 1800 mL / NET: -1380 mL                                             --------------------------------------------------------------  LABS:                        8.2    8.36  )-----------( 181      ( 06 Oct 2021 01:20 )             27.4     10-06    141  |  107  |  74<HH>  ----------------------------<  127<H>  4.9   |  18  |  5.1<HH>    Ca    7.9<L>      06 Oct 2021 01:20  Phos  5.0     10-05  Mg     2.2     10-06    TPro  5.5<L>  /  Alb  2.9<L>  /  TBili  0.2  /  DBili  x   /  AST  14  /  ALT  13  /  AlkPhos  103  10-06                                                -------------------------------------------------------------  RADIOLOGY:  < from: US Kidney and Bladder (10.04.21 @ 20:02) >  IMPRESSION:      1. No hydronephrosis or evidence of acute urinary tract obstruction.  2. Mildly increased renal echogenicity, particularly on the left, unchanged from the prior, in keeping with chronic medical renal disease    < end of copied text >                                            --------------------------------------------------------------  PHYSICAL EXAM:  General: well appearing man laying in bed in nad  HEENT: ncat, eomi, mmm  LUNGS: ctab  HEART: s1/s2,rrr, no m/r/g, mild JVD on compression of IVC, improving  ABDOMEN: soft, ntnd, bs+  EXT: wwp, no cyanosis, clubbing, edema  NEURO: aox4, moves all extremities   SKIN: intact, no rashes or lesions                                          --------------------------------------------------------------    ASSESSMENT & PLAN  56M w/ h/o HTN, HLD, CKD IV, T2DM on insulin a1c 5.8% on 7/3/2021, Glaucoma, and CAD s/p P`CI in July 2021. The patient presented to the ED with complaint of left sided chest pain and difficulty sleeping 2/2 to chest pain.     #Acute Kidney Injury  #CKD, Stage 4  Baseline Cr 2.6-2.7. Follows w/ Dr. Rollins as outpatient. LENNY likely secondary to ROSENDO from LHC   - Cr improving, 6>5.5>5.5>5.1  - avoid ACEi/ARB  - Renal/bladder Ultrasound with no hydronephrosis   - Fluid balance -1350 10/6  - Serum phos 5.0  - c/w sevelamer carbonate 1 tab PO TIDAC  - c/w bumex gtt, hypertonic saline 50ml/h x3h BID   - c/w sodium bicarb 1000mg q8h  - c/w lokelma 5g q12h   - f/u urine lytes, urine urea, urine cr   - Nephro recs appreciated     #Acute Coronary Syndrome  #CAD s/p PCI July 2021  #HFrEF (40-45%)  #HTN  Initially p/w chest pain. Admission CXR demonstrated no acute cardiopulmonary disease. Troponin up-trending on admission (0.12->.13->.14). Last C 7/22/21 demonstrated severe triple vessel disease, but deemed high risk for CTS. Stent subsequently placed in pLAD. TTE 9/30 showed LVEF 42% and moderately decreased segmental left ventricular systolic function. On 9/30, underwent cath w/ stent to pLCX and dLCX.   - resume diuresis today given pt making urine, overloaded on exam   - c/w bumex gtt @1mg/h, hypertonic saline @50ml/h for 3h BID stop at 9pm tonight, restart torsemide 40mg qd 9/7  - c/w hypertonic saline BID  - c/w hydralazine 100 mg PO TID  - c/w Imdur 30mg PO QD  - c/w carvedilol 12.5mg PO BID   - c/w ASA 81mg OP QD and clopidogrel 75mg PO QD (DAPT)  - c/w atorvastatin 80mg PO QHS  - heart failure recs appreciated   - f/u outpatient w/ Dr. Cantu as outpatient one week after discharge    #Microcytic Anemia  Hb 10.7 on admission. Iron Studies 9/29: Serum Iron 59, TIBC 229, Sat 26%, Ferritin 341.  - Hgb 9.2>9.8>9.0>7.8>9.3>8.6>8.2  -Trend Hb via daily CBC  - maintain active T&S    # HLD (hyperlipidemia)  Lipid Profile 9/30: LDL-C 150, HDL 67, Chol 220, TG 84  - c/w atorvastatin 80mg PO QHS  - Consider adding zetia (ezetimibe) on discharge given elevated LDL    #DM Type 2  #Diabetic Neuropathy  A1C 6.3% on 9/30/21. At home, take pioglitazone-metformin 15mg-850mg BID. Holding PO meds while inpatient.  - monitor FS TIDAC and QHS  - c/w ISS  - c/w gabapentin for neuropathy     #Misc   - DVT Prophylaxis: heparin 5000U SQ BID  - GI Prophylaxis: not indicated   - Diet: DASH/TLC  - Activity: IAT  - Code Status: Full Code    Handoff   - c/w bumex gtt @1mg/h, hypertonic saline @50ml/h for 3h BID stop at 9pm tonight, restart torsemide 40mg qd 9/7

## 2021-10-06 NOTE — PROGRESS NOTE ADULT - SUBJECTIVE AND OBJECTIVE BOX
Date of Admission: 21    Interval History:    - Patient resting in bed, no acute events overnight   - Remains fluid overloaded on exam   - Renal function improving today      HISTORY OF PRESENT ILLNESS:     Patient is a 56 year old male with PMHx of  HTN, HLD, CKD IV, T2DM on insulin a1c 5.8%, Glaucoma, and CAD s/p PCI in 2021. The patient presented to the ED with complaint of left sided chest pain and difficulty sleeping 2/2 to chest pain and BL LE neuropathy (chronic). The patient states that last night he woke up and from sleep due to burning substernal chest pain, non radiating, no exacerbation or alleviating factors. Of note the patient had recent PCI in 2021. During that admission patient had a prolonged stay in CCU, was treated for acute CHF exacerbation and worsening kidney function. Cath on 21 showed significant 3 vessels disease, LAD, RCA, LCx. CTS was consulted. During preop cardiac MRI patient became SOB. He received bumex and hypertonic saline for diuresis.  CABG was decided against due to increased risk of progression of CKD IV to ESRD.          PAST MEDICAL & SURGICAL HISTORY:  HTN (hypertension)    HLD (hyperlipidemia)    DM (diabetes mellitus)    Glaucoma    No significant past surgical history        FAMILY HISTORY:    Mother:  77 HTN  Father:  at 55 of CKD       SOCIAL HISTORY:      Smokes about 3 cigarettes a week, social alcohol drinking, denies drug use      Allergies    No Known Allergies    Intolerances      PHYSICAL EXAM:    General Appearance: well appearing, normal for age and gender. 	  Neck: normal JVP, no bruit.   Cardiovascular: regular rate and rhythm S1 S2, ++ JVD, No murmurs, BLE edema  Respiratory: Lungs clear to auscultation	  Psychiatry: Alert and oriented x 3, Mood & affect appropriate  Gastrointestinal:  Soft, Non-tender  Skin/Integumen: No rashes, No ecchymoses, No cyanosis	  Neurologic: Non-focal  Musculoskeletal/ extremities: Normal range of motion, No clubbing, cyanosis or edema  Vascular: Peripheral pulses palpable 2+ bilaterally      PREVIOUS DIAGNOSTIC TESTING:      tte 21    Summary:   1. Moderately decreased segmental left ventricular systolic function.   2. Mid and apical inferior wall, basal and mid inferolateral wall, mid anterolateral segment, and apical lateral segment are abnormal as described above.   3. LV Ejection Fraction by Garcia's Method with a biplane EF of 42 %.   4. Mildly increased LV wall thickness.   5. Mildly enlarged left atrium.   6. Normal right atrial size.   7. Small to moderate pericardial effusion.   8. Mild to moderate mitral valve regurgitation.   9. Moderate tricuspid regurgitation.  10. Mild pulmonic valve regurgitation.  11. Spectral Doppler shows restrictive pattern of left ventricular myocardial filling (Grade III diastolic dysfunction).  12. Mild thickening of the anterior and posterior mitral valve leaflets.    	    Home Medications:  aspirin 81 mg oral tablet, chewable: 1 tab(s) orally once a day (01 Oct 2021 10:)  atorvastatin 40 mg oral tablet: 1 tab(s) orally once a day (01 Oct 2021 10:03)  BASAGLAR 100 UNIT/ML KWIKPEN:  (01 Oct 2021 10:03)  brimonidine 0.2% ophthalmic solution: 1 drop(s) to each affected eye 2 times a day (01 Oct 2021 10:)  carvedilol 3.125 mg oral tablet: 1 tab(s) orally every 12 hours (01 Oct 2021 10:)  clopidogrel 75 mg oral tablet: 1 tab(s) orally once a day (01 Oct 2021 10:03)  dorzolamide-timolol 2%-0.5% preservative-free ophthalmic solution: 1 drop(s) to each affected eye 2 times a day (01 Oct 2021 10:)  latanoprost 0.005% ophthalmic solution: 1 drop(s) to each affected eye once a day (at bedtime) (01 Oct 2021 10:03)  pioglitazone-metformin 15 mg-850 mg oral tablet: 1 tab(s) orally 2 times a day  HOLD MEDICATION FOR 48 HR AFTER CARDIAC CATH  (01 Oct 2021 10:)  Rhopressa 0.02% ophthalmic solution: 1 drop(s) to each affected eye once a day (in the evening) (01 Oct 2021 10:03)  sevelamer carbonate 800 mg oral tablet: 1 tab(s) orally 3 times a day (with meals) (01 Oct 2021 10:)    MEDICATIONS  (STANDING):  aspirin  chewable 81 milliGRAM(s) Oral daily  atorvastatin 80 milliGRAM(s) Oral at bedtime  brimonidine 0.2% Ophthalmic Solution 1 Drop(s) Both EYES two times a day  buMETAnide Infusion 1 mG/Hr (5 mL/Hr) IV Continuous <Continuous>  buMETAnide Injectable 2 milliGRAM(s) IV Push once  carvedilol 12.5 milliGRAM(s) Oral every 12 hours  clopidogrel Tablet 75 milliGRAM(s) Oral daily  dextrose 40% Gel 15 Gram(s) Oral once  dextrose 5%. 1000 milliLiter(s) (50 mL/Hr) IV Continuous <Continuous>  dextrose 5%. 1000 milliLiter(s) (100 mL/Hr) IV Continuous <Continuous>  dextrose 50% Injectable 25 Gram(s) IV Push once  dextrose 50% Injectable 12.5 Gram(s) IV Push once  dextrose 50% Injectable 25 Gram(s) IV Push once  dextrose 50% Injectable 10 milliLiter(s) IV Push once  dorzolamide 2% Ophthalmic Solution 1 Drop(s) Both EYES <User Schedule>  gabapentin 100 milliGRAM(s) Oral two times a day  glucagon  Injectable 1 milliGRAM(s) IntraMuscular once  heparin   Injectable 5000 Unit(s) SubCutaneous every 12 hours  hydrALAZINE 100 milliGRAM(s) Oral three times a day  insulin lispro (ADMELOG) corrective regimen sliding scale   SubCutaneous three times a day before meals  isosorbide   mononitrate ER Tablet (IMDUR) 30 milliGRAM(s) Oral daily  ivabradine 5 milliGRAM(s) Oral two times a day  latanoprost 0.005% Ophthalmic Solution 1 Drop(s) Both EYES at bedtime  sevelamer carbonate 800 milliGRAM(s) Oral three times a day  sodium chloride 3%. 150 milliLiter(s) (50 mL/Hr) IV Continuous <Continuous>  timolol 0.5% Solution 1 Drop(s) Both EYES two times a day  torsemide 20 milliGRAM(s) Oral two times a day    MEDICATIONS  (PRN):         Date of Admission: 21      Interval History:    - Patient resting in bed, no acute events overnight   - Remains fluid overloaded on exam   - Renal function improving today      HISTORY OF PRESENT ILLNESS:     Patient is a 56 year old male with PMHx of  HTN, HLD, CKD IV, T2DM on insulin a1c 5.8%, Glaucoma, and CAD s/p PCI in 2021. The patient presented to the ED with complaint of left sided chest pain and difficulty sleeping 2/2 to chest pain and BL LE neuropathy (chronic). The patient states that last night he woke up and from sleep due to burning substernal chest pain, non radiating, no exacerbation or alleviating factors. Of note the patient had recent PCI in 2021. During that admission patient had a prolonged stay in CCU, was treated for acute CHF exacerbation and worsening kidney function. Cath on 21 showed significant 3 vessels disease, LAD, RCA, LCx. CTS was consulted. During preop cardiac MRI patient became SOB. He received bumex and hypertonic saline for diuresis.  CABG was decided against due to increased risk of progression of CKD IV to ESRD.          PAST MEDICAL & SURGICAL HISTORY:  HTN (hypertension)    HLD (hyperlipidemia)    DM (diabetes mellitus)    Glaucoma    No significant past surgical history        FAMILY HISTORY:    Mother:  77 HTN  Father:  at 55 of CKD       SOCIAL HISTORY:      Smokes about 3 cigarettes a week, social alcohol drinking, denies drug use      Allergies    No Known Allergies    Intolerances      PHYSICAL EXAM:    General Appearance: well appearing, normal for age and gender. 	  Neck: normal JVP, no bruit.   Cardiovascular: regular rate and rhythm S1 S2, ++ JVD, No murmurs, BLE edema  Respiratory: Lungs clear to auscultation	  Psychiatry: Alert and oriented x 3, Mood & affect appropriate  Gastrointestinal:  Soft, Non-tender  Skin/Integumen: No rashes, No ecchymoses, No cyanosis	  Neurologic: Non-focal  Musculoskeletal/ extremities: Normal range of motion, No clubbing, cyanosis or edema  Vascular: Peripheral pulses palpable 2+ bilaterally      PREVIOUS DIAGNOSTIC TESTING:      tte 21    Summary:   1. Moderately decreased segmental left ventricular systolic function.   2. Mid and apical inferior wall, basal and mid inferolateral wall, mid anterolateral segment, and apical lateral segment are abnormal as described above.   3. LV Ejection Fraction by Garcia's Method with a biplane EF of 42 %.   4. Mildly increased LV wall thickness.   5. Mildly enlarged left atrium.   6. Normal right atrial size.   7. Small to moderate pericardial effusion.   8. Mild to moderate mitral valve regurgitation.   9. Moderate tricuspid regurgitation.  10. Mild pulmonic valve regurgitation.  11. Spectral Doppler shows restrictive pattern of left ventricular myocardial filling (Grade III diastolic dysfunction).  12. Mild thickening of the anterior and posterior mitral valve leaflets.    	    Home Medications:  aspirin 81 mg oral tablet, chewable: 1 tab(s) orally once a day (01 Oct 2021 10:)  atorvastatin 40 mg oral tablet: 1 tab(s) orally once a day (01 Oct 2021 10:03)  BASAGLAR 100 UNIT/ML KWIKPEN:  (01 Oct 2021 10:03)  brimonidine 0.2% ophthalmic solution: 1 drop(s) to each affected eye 2 times a day (01 Oct 2021 10:)  carvedilol 3.125 mg oral tablet: 1 tab(s) orally every 12 hours (01 Oct 2021 10:)  clopidogrel 75 mg oral tablet: 1 tab(s) orally once a day (01 Oct 2021 10:03)  dorzolamide-timolol 2%-0.5% preservative-free ophthalmic solution: 1 drop(s) to each affected eye 2 times a day (01 Oct 2021 10:)  latanoprost 0.005% ophthalmic solution: 1 drop(s) to each affected eye once a day (at bedtime) (01 Oct 2021 10:03)  pioglitazone-metformin 15 mg-850 mg oral tablet: 1 tab(s) orally 2 times a day  HOLD MEDICATION FOR 48 HR AFTER CARDIAC CATH  (01 Oct 2021 10:)  Rhopressa 0.02% ophthalmic solution: 1 drop(s) to each affected eye once a day (in the evening) (01 Oct 2021 10:03)  sevelamer carbonate 800 mg oral tablet: 1 tab(s) orally 3 times a day (with meals) (01 Oct 2021 10:)    MEDICATIONS  (STANDING):  aspirin  chewable 81 milliGRAM(s) Oral daily  atorvastatin 80 milliGRAM(s) Oral at bedtime  brimonidine 0.2% Ophthalmic Solution 1 Drop(s) Both EYES two times a day  buMETAnide Infusion 1 mG/Hr (5 mL/Hr) IV Continuous <Continuous>  buMETAnide Injectable 2 milliGRAM(s) IV Push once  carvedilol 12.5 milliGRAM(s) Oral every 12 hours  clopidogrel Tablet 75 milliGRAM(s) Oral daily  dextrose 40% Gel 15 Gram(s) Oral once  dextrose 5%. 1000 milliLiter(s) (50 mL/Hr) IV Continuous <Continuous>  dextrose 5%. 1000 milliLiter(s) (100 mL/Hr) IV Continuous <Continuous>  dextrose 50% Injectable 25 Gram(s) IV Push once  dextrose 50% Injectable 12.5 Gram(s) IV Push once  dextrose 50% Injectable 25 Gram(s) IV Push once  dextrose 50% Injectable 10 milliLiter(s) IV Push once  dorzolamide 2% Ophthalmic Solution 1 Drop(s) Both EYES <User Schedule>  gabapentin 100 milliGRAM(s) Oral two times a day  glucagon  Injectable 1 milliGRAM(s) IntraMuscular once  heparin   Injectable 5000 Unit(s) SubCutaneous every 12 hours  hydrALAZINE 100 milliGRAM(s) Oral three times a day  insulin lispro (ADMELOG) corrective regimen sliding scale   SubCutaneous three times a day before meals  isosorbide   mononitrate ER Tablet (IMDUR) 30 milliGRAM(s) Oral daily  ivabradine 5 milliGRAM(s) Oral two times a day  latanoprost 0.005% Ophthalmic Solution 1 Drop(s) Both EYES at bedtime  sevelamer carbonate 800 milliGRAM(s) Oral three times a day  sodium chloride 3%. 150 milliLiter(s) (50 mL/Hr) IV Continuous <Continuous>  timolol 0.5% Solution 1 Drop(s) Both EYES two times a day  torsemide 20 milliGRAM(s) Oral two times a day    MEDICATIONS  (PRN):

## 2021-10-06 NOTE — PROGRESS NOTE ADULT - ASSESSMENT
Acute on chronic systolic HF /ICM/ CKD/ Anemia     Patient remains fluid overloaded on exam  Continue Bumex drip 1 mg/hr + 3% Hypertonic Saline 150ml BID stop tonight at 9:00 PM  Start Torsemide 40 mg at 6:00 AM 10/7  Get BMP twice daily   Maintain potassium >4.0, Mg >2.1  Strict intake and output  Daily weight   Plan discussed with primary team  Will continue to follow

## 2021-10-06 NOTE — PROGRESS NOTE ADULT - SUBJECTIVE AND OBJECTIVE BOX
Cardiology Follow up    GILLIAN MENDOZA   56y Male  PAST MEDICAL & SURGICAL HISTORY:  HTN (hypertension)    HLD (hyperlipidemia)    DM (diabetes mellitus)    Glaucoma    No significant past surgical history         HPI:  Patient is a 56 year old male with PMHx of  HTN, HLD, CKD IV, T2DM on insulin a1c 5.8%, Glaucoma, and CAD s/p PCI in July 2021. The patient presented to the ED with complaint of left sided chest pain and difficulty sleeping 2/2 to chest pain and BL LE neuropathy (chronic). The patient states that last night he woke up and from sleep due to burning substernal chest pain, non radiating, no exacerbation or alleviating factors. Of notr the patient had recent PCI in July 2021. During that admission patient had a prolonged stay in CCU, was treated for acute CHF exacerbation and worsening kidney function. Cath on 7/22/21 showed significant 3 vessle disease, LAD, RCA, LCx. CTS was consulted. During preop cardiac MRI patient became SOB. He received bumex and hypertonic saline for diuresis.  CABG was decided against due to increased risk of progression of CKDIV to ESRD.  The patient was discharged on DAPT and follows with Dr. Cantu as his cardiologist    In the ED, patient is hemodynamically stable with elevated troponin 0.12, microcytic anemia, elevated BNP 56496. The patient was evaluated in the ED by cardiology and is planed for cardiac cath on 9/30.  (29 Sep 2021 19:16)    Allergies    No Known Allergies    Intolerances    Patient seen and examined at bedside. No acute events overnight.  Patient without complaints. Pt ambulated without issues/symptoms  Denies CP, SOB, palpitations, or dizziness  No events on telemetry overnight    Vital Signs Last 24 Hrs  T(C): 36.2 (06 Oct 2021 05:24), Max: 36.9 (05 Oct 2021 17:24)  T(F): 97.1 (06 Oct 2021 05:24), Max: 98.4 (05 Oct 2021 17:24)  HR: 59 (06 Oct 2021 05:24) (53 - 63)  BP: 134/68 (06 Oct 2021 05:24) (134/68 - 161/88)  BP(mean): --  RR: 18 (06 Oct 2021 05:24) (18 - 20)  SpO2: 98% (05 Oct 2021 21:07) (98% - 98%)    MEDICATIONS  (STANDING):  aspirin  chewable 81 milliGRAM(s) Oral daily  atorvastatin 80 milliGRAM(s) Oral at bedtime  brimonidine 0.2% Ophthalmic Solution 1 Drop(s) Both EYES two times a day  buMETAnide Infusion 1 mG/Hr (5 mL/Hr) IV Continuous <Continuous>  carvedilol 12.5 milliGRAM(s) Oral every 12 hours  clopidogrel Tablet 75 milliGRAM(s) Oral daily  dextrose 40% Gel 15 Gram(s) Oral once  dextrose 5%. 1000 milliLiter(s) (50 mL/Hr) IV Continuous <Continuous>  dextrose 5%. 1000 milliLiter(s) (100 mL/Hr) IV Continuous <Continuous>  dextrose 50% Injectable 10 milliLiter(s) IV Push once  dextrose 50% Injectable 25 Gram(s) IV Push once  dextrose 50% Injectable 12.5 Gram(s) IV Push once  dextrose 50% Injectable 25 Gram(s) IV Push once  dorzolamide 2% Ophthalmic Solution 1 Drop(s) Both EYES <User Schedule>  folic acid 1 milliGRAM(s) Oral daily  gabapentin 100 milliGRAM(s) Oral two times a day  glucagon  Injectable 1 milliGRAM(s) IntraMuscular once  heparin   Injectable 5000 Unit(s) SubCutaneous every 12 hours  hydrALAZINE 100 milliGRAM(s) Oral three times a day  insulin lispro (ADMELOG) corrective regimen sliding scale   SubCutaneous three times a day before meals  isosorbide   mononitrate ER Tablet (IMDUR) 30 milliGRAM(s) Oral daily  ivabradine 5 milliGRAM(s) Oral two times a day  latanoprost 0.005% Ophthalmic Solution 1 Drop(s) Both EYES at bedtime  melatonin 5 milliGRAM(s) Oral at bedtime  sevelamer carbonate 800 milliGRAM(s) Oral three times a day  sodium bicarbonate 1300 milliGRAM(s) Oral every 8 hours  sodium chloride 3%. 150 milliLiter(s) (50 mL/Hr) IV Continuous <Continuous>  sodium chloride 3%. 150 milliLiter(s) (50 mL/Hr) IV Continuous <Continuous>  timolol 0.5% Solution 1 Drop(s) Both EYES two times a day    MEDICATIONS  (PRN):      REVIEW OF SYSTEMS:          All negative except as mentioned in HPI    PHYSICAL EXAM:           CONSTITUTIONAL: Well-developed; well-nourished; in no acute distress  	SKIN: warm, dry  	HEAD: Normocephalic; atraumatic  	EYES: PERRL.  	ENT: No nasal discharge, airway clear, mucous membranes moist  	NECK: Supple; non tender.  	CARD: +S1, +S2, no murmurs, gallops, or rubs. Regular rate and rhythm    	RESP: No wheezes, rales or rhonchi. CTA B/L  	ABD: soft ntnd, + BS x 4 quadrants  	EXT: moves all extremities,  no clubbing, cyanosis or edema  	NEURO: Alert and oriented x3, no focal deficits          PSYCH: Cooperative, appropriate          VASCULAR:  + Rad / + PTs / +  DPs          EXTREMITY:             Right Groin: access site soft, no hematoma, no pain, + pulses, no sign of infection, no numbness             Right Radial: access site soft, no hematoma, no pain, + pulses, no sign of infection, no numbness            ECG:   < from: 12 Lead ECG (10.01.21 @ 08:07) >    Ventricular Rate 65 BPM    Atrial Rate 65 BPM    P-R Interval 158 ms    QRS Duration 122 ms    Q-T Interval 462 ms    QTC Calculation(Bazett) 480 ms    P Axis 62 degrees    R Axis 106 degrees    T Axis 116 degrees    Diagnosis Line Normal sinus rhythm  Right bundle branch block  Cannot rule out Inferior infarct , age undetermined  T wave abnormality, consider lateral ischemia  Abnormal ECG    Confirmed by KLEVER PEÑA MD ([id number]) on 10/1/2021 12:40:59 PM                                                                                        2D ECHO:  < from: TTE Echo Complete w/o Contrast w/ Doppler (09.30.21 @ 16:45) >  Summary:   1. Moderately decreased segmental left ventricular systolic function.   2. Mid and apical inferior wall, basal and mid inferolateral wall, mid anterolateral segment, and apical lateral segment are abnormal as described above.   3. LV Ejection Fraction by Garcia's Method with a biplane EF of 42 %.   4. Mildly increased LV wall thickness.   5. Mildly enlarged left atrium.   6. Normal right atrial size.   7. Small to moderate pericardial effusion.   8. Mild to moderate mitral valve regurgitation.   9. Moderate tricuspid regurgitation.  10. Mild pulmonic valve regurgitation.  11. Spectral Doppler shows restrictive pattern of left ventricular myocardial filling (Grade III diastolic dysfunction).  12. Mild thickening of the anterior and posterior mitral valve leaflets.    LABS:                        8.2    8.36  )-----------( 181      ( 06 Oct 2021 01:20 )             27.4     10-06    141  |  107  |  74<HH>  ----------------------------<  127<H>  4.9   |  18  |  5.1<HH>    Ca    7.9<L>      06 Oct 2021 01:20  Phos  5.0     10-05  Mg     2.2     10-06    TPro  5.5<L>  /  Alb  2.9<L>  /  TBili  0.2  /  DBili  x   /  AST  14  /  ALT  13  /  AlkPhos  103  10-06    Magnesium, Serum: 2.2 mg/dL [1.8 - 2.4] (10-06-21 @ 01:20)  LIVER FUNCTIONS - ( 06 Oct 2021 01:20 )  Alb: 2.9 g/dL / Pro: 5.5 g/dL / ALK PHOS: 103 U/L / ALT: 13 U/L / AST: 14 U/L / GGT: x           LDL Cholesterol Calculated: 150 mg/dL   A1C with Estimated Average Glucose Result: 6.3    A/P:  I discussed the case with Cardiologist Dr. Singh and recommend the following:  S/P PCI:   Intervention:   -Successful IVUS guided PCI with balloon angioplasty, Intravascular lithotripsy, and drug eluting stent implantation x 1 of proximal LCX.  -Successful IVUS guided PCI with balloon angioplasty, and drug eluting stent implantation x 1 of distal LCX.    Implants:   -3.5x20 mm Synergy stent of prox LCX  -2.5x24 mm Synergy stent of distal LCX                       CHF team f/u                    Nephrology f/u                   Monitor Cr, Hg  	     Continue DAPT ( Aspirin 81 mg daily and Plavix 75 mg daily ), Isosorbide, Hydralazine, B-Blocker, Statin Therapy                     Hold ACEi/ARB due to elevated Cr                   Strict I&O's, daily wts, fluid restriction 1.5 l/day, low sodium diet                   HOLD Metformin for 48 hr after Cardiac Cath                    Good UO on BUMEX                   Cont present care                   PT/OOB to chair, ambulate with assisstance                   Patient given 30 day supply of ( Aspirin 81 mg daily and Plavix 75 mg daily ) to take at home                   Patient agreeing to take DAPT for at least one year or as directed by cardiologist                    Pt given instructions on importance of taking antiplatelet medication or risk acute stent thrombosis/death                   Post cath instructions, access site care and activity restrictions reviewed with patient                     Discussed with patient to return to hospital if experience chest pain, shortness breath, dizziness and site bleeding                   Aggressive risk factor modification, diet counseling, smoking cessation discussed with patient                       Monitor on TELE

## 2021-10-06 NOTE — PROGRESS NOTE ADULT - ASSESSMENT
# LENNY on CKD 4 / r/o ROSENDO / CRS  - non oliguric  - creat trending down   - cardiology/CHF team notes appreciated, cont bumex gtt and hypertonic saline, change to po also seen by CHF team / bumex and 3%  - renal ultrasound noted w/o hydronephrosis  - continue sodium bicarb 1300  mg q8h  - lokelma d/c'd, maintain on low K diet   - phos level noted, cont sevelamer, renal diet    - check UA, urine lytes, urine urea, urine creat, urine pr/cr ratio   - bp overall controlled, elevated at times, start ACE-i or ARB when creat stabilizes   - monitor hgb, RBC transfusion if hgb < 8, KEVIN if down trending  - does not need RRT   Will follow

## 2021-10-07 VITALS
RESPIRATION RATE: 18 BRPM | HEART RATE: 53 BPM | TEMPERATURE: 98 F | SYSTOLIC BLOOD PRESSURE: 136 MMHG | DIASTOLIC BLOOD PRESSURE: 76 MMHG

## 2021-10-07 LAB
ALBUMIN SERPL ELPH-MCNC: 2.9 G/DL — LOW (ref 3.5–5.2)
ALP SERPL-CCNC: 122 U/L — HIGH (ref 30–115)
ALT FLD-CCNC: 11 U/L — SIGNIFICANT CHANGE UP (ref 0–41)
ANION GAP SERPL CALC-SCNC: 14 MMOL/L — SIGNIFICANT CHANGE UP (ref 7–14)
ANION GAP SERPL CALC-SCNC: 16 MMOL/L — HIGH (ref 7–14)
AST SERPL-CCNC: 9 U/L — SIGNIFICANT CHANGE UP (ref 0–41)
BASOPHILS # BLD AUTO: 0.04 K/UL — SIGNIFICANT CHANGE UP (ref 0–0.2)
BASOPHILS NFR BLD AUTO: 0.4 % — SIGNIFICANT CHANGE UP (ref 0–1)
BILIRUB SERPL-MCNC: 0.3 MG/DL — SIGNIFICANT CHANGE UP (ref 0.2–1.2)
BUN SERPL-MCNC: 68 MG/DL — CRITICAL HIGH (ref 10–20)
BUN SERPL-MCNC: 70 MG/DL — CRITICAL HIGH (ref 10–20)
CALCIUM SERPL-MCNC: 7.7 MG/DL — LOW (ref 8.5–10.1)
CALCIUM SERPL-MCNC: 7.7 MG/DL — LOW (ref 8.5–10.1)
CHLORIDE SERPL-SCNC: 106 MMOL/L — SIGNIFICANT CHANGE UP (ref 98–110)
CHLORIDE SERPL-SCNC: 106 MMOL/L — SIGNIFICANT CHANGE UP (ref 98–110)
CO2 SERPL-SCNC: 22 MMOL/L — SIGNIFICANT CHANGE UP (ref 17–32)
CO2 SERPL-SCNC: 23 MMOL/L — SIGNIFICANT CHANGE UP (ref 17–32)
CREAT SERPL-MCNC: 4.4 MG/DL — CRITICAL HIGH (ref 0.7–1.5)
CREAT SERPL-MCNC: 4.6 MG/DL — CRITICAL HIGH (ref 0.7–1.5)
EOSINOPHIL # BLD AUTO: 0.91 K/UL — HIGH (ref 0–0.7)
EOSINOPHIL NFR BLD AUTO: 9.7 % — HIGH (ref 0–8)
GLUCOSE BLDC GLUCOMTR-MCNC: 165 MG/DL — HIGH (ref 70–99)
GLUCOSE BLDC GLUCOMTR-MCNC: 176 MG/DL — HIGH (ref 70–99)
GLUCOSE BLDC GLUCOMTR-MCNC: 254 MG/DL — HIGH (ref 70–99)
GLUCOSE SERPL-MCNC: 107 MG/DL — HIGH (ref 70–99)
GLUCOSE SERPL-MCNC: 141 MG/DL — HIGH (ref 70–99)
HCT VFR BLD CALC: 26.1 % — LOW (ref 42–52)
HGB BLD-MCNC: 8.1 G/DL — LOW (ref 14–18)
IMM GRANULOCYTES NFR BLD AUTO: 0.2 % — SIGNIFICANT CHANGE UP (ref 0.1–0.3)
LYMPHOCYTES # BLD AUTO: 1.2 K/UL — SIGNIFICANT CHANGE UP (ref 1.2–3.4)
LYMPHOCYTES # BLD AUTO: 12.8 % — LOW (ref 20.5–51.1)
MAGNESIUM SERPL-MCNC: 2 MG/DL — SIGNIFICANT CHANGE UP (ref 1.8–2.4)
MCHC RBC-ENTMCNC: 23.9 PG — LOW (ref 27–31)
MCHC RBC-ENTMCNC: 31 G/DL — LOW (ref 32–37)
MCV RBC AUTO: 77 FL — LOW (ref 80–94)
MONOCYTES # BLD AUTO: 0.77 K/UL — HIGH (ref 0.1–0.6)
MONOCYTES NFR BLD AUTO: 8.2 % — SIGNIFICANT CHANGE UP (ref 1.7–9.3)
NEUTROPHILS # BLD AUTO: 6.42 K/UL — SIGNIFICANT CHANGE UP (ref 1.4–6.5)
NEUTROPHILS NFR BLD AUTO: 68.7 % — SIGNIFICANT CHANGE UP (ref 42.2–75.2)
NRBC # BLD: 0 /100 WBCS — SIGNIFICANT CHANGE UP (ref 0–0)
PLATELET # BLD AUTO: 188 K/UL — SIGNIFICANT CHANGE UP (ref 130–400)
POTASSIUM SERPL-MCNC: 4.1 MMOL/L — SIGNIFICANT CHANGE UP (ref 3.5–5)
POTASSIUM SERPL-MCNC: 4.2 MMOL/L — SIGNIFICANT CHANGE UP (ref 3.5–5)
POTASSIUM SERPL-SCNC: 4.1 MMOL/L — SIGNIFICANT CHANGE UP (ref 3.5–5)
POTASSIUM SERPL-SCNC: 4.2 MMOL/L — SIGNIFICANT CHANGE UP (ref 3.5–5)
PROT SERPL-MCNC: 5.3 G/DL — LOW (ref 6–8)
RBC # BLD: 3.39 M/UL — LOW (ref 4.7–6.1)
RBC # FLD: 13.2 % — SIGNIFICANT CHANGE UP (ref 11.5–14.5)
SODIUM SERPL-SCNC: 143 MMOL/L — SIGNIFICANT CHANGE UP (ref 135–146)
SODIUM SERPL-SCNC: 144 MMOL/L — SIGNIFICANT CHANGE UP (ref 135–146)
WBC # BLD: 9.36 K/UL — SIGNIFICANT CHANGE UP (ref 4.8–10.8)
WBC # FLD AUTO: 9.36 K/UL — SIGNIFICANT CHANGE UP (ref 4.8–10.8)

## 2021-10-07 PROCEDURE — 99232 SBSQ HOSP IP/OBS MODERATE 35: CPT

## 2021-10-07 PROCEDURE — 99239 HOSP IP/OBS DSCHRG MGMT >30: CPT

## 2021-10-07 RX ORDER — ISOSORBIDE MONONITRATE 60 MG/1
1 TABLET, EXTENDED RELEASE ORAL
Qty: 30 | Refills: 3
Start: 2021-10-07 | End: 2022-02-03

## 2021-10-07 RX ORDER — HYDRALAZINE HCL 50 MG
1 TABLET ORAL
Qty: 90 | Refills: 3
Start: 2021-10-07 | End: 2022-02-03

## 2021-10-07 RX ORDER — CARVEDILOL PHOSPHATE 80 MG/1
1 CAPSULE, EXTENDED RELEASE ORAL
Qty: 60 | Refills: 3
Start: 2021-10-07 | End: 2022-02-03

## 2021-10-07 RX ORDER — BUMETANIDE 0.25 MG/ML
2 INJECTION INTRAMUSCULAR; INTRAVENOUS ONCE
Refills: 0 | Status: COMPLETED | OUTPATIENT
Start: 2021-10-07 | End: 2021-10-07

## 2021-10-07 RX ORDER — SODIUM CHLORIDE 5 G/100ML
150 INJECTION, SOLUTION INTRAVENOUS
Refills: 0 | Status: DISCONTINUED | OUTPATIENT
Start: 2021-10-07 | End: 2021-10-07

## 2021-10-07 RX ORDER — BUMETANIDE 0.25 MG/ML
1 INJECTION INTRAMUSCULAR; INTRAVENOUS
Qty: 20 | Refills: 0 | Status: DISCONTINUED | OUTPATIENT
Start: 2021-10-07 | End: 2021-10-07

## 2021-10-07 RX ORDER — FOLIC ACID 0.8 MG
1 TABLET ORAL
Qty: 30 | Refills: 5
Start: 2021-10-07 | End: 2022-04-04

## 2021-10-07 RX ORDER — IVABRADINE 7.5 MG/1
1 TABLET, FILM COATED ORAL
Qty: 60 | Refills: 3
Start: 2021-10-07 | End: 2022-02-03

## 2021-10-07 RX ADMIN — DORZOLAMIDE HYDROCHLORIDE 1 DROP(S): 20 SOLUTION/ DROPS OPHTHALMIC at 17:35

## 2021-10-07 RX ADMIN — IVABRADINE 5 MILLIGRAM(S): 7.5 TABLET, FILM COATED ORAL at 17:31

## 2021-10-07 RX ADMIN — BUMETANIDE 5 MG/HR: 0.25 INJECTION INTRAMUSCULAR; INTRAVENOUS at 12:24

## 2021-10-07 RX ADMIN — Medication 1 MILLIGRAM(S): at 12:28

## 2021-10-07 RX ADMIN — DORZOLAMIDE HYDROCHLORIDE 1 DROP(S): 20 SOLUTION/ DROPS OPHTHALMIC at 05:53

## 2021-10-07 RX ADMIN — Medication 100 MILLIGRAM(S): at 17:29

## 2021-10-07 RX ADMIN — SEVELAMER CARBONATE 800 MILLIGRAM(S): 2400 POWDER, FOR SUSPENSION ORAL at 05:52

## 2021-10-07 RX ADMIN — Medication 81 MILLIGRAM(S): at 12:28

## 2021-10-07 RX ADMIN — Medication 1300 MILLIGRAM(S): at 17:28

## 2021-10-07 RX ADMIN — GABAPENTIN 100 MILLIGRAM(S): 400 CAPSULE ORAL at 17:31

## 2021-10-07 RX ADMIN — Medication 40 MILLIGRAM(S): at 05:52

## 2021-10-07 RX ADMIN — HEPARIN SODIUM 5000 UNIT(S): 5000 INJECTION INTRAVENOUS; SUBCUTANEOUS at 17:31

## 2021-10-07 RX ADMIN — IVABRADINE 5 MILLIGRAM(S): 7.5 TABLET, FILM COATED ORAL at 08:36

## 2021-10-07 RX ADMIN — HEPARIN SODIUM 5000 UNIT(S): 5000 INJECTION INTRAVENOUS; SUBCUTANEOUS at 05:53

## 2021-10-07 RX ADMIN — Medication 1: at 17:27

## 2021-10-07 RX ADMIN — Medication 3: at 12:25

## 2021-10-07 RX ADMIN — BRIMONIDINE TARTRATE 1 DROP(S): 2 SOLUTION/ DROPS OPHTHALMIC at 17:35

## 2021-10-07 RX ADMIN — ISOSORBIDE MONONITRATE 30 MILLIGRAM(S): 60 TABLET, EXTENDED RELEASE ORAL at 12:28

## 2021-10-07 RX ADMIN — CLOPIDOGREL BISULFATE 75 MILLIGRAM(S): 75 TABLET, FILM COATED ORAL at 12:28

## 2021-10-07 RX ADMIN — SODIUM CHLORIDE 50 MILLILITER(S): 5 INJECTION, SOLUTION INTRAVENOUS at 12:24

## 2021-10-07 RX ADMIN — Medication 1300 MILLIGRAM(S): at 05:53

## 2021-10-07 RX ADMIN — CARVEDILOL PHOSPHATE 12.5 MILLIGRAM(S): 80 CAPSULE, EXTENDED RELEASE ORAL at 08:36

## 2021-10-07 RX ADMIN — GABAPENTIN 100 MILLIGRAM(S): 400 CAPSULE ORAL at 05:52

## 2021-10-07 RX ADMIN — BUMETANIDE 2 MILLIGRAM(S): 0.25 INJECTION INTRAMUSCULAR; INTRAVENOUS at 12:24

## 2021-10-07 RX ADMIN — Medication 100 MILLIGRAM(S): at 05:53

## 2021-10-07 RX ADMIN — Medication 1 DROP(S): at 17:36

## 2021-10-07 RX ADMIN — Medication 1: at 08:34

## 2021-10-07 RX ADMIN — CARVEDILOL PHOSPHATE 12.5 MILLIGRAM(S): 80 CAPSULE, EXTENDED RELEASE ORAL at 17:30

## 2021-10-07 RX ADMIN — BRIMONIDINE TARTRATE 1 DROP(S): 2 SOLUTION/ DROPS OPHTHALMIC at 05:54

## 2021-10-07 RX ADMIN — Medication 1 DROP(S): at 05:53

## 2021-10-07 RX ADMIN — SEVELAMER CARBONATE 800 MILLIGRAM(S): 2400 POWDER, FOR SUSPENSION ORAL at 17:29

## 2021-10-07 NOTE — PROGRESS NOTE ADULT - PROVIDER SPECIALTY LIST ADULT
Internal Medicine
Intervent Cardiology
Cardiology
Heart Failure
Hospitalist
Hospitalist
Internal Medicine
Intervent Cardiology
Nephrology
Nephrology
Hospitalist
Intervent Cardiology
Nephrology
Nephrology
Internal Medicine
Internal Medicine
Intervent Cardiology

## 2021-10-07 NOTE — PROGRESS NOTE ADULT - SUBJECTIVE AND OBJECTIVE BOX
GILLIAN MENDOZA 56y Male  MRN#: [id number]   CODE STATUS:     Hospital Day: 8d    Pt is currently admitted with the chief complaint of chest pain    SUBJECTIVE  Hospital Course: Patient is a 56 year old male with PMHx of  HTN, HLD, CKD IV, T2DM on insulin a1c 5.8%, Glaucoma, and CAD s/p PCI in July 2021. The patient presented to the ED with complaint of left sided chest pain and difficulty sleeping 2/2 to chest pain and BL LE neuropathy (chronic). The patient states that last night he woke up and from sleep due to burning substernal chest pain, non radiating, no exacerbation or alleviating factors. Of notr the patient had recent PCI in July 2021. During that admission patient had a prolonged stay in CCU, was treated for acute CHF exacerbation and worsening kidney function. Cath on 7/22/21 showed significant 3 vessle disease, LAD, RCA, LCx. CTS was consulted. During preop cardiac MRI patient became SOB. He received bumex and hypertonic saline for diuresis.  CABG was decided against due to increased risk of progression of CKDIV to ESRD.  The patient was discharged on DAPT and follows with Dr. Cantu as his cardiologist In the ED, patient is hemodynamically stable with elevated troponin 0.12, microcytic anemia, elevated BNP 13889. TTE 9/30 showed LVEF 42% and moderately decreased segmental left ventricular systolic function. On 9/30, underwent cath w/ stent to pLCX and dLCX. Developed worsening cr post cath, likely 2/2 ROSENDO. Was fluid overloaded, started on bumex, both fluid status and cr improving. On 10/7 transitioned from bumex gtt to PO torsemide 40mg QD.     Overnight events: none    Interval hx/Subjective complaints: Pt feels well this morning, no complaints. Off bumex gtt, switched to torsemide 40mg PO. Facial swelling continues to improve.     Pt denies any fevers, chills, fatigue, CP, palpitations, cough, SOB, abdominal pain, n/v/d, hematochezia, melena, weakness, numbness, tingling, or other FND.                                      ----------------------------------------------------------  OBJECTIVE  PAST MEDICAL & SURGICAL HISTORY  HTN (hypertension)    HLD (hyperlipidemia)    DM (diabetes mellitus)    Glaucoma    No significant past surgical history                                              -----------------------------------------------------------  ALLERGIES:  No Known Allergies                                            ------------------------------------------------------------    HOME MEDICATIONS  Home Medications:  aspirin 81 mg oral tablet, chewable: 1 tab(s) orally once a day (01 Oct 2021 10:03)  atorvastatin 40 mg oral tablet: 1 tab(s) orally once a day (01 Oct 2021 10:03)  BASAGLAR 100 UNIT/ML KWIKPEN:  (01 Oct 2021 10:03)  brimonidine 0.2% ophthalmic solution: 1 drop(s) to each affected eye 2 times a day (01 Oct 2021 10:03)  carvedilol 3.125 mg oral tablet: 1 tab(s) orally every 12 hours (01 Oct 2021 10:03)  clopidogrel 75 mg oral tablet: 1 tab(s) orally once a day (01 Oct 2021 10:03)  dorzolamide-timolol 2%-0.5% preservative-free ophthalmic solution: 1 drop(s) to each affected eye 2 times a day (01 Oct 2021 10:03)  latanoprost 0.005% ophthalmic solution: 1 drop(s) to each affected eye once a day (at bedtime) (01 Oct 2021 10:03)  pioglitazone-metformin 15 mg-850 mg oral tablet: 1 tab(s) orally 2 times a day  HOLD MEDICATION FOR 48 HR AFTER CARDIAC CATH  (01 Oct 2021 10:03)  Rhopressa 0.02% ophthalmic solution: 1 drop(s) to each affected eye once a day (in the evening) (01 Oct 2021 10:03)  sevelamer carbonate 800 mg oral tablet: 1 tab(s) orally 3 times a day (with meals) (01 Oct 2021 10:03)                           MEDICATIONS:  STANDING MEDICATIONS  aspirin  chewable 81 milliGRAM(s) Oral daily  atorvastatin 80 milliGRAM(s) Oral at bedtime  brimonidine 0.2% Ophthalmic Solution 1 Drop(s) Both EYES two times a day  carvedilol 12.5 milliGRAM(s) Oral every 12 hours  clopidogrel Tablet 75 milliGRAM(s) Oral daily  dextrose 40% Gel 15 Gram(s) Oral once  dextrose 5%. 1000 milliLiter(s) IV Continuous <Continuous>  dextrose 5%. 1000 milliLiter(s) IV Continuous <Continuous>  dextrose 50% Injectable 25 Gram(s) IV Push once  dextrose 50% Injectable 12.5 Gram(s) IV Push once  dextrose 50% Injectable 25 Gram(s) IV Push once  dextrose 50% Injectable 10 milliLiter(s) IV Push once  dorzolamide 2% Ophthalmic Solution 1 Drop(s) Both EYES <User Schedule>  folic acid 1 milliGRAM(s) Oral daily  gabapentin 100 milliGRAM(s) Oral two times a day  glucagon  Injectable 1 milliGRAM(s) IntraMuscular once  heparin   Injectable 5000 Unit(s) SubCutaneous every 12 hours  hydrALAZINE 100 milliGRAM(s) Oral three times a day  insulin lispro (ADMELOG) corrective regimen sliding scale   SubCutaneous three times a day before meals  isosorbide   mononitrate ER Tablet (IMDUR) 30 milliGRAM(s) Oral daily  ivabradine 5 milliGRAM(s) Oral two times a day  latanoprost 0.005% Ophthalmic Solution 1 Drop(s) Both EYES at bedtime  melatonin 5 milliGRAM(s) Oral at bedtime  sevelamer carbonate 800 milliGRAM(s) Oral three times a day  sodium bicarbonate 1300 milliGRAM(s) Oral every 8 hours  timolol 0.5% Solution 1 Drop(s) Both EYES two times a day  torsemide 40 milliGRAM(s) Oral daily    PRN MEDICATIONS                                            ------------------------------------------------------------  VITAL SIGNS: Last 24 Hours  T(C): 36.2 (07 Oct 2021 06:14), Max: 36.7 (06 Oct 2021 20:46)  T(F): 97.1 (07 Oct 2021 06:14), Max: 98 (06 Oct 2021 20:46)  HR: 57 (07 Oct 2021 06:14) (57 - 60)  BP: 160/89 (07 Oct 2021 06:14) (146/74 - 160/89)  BP(mean): --  RR: 18 (07 Oct 2021 06:14) (18 - 18)  SpO2: 98% (07 Oct 2021 06:14) (98% - 98%)      10-06-21 @ 07:01  -  10-07-21 @ 07:00  --------------------------------------------------------  IN: 580 mL / OUT: 3550 mL / NET: -2970 mL                                             --------------------------------------------------------------  LABS:                        8.1    9.36  )-----------( 188      ( 07 Oct 2021 02:16 )             26.1     10-07    144  |  106  |  70<HH>  ----------------------------<  141<H>  4.1   |  22  |  4.4<HH>    Ca    7.7<L>      07 Oct 2021 02:16  Phos  5.0     10-05  Mg     2.0     10-07    TPro  5.3<L>  /  Alb  2.9<L>  /  TBili  0.3  /  DBili  x   /  AST  9   /  ALT  11  /  AlkPhos  122<H>  10-07                              -------------------------------------------------------------  RADIOLOGY:  < from: US Kidney and Bladder (10.04.21 @ 20:02) >  IMPRESSION:      1. No hydronephrosis or evidence of acute urinary tract obstruction.  2. Mildly increased renal echogenicity, particularly on the left, unchanged from the prior, in keeping with chronic medical renal disease    < end of copied text >                                     --------------------------------------------------------------  PHYSICAL EXAM:  General: Well appearing man laying in bed in nad  HEENT: ncat, eomi, mmm  LUNGS: ctab  HEART: s1/s2, rrr, no m/r/g  ABDOMEN: soft, ntnd, bs+  EXT: wwp, no cyanosis, clubbing, edema  NEURO: aox4, moves all extremities   SKIN: intact, no rashes or lesions                                      --------------------------------------------------------------    ASSESSMENT & PLAN  56M w/ h/o HTN, HLD, CKD IV, T2DM on insulin a1c 5.8% on 7/3/2021, Glaucoma, and CAD s/p PCI in July 2021. The patient presented to the ED with complaint of left sided chest pain and difficulty sleeping 2/2 to chest pain. S/p LHC 9/30, underwent cath w/ stent to pLCX and dLCX, c/b ROSENDO, cr improving with diuresis.     #Acute Kidney Injury  #CKD, Stage 4  Baseline Cr 2.6-2.7. Follows w/ Dr. Rollins as outpatient. LENNY likely secondary to ROSENDO from LHC   - off Bumex gtt, started on torsemide 40mg PO qd  - Cr improving 6>5.5>5.5>5.1>5.0>4.4  - Renal/bladder Ultrasound with no hydronephrosis   - monitor UO  - avoid ACEi/ARB  - c/w sevelamer carbonate 1 tab PO TIDAC  - c/w sodium bicarb 1000mg q8h  - c/w lokelma 5g q12h   - f/u urine lytes, urine urea, urine cr   - Nephro recs appreciated     #Acute Coronary Syndrome  #CAD s/p PCI July 2021  #HFrEF (40-45%)  #HTN  Initially p/w chest pain. Admission CXR demonstrated no acute cardiopulmonary disease. Troponin up-trending on admission (0.12->.13->.14). Last C 7/22/21 demonstrated severe triple vessel disease, but deemed high risk for CTS. Stent subsequently placed in pLAD. TTE 9/30 showed LVEF 42% and moderately decreased segmental left ventricular systolic function. On 9/30, underwent cath w/ stent to pLCX and dLCX.   - off Bumex gtt, started on torsemide 40mg PO qd 10/7  - will continue to monitor UO   - c/w hypertonic saline BID  - c/w hydralazine 100 mg PO TID  - c/w Imdur 30mg PO QD  - c/w carvedilol 12.5mg PO BID   - c/w ASA 81mg OP QD and clopidogrel 75mg PO QD (DAPT)  - c/w atorvastatin 80mg PO QHS  - f/u outpatient w/ Dr. Cantu as outpatient one week after discharge    #Microcytic Anemia  Hb 10.7 on admission. Iron Studies 9/29: Serum Iron 59, TIBC 229, Sat 26%, Ferritin 341.  - Hgb 9.2>9.8>9.0>7.8>9.3>8.6  - Trend Hb via daily CBC  - maintain active T&S    # HLD (hyperlipidemia)  Lipid Profile 9/30: LDL-C 150, HDL 67, Chol 220, TG 84  - c/w atorvastatin 80mg PO QHS  - Consider adding Zetia (ezetimibe) on discharge given elevated LDL    #DM Type 2  #Diabetic Neuropathy  A1C 6.3% on 9/30/21. At home, take pioglitazone-metformin 15mg-850mg BID. Holding PO meds while inpatient.  - monitor FS TIDAC and QHS  - c/w ISS  - c/w gabapentin for neuropathy     #Misc   - DVT Prophylaxis: heparin 5000U SQ BID  - GI Prophylaxis: not indicated   - Diet: DASH/TLC  - Activity: IAT  - Code Status: Full Code    #Handoff   - transitioned from bumex gtt to torsemide 40mg QD  - cr improving  - f/u HF recs

## 2021-10-07 NOTE — PROGRESS NOTE ADULT - ATTENDING COMMENTS
A 56 year old male with PMHx of  HTN, HLD, CKD IV, T2DM on insulin a1c 5.8%, Glaucoma, and CAD presented to the ED with left sided chest pain and difficulty sleeping 2/2 to chest pain.    CAD S/P PCI mof prox and distal LCX on aspirin and plavix  Anemia-- Fe studies normal --on folic acid.  DM-2-- Hba1c is 6.3  LENNY (Likely ROSENDO) on CKD-4-- creatinine-- mild improvement-- renal US-- no hydronephrosis  was on bumex today changed to torsemide by Dr Cantu from AM  Acute on chronic HFmrEF-- now om torsemide  Ischemic CM-- EF is 42 %--on Coreg, Hydralazine and Isosorbide dinitrate.     will monitor renal function-- Creatinine 5.1 today-- nephrology follow up
Pt is doing much better he will be discharged home today.  Stat BMP to f/u BUN/CR if no worsening or better renal function will d/c home to f/u w/ outpt providers   D/W Dr. Singh as well as HF and Nephrology and the pt wants to go home today  d/c summary completed.    Dispo: d/c home as above / Dr Barfield aware
post PCI ROSENDO  creat recovering, down to 4.4  hf f/up  dc planning   dapt, cont all card meds
post PCI ROSENDO  renal fnx improving  good UO on BUMEX  cont present care  DAPT/BP mx  will follow
s/p pci of lcx with kanchan  post pci labs reviewed  echo noted  d/c planning  add zetia as lipids not at goal  outpt f/up 1-2 weeks
ROSENDO pot PCI  CHF  cont to improve  keep trending creat  hf f/up  dc planning
pt seen and examined  agree with above  no transfusion - repeat cbc  cont all meds  start IV diuretics  renal f/up   monitor UO  Will follow
ROSENDO post cath/pci  LENNY on CKD  hold diuretics  monitor renal fnx and urinary outpt closely  cont all card meds  will will

## 2021-10-07 NOTE — PROGRESS NOTE ADULT - SUBJECTIVE AND OBJECTIVE BOX
Cardiology Follow up    GILLIAN MENDOZA   56y Male  PAST MEDICAL & SURGICAL HISTORY:  HTN (hypertension)    HLD (hyperlipidemia)    DM (diabetes mellitus)    Glaucoma    No significant past surgical history         HPI:  Patient is a 56 year old male with PMHx of  HTN, HLD, CKD IV, T2DM on insulin a1c 5.8%, Glaucoma, and CAD s/p PCI in July 2021. The patient presented to the ED with complaint of left sided chest pain and difficulty sleeping 2/2 to chest pain and BL LE neuropathy (chronic). The patient states that last night he woke up and from sleep due to burning substernal chest pain, non radiating, no exacerbation or alleviating factors. Of notr the patient had recent PCI in July 2021. During that admission patient had a prolonged stay in CCU, was treated for acute CHF exacerbation and worsening kidney function. Cath on 7/22/21 showed significant 3 vessle disease, LAD, RCA, LCx. CTS was consulted. During preop cardiac MRI patient became SOB. He received bumex and hypertonic saline for diuresis.  CABG was decided against due to increased risk of progression of CKDIV to ESRD.  The patient was discharged on DAPT and follows with Dr. Cantu as his cardiologist    In the ED, patient is hemodynamically stable with elevated troponin 0.12, microcytic anemia, elevated BNP 96595. The patient was evaluated in the ED by cardiology and is planed for cardiac cath on 9/30.  (29 Sep 2021 19:16)    Allergies    No Known Allergies    Intolerances    Patient seen and examined at bedside. No acute events overnight.  Patient without complaints. Pt ambulated without issues/symptoms  Denies CP, SOB, palpitations, or dizziness  No events on telemetry overnight    Vital Signs Last 24 Hrs  T(C): 36.8 (07 Oct 2021 12:52), Max: 36.8 (07 Oct 2021 12:52)  T(F): 98.2 (07 Oct 2021 12:52), Max: 98.2 (07 Oct 2021 12:52)  HR: 53 (07 Oct 2021 12:52) (53 - 60)  BP: 136/76 (07 Oct 2021 12:52) (136/76 - 160/89)  BP(mean): --  RR: 18 (07 Oct 2021 12:52) (18 - 18)  SpO2: 98% (07 Oct 2021 06:14) (98% - 98%)    MEDICATIONS  (STANDING):  aspirin  chewable 81 milliGRAM(s) Oral daily  atorvastatin 80 milliGRAM(s) Oral at bedtime  brimonidine 0.2% Ophthalmic Solution 1 Drop(s) Both EYES two times a day  buMETAnide Infusion 1 mG/Hr (5 mL/Hr) IV Continuous <Continuous>  carvedilol 12.5 milliGRAM(s) Oral every 12 hours  clopidogrel Tablet 75 milliGRAM(s) Oral daily  dextrose 40% Gel 15 Gram(s) Oral once  dextrose 5%. 1000 milliLiter(s) (50 mL/Hr) IV Continuous <Continuous>  dextrose 5%. 1000 milliLiter(s) (100 mL/Hr) IV Continuous <Continuous>  dextrose 50% Injectable 10 milliLiter(s) IV Push once  dextrose 50% Injectable 25 Gram(s) IV Push once  dextrose 50% Injectable 12.5 Gram(s) IV Push once  dextrose 50% Injectable 25 Gram(s) IV Push once  dorzolamide 2% Ophthalmic Solution 1 Drop(s) Both EYES <User Schedule>  folic acid 1 milliGRAM(s) Oral daily  gabapentin 100 milliGRAM(s) Oral two times a day  glucagon  Injectable 1 milliGRAM(s) IntraMuscular once  heparin   Injectable 5000 Unit(s) SubCutaneous every 12 hours  hydrALAZINE 100 milliGRAM(s) Oral three times a day  insulin lispro (ADMELOG) corrective regimen sliding scale   SubCutaneous three times a day before meals  isosorbide   mononitrate ER Tablet (IMDUR) 30 milliGRAM(s) Oral daily  ivabradine 5 milliGRAM(s) Oral two times a day  latanoprost 0.005% Ophthalmic Solution 1 Drop(s) Both EYES at bedtime  melatonin 5 milliGRAM(s) Oral at bedtime  sevelamer carbonate 800 milliGRAM(s) Oral three times a day  sodium bicarbonate 1300 milliGRAM(s) Oral every 8 hours  sodium chloride 3%. 150 milliLiter(s) (50 mL/Hr) IV Continuous <Continuous>  timolol 0.5% Solution 1 Drop(s) Both EYES two times a day  torsemide 40 milliGRAM(s) Oral daily    MEDICATIONS  (PRN):      REVIEW OF SYSTEMS:          All negative except as mentioned in HPI    PHYSICAL EXAM:           CONSTITUTIONAL: Well-developed; well-nourished; in no acute distress  	SKIN: warm, dry  	HEAD: Normocephalic; atraumatic  	EYES: PERRL.  	ENT: No nasal discharge, airway clear, mucous membranes moist  	NECK: Supple; non tender.  	CARD: +S1, +S2, no murmurs, gallops, or rubs. Regular rate and rhythm    	RESP: No wheezes, rales or rhonchi. CTA B/L  	ABD: soft ntnd, + BS x 4 quadrants  	EXT: moves all extremities,  no clubbing, cyanosis or edema  	NEURO: Alert and oriented x3, no focal deficits          PSYCH: Cooperative, appropriate          VASCULAR:  + Rad / + PTs / +  DPs          EXTREMITY:             Right Groin: access site soft, no hematoma, no pain, + pulses, no sign of infection, no numbness             Right Radial: access site soft, no hematoma, no pain, + pulses, no sign of infection, no numbness            ECG:   < from: 12 Lead ECG (10.01.21 @ 08:07) >  Ventricular Rate 65 BPM    Atrial Rate 65 BPM    P-R Interval 158 ms    QRS Duration 122 ms    Q-T Interval 462 ms    QTC Calculation(Bazett) 480 ms    P Axis 62 degrees    R Axis 106 degrees    T Axis 116 degrees    Diagnosis Line Normal sinus rhythm  Right bundle branch block  Cannot rule out Inferior infarct , age undetermined  T wave abnormality, consider lateral ischemia  Abnormal ECG    Confirmed by KLEVER PEÑA MD ([id number]) on 10/1/2021 12:40:59 PM                                                                                            2D ECHO:  < from: TTE Echo Complete w/o Contrast w/ Doppler (09.30.21 @ 16:45) >  Summary:   1. Moderately decreased segmental left ventricular systolic function.   2. Mid and apical inferior wall, basal and mid inferolateral wall, mid anterolateral segment, and apical lateral segment are abnormal as described above.   3. LV Ejection Fraction by Garcia's Method with a biplane EF of 42 %.   4. Mildly increased LV wall thickness.   5. Mildly enlarged left atrium.   6. Normal right atrial size.   7. Small to moderate pericardial effusion.   8. Mild to moderate mitral valve regurgitation.   9. Moderate tricuspid regurgitation.  10. Mild pulmonic valve regurgitation.  11. Spectral Doppler shows restrictive pattern of left ventricular myocardial filling (Grade III diastolic dysfunction).  12. Mild thickening of the anterior and posterior mitral valve leaflets.    LABS:                        8.1    9.36  )-----------( 188      ( 07 Oct 2021 02:16 )             26.1     10-07    144  |  106  |  70<HH>  ----------------------------<  141<H>  4.1   |  22  |  4.4<HH>    Ca    7.7<L>      07 Oct 2021 02:16  Mg     2.0     10-07    TPro  5.3<L>  /  Alb  2.9<L>  /  TBili  0.3  /  DBili  x   /  AST  9   /  ALT  11  /  AlkPhos  122<H>  10-07    Magnesium, Serum: 2.0 mg/dL [1.8 - 2.4] (10-07-21 @ 02:16)  LIVER FUNCTIONS - ( 07 Oct 2021 02:16 )  Alb: 2.9 g/dL / Pro: 5.3 g/dL / ALK PHOS: 122 U/L / ALT: 11 U/L / AST: 9 U/L / GGT: x           LDL Cholesterol Calculated: 150 mg/dL   A1C with Estimated Average Glucose Result: 6.3    A/P:  I discussed the case with Cardiologist Dr. Singh and recommend the following:  S/P PCI:   Intervention:   -Successful IVUS guided PCI with balloon angioplasty, Intravascular lithotripsy, and drug eluting stent implantation x 1 of proximal LCX.  -Successful IVUS guided PCI with balloon angioplasty, and drug eluting stent implantation x 1 of distal LCX.    Implants:   -3.5x20 mm Synergy stent of prox LCX  -2.5x24 mm Synergy stent of distal LCX                       f/u CHF team rec                    Renal f/u                    Monitor Cr, Hg  	     Continue DAPT ( Aspirin 81 mg daily and Plavix 75 mg daily ), Torsemide, Isosorbide, Hydralazine, B-Blocker, Statin Therapy                     Hold ACEi/ARB due to elevated Cr                   Strict I&O's, daily wts, fluid restriction 1.5 l/day, low sodium diet                   HOLD Metformin for 48 hr after Cardiac Cath                    PT/OOB to chair, ambulate with assistance                   Patient given 30 day supply of ( Aspirin 81 mg daily and Plavix 75 mg daily ) to take at home                   Patient agreeing to take DAPT for at least one year or as directed by cardiologist                    Pt given instructions on importance of taking antiplatelet medication or risk acute stent thrombosis/death                   Post cath instructions, access site care and activity restrictions reviewed with patient                     Discussed with patient to return to hospital if experience chest pain, shortness breath, dizziness and site bleeding                   Aggressive risk factor modification, diet counseling, smoking cessation discussed with patient                       Monitor on TELE, discharge planning

## 2021-10-07 NOTE — PROGRESS NOTE ADULT - REASON FOR ADMISSION
Chest pain
chest pain
s/p PCI SAMMY
Chest pain
cad/chf
Chest pain
chest pain

## 2021-10-07 NOTE — PROGRESS NOTE ADULT - ASSESSMENT
Acute on chronic systolic HF /ICM/ CKD/ Anemia     Patient remains fluid overloaded on exam IVC 2.2 non-collapsing     Give Bumex 2 mg IV push, then start a Bumex gtt at 1 mg/hr stop at 4:00 PM  Give 3% Hypertonic Saline 150ml   Continue Hydralazine   Increase IMDUR to 40 mg daily  Replete potassium and magnesium  Repeat BMP this afternoon, if stable patient can be d/c home from HF   Maintain potassium >4.0, Mg >2.1  Strict intake and output  Daily weight   Plan discussed with primary team  Will continue to follow    Acute on chronic systolic HF /ICM/ CKD/ Anemia     Patient remains fluid overloaded on exam IVC 2.2 non-collapsing     Give Bumex 2 mg IV push, then start a Bumex gtt at 1 mg/hr stop at 4:00 PM  Give 3% Hypertonic Saline 150ml   Continue Hydralazine   Increase IMDUR to 40 mg daily  Replete potassium and magnesium  Repeat BMP this afternoon, if stable patient can be d/c home from HF   Maintain potassium >4.0, Mg >2.1  Strict intake and output  Daily weight   Plan discussed with primary team  Will continue to follow

## 2021-10-07 NOTE — PROGRESS NOTE ADULT - SUBJECTIVE AND OBJECTIVE BOX
Date of Admission: 21      Interval History:    - Patient resting in bed, in NAD  - Renal function improving today      HISTORY OF PRESENT ILLNESS:     Patient is a 56 year old male with PMHx of  HTN, HLD, CKD IV, T2DM on insulin a1c 5.8%, Glaucoma, and CAD s/p PCI in 2021. The patient presented to the ED with complaint of left sided chest pain and difficulty sleeping 2/2 to chest pain and BL LE neuropathy (chronic). The patient states that last night he woke up and from sleep due to burning substernal chest pain, non radiating, no exacerbation or alleviating factors. Of note the patient had recent PCI in 2021. During that admission patient had a prolonged stay in CCU, was treated for acute CHF exacerbation and worsening kidney function. Cath on 21 showed significant 3 vessels disease, LAD, RCA, LCx. CTS was consulted. During preop cardiac MRI patient became SOB. He received bumex and hypertonic saline for diuresis.  CABG was decided against due to increased risk of progression of CKD IV to ESRD.          PAST MEDICAL & SURGICAL HISTORY:  HTN (hypertension)    HLD (hyperlipidemia)    DM (diabetes mellitus)    Glaucoma    No significant past surgical history        FAMILY HISTORY:    Mother:  77 HTN  Father:  at 55 of CKD       SOCIAL HISTORY:      Smokes about 3 cigarettes a week, social alcohol drinking, denies drug use      Allergies    No Known Allergies    Intolerances      PHYSICAL EXAM:    General Appearance: well appearing, normal for age and gender. 	  Neck: normal JVP, no bruit.   Cardiovascular: regular rate and rhythm S1 S2, ++ JVD, No murmurs, BLE edema  Respiratory: Lungs clear to auscultation	  Psychiatry: Alert and oriented x 3, Mood & affect appropriate  Gastrointestinal:  Soft, Non-tender  Skin/Integumen: No rashes, No ecchymoses, No cyanosis	  Neurologic: Non-focal  Musculoskeletal/ extremities: Normal range of motion, No clubbing, cyanosis or edema  Vascular: Peripheral pulses palpable 2+ bilaterally      PREVIOUS DIAGNOSTIC TESTING:      tte 21    Summary:   1. Moderately decreased segmental left ventricular systolic function.   2. Mid and apical inferior wall, basal and mid inferolateral wall, mid anterolateral segment, and apical lateral segment are abnormal as described above.   3. LV Ejection Fraction by Garcia's Method with a biplane EF of 42 %.   4. Mildly increased LV wall thickness.   5. Mildly enlarged left atrium.   6. Normal right atrial size.   7. Small to moderate pericardial effusion.   8. Mild to moderate mitral valve regurgitation.   9. Moderate tricuspid regurgitation.  10. Mild pulmonic valve regurgitation.  11. Spectral Doppler shows restrictive pattern of left ventricular myocardial filling (Grade III diastolic dysfunction).  12. Mild thickening of the anterior and posterior mitral valve leaflets.    	    Home Medications:  aspirin 81 mg oral tablet, chewable: 1 tab(s) orally once a day (01 Oct 2021 10:03)  atorvastatin 40 mg oral tablet: 1 tab(s) orally once a day (01 Oct 2021 10:)  BASAGLAR 100 UNIT/ML KWIKPEN:  (01 Oct 2021 10:)  brimonidine 0.2% ophthalmic solution: 1 drop(s) to each affected eye 2 times a day (01 Oct 2021 10:)  carvedilol 3.125 mg oral tablet: 1 tab(s) orally every 12 hours (01 Oct 2021 10:)  clopidogrel 75 mg oral tablet: 1 tab(s) orally once a day (01 Oct 2021 10:)  dorzolamide-timolol 2%-0.5% preservative-free ophthalmic solution: 1 drop(s) to each affected eye 2 times a day (01 Oct 2021 10:)  latanoprost 0.005% ophthalmic solution: 1 drop(s) to each affected eye once a day (at bedtime) (01 Oct 2021 10:)  pioglitazone-metformin 15 mg-850 mg oral tablet: 1 tab(s) orally 2 times a day  HOLD MEDICATION FOR 48 HR AFTER CARDIAC CATH  (01 Oct 2021 10:)  Rhopressa 0.02% ophthalmic solution: 1 drop(s) to each affected eye once a day (in the evening) (01 Oct 2021 10:)  sevelamer carbonate 800 mg oral tablet: 1 tab(s) orally 3 times a day (with meals) (01 Oct 2021 10:)    MEDICATIONS  (STANDING):  aspirin  chewable 81 milliGRAM(s) Oral daily  atorvastatin 80 milliGRAM(s) Oral at bedtime  brimonidine 0.2% Ophthalmic Solution 1 Drop(s) Both EYES two times a day  buMETAnide Infusion 1 mG/Hr (5 mL/Hr) IV Continuous <Continuous>  buMETAnide Injectable 2 milliGRAM(s) IV Push once  carvedilol 12.5 milliGRAM(s) Oral every 12 hours  clopidogrel Tablet 75 milliGRAM(s) Oral daily  dextrose 40% Gel 15 Gram(s) Oral once  dextrose 5%. 1000 milliLiter(s) (50 mL/Hr) IV Continuous <Continuous>  dextrose 5%. 1000 milliLiter(s) (100 mL/Hr) IV Continuous <Continuous>  dextrose 50% Injectable 25 Gram(s) IV Push once  dextrose 50% Injectable 12.5 Gram(s) IV Push once  dextrose 50% Injectable 25 Gram(s) IV Push once  dextrose 50% Injectable 10 milliLiter(s) IV Push once  dorzolamide 2% Ophthalmic Solution 1 Drop(s) Both EYES <User Schedule>  gabapentin 100 milliGRAM(s) Oral two times a day  glucagon  Injectable 1 milliGRAM(s) IntraMuscular once  heparin   Injectable 5000 Unit(s) SubCutaneous every 12 hours  hydrALAZINE 100 milliGRAM(s) Oral three times a day  insulin lispro (ADMELOG) corrective regimen sliding scale   SubCutaneous three times a day before meals  isosorbide   mononitrate ER Tablet (IMDUR) 30 milliGRAM(s) Oral daily  ivabradine 5 milliGRAM(s) Oral two times a day  latanoprost 0.005% Ophthalmic Solution 1 Drop(s) Both EYES at bedtime  sevelamer carbonate 800 milliGRAM(s) Oral three times a day  sodium chloride 3%. 150 milliLiter(s) (50 mL/Hr) IV Continuous <Continuous>  timolol 0.5% Solution 1 Drop(s) Both EYES two times a day  torsemide 20 milliGRAM(s) Oral two times a day    MEDICATIONS  (PRN):

## 2021-10-07 NOTE — PROGRESS NOTE ADULT - SUBJECTIVE AND OBJECTIVE BOX
Nephrology progress note    Patient is seen and examined, events over the last 24 h noted .  On IV Bumex and 3% NS, feeling better.   Allergies:  No Known Allergies    Hospital Medications:   MEDICATIONS  (STANDING):  aspirin  chewable 81 milliGRAM(s) Oral daily  atorvastatin 80 milliGRAM(s) Oral at bedtime  brimonidine 0.2% Ophthalmic Solution 1 Drop(s) Both EYES two times a day  buMETAnide Infusion 1 mG/Hr (5 mL/Hr) IV Continuous <Continuous>  carvedilol 12.5 milliGRAM(s) Oral every 12 hours  clopidogrel Tablet 75 milliGRAM(s) Oral daily  dextrose 40% Gel 15 Gram(s) Oral once  dextrose 5%. 1000 milliLiter(s) (50 mL/Hr) IV Continuous <Continuous>  dextrose 5%. 1000 milliLiter(s) (100 mL/Hr) IV Continuous <Continuous>  dextrose 50% Injectable 10 milliLiter(s) IV Push once  dextrose 50% Injectable 25 Gram(s) IV Push once  dextrose 50% Injectable 12.5 Gram(s) IV Push once  dextrose 50% Injectable 25 Gram(s) IV Push once  dorzolamide 2% Ophthalmic Solution 1 Drop(s) Both EYES <User Schedule>  folic acid 1 milliGRAM(s) Oral daily  gabapentin 100 milliGRAM(s) Oral two times a day  glucagon  Injectable 1 milliGRAM(s) IntraMuscular once  heparin   Injectable 5000 Unit(s) SubCutaneous every 12 hours  hydrALAZINE 100 milliGRAM(s) Oral three times a day  insulin lispro (ADMELOG) corrective regimen sliding scale   SubCutaneous three times a day before meals  isosorbide   mononitrate ER Tablet (IMDUR) 30 milliGRAM(s) Oral daily  ivabradine 5 milliGRAM(s) Oral two times a day  latanoprost 0.005% Ophthalmic Solution 1 Drop(s) Both EYES at bedtime  melatonin 5 milliGRAM(s) Oral at bedtime  sevelamer carbonate 800 milliGRAM(s) Oral three times a day  sodium bicarbonate 1300 milliGRAM(s) Oral every 8 hours  sodium chloride 3%. 150 milliLiter(s) (50 mL/Hr) IV Continuous <Continuous>  timolol 0.5% Solution 1 Drop(s) Both EYES two times a day  torsemide 40 milliGRAM(s) Oral daily        VITALS:  T(F): 98.2 (10-07-21 @ 12:52), Max: 98.2 (10-07-21 @ 12:52)  HR: 53 (10-07-21 @ 12:52)  BP: 136/76 (10-07-21 @ 12:52)  RR: 18 (10-07-21 @ 12:52)  SpO2: 98% (10-07-21 @ 06:14)  Wt(kg): --    10-05 @ 07:01  -  10-06 @ 07:00  --------------------------------------------------------  IN: 0 mL / OUT: 1350 mL / NET: -1350 mL    10-06 @ 07:01  -  10-07 @ 07:00  --------------------------------------------------------  IN: 580 mL / OUT: 3550 mL / NET: -2970 mL          PHYSICAL EXAM:  Constitutional: NAD  HEENT: anicteric sclera  Neck: No JVD  Respiratory: CTAB,   Cardiovascular: S1, S2 split, RRR  Gastrointestinal: BS+, soft, NT/ND  Extremities: No peripheral edema  Neurological: A/O x 3, no focal deficits  : No CVA tenderness. No harris.   Skin: No rashes  Vascular Access:    LABS:  10-07    144  |  106  |  70<HH>  ----------------------------<  141<H>  4.1   |  22  |  4.4<HH>    Ca    7.7<L>      07 Oct 2021 02:16  Mg     2.0     10-07    TPro  5.3<L>  /  Alb  2.9<L>  /  TBili  0.3  /  DBili      /  AST  9   /  ALT  11  /  AlkPhos  122<H>  10-07                          8.1    9.36  )-----------( 188      ( 07 Oct 2021 02:16 )             26.1       Urine Studies:      RADIOLOGY & ADDITIONAL STUDIES:

## 2021-10-07 NOTE — PROGRESS NOTE ADULT - ASSESSMENT
# LENNY on CKD 4 / r/o ROSENDO / CRS EF 40%  - non oliguric  - creat trending down   - on bumex and 3%NS to be switched to Torsemide 20 bid; cleared for D/C by CHF team  - renal ultrasound noted w/o hydronephrosis  - continue sodium bicarb 1300  mg q8h  - lokelma d/c'd, maintain on low K diet   - phos level noted, cont sevelamer, renal diet    - bp overall controlled, elevated at times, start ACE-i or ARB when creat stabilizes   - monitor hgb, RBC transfusion if hgb < 8, KEVIN if down trending  - does not need RRT but will need it soon as OP  if pt d/c'ed today, needs renal f/u in 1-2 weeks  Will follow

## 2021-10-15 DIAGNOSIS — Z79.02 LONG TERM (CURRENT) USE OF ANTITHROMBOTICS/ANTIPLATELETS: ICD-10-CM

## 2021-10-15 DIAGNOSIS — I25.5 ISCHEMIC CARDIOMYOPATHY: ICD-10-CM

## 2021-10-15 DIAGNOSIS — I36.1 NONRHEUMATIC TRICUSPID (VALVE) INSUFFICIENCY: ICD-10-CM

## 2021-10-15 DIAGNOSIS — I25.110 ATHEROSCLEROTIC HEART DISEASE OF NATIVE CORONARY ARTERY WITH UNSTABLE ANGINA PECTORIS: ICD-10-CM

## 2021-10-15 DIAGNOSIS — E11.42 TYPE 2 DIABETES MELLITUS WITH DIABETIC POLYNEUROPATHY: ICD-10-CM

## 2021-10-15 DIAGNOSIS — I31.3 PERICARDIAL EFFUSION (NONINFLAMMATORY): ICD-10-CM

## 2021-10-15 DIAGNOSIS — D50.9 IRON DEFICIENCY ANEMIA, UNSPECIFIED: ICD-10-CM

## 2021-10-15 DIAGNOSIS — I45.10 UNSPECIFIED RIGHT BUNDLE-BRANCH BLOCK: ICD-10-CM

## 2021-10-15 DIAGNOSIS — I27.20 PULMONARY HYPERTENSION, UNSPECIFIED: ICD-10-CM

## 2021-10-15 DIAGNOSIS — H40.9 UNSPECIFIED GLAUCOMA: ICD-10-CM

## 2021-10-15 DIAGNOSIS — Z84.1 FAMILY HISTORY OF DISORDERS OF KIDNEY AND URETER: ICD-10-CM

## 2021-10-15 DIAGNOSIS — I13.0 HYPERTENSIVE HEART AND CHRONIC KIDNEY DISEASE WITH HEART FAILURE AND STAGE 1 THROUGH STAGE 4 CHRONIC KIDNEY DISEASE, OR UNSPECIFIED CHRONIC KIDNEY DISEASE: ICD-10-CM

## 2021-10-15 DIAGNOSIS — E78.5 HYPERLIPIDEMIA, UNSPECIFIED: ICD-10-CM

## 2021-10-15 DIAGNOSIS — F17.210 NICOTINE DEPENDENCE, CIGARETTES, UNCOMPLICATED: ICD-10-CM

## 2021-10-15 DIAGNOSIS — N17.9 ACUTE KIDNEY FAILURE, UNSPECIFIED: ICD-10-CM

## 2021-10-15 DIAGNOSIS — I45.2 BIFASCICULAR BLOCK: ICD-10-CM

## 2021-10-15 DIAGNOSIS — I34.0 NONRHEUMATIC MITRAL (VALVE) INSUFFICIENCY: ICD-10-CM

## 2021-10-15 DIAGNOSIS — N14.1 NEPHROPATHY INDUCED BY OTHER DRUGS, MEDICAMENTS AND BIOLOGICAL SUBSTANCES: ICD-10-CM

## 2021-10-15 DIAGNOSIS — T50.8X5A ADVERSE EFFECT OF DIAGNOSTIC AGENTS, INITIAL ENCOUNTER: ICD-10-CM

## 2021-10-15 DIAGNOSIS — Z95.5 PRESENCE OF CORONARY ANGIOPLASTY IMPLANT AND GRAFT: ICD-10-CM

## 2021-10-15 DIAGNOSIS — N18.4 CHRONIC KIDNEY DISEASE, STAGE 4 (SEVERE): ICD-10-CM

## 2021-10-15 DIAGNOSIS — Z82.49 FAMILY HISTORY OF ISCHEMIC HEART DISEASE AND OTHER DISEASES OF THE CIRCULATORY SYSTEM: ICD-10-CM

## 2021-10-15 DIAGNOSIS — E11.22 TYPE 2 DIABETES MELLITUS WITH DIABETIC CHRONIC KIDNEY DISEASE: ICD-10-CM

## 2021-10-15 DIAGNOSIS — Z79.82 LONG TERM (CURRENT) USE OF ASPIRIN: ICD-10-CM

## 2021-10-15 DIAGNOSIS — I24.9 ACUTE ISCHEMIC HEART DISEASE, UNSPECIFIED: ICD-10-CM

## 2021-10-15 DIAGNOSIS — I50.23 ACUTE ON CHRONIC SYSTOLIC (CONGESTIVE) HEART FAILURE: ICD-10-CM

## 2021-10-15 DIAGNOSIS — Z79.4 LONG TERM (CURRENT) USE OF INSULIN: ICD-10-CM

## 2021-10-20 ENCOUNTER — APPOINTMENT (OUTPATIENT)
Dept: CARDIOLOGY | Facility: CLINIC | Age: 56
End: 2021-10-20
Payer: MEDICAID

## 2021-10-20 VITALS
BODY MASS INDEX: 25.16 KG/M2 | RESPIRATION RATE: 18 BRPM | DIASTOLIC BLOOD PRESSURE: 80 MMHG | HEART RATE: 68 BPM | HEIGHT: 63 IN | WEIGHT: 142 LBS | SYSTOLIC BLOOD PRESSURE: 144 MMHG | TEMPERATURE: 98.3 F | OXYGEN SATURATION: 98 %

## 2021-10-20 DIAGNOSIS — I50.22 CHRONIC SYSTOLIC (CONGESTIVE) HEART FAILURE: ICD-10-CM

## 2021-10-20 PROCEDURE — 99214 OFFICE O/P EST MOD 30 MIN: CPT

## 2021-10-20 PROCEDURE — 93000 ELECTROCARDIOGRAM COMPLETE: CPT

## 2021-10-20 RX ORDER — ISOSORBIDE DINITRATE 40 MG/1
40 TABLET ORAL EVERY 8 HOURS
Qty: 180 | Refills: 3 | Status: ACTIVE | COMMUNITY
Start: 2021-10-20 | End: 1900-01-01

## 2021-10-20 RX ORDER — HYDRALAZINE HYDROCHLORIDE 100 MG/1
100 TABLET ORAL 3 TIMES DAILY
Qty: 90 | Refills: 3 | Status: ACTIVE | COMMUNITY
Start: 2021-08-10 | End: 1900-01-01

## 2021-10-20 NOTE — HISTORY OF PRESENT ILLNESS
[FreeTextEntry1] : 55 y/o male  w/ PMHx HTN, HLD, CKD, T2DM on insulin, Glaucoma, CAD s/p PCI of LAD and staged PCI of LCx coming for follow up after hospital dc. Patient reports feeling well. No significant SOB, LOC, N/V. He claims to be complaint with low salt diet and meds. His BP log shows readings 140-170s and weight stable around 142 lb.

## 2021-10-31 LAB
ANION GAP SERPL CALC-SCNC: 11 MMOL/L
ANION GAP SERPL CALC-SCNC: 9 MMOL/L
BUN SERPL-MCNC: 32 MG/DL
BUN SERPL-MCNC: 40 MG/DL
CALCIUM SERPL-MCNC: 8.3 MG/DL
CALCIUM SERPL-MCNC: 8.8 MG/DL
CHLORIDE SERPL-SCNC: 105 MMOL/L
CHLORIDE SERPL-SCNC: 108 MMOL/L
CO2 SERPL-SCNC: 24 MMOL/L
CO2 SERPL-SCNC: 25 MMOL/L
CREAT SERPL-MCNC: 2.4 MG/DL
CREAT SERPL-MCNC: 3.1 MG/DL
GLUCOSE SERPL-MCNC: 109 MG/DL
GLUCOSE SERPL-MCNC: 242 MG/DL
MAGNESIUM SERPL-MCNC: 2.1 MG/DL
MAGNESIUM SERPL-MCNC: 2.3 MG/DL
POTASSIUM SERPL-SCNC: 4.6 MMOL/L
POTASSIUM SERPL-SCNC: 5.3 MMOL/L
SODIUM SERPL-SCNC: 141 MMOL/L
SODIUM SERPL-SCNC: 141 MMOL/L

## 2021-11-23 LAB
ALBUMIN SERPL ELPH-MCNC: 3.7 G/DL
ALP BLD-CCNC: 121 U/L
ALT SERPL-CCNC: 19 U/L
ANION GAP SERPL CALC-SCNC: 14 MMOL/L
AST SERPL-CCNC: 19 U/L
BASOPHILS # BLD AUTO: 0.04 K/UL
BASOPHILS NFR BLD AUTO: 0.5 %
BILIRUB SERPL-MCNC: 0.3 MG/DL
BUN SERPL-MCNC: 43 MG/DL
CALCIUM SERPL-MCNC: 8.9 MG/DL
CHLORIDE SERPL-SCNC: 103 MMOL/L
CO2 SERPL-SCNC: 23 MMOL/L
CREAT SERPL-MCNC: 2.8 MG/DL
EOSINOPHIL # BLD AUTO: 0.69 K/UL
EOSINOPHIL NFR BLD AUTO: 7.9 %
GLUCOSE SERPL-MCNC: 137 MG/DL
HCT VFR BLD CALC: 37.4 %
HGB BLD-MCNC: 10.8 G/DL
IMM GRANULOCYTES NFR BLD AUTO: 0.3 %
IRON SATN MFR SERPL: 35 %
IRON SERPL-MCNC: 88 UG/DL
LYMPHOCYTES # BLD AUTO: 1.36 K/UL
LYMPHOCYTES NFR BLD AUTO: 15.6 %
MAN DIFF?: NORMAL
MCHC RBC-ENTMCNC: 23.7 PG
MCHC RBC-ENTMCNC: 28.9 G/DL
MCV RBC AUTO: 82.2 FL
MONOCYTES # BLD AUTO: 0.54 K/UL
MONOCYTES NFR BLD AUTO: 6.2 %
NEUTROPHILS # BLD AUTO: 6.04 K/UL
NEUTROPHILS NFR BLD AUTO: 69.5 %
NT-PROBNP SERPL-MCNC: 9291 PG/ML
PLATELET # BLD AUTO: 214 K/UL
POTASSIUM SERPL-SCNC: 4.1 MMOL/L
PROT SERPL-MCNC: 7.1 G/DL
RBC # BLD: 4.55 M/UL
RBC # FLD: 13.4 %
SODIUM SERPL-SCNC: 140 MMOL/L
TIBC SERPL-MCNC: 255 UG/DL
TRANSFERRIN SERPL-MCNC: 215 MG/DL
UIBC SERPL-MCNC: 167 UG/DL
WBC # FLD AUTO: 8.7 K/UL

## 2021-11-24 LAB — FERRITIN SERPL-MCNC: 352 NG/ML

## 2021-12-06 ENCOUNTER — RX RENEWAL (OUTPATIENT)
Age: 56
End: 2021-12-06

## 2021-12-21 ENCOUNTER — APPOINTMENT (OUTPATIENT)
Dept: CARDIOLOGY | Facility: CLINIC | Age: 56
End: 2021-12-21

## 2022-01-26 ENCOUNTER — APPOINTMENT (OUTPATIENT)
Dept: CARDIOLOGY | Facility: CLINIC | Age: 57
End: 2022-01-26
Payer: MEDICAID

## 2022-01-26 VITALS
BODY MASS INDEX: 26.58 KG/M2 | TEMPERATURE: 97.2 F | HEART RATE: 77 BPM | HEIGHT: 63 IN | OXYGEN SATURATION: 95 % | WEIGHT: 150 LBS | DIASTOLIC BLOOD PRESSURE: 102 MMHG | RESPIRATION RATE: 18 BRPM | SYSTOLIC BLOOD PRESSURE: 177 MMHG

## 2022-01-26 DIAGNOSIS — N18.9 CHRONIC KIDNEY DISEASE, UNSPECIFIED: ICD-10-CM

## 2022-01-26 DIAGNOSIS — I25.10 ATHEROSCLEROTIC HEART DISEASE OF NATIVE CORONARY ARTERY W/OUT ANGINA PECTORIS: ICD-10-CM

## 2022-01-26 DIAGNOSIS — I50.9 HEART FAILURE, UNSPECIFIED: ICD-10-CM

## 2022-01-26 DIAGNOSIS — E11.9 TYPE 2 DIABETES MELLITUS W/OUT COMPLICATIONS: ICD-10-CM

## 2022-01-26 PROCEDURE — 99214 OFFICE O/P EST MOD 30 MIN: CPT

## 2022-01-26 PROCEDURE — 93000 ELECTROCARDIOGRAM COMPLETE: CPT

## 2022-01-26 RX ORDER — CARVEDILOL 25 MG/1
25 TABLET, FILM COATED ORAL TWICE DAILY
Qty: 180 | Refills: 3 | Status: ACTIVE | COMMUNITY
Start: 2021-08-10 | End: 1900-01-01

## 2022-02-04 PROBLEM — N18.9 CHRONIC KIDNEY DISEASE, UNSPECIFIED CKD STAGE: Status: ACTIVE | Noted: 2021-08-16

## 2022-02-04 PROBLEM — I25.10 3-VESSEL CAD: Status: ACTIVE | Noted: 2021-08-16

## 2022-02-04 PROBLEM — E11.9 DIABETES MELLITUS, TYPE 2: Status: ACTIVE | Noted: 2021-08-16

## 2022-02-08 NOTE — HISTORY OF PRESENT ILLNESS
[FreeTextEntry1] : 57 y/o male  w/ PMHx HTN, HLD, CKD, T2DM on insulin, Glaucoma, CAD s/p PCI of LAD and staged PCI of LCx. Here for a follow up.\par \par Patient reports he is able to walk for less than 2 blocks before getting SOB, He is not checking his BP or weight at home, he is unable to work and has no income to make healthy choices when buying his food. Patient denies CP, SOB at rest, PND, abdominal discomfort, LH/dizziness, palpitations, and syncope.

## 2022-03-03 LAB
ALBUMIN SERPL ELPH-MCNC: 2.9 G/DL
ALP BLD-CCNC: 158 U/L
ALT SERPL-CCNC: 23 U/L
ANION GAP SERPL CALC-SCNC: 9 MMOL/L
AST SERPL-CCNC: 18 U/L
BASOPHILS # BLD AUTO: 0.04 K/UL
BASOPHILS NFR BLD AUTO: 0.5 %
BILIRUB SERPL-MCNC: 0.4 MG/DL
BUN SERPL-MCNC: 42 MG/DL
CALCIUM SERPL-MCNC: 8.1 MG/DL
CHLORIDE SERPL-SCNC: 112 MMOL/L
CO2 SERPL-SCNC: 21 MMOL/L
CREAT SERPL-MCNC: 4 MG/DL
EGFR: 17 ML/MIN/1.73M2
EOSINOPHIL # BLD AUTO: 0.57 K/UL
EOSINOPHIL NFR BLD AUTO: 7 %
FERRITIN SERPL-MCNC: 249 NG/ML
GLUCOSE SERPL-MCNC: 135 MG/DL
HCT VFR BLD CALC: 38 %
HGB BLD-MCNC: 10.9 G/DL
IMM GRANULOCYTES NFR BLD AUTO: 0.2 %
IRON SATN MFR SERPL: 31 %
IRON SERPL-MCNC: 72 UG/DL
LYMPHOCYTES # BLD AUTO: 0.9 K/UL
LYMPHOCYTES NFR BLD AUTO: 11.1 %
MAN DIFF?: NORMAL
MCHC RBC-ENTMCNC: 23.9 PG
MCHC RBC-ENTMCNC: 28.7 G/DL
MCV RBC AUTO: 83.2 FL
MONOCYTES # BLD AUTO: 0.62 K/UL
MONOCYTES NFR BLD AUTO: 7.6 %
NEUTROPHILS # BLD AUTO: 5.96 K/UL
NEUTROPHILS NFR BLD AUTO: 73.6 %
PLATELET # BLD AUTO: 221 K/UL
POTASSIUM SERPL-SCNC: 5.2 MMOL/L
PROT SERPL-MCNC: 5.8 G/DL
RBC # BLD: 4.57 M/UL
RBC # FLD: 14.2 %
SODIUM SERPL-SCNC: 142 MMOL/L
TIBC SERPL-MCNC: 231 UG/DL
TRANSFERRIN SERPL-MCNC: 192 MG/DL
UIBC SERPL-MCNC: 159 UG/DL
WBC # FLD AUTO: 8.11 K/UL

## 2022-03-03 RX ORDER — TORSEMIDE 20 MG/1
20 TABLET ORAL
Qty: 240 | Refills: 3 | Status: ACTIVE | COMMUNITY
Start: 2021-08-10 | End: 1900-01-01

## 2022-04-14 ENCOUNTER — APPOINTMENT (OUTPATIENT)
Dept: CARDIOLOGY | Facility: CLINIC | Age: 57
End: 2022-04-14

## 2022-08-16 NOTE — DISCHARGE NOTE NURSING/CASE MANAGEMENT/SOCIAL WORK - NSFLUVACAGEDISCH_IMM_ALL_CORE
Progress Note  PROGRESS NOTE    HISTORY:  Alda Guardado is a 35 year old female s/p robotic retrorectus ventral hernia repair with mesh on 6/23/22 complicated by seroma.    She reports no drainage from umbilicus. She has been compliant with daily packing changes.   She denies fevers or chills    PMH:  Past Medical History:   Diagnosis Date   • Anemia    • Class 3 severe obesity with body mass index (BMI) of 50.0 to 59.9 in adult (CMS/HCC)    • Diabetes mellitus (CMS/HCC)    • Fatty liver    • Hypertension    • Ventral hernia without obstruction or gangrene        PSH:                                  Past Surgical History:   Procedure Laterality Date   • Hernia repair  06/23/2022    Robotic retrorectus repair of ventral hernia with mesh and bilateral creation of posterior myofascial planes   • Ovarian cyst removal Left 08/2018    31 cm left ovarian mass status post left salpingo-oophorectomy in August 2018       Allergies   Allergen Reactions   • Pineapple   (Food Or Med) HIVES       MEDICATIONS:  Current Outpatient Medications   Medication Sig Dispense Refill   • empagliflozin (JARDIANCE) 25 MG tablet Take 1 tablet by mouth daily (before breakfast). 30 tablet 3   • losartan (COZAAR) 100 MG tablet Take 1 tablet by mouth daily. 90 tablet 1   • metFORMIN (GLUCOPHAGE) 500 MG tablet Take 1 tablet by mouth 2 times daily (with meals). 180 tablet 1   • atorvastatin (LIPITOR) 10 MG tablet Take 1 tablet by mouth nightly. 90 tablet 1     No current facility-administered medications for this visit.       SOCIAL HISTORY:  Social History     Socioeconomic History   • Marital status: /Civil Union     Spouse name: Not on file   • Number of children: Not on file   • Years of education: Not on file   • Highest education level: Not on file   Occupational History   • Not on file   Tobacco Use   • Smoking status: Never Smoker   • Smokeless tobacco: Never Used   Vaping Use   • Vaping Use: never used   Substance and Sexual  Activity   • Alcohol use: No   • Drug use: Never   • Sexual activity: Not on file   Other Topics Concern   • Not on file   Social History Narrative   • Not on file     Social Determinants of Health     Financial Resource Strain: Not on file   Food Insecurity: Not on file   Transportation Needs: Not on file   Physical Activity: Not on file   Stress: Not on file   Social Connections: Not on file   Intimate Partner Violence: Not At Risk   • Social Determinants: Intimate Partner Violence Past Fear: No   • Social Determinants: Intimate Partner Violence Current Fear: No       PHYSICAL EXAMINATION:      VITALS:  Visit Vitals  BP (!) 138/98   Pulse 72   Temp 97.3 °F (36.3 °C)   Ht 5' 6\" (1.676 m)   Wt (!) 147.1 kg (324 lb 6.4 oz)   LMP 06/19/2022 (Exact Date)   BMI 52.36 kg/m²       NAD, AOx3  NLB on RA  RRR  ABD soft, ND, NTP  Umbilicus wound is almost closed. The opening is less than 5 mm and too small to continuing packing.    LABS  None    Imaging  No Imaging to review.     ASSESSMENT/PLAN:  Alda Guardado is a 35 year old female s/p robotic retrorectus ventral hernia repair with mesh on 6/23/22 complicated by seroma. The cavity appears to be healing well by secondary intention.     No need to pack the wound. Instructed to keep the wound clean and dry.    She will follow-up with me in 3 weeks at which point I hope the wound be closed completely.    Gadiel Barnes MD  General Surgery   Adult

## 2022-09-29 NOTE — CONSULT NOTE ADULT - ASSESSMENT
Patient is slightly fluid overloaded on exam  Continue to hold diuretics for now  Stop Nifedipine   Start Hydralazine 25 mg TID  Start Isosorbide 20 mg TID  Ischemic workup  Get BMP daily   Maintain potassium >4.0, Mg >2.1  Strict intake and output  Daily weight   plan discussed with primary team  Will continue to follow  Acute on chronic systolic HF / LENNY/ Fluid overload     Patient is slightly fluid overloaded on exam  Continue to hold diuretics for now  Stop Nifedipine   Start metoprolol 25 mg succinate on 7/7  Start Hydralazine 25 mg TID  Start Isosorbide 20 mg TID  Ischemic workup - coronary angiogram   Get BMP daily   Maintain potassium >4.0, Mg >2.1  Strict intake and output  Daily weight   Plan discussed with primary team  Will continue to follow  Bcc Histology Text: There were numerous aggregates of basaloid cells.

## 2022-10-25 NOTE — ED ADULT NURSE NOTE - NSFALLRSKINDICATORS_ED_ALL_ED
Spoke with pt and offers to decrease starting ramp pressure to 5cm with a 30 minute ramp time. Pt refuses stating that ramp time does not matter and she wants the pressure reduced because she cannot breathe out. yes

## 2023-11-16 NOTE — PROGRESS NOTE ADULT - ASSESSMENT
IMPRESSION:  CAD - 3V disease, awaiting CABG  Ischemic cardiomyopathy - patient in fluid overload clinically  Bradycardia  LENNY - stable (ROSENDO vs hypoperfusion from hypotension?)  Elevated LFTs  DM  HTN   HLD    PLAN:    CNS: Avoid sedation    HEENT: Oral care    PULMONARY:    - HOB @ 45 degrees    CARDIOVASCULAR:  - Continue CCU monitoring  - Continue Hydralazine 10mg TID and Isordil 10mg TID; will monitor BP and adjust medications accordingly  - Continue aspirin 81mg daily, Atorvastatin 40mg daily  - Continue carvedilol 6.25mg q12h  - Amiodarone discontinued due to bradycardia  - Cont Bumex 2mg in am. Check I&O in evening. If output is less then 1L then give a second dose; check strict I&Os; keep I<Os;   - Monitor renal function, electrolytes, daily weight and volume status   - Couldn't complete MRI due to orthopnea  - F/U with CT surgery; plan for CABG     GI: GI prophylaxis.  Feeding     RENAL:  - Follow up renal function and lytes.  Correct as needed  - Work-up as recommended by nephrology  - Avoid nephrotoxic agents  - Nephrology following    INFECTIOUS DISEASE: Monitor VS    HEMATOLOGICAL:    - On iron supplementation  - Monitor cbc; watch for signs and symptoms of bleeding  - DVT prophylaxis.    ENDOCRINE:  Follow up FS.  Insulin protocol Detail Level: Detailed IMPRESSION:  CAD - 3V disease, awaiting CABG  Ischemic cardiomyopathy - patient in fluid overload clinically  Bradycardia  LENNY - stable (ROSENDO vs hypoperfusion from hypotension?)  Elevated LFTs  DM  HTN   HLD    PLAN:    CNS: Avoid sedation    HEENT: Oral care    PULMONARY:    - HOB @ 45 degrees    CARDIOVASCULAR:  - Continue CCU monitoring  - Continue Hydralazine 10mg TID and Isordil 10mg TID; will monitor BP and adjust medications accordingly  - Continue aspirin 81mg daily, Atorvastatin 40mg daily  - Decrease carvedilol to 3.125mg q12h  - Amiodarone discontinued due to bradycardia  - Bumex 2mg IV x1 with 150cc of hypertonic saline; check strict I&Os; keep I<Os;   - Monitor renal function, electrolytes, daily weight and volume status   - Couldn't complete MRI due to orthopnea  - F/U with CT surgery; plan for CABG     GI: GI prophylaxis.  Feeding     RENAL:  - Follow up renal function and lytes.  Correct as needed  - Work-up as recommended by nephrology  - Avoid nephrotoxic agents  - Nephrology following    INFECTIOUS DISEASE: Monitor VS    HEMATOLOGICAL:    - On iron supplementation  - Monitor cbc; watch for signs and symptoms of bleeding  - DVT prophylaxis.    ENDOCRINE:  Follow up FS.  Insulin protocol

## 2024-03-27 NOTE — ED ADULT TRIAGE NOTE - HEIGHT IN INCHES
Pt's Brother Haris called requesting medication refill, Morphine and oxycodone. Haris states pt is doing well on the medication and had 1 more day left.     [facility] verified.    3

## 2025-04-30 NOTE — PROGRESS NOTE ADULT - ASSESSMENT
NEUROSURGICAL PROGRESS NOTE    DATE OF SERVICE:  04/30/2025    ATTENDING PHYSICIAN:  Clemente Lindquist MD    SUBJECTIVE:    INTERIM HISTORY:    He is 3 months status post L2-3, L3-4, L4-5 oblique interbody fusion with posterior instrumentation for multilevel degenerative disc disease with foraminal stenosis and central canal stenosis.  The patient is doing well.  He reports complete resolution of his radiculopathy symptoms.  He is able to walk more than before surgery.  He has completed physical therapy.  Using his back brace anymore.  He is complaining of increased paresthesias in his toes compared to before surgery.  When he walks he also noticed that his right leg tends to drag after 5 or 10 minutes.  He does have mild gait imbalance.  Sometimes he has some numbness in his fingers.  He has not dropping things.              PAST MEDICAL HISTORY:  Active Ambulatory Problems     Diagnosis Date Noted    Essential hypertension 03/23/2018    Arthritis of facet joints at multiple vertebral levels 07/20/2011    High cholesterol 03/23/2018    Elevated TSH 03/23/2018    Unspecified inflammatory spondylopathy, multiple sites in spine 03/12/2020    Gout 03/12/2020    Asthma 03/12/2020    History of adenomatous polyps of colon 05/16/2023    Lumbar stenosis with neurogenic claudication 05/16/2023    Foraminal stenosis of lumbar region 01/16/2025    S/P lumbar spinal fusion 01/30/2025    Excessive drinking of alcohol 02/19/2025    Decreased ROM of lumbar spine 03/07/2025    Weakness of right lower extremity 03/07/2025     Resolved Ambulatory Problems     Diagnosis Date Noted    Pain aggravated by standing 03/02/2023     Past Medical History:   Diagnosis Date    Gout     Hypertension        PAST SURGICAL HISTORY:  Past Surgical History:   Procedure Laterality Date    ADENOIDECTOMY      COLONOSCOPY N/A 5/30/2018    Procedure: COLONOSCOPY;  Surgeon: Srikanth Carver Jr., MD;  Location: [facility];  Service: Endoscopy;  Laterality:  N/A;    COLONOSCOPY N/A 9/13/2023    Procedure: COLONOSCOPY;  Surgeon: Devyn Clemens MD;  Location: [facility] ENDO;  Service: Endoscopy;  Laterality: N/A;    COLONOSCOPY W/ POLYPECTOMY  05/30/2018    EYE SURGERY      FUSION OF SPINE WITH INSTRUMENTATION N/A 1/16/2025    Procedure: FUSION, SPINE, WITH INSTRUMENTATION;  Surgeon: Clemente Lindquist MD;  Location: [facility] OR;  Service: Neurosurgery;  Laterality: N/A;  Procedure:L2-3,L3-4,L4-5 OLiF Depuy conduit BMP  Length of procedure: 2.5 hours  Anesthesia:General  Blood:Type and screen  Radiology:C-arm  Brace:TLSO  Bed:Flat top  Position:Lateral right down  Equipment:Depuy-Raf, Orthofix-Bone Stim    LUMBAR FUSION N/A 1/16/2025    Procedure: FUSION, SPINE, LUMBAR;  Surgeon: Clemente Lindquist MD;  Location: [facility] OR;  Service: Neurosurgery;  Laterality: N/A;  Procedure:L2-L5 posterior instrumentation   Length of procedure: 2 hours  LOS: 3 nights  Anesthesia;General  Blood:Type and screen  Radiology:[facility], [facility]  Brace:TLSO  Bed:[place] [id number] Poster  Position:Porne  Equipment:Depuy-Raf, Orthofix-Bone Stim    MOUTH SURGERY  02/2018    REFRACTIVE SURGERY Right     TONSILLECTOMY      TRANSFORAMINAL EPIDURAL INJECTION OF STEROID Bilateral 3/15/2023    Procedure: Injection,steroid,epidural,transforaminal bilateral L2/3;  Surgeon: Brianda Reilly DO;  Location: [facility] PAIN MGT;  Service: Pain Management;  Laterality: Bilateral;    TRANSFORAMINAL EPIDURAL INJECTION OF STEROID Bilateral 8/9/2023    Procedure: bilateral TFESI L3/4;  Surgeon: Sanjeev Rosas MD;  Location: [facility] PAIN MANAGEMENT;  Service: Pain Management;  Laterality: Bilateral;    TRANSFORAMINAL EPIDURAL INJECTION OF STEROID Bilateral 7/18/2024    Procedure: LUMBAR TRANSFORAMINAL BILATERAL L3/4 DIRECT REFERRAL;  Surgeon: Sanjeev Rosas MD;  Location: [facility] PAIN MGT;  Service: Pain Management;  Laterality: Bilateral;  [phone]       SOCIAL HISTORY:   Social History[1]    FAMILY  56M w/ h/o HTN, HLD, CKD IV, T2DM on insulin a1c 5.8% on 7/3/2021, Glaucoma, and CAD s/p PCI in July 2021. The patient presented to the ED with complaint of left sided chest pain and difficulty sleeping 2/2 to  chest pain.     # Chest pain w/ elevated Troponin r/o ACS  # Hx of CAD s/p PCI July 2021  # Hx of HFrEF (40-45%)  - Tele monitoring  - CXR: no evidence of cardiopulmonary disease   - troponin trend: 0.14 ng/mL (09-30-21)<--0.13 ng/mL (09-30-21)<--0.12 ng/mL (09-29-21)  - s/p cath w/ stent to pLCX and dLCX  - TTE 9/30 showed LVEF 42% and moderately decreased segmental left ventricular systolic function  - c/w hydralazine 100 mg TID, isosorbide 20mg TID, torsemide 20 mg BID, increasing carvedilol to 12.5 mg BID   - c/w ASA 81mg OP QD  - c/w atorvastatin 80mg PO QHS  - c/w clopidogrel 75mg PO QD    # Microcytic Anemia  - MCV 76.9, Hb 10.7, denies bleeding  - follow up iron studies    # CKD4  - Nephrologist: Dr. Jan Cabrera  - baseline Cr 2.6- 2.7, at baseline  - c/w abhijit    # HTN (hypertension)  - c/w home meds per above     # HLD (hyperlipidemia)  - LDL (09/30) - 150   - c/w atorvastatin 80mg PO QHS    #DM Type 2  #Diabetic Neuropathy  - A1C - 6.3  - monitor FSG q ACHS, maintain <180  - c/w gabapentin for neuropathy     DVT Prophylaxis: heparin 5000U SQ BID  GI Prophylaxis: none  Diet: DASH/TLC  Activity: IAT  Code Status: Full Code    Dispo: Anticipated for tomorrow HISTORY:  Family History   Problem Relation Name Age of Onset    Alzheimer's disease Mother      Colon cancer Mother      Stroke Father      No Known Problems Son x4        CURRENTS MEDICATIONS:  Medications Ordered Prior to Encounter[2]    ALLERGIES:  Review of patient's allergies indicates:  No Known Allergies    REVIEW OF SYSTEMS:  Review of Systems   Constitutional:  Negative for diaphoresis, fever and weight loss.   Respiratory:  Negative for shortness of breath.    Cardiovascular:  Negative for chest pain.   Gastrointestinal:  Negative for blood in stool.   Genitourinary:  Negative for hematuria.   Endo/Heme/Allergies:  Does not bruise/bleed easily.   All other systems reviewed and are negative.        OBJECTIVE:    PHYSICAL EXAMINATION:   Vitals:    04/30/25 1300   BP: (!) 156/91   Pulse: 65       Physical Exam:  Vitals reviewed.    Constitutional: He appears well-developed and well-nourished.     Eyes: Pupils are equal, round, and reactive to light. Conjunctivae and EOM are normal.     Cardiovascular: Normal distal pulses and no edema.     Abdominal: Soft.     Skin: Skin displays no rash on trunk and no rash on extremities. Skin displays no lesions on trunk and no lesions on extremities.     Psych/Behavior: He is alert. He is oriented to person, place, and time. He has a normal mood and affect.     Musculoskeletal:        Neck: Range of motion is limited.     Neurological:        DTRs: Bicep reflexes are 1+ on the right side and 1+ on the left side. Brachioradialis reflexes are 1+ on the right side and 1+ on the left side. Patellar reflexes are 0 on the right side and 0 on the left side.       Ortho Exam        Neurological Exam  Mental Status  Alert. Oriented to person, place, and time.    Cranial Nerves  CN III, IV, VI: Extraocular movements intact bilaterally. Pupils equal round and reactive to light bilaterally.    Motor   Strength is 5/5 throughout all four extremities.    Reflexes                                             Right                      Left  Brachioradialis                    1+                         1+  Biceps                                 1+                         1+  Patellar                                0                         0    Right pathological reflexes: Elba's absent.  Left pathological reflexes: Elba's absent.    Gait  Casual gait is normal including stance, stride, and arm swing.        DIAGNOSTIC DATA:  I personally interpreted the following imaging:   Scoliosis film shows appropriate alignment, no subsidence of interbody spacer, no lucency around hardware  Cervical spine MRI shows moderate stenosis at C4-5 and C5-6, severe bilateral foraminal stenosis at C3-4    ASSESSMENT:  This is a 75 y.o. male with     Problem List Items Addressed This Visit    None  Visit Diagnoses         Status post lumbar spinal fusion    -  Primary      Cervical spondylosis                  PLAN:  I do not think that he has significant myelopathy at this time.  We will keep following his right leg dragging symptoms and upper extremity symptoms over time.  Deep neck flexor muscle strengthening exercises, chin-tuck explain  Follow up in 3 months with repeat lumbar x-ray  All questions answered    More than 50% of the time was spent on discussing conservative management treatments (medication, physical therapy exercises) and possible interventions (spinal injections and surgical procedures). Care coordination was discussed.    30 min        Clemente Lindquist MD  Cell:[phone]           [1]   Social History  Socioeconomic History    Marital status:    Tobacco Use    Smoking status: Former     Current packs/day: 0.00     Average packs/day: 1 pack/day for 25.0 years (25.0 ttl pk-yrs)     Types: Cigarettes     Start date: 1965     Quit date: 1990     Years since quittin.2    Smokeless tobacco: Former   Substance and Sexual Activity    Alcohol use: Yes     Alcohol/week: 32.0 -  39.0 standard drinks of alcohol     Types: 4 Glasses of wine, 14 - 21 Cans of beer, 14 Shots of liquor per week     Comment: daily    Drug use: No    Sexual activity: Not Currently     Partners: Female     Social Drivers of Health     Financial Resource Strain: Low Risk  (1/17/2025)    Overall Financial Resource Strain (CARDIA)     Difficulty of Paying Living Expenses: Not hard at all   Food Insecurity: No Food Insecurity (1/17/2025)    Hunger Vital Sign     Worried About Running Out of Food in the Last Year: Never true     Ran Out of Food in the Last Year: Never true   Transportation Needs: No Transportation Needs (1/17/2025)    TRANSPORTATION NEEDS     Transportation : No   Physical Activity: Patient Unable To Answer (1/17/2025)    Exercise Vital Sign     Days of Exercise per Week: Patient unable to answer     Minutes of Exercise per Session: Patient unable to answer   Stress: Patient Unable To Answer (1/17/2025)    [employer] - Occupational Stress Questionnaire     Feeling of Stress : Patient unable to answer   Housing Stability: Unknown (1/17/2025)    Housing Stability Vital Sign     Unable to Pay for Housing in the Last Year: No     Homeless in the Last Year: No   [2]   Current Outpatient Medications on File Prior to Visit   Medication Sig Dispense Refill    celecoxib (CELEBREX) 200 MG capsule Take 1 capsule (200 mg total) by mouth 2 (two) times daily. 60 capsule 3    irbesartan (AVAPRO) 300 MG tablet TAKE 1 TABLET EVERY EVENING 90 tablet 3    LUTEIN/ZEAXANTHIN (OCUVITE LUTEIN 25 ORAL) Take 1 tablet by mouth once daily.      NIFEdipine (PROCARDIA-XL) 30 MG (OSM) 24 hr tablet TAKE 1 TABLET ONE TIME DAILY 90 tablet 3     No current facility-administered medications on file prior to visit.